# Patient Record
Sex: FEMALE | Race: BLACK OR AFRICAN AMERICAN | Employment: UNEMPLOYED | URBAN - METROPOLITAN AREA
[De-identification: names, ages, dates, MRNs, and addresses within clinical notes are randomized per-mention and may not be internally consistent; named-entity substitution may affect disease eponyms.]

---

## 2017-01-15 ENCOUNTER — APPOINTMENT (OUTPATIENT)
Dept: GENERAL RADIOLOGY | Age: 57
End: 2017-01-15
Attending: PHYSICIAN ASSISTANT
Payer: MEDICAID

## 2017-01-15 ENCOUNTER — HOSPITAL ENCOUNTER (OUTPATIENT)
Age: 57
Setting detail: OBSERVATION
Discharge: HOME | End: 2017-01-17
Attending: EMERGENCY MEDICINE | Admitting: FAMILY MEDICINE
Payer: MEDICAID

## 2017-01-15 DIAGNOSIS — J20.9 ACUTE BRONCHITIS, UNSPECIFIED ORGANISM: ICD-10-CM

## 2017-01-15 DIAGNOSIS — M62.82 NON-TRAUMATIC RHABDOMYOLYSIS: Primary | ICD-10-CM

## 2017-01-15 DIAGNOSIS — S92.109A: ICD-10-CM

## 2017-01-15 PROBLEM — E86.0 DEHYDRATION: Status: ACTIVE | Noted: 2017-01-15

## 2017-01-15 LAB
ALBUMIN SERPL BCP-MCNC: 3.2 G/DL (ref 3.5–5)
ALBUMIN/GLOB SERPL: 0.8 {RATIO} (ref 1.1–2.2)
ALP SERPL-CCNC: 154 U/L (ref 45–117)
ALT SERPL-CCNC: 26 U/L (ref 12–78)
ANION GAP BLD CALC-SCNC: 7 MMOL/L (ref 5–15)
APPEARANCE UR: ABNORMAL
AST SERPL W P-5'-P-CCNC: 30 U/L (ref 15–37)
BACTERIA URNS QL MICRO: ABNORMAL /HPF
BASOPHILS # BLD AUTO: 0 K/UL (ref 0–0.1)
BASOPHILS # BLD: 0 % (ref 0–1)
BILIRUB SERPL-MCNC: 0.3 MG/DL (ref 0.2–1)
BILIRUB UR QL: NEGATIVE
BUN SERPL-MCNC: 16 MG/DL (ref 6–20)
BUN/CREAT SERPL: 15 (ref 12–20)
CALCIUM SERPL-MCNC: 8.8 MG/DL (ref 8.5–10.1)
CHLORIDE SERPL-SCNC: 103 MMOL/L (ref 97–108)
CK MB CFR SERPL CALC: 0.5 % (ref 0–2.5)
CK MB SERPL-MCNC: 7.9 NG/ML (ref 5–25)
CK SERPL-CCNC: 1447 U/L (ref 26–192)
CO2 SERPL-SCNC: 28 MMOL/L (ref 21–32)
COLOR UR: ABNORMAL
CREAT SERPL-MCNC: 1.06 MG/DL (ref 0.55–1.02)
EOSINOPHIL # BLD: 0 K/UL (ref 0–0.4)
EOSINOPHIL NFR BLD: 0 % (ref 0–7)
EPITH CASTS URNS QL MICRO: ABNORMAL /LPF
ERYTHROCYTE [DISTWIDTH] IN BLOOD BY AUTOMATED COUNT: 13.4 % (ref 11.5–14.5)
GLOBULIN SER CALC-MCNC: 3.9 G/DL (ref 2–4)
GLUCOSE BLD STRIP.AUTO-MCNC: 290 MG/DL (ref 65–100)
GLUCOSE BLD STRIP.AUTO-MCNC: 297 MG/DL (ref 65–100)
GLUCOSE BLD STRIP.AUTO-MCNC: 304 MG/DL (ref 65–100)
GLUCOSE SERPL-MCNC: 278 MG/DL (ref 65–100)
GLUCOSE UR STRIP.AUTO-MCNC: >1000 MG/DL
HCT VFR BLD AUTO: 36.6 % (ref 35–47)
HGB BLD-MCNC: 11.8 G/DL (ref 11.5–16)
HGB UR QL STRIP: ABNORMAL
KETONES UR QL STRIP.AUTO: NEGATIVE MG/DL
LEUKOCYTE ESTERASE UR QL STRIP.AUTO: NEGATIVE
LYMPHOCYTES # BLD AUTO: 30 % (ref 12–49)
LYMPHOCYTES # BLD: 3.1 K/UL (ref 0.8–3.5)
MAGNESIUM SERPL-MCNC: 2.2 MG/DL (ref 1.6–2.4)
MCH RBC QN AUTO: 29 PG (ref 26–34)
MCHC RBC AUTO-ENTMCNC: 32.2 G/DL (ref 30–36.5)
MCV RBC AUTO: 89.9 FL (ref 80–99)
MONOCYTES # BLD: 0.4 K/UL (ref 0–1)
MONOCYTES NFR BLD AUTO: 4 % (ref 5–13)
NEUTS SEG # BLD: 6.6 K/UL (ref 1.8–8)
NEUTS SEG NFR BLD AUTO: 66 % (ref 32–75)
NITRITE UR QL STRIP.AUTO: NEGATIVE
PH UR STRIP: 5.5 [PH] (ref 5–8)
PLATELET # BLD AUTO: 246 K/UL (ref 150–400)
POTASSIUM SERPL-SCNC: 4.6 MMOL/L (ref 3.5–5.1)
PROT SERPL-MCNC: 7.1 G/DL (ref 6.4–8.2)
PROT UR STRIP-MCNC: 100 MG/DL
RBC # BLD AUTO: 4.07 M/UL (ref 3.8–5.2)
RBC #/AREA URNS HPF: ABNORMAL /HPF (ref 0–5)
SERVICE CMNT-IMP: ABNORMAL
SODIUM SERPL-SCNC: 138 MMOL/L (ref 136–145)
SP GR UR REFRACTOMETRY: 1.03 (ref 1–1.03)
TROPONIN I SERPL-MCNC: <0.04 NG/ML
UA: UC IF INDICATED,UAUC: ABNORMAL
UROBILINOGEN UR QL STRIP.AUTO: 0.2 EU/DL (ref 0.2–1)
WBC # BLD AUTO: 10.1 K/UL (ref 3.6–11)
WBC URNS QL MICRO: ABNORMAL /HPF (ref 0–4)

## 2017-01-15 PROCEDURE — 94640 AIRWAY INHALATION TREATMENT: CPT

## 2017-01-15 PROCEDURE — 71020 XR CHEST PA LAT: CPT

## 2017-01-15 PROCEDURE — 80053 COMPREHEN METABOLIC PANEL: CPT | Performed by: PHYSICIAN ASSISTANT

## 2017-01-15 PROCEDURE — 73610 X-RAY EXAM OF ANKLE: CPT

## 2017-01-15 PROCEDURE — 74011000250 HC RX REV CODE- 250: Performed by: FAMILY MEDICINE

## 2017-01-15 PROCEDURE — 75810000053 HC SPLINT APPLICATION

## 2017-01-15 PROCEDURE — 87077 CULTURE AEROBIC IDENTIFY: CPT | Performed by: EMERGENCY MEDICINE

## 2017-01-15 PROCEDURE — 82550 ASSAY OF CK (CPK): CPT | Performed by: PHYSICIAN ASSISTANT

## 2017-01-15 PROCEDURE — 74011250636 HC RX REV CODE- 250/636: Performed by: FAMILY MEDICINE

## 2017-01-15 PROCEDURE — 84484 ASSAY OF TROPONIN QUANT: CPT | Performed by: PHYSICIAN ASSISTANT

## 2017-01-15 PROCEDURE — 87186 SC STD MICRODIL/AGAR DIL: CPT | Performed by: EMERGENCY MEDICINE

## 2017-01-15 PROCEDURE — 96361 HYDRATE IV INFUSION ADD-ON: CPT

## 2017-01-15 PROCEDURE — 74011000250 HC RX REV CODE- 250: Performed by: PHYSICIAN ASSISTANT

## 2017-01-15 PROCEDURE — 96360 HYDRATION IV INFUSION INIT: CPT

## 2017-01-15 PROCEDURE — 81001 URINALYSIS AUTO W/SCOPE: CPT | Performed by: PHYSICIAN ASSISTANT

## 2017-01-15 PROCEDURE — 82962 GLUCOSE BLOOD TEST: CPT

## 2017-01-15 PROCEDURE — 74011636637 HC RX REV CODE- 636/637: Performed by: FAMILY MEDICINE

## 2017-01-15 PROCEDURE — 36415 COLL VENOUS BLD VENIPUNCTURE: CPT | Performed by: PHYSICIAN ASSISTANT

## 2017-01-15 PROCEDURE — 99285 EMERGENCY DEPT VISIT HI MDM: CPT

## 2017-01-15 PROCEDURE — 83735 ASSAY OF MAGNESIUM: CPT | Performed by: PHYSICIAN ASSISTANT

## 2017-01-15 PROCEDURE — 87086 URINE CULTURE/COLONY COUNT: CPT | Performed by: EMERGENCY MEDICINE

## 2017-01-15 PROCEDURE — 74011250637 HC RX REV CODE- 250/637: Performed by: FAMILY MEDICINE

## 2017-01-15 PROCEDURE — 74011250637 HC RX REV CODE- 250/637: Performed by: PHYSICIAN ASSISTANT

## 2017-01-15 PROCEDURE — 85025 COMPLETE CBC W/AUTO DIFF WBC: CPT | Performed by: PHYSICIAN ASSISTANT

## 2017-01-15 PROCEDURE — 77030028224 HC PDNG CST BSNM -A

## 2017-01-15 PROCEDURE — 74011250636 HC RX REV CODE- 250/636: Performed by: PHYSICIAN ASSISTANT

## 2017-01-15 PROCEDURE — 77030013140 HC MSK NEB VYRM -A

## 2017-01-15 PROCEDURE — 65270000029 HC RM PRIVATE

## 2017-01-15 PROCEDURE — 99218 HC RM OBSERVATION: CPT

## 2017-01-15 PROCEDURE — 96372 THER/PROPH/DIAG INJ SC/IM: CPT

## 2017-01-15 RX ORDER — DEXTROSE 50 % IN WATER (D50W) INTRAVENOUS SYRINGE
12.5-25 AS NEEDED
Status: DISCONTINUED | OUTPATIENT
Start: 2017-01-15 | End: 2017-01-17 | Stop reason: HOSPADM

## 2017-01-15 RX ORDER — SODIUM CHLORIDE 0.9 % (FLUSH) 0.9 %
5-10 SYRINGE (ML) INJECTION EVERY 8 HOURS
Status: DISCONTINUED | OUTPATIENT
Start: 2017-01-15 | End: 2017-01-17 | Stop reason: HOSPADM

## 2017-01-15 RX ORDER — INSULIN GLARGINE 100 [IU]/ML
25 INJECTION, SOLUTION SUBCUTANEOUS
Status: DISCONTINUED | OUTPATIENT
Start: 2017-01-15 | End: 2017-01-16

## 2017-01-15 RX ORDER — METFORMIN HYDROCHLORIDE 500 MG/1
500 TABLET, EXTENDED RELEASE ORAL 2 TIMES DAILY WITH MEALS
COMMUNITY
End: 2017-05-24 | Stop reason: SDUPTHER

## 2017-01-15 RX ORDER — SODIUM CHLORIDE 0.9 % (FLUSH) 0.9 %
5-10 SYRINGE (ML) INJECTION AS NEEDED
Status: DISCONTINUED | OUTPATIENT
Start: 2017-01-15 | End: 2017-01-17 | Stop reason: HOSPADM

## 2017-01-15 RX ORDER — IBUPROFEN 400 MG/1
400 TABLET ORAL
Status: DISCONTINUED | OUTPATIENT
Start: 2017-01-15 | End: 2017-01-15

## 2017-01-15 RX ORDER — HYDROCODONE BITARTRATE AND ACETAMINOPHEN 5; 325 MG/1; MG/1
1 TABLET ORAL
Status: COMPLETED | OUTPATIENT
Start: 2017-01-15 | End: 2017-01-15

## 2017-01-15 RX ORDER — SODIUM CHLORIDE 9 MG/ML
150 INJECTION, SOLUTION INTRAVENOUS CONTINUOUS
Status: DISCONTINUED | OUTPATIENT
Start: 2017-01-15 | End: 2017-01-17 | Stop reason: HOSPADM

## 2017-01-15 RX ORDER — INSULIN GLARGINE 100 [IU]/ML
16 INJECTION, SOLUTION SUBCUTANEOUS
Status: DISCONTINUED | OUTPATIENT
Start: 2017-01-15 | End: 2017-01-15

## 2017-01-15 RX ORDER — MAGNESIUM SULFATE 100 %
4 CRYSTALS MISCELLANEOUS AS NEEDED
Status: DISCONTINUED | OUTPATIENT
Start: 2017-01-15 | End: 2017-01-17 | Stop reason: HOSPADM

## 2017-01-15 RX ORDER — PREDNISONE 20 MG/1
20 TABLET ORAL
Status: DISCONTINUED | OUTPATIENT
Start: 2017-01-16 | End: 2017-01-17 | Stop reason: HOSPADM

## 2017-01-15 RX ORDER — IPRATROPIUM BROMIDE AND ALBUTEROL SULFATE 2.5; .5 MG/3ML; MG/3ML
3 SOLUTION RESPIRATORY (INHALATION)
Status: DISCONTINUED | OUTPATIENT
Start: 2017-01-15 | End: 2017-01-16

## 2017-01-15 RX ORDER — INSULIN LISPRO 100 [IU]/ML
INJECTION, SOLUTION INTRAVENOUS; SUBCUTANEOUS
Status: DISCONTINUED | OUTPATIENT
Start: 2017-01-15 | End: 2017-01-15

## 2017-01-15 RX ORDER — ONDANSETRON 2 MG/ML
4 INJECTION INTRAMUSCULAR; INTRAVENOUS
Status: DISCONTINUED | OUTPATIENT
Start: 2017-01-15 | End: 2017-01-17 | Stop reason: HOSPADM

## 2017-01-15 RX ORDER — TRAMADOL HYDROCHLORIDE 50 MG/1
50 TABLET ORAL
Status: DISCONTINUED | OUTPATIENT
Start: 2017-01-15 | End: 2017-01-17 | Stop reason: HOSPADM

## 2017-01-15 RX ORDER — IPRATROPIUM BROMIDE AND ALBUTEROL SULFATE 2.5; .5 MG/3ML; MG/3ML
3 SOLUTION RESPIRATORY (INHALATION)
Status: COMPLETED | OUTPATIENT
Start: 2017-01-15 | End: 2017-01-15

## 2017-01-15 RX ORDER — HEPARIN SODIUM 5000 [USP'U]/ML
5000 INJECTION, SOLUTION INTRAVENOUS; SUBCUTANEOUS EVERY 8 HOURS
Status: DISCONTINUED | OUTPATIENT
Start: 2017-01-15 | End: 2017-01-17 | Stop reason: HOSPADM

## 2017-01-15 RX ORDER — PANTOPRAZOLE SODIUM 40 MG/1
40 TABLET, DELAYED RELEASE ORAL
Status: DISCONTINUED | OUTPATIENT
Start: 2017-01-16 | End: 2017-01-17 | Stop reason: HOSPADM

## 2017-01-15 RX ORDER — ACETAMINOPHEN 325 MG/1
650 TABLET ORAL
Status: DISCONTINUED | OUTPATIENT
Start: 2017-01-15 | End: 2017-01-17 | Stop reason: HOSPADM

## 2017-01-15 RX ORDER — INSULIN LISPRO 100 [IU]/ML
INJECTION, SOLUTION INTRAVENOUS; SUBCUTANEOUS
Status: DISCONTINUED | OUTPATIENT
Start: 2017-01-15 | End: 2017-01-17 | Stop reason: HOSPADM

## 2017-01-15 RX ORDER — PREDNISONE 20 MG/1
20 TABLET ORAL
Status: DISCONTINUED | OUTPATIENT
Start: 2017-01-15 | End: 2017-01-15

## 2017-01-15 RX ORDER — ALBUTEROL SULFATE 0.83 MG/ML
2.5 SOLUTION RESPIRATORY (INHALATION)
Status: DISCONTINUED | OUTPATIENT
Start: 2017-01-15 | End: 2017-01-17 | Stop reason: HOSPADM

## 2017-01-15 RX ORDER — LOSARTAN POTASSIUM 50 MG/1
50 TABLET ORAL DAILY
Status: DISCONTINUED | OUTPATIENT
Start: 2017-01-16 | End: 2017-01-15

## 2017-01-15 RX ADMIN — PREDNISONE 20 MG: 20 TABLET ORAL at 18:27

## 2017-01-15 RX ADMIN — SODIUM CHLORIDE 500 ML: 900 INJECTION, SOLUTION INTRAVENOUS at 13:51

## 2017-01-15 RX ADMIN — TRAMADOL HYDROCHLORIDE 50 MG: 50 TABLET, FILM COATED ORAL at 18:27

## 2017-01-15 RX ADMIN — INSULIN LISPRO 4 UNITS: 100 INJECTION, SOLUTION INTRAVENOUS; SUBCUTANEOUS at 22:13

## 2017-01-15 RX ADMIN — INSULIN LISPRO 5 UNITS: 100 INJECTION, SOLUTION INTRAVENOUS; SUBCUTANEOUS at 18:28

## 2017-01-15 RX ADMIN — HEPARIN SODIUM 5000 UNITS: 5000 INJECTION, SOLUTION INTRAVENOUS; SUBCUTANEOUS at 17:03

## 2017-01-15 RX ADMIN — IPRATROPIUM BROMIDE AND ALBUTEROL SULFATE 3 ML: .5; 2.5 SOLUTION RESPIRATORY (INHALATION) at 19:04

## 2017-01-15 RX ADMIN — IPRATROPIUM BROMIDE AND ALBUTEROL SULFATE 3 ML: .5; 2.5 SOLUTION RESPIRATORY (INHALATION) at 14:17

## 2017-01-15 RX ADMIN — HYDROCODONE BITARTRATE AND ACETAMINOPHEN 1 TABLET: 5; 325 TABLET ORAL at 14:39

## 2017-01-15 RX ADMIN — SODIUM CHLORIDE 150 ML/HR: 900 INJECTION, SOLUTION INTRAVENOUS at 21:53

## 2017-01-15 RX ADMIN — INSULIN GLARGINE 25 UNITS: 100 INJECTION, SOLUTION SUBCUTANEOUS at 22:13

## 2017-01-15 RX ADMIN — GUAIFENESIN 600 MG: 600 TABLET, EXTENDED RELEASE ORAL at 18:27

## 2017-01-15 RX ADMIN — IPRATROPIUM BROMIDE AND ALBUTEROL SULFATE 3 ML: .5; 2.5 SOLUTION RESPIRATORY (INHALATION) at 22:32

## 2017-01-15 RX ADMIN — Medication 10 ML: at 22:14

## 2017-01-15 NOTE — ED TRIAGE NOTES
Pt. C/o of vomiting two days ago,\"I figured it was because I was coughing up flem, color is greenish\"  Pt. Also complains of cramping lower legs with the right legs being worse. Pt. Takes k+ with BP meds. Pt. States she fell yesterday and could not get up.  Laid on the floor from 10pm to 4am.

## 2017-01-15 NOTE — H&P
[address] [facility]   Admission H&P    Date of admission: 1/15/2017    Patient name: Alan Boggs  MRN: [id number]  YOB: 1960  Age: 64 y.o. Primary care provider:  Serenity Patel MD     Source of Information: patient, medical records    Chief complaint:  Rt foot pain, leg cramping, vomiting    History of Present Illness  Alan Boggs is a 64 y.o. female with Hx of DM, HTN, Diabetic Neuropathy, Asthma who presents to the ER complaining of Rt foot pain after falling on the night of 01/13/2017. Patient reports that she was experiencing some nausea with several episodes of NBNB vomiting before the onset of severe cramping in the legs during that night. Patient had to lay down in an attempt to relieve the pain in the legs, but tried to stand and feel on her feet. She could not move or stand from about 10:00 PM to 4:00 AM of the next day. Since then, the patient have been feeling an intense pain in the Rt foot and leg. The pain is worse during ambulation. Patient told ED personnel, that she also had cough with wheezing while laying on the floor. Denies fever, changes in vision, headaches, CP, SOB, diarrhea, dysuria, frequency, hematuria, or any other concern. In the ER, vital signs were unremarkable. Labs were remarkable for Glu 278, Cr 1.06 (baseline 0.66), Alk Phos 154, CK 1447, trop <0.04. Pt was treated with IVF and Splinting of Rt foot. Home Medications   Prior to Admission medications    Medication Sig Start Date End Date Taking? Authorizing Provider   metFORMIN ER (GLUCOPHAGE XR) 500 mg tablet Take 500 mg by mouth two (2) times daily (with meals). Yes Historical Provider   SUMAtriptan (IMITREX) 100 mg tablet Take 1 Tab by mouth once as needed for Migraine for up to 1 dose.  NEED OFFICE VISIT 02/62/28  Yes Serenity Patel MD   VENTOLIN HFA 90 mcg/actuation inhaler TAKE 2 PUFFS BY INHALATION EVERY FOUR (4) HOURS AS NEEDED FOR WHEEZING. 12/15/16 Yes Katt Rust MD   insulin lispro (HUMALOG) 100 unit/mL kwikpen by SubCUTAneous route Before breakfast, lunch, and dinner. Yes Historical Provider   gabapentin (NEURONTIN) 300 mg capsule TAKE 1 CAP BY MOUTH THREE (3) TIMES DAILY. 1/95/29  Yes Katt Rust MD   losartan (COZAAR) 50 mg tablet TAKE 1 TAB BY MOUTH DAILY. INDICATIONS: DIABETIC NEPHROPATHY, HYPERTENSION 0/40/56  Yes Katt Rust MD   LANTUS SOLOSTAR 100 unit/mL (3 mL) pen 30 UNITS AT NIGHT  Patient taking differently: 32 UNITS AT NIGHT 7/27/48  Yes Katt Rust MD   omeprazole (PRILOSEC) 20 mg capsule TAKE 1 CAPSULE BY MOUTH TWICE A DAY 8/51/02  Yes Katt Rust MD   L.ACIDOPH/B. LONG/L. PLANT/B.LAC (PROBIOTIC ACIDOPHILUS BEADS PO) Take 1 Cap by mouth daily. Yes Historical Provider   levocetirizine (XYZAL) 5 mg tablet Take 1 Tab by mouth daily. 0/7/30  Yes Katt Rust MD   torsemide (DEMADEX) 20 mg tablet Take 1 Tab by mouth daily. Indications: EDEMA 6/8/93  Yes Katt Rust MD   promethazine (PHENERGAN) 25 mg tablet Take 1 Tab by mouth every six (6) hours as needed for Nausea. 4/5/27  Yes Katt Rust MD   lipase-protease-amylase 36,000-114,000- 180,000 unit cpDR Take 4 Caps by mouth three (3) times daily (with meals). Take 4 capsules with each meal and 2 capsules with snacks  Patient taking differently: Take  by mouth.  Take 4 capsules with each meal and 2 capsules with snacks 7/1/48  Yes Katt Rust MD       Allergies   Allergies   Allergen Reactions    Ciprofloxacin Cough    Lisinopril Cough    Penicillins Other (comments)     Yeast infection        Past Medical History   Diagnosis Date    Arthritis     Asthma     Diabetes ([address]     GERD (gastroesophageal reflux disease)     Hypertension     Ill-defined condition      pancreatitis       Past Surgical History   Procedure Laterality Date    Aries mammo bi screening incl cad  5/3/12     normal    Pr pancreatic elastase, fecal, qual/semi-quant  1999     growths in prancreatitis    Hx cataract removal Left 10/15    Hx cataract removal Right 10/2016    Hx cholecystectomy  2005 ?  Hx gi  2001 ? gastic tumor removal.     Colonoscopy N/A 11/30/2016     COLONOSCOPY,EGD performed by Kaitlin Parker MD at [facility] ENDOSCOPY       Family History   Problem Relation Age of Onset    Diabetes Mother     Heart Disease Father     Diabetes Brother        Social History   Patient resides  x  Independently      With family care      Assisted living      SNF      Ambulates  x  Independently      With cane       Assisted walker           Alcohol history   x  None     Social     Chronic     Smoking history    None   x  Former smoker     Current smoker     History   Smoking Status    Former Smoker    Types: Cigarettes    Start date: 2/13/2016   [employer] Quit date: 2/29/2016   Smokeless Tobacco    Never Used           Drug history  x  None     Former drug user     Current drug user     Sexual history    Sexually active    x  Not sexually active     Code status  x  Full code     DNR/DNI     Partial    Code status discussed with the patient/caregivers. Review of Systems  See HPI    Physical Exam  Visit Vitals    /75 (BP 1 Location: Left arm, BP Patient Position: At rest)    Pulse 66    Temp 98.2 °F (36.8 °C)    Resp 18    Ht 5' 2\" (1.575 m)    Wt 190 lb (86.2 kg)    SpO2 99%    BMI 34.75 kg/m2        General: No acute distress. Alert. Cooperative. Head: Normocephalic. Atraumatic. Eyes:  Conjunctiva pink. Sclera white. PERRL. Ears:  Ear canals patent. TM non-erythematous. Nose:  Septum midline. Mucosa pink. No drainage. Throat: Mucosa pink. Moist mucous membranes. No tonsillar exudates or erythema. Palate movement equal bilaterally. Neck: Supple. Normal ROM. No stiffness. Respiratory: CTAB. No w/r/r/c.   Cardiovascular: RRR. Normal S1,S2. No m/r/g. Pulses 2+ throughout. GI: + bowel sounds. Nontender. No rebound tenderness or guarding. Nondistended. Extremities: She exhibits edema and tenderness. She exhibits no deformity. Marked right ankle TTP medially without discoloration. Increased pain with ROM. Distal n/v intact. Cap refill brisk. Normothermic. Unable to ambulate secondary to pain. No lesions. Diffuse leg tenderness bilaterally     Musculoskeletal: Full ROM in all extremities. Neuro: CN II-XII grossly intact. Strength 5/5 in all extremities. Sensation intact in all extremities. DTRs 2+ throughout. Skin: Clear. No rashes. No ulcers. : Deferred   Rectal: Deferred       Laboratory Data  Recent Results (from the past 24 hour(s))   GLUCOSE, POC    Collection Time: 01/15/17  1:47 PM   Result Value Ref Range    Glucose (POC) 297 (H) 65 - 100 mg/dL    Performed by Carson Tijerina    CBC WITH AUTOMATED DIFF    Collection Time: 01/15/17  1:49 PM   Result Value Ref Range    WBC 10.1 3.6 - 11.0 K/uL    RBC 4.07 3.80 - 5.20 M/uL    HGB 11.8 11.5 - 16.0 g/dL    HCT 36.6 35.0 - 47.0 %    MCV 89.9 80.0 - 99.0 FL    MCH 29.0 26.0 - 34.0 PG    MCHC 32.2 30.0 - 36.5 g/dL    RDW 13.4 11.5 - 14.5 %    PLATELET 526 326 - 556 K/uL    NEUTROPHILS 66 32 - 75 %    LYMPHOCYTES 30 12 - 49 %    MONOCYTES 4 (L) 5 - 13 %    EOSINOPHILS 0 0 - 7 %    BASOPHILS 0 0 - 1 %    ABS. NEUTROPHILS 6.6 1.8 - 8.0 K/UL    ABS. LYMPHOCYTES 3.1 0.8 - 3.5 K/UL    ABS. MONOCYTES 0.4 0.0 - 1.0 K/UL    ABS. EOSINOPHILS 0.0 0.0 - 0.4 K/UL    ABS.  BASOPHILS 0.0 0.0 - 0.1 K/UL   METABOLIC PANEL, COMPREHENSIVE    Collection Time: 01/15/17  1:49 PM   Result Value Ref Range    Sodium 138 136 - 145 mmol/L    Potassium 4.6 3.5 - 5.1 mmol/L    Chloride 103 97 - 108 mmol/L    CO2 28 21 - 32 mmol/L    Anion gap 7 5 - 15 mmol/L    Glucose 278 (H) 65 - 100 mg/dL    BUN 16 6 - 20 MG/DL    Creatinine 1.06 (H) 0.55 - 1.02 MG/DL    BUN/Creatinine ratio 15 12 - 20      GFR est AA >60 >60 ml/min/1.73m2    GFR est non-AA 54 (L) >60 ml/min/1.73m2    Calcium 8.8 8.5 - 10.1 MG/DL    Bilirubin, total 0.3 0.2 - 1.0 MG/DL    ALT 26 12 - 78 U/L    AST 30 15 - 37 U/L    Alk. phosphatase 154 (H) 45 - 117 U/L    Protein, total 7.1 6.4 - 8.2 g/dL    Albumin 3.2 (L) 3.5 - 5.0 g/dL    Globulin 3.9 2.0 - 4.0 g/dL    A-G Ratio 0.8 (L) 1.1 - 2.2     CK W/ CKMB & INDEX    Collection Time: 01/15/17  1:49 PM   Result Value Ref Range    CK 1447 (H) 26 - 192 U/L    CK - MB 7.9 (H) <3.6 NG/ML    CK-MB Index 0.5 0 - 2.5     TROPONIN I    Collection Time: 01/15/17  1:49 PM   Result Value Ref Range    Troponin-I, Qt. <0.04 <0.05 ng/mL   MAGNESIUM    Collection Time: 01/15/17  1:49 PM   Result Value Ref Range    Magnesium 2.2 1.6 - 2.4 mg/dL   URINALYSIS W/ REFLEX CULTURE    Collection Time: 01/15/17  2:17 PM   Result Value Ref Range    Color YELLOW/STRAW      Appearance CLOUDY (A) CLEAR      Specific gravity 1.027 1.003 - 1.030      pH (UA) 5.5 5.0 - 8.0      Protein 100 (A) NEG mg/dL    Glucose >1000 (A) NEG mg/dL    Ketone NEGATIVE  NEG mg/dL    Bilirubin NEGATIVE  NEG      Blood MODERATE (A) NEG      Urobilinogen 0.2 0.2 - 1.0 EU/dL    Nitrites NEGATIVE  NEG      Leukocyte Esterase NEGATIVE  NEG      WBC 10-20 0 - 4 /hpf    RBC 5-10 0 - 5 /hpf    Epithelial cells FEW FEW /lpf    Bacteria 4+ (A) NEG /hpf    UA:UC IF INDICATED URINE CULTURE ORDERED (A) CNI     GLUCOSE, POC    Collection Time: 01/15/17  5:47 PM   Result Value Ref Range    Glucose (POC) 290 (H) 65 - 100 mg/dL    Performed by Montse Escalante      Imaging    EXAM: XR CHEST PA LAT     INDICATION: cough, congestion     COMPARISON: 2011.     FINDINGS: PA and lateral radiographs of the chest demonstrate clear lungs. The  cardiac and mediastinal contours and pulmonary vascularity are normal. The  bones and soft tissues are within normal limits.      IMPRESSION  IMPRESSION: No acute abnormality        EXAM: XR ANKLE LT MIN 3 V     INDICATION: Trauma. Right leg pain.  Symptoms for 2 days     COMPARISON: None.     FINDINGS: Three views of the left ankle demonstrate a small age indeterminate  fracture dorsal margin of the talar head. There is no other acute osseous or  articular abnormality. The soft tissues are within normal limits.     IMPRESSION  IMPRESSION:   1. Age indeterminate fracture dorsal margin of the talar head. Assessment and Plan   Cathie Héctor is a 64 y.o. female who is admitted for Dehydration. Dehydration with Rhabdomyolysis - s/p GLF due to leg cramping now with elevated CK to 1447. Layed down on floor for ~ 6 hours. - IVF hydration  - Daily CK. Rt Ankle Pain - XR showed a small age indeterminate fracture dorsal margin of the talar head. Pain control with Tylenol prn, if not controlled will consider a stronger option.   - Tylenol PRN  - PT/OT    T2DM - On Lantus 32 units at home, will do 80% 25 units  - Lantus 25u at bedtime  - SSI Regular Sensitivity   - Accuchecks ACHS    Asthma - Possible exacerbation with mild wheezing and cough. - DuoNebs Q4H  - Albuterol PRN  - Prednisone 20 mg QDay x 5 days  - Mucinex 600 mg BID    GERD -  Stable  - Protonix 40 mg AM    HTN - Stable   - Hold Losartan 50 mg  until baseline renal function    LE Edema - Stable   - Hold Torsemide 20 mg daily until baseline renal function      FEN/GI - Regular Diabetic Diet. 0.9% NS at 150 mL/hr. Activity - Ambulate with Assistance, Fall Precautions   DVT prophylaxis - Heparin and SCDs  GI prophylaxis -  Zofran  Disposition - Plan to d/c to Home.     CODE STATUS:  FULL CODE       Patient to be discussed with Dr. Iva Kumar, [address] Problems  Date Reviewed: 7/6/2016          Codes Class Noted POA    * (Principal)Dehydration ICD-10-CM: E86.0  ICD-9-CM: 276.51  1/15/2017 Yes        UTI (urinary tract infection) ICD-10-CM: N39.0  ICD-9-CM: 599.0  7/14/2016 Yes

## 2017-01-15 NOTE — IP AVS SNAPSHOT
[address] 
[phone] Patient: Yousuf Gutierrez MRN: [id number] CQ You are allergic to the following Allergen Reactions Ciprofloxacin Cough Lisinopril Cough Penicillins Other (comments) Yeast infection Recent Documentation Height Weight Breastfeeding? BMI OB Status Smoking Status 1.575 m 86.2 kg No 34.75 kg/m2 Postmenopausal Former Smoker Unresulted Labs Order Current Status CULTURE, BLOOD, PERIPHERAL Preliminary result CULTURE, URINE Preliminary result Emergency Contacts Name Discharge Info Relation Home Work Mobile Francis Purvis DISCHARGE CAREGIVER [3] Other Relative [6] [phone] About your hospitalization You were admitted on:  January 15, 2017 You last received care in the:  [facility] 4M POST SURG ORT 2 You were discharged on:  2017 Unit phone number:  [phone] Why you were hospitalized Your primary diagnosis was:  Dehydration Your diagnoses also included:  Uti (Urinary Tract Infection), Diabetic Neuropathy (Hcc), Essential Hypertension, Traumatic Rhabdomyolysis (Hcc), Closed Fracture Of Right Talus, Synovial Cyst Of Right Knee, Mild Intermittent Asthma Without Complication, Diabetes (Hcc) Providers Seen During Your Hospitalizations Provider Role Specialty Primary office phone Joana Aragon MD Attending Provider Emergency Medicine [phone] Dotty Baker DO Attending Provider [facility] [phone] Rafa Bauer MD Attending Provider Family Practice [phone] Your Primary Care Physician (PCP) Primary Care Physician Office Phone Office Fax [facility] [phone] Follow-up Information Follow up With Details Comments Contact Info An Arredondo MD Schedule an appointment as soon as possible for a visit in 2 days Hopsital follow-up, Check BP (if low we added HCTZ 12.5mg), blood glucose check  [address] [facility] [address] [facility] 
[phone] [facility] [address] 
[phone] Current Discharge Medication List  
  
START taking these medications Dose & Instructions Dispensing Information Comments Morning Noon Evening Bedtime  
 acetaminophen 325 mg tablet Commonly known as:  TYLENOL Your next dose is: Today, Tomorrow Other:  _________ Dose:  650 mg Take 2 Tabs by mouth every six (6) hours as needed. Quantity:  40 Tab Refills:  0  
     
   
   
   
  
 cephALEXin 500 mg capsule Commonly known as:  Rick Mendez Your next dose is: Today, Tomorrow Other:  _________ Dose:  500 mg Take 1 Cap by mouth every twelve (12) hours for 4 days. Quantity:  8 Cap Refills:  0  
     
   
   
   
  
 guaiFENesin  mg SR tablet Commonly known as:  [employer] Your next dose is: Today, Tomorrow Other:  _________ Dose:  600 mg Take 1 Tab by mouth two (2) times a day. Quantity:  30 Tab Refills:  0  
     
   
   
   
  
 hydroCHLOROthiazide 12.5 mg tablet Commonly known as:  HYDRODIURIL Your next dose is: Today, Tomorrow Other:  _________ Dose:  12.5 mg Take 1 Tab by mouth daily. Quantity:  30 Tab Refills:  0 HYDROcodone-acetaminophen 5-325 mg per tablet Commonly known as:  Rogelio Handlekris Your next dose is: Today, Tomorrow Other:  _________ Dose:  1 Tab Take 1 Tab by mouth every eight (8) hours as needed. Max Daily Amount: 3 Tabs. Quantity:  5 Tab Refills:  0  
     
   
   
   
  
 predniSONE 20 mg tablet Commonly known as:  Shawn Gun Your next dose is: Today, Tomorrow Other:  _________ Dose:  20 mg Take 1 Tab by mouth daily (with breakfast) for 3 days. Quantity:  3 Tab Refills:  0 CONTINUE these medications which have CHANGED Dose & Instructions Dispensing Information Comments Morning Noon Evening Bedtime LANTUS SOLOSTAR 100 unit/mL (3 mL) pen Generic drug:  insulin glargine What changed:  See the new instructions. Your next dose is: Today, Tomorrow Other:  _________  
   
   
 30 UNITS AT NIGHT Quantity:  15 mL Refills:  3  
     
   
   
   
  
 lipase-protease-amylase 36,000-114,000- 180,000 unit Cpdr  
What changed:   
- how much to take - when to take this 
- additional instructions Your next dose is: Today, Tomorrow Other:  _________ Dose:  4 Cap Take 4 Caps by mouth three (3) times daily (with meals). Take 4 capsules with each meal and 2 capsules with snacks Quantity:  480 Cap Refills:  12 CONTINUE these medications which have NOT CHANGED Dose & Instructions Dispensing Information Comments Morning Noon Evening Bedtime  
 gabapentin 300 mg capsule Commonly known as:  NEURONTIN Your next dose is: Today, Tomorrow Other:  _________ TAKE 1 CAP BY MOUTH THREE (3) TIMES DAILY. Quantity:  90 Cap Refills:  5  
     
   
   
   
  
 insulin lispro 100 unit/mL kwikpen Commonly known as:  HUMALOG Your next dose is: Today, Tomorrow Other:  _________  
   
   
 by SubCUTAneous route Before breakfast, lunch, and dinner. Refills:  0  
     
   
   
   
  
 levocetirizine 5 mg tablet Commonly known as:  Rosa Isela Malu Your next dose is: Today, Tomorrow Other:  _________ Dose:  5 mg Take 1 Tab by mouth daily. Quantity:  30 Tab Refills:  12  
     
   
   
   
  
 losartan 50 mg tablet Commonly known as:  COZAAR Your next dose is: Today, Tomorrow Other:  _________ TAKE 1 TAB BY MOUTH DAILY. INDICATIONS: DIABETIC NEPHROPATHY, HYPERTENSION Quantity:  30 Tab Refills:  5  
     
   
   
   
  
 metFORMIN  mg tablet Commonly known as:  GLUCOPHAGE XR Your next dose is: Today, Tomorrow Other:  _________ Dose:  500 mg Take 500 mg by mouth two (2) times daily (with meals). Refills:  0  
     
   
   
   
  
 omeprazole 20 mg capsule Commonly known as:  PRILOSEC Your next dose is: Today, Tomorrow Other:  _________ TAKE 1 CAPSULE BY MOUTH TWICE A DAY Quantity:  60 Cap Refills:  3 PROBIOTIC ACIDOPHILUS BEADS PO Your next dose is: Today, Tomorrow Other:  _________ Dose:  1 Cap Take 1 Cap by mouth daily. Refills:  0  
     
   
   
   
  
 promethazine 25 mg tablet Commonly known as:  PHENERGAN Your next dose is: Today, Tomorrow Other:  _________ Dose:  25 mg Take 1 Tab by mouth every six (6) hours as needed for Nausea. Quantity:  20 Tab Refills:  2 SUMAtriptan 100 mg tablet Commonly known as:  IMITREX Your next dose is: Today, Tomorrow Other:  _________ Dose:  100 mg Take 1 Tab by mouth once as needed for Migraine for up to 1 dose. NEED OFFICE VISIT Quantity:  9 Tab Refills:  0  
     
   
   
   
  
 torsemide 20 mg tablet Commonly known as:  DEMADEX Your next dose is: Today, Tomorrow Other:  _________ Dose:  20 mg Take 1 Tab by mouth daily. Indications: EDEMA Quantity:  30 Tab Refills:  5 VENTOLIN HFA 90 mcg/actuation inhaler Generic drug:  albuterol Your next dose is: Today, Tomorrow Other:  _________ TAKE 2 PUFFS BY INHALATION EVERY FOUR (4) HOURS AS NEEDED FOR WHEEZING. Quantity:  18 Inhaler Refills:  0 NEED OFFICE VISIT Where to Get Your Medications Information on where to get these meds will be given to you by the nurse or doctor. ! Ask your nurse or doctor about these medications  
  acetaminophen 325 mg tablet  
 cephALEXin 500 mg capsule  
 guaiFENesin  mg SR tablet  
 hydroCHLOROthiazide 12.5 mg tablet HYDROcodone-acetaminophen 5-325 mg per tablet  
 predniSONE 20 mg tablet Discharge Instructions HOME DISCHARGE INSTRUCTIONS Kumar Jordan / [id number] : 1960 Admission date: 1/15/2017 Discharge date: 2017 Please bring this form with you to show your care provider at your follow-up appointment. Primary care provider:  Neptali Pleitez MD 
 
Discharging provider: Kristyn Love DO  - Family Medicine Resident Dr. Donald Nickerson MD - Attending, Family Medicine You have been admitted to the hospital with the following diagnoses: 
 
ACUTE DIAGNOSES: 
UTI (urinary tract infection) Sarah Richter . . . . . . . . . . . . . . . . . . . . . . . . . . . . . . . . . . . . . . . . . . . . . . . . . . . . . . . . . . . . . . . . . . . . . . Sarah Richter FOLLOW-UP CARE RECOMMENDATIONS: 
- You were found to have a fracture on imaging.  
- You can follow up with your PCP or with Ortho. Both numbers have been provided - You can take ibuprofen or tylenol for pain. You have been provided some pain medications. Follow up with PCP for further pain management 
 
-For your asthma, we placed you of 3 more days of Prednisone. Important to take and important to check your blood sugars. This medication can increase blood sugars, therefore add lantus accordingly. This is why you need to see Dr. Matt Manrique with in 2 days, so you can discuss your blood sugars with her. 
 
- Check your blood pressure at home, and also follow up with your PCP. If you feel dizzy or lightheaded, tell your PCP. Your blood pressures were slightly high here so we added HCTZ 12.5 mg to your current Losartan - Check Blood Glucose, you are on prednisone, would make your blood sugar higher. If high, then add 2 units for your lantus.  
 
- You had a possible infection found in your urine. You were started on an antibiotic for this and have 4 more days of Keflex, take twice per day. Follow-up tests needed: Blood glucose, CBC Pending test results: At the time of your discharge the following test results are still pending: none. Please make sure you review these results with your outpatient follow-up provider(s). Specific symptoms to watch for: chest pain, shortness of breath, fever (100.4 Farenheit or greater), chills, nausea, vomiting, diarrhea, change in mentation, falling, weakness, bleeding. DIET/what to eat:  Diabetic Diet ACTIVITY:  Activity as tolerated What to do if new or unexpected symptoms occur? If you experience any of the above symptoms (or should other concerns or questions arise after discharge) please call your primary care physician. Return to the emergency room if you cannot get hold of your doctor. · It is very important that you keep your follow-up appointment(s). · Please bring discharge papers, medication list (and/or medication bottles) to your follow-up appointments for review by your outpatient provider(s). · Please check the list of medications and be sure it includes every medication (even non-prescription medications) that your provider wants you to take. · It is important that you take the medication exactly as they are prescribed. · Keep your medication in the bottles provided by the pharmacist and keep a list of the medication names, dosages, and times to be taken in your wallet. · Do not take other medications without consulting your doctor. · If you have any questions about your medications or other instructions, please talk to your nurse or care provider before you leave the hospital.  
 
Information obtained by: I understand that if any problems occur once I am at home I am to contact my physician. These instructions were explained to me and I had the opportunity to ask questions. I understand and acknowledge receipt of the instructions indicated above. Physician's or R.N.'s Signature                                                                  Date/Time Patient or Representative Signature                                                          Date/Time Follow-up Information Follow up With Details Comments Contact Info Hien Shane MD Schedule an appointment as soon as possible for a visit in 2 days Hopsital follow-up, Check BP (if low we added HCTZ 12.5mg), blood glucose check  [address] [facility] [address] [facility] 
[phone] [facility] [address] 
[phone] Discharge Orders None [employer] Announcement We are excited to announce that we are making your provider's discharge notes available to you in [employer]. You will see these notes when they are completed and signed by the physician that discharged you from your recent hospital stay. If you have any questions or concerns about any information you see in [employer], please call the Health Information Department where you were seen or reach out to your Primary Care Provider for more information about your plan of care. Introducing [facility]! Leda Crockett introduces [employer] patient portal. Now you can access parts of your medical record, email your doctor's office, and request medication refills online.    
 
1. In your internet browser, go to [email or web address] 2. Click on the First Time User? Click Here link in the Sign In box. You will see the New Member Sign Up page. 3. Enter your [employer] Access Code exactly as it appears below. You will not need to use this code after youve completed the sign-up process. If you do not sign up before the expiration date, you must request a new code. · [employer] Access Code: [email or web address] Expires: 2/28/2017 10:51 AM 
 
4. Enter the last four digits of your Social Security Number (xxxx) and Date of Birth (mm/dd/yyyy) as indicated and click Submit. You will be taken to the next sign-up page. 5. Create a [employer] ID. This will be your [employer] login ID and cannot be changed, so think of one that is secure and easy to remember. 6. Create a [employer] password. You can change your password at any time. 7. Enter your Password Reset Question and Answer. This can be used at a later time if you forget your password. 8. Enter your e-mail address. You will receive e-mail notification when new information is available in [address] 9. Click Sign Up. You can now view and download portions of your medical record. 10. Click the Download Summary menu link to download a portable copy of your medical information. If you have questions, please visit the Frequently Asked Questions section of the [employer] website. Remember, [employer] is NOT to be used for urgent needs. For medical emergencies, dial 911. Now available from your iPhone and Android! General Information Please provide this summary of care documentation to your next provider. Patient Signature:  ____________________________________________________________ Date:  ____________________________________________________________  
  
Bootjack Karla Provider Signature:  ____________________________________________________________ Date:  ____________________________________________________________

## 2017-01-15 NOTE — ED NOTES
Attempted to call report, unable to receive at this time      5:00pm pt's leg was splinted by Hermelinda Console, neurovascular was intact before and after.

## 2017-01-15 NOTE — ED NOTES
TRANSFER - OUT REPORT:    Verbal report given to RONAN Paula(name) on Lazarus Don  being transferred to 4th floor(unit) for routine progression of care       Report consisted of patients Situation, Background, Assessment and   Recommendations(SBAR). Information from the following report(s) SBAR, Kardex, ED Summary, [facility] and Recent Results was reviewed with the receiving nurse. Lines:   Peripheral IV 01/15/17 Right Antecubital (Active)   Site Assessment Clean, dry, & intact 1/15/2017  2:16 PM   Phlebitis Assessment 0 1/15/2017  2:16 PM   Infiltration Assessment 0 1/15/2017  2:16 PM   Dressing Status Clean, dry, & intact 1/15/2017  2:16 PM        Opportunity for questions and clarification was provided. All of pt's belongings, valuables, and medications were sent with patient.     Patient transported with:   XTWIP

## 2017-01-15 NOTE — PROGRESS NOTES
[facility]: MED RECONCILIATION    Comments: Interviewed patient. RX Query reviewed. Allergies: Ciprofloxacin; Lisinopril; and Penicillins      Prior to Admission Medications   Prescriptions Last Dose Informant Patient Reported? Taking? L.ACIDOPH/B. LONG/L. PLANT/B.LAC (PROBIOTIC ACIDOPHILUS BEADS PO) 2017 Self Yes Yes   Sig: Take 1 Cap by mouth daily. LANTUS SOLOSTAR 100 unit/mL (3 mL) pen 2017 at HS Self No Yes   Si UNITS AT NIGHT   Patient taking differently: 32 UNITS AT NIGHT   SUMAtriptan (IMITREX) 100 mg tablet 2016 Self No Yes   Sig: Take 1 Tab by mouth once as needed for Migraine for up to 1 dose. NEED OFFICE VISIT   VENTOLIN HFA 90 mcg/actuation inhaler  Self No Yes   Sig: TAKE 2 PUFFS BY INHALATION EVERY FOUR (4) HOURS AS NEEDED FOR WHEEZING. gabapentin (NEURONTIN) 300 mg capsule 2017 Self No Yes   Sig: TAKE 1 CAP BY MOUTH THREE (3) TIMES DAILY. insulin lispro (HUMALOG) 100 unit/mL kwikpen 2017 Self Yes Yes   Sig: by SubCUTAneous route Before breakfast, lunch, and dinner. levocetirizine (XYZAL) 5 mg tablet 2017 Self No Yes   Sig: Take 1 Tab by mouth daily. lipase-protease-amylase 36,000-114,000- 180,000 unit cpDR 2017 Self No Yes   Sig: Take 4 Caps by mouth three (3) times daily (with meals). Take 4 capsules with each meal and 2 capsules with snacks   losartan (COZAAR) 50 mg tablet 1/15/2017 Self No Yes   Sig: TAKE 1 TAB BY MOUTH DAILY. INDICATIONS: DIABETIC NEPHROPATHY, HYPERTENSION   metFORMIN ER (GLUCOPHAGE XR) 500 mg tablet 2017  Yes Yes   Sig: Take 500 mg by mouth two (2) times daily (with meals). omeprazole (PRILOSEC) 20 mg capsule 2017 Self No Yes   Sig: TAKE 1 CAPSULE BY MOUTH TWICE A DAY   promethazine (PHENERGAN) 25 mg tablet  Self No Yes   Sig: Take 1 Tab by mouth every six (6) hours as needed for Nausea. torsemide (DEMADEX) 20 mg tablet 2017 Self No Yes   Sig: Take 1 Tab by mouth daily.  Indications: EDEMA           Dada M Ginger Vazquez   Contact: [id number]

## 2017-01-15 NOTE — ED PROVIDER NOTES
HPI Comments: Tova Jay is a 64 y.o. female who presents to the ED with a c/o leg pain. Pt states 2 days ago (1/13/17) multiple episodes of nausea and vomiting onset that gradually decreased throughout the day. Pt states that as the day progressed, she began experiencing worsening muscle cramps and spasms that she could visualize bilaterally through her legs and body. Pt experienced a GLF that night due to the spasms, and the pain was so great that she was unable to get up from [id number] the following morning. Pt complains of residual right ankle pain since then. Pt complains of significant leg pain with ambulation bilaterally that is greater over the right leg. Pt also states she developed a cough with wheezing while she was lying on the ground that causes chest pain. Pt denies taking medications for her symptoms. She specifically denies any fevers, chills, shortness of breath, headache, rash, diarrhea, sweating or weight loss. PCP: Eduardo Latham. John Cobian MD  PMHx significant for: DM, HTN, asthma, pancreatitis, arthritis and GERD  PSHx significant for: cataract removal, cholecystectomy, gastric tumor removal, colonoscopy  Social Hx: Tobacco: former tobacco user EtOH: uses socially Illicit drug use: denies    There are no further complaints or symptoms at this time. Note written by Margarette Blancas, as dictated by Charly Faith. RADHA Whitt 12:58 PM      The history is provided by the patient. Past Medical History:   Diagnosis Date    Arthritis     Asthma     Diabetes ([address]     GERD (gastroesophageal reflux disease)     Hypertension     Ill-defined condition      pancreatitis       Past Surgical History:   Procedure Laterality Date    Aries mammo bi screening incl cad  5/3/12     normal    Pr pancreatic elastase, fecal, qual/semi-quant  1999     growths in prancreatitis    Hx cataract removal Left 10/15    Hx cataract removal Right 10/2016    Hx cholecystectomy  2005 ?  Hx gi  2001 ? gastic tumor removal.     Colonoscopy N/A 11/30/2016     COLONOSCOPY,EGD performed by Oz Bales MD at [address]         Family History:   Problem Relation Age of Onset    Diabetes Mother     Heart Disease Father     Diabetes Brother        Social History     Social History    Marital status: LEGALLY      Spouse name: N/A    Number of children: N/A    Years of education: N/A     Occupational History    Not on file. Social History Main Topics    Smoking status: Former Smoker     Types: Cigarettes     Start date: 2/13/2016     Quit date: 2/29/2016    Smokeless tobacco: Never Used    Alcohol use No      Comment: social    Drug use: No    Sexual activity: Not Currently     Other Topics Concern    Not on file     Social History Narrative         ALLERGIES: Ciprofloxacin; Lisinopril; and Penicillins    Review of Systems   Constitutional: Positive for activity change. Negative for chills and fever. HENT: Negative for congestion, rhinorrhea, sneezing and sore throat. Eyes: Negative for redness and visual disturbance. Respiratory: Positive for cough and wheezing. Negative for shortness of breath. Cardiovascular: Positive for chest pain. Negative for leg swelling. Gastrointestinal: Positive for nausea and vomiting. Negative for abdominal pain and constipation. Genitourinary: Negative for difficulty urinating and frequency. Musculoskeletal: Positive for arthralgias, gait problem and myalgias. Negative for back pain and neck stiffness. Skin: Negative for rash. Neurological: Negative for dizziness, syncope, weakness and headaches. Hematological: Negative for adenopathy.        Patient Vitals for the past 12 hrs:   Temp Pulse Resp BP SpO2   01/15/17 1904 - - - - 98 %   01/15/17 1742 98.2 °F (36.8 °C) 66 18 178/75 99 %   01/15/17 1653 98 °F (36.7 °C) 73 16 167/54 99 %   01/15/17 1530 - - - 158/73 99 %   01/15/17 1520 - - - 165/63 98 %   01/15/17 1228 97.8 °F (36.6 °C) - 18 144/88 100 %              Physical Exam   Constitutional: She is oriented to person, place, and time. She appears well-developed and well-nourished. No distress. HENT:   Head: Normocephalic and atraumatic. Right Ear: External ear normal.   Left Ear: External ear normal.   Nose: Nose normal.   Mouth/Throat: Oropharynx is clear and moist. No oropharyngeal exudate. Neck: Neck supple. Cardiovascular: Normal rate, regular rhythm, normal heart sounds and intact distal pulses. Exam reveals no gallop and no friction rub. No murmur heard. Pulmonary/Chest: Effort normal and breath sounds normal. No stridor. No respiratory distress. She has no wheezes. She has no rales. She exhibits no tenderness. Bronchial cough    Abdominal: Soft. Bowel sounds are normal. She exhibits no distension and no mass. There is no tenderness. There is no rebound and no guarding. Musculoskeletal: She exhibits edema and tenderness. She exhibits no deformity. Marked right ankle TTP medially without discoloration. Increased pain with ROM. Distal n/v intact. Cap refill brisk. Normothermic. Unable to ambulate secondary to pain. No lesions. Diffuse leg tenderness bilaterally   Neurological: She is alert and oriented to person, place, and time. No cranial nerve deficit. Coordination normal.   Skin: No rash noted. No erythema. No pallor. Psychiatric: She has a normal mood and affect. Her behavior is normal.   Nursing note and vitals reviewed.        MDM  Number of Diagnoses or Management Options  Acute bronchitis, unspecified organism:   Closed fracture of talus, unspecified portion of talus, initial encounter:   Non-traumatic rhabdomyolysis:      Amount and/or Complexity of Data Reviewed  Clinical lab tests: ordered and reviewed  Tests in the radiology section of CPT®: reviewed and ordered  Tests in the medicine section of CPT®: ordered and reviewed  Obtain history from someone other than the patient: yes  Review and summarize past medical records: yes  Independent visualization of images, tracings, or specimens: yes    Patient Progress  Patient progress: stable    ED Course       Procedures  2:07 PM  Discussed pt, sx, hx and current findings with Dr. Ysabel Pandey. He is in agreement with plan  Sobia Kuo. RADHA Whitt    3:30 PM  Sobia Kuo. RADHA Whitt spoke with [facility] resident. Discussed available diagnostic tests and clinical findings. He is in agreement with care plans as outlined. He will admit  Lauryn Park PA-C    Procedure Note - Splint Placement:  4:22 PM  Performed by: ELEANOR tech, checked and readjusted by Sobia Kuo. RADHA Whitt  Neurovascularly intact prior to tx. An Orthoglass posterior splint and sugar tong was placed on pt's right ankle. Joint was placed in neutral position. Neurovascularly intact after tx. The procedure took 1-15 minutes, and pt tolerated well. Sobia Kuo. RADHA Whitt    LABORATORY TESTS:  Recent Results (from the past 12 hour(s))   GLUCOSE, POC    Collection Time: 01/15/17  1:47 PM   Result Value Ref Range    Glucose (POC) 297 (H) 65 - 100 mg/dL    Performed by Gilbert Renee    CBC WITH AUTOMATED DIFF    Collection Time: 01/15/17  1:49 PM   Result Value Ref Range    WBC 10.1 3.6 - 11.0 K/uL    RBC 4.07 3.80 - 5.20 M/uL    HGB 11.8 11.5 - 16.0 g/dL    HCT 36.6 35.0 - 47.0 %    MCV 89.9 80.0 - 99.0 FL    MCH 29.0 26.0 - 34.0 PG    MCHC 32.2 30.0 - 36.5 g/dL    RDW 13.4 11.5 - 14.5 %    PLATELET 126 760 - 734 K/uL    NEUTROPHILS 66 32 - 75 %    LYMPHOCYTES 30 12 - 49 %    MONOCYTES 4 (L) 5 - 13 %    EOSINOPHILS 0 0 - 7 %    BASOPHILS 0 0 - 1 %    ABS. NEUTROPHILS 6.6 1.8 - 8.0 K/UL    ABS. LYMPHOCYTES 3.1 0.8 - 3.5 K/UL    ABS. MONOCYTES 0.4 0.0 - 1.0 K/UL    ABS. EOSINOPHILS 0.0 0.0 - 0.4 K/UL    ABS.  BASOPHILS 0.0 0.0 - 0.1 K/UL   METABOLIC PANEL, COMPREHENSIVE    Collection Time: 01/15/17  1:49 PM   Result Value Ref Range    Sodium 138 136 - 145 mmol/L    Potassium 4.6 3.5 - 5.1 mmol/L    Chloride 103 97 - 108 mmol/L    CO2 28 21 - 32 mmol/L    Anion gap 7 5 - 15 mmol/L    Glucose 278 (H) 65 - 100 mg/dL    BUN 16 6 - 20 MG/DL    Creatinine 1.06 (H) 0.55 - 1.02 MG/DL    BUN/Creatinine ratio 15 12 - 20      GFR est AA >60 >60 ml/min/1.73m2    GFR est non-AA 54 (L) >60 ml/min/1.73m2    Calcium 8.8 8.5 - 10.1 MG/DL    Bilirubin, total 0.3 0.2 - 1.0 MG/DL    ALT 26 12 - 78 U/L    AST 30 15 - 37 U/L    Alk. phosphatase 154 (H) 45 - 117 U/L    Protein, total 7.1 6.4 - 8.2 g/dL    Albumin 3.2 (L) 3.5 - 5.0 g/dL    Globulin 3.9 2.0 - 4.0 g/dL    A-G Ratio 0.8 (L) 1.1 - 2.2     CK W/ CKMB & INDEX    Collection Time: 01/15/17  1:49 PM   Result Value Ref Range    CK 1447 (H) 26 - 192 U/L    CK - MB 7.9 (H) <3.6 NG/ML    CK-MB Index 0.5 0 - 2.5     TROPONIN I    Collection Time: 01/15/17  1:49 PM   Result Value Ref Range    Troponin-I, Qt. <0.04 <0.05 ng/mL   MAGNESIUM    Collection Time: 01/15/17  1:49 PM   Result Value Ref Range    Magnesium 2.2 1.6 - 2.4 mg/dL   URINALYSIS W/ REFLEX CULTURE    Collection Time: 01/15/17  2:17 PM   Result Value Ref Range    Color YELLOW/STRAW      Appearance CLOUDY (A) CLEAR      Specific gravity 1.027 1.003 - 1.030      pH (UA) 5.5 5.0 - 8.0      Protein 100 (A) NEG mg/dL    Glucose >1000 (A) NEG mg/dL    Ketone NEGATIVE  NEG mg/dL    Bilirubin NEGATIVE  NEG      Blood MODERATE (A) NEG      Urobilinogen 0.2 0.2 - 1.0 EU/dL    Nitrites NEGATIVE  NEG      Leukocyte Esterase NEGATIVE  NEG      WBC 10-20 0 - 4 /hpf    RBC 5-10 0 - 5 /hpf    Epithelial cells FEW FEW /lpf    Bacteria 4+ (A) NEG /hpf    UA:UC IF INDICATED URINE CULTURE ORDERED (A) CNI     GLUCOSE, POC    Collection Time: 01/15/17  5:47 PM   Result Value Ref Range    Glucose (POC) 290 (H) 65 - 100 mg/dL    Performed by Montse Escalante        IMAGING RESULTS:     Study Result      EXAM: XR CHEST PA LAT     INDICATION: cough, congestion     COMPARISON: 2011.     FINDINGS: PA and lateral radiographs of the chest demonstrate clear lungs. The  cardiac and mediastinal contours and pulmonary vascularity are normal. The  bones and soft tissues are within normal limits.      IMPRESSION  IMPRESSION: No acute abnormality              Study Result      EXAM: XR ANKLE LT MIN 3 V     INDICATION: Trauma. Right leg pain. Symptoms for 2 days     COMPARISON: None.     FINDINGS: Three views of the left ankle demonstrate a small age indeterminate  fracture dorsal margin of the talar head. There is no other acute osseous or  articular abnormality. The soft tissues are within normal limits.     IMPRESSION  IMPRESSION:   1.  Age indeterminate fracture dorsal margin of the talar head.                 MEDICATIONS GIVEN:  Medications   sodium chloride (NS) flush 5-10 mL (not administered)   sodium chloride (NS) flush 5-10 mL (not administered)   ondansetron (ZOFRAN) injection 4 mg (not administered)   heparin (porcine) injection 5,000 Units (5,000 Units SubCUTAneous Given 1/15/17 1703)   glucose chewable tablet 16 g (not administered)   dextrose (D50W) injection syrg 12.5-25 g (not administered)   glucagon (GLUCAGEN) injection 1 mg (not administered)   0.9% sodium chloride infusion (not administered)   insulin lispro (HUMALOG) injection (5 Units SubCUTAneous Given 1/15/17 1828)   insulin glargine (LANTUS) injection 25 Units (not administered)   albuterol-ipratropium (DUO-NEB) 2.5 MG-0.5 MG/3 ML (3 mL Nebulization Given 1/15/17 1904)   guaiFENesin SR (MUCINEX) tablet 600 mg (600 mg Oral Given 1/15/17 1827)   traMADol (ULTRAM) tablet 50 mg (50 mg Oral Given 1/15/17 1827)   acetaminophen (TYLENOL) tablet 650 mg (not administered)   predniSONE (DELTASONE) tablet 20 mg (not administered)   albuterol (PROVENTIL VENTOLIN) nebulizer solution 2.5 mg (not administered)   albuterol-ipratropium (DUO-NEB) 2.5 MG-0.5 MG/3 ML (3 mL Nebulization Given 1/15/17 1417)   sodium chloride 0.9 % bolus infusion 500 mL (500 mL IntraVENous New Bag 1/15/17 1351)   HYDROcodone-acetaminophen (Tiny Lopez) 5-325 mg per tablet 1 Tab (1 Tab Oral Given 1/15/17 8843)       IMPRESSION:  1. Non-traumatic rhabdomyolysis    2. Closed fracture of talus, unspecified portion of talus, initial encounter    3. Acute bronchitis, unspecified organism        PLAN:  1. Admit to hospital     4:11 PM   Pt is being admitted to the hospital by [facility]. All available results and reasons for admission have been discussed with pt and/or available family. Pt and/or family convey agreement and understanding of need for admission and of admission diagnosis. Charly Faith.  RADHA Whitt

## 2017-01-16 ENCOUNTER — APPOINTMENT (OUTPATIENT)
Dept: GENERAL RADIOLOGY | Age: 57
End: 2017-01-16
Attending: FAMILY MEDICINE
Payer: MEDICAID

## 2017-01-16 PROBLEM — M71.21 SYNOVIAL CYST OF RIGHT KNEE: Status: ACTIVE | Noted: 2017-01-16

## 2017-01-16 PROBLEM — S92.101A CLOSED FRACTURE OF RIGHT TALUS: Status: ACTIVE | Noted: 2017-01-16

## 2017-01-16 PROBLEM — T79.6XXA TRAUMATIC RHABDOMYOLYSIS (HCC): Status: ACTIVE | Noted: 2017-01-16

## 2017-01-16 LAB
ANION GAP BLD CALC-SCNC: 9 MMOL/L (ref 5–15)
BASOPHILS # BLD AUTO: 0 K/UL (ref 0–0.1)
BASOPHILS # BLD: 0 % (ref 0–1)
BUN SERPL-MCNC: 20 MG/DL (ref 6–20)
BUN/CREAT SERPL: 19 (ref 12–20)
CALCIUM SERPL-MCNC: 8.4 MG/DL (ref 8.5–10.1)
CHLORIDE SERPL-SCNC: 105 MMOL/L (ref 97–108)
CK SERPL-CCNC: 759 U/L (ref 26–192)
CO2 SERPL-SCNC: 22 MMOL/L (ref 21–32)
CREAT SERPL-MCNC: 1.03 MG/DL (ref 0.55–1.02)
EOSINOPHIL # BLD: 0 K/UL (ref 0–0.4)
EOSINOPHIL NFR BLD: 0 % (ref 0–7)
ERYTHROCYTE [DISTWIDTH] IN BLOOD BY AUTOMATED COUNT: 13.4 % (ref 11.5–14.5)
GLUCOSE BLD STRIP.AUTO-MCNC: 188 MG/DL (ref 65–100)
GLUCOSE BLD STRIP.AUTO-MCNC: 263 MG/DL (ref 65–100)
GLUCOSE BLD STRIP.AUTO-MCNC: 319 MG/DL (ref 65–100)
GLUCOSE BLD STRIP.AUTO-MCNC: 467 MG/DL (ref 65–100)
GLUCOSE SERPL-MCNC: 343 MG/DL (ref 65–100)
HCT VFR BLD AUTO: 33.7 % (ref 35–47)
HGB BLD-MCNC: 10.7 G/DL (ref 11.5–16)
LYMPHOCYTES # BLD AUTO: 18 % (ref 12–49)
LYMPHOCYTES # BLD: 1.3 K/UL (ref 0.8–3.5)
MCH RBC QN AUTO: 28.7 PG (ref 26–34)
MCHC RBC AUTO-ENTMCNC: 31.8 G/DL (ref 30–36.5)
MCV RBC AUTO: 90.3 FL (ref 80–99)
MONOCYTES # BLD: 0.1 K/UL (ref 0–1)
MONOCYTES NFR BLD AUTO: 1 % (ref 5–13)
NEUTS SEG # BLD: 6.1 K/UL (ref 1.8–8)
NEUTS SEG NFR BLD AUTO: 81 % (ref 32–75)
PLATELET # BLD AUTO: 218 K/UL (ref 150–400)
POTASSIUM SERPL-SCNC: 5.2 MMOL/L (ref 3.5–5.1)
RBC # BLD AUTO: 3.73 M/UL (ref 3.8–5.2)
SERVICE CMNT-IMP: ABNORMAL
SODIUM SERPL-SCNC: 136 MMOL/L (ref 136–145)
WBC # BLD AUTO: 7.6 K/UL (ref 3.6–11)

## 2017-01-16 PROCEDURE — 74011250636 HC RX REV CODE- 250/636: Performed by: FAMILY MEDICINE

## 2017-01-16 PROCEDURE — 73610 X-RAY EXAM OF ANKLE: CPT

## 2017-01-16 PROCEDURE — 74011000250 HC RX REV CODE- 250: Performed by: FAMILY MEDICINE

## 2017-01-16 PROCEDURE — 74011250637 HC RX REV CODE- 250/637: Performed by: FAMILY MEDICINE

## 2017-01-16 PROCEDURE — 82962 GLUCOSE BLOOD TEST: CPT

## 2017-01-16 PROCEDURE — 65270000029 HC RM PRIVATE

## 2017-01-16 PROCEDURE — 74011636637 HC RX REV CODE- 636/637: Performed by: FAMILY MEDICINE

## 2017-01-16 PROCEDURE — 85025 COMPLETE CBC W/AUTO DIFF WBC: CPT | Performed by: FAMILY MEDICINE

## 2017-01-16 PROCEDURE — 77030013140 HC MSK NEB VYRM -A

## 2017-01-16 PROCEDURE — 36415 COLL VENOUS BLD VENIPUNCTURE: CPT | Performed by: FAMILY MEDICINE

## 2017-01-16 PROCEDURE — 82550 ASSAY OF CK (CPK): CPT | Performed by: FAMILY MEDICINE

## 2017-01-16 PROCEDURE — 87040 BLOOD CULTURE FOR BACTERIA: CPT | Performed by: FAMILY MEDICINE

## 2017-01-16 PROCEDURE — 94640 AIRWAY INHALATION TREATMENT: CPT

## 2017-01-16 PROCEDURE — 73590 X-RAY EXAM OF LOWER LEG: CPT

## 2017-01-16 PROCEDURE — 99218 HC RM OBSERVATION: CPT

## 2017-01-16 PROCEDURE — 93970 EXTREMITY STUDY: CPT

## 2017-01-16 PROCEDURE — 80048 BASIC METABOLIC PNL TOTAL CA: CPT | Performed by: FAMILY MEDICINE

## 2017-01-16 RX ORDER — IPRATROPIUM BROMIDE AND ALBUTEROL SULFATE 2.5; .5 MG/3ML; MG/3ML
3 SOLUTION RESPIRATORY (INHALATION)
Status: DISCONTINUED | OUTPATIENT
Start: 2017-01-16 | End: 2017-01-17 | Stop reason: HOSPADM

## 2017-01-16 RX ORDER — OXYCODONE HYDROCHLORIDE 5 MG/1
5 TABLET ORAL ONCE
Status: COMPLETED | OUTPATIENT
Start: 2017-01-16 | End: 2017-01-16

## 2017-01-16 RX ORDER — OMEPRAZOLE 20 MG/1
20 CAPSULE, DELAYED RELEASE ORAL DAILY
Status: DISCONTINUED | OUTPATIENT
Start: 2017-01-16 | End: 2017-01-16 | Stop reason: SDUPTHER

## 2017-01-16 RX ORDER — INSULIN LISPRO 100 [IU]/ML
10 INJECTION, SOLUTION INTRAVENOUS; SUBCUTANEOUS ONCE
Status: COMPLETED | OUTPATIENT
Start: 2017-01-16 | End: 2017-01-16

## 2017-01-16 RX ORDER — CEPHALEXIN 250 MG/1
500 CAPSULE ORAL EVERY 12 HOURS
Status: DISCONTINUED | OUTPATIENT
Start: 2017-01-16 | End: 2017-01-17 | Stop reason: HOSPADM

## 2017-01-16 RX ORDER — GABAPENTIN 300 MG/1
300 CAPSULE ORAL 3 TIMES DAILY
Status: DISCONTINUED | OUTPATIENT
Start: 2017-01-16 | End: 2017-01-16

## 2017-01-16 RX ORDER — PROMETHAZINE HYDROCHLORIDE 25 MG/1
25 TABLET ORAL
Status: DISCONTINUED | OUTPATIENT
Start: 2017-01-16 | End: 2017-01-17 | Stop reason: HOSPADM

## 2017-01-16 RX ORDER — GABAPENTIN 300 MG/1
300 CAPSULE ORAL 3 TIMES DAILY
Status: DISCONTINUED | OUTPATIENT
Start: 2017-01-16 | End: 2017-01-17 | Stop reason: HOSPADM

## 2017-01-16 RX ORDER — HYDROCHLOROTHIAZIDE 25 MG/1
12.5 TABLET ORAL DAILY
Status: DISCONTINUED | OUTPATIENT
Start: 2017-01-16 | End: 2017-01-17 | Stop reason: HOSPADM

## 2017-01-16 RX ORDER — HYDROCODONE BITARTRATE AND ACETAMINOPHEN 5; 325 MG/1; MG/1
1 TABLET ORAL
Status: DISCONTINUED | OUTPATIENT
Start: 2017-01-16 | End: 2017-01-17 | Stop reason: HOSPADM

## 2017-01-16 RX ORDER — LOSARTAN POTASSIUM 50 MG/1
50 TABLET ORAL DAILY
Status: DISCONTINUED | OUTPATIENT
Start: 2017-01-16 | End: 2017-01-17 | Stop reason: HOSPADM

## 2017-01-16 RX ORDER — INSULIN GLARGINE 100 [IU]/ML
34 INJECTION, SOLUTION SUBCUTANEOUS
Status: DISCONTINUED | OUTPATIENT
Start: 2017-01-17 | End: 2017-01-17

## 2017-01-16 RX ORDER — INSULIN GLARGINE 100 [IU]/ML
10 INJECTION, SOLUTION SUBCUTANEOUS
Status: DISCONTINUED | OUTPATIENT
Start: 2017-01-16 | End: 2017-01-16

## 2017-01-16 RX ORDER — TORSEMIDE 20 MG/1
20 TABLET ORAL DAILY
Status: DISCONTINUED | OUTPATIENT
Start: 2017-01-16 | End: 2017-01-17 | Stop reason: HOSPADM

## 2017-01-16 RX ADMIN — PREDNISONE 20 MG: 20 TABLET ORAL at 08:09

## 2017-01-16 RX ADMIN — CEPHALEXIN 500 MG: 250 CAPSULE ORAL at 21:34

## 2017-01-16 RX ADMIN — INSULIN LISPRO 2 UNITS: 100 INJECTION, SOLUTION INTRAVENOUS; SUBCUTANEOUS at 16:30

## 2017-01-16 RX ADMIN — Medication 10 ML: at 06:35

## 2017-01-16 RX ADMIN — IPRATROPIUM BROMIDE AND ALBUTEROL SULFATE 3 ML: .5; 2.5 SOLUTION RESPIRATORY (INHALATION) at 19:38

## 2017-01-16 RX ADMIN — OXYCODONE HYDROCHLORIDE 5 MG: 5 TABLET ORAL at 10:56

## 2017-01-16 RX ADMIN — TRAMADOL HYDROCHLORIDE 50 MG: 50 TABLET, FILM COATED ORAL at 02:22

## 2017-01-16 RX ADMIN — HEPARIN SODIUM 5000 UNITS: 5000 INJECTION, SOLUTION INTRAVENOUS; SUBCUTANEOUS at 08:09

## 2017-01-16 RX ADMIN — Medication 5 ML: at 14:00

## 2017-01-16 RX ADMIN — INSULIN LISPRO 10 UNITS: 100 INJECTION, SOLUTION INTRAVENOUS; SUBCUTANEOUS at 23:03

## 2017-01-16 RX ADMIN — HYDROCHLOROTHIAZIDE 12.5 MG: 25 TABLET ORAL at 17:41

## 2017-01-16 RX ADMIN — INSULIN LISPRO 7 UNITS: 100 INJECTION, SOLUTION INTRAVENOUS; SUBCUTANEOUS at 08:09

## 2017-01-16 RX ADMIN — IPRATROPIUM BROMIDE AND ALBUTEROL SULFATE 3 ML: .5; 2.5 SOLUTION RESPIRATORY (INHALATION) at 14:01

## 2017-01-16 RX ADMIN — HEPARIN SODIUM 5000 UNITS: 5000 INJECTION, SOLUTION INTRAVENOUS; SUBCUTANEOUS at 16:22

## 2017-01-16 RX ADMIN — GABAPENTIN 300 MG: 300 CAPSULE ORAL at 08:08

## 2017-01-16 RX ADMIN — GUAIFENESIN 600 MG: 600 TABLET, EXTENDED RELEASE ORAL at 08:08

## 2017-01-16 RX ADMIN — INSULIN LISPRO 5 UNITS: 100 INJECTION, SOLUTION INTRAVENOUS; SUBCUTANEOUS at 12:07

## 2017-01-16 RX ADMIN — HYDROCODONE BITARTRATE AND ACETAMINOPHEN 1 TABLET: 5; 325 TABLET ORAL at 16:22

## 2017-01-16 RX ADMIN — PANTOPRAZOLE SODIUM 40 MG: 40 TABLET, DELAYED RELEASE ORAL at 06:35

## 2017-01-16 RX ADMIN — IPRATROPIUM BROMIDE AND ALBUTEROL SULFATE 3 ML: .5; 2.5 SOLUTION RESPIRATORY (INHALATION) at 03:46

## 2017-01-16 RX ADMIN — GABAPENTIN 300 MG: 300 CAPSULE ORAL at 16:22

## 2017-01-16 RX ADMIN — Medication 10 ML: at 21:35

## 2017-01-16 RX ADMIN — HYDROCODONE BITARTRATE AND ACETAMINOPHEN 1 TABLET: 5; 325 TABLET ORAL at 23:03

## 2017-01-16 RX ADMIN — GUAIFENESIN 600 MG: 600 TABLET, EXTENDED RELEASE ORAL at 17:41

## 2017-01-16 RX ADMIN — LOSARTAN POTASSIUM 50 MG: 50 TABLET, FILM COATED ORAL at 08:08

## 2017-01-16 RX ADMIN — HEPARIN SODIUM 5000 UNITS: 5000 INJECTION, SOLUTION INTRAVENOUS; SUBCUTANEOUS at 00:09

## 2017-01-16 RX ADMIN — TORSEMIDE 20 MG: 20 TABLET ORAL at 08:08

## 2017-01-16 RX ADMIN — GABAPENTIN 300 MG: 300 CAPSULE ORAL at 21:34

## 2017-01-16 NOTE — PROGRESS NOTES
01/16/17 0918   Chart and Patient  Assessment   Pulmonary History 3   Surgical History 0   Chest X-ray 0   Respiratory Pattern 0   Mental Status 0   Breath Sounds 2   Cough 0   Level of Activity/Mobility 1   Respiratory Assessment Total Score 6   Date Last Assessed 01/16/17     Neb frequency changed to Q6WA & PRN per RT Protocol.

## 2017-01-16 NOTE — CDMP QUERY
1. Dr. Ashvin Roe,    Good morning, this query is to ask for further specificity of the rhabdomyolysis which was POA. Could this be further specified as:     =>Traumatic Rhabdomyolysis in the setting of GLF treated with IVF hydration  =>Other Explanation of clinical findings  =>Unable to Determine (no explanation of clinical findings)    The medical record reflects the following clinical findings, treatment, and risk factors:    Risk Factors: indeterminate fracture dorsal margin of the talar head. Clinical Indicators: CK on adm 1447; Down on floor for 6 hrs after fall; dehydration POA    Treatment: Monitor labs, IVF hydration as ordered, monitor VS, I/O    Please clarify and document your clinical opinion in the progress notes and discharge summary including the definitive and/or presumptive diagnosis, (suspected or probable), related to the above clinical findings. Please include clinical findings supporting your diagnosis.     Thank you,  Stella Nance, MSN, [address] [facility], [facility]  [phone]

## 2017-01-16 NOTE — PROGRESS NOTES
Orders received and chart reviewed. Patient awaiting dopplers due to calf pain to rule out DVT. Will defer PT until results of dopplers known. Will follow. Nursing aware.

## 2017-01-16 NOTE — CDMP QUERY
2. Per the [facility] [employer], \"Individuals who are overweight, obese, or morbidly obese are at an increased risk for certain medical conditions when compared to persons of normal weight. Therefore, these conditions are always clinically significant and reportable. \"    Patient is noted to have a BMI of 34.75  kg/m2 ( 5'2\", 190 lbs ). Please clarify if this patient is:     =>Morbidly obese  =>Obese   =>Overweight  =>Other explanation of clinical findings  =>Unable to determine (no explanation for clinical findings)    Please clarify and document your clinical opinion in the progress notes and discharge summary, including the definitive and or presumptive diagnosis, (suspected or probable), related to the above clinical findings. Please include clinical findings supporting your diagnosis.      Thank you,  Vicente Iyer, MSN, [address] [facility], [facility]  [phone]

## 2017-01-16 NOTE — PROGRESS NOTES
CM note:    Met with pt. Confirmed demographics. Pt reports that she lives alone. Reports no DME at home and no hx of home health. CM to follow for dc needs. Thanks.   Alessandra Bowen, \[facility] [place] [facility]\""JAMAAL    Care Management Interventions  Current Support Network: Own Home, Lives Alone  Confirm Follow Up Transport: Family  Discharge Location  Discharge Placement: Home

## 2017-01-16 NOTE — PROGRESS NOTES
[facility] MEDICINE RESIDENCY PROGRAM  PROGRESS NOTE       PCP: Alyssa Guajardo MD     Assessment/Plan:   Lis Valencia is a 64 y.o. female who is admitted for Dehydration.     Dehydration with Rhabdomyolysis -  Improving. CK to 1447 to 759. Layed down on floor for ~ 6 hours. - IVF hydration  - Daily CK.     Rt Ankle Pain - XR showed a small age indeterminate fracture dorsal margin of the talar head. - Tylenol for mild pain, Tramadol for moderate pain, and Norco 5 for severe pain PRN  - PT/OT     T2DM - On Lantus 32 units at home, will do 80% 25 units  - Lantus 25u at bedtime  - SSI Regular Sensitivity   - Accuchecks ACHS     Asthma - Possible exacerbation with mild wheezing and cough. - DuoNebs Q4H  - Albuterol PRN  - Prednisone 20 mg QDay x 5 days  - Mucinex 600 mg BID     GERD - Stable  - Protonix 40 mg AM     HTN - Stable   - Losartan 50 mg     LE Edema - Stable   - Torsemide 20 mg daily         FEN/GI - Regular Diabetic Diet. 0.9% NS at 150 mL/hr. Activity - Ambulate with Assistance, Fall Precautions   DVT prophylaxis - Heparin and SCDs  GI prophylaxis - Zofran  Disposition - Plan to d/c to Home.     CODE STATUS:  FULL CODE        Subjective:     Pt was seen and examined at bedside. No acute events overnight. Patient is upset that right foot pain was not properly controlled. No SOB or wheezing. Allergies: Allergies   Allergen Reactions    Ciprofloxacin Cough    Lisinopril Cough    Penicillins Other (comments)     Yeast infection        Objective:   Physical examination  Visit Vitals    /83 (BP 1 Location: Left arm, BP Patient Position: At rest)    Pulse 66    Temp 98 °F (36.7 °C)    Resp 17    Ht 5' 2\" (1.575 m)    Wt 190 lb (86.2 kg)    SpO2 97%    BMI 34.75 kg/m2      Temp (24hrs), Av °F (36.7 °C), Min:97.8 °F (36.6 °C), Max:98.2 °F (36.8 °C)         O2 Device: Room air    General: No acute distress. Alert. Cooperative. Neck: Supple. Normal ROM.  No stiffness. Respiratory: CTAB. No w/r/r/c.   Cardiovascular: RRR. Normal S1,S2. No m/r/g. Pulses 2+ throughout. GI: + bowel sounds. Nontender. No rebound tenderness or guarding. Nondistended. Extremities: She exhibits edema and tenderness. She exhibits no deformity. Right ankle in splint. Distal n/v intact. Cap refill brisk. Normothermic. Unable to ambulate secondary to pain. No lesions. Diffuse leg tenderness on the right leg and foot. TTP left calf. Musculoskeletal: Full ROM in all extremities.        Data Review:     Recent Labs      01/16/17   0508  01/15/17   1349   WBC  7.6  10.1   HGB  10.7*  11.8   HCT  33.7*  36.6   PLT  218  246     Recent Labs      01/16/17   0508  01/15/17   1349   NA  136  138   K  5.2*  4.6   CL  105  103   CO2  22  28   GLU  343*  278*   BUN  20  16   CREA  1.03*  1.06*   CA  8.4*  8.8   MG   --   2.2   ALB   --   3.2*   SGOT   --   30   ALT   --   26     Medications reviewed  Current Facility-Administered Medications   Medication Dose Route Frequency    losartan (COZAAR) tablet 50 mg  50 mg Oral DAILY    promethazine (PHENERGAN) tablet 25 mg  25 mg Oral Q6H PRN    torsemide (DEMADEX) tablet 20 mg  20 mg Oral DAILY    gabapentin (NEURONTIN) capsule 300 mg  300 mg Oral TID    albuterol-ipratropium (DUO-NEB) 2.5 MG-0.5 MG/3 ML  3 mL Nebulization Q6HWA RT    sodium chloride (NS) flush 5-10 mL  5-10 mL IntraVENous Q8H    sodium chloride (NS) flush 5-10 mL  5-10 mL IntraVENous PRN    ondansetron (ZOFRAN) injection 4 mg  4 mg IntraVENous Q4H PRN    heparin (porcine) injection 5,000 Units  5,000 Units SubCUTAneous Q8H    glucose chewable tablet 16 g  4 Tab Oral PRN    dextrose (D50W) injection syrg 12.5-25 g  12.5-25 g IntraVENous PRN    glucagon (GLUCAGEN) injection 1 mg  1 mg IntraMUSCular PRN    0.9% sodium chloride infusion  150 mL/hr IntraVENous CONTINUOUS    insulin lispro (HUMALOG) injection   SubCUTAneous AC&HS    insulin glargine (LANTUS) injection 25 Units  25 Units SubCUTAneous QHS    guaiFENesin SR (MUCINEX) tablet 600 mg  600 mg Oral BID    traMADol (ULTRAM) tablet 50 mg  50 mg Oral Q8H PRN    acetaminophen (TYLENOL) tablet 650 mg  650 mg Oral Q6H PRN    predniSONE (DELTASONE) tablet 20 mg  20 mg Oral DAILY WITH BREAKFAST    albuterol (PROVENTIL VENTOLIN) nebulizer solution 2.5 mg  2.5 mg Nebulization Q2H PRN    pantoprazole (PROTONIX) tablet 40 mg  40 mg Oral ACB       Imaging:  Right leg xray  FINDINGS: Again noted is a small mildly displaced fracture of the dorsal margin  of the talar head, unchanged compared to plain radiographs dated January 15,  2017. No additional fracture is visualized. Bones are well-mineralized. Signed:    Ebony De León DO   Resident, Family Medicine

## 2017-01-16 NOTE — DIABETES MGMT
[facility]        Recommendations/ Comments: If appropriate, please consider adding a new A1C test to next lab draw to assess home management. A1c:   Lab Results   Component Value Date/Time    Hemoglobin A1c 12.5 07/06/2016 10:28 AM    Hemoglobin A1c 8.1 11/20/2014 09:30 AM       Will continue to follow as needed. Thank you. Alexa Guillaume.  LAURIE Esquivel, [address] [place] [address]   [phone] (office)  [phone] (pager)

## 2017-01-16 NOTE — PROGRESS NOTES
Bedside and Verbal shift change report given to [facility] (oncoming nurse) by Joslyn Stephens (offgoing nurse). Report included the following information ED Summary, Intake/Output, MAR and Recent Results.

## 2017-01-16 NOTE — PROGRESS NOTES
Occupational Therapy Orders    Orders received, chart reviewed. Patient awaiting dopplers to rule out DVT of LE. Will defer OT at this time. Charisma Real MS OTR/L

## 2017-01-16 NOTE — PROGRESS NOTES
Bedside and Verbal shift change report given to Iva Huffman RN (oncoming nurse) by Elizabeth Lomax RN (offgoing nurse). Report included the following information SBAR, Kardex, Procedure Summary, Intake/Output, MAR, Accordion and Recent Results.

## 2017-01-16 NOTE — PROCEDURES
[facility]  *** AMENDED REPORT ***    Name: Arlene Lopez  MRN: [id number]    Inpatient  : 1960  HIS Order #: [id number]  [id number] [facility] Visit #: [id number]  Date: 2017    TYPE OF TEST: Peripheral Venous Testing    REASON FOR TEST  Pain in limb    Right Leg:-  Deep venous thrombosis:           No  Superficial venous thrombosis:    No  Deep venous insufficiency:        No  Superficial venous insufficiency: No    Left Leg:-  Deep venous thrombosis:           No  Superficial venous thrombosis:    No  Deep venous insufficiency:        No  Superficial venous insufficiency: No      AMENDED INTERPRETATION/FINDINGS  PROCEDURE:  BILATERAL LE VENOUS DUPLEX. Evaluation of lower extremity veins with ultrasound (B-mode imaging,  pulsed Doppler, color Doppler). Includes the common femoral, deep  femoral, femoral, popliteal, posterior tibial, peroneal, and great  saphenous veins. FINDINGS:  Sapphire Lenore scale and color flow duplex images of the veins in  both lower extremities demonstrate normal compressibility, spontaneous   and augmented flow profiles, and absence of filling defects  throughout the deep and superficial veins in both lower extremities. CONCLUSION:  Bilateral lower extremity venous duplex negative for deep   venous thrombosis or thrombophlebitis. NOTE: Avascular structure  noted in the right popliteal fossa measuring 2.44 x 2.41 cm consistent   with a baker's cyst.    AMENDED COMMENTS    I have personally reviewed the data relevant to the interpretation of  this study. TECHNOLOGIST: Yari Tapia  Signed: 2017 04:09 PM    PHYSICIAN: Alex Monroy.  Pradeep Crook MD  Signed: 2017 09:30 AM

## 2017-01-16 NOTE — PROGRESS NOTES
Primary Nurse Ralph Vela, RN performed a dual skin assessment on this patient No impairment noted  Gee score is 21

## 2017-01-16 NOTE — PROGRESS NOTES
Paged Family Practice regarding patients pain level of 7/10 not relieved by PRN pain medication. No new orders given at this time. MD recommended Tylenol for breakthrough pain. Patient refuses tylenol stating \" That won't help. \"

## 2017-01-17 VITALS
TEMPERATURE: 97.7 F | DIASTOLIC BLOOD PRESSURE: 69 MMHG | SYSTOLIC BLOOD PRESSURE: 118 MMHG | OXYGEN SATURATION: 98 % | BODY MASS INDEX: 34.96 KG/M2 | RESPIRATION RATE: 18 BRPM | HEART RATE: 78 BPM | WEIGHT: 190 LBS | HEIGHT: 62 IN

## 2017-01-17 LAB
ANION GAP BLD CALC-SCNC: 10 MMOL/L (ref 5–15)
BASOPHILS # BLD AUTO: 0 K/UL (ref 0–0.1)
BASOPHILS # BLD: 0 % (ref 0–1)
BUN SERPL-MCNC: 29 MG/DL (ref 6–20)
BUN/CREAT SERPL: 25 (ref 12–20)
CALCIUM SERPL-MCNC: 8.8 MG/DL (ref 8.5–10.1)
CHLORIDE SERPL-SCNC: 103 MMOL/L (ref 97–108)
CK SERPL-CCNC: 407 U/L (ref 26–192)
CO2 SERPL-SCNC: 23 MMOL/L (ref 21–32)
CREAT SERPL-MCNC: 1.17 MG/DL (ref 0.55–1.02)
EOSINOPHIL # BLD: 0.1 K/UL (ref 0–0.4)
EOSINOPHIL NFR BLD: 0 % (ref 0–7)
ERYTHROCYTE [DISTWIDTH] IN BLOOD BY AUTOMATED COUNT: 13.1 % (ref 11.5–14.5)
GLUCOSE BLD STRIP.AUTO-MCNC: 105 MG/DL (ref 65–100)
GLUCOSE BLD STRIP.AUTO-MCNC: 266 MG/DL (ref 65–100)
GLUCOSE BLD STRIP.AUTO-MCNC: 449 MG/DL (ref 65–100)
GLUCOSE SERPL-MCNC: 154 MG/DL (ref 65–100)
HCT VFR BLD AUTO: 32.3 % (ref 35–47)
HGB BLD-MCNC: 10.6 G/DL (ref 11.5–16)
LYMPHOCYTES # BLD AUTO: 38 % (ref 12–49)
LYMPHOCYTES # BLD: 5.4 K/UL (ref 0.8–3.5)
MCH RBC QN AUTO: 28.8 PG (ref 26–34)
MCHC RBC AUTO-ENTMCNC: 32.8 G/DL (ref 30–36.5)
MCV RBC AUTO: 87.8 FL (ref 80–99)
MONOCYTES # BLD: 0.6 K/UL (ref 0–1)
MONOCYTES NFR BLD AUTO: 4 % (ref 5–13)
NEUTS SEG # BLD: 8.1 K/UL (ref 1.8–8)
NEUTS SEG NFR BLD AUTO: 58 % (ref 32–75)
PLATELET # BLD AUTO: 236 K/UL (ref 150–400)
POTASSIUM SERPL-SCNC: 4 MMOL/L (ref 3.5–5.1)
RBC # BLD AUTO: 3.68 M/UL (ref 3.8–5.2)
SERVICE CMNT-IMP: ABNORMAL
SODIUM SERPL-SCNC: 136 MMOL/L (ref 136–145)
WBC # BLD AUTO: 14.1 K/UL (ref 3.6–11)

## 2017-01-17 PROCEDURE — 74011250637 HC RX REV CODE- 250/637: Performed by: FAMILY MEDICINE

## 2017-01-17 PROCEDURE — 82962 GLUCOSE BLOOD TEST: CPT

## 2017-01-17 PROCEDURE — 74011250636 HC RX REV CODE- 250/636: Performed by: FAMILY MEDICINE

## 2017-01-17 PROCEDURE — 74011636637 HC RX REV CODE- 636/637: Performed by: FAMILY MEDICINE

## 2017-01-17 PROCEDURE — 97161 PT EVAL LOW COMPLEX 20 MIN: CPT

## 2017-01-17 PROCEDURE — 97116 GAIT TRAINING THERAPY: CPT

## 2017-01-17 PROCEDURE — 99218 HC RM OBSERVATION: CPT

## 2017-01-17 PROCEDURE — 85025 COMPLETE CBC W/AUTO DIFF WBC: CPT | Performed by: FAMILY MEDICINE

## 2017-01-17 PROCEDURE — 94640 AIRWAY INHALATION TREATMENT: CPT

## 2017-01-17 PROCEDURE — 82550 ASSAY OF CK (CPK): CPT | Performed by: FAMILY MEDICINE

## 2017-01-17 PROCEDURE — 80048 BASIC METABOLIC PNL TOTAL CA: CPT | Performed by: FAMILY MEDICINE

## 2017-01-17 PROCEDURE — 36415 COLL VENOUS BLD VENIPUNCTURE: CPT | Performed by: FAMILY MEDICINE

## 2017-01-17 PROCEDURE — 74011000250 HC RX REV CODE- 250: Performed by: FAMILY MEDICINE

## 2017-01-17 RX ORDER — PREDNISONE 20 MG/1
20 TABLET ORAL
Qty: 3 TAB | Refills: 0 | Status: SHIPPED | OUTPATIENT
Start: 2017-01-17 | End: 2017-01-20

## 2017-01-17 RX ORDER — CEPHALEXIN 500 MG/1
500 CAPSULE ORAL EVERY 12 HOURS
Qty: 8 CAP | Refills: 0 | Status: SHIPPED | OUTPATIENT
Start: 2017-01-17 | End: 2017-01-21

## 2017-01-17 RX ORDER — INSULIN LISPRO 100 [IU]/ML
8 INJECTION, SOLUTION INTRAVENOUS; SUBCUTANEOUS ONCE
Status: COMPLETED | OUTPATIENT
Start: 2017-01-17 | End: 2017-01-17

## 2017-01-17 RX ORDER — HYDROCODONE BITARTRATE AND ACETAMINOPHEN 5; 325 MG/1; MG/1
1 TABLET ORAL
Qty: 5 TAB | Refills: 0 | Status: SHIPPED | OUTPATIENT
Start: 2017-01-17 | End: 2017-01-19 | Stop reason: SDUPTHER

## 2017-01-17 RX ORDER — ACETAMINOPHEN 325 MG/1
650 TABLET ORAL
Qty: 40 TAB | Refills: 0 | Status: SHIPPED | OUTPATIENT
Start: 2017-01-17 | End: 2019-11-22 | Stop reason: SDUPTHER

## 2017-01-17 RX ORDER — HYDROCHLOROTHIAZIDE 12.5 MG/1
12.5 TABLET ORAL DAILY
Qty: 30 TAB | Refills: 0 | Status: SHIPPED | OUTPATIENT
Start: 2017-01-17 | End: 2017-01-19 | Stop reason: SDUPTHER

## 2017-01-17 RX ORDER — INSULIN GLARGINE 100 [IU]/ML
34 INJECTION, SOLUTION SUBCUTANEOUS
Status: DISCONTINUED | OUTPATIENT
Start: 2017-01-17 | End: 2017-01-17 | Stop reason: HOSPADM

## 2017-01-17 RX ADMIN — TRAMADOL HYDROCHLORIDE 50 MG: 50 TABLET, FILM COATED ORAL at 03:37

## 2017-01-17 RX ADMIN — HEPARIN SODIUM 5000 UNITS: 5000 INJECTION, SOLUTION INTRAVENOUS; SUBCUTANEOUS at 01:30

## 2017-01-17 RX ADMIN — PANCRELIPASE 6 CAPSULE: 24000; 76000; 120000 CAPSULE, DELAYED RELEASE PELLETS ORAL at 09:15

## 2017-01-17 RX ADMIN — PREDNISONE 20 MG: 20 TABLET ORAL at 09:15

## 2017-01-17 RX ADMIN — HYDROCHLOROTHIAZIDE 12.5 MG: 25 TABLET ORAL at 09:15

## 2017-01-17 RX ADMIN — Medication 10 ML: at 14:22

## 2017-01-17 RX ADMIN — CEPHALEXIN 500 MG: 250 CAPSULE ORAL at 09:15

## 2017-01-17 RX ADMIN — HEPARIN SODIUM 5000 UNITS: 5000 INJECTION, SOLUTION INTRAVENOUS; SUBCUTANEOUS at 09:18

## 2017-01-17 RX ADMIN — PANCRELIPASE 6 CAPSULE: 24000; 76000; 120000 CAPSULE, DELAYED RELEASE PELLETS ORAL at 12:14

## 2017-01-17 RX ADMIN — LOSARTAN POTASSIUM 50 MG: 50 TABLET, FILM COATED ORAL at 09:15

## 2017-01-17 RX ADMIN — IPRATROPIUM BROMIDE AND ALBUTEROL SULFATE 3 ML: .5; 2.5 SOLUTION RESPIRATORY (INHALATION) at 07:10

## 2017-01-17 RX ADMIN — PANTOPRAZOLE SODIUM 40 MG: 40 TABLET, DELAYED RELEASE ORAL at 06:22

## 2017-01-17 RX ADMIN — Medication 1 CAPSULE: at 09:15

## 2017-01-17 RX ADMIN — HYDROCODONE BITARTRATE AND ACETAMINOPHEN 1 TABLET: 5; 325 TABLET ORAL at 12:14

## 2017-01-17 RX ADMIN — HYDROCODONE BITARTRATE AND ACETAMINOPHEN 1 TABLET: 5; 325 TABLET ORAL at 06:22

## 2017-01-17 RX ADMIN — TORSEMIDE 20 MG: 20 TABLET ORAL at 09:15

## 2017-01-17 RX ADMIN — INSULIN GLARGINE 34 UNITS: 100 INJECTION, SOLUTION SUBCUTANEOUS at 01:30

## 2017-01-17 RX ADMIN — GABAPENTIN 300 MG: 300 CAPSULE ORAL at 09:15

## 2017-01-17 RX ADMIN — GUAIFENESIN 600 MG: 600 TABLET, EXTENDED RELEASE ORAL at 09:15

## 2017-01-17 RX ADMIN — IPRATROPIUM BROMIDE AND ALBUTEROL SULFATE 3 ML: .5; 2.5 SOLUTION RESPIRATORY (INHALATION) at 13:19

## 2017-01-17 RX ADMIN — INSULIN LISPRO 5 UNITS: 100 INJECTION, SOLUTION INTRAVENOUS; SUBCUTANEOUS at 12:13

## 2017-01-17 RX ADMIN — INSULIN LISPRO 8 UNITS: 100 INJECTION, SOLUTION INTRAVENOUS; SUBCUTANEOUS at 16:58

## 2017-01-17 NOTE — PROGRESS NOTES
Problem: Mobility Impaired (Adult and Pediatric)  Goal: *Acute Goals and Plan of Care (Insert Text)  Physical Therapy Goals  Initiated 1/17/2017  1. Patient will transfer from bed to chair and chair to bed with modified independence using the least restrictive device within 4 day(s). 2. Patient will perform sit to stand with modified independence within 4 day(s). 3. Patient will ambulate with modified independence for 150 feet with the least restrictive device within 4 day(s). 4. Patient will ascend/descend 4 stairs with 2 handrail(s) with modified independence within 4 day(s). PHYSICAL THERAPY EVALUATION  Patient: Eliceo Toro (56 y.o. female)  Date: 1/17/2017  Primary Diagnosis: UTI (urinary tract infection)        Precautions: R NWB         ASSESSMENT :  Based on the objective data described below, the patient presents with R talus fracture without history of fall. Patient reports a history of nausea and vomitting and inability to walk and was admitted with rhabdo, dehydration. Per ED note, she has a R nondisplaced talus fracture and she was placed in a splint and made NWB, though patient reports that in the ED she was told she would likely get a boot. It is not clear when this would be (at follow up or on admission) so contacted Family Practice who is reconsulting Ortho. Patient also has a R Covarrubias's cyst.  She was able to mobilize with the walker and then was trained with crutches, but she was clearly having difficulty maintaining a true NWB status. She states that she is not putting pressure on her R foot, but it is often touching the floor when standing. Patient will need to manage 4 steps to enter her home, and we will wait on ortho consult to clarify weight bearing status prior to stair training, in the event that they are able to upgrade her weight bearing. We will also wait on ordering DME pending ortho consult.      Patient will benefit from skilled intervention to address the above impairments. Patients rehabilitation potential is considered to be Good  Factors which may influence rehabilitation potential include:   [ ]         None noted  [ ]         Mental ability/status  [ ]         Medical condition  [ ]         Home/family situation and support systems  [ ]         Safety awareness  [ ]         Pain tolerance/management  [ ]         Other:        PLAN :  Recommendations and Planned Interventions:  [ ]           Bed Mobility Training             [ ]    Neuromuscular Re-Education  [ ]           Transfer Training                   [ ]    Orthotic/Prosthetic Training  [ ]           Gait Training                         [ ]    Modalities  [ ]           Therapeutic Exercises           [ ]    Edema Management/Control  [ ]           Therapeutic Activities            [ ]    Patient and Family Training/Education  [ ]           Other (comment):     Frequency/Duration: Patient will be followed by physical therapy  twice daily to address goals. Discharge Recommendations: To Be Determined  Further Equipment Recommendations for Discharge:  to be determined pending ortho rec's       SUBJECTIVE:   Patient stated [place]       OBJECTIVE DATA SUMMARY:   HISTORY:    Past Medical History   Diagnosis Date    Arthritis      Asthma      Diabetes ([facility] [address]      GERD (gastroesophageal reflux disease)      Hypertension      Ill-defined condition         pancreatitis     Past Surgical History   Procedure Laterality Date    Aries mammo bi screening incl cad   5/3/12       normal    Pr pancreatic elastase, fecal, qual/semi-quant   1999       growths in prancreatitis    Hx cataract removal Left 10/15    Hx cataract removal Right 10/2016    Hx cholecystectomy   2005 ?  Hx gi   2001 ?        gastic tumor removal.     Colonoscopy N/A 11/30/2016       COLONOSCOPY,EGD performed by Maryjane Holstein, MD at [address] [place] [address] 10     Prior Level of Function/Home Situation: patient was on disability but states she was working 2 days a week prior to admission. She lives alone in a one level home with steps to enter with railings  Personal factors and/or comorbidities impacting plan of care: Baker's cyst R knee, R talus fx, GI issues     Home Situation  Home Environment: Private residence  # Steps to Enter: 3  One/Two Story Residence: One story  Living Alone: Yes  Support Systems: Child(ayden)  Patient Expects to be Discharged to[de-identified] Private residence  Current DME Used/Available at Home: None     EXAMINATION/PRESENTATION/DECISION MAKING:   Critical Behavior:              Skin:  Splint R foot is intact with ace bandage  Strength:    Strength: Within functional limits (except for R foot in splint)                    Tone & Sensation:   Tone: Normal              Sensation: Intact               Range Of Motion:  AROM: Within functional limits (except for R foot in splint)                       Coordination:  Coordination: Within functional limits     Functional Mobility:  Bed Mobility:     Supine to Sit: Supervision  Sit to Supine: Supervision     Transfers:  Sit to Stand: Contact guard assistance;Minimum assistance; Adaptive equipment (min assist to CG with training and practice)  Stand to Sit: Contact guard assistance;Minimum assistance (with training and practice, improved to CG)  Stand Pivot Transfers: Minimum assistance                    Balance:   Sitting: Intact  Standing: Impaired; With support  Standing - Static: Good (with hands on a support, NWB R)  Standing - Dynamic : Fair  Ambulation/Gait Training:  Distance (ft): 50 Feet (ft)  Assistive Device: Gait belt;Crutches;Walker, rolling (started with RW and progressed to crutches)  Ambulation - Level of Assistance: Minimal assistance; Moderate assistance (mod at first, to min assist with training, practice)     Gait Description (WDL): Exceptions to WDL  Gait Abnormalities: Step to gait; Decreased step clearance  Right Side Weight Bearing: Non-weight bearing  Left Side Weight Bearing: Full  Base of Support: Center of gravity altered     Speed/Lexii: Slow  Step Length: Left shortened        Interventions: Visual/Demos; Tactile cues; Verbal cues; Safety awareness training                    Pt initially was trained in NWB with the walker and then advanced to axillary crutches. She tends to put her R foot down but states she is not putting any pressure. Physical Therapy Evaluation Charge Determination   History Examination Presentation Decision-Making   MEDIUM  Complexity : 1-2 comorbidities / personal factors will impact the outcome/ POC  MEDIUM Complexity : 3 Standardized tests and measures addressing body structure, function, activity limitation and / or participation in recreation  LOW Complexity : Stable, uncomplicated  LOW Complexity : FOTO score of       Based on the above components, the patient evaluation is determined to be of the following complexity level: LOW      Pain:  Pain Scale 1: Numeric (0 - 10)  Pain Intensity 1: 6  Pain Location 1: Foot  Pain Orientation 1: Right  Pain Description 1: Aching  Pain Intervention(s) 1: Medication (see MAR)  Activity Tolerance:   Good overall, but fatigues with ambulation NWB  Please refer to the flowsheet for vital signs taken during this treatment. After treatment:   [ ]         Patient left in no apparent distress sitting up in chair  [X]         Patient left in no apparent distress in bed  [X]         Call bell left within reach  [X]         Nursing notified  [ ]         Caregiver present  [ ]         Bed alarm activated      COMMUNICATION/EDUCATION:   The patients plan of care was discussed with: Registered Nurse and Physician.  [X]         Fall prevention education was provided and the patient/caregiver indicated understanding. [X]         Patient/family have participated as able in goal setting and plan of care.   [X]         Patient/family agree to work toward stated goals and plan of care. [ ]         Patient understands intent and goals of therapy, but is neutral about his/her participation. [ ]         Patient is unable to participate in goal setting and plan of care.      Thank you for this referral.  Rebecca Fajardo, PT   Time Calculation: 26 mins

## 2017-01-17 NOTE — PROGRESS NOTES
Bedside and Verbal shift change report given to [address] [place] [address] (oncoming nurse) by Naeem Bonds RN (offgoing nurse). Report included the following information SBAR, Kardex, Procedure Summary, Intake/Output, MAR, Accordion and Recent Results.

## 2017-01-17 NOTE — PROGRESS NOTES
Discharge instructions reviewed with patient. Declined to have discharge caregiver present. Reinforced that patient is to take 34units of lantus at bedtime per instructions from family practice (see prior notes) and follow-up with PCP on Thursday for further instructions. Pt verbalized understanding. All new prescriptions reviewed - administration instructions and possible side effects discussed. PIV removed prior to discharge. Opportunity for questions provided. Pt agreeable to discharge home at this time.

## 2017-01-17 NOTE — PROGRESS NOTES
Bedside and Verbal shift change report given to [facility] (oncoming nurse) by Marek Gooden (offgoing nurse). Report included the following information SBAR, Intake/Output, MAR and Recent Results.

## 2017-01-17 NOTE — PROGRESS NOTES
Problem: Mobility Impaired (Adult and Pediatric)  Goal: *Acute Goals and Plan of Care (Insert Text)  Physical Therapy Goals  Initiated 1/17/2017  1. Patient will transfer from bed to chair and chair to bed with modified independence using the least restrictive device within 4 day(s). 2. Patient will perform sit to stand with modified independence within 4 day(s). 3. Patient will ambulate with modified independence for 150 feet with the least restrictive device within 4 day(s). 4. Patient will ascend/descend 4 stairs with 2 handrail(s) with modified independence within 4 day(s). PHYSICAL THERAPY TREATMENT  Patient: Shilo Sanderson (89 y.o. female)  Date: 1/17/2017  Diagnosis: UTI (urinary tract infection) Dehydration       Precautions:        ASSESSMENT:  After talking with ortho, patient is now WBAT RLE with CAM boot on. She was able to ambulate with the boot on with the crutches for support without any difficulty. She is clear for D/C home with family support. CAM boot and crutches were fit and issued to her. Progression toward goals:  [X]    Improving appropriately and progressing toward goals  [ ]    Improving slowly and progressing toward goals  [ ]    Not making progress toward goals and plan of care will be adjusted       PLAN:  Patient continues to benefit from skilled intervention to address the above impairments. Continue treatment per established plan of care. Discharge Recommendations:  None  Further Equipment Recommendations for Discharge: All provided       SUBJECTIVE:   Patient stated I want to go home.       OBJECTIVE DATA SUMMARY:   Critical Behavior:              Functional Mobility Training:  Bed Mobility:     Supine to Sit: Supervision  Sit to Supine: Supervision           Transfers:  Sit to Stand: Contact guard assistance;Minimum assistance; Adaptive equipment (min assist to CG with training and practice)  Stand to Sit: Contact guard assistance;Minimum assistance (with training and practice, improved to CG)  Stand Pivot Transfers: Minimum assistance                          Balance:  Sitting: Intact  Standing: Impaired; With support  Standing - Static: Good (with hands on a support, NWB R)  Standing - Dynamic : Fair  Ambulation/Gait Training:  Distance (ft): 40 Feet (ft)  Assistive Device: Crutches  Ambulation - Level of Assistance: Supervision     Gait Description (WDL): Exceptions to WDL  Gait Abnormalities: Step to gait; Decreased step clearance  Right Side Weight Bearing: As tolerated (in cam boot)  Left Side Weight Bearing: Full  Base of Support: Center of gravity altered     Speed/Lexii: Slow  Step Length: Left shortened        Interventions: Visual/Demos; Tactile cues; Verbal cues; Safety awareness training        Pain:  Pain Scale 1: Numeric (0 - 10)  Pain Intensity 1: 6  Pain Location 1: Foot  Pain Orientation 1: Right  Pain Description 1: Aching  Pain Intervention(s) 1: Medication (see MAR)  Activity Tolerance:   Good  Please refer to the flowsheet for vital signs taken during this treatment.   After treatment:   [ ]    Patient left in no apparent distress sitting up in chair  [X]    Patient left in no apparent distress in bed  [X]    Call bell left within reach  [X]    Nursing notified  [X]    Caregiver present  [ ]    Bed alarm activated      COMMUNICATION/COLLABORATION:   The patients plan of care was discussed with: Registered Nurse and Physician     Nolan Lemus PT   Time Calculation: 56 mins

## 2017-01-17 NOTE — PROGRESS NOTES
1640.  Spoke to Dr. Edward Santos regarding patient's blood sugar of 449. One time order to administer dose of 8 units of humalog received. Dr. Edward Santos informed RN to reinforce with patient to take 34 units of insulin at home and follow her sliding scale- patient verbalized understanding with Marianne Murphy RN & myself.

## 2017-01-17 NOTE — DISCHARGE SUMMARY
[address]                                                                                DISCHARGE SUMMARY     Patient: Yousuf Gutierrez  MRN: [id number]  YOB: 1960  Age: 64 y.o. Date of admission:  1/15/2017  Date of discharge:  1/17/2017  Primary care provider:  Griffin Carl MD   Admitting provider:  Dotty Baker DO    Discharging provider(s): Precious Wheeler DO - Family Medicine Resident  Dr. Dayan Gaona MD -  Family Medicine Attending      Consultations:  Ortho    Procedures:  · None    Discharge destination: Home. The patient is stable for discharge. Admission diagnosis:  Dehydration  Rhabdomyolysis    HPI:   Yousuf Gutierrez is a 64 y.o. female with Hx of DM, HTN, Diabetic Neuropathy, Asthma who presents to the ER complaining of Rt foot pain after falling on the night of 01/13/2017. Patient reports that she was experiencing some nausea with several episodes of NBNB vomiting before the onset of severe cramping in the legs during that night. Patient had to lay down in an attempt to relieve the pain in the legs, but tried to stand and feel on her feet. She could not move or stand from about 10:00 PM to 4:00 AM of the next day. Since then, the patient have been feeling an intense pain in the Rt foot and leg. The pain is worse during ambulation. Patient told ED personnel, that she also had cough with wheezing while laying on the floor. Denies fever, changes in vision, headaches, CP, SOB, diarrhea, dysuria, frequency, hematuria, or any other concern.       In the ER, vital signs were unremarkable. Labs were remarkable for Glu 278, Cr 1.06 (baseline 0.66), Alk Phos 154, CK 1447, trop <0.04. Pt was treated with IVF and Splinting of Rt foot.     Final discharge diagnoses and brief hospital course:   Dehydration with Rhabdomyolysis -  Initially pt was fatigued. Pt improved with IVF hydration. CK 1447 to 759 on day of discharge. .  - F/u with PCP.       Rt Ankle Pain - XR showed a small age indeterminate fracture dorsal margin of the talar head. Tylenol for mild pain, Tramadol for moderate pain, and Norco 5 for severe pain PRN. Orthopedics reviewed the xray and found that no surgery need. It was recommended that pt be placed in a boot and use crutches. Weight barring allowed. PT consult helped pt learn how to properly used crutches. - F/u with PCP  - F/u with ortho  - Pain control with Tylenol and Ibuprofen. Also gave pt Norco to manage severe pain. UTI: UA showed pt had infection. Pt was asymptomatic. He was started on Keflex. - Continue Keflex for 4 more days BID.   - F/u with PCP.      T2DM - On Lantus 32 units at home. Initially placed pt on 80% of home regimen which was 25 units. Also started on SSI. However, pt was started on a 5 day prednisone regimen for wheezing, which complicated glucose control.   -Pt to check her BG at home and adjust home Lantus dosing.  - Pt stated she understood how to dose her Lantus according to her BG readings. - F/u with PCP      Asthma -ED noted pt to have mild wheezing and cough. Pt was started on Prednisone 20 mg QDay x 5 days. Wheeze improved throughout stay. - Continue Prednisone for 3 more days. - F/u with PCP for lung exam and blood glucose check    HTN - Pt had elevated BP while inpatient. Pain medications did not decrease BP.  Added HCTZ 12.5 to home Losartan which stabilized pt's BP.  - Pt to continue Losartan and will start to take HCTZ at home  - Educated pt about hypotensive symptoms  - F/u with PCP to evaluate BP    GERD - Stable  - Continue Protonix 40 mg AM      LE Edema - Stable   - Torsemide 20 mg daily       Obese   - Pt advised about healthy eating and exercising     Follow-up Cares:   · Pending LABS at the time of Discharge: None  · Labs Need to Repeat by PCP: Blood glucose and CBC    Follow-up Information     Follow up With Details Comments Contact Info    Glenys Mendes MD Schedule an appointment as soon as possible for a visit in 2 days [facility] follow-up, Check BP (if low we added HCTZ 12.5mg), blood glucose check  [address] [place] [address]  [address]  On license of [facility] [address]  [phone]      Ortho of [address] [facility] [address]   [phone]            Physical Examination at Discharge:     Visit Vitals    /69 (BP 1 Location: Left arm)    Pulse 78    Temp 97.7 °F (36.5 °C)    Resp 18    Ht 5' 2\" (1.575 m)    Wt 190 lb (86.2 kg)    SpO2 98%    Breastfeeding No    BMI 34.75 kg/m2       General: No acute distress. Alert. Cooperative. Neck: Supple. Normal ROM. No stiffness. Respiratory: CTAB. No w/r/r/c.   Cardiovascular: RRR. Normal S1,S2. No m/r/g. Pulses 2+ throughout. GI: + bowel sounds. Nontender. No rebound tenderness or guarding. Nondistended. Extremities: She exhibits edema and tenderness. She exhibits no deformity. Right ankle in splint. Distal n/v intact. Cap refill brisk. Normothermic. Unable to ambulate secondary to pain. No lesions. Diffuse leg tenderness on the right leg and foot. TTP left calf. Musculoskeletal: Full ROM in all extremities. Pertinent Imaging Studies:     Xr Chest Pa Lat    Result Date: 1/15/2017  EXAM:  XR CHEST PA LAT INDICATION:   cough, congestion COMPARISON: 2011. FINDINGS: PA and lateral radiographs of the chest demonstrate clear lungs. The cardiac and mediastinal contours and pulmonary vascularity are normal.  The bones and soft tissues are within normal limits. IMPRESSION: No acute abnormality     Xr Tib/fib Rt    Result Date: 1/16/2017  INDICATION:  right leg pain Exam: AP, lateral, oblique casted views of the right ankle and AP and lateral views of the right tibia and fibula. FINDINGS: Again noted is a small mildly displaced fracture of the dorsal margin of the talar head, unchanged compared to plain radiographs dated January 15, 2017. No additional fracture is visualized. Bones are well-mineralized. IMPRESSION: No interval change. Xr Ankle Rt Min 3 V    Result Date: 1/16/2017  INDICATION:  right leg pain Exam: AP, lateral, oblique casted views of the right ankle and AP and lateral views of the right tibia and fibula. FINDINGS: Again noted is a small mildly displaced fracture of the dorsal margin of the talar head, unchanged compared to plain radiographs dated January 15, 2017. No additional fracture is visualized. Bones are well-mineralized. IMPRESSION: No interval change. Xr Ankle Lt Min 3 V    Result Date: 1/15/2017  EXAM:  XR ANKLE LT MIN 3 V INDICATION:  Trauma. Right leg pain. Symptoms for 2 days COMPARISON: None. FINDINGS: Three views of the left ankle demonstrate a small age indeterminate fracture dorsal margin of the talar head. There is no other acute osseous or articular abnormality. The soft tissues are within normal limits. IMPRESSION: 1. Age indeterminate fracture dorsal margin of the talar head.     ---------------------------------    Discharge Medications:      Discharge Medication List as of 1/17/2017  3:38 PM      START taking these medications    Details   cephALEXin (KEFLEX) 500 mg capsule Take 1 Cap by mouth every twelve (12) hours for 4 days. , Print, Disp-8 Cap, R-0      guaiFENesin SR (MUCINEX) 600 mg SR tablet Take 1 Tab by mouth two (2) times a day., Print, Disp-30 Tab, R-0      hydroCHLOROthiazide (HYDRODIURIL) 12.5 mg tablet Take 1 Tab by mouth daily. , Print, Disp-30 Tab, R-0      HYDROcodone-acetaminophen (NORCO) 5-325 mg per tablet Take 1 Tab by mouth every eight (8) hours as needed. Max Daily Amount: 3 Tabs., Print, Disp-5 Tab, R-0      predniSONE (DELTASONE) 20 mg tablet Take 1 Tab by mouth daily (with breakfast) for 3 days. , Print, Disp-3 Tab, R-0      acetaminophen (TYLENOL) 325 mg tablet Take 2 Tabs by mouth every six (6) hours as needed. , Print, Disp-40 Tab, R-0         CONTINUE these medications which have NOT CHANGED    Details   metFORMIN ER (GLUCOPHAGE XR) 500 mg tablet Take 500 mg by mouth two (2) times daily (with meals). , Historical Med      SUMAtriptan (IMITREX) 100 mg tablet Take 1 Tab by mouth once as needed for Migraine for up to 1 dose. NEED OFFICE VISIT, Normal, Disp-9 Tab, R-0      VENTOLIN HFA 90 mcg/actuation inhaler TAKE 2 PUFFS BY INHALATION EVERY FOUR (4) HOURS AS NEEDED FOR WHEEZING., NormalNEED OFFICE VISITDisp-18 Inhaler, R-0      insulin lispro (HUMALOG) 100 unit/mL kwikpen by SubCUTAneous route Before breakfast, lunch, and dinner., Historical Med      gabapentin (NEURONTIN) 300 mg capsule TAKE 1 CAP BY MOUTH THREE (3) TIMES DAILY., Normal, Disp-90 Cap, R-5      losartan (COZAAR) 50 mg tablet TAKE 1 TAB BY MOUTH DAILY. INDICATIONS: DIABETIC NEPHROPATHY, HYPERTENSION, Normal, Disp-30 Tab, R-5      LANTUS SOLOSTAR 100 unit/mL (3 mL) pen 30 UNITS AT NIGHT, Normal, Disp-15 mL, R-3      omeprazole (PRILOSEC) 20 mg capsule TAKE 1 CAPSULE BY MOUTH TWICE A DAY, Normal, Disp-60 Cap, R-3      L. ACIDOPH/B. LONG/L. PLANT/B.LAC (PROBIOTIC ACIDOPHILUS BEADS PO) Take 1 Cap by mouth daily. , Historical Med      levocetirizine (XYZAL) 5 mg tablet Take 1 Tab by mouth daily. , Normal, Disp-30 Tab, R-12      torsemide (DEMADEX) 20 mg tablet Take 1 Tab by mouth daily. Indications: EDEMA, Normal, Disp-30 Tab, R-5      promethazine (PHENERGAN) 25 mg tablet Take 1 Tab by mouth every six (6) hours as needed for Nausea., Normal, Disp-20 Tab, R-2      lipase-protease-amylase 36,000-114,000- 180,000 unit cpDR Take 4 Caps by mouth three (3) times daily (with meals). Take 4 capsules with each meal and 2 capsules with snacks, Normal, Disp-480 Cap, R-12             Chronic Diagnoses:    Problem List as of 1/17/2017  Date Reviewed: 1/16/2017          Codes Class Noted - Resolved    Traumatic rhabdomyolysis ([facility] ICD-10-CM: T79. 6XXA  ICD-9-CM: 958.6  1/16/2017 - Present        Closed fracture of right talus ICD-10-CM: S92.101A  ICD-9-CM: 825.21  1/16/2017 - Present Synovial cyst of right knee ICD-10-CM: M71.21  ICD-9-CM: 727.51  1/16/2017 - Present        * (Principal)Dehydration ICD-10-CM: E86.0  ICD-9-CM: 276.51  1/15/2017 - Present        UTI (urinary tract infection) ICD-10-CM: N39.0  ICD-9-CM: 599.0  7/14/2016 - Present        Mild intermittent asthma without complication ZBE-30-AI: A97.09  ICD-9-CM: 493.90  7/6/2016 - Present        Diabetes ([facility] ICD-10-CM: E11.9  ICD-9-CM: 250.00  Unknown - Present        GERD (gastroesophageal reflux disease) ICD-10-CM: K21.9  ICD-9-CM: 530.81  Unknown - Present        Pancreatitis ICD-10-CM: K85.90  ICD-9-CM: 577.0  2/8/2016 - Present    Overview Signed 3/6/5423  9:69 PM by Jazz Meléndez MD     [facility]             Essential hypertension ICD-10-CM: I10  ICD-9-CM: 401.9  2/8/2016 - Present        Diabetic neuropathy ([facility] ICD-10-CM: E11.40  ICD-9-CM: 250.60, 357.2  2/8/2016 - Present        Vitamin D deficiency ICD-10-CM: E55.9  ICD-9-CM: 268.9  2/8/2016 - Present        RESOLVED: Antibiotic-induced yeast infection ICD-10-CM: B37.9, T36.95XA  ICD-9-CM: 112.9  7/15/2016 - 1/16/2017              Signed: Ana Laura Bravo DO   Family Medicine Resident      1/17/2017   1:01 PM     Cc:  Jazz Meléndez MD

## 2017-01-17 NOTE — DISCHARGE INSTRUCTIONS
HOME DISCHARGE INSTRUCTIONS    Darcy Carpio / [id number] : 1960    Admission date: 1/15/2017 Discharge date: 2017     Please bring this form with you to show your care provider at your follow-up appointment. Primary care provider:  Calvin Gonzalez MD    Discharging provider: Kathia Bauer DO  - Family Medicine Resident  Dr. Catarino Antoine MD - Attending, Family Medicine     You have been admitted to the hospital with the following diagnoses:    ACUTE DIAGNOSES:  UTI (urinary tract infection)  . . . . . . . . . . . . . . . . . . . . . . . . . . . . . . . . . . . . . . . . . . . . . . . . . . . . . . . . . . . . . . . . . . . . . . . Ilana Serrano FOLLOW-UP CARE RECOMMENDATIONS:  - You were found to have a fracture on imaging.   - You can follow up with your PCP or with Ortho. Both numbers have been provided  - You can take ibuprofen or tylenol for pain. You have been provided some pain medications. Follow up with PCP for further pain management    -For your asthma, we placed you of 3 more days of Prednisone. Important to take and important to check your blood sugars. This medication can increase blood sugars, therefore add lantus accordingly. This is why you need to see Dr. Sejal Graves with in 2 days, so you can discuss your blood sugars with her.    - Check your blood pressure at home, and also follow up with your PCP. If you feel dizzy or lightheaded, tell your PCP. Your blood pressures were slightly high here so we added HCTZ 12.5 mg to your current Losartan    - Check Blood Glucose, you are on prednisone, would make your blood sugar higher. If high, then add 2 units for your lantus.     - You had a possible infection found in your urine. You were started on an antibiotic for this and have 4 more days of Keflex, take twice per day. Follow-up tests needed: Blood glucose, CBC    Pending test results: At the time of your discharge the following test results are still pending: none.    Please make sure you review these results with your outpatient follow-up provider(s). Specific symptoms to watch for: chest pain, shortness of breath, fever (100.4 Farenheit or greater), chills, nausea, vomiting, diarrhea, change in mentation, falling, weakness, bleeding. DIET/what to eat:  Diabetic Diet    ACTIVITY:  Activity as tolerated    What to do if new or unexpected symptoms occur? If you experience any of the above symptoms (or should other concerns or questions arise after discharge) please call your primary care physician. Return to the emergency room if you cannot get hold of your doctor. · It is very important that you keep your follow-up appointment(s). · Please bring discharge papers, medication list (and/or medication bottles) to your follow-up appointments for review by your outpatient provider(s). · Please check the list of medications and be sure it includes every medication (even non-prescription medications) that your provider wants you to take. · It is important that you take the medication exactly as they are prescribed. · Keep your medication in the bottles provided by the pharmacist and keep a list of the medication names, dosages, and times to be taken in your wallet. · Do not take other medications without consulting your doctor. · If you have any questions about your medications or other instructions, please talk to your nurse or care provider before you leave the hospital.     Information obtained by:     I understand that if any problems occur once I am at home I am to contact my physician. These instructions were explained to me and I had the opportunity to ask questions. I understand and acknowledge receipt of the instructions indicated above.                                                                                                                                                Physician's or R.N.'s Signature Date/Time                                                                                                                                              Patient or Representative Signature                                                          Date/Time    Follow-up Information     Follow up With Details Comments Contact Info    Akilah Washington MD Schedule an appointment as soon as possible for a visit in 2 days [facility] follow-up, Check BP (if low we added HCTZ 12.5mg), blood glucose check  [address] [place] [phone]      [facility] of [address] [address] [place] [address]  [phone]

## 2017-01-18 ENCOUNTER — TELEPHONE (OUTPATIENT)
Dept: FAMILY MEDICINE CLINIC | Age: 57
End: 2017-01-18

## 2017-01-18 NOTE — TELEPHONE ENCOUNTER
Voicemail left Per call from Mame Cortes with [facility] at [facility]. Patient insurance denying Inpatient status and asking is it okay for observation?        Call [phone]

## 2017-01-19 ENCOUNTER — TELEPHONE (OUTPATIENT)
Dept: FAMILY MEDICINE CLINIC | Age: 57
End: 2017-01-19

## 2017-01-19 ENCOUNTER — OFFICE VISIT (OUTPATIENT)
Dept: FAMILY MEDICINE CLINIC | Age: 57
End: 2017-01-19

## 2017-01-19 VITALS
DIASTOLIC BLOOD PRESSURE: 66 MMHG | HEIGHT: 62 IN | BODY MASS INDEX: 34.96 KG/M2 | HEART RATE: 64 BPM | RESPIRATION RATE: 16 BRPM | WEIGHT: 190 LBS | SYSTOLIC BLOOD PRESSURE: 134 MMHG | OXYGEN SATURATION: 99 % | TEMPERATURE: 96.6 F

## 2017-01-19 DIAGNOSIS — Z09 HOSPITAL DISCHARGE FOLLOW-UP: Primary | ICD-10-CM

## 2017-01-19 DIAGNOSIS — E11.42 DIABETIC POLYNEUROPATHY ASSOCIATED WITH TYPE 2 DIABETES MELLITUS (HCC): ICD-10-CM

## 2017-01-19 DIAGNOSIS — S92.101A: ICD-10-CM

## 2017-01-19 DIAGNOSIS — E86.0 DEHYDRATION: ICD-10-CM

## 2017-01-19 DIAGNOSIS — I10 ESSENTIAL HYPERTENSION WITH GOAL BLOOD PRESSURE LESS THAN 140/90: ICD-10-CM

## 2017-01-19 DIAGNOSIS — G43.909 MIGRAINE WITHOUT STATUS MIGRAINOSUS, NOT INTRACTABLE, UNSPECIFIED MIGRAINE TYPE: ICD-10-CM

## 2017-01-19 DIAGNOSIS — Z79.4 TYPE 2 DIABETES MELLITUS WITH DIABETIC NEUROPATHY, WITH LONG-TERM CURRENT USE OF INSULIN (HCC): ICD-10-CM

## 2017-01-19 DIAGNOSIS — R30.0 DYSURIA: ICD-10-CM

## 2017-01-19 DIAGNOSIS — R60.9 EDEMA, UNSPECIFIED TYPE: ICD-10-CM

## 2017-01-19 DIAGNOSIS — E11.40 TYPE 2 DIABETES MELLITUS WITH DIABETIC NEUROPATHY, WITH LONG-TERM CURRENT USE OF INSULIN (HCC): ICD-10-CM

## 2017-01-19 LAB
BACTERIA SPEC CULT: NORMAL
BACTERIA SPEC CULT: NORMAL
BILIRUB UR QL STRIP: NEGATIVE
CC UR VC: NORMAL
GLUCOSE UR-MCNC: NORMAL MG/DL
KETONES P FAST UR STRIP-MCNC: NEGATIVE MG/DL
PH UR STRIP: 5.5 [PH] (ref 4.6–8)
PROT UR QL STRIP: NEGATIVE MG/DL
SERVICE CMNT-IMP: NORMAL
SP GR UR STRIP: 1.01 (ref 1–1.03)
UA UROBILINOGEN AMB POC: NORMAL (ref 0.2–1)
URINALYSIS CLARITY POC: CLEAR
URINALYSIS COLOR POC: YELLOW
URINE BLOOD POC: NORMAL
URINE LEUKOCYTES POC: NEGATIVE
URINE NITRITES POC: NEGATIVE

## 2017-01-19 RX ORDER — GABAPENTIN 300 MG/1
CAPSULE ORAL
Qty: 270 CAP | Refills: 1 | Status: SHIPPED | OUTPATIENT
Start: 2017-01-19 | End: 2017-07-16 | Stop reason: SDUPTHER

## 2017-01-19 RX ORDER — INSULIN GLARGINE 100 [IU]/ML
INJECTION, SOLUTION SUBCUTANEOUS
Qty: 15 ML | Refills: 3 | Status: SHIPPED | OUTPATIENT
Start: 2017-01-19 | End: 2017-05-24 | Stop reason: SDUPTHER

## 2017-01-19 RX ORDER — LOSARTAN POTASSIUM 50 MG/1
TABLET ORAL
Qty: 90 TAB | Refills: 1 | Status: SHIPPED | OUTPATIENT
Start: 2017-01-19 | End: 2017-05-24 | Stop reason: SDUPTHER

## 2017-01-19 RX ORDER — HYDROCHLOROTHIAZIDE 12.5 MG/1
12.5 TABLET ORAL DAILY
Qty: 90 TAB | Refills: 1 | Status: SHIPPED | OUTPATIENT
Start: 2017-01-19 | End: 2017-04-17 | Stop reason: SDUPTHER

## 2017-01-19 RX ORDER — HYDROCODONE BITARTRATE AND ACETAMINOPHEN 5; 325 MG/1; MG/1
1 TABLET ORAL
Qty: 10 TAB | Refills: 0 | Status: SHIPPED | OUTPATIENT
Start: 2017-01-19 | End: 2017-05-24 | Stop reason: ALTCHOICE

## 2017-01-19 RX ORDER — SUMATRIPTAN 100 MG/1
100 TABLET, FILM COATED ORAL
Qty: 9 TAB | Refills: 2 | Status: SHIPPED | OUTPATIENT
Start: 2017-01-19 | End: 2018-11-19 | Stop reason: SDUPTHER

## 2017-01-19 RX ORDER — TORSEMIDE 20 MG/1
20 TABLET ORAL DAILY
Qty: 90 TAB | Refills: 1 | Status: SHIPPED | OUTPATIENT
Start: 2017-01-19 | End: 2017-05-24 | Stop reason: SDUPTHER

## 2017-01-19 NOTE — PROGRESS NOTES
HISTORY OF PRESENT ILLNESS  Judie Wong is a 64 y.o. female. HPI  Pt presents with \"hospital discharge follow up\"    Pt was admitted to [facility] from 1/15-, due to dehydration and rhabdomyolysis. Pt had fallen at home, and was having some right ankle pain. Pt was found to have right ankle fracture, and was placed in boot as fracture is non operative. Pt is to wear the boot, and follow up with ortho, as needed for foot pain. Pt is continuing to have ankle pain, and is requesting more pain medication. In addition, patient needs information for orthopedics, as she has yet to make an appointment. UTI was found in hospital, and patient was placed on Keflex. Pt denies any UTI symptoms at this time, but we will re-check urine at this time. Diabetes was uncontrolled in hospital, and worsened by being placed on prednisone in the hospital for wheezing. Pt was informed to adjust insulin dosing based on her blood sugars. Pt has been adjusting insulin, as the hospital and her endocrinologist suggest, Humalog 3 units with meals, then add 1 unit per 50 blood glucose that is over 200. In addition, patient has been administering 34 units of Lantus at night. Pt brought in the following blood sugar lo/15: , Noon 263, Night 105  1: , Noon 188, Night 266  1: , Noon 178, Night 400  1/: , Noon 183, Night 389  :     Pt is followed by endocrinology, at [facility], and saw them last in December. Pt has appointment with them in the beginning of February. Pt's blood pressure was also elevated in the ER, and was placed on HCTZ. Pt was informed to continue HCTZ, until being seen by PCP for BP re-check. Blood pressure readings:  : /79, Noon 142/84, Night, 132/73  : /74    Pt states that she has been doing quite well since being home, besides her foot pain. Review of Systems   Constitutional: Negative for fever. HENT: Negative for congestion. Musculoskeletal: Positive for joint pain. Physical Exam   Constitutional: She is oriented to person, place, and time. She appears well-developed and well-nourished. HENT:   Head: Normocephalic and atraumatic. Neck: Normal range of motion. Neck supple. Cardiovascular: Normal rate, regular rhythm and normal heart sounds. Pulmonary/Chest: Effort normal and breath sounds normal.   Musculoskeletal:        Right foot: There is tenderness. There is normal range of motion. Lymphadenopathy:     She has no cervical adenopathy. Neurological: She is alert and oriented to person, place, and time. Skin: Skin is warm and dry. Psychiatric: She has a normal mood and affect. Her behavior is normal.       ASSESSMENT and PLAN    ICD-10-CM ICD-9-CM    1. Hospital discharge follow-up Z09 V67.59    2. Closed fracture of right talus, unspecified portion of talus, initial encounter S92.101A 825.21 HYDROcodone-acetaminophen (NORCO) 5-325 mg per tablet      REFERRAL TO ORTHOPEDICS   3. Dehydration E86.0 276.51 CBC WITH AUTOMATED DIFF      METABOLIC PANEL, COMPREHENSIVE   4. Dysuria R30.0 788.1 AMB POC URINALYSIS DIP STICK AUTO W/O MICRO   5. Migraine without status migrainosus, not intractable, unspecified migraine type G43.909 346.90 SUMAtriptan (IMITREX) 100 mg tablet   6. Diabetic polyneuropathy associated with type 2 diabetes mellitus (HCC) E11.42 250.60 gabapentin (NEURONTIN) 300 mg capsule     357.2    7. Type 2 diabetes mellitus with diabetic neuropathy, with long-term current use of insulin ([facility]) E11.40 250.60 losartan (COZAAR) 50 mg tablet    Z79.4 357.2 insulin glargine (LANTUS SOLOSTAR) 100 unit/mL (3 mL) pen     V58.67 CBC WITH AUTOMATED DIFF      METABOLIC PANEL, COMPREHENSIVE   8. Essential hypertension with goal blood pressure less than 140/90 I10 401.9 losartan (COZAAR) 50 mg tablet      hydroCHLOROthiazide (HYDRODIURIL) 12.5 mg tablet   9.  Edema, unspecified type R60.9 782.3 torsemide (DEMADEX) 20 mg tablet     Informed patient that we will notify her when her labs return, and inform her of any change in plan of care at that time. Educated about calling endocrinology, with blood sugar readings, as they may want to change her dosing. Educated that blood sugar elevation is expected with prednisone, but her blood sugars are incredibly high. Informed patient that we will continue the HCTZ therapy, as BP is still not at goal.  Educated about taking as prescribed, and monitoring BP. Educated about calling ortho, ASAP, for appointment. One time rx for pain pills given, but informed patient that this will not be refilled. Pt informed to return to office with worsening of symptoms, or PRN with any questions or concerns. Pt verbalizes understanding of plan of care and denies further questions or concerns at this time.

## 2017-01-19 NOTE — PROGRESS NOTES
Identified pt with two pt identifiers(name and ). Chief Complaint   Patient presents with   [facility] Follow Up     [address] 1-15-17      Pt would like a refill on Imitrex today. Health Maintenance Due   Topic    Hepatitis C Screening     EYE EXAM RETINAL OR DILATED Q1     BREAST CANCER SCRN MAMMOGRAM     FOBT Q 1 YEAR AGE 50-75     INFLUENZA AGE 9 TO ADULT     HEMOGLOBIN A1C Q6M     MICROALBUMIN Q1        Wt Readings from Last 3 Encounters:   17 190 lb (86.2 kg)   01/15/17 190 lb (86.2 kg)   16 189 lb (85.7 kg)     Temp Readings from Last 3 Encounters:   17 96.6 °F (35.9 °C) (Oral)   17 97.7 °F (36.5 °C)   16 97.7 °F (36.5 °C)     BP Readings from Last 3 Encounters:   17 134/66   17 118/69   16 130/88     Pulse Readings from Last 3 Encounters:   17 64   17 78   16 63         Learning Assessment:  :     Learning Assessment 2016   PRIMARY LEARNER Patient   HIGHEST LEVEL OF EDUCATION - PRIMARY LEARNER  GRADUATED HIGH SCHOOL OR GED   BARRIERS PRIMARY LEARNER NONE   CO-LEARNER CAREGIVER No   PRIMARY LANGUAGE ENGLISH   LEARNER PREFERENCE PRIMARY OTHER (COMMENT)   ANSWERED BY self   RELATIONSHIP SELF       Depression Screening:  :     PHQ 2 / 9, over the last two weeks 2017   Little interest or pleasure in doing things Not at all   Feeling down, depressed or hopeless Not at all   Total Score PHQ 2 0       Fall Risk Assessment:  :     Fall Risk Assessment, last 12 mths 2016   Able to walk? Yes   Fall in past 12 months? No       Abuse Screening:  :     Abuse Screening Questionnaire 2016   Do you ever feel afraid of your partner? N   Are you in a relationship with someone who physically or mentally threatens you? N   Is it safe for you to go home? Y       Coordination of Care Questionnaire:  :     1) Have you been to an emergency room, urgent care clinic since your last visit? yes [address] 1-15-17  Hospitalized since your last visit? yes      [facility] 1-15-17      2) Have you seen or consulted any other health care providers outside of [address] [place] [address] since your last visit? no  (Include any pap smears or colon screenings in this section.)    3) Do you have an Advance Directive on file? no  Are you interested in receiving information about Advance Directives? no    Patient is accompanied by self I have received verbal consent from Bull Montemayor to discuss any/all medical information while they are present in the room. Reviewed record in preparation for visit and have obtained necessary documentation. Medication reconciliation up to date and corrected with patient at this time.

## 2017-01-19 NOTE — MR AVS SNAPSHOT
Visit Information Date & Time Provider Department Dept. Phone Encounter #  
 1/19/2017 11:00 AM Glenis Tee NP [address] [place] [address] [phone] [id number] Follow-up Instructions Return if symptoms worsen or fail to improve. Upcoming Health Maintenance Date Due Hepatitis C Screening 1960 EYE EXAM RETINAL OR DILATED Q1 4/12/1970 BREAST CANCER SCRN MAMMOGRAM 4/12/2010 FOBT Q 1 YEAR AGE 50-75 4/12/2010 INFLUENZA AGE 9 TO ADULT 8/1/2016 HEMOGLOBIN A1C Q6M 1/6/2017 MICROALBUMIN Q1 2/8/2017 FOOT EXAM Q1 7/6/2017 LIPID PANEL Q1 7/6/2017 PAP AKA CERVICAL CYTOLOGY 7/6/2019 DTaP/Tdap/Td series (2 - Td) 7/21/2026 Allergies as of 1/19/2017  Review Complete On: 1/19/2017 By: Glenis Tee NP Severity Noted Reaction Type Reactions Ciprofloxacin  07/14/2016    Cough Lisinopril  07/21/2016    Cough Penicillins  04/09/2011   Side Effect Other (comments) Yeast infection Current Immunizations  Reviewed on 2/8/2016 Name Date Influenza Vaccine 10/5/2015 Tdap 7/21/2016  8:03 PM  
  
 Not reviewed this visit You Were Diagnosed With   
  
 Codes Comments Hospital discharge follow-up    -  Primary ICD-10-CM: [facility] ICD-9-CM: V67.59 Closed fracture of right talus, unspecified portion of talus, initial encounter     ICD-10-CM: S92.101A ICD-9-CM: 825.21 Dehydration     ICD-10-CM: E86.0 ICD-9-CM: 276.51 Dysuria     ICD-10-CM: R30.0 ICD-9-CM: 788.1 Migraine without status migrainosus, not intractable, unspecified migraine type     ICD-10-CM: G43.909 ICD-9-CM: 346.90 Diabetic polyneuropathy associated with type 2 diabetes mellitus ([facility] [address]     ICD-10-CM: E11.42 
ICD-9-CM: 250.60, 357.2 Type 2 diabetes mellitus with diabetic neuropathy, with long-term current use of insulin (HCC)     ICD-10-CM: E11.40, Z79.4 ICD-9-CM: 250.60, 357.2, V58.67   
 Essential hypertension with goal blood pressure less than 140/90     ICD-10-CM: I10 
ICD-9-CM: 401.9 Edema, unspecified type     ICD-10-CM: R60.9 ICD-9-CM: 942. 3 Vitals BP Pulse Temp Resp Height(growth percentile) Weight(growth percentile) 134/66 64 96.6 °F (35.9 °C) (Oral) 16 5' 2\" (1.575 m) 190 lb (86.2 kg) SpO2 BMI OB Status Smoking Status 99% 34.75 kg/m2 Postmenopausal Former Smoker BMI and BSA Data Body Mass Index Body Surface Area 34.75 kg/m 2 1.94 m 2 Preferred Pharmacy Pharmacy Name Phone [facility]/PHARMACY #[id number] - Donnette Gitelman - [address] [facility] [address] [phone] Your Updated Medication List  
  
   
This list is accurate as of: 1/19/17 11:31 AM.  Always use your most recent med list.  
  
  
  
  
 acetaminophen 325 mg tablet Commonly known as:  TYLENOL Take 2 Tabs by mouth every six (6) hours as needed. cephALEXin 500 mg capsule Commonly known as:  Rebecca Rummer Take 1 Cap by mouth every twelve (12) hours for 4 days. gabapentin 300 mg capsule Commonly known as:  NEURONTIN  
TAKE 1 CAP BY MOUTH THREE (3) TIMES DAILY. guaiFENesin  mg SR tablet Commonly known as:  [employer] Quinn Take 1 Tab by mouth two (2) times a day. hydroCHLOROthiazide 12.5 mg tablet Commonly known as:  HYDRODIURIL Take 1 Tab by mouth daily. HYDROcodone-acetaminophen 5-325 mg per tablet Commonly known as:  Diehl Kanwal Take 1 Tab by mouth every eight (8) hours as needed. Max Daily Amount: 3 Tabs. insulin glargine 100 unit/mL (3 mL) pen Commonly known as:  LANTUS SOLOSTAR  
32 UNITS AT NIGHT  
  
 insulin lispro 100 unit/mL kwikpen Commonly known as:  HUMALOG  
by SubCUTAneous route Before breakfast, lunch, and dinner. levocetirizine 5 mg tablet Commonly known as:  Buster Loss Take 1 Tab by mouth daily.   
  
 lipase-protease-amylase 36,000-114,000- 180,000 unit Cpdr  
 Take 4 Caps by mouth three (3) times daily (with meals). Take 4 capsules with each meal and 2 capsules with snacks  
  
 losartan 50 mg tablet Commonly known as:  COZAAR  
TAKE 1 TAB BY MOUTH DAILY. INDICATIONS: DIABETIC NEPHROPATHY, HYPERTENSION  
  
 metFORMIN  mg tablet Commonly known as:  GLUCOPHAGE XR Take 500 mg by mouth two (2) times daily (with meals). omeprazole 20 mg capsule Commonly known as:  PRILOSEC  
TAKE 1 CAPSULE BY MOUTH TWICE A DAY  
  
 predniSONE 20 mg tablet Commonly known as:  Sanaz Pound Take 1 Tab by mouth daily (with breakfast) for 3 days. PROBIOTIC ACIDOPHILUS BEADS PO Take 1 Cap by mouth daily. promethazine 25 mg tablet Commonly known as:  PHENERGAN Take 1 Tab by mouth every six (6) hours as needed for Nausea. SUMAtriptan 100 mg tablet Commonly known as:  IMITREX Take 1 Tab by mouth once as needed for Migraine for up to 1 dose. torsemide 20 mg tablet Commonly known as:  DEMADEX Take 1 Tab by mouth daily. Indications: Edema VENTOLIN HFA 90 mcg/actuation inhaler Generic drug:  albuterol TAKE 2 PUFFS BY INHALATION EVERY FOUR (4) HOURS AS NEEDED FOR WHEEZING. Prescriptions Printed Refills HYDROcodone-acetaminophen (NORCO) 5-325 mg per tablet 0 Sig: Take 1 Tab by mouth every eight (8) hours as needed. Max Daily Amount: 3 Tabs. Class: Print Route: Oral  
  
Prescriptions Sent to Pharmacy Refills SUMAtriptan (IMITREX) 100 mg tablet 2 Sig: Take 1 Tab by mouth once as needed for Migraine for up to 1 dose. Class: Normal  
 Pharmacy: [facility]/pharmacy #[id number] - [address] Ph #: [phone] Route: Oral  
 gabapentin (NEURONTIN) 300 mg capsule 1 Sig: TAKE 1 CAP BY MOUTH THREE (3) TIMES DAILY. Class: Normal  
 Pharmacy: [facility]/pharmacy #[id number] - [address]  Ph #: [phone]  
 losartan (COZAAR) 50 mg tablet 1  
 Sig: TAKE 1 TAB BY MOUTH DAILY. INDICATIONS: DIABETIC NEPHROPATHY, HYPERTENSION Class: Normal  
 Pharm[id number]  [address] Ph #: [phone]  
 torsemide (DEMADEX) 20 mg tablet 1 Sig: Take 1 Tab by mouth daily. Indications: Edema Class: Normal  
 Pharmacy: [facility]/pharmacy #[id number] - [place], [place] -  [address] Ph #: [phone] Route: Oral  
 insulin glargine (LANTUS SOLOSTAR) 100 unit/mL (3 mL) pen 3 Si UNITS AT NIGHT Class: Normal  
 [id number]  [address] Ph #: [phone] We Performed the Following AMB POC URINALYSIS DIP STICK AUTO W/O MICRO [72791 CPT(R)] CBC WITH AUTOMATED DIFF [99877 CPT(R)] METABOLIC PANEL, COMPREHENSIVE [87970 CPT(R)] REFERRAL TO ORTHOPEDICS [[id number] Custom] Comments:  
 Please evaluate patient for foot fracture Follow-up Instructions Return if symptoms worsen or fail to improve. Referral Information Referral ID Referred By Referred To  
  
 [id number] Champ MORENO [address] [place] [address] Phone: [phone] Fax: [phone] Visits Status Start Date End Date 1 New Request 17 If your referral has a status of pending review or denied, additional information will be sent to support the outcome of this decision. Patient Instructions Mediterranean Diet: Care Instructions Your Care Instructions The Mediterranean diet features foods eaten in [place], [place], [place] and [place], and other countries that border the [facility]. It emphasizes eating a diet rich in fruits, vegetables, nuts, and high-fiber grains, and limits meat, cheese, and sweets. The Mediterranean diet may: · Prevent heart disease and lower the risk of a heart attack or stroke. · Prevent type 2 diabetes. · Prevent Alzheimer's disease and other dementia. · Prevent depression. · Prevent Parkinson's disease. This diet contains more fat than other heart-healthy diets. But the fats are mainly from nuts, unsaturated oils, such as fish oils, olive oil, and certain nut or seed oils (such as canola, soybean, or flaxseed oil). These types of oils may help protect the heart and blood vessels. Follow-up care is a key part of your treatment and safety. Be sure to make and go to all appointments, and call your doctor if you are having problems. It's also a good idea to know your test results and keep a list of the medicines you take. How can you care for yourself at home? What to eat · Eat a variety of fruits and vegetables each day, such as grapes, blueberries, tomatoes, broccoli, peppers, figs, olives, spinach, eggplant, beans, lentils, and chickpeas. · Eat a variety of whole-grain foods each day, such as oats, brown rice, and whole wheat bread, pasta, and couscous. · Eat fish at least 2 times a week. Try tuna, salmon, mackerel, lake trout, herring, or sardines. · Eat moderate amounts of low-fat dairy products each day or weekly, such as milk, cheese, or yogurt. · Eat moderate amounts of poultry and eggs every 2 days or weekly. · Choose healthy (unsaturated) fats, such as nuts, olive oil and certain nut or seed oils like canola, soybean, and flaxseed. · Limit unhealthy (saturated) fats, such as butter, palm oil, and coconut oil. And limit fats found in animal products, such as meat and dairy products made with whole milk. Try to eat red meat only a few times a month in very small amounts. · Limit sweets and desserts to only a few times a week. This includes sugar-sweetened drinks like soda. The Mediterranean diet may also include red wine with your meal1 glass each day for women and up to 2 glasses a day for men. Tips for changing your diet · Dip bread in a mix of olive oil and fresh herbs instead of using butter. · Add avocado slices to your sandwich instead of aguilar. · Have fish for lunch or dinner instead of red meat. Brush the fish with olive oil, and broil or grill it. · Sprinkle your salad with seeds or nuts instead of cheese. · Cook with olive or canola oil instead of butter or oils that are high in saturated fat. · Switch from 2% milk or whole milk to 1% or fat-free milk. · Dip raw vegetables in a vinaigrette dressing or hummus instead of dips made from mayonnaise or sour cream. 
· Have a piece of fruit for dessert instead of a piece of cake. Try baked apples, or have some dried fruit. Part of the Mediterranean diet is being active. Get at least 30 minutes of exercise on most days of the week. Walking is a good choice. You also may want to do other activities, such as running, swimming, cycling, or playing tennis or team sports. Where can you learn more? Go to http://anil-shade.info/. Enter O407 in the search box to learn more about \"Mediterranean Diet: Care Instructions. \" Current as of: July 26, 2016 Content Version: 11.1 © 1592-4722 [employer]. Care instructions adapted under license by [employer] (which disclaims liability or warranty for this information). If you have questions about a medical condition or this instruction, always ask your healthcare professional. [place] [address] any warranty or liability for your use of this information. Introducing [facility]! Deepa Josue introduces [employer] patient portal. Now you can access parts of your medical record, email your doctor's office, and request medication refills online. 1. In your internet browser, go to [email or web address] 2. Click on the First Time User? Click Here link in the Sign In box. You will see the New Member Sign Up page. 3. Enter your Path Access Code exactly as it appears below. You will not need to use this code after youve completed the sign-up process. If you do not sign up before the expiration date, you must request a new code. · Path Access Code: [email or web address] Expires: 2/28/2017 10:51 AM 
 
4. Enter the last four digits of your Social Security Number (xxxx) and Date of Birth (mm/dd/yyyy) as indicated and click Submit. You will be taken to the next sign-up page. 5. Create a Path ID. This will be your Path login ID and cannot be changed, so think of one that is secure and easy to remember. 6. Create a Path password. You can change your password at any time. 7. Enter your Password Reset Question and Answer. This can be used at a later time if you forget your password. 8. Enter your e-mail address. You will receive e-mail notification when new information is available in [address] 9. Click Sign Up. You can now view and download portions of your medical record. 10. Click the Download Summary menu link to download a portable copy of your medical information. If you have questions, please visit the Frequently Asked Questions section of the Path website. Remember, Path is NOT to be used for urgent needs. For medical emergencies, dial 911. Now available from your iPhone and Android! Please provide this summary of care documentation to your next provider. Your primary care clinician is listed as Calvin Gonzalez. If you have any questions after today's visit, please call [phone].

## 2017-01-19 NOTE — PATIENT INSTRUCTIONS
Mediterranean Diet: Care Instructions  Your Care Instructions  The Mediterranean diet features foods eaten in [place], [place], [place] and [place], and other countries that border the [facility]. It emphasizes eating a diet rich in fruits, vegetables, nuts, and high-fiber grains, and limits meat, cheese, and sweets. The Mediterranean diet may:  · Prevent heart disease and lower the risk of a heart attack or stroke. · Prevent type 2 diabetes. · Prevent Alzheimer's disease and other dementia. · Prevent depression. · Prevent Parkinson's disease. This diet contains more fat than other heart-healthy diets. But the fats are mainly from nuts, unsaturated oils, such as fish oils, olive oil, and certain nut or seed oils (such as canola, soybean, or flaxseed oil). These types of oils may help protect the heart and blood vessels. Follow-up care is a key part of your treatment and safety. Be sure to make and go to all appointments, and call your doctor if you are having problems. It's also a good idea to know your test results and keep a list of the medicines you take. How can you care for yourself at home? What to eat  · Eat a variety of fruits and vegetables each day, such as grapes, blueberries, tomatoes, broccoli, peppers, figs, olives, spinach, eggplant, beans, lentils, and chickpeas. · Eat a variety of whole-grain foods each day, such as oats, brown rice, and whole wheat bread, pasta, and couscous. · Eat fish at least 2 times a week. Try tuna, salmon, mackerel, lake trout, herring, or sardines. · Eat moderate amounts of low-fat dairy products each day or weekly, such as milk, cheese, or yogurt. · Eat moderate amounts of poultry and eggs every 2 days or weekly. · Choose healthy (unsaturated) fats, such as nuts, olive oil and certain nut or seed oils like canola, soybean, and flaxseed. · Limit unhealthy (saturated) fats, such as butter, palm oil, and coconut oil.  And limit fats found in animal products, such as meat and dairy products made with whole milk. Try to eat red meat only a few times a month in very small amounts. · Limit sweets and desserts to only a few times a week. This includes sugar-sweetened drinks like soda. The Mediterranean diet may also include red wine with your meal--1 glass each day for women and up to 2 glasses a day for men. Tips for changing your diet  · Dip bread in a mix of olive oil and fresh herbs instead of using butter. · Add avocado slices to your sandwich instead of aguilar. · Have fish for lunch or dinner instead of red meat. Brush the fish with olive oil, and broil or grill it. · Sprinkle your salad with seeds or nuts instead of cheese. · Cook with olive or canola oil instead of butter or oils that are high in saturated fat. · Switch from 2% milk or whole milk to 1% or fat-free milk. · Dip raw vegetables in a vinaigrette dressing or hummus instead of dips made from mayonnaise or sour cream.  · Have a piece of fruit for dessert instead of a piece of cake. Try baked apples, or have some dried fruit. Part of the Mediterranean diet is being active. Get at least 30 minutes of exercise on most days of the week. Walking is a good choice. You also may want to do other activities, such as running, swimming, cycling, or playing tennis or team sports. Where can you learn more? Go to http://anil-shade.info/. Enter O407 in the search box to learn more about \"Mediterranean Diet: Care Instructions. \"  Current as of: July 26, 2016  Content Version: 11.1  © 6503-2567 [employer]. Care instructions adapted under license by [employer] (which disclaims liability or warranty for this information). If you have questions about a medical condition or this instruction, always ask your healthcare professional. [address] any warranty or liability for your use of this information.

## 2017-01-20 ENCOUNTER — TELEPHONE (OUTPATIENT)
Dept: FAMILY MEDICINE CLINIC | Age: 57
End: 2017-01-20

## 2017-01-20 LAB
ALBUMIN SERPL-MCNC: 3.8 G/DL (ref 3.5–5.5)
ALBUMIN/GLOB SERPL: 1.4 {RATIO} (ref 1.1–2.5)
ALP SERPL-CCNC: 138 IU/L (ref 39–117)
ALT SERPL-CCNC: 13 IU/L (ref 0–32)
AST SERPL-CCNC: 10 IU/L (ref 0–40)
BASOPHILS # BLD AUTO: 0 X10E3/UL (ref 0–0.2)
BASOPHILS NFR BLD AUTO: 0 %
BILIRUB SERPL-MCNC: <0.2 MG/DL (ref 0–1.2)
BUN SERPL-MCNC: 36 MG/DL (ref 6–24)
BUN/CREAT SERPL: 31 (ref 9–23)
CALCIUM SERPL-MCNC: 9.4 MG/DL (ref 8.7–10.2)
CHLORIDE SERPL-SCNC: 94 MMOL/L (ref 96–106)
CO2 SERPL-SCNC: 21 MMOL/L (ref 18–29)
CREAT SERPL-MCNC: 1.15 MG/DL (ref 0.57–1)
EOSINOPHIL # BLD AUTO: 0.1 X10E3/UL (ref 0–0.4)
EOSINOPHIL NFR BLD AUTO: 0 %
ERYTHROCYTE [DISTWIDTH] IN BLOOD BY AUTOMATED COUNT: 13.9 % (ref 12.3–15.4)
GLOBULIN SER CALC-MCNC: 2.7 G/DL (ref 1.5–4.5)
GLUCOSE SERPL-MCNC: 411 MG/DL (ref 65–99)
HCT VFR BLD AUTO: 36.4 % (ref 34–46.6)
HGB BLD-MCNC: 11.5 G/DL (ref 11.1–15.9)
IMM GRANULOCYTES # BLD: 0 X10E3/UL (ref 0–0.1)
IMM GRANULOCYTES NFR BLD: 0 %
INTERPRETATION: NORMAL
LYMPHOCYTES # BLD AUTO: 5.1 X10E3/UL (ref 0.7–3.1)
LYMPHOCYTES NFR BLD AUTO: 31 %
MCH RBC QN AUTO: 28.9 PG (ref 26.6–33)
MCHC RBC AUTO-ENTMCNC: 31.6 G/DL (ref 31.5–35.7)
MCV RBC AUTO: 92 FL (ref 79–97)
MONOCYTES # BLD AUTO: 0.8 X10E3/UL (ref 0.1–0.9)
MONOCYTES NFR BLD AUTO: 5 %
NEUTROPHILS # BLD AUTO: 10.4 X10E3/UL (ref 1.4–7)
NEUTROPHILS NFR BLD AUTO: 64 %
PLATELET # BLD AUTO: 316 X10E3/UL (ref 150–379)
POTASSIUM SERPL-SCNC: 4.4 MMOL/L (ref 3.5–5.2)
PROT SERPL-MCNC: 6.5 G/DL (ref 6–8.5)
RBC # BLD AUTO: 3.98 X10E6/UL (ref 3.77–5.28)
SODIUM SERPL-SCNC: 133 MMOL/L (ref 134–144)
WBC # BLD AUTO: 16.5 X10E3/UL (ref 3.4–10.8)

## 2017-01-20 NOTE — TELEPHONE ENCOUNTER
----- Message from Michelle Costa NP sent at 1/20/2017  1:19 PM EST -----  Please call patient and let her know that her labs returned. She must follow up with endocrinologist ASAP, her blood sugar is still very high. She should return to office in 2-3 months, very well hydrated, for BP re-check, and re-check of kidney function. Thanks!

## 2017-01-20 NOTE — TELEPHONE ENCOUNTER
----- Message from Jax Steele NP sent at 1/20/2017  1:19 PM EST -----  Please call patient and let her know that her labs returned. She must follow up with endocrinologist ASAP, her blood sugar is still very high. She should return to office in 2-3 months, very well hydrated, for BP re-check, and re-check of kidney function. Thanks!

## 2017-01-20 NOTE — PROGRESS NOTES
Please call patient and let her know that her labs returned. She must follow up with endocrinologist ASAP, her blood sugar is still very high. She should return to office in 2-3 months, very well hydrated, for BP re-check, and re-check of kidney function. Thanks!

## 2017-01-20 NOTE — TELEPHONE ENCOUNTER
See note below. Advised pt lab results & recommendations per CHEKO Obrien. Pt states she is seeing her DM doctor in February.

## 2017-01-22 LAB
BACTERIA SPEC CULT: NORMAL
SERVICE CMNT-IMP: NORMAL

## 2017-01-24 NOTE — TELEPHONE ENCOUNTER
Cody Tineo with [facility]/Care Management states this is her 4th time calling and is asking that Dr. Milan Steen address message. Asking if she will except observation status or if she wants to do peer to peer? Okay to leave voicemail.       I did inform her Dr. Milan Steen not in clinic on today, but in office tomorrow evening    Call [phone]

## 2017-01-24 NOTE — TELEPHONE ENCOUNTER
This is not a patient of our practice, she was seen by the residents in the hospital. I have sent the message to [address] and he stated the patient was Discharged. I have called Dora back and informed of 's message and that I can not give orders as she is not even an established patient here. She stated she would forward this to her manager, as they need to know the process of what to do when they need an order changed. I will also copy our  on this message as well.

## 2017-01-30 ENCOUNTER — HOSPITAL ENCOUNTER (OUTPATIENT)
Dept: MAMMOGRAPHY | Age: 57
Discharge: HOME | End: 2017-01-30
Attending: NURSE PRACTITIONER
Payer: MEDICAID

## 2017-01-30 ENCOUNTER — TELEPHONE (OUTPATIENT)
Dept: FAMILY MEDICINE CLINIC | Age: 57
End: 2017-01-30

## 2017-01-30 DIAGNOSIS — S92.101A: ICD-10-CM

## 2017-01-30 PROCEDURE — 77080 DXA BONE DENSITY AXIAL: CPT

## 2017-01-30 NOTE — TELEPHONE ENCOUNTER
----- Message from Baldomero Vega NP sent at 1/30/2017 11:55 AM EST -----  Please call patient and let her know that her DEXA scan returned normal.  Thanks!

## 2017-02-21 DIAGNOSIS — Z79.4 TYPE 2 DIABETES MELLITUS WITH DIABETIC NEUROPATHY, WITH LONG-TERM CURRENT USE OF INSULIN (HCC): ICD-10-CM

## 2017-02-21 DIAGNOSIS — E11.40 TYPE 2 DIABETES MELLITUS WITH DIABETIC NEUROPATHY, WITH LONG-TERM CURRENT USE OF INSULIN (HCC): ICD-10-CM

## 2017-02-21 DIAGNOSIS — I10 ESSENTIAL HYPERTENSION WITH GOAL BLOOD PRESSURE LESS THAN 140/90: ICD-10-CM

## 2017-02-21 RX ORDER — LOSARTAN POTASSIUM 50 MG/1
TABLET ORAL
Qty: 90 TAB | Refills: 1 | Status: CANCELLED | OUTPATIENT
Start: 2017-02-21

## 2017-02-21 RX ORDER — HYDROCHLOROTHIAZIDE 12.5 MG/1
12.5 TABLET ORAL DAILY
Qty: 90 TAB | Refills: 1 | Status: CANCELLED | OUTPATIENT
Start: 2017-02-21

## 2017-03-13 RX ORDER — ERGOCALCIFEROL 1.25 MG/1
CAPSULE ORAL
Qty: 5 CAP | Refills: 11 | Status: SHIPPED | OUTPATIENT
Start: 2017-03-13 | End: 2018-05-21 | Stop reason: SDUPTHER

## 2017-03-13 RX ORDER — OMEPRAZOLE 20 MG/1
CAPSULE, DELAYED RELEASE ORAL
Qty: 60 CAP | Refills: 3 | Status: SHIPPED | OUTPATIENT
Start: 2017-03-13 | End: 2017-07-16 | Stop reason: SDUPTHER

## 2017-03-13 NOTE — TELEPHONE ENCOUNTER
I have not seen pt since Sept 2016. Last seen by Christopher Buckner NP. Will forward refill request to her.

## 2017-03-15 RX ORDER — ERGOCALCIFEROL 1.25 MG/1
CAPSULE ORAL
Qty: 5 CAP | Refills: 11 | OUTPATIENT
Start: 2017-03-15

## 2017-03-21 ENCOUNTER — TELEPHONE (OUTPATIENT)
Dept: FAMILY MEDICINE CLINIC | Age: 57
End: 2017-03-21

## 2017-03-21 NOTE — TELEPHONE ENCOUNTER
Patient called and stated that she would like for Cindy Mcneill to call her. She would not elaborate any further was to what the call is about.   Please call at [phone]

## 2017-03-23 NOTE — TELEPHONE ENCOUNTER
She was asking about Vit D. The pharmacy said she was too early to  Vit D. I told her to call and see when she can pick it up. She can not pick it up too early.

## 2017-04-08 DIAGNOSIS — R60.9 EDEMA, UNSPECIFIED TYPE: ICD-10-CM

## 2017-04-08 DIAGNOSIS — E11.42 DIABETIC POLYNEUROPATHY ASSOCIATED WITH TYPE 2 DIABETES MELLITUS (HCC): ICD-10-CM

## 2017-04-10 RX ORDER — TORSEMIDE 20 MG/1
TABLET ORAL
Qty: 90 TAB | Refills: 0 | OUTPATIENT
Start: 2017-04-10

## 2017-04-10 RX ORDER — GABAPENTIN 300 MG/1
CAPSULE ORAL
Qty: 270 CAP | Refills: 0 | OUTPATIENT
Start: 2017-04-10

## 2017-04-17 DIAGNOSIS — I10 ESSENTIAL HYPERTENSION WITH GOAL BLOOD PRESSURE LESS THAN 140/90: ICD-10-CM

## 2017-04-17 RX ORDER — HYDROCHLOROTHIAZIDE 12.5 MG/1
TABLET ORAL
Qty: 90 TAB | Refills: 0 | Status: SHIPPED | OUTPATIENT
Start: 2017-04-17 | End: 2017-05-24 | Stop reason: SDUPTHER

## 2017-05-17 DIAGNOSIS — E11.40 TYPE 2 DIABETES MELLITUS WITH DIABETIC NEUROPATHY, WITH LONG-TERM CURRENT USE OF INSULIN (HCC): ICD-10-CM

## 2017-05-17 DIAGNOSIS — Z79.4 TYPE 2 DIABETES MELLITUS WITH DIABETIC NEUROPATHY, WITH LONG-TERM CURRENT USE OF INSULIN (HCC): ICD-10-CM

## 2017-05-17 DIAGNOSIS — I10 ESSENTIAL HYPERTENSION WITH GOAL BLOOD PRESSURE LESS THAN 140/90: ICD-10-CM

## 2017-05-17 RX ORDER — LOSARTAN POTASSIUM 50 MG/1
TABLET ORAL
Qty: 90 TAB | Refills: 0 | OUTPATIENT
Start: 2017-05-17

## 2017-05-24 ENCOUNTER — OFFICE VISIT (OUTPATIENT)
Dept: FAMILY MEDICINE CLINIC | Age: 57
End: 2017-05-24

## 2017-05-24 VITALS
RESPIRATION RATE: 16 BRPM | HEART RATE: 78 BPM | WEIGHT: 185 LBS | HEIGHT: 62 IN | DIASTOLIC BLOOD PRESSURE: 68 MMHG | SYSTOLIC BLOOD PRESSURE: 118 MMHG | OXYGEN SATURATION: 97 % | TEMPERATURE: 98 F | BODY MASS INDEX: 34.04 KG/M2

## 2017-05-24 DIAGNOSIS — Z13.220 SCREENING FOR CHOLESTEROL LEVEL: ICD-10-CM

## 2017-05-24 DIAGNOSIS — R60.9 EDEMA, UNSPECIFIED TYPE: ICD-10-CM

## 2017-05-24 DIAGNOSIS — I10 ESSENTIAL HYPERTENSION WITH GOAL BLOOD PRESSURE LESS THAN 140/90: ICD-10-CM

## 2017-05-24 DIAGNOSIS — R20.2 TINGLING: ICD-10-CM

## 2017-05-24 DIAGNOSIS — Z12.11 SCREENING FOR COLON CANCER: ICD-10-CM

## 2017-05-24 DIAGNOSIS — Z13.29 SCREENING FOR THYROID DISORDER: ICD-10-CM

## 2017-05-24 DIAGNOSIS — E11.9 TYPE 2 DIABETES MELLITUS WITHOUT COMPLICATION, WITH LONG-TERM CURRENT USE OF INSULIN (HCC): Primary | ICD-10-CM

## 2017-05-24 DIAGNOSIS — Z72.0 TOBACCO ABUSE: ICD-10-CM

## 2017-05-24 DIAGNOSIS — Z12.39 SCREENING FOR BREAST CANCER: ICD-10-CM

## 2017-05-24 DIAGNOSIS — E11.40 TYPE 2 DIABETES MELLITUS WITH DIABETIC NEUROPATHY, WITH LONG-TERM CURRENT USE OF INSULIN (HCC): ICD-10-CM

## 2017-05-24 DIAGNOSIS — Z79.4 TYPE 2 DIABETES MELLITUS WITHOUT COMPLICATION, WITH LONG-TERM CURRENT USE OF INSULIN (HCC): Primary | ICD-10-CM

## 2017-05-24 DIAGNOSIS — E55.9 VITAMIN D DEFICIENCY: ICD-10-CM

## 2017-05-24 DIAGNOSIS — I10 ESSENTIAL HYPERTENSION: ICD-10-CM

## 2017-05-24 DIAGNOSIS — Z11.59 NEED FOR HEPATITIS C SCREENING TEST: ICD-10-CM

## 2017-05-24 DIAGNOSIS — Z79.4 TYPE 2 DIABETES MELLITUS WITH DIABETIC NEUROPATHY, WITH LONG-TERM CURRENT USE OF INSULIN (HCC): ICD-10-CM

## 2017-05-24 LAB
ALBUMIN UR QL STRIP: NORMAL MG/L
CREATININE, URINE POC: NORMAL MG/DL
MICROALBUMIN/CREAT RATIO POC: NORMAL MG/G

## 2017-05-24 RX ORDER — NICOTINE 7MG/24HR
1 PATCH, TRANSDERMAL 24 HOURS TRANSDERMAL EVERY 24 HOURS
Qty: 14 PATCH | Refills: 0 | Status: SHIPPED | OUTPATIENT
Start: 2017-05-24 | End: 2017-06-07

## 2017-05-24 RX ORDER — IBUPROFEN 200 MG
1 TABLET ORAL EVERY 24 HOURS
Qty: 42 PATCH | Refills: 0 | Status: SHIPPED | OUTPATIENT
Start: 2017-05-24 | End: 2017-06-23

## 2017-05-24 RX ORDER — TORSEMIDE 20 MG/1
20 TABLET ORAL DAILY
Qty: 90 TAB | Refills: 1 | Status: SHIPPED | OUTPATIENT
Start: 2017-05-24 | End: 2018-10-30

## 2017-05-24 RX ORDER — LOSARTAN POTASSIUM 50 MG/1
TABLET ORAL
Qty: 90 TAB | Refills: 1 | Status: SHIPPED | OUTPATIENT
Start: 2017-05-24 | End: 2017-12-04 | Stop reason: SDUPTHER

## 2017-05-24 RX ORDER — METFORMIN HYDROCHLORIDE 500 MG/1
500 TABLET, EXTENDED RELEASE ORAL 2 TIMES DAILY WITH MEALS
Qty: 180 TAB | Refills: 1 | Status: SHIPPED | OUTPATIENT
Start: 2017-05-24 | End: 2017-06-21 | Stop reason: DRUGHIGH

## 2017-05-24 RX ORDER — INSULIN GLARGINE 100 [IU]/ML
32 INJECTION, SOLUTION SUBCUTANEOUS
Qty: 15 ML | Refills: 3 | Status: SHIPPED | OUTPATIENT
Start: 2017-05-24 | End: 2017-11-30 | Stop reason: SDUPTHER

## 2017-05-24 RX ORDER — INSULIN LISPRO 100 [IU]/ML
INJECTION, SOLUTION INTRAVENOUS; SUBCUTANEOUS
Qty: 1 PACKAGE | Refills: 2 | Status: SHIPPED | OUTPATIENT
Start: 2017-05-24 | End: 2018-10-03

## 2017-05-24 RX ORDER — HYDROCHLOROTHIAZIDE 12.5 MG/1
TABLET ORAL
Qty: 90 TAB | Refills: 1 | Status: SHIPPED | OUTPATIENT
Start: 2017-05-24 | End: 2018-02-10 | Stop reason: SDUPTHER

## 2017-05-24 NOTE — PROGRESS NOTES
Subjective:     Hui Aldana is a 62 y.o. female seen for follow up of diabetes. She also has hypertension. Diabetic Review of Systems - medication compliance: noncompliant some of the time, home glucose monitoring: is performed sporadically. Other symptoms and concerns: Pt states that she is here for diabetes follow up. Pt is fasting, and we will collect fasting labs. No blood sugar log is present, and patient has no blood sugar recall. Pt has no set sliding scale instructions, with her Humalog. When asked how patient has been taking her Humalog, there is no set answer. It appears that the Humalog has not been administered in any regular fashion. Pt has had some tingling in her toes, and her lips at times. No pain and no swelling. Pt states that she is unsure if this is in relation to her diabetes, or something else. In addition, she has started smoking again. Pt states that most days, she smokes about 4 cigarettes. When she gets stressed out, she finds herself smoking more so. Pt is interested in quitting and is requesting an order for the nicotine patches. Pt has quite smoking previously.   Patient Active Problem List    Diagnosis Date Noted    Tingling 05/24/2017    Tobacco abuse 05/24/2017    Screening for breast cancer 05/24/2017    Screening for colon cancer 05/24/2017    Need for hepatitis C screening test 05/24/2017    Screening for thyroid disorder 05/24/2017    Screening for cholesterol level 05/24/2017   [facility] discharge follow-up 01/19/2017    Traumatic rhabdomyolysis ([facility] [address] 01/16/2017    Closed fracture of right talus 01/16/2017    Synovial cyst of right knee 01/16/2017    Dehydration 01/15/2017    UTI (urinary tract infection) 07/14/2016    Mild intermittent asthma without complication 27/10/0925    Pancreatitis 02/08/2016    Essential hypertension 02/08/2016    Diabetic neuropathy ([facility] [address] 02/08/2016    Vitamin D deficiency 02/08/2016    Diabetes ([facility] [address]  GERD (gastroesophageal reflux disease)      Current Outpatient Prescriptions   Medication Sig Dispense Refill    losartan (COZAAR) 50 mg tablet TAKE 1 TAB BY MOUTH DAILY. INDICATIONS: DIABETIC NEPHROPATHY, HYPERTENSION 90 Tab 1    hydroCHLOROthiazide (HYDRODIURIL) 12.5 mg tablet TAKE 1 TAB BY MOUTH DAILY. 90 Tab 1    torsemide (DEMADEX) 20 mg tablet Take 1 Tab by mouth daily. Indications: Edema 90 Tab 1    insulin glargine (LANTUS SOLOSTAR) 100 unit/mL (3 mL) inpn 32 Units by SubCUTAneous route nightly. 32 UNITS AT NIGHT 15 mL 3    metFORMIN ER (GLUCOPHAGE XR) 500 mg tablet Take 1 Tab by mouth two (2) times daily (with meals). 180 Tab 1    insulin lispro (HUMALOG) 100 unit/mL kwikpen Blood sugar 0-150: 0 units  151-200: 4 units  201-250: 6 units  251-300: 8 units  301-350: 10 units  351-400: 12 units  Over 400: call office 1 Package 2    nicotine (NICODERM CQ) 14 mg/24 hr patch 1 Patch by TransDERmal route every twenty-four (24) hours for 30 days. X 6 weeks. 42 Patch 0    nicotine (NICODERM CQ) 7 mg/24 hr 1 Patch by TransDERmal route every twenty-four (24) hours for 14 days. 14 Patch 0    omeprazole (PRILOSEC) 20 mg capsule TAKE 1 CAPSULE BY MOUTH TWICE A DAY 60 Cap 3    ergocalciferol (ERGOCALCIFEROL) 50,000 unit capsule TAKE 1 CAP BY MOUTH EVERY SEVEN (7) DAYS. 5 Cap 11    glucose blood VI test strips (ASCENSIA AUTODISC VI, ONE TOUCH ULTRA TEST VI) strip Check BS twice a day E11.40 200 Strip 5    SUMAtriptan (IMITREX) 100 mg tablet Take 1 Tab by mouth once as needed for Migraine for up to 1 dose. 9 Tab 2    gabapentin (NEURONTIN) 300 mg capsule TAKE 1 CAP BY MOUTH THREE (3) TIMES DAILY. 270 Cap 1    guaiFENesin SR (MUCINEX) 600 mg SR tablet Take 1 Tab by mouth two (2) times a day. 30 Tab 0    acetaminophen (TYLENOL) 325 mg tablet Take 2 Tabs by mouth every six (6) hours as needed.  40 Tab 0    VENTOLIN HFA 90 mcg/actuation inhaler TAKE 2 PUFFS BY INHALATION EVERY FOUR (4) HOURS AS NEEDED FOR WHEEZING. 18 Inhaler 0    L. ACIDOPH/B. LONG/L. PLANT/B.LAC (PROBIOTIC ACIDOPHILUS BEADS PO) Take 1 Cap by mouth daily.  levocetirizine (XYZAL) 5 mg tablet Take 1 Tab by mouth daily. 30 Tab 12    lipase-protease-amylase 36,000-114,000- 180,000 unit cpDR Take 4 Caps by mouth three (3) times daily (with meals). Take 4 capsules with each meal and 2 capsules with snacks (Patient taking differently: Take  by mouth. Take 4 capsules with each meal and 2 capsules with snacks) 480 Cap 12     Allergies   Allergen Reactions    Ciprofloxacin Cough    Lisinopril Cough    Penicillins Other (comments)     Yeast infection      Past Medical History:   Diagnosis Date    Arthritis     Asthma     Diabetes ([address]     GERD (gastroesophageal reflux disease)     Hypertension     Ill-defined condition     pancreatitis     Past Surgical History:   Procedure Laterality Date    COLONOSCOPY N/A 11/30/2016    COLONOSCOPY,EGD performed by Anju Cook MD at [facility] 175 Left 10/15    HX CATARACT REMOVAL Right 10/2016    HX CHOLECYSTECTOMY  2005 ?  HX GI  2001 ?     gastic tumor removal.     ASHA MAMMO BI SCREENING INCL CAD  5/3/12    normal    OK PANCREATIC ELASTASE, FECAL, QUAL/SEMI-QUANT  1999    growths in prancreatitis     Family History   Problem Relation Age of Onset    Diabetes Mother     Heart Disease Father     Diabetes Brother      Social History   Substance Use Topics    Smoking status: Former Smoker     Types: Cigarettes     Start date: 2/13/2016     Quit date: 2/29/2016    Smokeless tobacco: Never Used    Alcohol use No      Comment: social        Lab Results  Component Value Date/Time   WBC 16.5 01/19/2017 11:43 AM   HGB 11.5 01/19/2017 11:43 AM   HCT 36.4 01/19/2017 11:43 AM   PLATELET 082 90/68/5355 11:43 AM   MCV 92 01/19/2017 11:43 AM       Lab Results  Component Value Date/Time   Cholesterol, total 167 07/06/2016 10:28 AM   HDL Cholesterol 70 07/06/2016 10:28 AM   LDL, calculated 80 07/06/2016 10:28 AM   Triglyceride 85 07/06/2016 10:28 AM   CHOL/HDL Ratio 2.0 07/24/2014 09:10 AM       Lab Results  Component Value Date/Time   GFR est AA 61 01/19/2017 11:43 AM   GFR est non-AA 53 01/19/2017 11:43 AM   Creatinine 1.15 01/19/2017 11:43 AM   BUN 36 01/19/2017 11:43 AM   Sodium 133 01/19/2017 11:43 AM   Potassium 4.4 01/19/2017 11:43 AM   Chloride 94 01/19/2017 11:43 AM   CO2 21 01/19/2017 11:43 AM         Review of Systems  Pertinent items are noted in HPI. Objective:     Visit Vitals    /68    Pulse 78    Temp 98 °F (36.7 °C) (Oral)    Resp 16    Ht 5' 2\" (1.575 m)    Wt 185 lb (83.9 kg)    SpO2 97%    BMI 33.84 kg/m2     Appearance: alert, well appearing, and in no distress. Exam: heart sounds normal rate, regular rhythm, normal S1, S2, no murmurs, rubs, clicks or gallops, no carotid bruits  Lab review: orders written for new lab studies as appropriate; see orders. Assessment/Plan:     diabetes control uncertain. Diabetic issues reviewed with her: diabetic diet discussed in detail, written exchange diet given, low cholesterol diet, weight control and daily exercise discussed, home glucose monitoring emphasized, all medications, side effects and compliance discussed carefully, foot care discussed and Podiatry visits discussed, annual eye examinations at Ophthalmology discussed, glycohemoglobin and other lab monitoring discussed and long term diabetic complications discussed. ICD-10-CM ICD-9-CM    1. Type 2 diabetes mellitus without complication, with long-term current use of insulin (HCC) E11.9 250.00 AMB POC URINE, MICROALBUMIN, SEMIQUANT (3 RESULTS)    Z79.4 V58.67 CBC WITH AUTOMATED DIFF      METABOLIC PANEL, COMPREHENSIVE      HEMOGLOBIN A1C WITH EAG      metFORMIN ER (GLUCOPHAGE XR) 500 mg tablet      insulin lispro (HUMALOG) 100 unit/mL kwikpen   2. Essential hypertension I10 401.9    3.  Type 2 diabetes mellitus with diabetic neuropathy, with long-term current use of insulin (HCC) E11.40 250.60 losartan (COZAAR) 50 mg tablet    Z79.4 357.2 insulin glargine (LANTUS SOLOSTAR) 100 unit/mL (3 mL) inpn     V58.67    4. Essential hypertension with goal blood pressure less than 140/90 I10 401.9 losartan (COZAAR) 50 mg tablet      hydroCHLOROthiazide (HYDRODIURIL) 12.5 mg tablet   5. Tingling R20.2 782.0 VITAMIN B12 & FOLATE   6. Tobacco abuse Z72.0 305.1 nicotine (NICODERM CQ) 14 mg/24 hr patch      nicotine (NICODERM CQ) 7 mg/24 hr   7. Edema, unspecified type R60.9 782.3 torsemide (DEMADEX) 20 mg tablet   8. Screening for breast cancer Z12.39 V76.10 ASHA MAMMO BI SCREENING INCL CAD   9. Screening for colon cancer Z12.11 V76.51 OCCULT BLOOD, STOOL   10. Need for hepatitis C screening test Z11.59 V73.89 HEPATITIS C AB   11. Screening for thyroid disorder Z13.29 V77.0 THYROID CASCADE PROFILE   12. Screening for cholesterol level Z13.220 V77.91 LIPID PANEL   13. Vitamin D deficiency E55.9 268.9 VITAMIN D, 25 HYDROXY     Medications sent to pharmacy. Educated that we will notify her when labs return, and inform her of any change in plan of care at that time. Educated about importance of following sliding scale, for her Humalog, as opposed to just making up dosage of insulin. Educated about using Nicotine patches, as prescribed, to aid in smoking cessation. Educated about starting these when she is ready to quit smoking. Pt informed to return to office with worsening of symptoms, or PRN with any questions or concerns. Pt verbalizes understanding of plan of care and denies further questions or concerns at this time.

## 2017-05-24 NOTE — PATIENT INSTRUCTIONS
Nicotine (Nicoderm CQ, Nicoderm CQ Clear, Leader Nicotine Transdermal System, Nicotrol) - (Absorbed through the skin)   Why this medicine is used:   Helps you quit smoking. Contact a nurse or doctor right away if you have:  · Fast, slow, pounding, or uneven heartbeat     Common side effects:  · Vivid dreams, trouble sleeping  · Mild skin redness, itching, burning, tingling where you wear the patch  · Headache  © 2017 [address] is for End User's use only and may not be sold, redistributed or otherwise used for commercial purposes.

## 2017-05-24 NOTE — MR AVS SNAPSHOT
Visit Information Date & Time Provider Department Dept. Phone Encounter #  
 5/24/2017  8:45 AM Avery Katz  [address] [phone] [id number] Follow-up Instructions Return in about 6 months (around 11/24/2017), or if symptoms worsen or fail to improve. Upcoming Health Maintenance Date Due  
 BREAST CANCER SCRN MAMMOGRAM 6/23/2017* EYE EXAM RETINAL OR DILATED Q1 6/23/2017* FOBT Q 1 YEAR AGE 50-75 6/23/2017* MICROALBUMIN Q1 6/30/2017* FOOT EXAM Q1 7/6/2017 LIPID PANEL Q1 7/6/2017 INFLUENZA AGE 9 TO ADULT 8/1/2017 HEMOGLOBIN A1C Q6M 11/24/2017 PAP AKA CERVICAL CYTOLOGY 7/6/2019 DTaP/Tdap/Td series (2 - Td) 7/21/2026 *Topic was postponed. The date shown is not the original due date. Allergies as of 5/24/2017  Review Complete On: 5/24/2017 By: Avery Katz NP Severity Noted Reaction Type Reactions Ciprofloxacin  07/14/2016    Cough Lisinopril  07/21/2016    Cough Penicillins  04/09/2011   Side Effect Other (comments) Yeast infection Current Immunizations  Reviewed on 2/8/2016 Name Date Influenza Vaccine 10/5/2015 Tdap 7/21/2016  8:03 PM  
  
 Not reviewed this visit You Were Diagnosed With   
  
 Codes Comments Type 2 diabetes mellitus without complication, with long-term current use of insulin (HCC)    -  Primary ICD-10-CM: E11.9, Z79.4 ICD-9-CM: 250.00, V58.67 Essential hypertension     ICD-10-CM: I10 
ICD-9-CM: 401.9 Type 2 diabetes mellitus with diabetic neuropathy, with long-term current use of insulin (HCC)     ICD-10-CM: E11.40, Z79.4 ICD-9-CM: 250.60, 357.2, V58.67 Essential hypertension with goal blood pressure less than 140/90     ICD-10-CM: I10 
ICD-9-CM: 401.9 Tingling     ICD-10-CM: R20.2 ICD-9-CM: 782.0 Tobacco abuse     ICD-10-CM: Z72.0 ICD-9-CM: 305.1 Edema, unspecified type     ICD-10-CM: R60.9 ICD-9-CM: 782.3 Screening for breast cancer     ICD-10-CM: Z12.39 
ICD-9-CM: V76.10 Screening for colon cancer     ICD-10-CM: Z12.11 ICD-9-CM: V76.51 Need for hepatitis C screening test     ICD-10-CM: Z11.59 
ICD-9-CM: V73.89 Screening for thyroid disorder     ICD-10-CM: Z13.29 ICD-9-CM: V77.0 Screening for cholesterol level     ICD-10-CM: Z13.220 ICD-9-CM: V77.91 Vitamin D deficiency     ICD-10-CM: E55.9 ICD-9-CM: 268.9 Vitals BP Pulse Temp Resp Height(growth percentile) Weight(growth percentile)  
 118/68 78 98 °F (36.7 °C) (Oral) 16 5' 2\" (1.575 m) 185 lb (83.9 kg) SpO2 BMI OB Status Smoking Status 97% 33.84 kg/m2 Postmenopausal Former Smoker BMI and BSA Data Body Mass Index Body Surface Area  
 33.84 kg/m 2 1.92 m 2 Preferred Pharmacy Pharmacy Name Phone [facility]/PHARMACY #[id number] - [place] - [address] [phone] Your Updated Medication List  
  
   
This list is accurate as of: 5/24/17  9:04 AM.  Always use your most recent med list.  
  
  
  
  
 acetaminophen 325 mg tablet Commonly known as:  TYLENOL Take 2 Tabs by mouth every six (6) hours as needed. ergocalciferol 50,000 unit capsule Commonly known as:  ERGOCALCIFEROL  
TAKE 1 CAP BY MOUTH EVERY SEVEN (7) DAYS. gabapentin 300 mg capsule Commonly known as:  NEURONTIN  
TAKE 1 CAP BY MOUTH THREE (3) TIMES DAILY. glucose blood VI test strips strip Commonly known as:  ASCENSIA AUTODISC VI, ONE TOUCH ULTRA TEST VI Check BS twice a day E11.40  
  
 guaiFENesin  mg SR tablet Commonly known as:  [employer] Take 1 Tab by mouth two (2) times a day. hydroCHLOROthiazide 12.5 mg tablet Commonly known as:  HYDRODIURIL  
TAKE 1 TAB BY MOUTH DAILY. insulin glargine 100 unit/mL (3 mL) Inpn Commonly known as:  LANTUS SOLOSTAR  
32 Units by SubCUTAneous route nightly. 32 UNITS AT NIGHT  
  
 insulin lispro 100 unit/mL kwikpen Commonly known as:  HUMALOG Blood sugar 0-150: 0 units 151-200: 4 units 201-250: 6 units 251-300: 8 units 301-350: 10 units 351-400: 12 units Over 400: call office  
  
 levocetirizine 5 mg tablet Commonly known as:  Gaynel Ink Take 1 Tab by mouth daily. lipase-protease-amylase 36,000-114,000- 180,000 unit Cpdr  
Take 4 Caps by mouth three (3) times daily (with meals). Take 4 capsules with each meal and 2 capsules with snacks  
  
 losartan 50 mg tablet Commonly known as:  COZAAR  
TAKE 1 TAB BY MOUTH DAILY. INDICATIONS: DIABETIC NEPHROPATHY, HYPERTENSION  
  
 metFORMIN  mg tablet Commonly known as:  GLUCOPHAGE XR Take 1 Tab by mouth two (2) times daily (with meals). * nicotine 14 mg/24 hr patch Commonly known as:  NICODERM CQ  
1 Patch by TransDERmal route every twenty-four (24) hours for 30 days. X 6 weeks. * nicotine 7 mg/24 hr  
Commonly known as:  NICODERM CQ  
1 Patch by TransDERmal route every twenty-four (24) hours for 14 days. omeprazole 20 mg capsule Commonly known as:  PRILOSEC  
TAKE 1 CAPSULE BY MOUTH TWICE A DAY PROBIOTIC ACIDOPHILUS BEADS PO Take 1 Cap by mouth daily. SUMAtriptan 100 mg tablet Commonly known as:  IMITREX Take 1 Tab by mouth once as needed for Migraine for up to 1 dose. torsemide 20 mg tablet Commonly known as:  DEMADEX Take 1 Tab by mouth daily. Indications: Edema VENTOLIN HFA 90 mcg/actuation inhaler Generic drug:  albuterol TAKE 2 PUFFS BY INHALATION EVERY FOUR (4) HOURS AS NEEDED FOR WHEEZING. * Notice: This list has 2 medication(s) that are the same as other medications prescribed for you. Read the directions carefully, and ask your doctor or other care provider to review them with you. Prescriptions Printed Refills  
 insulin lispro (HUMALOG) 100 unit/mL kwikpen 2 Sig: Blood sugar 0-150: 0 units 151-200: 4 units 201-250: 6 units 251-300: 8 units 301-350: 10 units 351-400: 12 units Over 400: call office Class: Print Prescriptions Sent to Pharmacy Refills  
 losartan (COZAAR) 50 mg tablet 1 Sig: TAKE 1 TAB BY MOUTH DAILY. INDICATIONS: DIABETIC NEPHROPATHY, HYPERTENSION Class: Normal  
 Pharmacy: [facility]/pharmacy #[id number] [address] Ph #: [phone]  
 hydroCHLOROthiazide (HYDRODIURIL) 12.5 mg tablet 1 Sig: TAKE 1 TAB BY MOUTH DAILY. Class: Normal  
 Pharmacy: [facility]/pharmacy #[id number] [address] Ph #: [phone]  
 torsemide (DEMADEX) 20 mg tablet 1 Sig: Take 1 Tab by mouth daily. Indications: Edema Class: Normal  
 Pharmacy: [facility]/pharmacy #[id number] [address] Ph #: [phone] Route: Oral  
 insulin glargine (LANTUS SOLOSTAR) 100 unit/mL (3 mL) inpn 3 Si Units by SubCUTAneous route nightly. 32 UNITS AT NIGHT Class: Normal  
 Pharmacy: [facility]/pharmacy #[id number] [address] Ph #: [phone] Route: SubCUTAneous  
 metFORMIN ER (GLUCOPHAGE XR) 500 mg tablet 1 Sig: Take 1 Tab by mouth two (2) times daily (with meals). Class: Normal  
 Pharmacy: [facility]/pharmacy #[id number] [address] Ph #: [phone] Route: Oral  
 nicotine (NICODERM CQ) 14 mg/24 hr patch 0 Si Patch by TransDERmal route every twenty-four (24) hours for 30 days. X 6 weeks. Class: Normal  
 Pharmacy: [facility]/pharmacy #[id number] [address] Ph #: [phone] Route: TransDERmal  
 nicotine (NICODERM CQ) 7 mg/24 hr 0 Si Patch by TransDERmal route every twenty-four (24) hours for 14 days. Class: Normal  
 Pharmacy: [facility]/pharmacy #[id number] [address] Ph #: [phone] Route: TransDERmal  
  
We Performed the Following AMB POC URINE, MICROALBUMIN, SEMIQUANT (3 RESULTS) [17416 CPT(R)] CBC WITH AUTOMATED DIFF [94324 CPT(R)] HEMOGLOBIN A1C WITH EAG [43132 CPT(R)] HEPATITIS C AB [45368 CPT(R)] LIPID PANEL [47506 CPT(R)] METABOLIC PANEL, COMPREHENSIVE [64100 CPT(R)] OCCULT BLOOD, STOOL [id number] CPT(R)] THYROID CASCADE PROFILE [[id number] Custom] VITAMIN B12 & FOLATE [95714 CPT(R)] VITAMIN D, 25 HYDROXY [id number] CPT(R)] Follow-up Instructions Return in about 6 months (around 11/24/2017), or if symptoms worsen or fail to improve. To-Do List   
 05/25/2017 Imaging:  ASHA MAMMO BI SCREENING INCL CAD Patient Instructions Nicotine (Nicoderm CQ, Nicoderm CQ Clear, Leader Nicotine Transdermal System, Nicotrol) - (Absorbed through the skin) Why this medicine is used:  
Helps you quit smoking. Contact a nurse or doctor right away if you have: 
· Fast, slow, pounding, or uneven heartbeat Common side effects: · Vivid dreams, trouble sleeping · Mild skin redness, itching, burning, tingling where you wear the patch 
· Headache © 2017 [place] Information is for End User's use only and may not be sold, redistributed or otherwise used for commercial purposes. Introducing [facility]! [employer] introduces [employer] patient portal. Now you can access parts of your medical record, email your doctor's office, and request medication refills online. 1. In your internet browser, go to [email or web address] 2. Click on the First Time User? Click Here link in the Sign In box. You will see the New Member Sign Up page. 3. Enter your [employer] Access Code exactly as it appears below. You will not need to use this code after youve completed the sign-up process. If you do not sign up before the expiration date, you must request a new code. · [employer] Access Code: [facility] Expires: 8/22/2017  8:42 AM 
 
4. Enter the last four digits of your Social Security Number (xxxx) and Date of Birth (mm/dd/yyyy) as indicated and click Submit. You will be taken to the next sign-up page. 5. Create a SPHARES ID. This will be your [employer] login ID and cannot be changed, so think of one that is secure and easy to remember. 6. Create a [employer] password. You can change your password at any time. 7. Enter your Password Reset Question and Answer. This can be used at a later time if you forget your password. 8. Enter your e-mail address. You will receive e-mail notification when new information is available in [address] 9. Click Sign Up. You can now view and download portions of your medical record. 10. Click the Download Summary menu link to download a portable copy of your medical information. If you have questions, please visit the Frequently Asked Questions section of the [employer] website. Remember, [employer] is NOT to be used for urgent needs. For medical emergencies, dial 911. Now available from your iPhone and Android! Please provide this summary of care documentation to your next provider. Your primary care clinician is listed as Evonne Goltz. If you have any questions after today's visit, please call [phone].

## 2017-05-24 NOTE — PROGRESS NOTES
Identified pt with two pt identifiers(name and ). Chief Complaint   Patient presents with    Diabetes     fasting follow up        Health Maintenance Due   Topic    Hepatitis C Screening     EYE EXAM RETINAL OR DILATED Q1     BREAST CANCER SCRN MAMMOGRAM     FOBT Q 1 YEAR AGE 50-75     HEMOGLOBIN A1C Q6M     MICROALBUMIN Q1        Wt Readings from Last 3 Encounters:   17 185 lb (83.9 kg)   17 190 lb (86.2 kg)   01/15/17 190 lb (86.2 kg)     Temp Readings from Last 3 Encounters:   17 98 °F (36.7 °C) (Oral)   17 96.6 °F (35.9 °C) (Oral)   17 97.7 °F (36.5 °C)     BP Readings from Last 3 Encounters:   17 118/68   17 134/66   17 118/69     Pulse Readings from Last 3 Encounters:   17 78   17 64   17 78         Learning Assessment:  :     Learning Assessment 2016   PRIMARY LEARNER Patient   HIGHEST LEVEL OF EDUCATION - PRIMARY LEARNER  GRADUATED HIGH SCHOOL OR GED   BARRIERS PRIMARY LEARNER NONE   CO-LEARNER CAREGIVER No   PRIMARY LANGUAGE ENGLISH   LEARNER PREFERENCE PRIMARY OTHER (COMMENT)   ANSWERED BY self   RELATIONSHIP SELF       Depression Screening:  :     PHQ over the last two weeks 2017   Little interest or pleasure in doing things Not at all   Feeling down, depressed or hopeless Not at all   Total Score PHQ 2 0       Fall Risk Assessment:  :     Fall Risk Assessment, last 12 mths 2016   Able to walk? Yes   Fall in past 12 months? No       Abuse Screening:  :     Abuse Screening Questionnaire 2016   Do you ever feel afraid of your partner? N   Are you in a relationship with someone who physically or mentally threatens you? N   Is it safe for you to go home?  Y       Coordination of Care Questionnaire:  :     1) Have you been to an emergency room, urgent care clinic since your last visit? no   Hospitalized since your last visit? no             2) Have you seen or consulted any other health care providers outside of [facility] [address] [place] [address] since your last visit? no  (Include any pap smears or colon screenings in this section.)    3) Do you have an Advance Directive on file? no  Are you interested in receiving information about Advance Directives? no    Patient is accompanied by self I have received verbal consent from John Barroso to discuss any/all medical information while they are present in the room. Reviewed record in preparation for visit and have obtained necessary documentation. Medication reconciliation up to date and corrected with patient at this time.

## 2017-06-01 ENCOUNTER — TELEPHONE (OUTPATIENT)
Dept: FAMILY MEDICINE CLINIC | Age: 57
End: 2017-06-01

## 2017-06-01 LAB
25(OH)D3+25(OH)D2 SERPL-MCNC: 44.5 NG/ML (ref 30–100)
ALBUMIN SERPL-MCNC: 4 G/DL (ref 3.5–5.5)
ALBUMIN/GLOB SERPL: 1.4 {RATIO} (ref 1.2–2.2)
ALP SERPL-CCNC: 149 IU/L (ref 39–117)
ALT SERPL-CCNC: 8 IU/L (ref 0–32)
AST SERPL-CCNC: 10 IU/L (ref 0–40)
BASOPHILS # BLD AUTO: 0 X10E3/UL (ref 0–0.2)
BASOPHILS NFR BLD AUTO: 0 %
BILIRUB SERPL-MCNC: 0.3 MG/DL (ref 0–1.2)
BUN SERPL-MCNC: 32 MG/DL (ref 6–24)
BUN/CREAT SERPL: 28 (ref 9–23)
CALCIUM SERPL-MCNC: 9.4 MG/DL (ref 8.7–10.2)
CHLORIDE SERPL-SCNC: 98 MMOL/L (ref 96–106)
CHOLEST SERPL-MCNC: 139 MG/DL (ref 100–199)
CO2 SERPL-SCNC: 20 MMOL/L (ref 18–29)
CREAT SERPL-MCNC: 1.15 MG/DL (ref 0.57–1)
EOSINOPHIL # BLD AUTO: 0 X10E3/UL (ref 0–0.4)
EOSINOPHIL NFR BLD AUTO: 0 %
ERYTHROCYTE [DISTWIDTH] IN BLOOD BY AUTOMATED COUNT: 13.9 % (ref 12.3–15.4)
EST. AVERAGE GLUCOSE BLD GHB EST-MCNC: 341 MG/DL
FOLATE SERPL-MCNC: 13.2 NG/ML
GLOBULIN SER CALC-MCNC: 2.8 G/DL (ref 1.5–4.5)
GLUCOSE SERPL-MCNC: 194 MG/DL (ref 65–99)
HBA1C MFR BLD: 13.5 % (ref 4.8–5.6)
HCT VFR BLD AUTO: 36 % (ref 34–46.6)
HCV AB S/CO SERPL IA: <0.1 S/CO RATIO (ref 0–0.9)
HDLC SERPL-MCNC: 48 MG/DL
HEMOCCULT STL QL IA: NEGATIVE
HGB BLD-MCNC: 11.6 G/DL (ref 11.1–15.9)
IMM GRANULOCYTES # BLD: 0 X10E3/UL (ref 0–0.1)
IMM GRANULOCYTES NFR BLD: 0 %
INTERPRETATION, 910389: NORMAL
INTERPRETATION: NORMAL
LDLC SERPL CALC-MCNC: 72 MG/DL (ref 0–99)
LYMPHOCYTES # BLD AUTO: 3.5 X10E3/UL (ref 0.7–3.1)
LYMPHOCYTES NFR BLD AUTO: 37 %
Lab: NORMAL
MCH RBC QN AUTO: 29.2 PG (ref 26.6–33)
MCHC RBC AUTO-ENTMCNC: 32.2 G/DL (ref 31.5–35.7)
MCV RBC AUTO: 91 FL (ref 79–97)
MONOCYTES # BLD AUTO: 0.4 X10E3/UL (ref 0.1–0.9)
MONOCYTES NFR BLD AUTO: 5 %
NEUTROPHILS # BLD AUTO: 5.4 X10E3/UL (ref 1.4–7)
NEUTROPHILS NFR BLD AUTO: 58 %
PDF IMAGE, 910387: NORMAL
PLATELET # BLD AUTO: 277 X10E3/UL (ref 150–379)
POTASSIUM SERPL-SCNC: 5.2 MMOL/L (ref 3.5–5.2)
PROT SERPL-MCNC: 6.8 G/DL (ref 6–8.5)
RBC # BLD AUTO: 3.97 X10E6/UL (ref 3.77–5.28)
SODIUM SERPL-SCNC: 135 MMOL/L (ref 134–144)
TRIGL SERPL-MCNC: 97 MG/DL (ref 0–149)
TSH SERPL DL<=0.005 MIU/L-ACNC: 0.92 UIU/ML (ref 0.45–4.5)
VIT B12 SERPL-MCNC: 441 PG/ML (ref 211–946)
VLDLC SERPL CALC-MCNC: 19 MG/DL (ref 5–40)
WBC # BLD AUTO: 9.4 X10E3/UL (ref 3.4–10.8)

## 2017-06-01 NOTE — TELEPHONE ENCOUNTER
----- Message from Alysia Garland NP sent at 6/1/2017  7:31 AM EDT -----  Please call patient and let her know that her labs returned. Diabetes is horribly uncontrolled, and she needs to make an appointment to discuss this. She should bring ALL medications, and blood sugar log. Thanks!

## 2017-06-01 NOTE — PROGRESS NOTES
Please call patient and let her know that her labs returned. Diabetes is horribly uncontrolled, and she needs to make an appointment to discuss this. She should bring ALL medications, and blood sugar log. Thanks!

## 2017-06-21 ENCOUNTER — OFFICE VISIT (OUTPATIENT)
Dept: FAMILY MEDICINE CLINIC | Age: 57
End: 2017-06-21

## 2017-06-21 VITALS
HEART RATE: 78 BPM | TEMPERATURE: 98.4 F | OXYGEN SATURATION: 96 % | HEIGHT: 62 IN | WEIGHT: 186 LBS | RESPIRATION RATE: 16 BRPM | BODY MASS INDEX: 34.23 KG/M2 | SYSTOLIC BLOOD PRESSURE: 118 MMHG | DIASTOLIC BLOOD PRESSURE: 72 MMHG

## 2017-06-21 DIAGNOSIS — N89.8 VAGINAL ITCHING: ICD-10-CM

## 2017-06-21 DIAGNOSIS — E11.9 TYPE 2 DIABETES MELLITUS WITHOUT COMPLICATION, WITH LONG-TERM CURRENT USE OF INSULIN (HCC): Primary | ICD-10-CM

## 2017-06-21 DIAGNOSIS — Z79.4 TYPE 2 DIABETES MELLITUS WITHOUT COMPLICATION, WITH LONG-TERM CURRENT USE OF INSULIN (HCC): Primary | ICD-10-CM

## 2017-06-21 RX ORDER — FLUCONAZOLE 150 MG/1
150 TABLET ORAL DAILY
Qty: 1 TAB | Refills: 0 | Status: SHIPPED | OUTPATIENT
Start: 2017-06-21 | End: 2017-06-22

## 2017-06-21 RX ORDER — METFORMIN HYDROCHLORIDE 750 MG/1
750 TABLET, EXTENDED RELEASE ORAL 2 TIMES DAILY
Qty: 180 TAB | Refills: 0 | Status: SHIPPED | OUTPATIENT
Start: 2017-06-21 | End: 2017-10-20 | Stop reason: SDUPTHER

## 2017-06-21 RX ORDER — GLIMEPIRIDE 2 MG/1
2 TABLET ORAL
Qty: 90 TAB | Refills: 0 | Status: SHIPPED | OUTPATIENT
Start: 2017-06-21 | End: 2017-09-18 | Stop reason: SDUPTHER

## 2017-06-21 NOTE — MR AVS SNAPSHOT
Visit Information Date & Time Provider Department Dept. Phone Encounter #  
 6/21/2017  9:00 AM Theo Lennonqusinlu[facility] [address] [phone] [id number] Follow-up Instructions Return in about 3 months (around 9/21/2017), or if symptoms worsen or fail to improve. Upcoming Health Maintenance Date Due  
 FOOT EXAM Q1 7/6/2017 BREAST CANCER SCRN MAMMOGRAM 6/23/2017* EYE EXAM RETINAL OR DILATED Q1 6/23/2017* INFLUENZA AGE 9 TO ADULT 8/1/2017 HEMOGLOBIN A1C Q6M 11/24/2017 MICROALBUMIN Q1 5/24/2018 LIPID PANEL Q1 5/24/2018 FOBT Q 1 YEAR AGE 50-75 5/25/2018 PAP AKA CERVICAL CYTOLOGY 7/6/2019 DTaP/Tdap/Td series (2 - Td) 7/21/2026 *Topic was postponed. The date shown is not the original due date. Allergies as of 6/21/2017  Review Complete On: 6/21/2017 By: Sona Jovel NP Severity Noted Reaction Type Reactions Ciprofloxacin  07/14/2016    Cough Lisinopril  07/21/2016    Cough Penicillins  04/09/2011   Side Effect Other (comments) Yeast infection Current Immunizations  Reviewed on 2/8/2016 Name Date Influenza Vaccine 10/5/2015 Tdap 7/21/2016  8:03 PM  
  
 Not reviewed this visit You Were Diagnosed With   
  
 Codes Comments Type 2 diabetes mellitus without complication, with long-term current use of insulin (HCC)    -  Primary ICD-10-CM: E11.9, Z79.4 ICD-9-CM: 250.00, V58.67 Vitals BP Pulse Temp Resp Height(growth percentile) Weight(growth percentile) 118/72 78 98.4 °F (36.9 °C) (Oral) 16 5' 2\" (1.575 m) 186 lb (84.4 kg) SpO2 BMI OB Status Smoking Status 96% 34.02 kg/m2 Postmenopausal Former Smoker BMI and BSA Data Body Mass Index Body Surface Area 34.02 kg/m 2 1.92 m 2 Preferred Pharmacy Pharmacy Name Phone [facility]/PHARMACY #[id number] - Katharine Ashbydanielkris - [address] [address] [phone] Your Updated Medication List  
  
   
 This list is accurate as of: 6/21/17  9:16 AM.  Always use your most recent med list.  
  
  
  
  
 acetaminophen 325 mg tablet Commonly known as:  TYLENOL Take 2 Tabs by mouth every six (6) hours as needed. ergocalciferol 50,000 unit capsule Commonly known as:  ERGOCALCIFEROL  
TAKE 1 CAP BY MOUTH EVERY SEVEN (7) DAYS. gabapentin 300 mg capsule Commonly known as:  NEURONTIN  
TAKE 1 CAP BY MOUTH THREE (3) TIMES DAILY. glimepiride 2 mg tablet Commonly known as:  AMARYL Take 1 Tab by mouth every morning. glucose blood VI test strips strip Commonly known as:  ASCENSIA AUTODISC VI, ONE TOUCH ULTRA TEST VI Check BS twice a day E11.40  
  
 guaiFENesin  mg SR tablet Commonly known as:  Quinn Jamba! Quinn Take 1 Tab by mouth two (2) times a day. hydroCHLOROthiazide 12.5 mg tablet Commonly known as:  HYDRODIURIL  
TAKE 1 TAB BY MOUTH DAILY. insulin glargine 100 unit/mL (3 mL) Inpn Commonly known as:  LANTUS SOLOSTAR  
32 Units by SubCUTAneous route nightly. 32 UNITS AT NIGHT  
  
 insulin lispro 100 unit/mL kwikpen Commonly known as:  HUMALOG Blood sugar 0-150: 0 units 151-200: 4 units 201-250: 6 units 251-300: 8 units 301-350: 10 units 351-400: 12 units Over 400: call office  
  
 levocetirizine 5 mg tablet Commonly known as:  Azalea Gregory Take 1 Tab by mouth daily. lipase-protease-amylase 36,000-114,000- 180,000 unit Cpdr  
Take 4 Caps by mouth three (3) times daily (with meals). Take 4 capsules with each meal and 2 capsules with snacks  
  
 losartan 50 mg tablet Commonly known as:  COZAAR  
TAKE 1 TAB BY MOUTH DAILY. INDICATIONS: DIABETIC NEPHROPATHY, HYPERTENSION  
  
 metFORMIN  mg tablet Commonly known as:  GLUCOPHAGE XR Take 1 Tab by mouth two (2) times a day. nicotine 14 mg/24 hr patch Commonly known as:  NICODERM CQ  
1 Patch by TransDERmal route every twenty-four (24) hours for 30 days. X 6 weeks. omeprazole 20 mg capsule Commonly known as:  PRILOSEC  
TAKE 1 CAPSULE BY MOUTH TWICE A DAY PROBIOTIC ACIDOPHILUS BEADS PO Take 1 Cap by mouth daily. SUMAtriptan 100 mg tablet Commonly known as:  IMITREX Take 1 Tab by mouth once as needed for Migraine for up to 1 dose. torsemide 20 mg tablet Commonly known as:  DEMADEX Take 1 Tab by mouth daily. Indications: Edema VENTOLIN HFA 90 mcg/actuation inhaler Generic drug:  albuterol TAKE 2 PUFFS BY INHALATION EVERY FOUR (4) HOURS AS NEEDED FOR WHEEZING. Prescriptions Sent to Pharmacy Refills  
 metFORMIN ER (GLUCOPHAGE XR) 750 mg tablet 0 Sig: Take 1 Tab by mouth two (2) times a day. Class: Normal  
 Pharmacy: [facility] #[id number] - [place], [place] - [address] Ph #: [phone] Route: Oral  
 glimepiride (AMARYL) 2 mg tablet 0 Sig: Take 1 Tab by mouth every morning. Class: Normal  
 Pharmacy: [facility] #[id number] - [place], [place] - [address] Ph #: [phone] Route: Oral  
  
Follow-up Instructions Return in about 3 months (around 9/21/2017), or if symptoms worsen or fail to improve. To-Do List   
 06/23/2017 9:00 AM  
(Arrive by 8:45 AM) Appointment with [facility] at [facility] ([phone]) Shower or bathe using soap and water. Do not use deodorant, powder, perfumes, or lotion the day of your exam.  If your prior mammograms were not performed at [place] [address] please bring films with you or forward prior images 2 days before your procedure. Check in at registration 15min before your appointment time unless you were instructed to do otherwise. A script is not necessary, but if you have one, please bring it on the day of the mammogram or have it faxed to the department. [facility] [phone] [facility]  [phone] [facility]  [phone] [facility] [phone] [place] [id number] [facility] [phone] Please arrive 15 minutes prior to appointment to register Patient Instructions Learning About Diabetes Food Guidelines Your Care Instructions Meal planning is important to manage diabetes. It helps keep your blood sugar at a target level (which you set with your doctor). You don't have to eat special foods. You can eat what your family eats, including sweets once in a while. But you do have to pay attention to how often you eat and how much you eat of certain foods. You may want to work with a dietitian or a certified diabetes educator (CDE) to help you plan meals and snacks. A dietitian or CDE can also help you lose weight if that is one of your goals. What should you know about eating carbs? Managing the amount of carbohydrate (carbs) you eat is an important part of healthy meals when you have diabetes. Carbohydrate is found in many foods. · Learn which foods have carbs. And learn the amounts of carbs in different foods. ¨ Bread, cereal, pasta, and rice have about 15 grams of carbs in a serving. A serving is 1 slice of bread (1 ounce), ½ cup of cooked cereal, or 1/3 cup of cooked pasta or rice. ¨ Fruits have 15 grams of carbs in a serving. A serving is 1 small fresh fruit, such as an apple or orange; ½ of a banana; ½ cup of cooked or canned fruit; ½ cup of fruit juice; 1 cup of melon or raspberries; or 2 tablespoons of dried fruit. ¨ Milk and no-sugar-added yogurt have 15 grams of carbs in a serving. A serving is 1 cup of milk or 2/3 cup of no-sugar-added yogurt. ¨ Starchy vegetables have 15 grams of carbs in a serving. A serving is ½ cup of mashed potatoes or sweet potato; 1 cup winter squash; ½ of a small baked potato; ½ cup of cooked beans; or ½ cup cooked corn or green peas. · Learn how much carbs to eat each day and at each meal. A dietitian or CDE can teach you how to keep track of the amount of carbs you eat. This is called carbohydrate counting.  
· If you are not sure how to count carbohydrate grams, use the Plate Method to plan meals. It is a good, quick way to make sure that you have a balanced meal. It also helps you spread carbs throughout the day. ¨ Divide your plate by types of foods. Put non-starchy vegetables on half the plate, meat or other protein food on one-quarter of the plate, and a grain or starchy vegetable in the final quarter of the plate. To this you can add a small piece of fruit and 1 cup of milk or yogurt, depending on how many carbs you are supposed to eat at a meal. 
· Try to eat about the same amount of carbs at each meal. Do not \"save up\" your daily allowance of carbs to eat at one meal. 
· Proteins have very little or no carbs per serving. Examples of proteins are beef, chicken, turkey, fish, eggs, tofu, cheese, cottage cheese, and peanut butter. A serving size of meat is 3 ounces, which is about the size of a deck of cards. Examples of meat substitute serving sizes (equal to 1 ounce of meat) are 1/4 cup of cottage cheese, 1 egg, 1 tablespoon of peanut butter, and ½ cup of tofu. How can you eat out and still eat healthy? · Learn to estimate the serving sizes of foods that have carbohydrate. If you measure food at home, it will be easier to estimate the amount in a serving of restaurant food. · If the meal you order has too much carbohydrate (such as potatoes, corn, or baked beans), ask to have a low-carbohydrate food instead. Ask for a salad or green vegetables. · If you use insulin, check your blood sugar before and after eating out to help you plan how much to eat in the future. · If you eat more carbohydrate at a meal than you had planned, take a walk or do other exercise. This will help lower your blood sugar. What else should you know? · Limit saturated fat, such as the fat from meat and dairy products. This is a healthy choice because people who have diabetes are at higher risk of heart disease.  So choose lean cuts of meat and nonfat or low-fat dairy products. Use olive or canola oil instead of butter or shortening when cooking. · Don't skip meals. Your blood sugar may drop too low if you skip meals and take insulin or certain medicines for diabetes. · Check with your doctor before you drink alcohol. Alcohol can cause your blood sugar to drop too low. Alcohol can also cause a bad reaction if you take certain diabetes medicines. Follow-up care is a key part of your treatment and safety. Be sure to make and go to all appointments, and call your doctor if you are having problems. It's also a good idea to know your test results and keep a list of the medicines you take. Where can you learn more? Go to http://anil-shade.info/. Enter F940 in the search box to learn more about \"Learning About Diabetes Food Guidelines. \" Current as of: March 13, 2017 Content Version: 11.3 © 4851-5496 [employer]. Care instructions adapted under license by [employer] (which disclaims liability or warranty for this information). If you have questions about a medical condition or this instruction, always ask your healthcare professional. [address] any warranty or liability for your use of this information. Introducing [facility]! Willie Kiran introduces [employer] patient portal. Now you can access parts of your medical record, email your doctor's office, and request medication refills online. 1. In your internet browser, go to [email or web address] 2. Click on the First Time User? Click Here link in the Sign In box. You will see the New Member Sign Up page. 3. Enter your [employer] Access Code exactly as it appears below. You will not need to use this code after youve completed the sign-up process. If you do not sign up before the expiration date, you must request a new code. · [employer] Access Code: [facility] Expires: 8/22/2017  8:42 AM 
 
 4. Enter the last four digits of your Social Security Number (xxxx) and Date of Birth (mm/dd/yyyy) as indicated and click Submit. You will be taken to the next sign-up page. 5. Create a [employer] ID. This will be your [employer] login ID and cannot be changed, so think of one that is secure and easy to remember. 6. Create a [employer] password. You can change your password at any time. 7. Enter your Password Reset Question and Answer. This can be used at a later time if you forget your password. 8. Enter your e-mail address. You will receive e-mail notification when new information is available in [address] 9. Click Sign Up. You can now view and download portions of your medical record. 10. Click the Download Summary menu link to download a portable copy of your medical information. If you have questions, please visit the Frequently Asked Questions section of the [employer] website. Remember, [employer] is NOT to be used for urgent needs. For medical emergencies, dial 911. Now available from your iPhone and Android! Please provide this summary of care documentation to your next provider. Your primary care clinician is listed as Ash France. If you have any questions after today's visit, please call [phone].

## 2017-06-21 NOTE — PATIENT INSTRUCTIONS

## 2017-06-21 NOTE — PROGRESS NOTES
Subjective:     Isabelle Shah is a 62 y.o. female seen for follow up of diabetes. She also has hyperlipidemia. Diabetic Review of Systems - medication compliance: noncompliant some of the time, diabetic diet compliance: noncompliant some of the time. Other symptoms and concerns: Pt is here to discuss recent labs. Her HgbA1C returned at 13.5. It was apparent through discussion that patient has not been using her sliding scale insulin as prescribed, and this was reinforced. In addition, patient states that she does not always follow a diabetic diet. Patient Active Problem List    Diagnosis Date Noted    Vaginal itching 06/21/2017    Tingling 05/24/2017    Tobacco abuse 05/24/2017    Screening for breast cancer 05/24/2017    Screening for colon cancer 05/24/2017    Need for hepatitis C screening test 05/24/2017    Screening for thyroid disorder 05/24/2017    Screening for cholesterol level 05/24/2017   [facility] discharge follow-up 01/19/2017    Traumatic rhabdomyolysis ([facility] [address] 01/16/2017    Closed fracture of right talus 01/16/2017    Synovial cyst of right knee 01/16/2017    Dehydration 01/15/2017    UTI (urinary tract infection) 07/14/2016    Mild intermittent asthma without complication 96/82/3753    Pancreatitis 02/08/2016    Essential hypertension 02/08/2016    Diabetic neuropathy ([facility] [address] 02/08/2016    Vitamin D deficiency 02/08/2016    Diabetes (HCC)     GERD (gastroesophageal reflux disease)      Current Outpatient Prescriptions   Medication Sig Dispense Refill    metFORMIN ER (GLUCOPHAGE XR) 750 mg tablet Take 1 Tab by mouth two (2) times a day. 180 Tab 0    glimepiride (AMARYL) 2 mg tablet Take 1 Tab by mouth every morning. 90 Tab 0    fluconazole (DIFLUCAN) 150 mg tablet Take 1 Tab by mouth daily for 1 day. FDA advises cautious prescribing of oral fluconazole in pregnancy. 1 Tab 0    losartan (COZAAR) 50 mg tablet TAKE 1 TAB BY MOUTH DAILY.  INDICATIONS: DIABETIC NEPHROPATHY, HYPERTENSION 90 Tab 1    hydroCHLOROthiazide (HYDRODIURIL) 12.5 mg tablet TAKE 1 TAB BY MOUTH DAILY. 90 Tab 1    torsemide (DEMADEX) 20 mg tablet Take 1 Tab by mouth daily. Indications: Edema 90 Tab 1    insulin glargine (LANTUS SOLOSTAR) 100 unit/mL (3 mL) inpn 32 Units by SubCUTAneous route nightly. 32 UNITS AT NIGHT 15 mL 3    insulin lispro (HUMALOG) 100 unit/mL kwikpen Blood sugar 0-150: 0 units  151-200: 4 units  201-250: 6 units  251-300: 8 units  301-350: 10 units  351-400: 12 units  Over 400: call office 1 Package 2    omeprazole (PRILOSEC) 20 mg capsule TAKE 1 CAPSULE BY MOUTH TWICE A DAY 60 Cap 3    ergocalciferol (ERGOCALCIFEROL) 50,000 unit capsule TAKE 1 CAP BY MOUTH EVERY SEVEN (7) DAYS. 5 Cap 11    glucose blood VI test strips (ASCENSIA AUTODISC VI, ONE TOUCH ULTRA TEST VI) strip Check BS twice a day E11.40 200 Strip 5    SUMAtriptan (IMITREX) 100 mg tablet Take 1 Tab by mouth once as needed for Migraine for up to 1 dose. 9 Tab 2    gabapentin (NEURONTIN) 300 mg capsule TAKE 1 CAP BY MOUTH THREE (3) TIMES DAILY. 270 Cap 1    guaiFENesin SR (MUCINEX) 600 mg SR tablet Take 1 Tab by mouth two (2) times a day. 30 Tab 0    acetaminophen (TYLENOL) 325 mg tablet Take 2 Tabs by mouth every six (6) hours as needed. 40 Tab 0    VENTOLIN HFA 90 mcg/actuation inhaler TAKE 2 PUFFS BY INHALATION EVERY FOUR (4) HOURS AS NEEDED FOR WHEEZING. 18 Inhaler 0    L. ACIDOPH/B. LONG/L. PLANT/B.LAC (PROBIOTIC ACIDOPHILUS BEADS PO) Take 1 Cap by mouth daily.  levocetirizine (XYZAL) 5 mg tablet Take 1 Tab by mouth daily. 30 Tab 12    lipase-protease-amylase 36,000-114,000- 180,000 unit cpDR Take 4 Caps by mouth three (3) times daily (with meals). Take 4 capsules with each meal and 2 capsules with snacks (Patient taking differently: Take  by mouth.  Take 4 capsules with each meal and 2 capsules with snacks) 480 Cap 12    nicotine (NICODERM CQ) 14 mg/24 hr patch 1 Patch by TransDERmal route every twenty-four (24) hours for 30 days. X 6 weeks. 42 Patch 0     Allergies   Allergen Reactions    Ciprofloxacin Cough    Lisinopril Cough    Penicillins Other (comments)     Yeast infection      Past Medical History:   Diagnosis Date    Arthritis     Asthma     Diabetes ([address]     GERD (gastroesophageal reflux disease)     Hypertension     Ill-defined condition     pancreatitis     Past Surgical History:   Procedure Laterality Date    COLONOSCOPY N/A 11/30/2016    COLONOSCOPY,EGD performed by Eugene Wren MD at [address] HX CATARACT REMOVAL Left 10/15    HX CATARACT REMOVAL Right 10/2016    HX CHOLECYSTECTOMY  2005 ?  HX GI  2001 ?     gastic tumor removal.     ASHA MAMMO BI SCREENING INCL CAD  5/3/12    normal    AR PANCREATIC ELASTASE, FECAL, QUAL/SEMI-QUANT  1999    growths in prancreatitis     Family History   Problem Relation Age of Onset    Diabetes Mother     Heart Disease Father     Diabetes Brother      Social History   Substance Use Topics    Smoking status: Former Smoker     Types: Cigarettes     Start date: 2/13/2016     Quit date: 2/29/2016    Smokeless tobacco: Never Used    Alcohol use No      Comment: social        Lab Results  Component Value Date/Time   WBC 9.4 05/24/2017 09:25 AM   HGB 11.6 05/24/2017 09:25 AM   HCT 36.0 05/24/2017 09:25 AM   PLATELET 250 40/54/3782 09:25 AM   MCV 91 05/24/2017 09:25 AM     Lab Results  Component Value Date/Time   Hemoglobin A1c 13.5 05/24/2017 09:25 AM   Hemoglobin A1c 12.5 07/06/2016 10:28 AM   Hemoglobin A1c 8.1 11/20/2014 09:30 AM   Glucose 194 05/24/2017 09:25 AM   Glucose (POC) 449 01/17/2017 04:43 PM   Microalbumin/Creat ratio (mg/g creat) 227 03/20/2014 11:35 AM   Microalbumin,urine random 28.00 03/20/2014 11:35 AM   LDL, calculated 72 05/24/2017 09:25 AM   Creatinine 1.15 05/24/2017 09:25 AM      Lab Results  Component Value Date/Time   Cholesterol, total 139 05/24/2017 09:25 AM   HDL Cholesterol 48 05/24/2017 09:25 AM LDL, calculated 72 05/24/2017 09:25 AM   Triglyceride 97 05/24/2017 09:25 AM   CHOL/HDL Ratio 2.0 07/24/2014 09:10 AM              Review of Systems  A comprehensive review of systems was negative except for that written in the HPI. Objective:   Visit Vitals    /72    Pulse 78    Temp 98.4 °F (36.9 °C) (Oral)    Resp 16    Ht 5' 2\" (1.575 m)    Wt 186 lb (84.4 kg)    SpO2 96%    BMI 34.02 kg/m2     Appearance: alert, well appearing, and in no distress. Exam: heart sounds normal rate, regular rhythm, normal S1, S2, no murmurs, rubs, clicks or gallops  Lab review: reviewed labs in person. Assessment/Plan:     diabetes poorly controlled. Diabetic issues reviewed with her: diabetic diet discussed in detail, written exchange diet given, home glucose monitoring emphasized, all medications, side effects and compliance discussed carefully, use and side effects of insulin is taught, annual eye examinations at Ophthalmology discussed, glycohemoglobin and other lab monitoring discussed and long term diabetic complications discussed. ICD-10-CM ICD-9-CM    1. Type 2 diabetes mellitus without complication, with long-term current use of insulin ([facility]) E11.9 250.00 metFORMIN ER (GLUCOPHAGE XR) 750 mg tablet    Z79.4 V58.67 glimepiride (AMARYL) 2 mg tablet   2. Vaginal itching L29.8 698.1 fluconazole (DIFLUCAN) 150 mg tablet     Spent a lot of time discussing the importance of getting her diabetes under control, and the long term side effects of uncontrolled diabetes. Educated about the importance of taking medications as prescribed, in addition to focusing hard on diabetic diet. Educated about increasing dose of Metformin, and adding in Amaryl. Educated about taking as prescribed. Pt to return to office in 3 months for office visit and labs. Pt informed to return to office with worsening of symptoms, or PRN with any questions or concerns.   Pt verbalizes understanding of plan of care and denies further questions or concerns at this time.

## 2017-06-21 NOTE — PROGRESS NOTES
Identified pt with two pt identifiers(name and ). Chief Complaint   Patient presents with    Diabetes     follow up        Health Maintenance Due   Topic    FOOT EXAM Q1        Wt Readings from Last 3 Encounters:   17 186 lb (84.4 kg)   17 185 lb (83.9 kg)   17 190 lb (86.2 kg)     Temp Readings from Last 3 Encounters:   17 98.4 °F (36.9 °C) (Oral)   17 98 °F (36.7 °C) (Oral)   17 96.6 °F (35.9 °C) (Oral)     BP Readings from Last 3 Encounters:   17 118/72   17 118/68   17 134/66     Pulse Readings from Last 3 Encounters:   17 78   17 78   17 64         Learning Assessment:  :     Learning Assessment 2016   PRIMARY LEARNER Patient   HIGHEST LEVEL OF EDUCATION - PRIMARY LEARNER  GRADUATED HIGH SCHOOL OR GED   BARRIERS PRIMARY LEARNER NONE   CO-LEARNER CAREGIVER No   PRIMARY LANGUAGE ENGLISH   LEARNER PREFERENCE PRIMARY OTHER (COMMENT)   ANSWERED BY self   RELATIONSHIP SELF       Depression Screening:  :     PHQ over the last two weeks 2017   Little interest or pleasure in doing things Not at all   Feeling down, depressed or hopeless Not at all   Total Score PHQ 2 0       Fall Risk Assessment:  :     Fall Risk Assessment, last 12 mths 2016   Able to walk? Yes   Fall in past 12 months? No       Abuse Screening:  :     Abuse Screening Questionnaire 2016   Do you ever feel afraid of your partner? N   Are you in a relationship with someone who physically or mentally threatens you? N   Is it safe for you to go home?  Y       Coordination of Care Questionnaire:  :     1) Have you been to an emergency room, urgent care clinic since your last visit? no   Hospitalized since your last visit? no             2) Have you seen or consulted any other health care providers outside of [facility] since your last visit? no  (Include any pap smears or colon screenings in this section.)    3) Do you have an Advance Directive on file? no  Are you interested in receiving information about Advance Directives? no    Patient is accompanied by self I have received verbal consent from Jose Lopez to discuss any/all medical information while they are present in the room. Reviewed record in preparation for visit and have obtained necessary documentation. Medication reconciliation up to date and corrected with patient at this time.

## 2017-06-22 DIAGNOSIS — Z72.0 TOBACCO ABUSE: ICD-10-CM

## 2017-06-22 RX ORDER — IBUPROFEN 200 MG
TABLET ORAL
Qty: 42 PATCH | Refills: 0 | OUTPATIENT
Start: 2017-06-22

## 2017-06-22 NOTE — TELEPHONE ENCOUNTER
I denied request because I have already sent in the patches she should wear next, the 7 mg. You only do the 14 mg for 6 weeks. Please call the patient and let her know. Thanks!

## 2017-06-23 ENCOUNTER — HOSPITAL ENCOUNTER (OUTPATIENT)
Dept: MAMMOGRAPHY | Age: 57
Discharge: HOME | End: 2017-06-23
Attending: NURSE PRACTITIONER
Payer: MEDICAID

## 2017-06-23 DIAGNOSIS — Z12.39 SCREENING FOR BREAST CANCER: ICD-10-CM

## 2017-06-23 PROCEDURE — 77067 SCR MAMMO BI INCL CAD: CPT

## 2017-07-16 DIAGNOSIS — E11.42 DIABETIC POLYNEUROPATHY ASSOCIATED WITH TYPE 2 DIABETES MELLITUS (HCC): ICD-10-CM

## 2017-07-17 RX ORDER — GABAPENTIN 300 MG/1
CAPSULE ORAL
Qty: 270 CAP | Refills: 0 | Status: SHIPPED | OUTPATIENT
Start: 2017-07-17 | End: 2017-10-28 | Stop reason: SDUPTHER

## 2017-07-17 RX ORDER — OMEPRAZOLE 20 MG/1
CAPSULE, DELAYED RELEASE ORAL
Qty: 60 CAP | Refills: 3 | Status: SHIPPED | OUTPATIENT
Start: 2017-07-17 | End: 2017-12-04 | Stop reason: SDUPTHER

## 2017-08-03 RX ORDER — BLOOD SUGAR DIAGNOSTIC
STRIP MISCELLANEOUS
Qty: 100 STRIP | Refills: 0 | Status: SHIPPED | OUTPATIENT
Start: 2017-08-03 | End: 2021-09-27 | Stop reason: SDUPTHER

## 2017-09-18 DIAGNOSIS — E11.9 TYPE 2 DIABETES MELLITUS WITHOUT COMPLICATION, WITH LONG-TERM CURRENT USE OF INSULIN (HCC): ICD-10-CM

## 2017-09-18 DIAGNOSIS — Z79.4 TYPE 2 DIABETES MELLITUS WITHOUT COMPLICATION, WITH LONG-TERM CURRENT USE OF INSULIN (HCC): ICD-10-CM

## 2017-09-18 RX ORDER — GLIMEPIRIDE 2 MG/1
TABLET ORAL
Qty: 90 TAB | Refills: 0 | Status: SHIPPED | OUTPATIENT
Start: 2017-09-18 | End: 2017-12-21 | Stop reason: SDUPTHER

## 2017-09-22 RX ORDER — PANCRELIPASE 36000; 180000; 114000 [USP'U]/1; [USP'U]/1; [USP'U]/1
CAPSULE, DELAYED RELEASE PELLETS ORAL
Qty: 480 CAP | Refills: 6 | Status: SHIPPED | OUTPATIENT
Start: 2017-09-22 | End: 2019-06-24 | Stop reason: SDUPTHER

## 2017-10-02 DIAGNOSIS — Z91.09 ENVIRONMENTAL ALLERGIES: ICD-10-CM

## 2017-10-03 RX ORDER — LEVOCETIRIZINE DIHYDROCHLORIDE 5 MG/1
TABLET, FILM COATED ORAL
Qty: 30 TAB | Refills: 9 | Status: SHIPPED | OUTPATIENT
Start: 2017-10-03 | End: 2018-11-21 | Stop reason: SDUPTHER

## 2017-10-20 DIAGNOSIS — E11.9 TYPE 2 DIABETES MELLITUS WITHOUT COMPLICATION, WITH LONG-TERM CURRENT USE OF INSULIN (HCC): ICD-10-CM

## 2017-10-20 DIAGNOSIS — Z79.4 TYPE 2 DIABETES MELLITUS WITHOUT COMPLICATION, WITH LONG-TERM CURRENT USE OF INSULIN (HCC): ICD-10-CM

## 2017-10-23 RX ORDER — METFORMIN HYDROCHLORIDE 750 MG/1
TABLET, EXTENDED RELEASE ORAL
Qty: 180 TAB | Refills: 0 | Status: SHIPPED | OUTPATIENT
Start: 2017-10-23 | End: 2018-11-19 | Stop reason: SDUPTHER

## 2017-10-28 DIAGNOSIS — E11.42 DIABETIC POLYNEUROPATHY ASSOCIATED WITH TYPE 2 DIABETES MELLITUS (HCC): ICD-10-CM

## 2017-10-30 RX ORDER — GABAPENTIN 300 MG/1
CAPSULE ORAL
Qty: 270 CAP | Refills: 0 | Status: SHIPPED | OUTPATIENT
Start: 2017-10-30 | End: 2018-04-30 | Stop reason: SDUPTHER

## 2017-11-01 LAB
HBA1C MFR BLD HPLC: 9.6 %
LDL-C, EXTERNAL: 86

## 2017-11-30 DIAGNOSIS — Z79.4 TYPE 2 DIABETES MELLITUS WITH DIABETIC NEUROPATHY, WITH LONG-TERM CURRENT USE OF INSULIN (HCC): ICD-10-CM

## 2017-11-30 DIAGNOSIS — J45.20 MILD INTERMITTENT ASTHMA WITHOUT COMPLICATION: ICD-10-CM

## 2017-11-30 DIAGNOSIS — E11.40 TYPE 2 DIABETES MELLITUS WITH DIABETIC NEUROPATHY, WITH LONG-TERM CURRENT USE OF INSULIN (HCC): ICD-10-CM

## 2017-11-30 RX ORDER — INSULIN GLARGINE 100 [IU]/ML
INJECTION, SOLUTION SUBCUTANEOUS
Qty: 15 ADJUSTABLE DOSE PRE-FILLED PEN SYRINGE | Refills: 3 | Status: SHIPPED | OUTPATIENT
Start: 2017-11-30 | End: 2018-04-06 | Stop reason: SDUPTHER

## 2017-12-01 RX ORDER — ALBUTEROL SULFATE 90 UG/1
AEROSOL, METERED RESPIRATORY (INHALATION)
Qty: 18 INHALER | Refills: 3 | Status: SHIPPED | OUTPATIENT
Start: 2017-12-01 | End: 2018-11-19 | Stop reason: SDUPTHER

## 2017-12-04 DIAGNOSIS — E11.40 TYPE 2 DIABETES MELLITUS WITH DIABETIC NEUROPATHY, WITH LONG-TERM CURRENT USE OF INSULIN (HCC): ICD-10-CM

## 2017-12-04 DIAGNOSIS — Z79.4 TYPE 2 DIABETES MELLITUS WITH DIABETIC NEUROPATHY, WITH LONG-TERM CURRENT USE OF INSULIN (HCC): ICD-10-CM

## 2017-12-04 DIAGNOSIS — I10 ESSENTIAL HYPERTENSION WITH GOAL BLOOD PRESSURE LESS THAN 140/90: ICD-10-CM

## 2017-12-04 RX ORDER — LOSARTAN POTASSIUM 50 MG/1
TABLET ORAL
Qty: 90 TAB | Refills: 0 | Status: SHIPPED | OUTPATIENT
Start: 2017-12-04 | End: 2018-02-10 | Stop reason: SDUPTHER

## 2017-12-04 RX ORDER — OMEPRAZOLE 20 MG/1
CAPSULE, DELAYED RELEASE ORAL
Qty: 60 CAP | Refills: 3 | Status: SHIPPED | OUTPATIENT
Start: 2017-12-04 | End: 2018-04-06 | Stop reason: SDUPTHER

## 2017-12-21 DIAGNOSIS — Z79.4 TYPE 2 DIABETES MELLITUS WITHOUT COMPLICATION, WITH LONG-TERM CURRENT USE OF INSULIN (HCC): ICD-10-CM

## 2017-12-21 DIAGNOSIS — E11.9 TYPE 2 DIABETES MELLITUS WITHOUT COMPLICATION, WITH LONG-TERM CURRENT USE OF INSULIN (HCC): ICD-10-CM

## 2017-12-21 RX ORDER — GLIMEPIRIDE 2 MG/1
TABLET ORAL
Qty: 90 TAB | Refills: 0 | Status: SHIPPED | OUTPATIENT
Start: 2017-12-21 | End: 2018-04-06 | Stop reason: SDUPTHER

## 2017-12-28 ENCOUNTER — OFFICE VISIT (OUTPATIENT)
Dept: FAMILY MEDICINE CLINIC | Age: 57
End: 2017-12-28

## 2017-12-28 VITALS
RESPIRATION RATE: 18 BRPM | BODY MASS INDEX: 33.49 KG/M2 | HEIGHT: 62 IN | TEMPERATURE: 97.7 F | DIASTOLIC BLOOD PRESSURE: 82 MMHG | WEIGHT: 182 LBS | HEART RATE: 74 BPM | SYSTOLIC BLOOD PRESSURE: 140 MMHG | OXYGEN SATURATION: 99 %

## 2017-12-28 DIAGNOSIS — B00.1 COLD SORE: ICD-10-CM

## 2017-12-28 DIAGNOSIS — J06.9 URI WITH COUGH AND CONGESTION: Primary | ICD-10-CM

## 2017-12-28 DIAGNOSIS — N76.0 ACUTE VAGINITIS: ICD-10-CM

## 2017-12-28 RX ORDER — ACYCLOVIR 50 MG/G
OINTMENT TOPICAL
Qty: 5 G | Refills: 0 | Status: SHIPPED | OUTPATIENT
Start: 2017-12-28 | End: 2018-11-19 | Stop reason: ALTCHOICE

## 2017-12-28 RX ORDER — FLUCONAZOLE 150 MG/1
TABLET ORAL
Qty: 2 TAB | Refills: 0 | Status: SHIPPED | OUTPATIENT
Start: 2017-12-28 | End: 2018-08-08 | Stop reason: ALTCHOICE

## 2017-12-28 RX ORDER — PEN NEEDLE, DIABETIC 32GX 5/32"
NEEDLE, DISPOSABLE MISCELLANEOUS
Refills: 11 | COMMUNITY
Start: 2017-10-22 | End: 2018-03-08 | Stop reason: SDUPTHER

## 2017-12-28 RX ORDER — VALACYCLOVIR HYDROCHLORIDE 1 G/1
2000 TABLET, FILM COATED ORAL 2 TIMES DAILY
Qty: 60 TAB | Refills: 0 | Status: SHIPPED | OUTPATIENT
Start: 2017-12-28 | End: 2019-02-13 | Stop reason: ALTCHOICE

## 2017-12-28 RX ORDER — CODEINE PHOSPHATE AND GUAIFENESIN 10; 100 MG/5ML; MG/5ML
5 SOLUTION ORAL
Qty: 236 ML | Refills: 0 | Status: SHIPPED | OUTPATIENT
Start: 2017-12-28 | End: 2018-08-08 | Stop reason: ALTCHOICE

## 2017-12-28 RX ORDER — HYDROMORPHONE HYDROCHLORIDE 4 MG/1
4 TABLET ORAL
COMMUNITY
Start: 2017-12-22 | End: 2018-08-08 | Stop reason: ALTCHOICE

## 2017-12-28 NOTE — PROGRESS NOTES
Identified pt with two pt identifiers(name and ). Chief Complaint   Patient presents with    Medication Evaluation     would like to be prepared for cold season because of ride for insurance. Health Maintenance Due   Topic    EYE EXAM RETINAL OR DILATED Q1     FOOT EXAM Q1     HEMOGLOBIN A1C Q6M        Wt Readings from Last 3 Encounters:   17 182 lb (82.6 kg)   17 186 lb (84.4 kg)   17 185 lb (83.9 kg)     Temp Readings from Last 3 Encounters:   17 97.7 °F (36.5 °C) (Oral)   17 98.4 °F (36.9 °C) (Oral)   17 98 °F (36.7 °C) (Oral)     BP Readings from Last 3 Encounters:   17 140/82   17 118/72   17 118/68     Pulse Readings from Last 3 Encounters:   17 74   17 78   17 78         Learning Assessment:  :     Learning Assessment 2016   PRIMARY LEARNER Patient   HIGHEST LEVEL OF EDUCATION - PRIMARY LEARNER  GRADUATED HIGH SCHOOL OR GED   BARRIERS PRIMARY LEARNER NONE   CO-LEARNER CAREGIVER No   PRIMARY LANGUAGE ENGLISH   LEARNER PREFERENCE PRIMARY OTHER (COMMENT)   ANSWERED BY self   RELATIONSHIP SELF       Depression Screening:  :     PHQ over the last two weeks 2017   Little interest or pleasure in doing things Not at all   Feeling down, depressed or hopeless Not at all   Total Score PHQ 2 0       Fall Risk Assessment:  :     Fall Risk Assessment, last 12 mths 2016   Able to walk? Yes   Fall in past 12 months? No       Abuse Screening:  :     Abuse Screening Questionnaire 2016   Do you ever feel afraid of your partner? N   Are you in a relationship with someone who physically or mentally threatens you? N   Is it safe for you to go home?  Y       Coordination of Care Questionnaire:  :     1) Have you been to an emergency room, urgent care clinic since your last visit? no   Hospitalized since your last visit? no             2) Have you seen or consulted any other health care providers outside of [facility] since your last visit? yes  Diabetic Specialist at [facility] (Include any pap smears or colon screenings in this section.)    3) Do you have an Advance Directive on file? no  Are you interested in receiving information about Advance Directives? no    Reviewed record in preparation for visit and have obtained necessary documentation. Medication reconciliation up to date and corrected with patient at this time.

## 2017-12-28 NOTE — PROGRESS NOTES
Subjective:      Jillian Erickson is a 62 y.o. female here today for \"check up\". Current concerns:   - Reports that she intermittently has yeast infection for which she has to use Diflucan therapy. Currently asymptomatic. Would like to have prescription on hand. - Cold sores: on the lips. Has previously been treated with topical acyclovir and Valtrex per her report. - URI symptoms: has started with nasal congestion, throat irritation, mild cough x 2-3 days. Denies fever, shortness of breath. Has been taking OTC cold medications at needed. Diabetes: managed by Diabetes Clinic at [facility] - goes once every 3 months. She brings in copy of recent labs for review (performed on 11/1/17): HgA1c 9.6, (+) microalbuminuria, creatinine 1.04, LDL 86. States no changes have been made to her medication.   - Eye Care is performed at [facility]       Current Outpatient Prescriptions   Medication Sig Dispense Refill    HYDROmorphone (DILAUDID) 4 mg tablet Take 4 mg by mouth every six (6) hours as needed.  LEE PEN NEEDLE 32 gauge x 5/32\" ndle FOR INJECTING INSULIN 250.00/E11.9 TEST _4__ TIME(S) PER DAY  11    glimepiride (AMARYL) 2 mg tablet TAKE 1 TAB BY MOUTH EVERY MORNING. 90 Tab 0    losartan (COZAAR) 50 mg tablet TAKE 1 TABLET BY MOUTH DAILY 90 Tab 0    omeprazole (PRILOSEC) 20 mg capsule TAKE 1 CAPSULE BY MOUTH TWICE A DAY 60 Cap 3    VENTOLIN HFA 90 mcg/actuation inhaler TAKE 2 PUFFS BY INHALATION EVERY FOUR (4) HOURS AS NEEDED FOR WHEEZING. 18 Inhaler 3    LANTUS SOLOSTAR 100 unit/mL (3 mL) inpn INJECT 32 UNITS SUBCUTANEOUSLY NIGHTLY 15 Adjustable Dose Pre-filled Pen Syringe 3    gabapentin (NEURONTIN) 300 mg capsule TAKE 1 CAP BY MOUTH THREE (3) TIMES DAILY. 270 Cap 0    metFORMIN ER (GLUCOPHAGE XR) 750 mg tablet TAKE 1 TAB BY MOUTH TWO (2) TIMES A DAY.  180 Tab 0    levocetirizine (XYZAL) 5 mg tablet TAKE 1 TABLET BY MOUTH DAILY 30 Tab 9    CREON 36,000-114,000- 180,000 unit cpDR TAKE 4 CAPSULES BY MOUTH THREE TIMES A DAY WITH EACH MEAL AND TAKE 2 CAPS WITH SNACKS 480 Cap 6    ONETOUCH ULTRA TEST strip TEST BLOOD GLUCOSE THREE TIMES DAILY 100 Strip 0    hydroCHLOROthiazide (HYDRODIURIL) 12.5 mg tablet TAKE 1 TAB BY MOUTH DAILY. 90 Tab 1    torsemide (DEMADEX) 20 mg tablet Take 1 Tab by mouth daily. Indications: Edema 90 Tab 1    insulin lispro (HUMALOG) 100 unit/mL kwikpen Blood sugar 0-150: 0 units  151-200: 4 units  201-250: 6 units  251-300: 8 units  301-350: 10 units  351-400: 12 units  Over 400: call office 1 Package 2    ergocalciferol (ERGOCALCIFEROL) 50,000 unit capsule TAKE 1 CAP BY MOUTH EVERY SEVEN (7) DAYS. 5 Cap 11    SUMAtriptan (IMITREX) 100 mg tablet Take 1 Tab by mouth once as needed for Migraine for up to 1 dose. 9 Tab 2    guaiFENesin SR (MUCINEX) 600 mg SR tablet Take 1 Tab by mouth two (2) times a day. 30 Tab 0    acetaminophen (TYLENOL) 325 mg tablet Take 2 Tabs by mouth every six (6) hours as needed. 40 Tab 0    L. ACIDOPH/B. LONG/L. PLANT/B.LAC (PROBIOTIC ACIDOPHILUS BEADS PO) Take 1 Cap by mouth daily. Allergies   Allergen Reactions    Ciprofloxacin Cough    Lisinopril Cough    Penicillins Other (comments)     Yeast infection        Past Medical History:   Diagnosis Date    Arthritis     Asthma     Diabetes ([facility] [address]     GERD (gastroesophageal reflux disease)     Hypertension     Ill-defined condition     pancreatitis       Social History   Substance Use Topics    Smoking status: Former Smoker     Types: Cigarettes     Start date: 2/13/2016     Quit date: 2/29/2016    Smokeless tobacco: Never Used    Alcohol use No      Comment: social        Review of Systems  Pertinent items are noted in HPI.      Objective:     Visit Vitals    /82 (BP 1 Location: Right arm, BP Patient Position: Sitting)  Comment: manual    Pulse 74    Temp 97.7 °F (36.5 °C) (Oral)    Resp 18    Ht 5' 2\" (1.575 m)    Wt 182 lb (82.6 kg)    SpO2 99%    BMI 33.29 kg/m2      General appearance - alert, well appearing, and in no distress  Eyes - pupils equal and reactive, extraocular eye movements intact, sclera anicteric  Ears - bilateral TM's and external ear canals normal  Nasal exam - mucosal congestion, mucosal erythema and sinuses normal and nontender  Oropharyngx - mucous membranes moist, pharynx normal without lesions  Neck - supple, no significant adenopathy  Chest - clear to auscultation, no wheezes, rales or rhonchi, symmetric air entry, no tachypnea, retractions or cyanosis  Heart - normal rate, regular rhythm, normal S1, S2, no murmurs, rubs, clicks or gallops  Neurological - alert, oriented, normal speech, no focal findings or movement disorder noted    Assessment/Plan:   Olesya Burden is a 62 y.o. female seen for:     1. URI with cough and congestion: viral in etiology. Symptomatic therapies recommended.   - guaiFENesin-codeine (ROBITUSSIN AC) 100-10 mg/5 mL solution; Take 5 mL by mouth three (3) times daily as needed for Cough. Max Daily Amount: 15 mL. Dispense: 236 mL; Refill: 0    2. Cold sore: per history, currently asymptomatic. Treat as below (when symptomatic). - acyclovir (ZOVIRAX) 5 % ointment; Apply  to affected area six (6) times daily. For 7 days. Apply enough to sufficiently cover lesion. Dispense: 5 g; Refill: 0  - valACYclovir (VALTREX) 1 gram tablet; Take 2 Tabs by mouth two (2) times a day. Dispense: 60 Tab; Refill: 0    3. Acute vaginitis  - fluconazole (DIFLUCAN) 150 mg tablet; Take 1 tablet by mouth. May repeat dose in 48-72 hours if still having symptoms. Dispense: 2 Tab; Refill: 0    I have discussed the diagnosis with the patient and the intended plan as seen in the above orders. The patient has received an after-visit summary and questions were answered concerning future plans. I have discussed medication side effects and warnings with the patient as well.  Patient verbalizes understanding of plan of care and denies further questions or concerns at this time. Informed patient to return to the office if symptoms worsen or if new symptoms arise. Follow-up Disposition:  Return if symptoms worsen or fail to improve.

## 2017-12-28 NOTE — MR AVS SNAPSHOT
Visit Information Date & Time Provider Department Dept. Phone Encounter #  
 12/28/2017  9:00 AM Joe Nina Karl [phone] [id number] Follow-up Instructions Return if symptoms worsen or fail to improve. Upcoming Health Maintenance Date Due  
 EYE EXAM RETINAL OR DILATED Q1 4/12/1970 FOOT EXAM Q1 7/6/2017 HEMOGLOBIN A1C Q6M 11/24/2017 MICROALBUMIN Q1 5/24/2018 LIPID PANEL Q1 5/24/2018 FOBT Q 1 YEAR AGE 50-75 5/25/2018 PAP AKA CERVICAL CYTOLOGY 7/6/2019 DTaP/Tdap/Td series (2 - Td) 7/21/2026 Allergies as of 12/28/2017  Review Complete On: 12/28/2017 By: Sandra Redmond MD  
  
 Severity Noted Reaction Type Reactions Ciprofloxacin  07/14/2016    Cough Lisinopril  07/21/2016    Cough Penicillins  04/09/2011   Side Effect Other (comments) Yeast infection Current Immunizations  Reviewed on 2/8/2016 Name Date Influenza Vaccine 10/5/2015 Tdap 7/21/2016  8:03 PM  
  
 Not reviewed this visit You Were Diagnosed With   
  
 Codes Comments URI with cough and congestion    -  Primary ICD-10-CM: J06.9 ICD-9-CM: 465.9 Cold sore     ICD-10-CM: B00.1 ICD-9-CM: 054.9 Acute vaginitis     ICD-10-CM: N76.0 ICD-9-CM: 616.10 Vitals BP Pulse Temp Resp Height(growth percentile) Weight(growth percentile) 140/82 (BP 1 Location: Right arm, BP Patient Position: Sitting) 74 97.7 °F (36.5 °C) (Oral) 18 5' 2\" (1.575 m) 182 lb (82.6 kg) SpO2 BMI OB Status Smoking Status 99% 33.29 kg/m2 Postmenopausal Former Smoker Vitals History BMI and BSA Data Body Mass Index Body Surface Area  
 33.29 kg/m 2 1.9 m 2 Preferred Pharmacy Pharmacy Name Phone [id number] [place] - [address] [phone] Your Updated Medication List  
  
   
This list is accurate as of: 12/28/17 10:19 AM.  Always use your most recent med list.  
  
  
  
  
 acetaminophen 325 mg tablet Commonly known as:  TYLENOL Take 2 Tabs by mouth every six (6) hours as needed. acyclovir 5 % ointment Commonly known as:  ZOVIRAX Apply  to affected area six (6) times daily. For 7 days. Apply enough to sufficiently cover lesion. CREON 36,000-114,000- 180,000 unit Cpdr  
Generic drug:  lipase-protease-amylase TAKE 4 CAPSULES BY MOUTH THREE TIMES A DAY WITH EACH MEAL AND TAKE 2 CAPS WITH SNACKS  
  
 ergocalciferol 50,000 unit capsule Commonly known as:  ERGOCALCIFEROL  
TAKE 1 CAP BY MOUTH EVERY SEVEN (7) DAYS. fluconazole 150 mg tablet Commonly known as:  DIFLUCAN Take 1 tablet by mouth. May repeat dose in 48-72 hours if still having symptoms. gabapentin 300 mg capsule Commonly known as:  NEURONTIN  
TAKE 1 CAP BY MOUTH THREE (3) TIMES DAILY. glimepiride 2 mg tablet Commonly known as:  AMARYL  
TAKE 1 TAB BY MOUTH EVERY MORNING. guaiFENesin  mg SR tablet Commonly known as:  [employer] Take 1 Tab by mouth two (2) times a day. guaiFENesin-codeine 100-10 mg/5 mL solution Commonly known as:  ROBITUSSIN AC Take 5 mL by mouth three (3) times daily as needed for Cough. Max Daily Amount: 15 mL.  
  
 hydroCHLOROthiazide 12.5 mg tablet Commonly known as:  HYDRODIURIL  
TAKE 1 TAB BY MOUTH DAILY. HYDROmorphone 4 mg tablet Commonly known as:  DILAUDID Take 4 mg by mouth every six (6) hours as needed. insulin lispro 100 unit/mL kwikpen Commonly known as:  HUMALOG Blood sugar 0-150: 0 units 151-200: 4 units 201-250: 6 units 251-300: 8 units 301-350: 10 units 351-400: 12 units Over 400: call office LANTUS SOLOSTAR 100 unit/mL (3 mL) Inpn Generic drug:  insulin glargine INJECT 32 UNITS SUBCUTANEOUSLY NIGHTLY  
  
 levocetirizine 5 mg tablet Commonly known as:  Hong Maidens TAKE 1 TABLET BY MOUTH DAILY losartan 50 mg tablet Commonly known as:  COZAAR  
TAKE 1 TABLET BY MOUTH DAILY metFORMIN  mg tablet Commonly known as:  GLUCOPHAGE XR  
TAKE 1 TAB BY MOUTH TWO (2) TIMES A DAY. Rosalba Pen Needle 32 gauge x 5/32\" Ndle Generic drug:  Insulin Needles (Disposable) FOR INJECTING INSULIN 250.00/E11.9 TEST _4__ TIME(S) PER DAY  
  
 omeprazole 20 mg capsule Commonly known as:  PRILOSEC  
TAKE 1 CAPSULE BY MOUTH TWICE A DAY  
  
 ONETOUCH ULTRA TEST strip Generic drug:  glucose blood VI test strips TEST BLOOD GLUCOSE THREE TIMES DAILY PROBIOTIC ACIDOPHILUS BEADS PO Take 1 Cap by mouth daily. SUMAtriptan 100 mg tablet Commonly known as:  IMITREX Take 1 Tab by mouth once as needed for Migraine for up to 1 dose. torsemide 20 mg tablet Commonly known as:  DEMADEX Take 1 Tab by mouth daily. Indications: Edema  
  
 valACYclovir 1 gram tablet Commonly known as:  VALTREX Take 2 Tabs by mouth two (2) times a day. VENTOLIN HFA 90 mcg/actuation inhaler Generic drug:  albuterol TAKE 2 PUFFS BY INHALATION EVERY FOUR (4) HOURS AS NEEDED FOR WHEEZING. Prescriptions Printed Refills  
 guaiFENesin-codeine (ROBITUSSIN AC) 100-10 mg/5 mL solution 0 Sig: Take 5 mL by mouth three (3) times daily as needed for Cough. Max Daily Amount: 15 mL. Class: Print Route: Oral  
  
Prescriptions Sent to Pharmacy Refills  
 fluconazole (DIFLUCAN) 150 mg tablet 0 Sig: Take 1 tablet by mouth. May repeat dose in 48-72 hours if still having symptoms. Class: Normal  
 Pharmacy: [facility] #[id number] - [place] [address] [facility] Ph #: [phone]  
 acyclovir (ZOVIRAX) 5 % ointment 0 Sig: Apply  to affected area six (6) times daily. For 7 days. Apply enough to sufficiently cover lesion. Class: Normal  
 Pharmacy: [facility]/pharmacy #[id number] - [place] [address] Ph #: [phone] Route: Topical  
 valACYclovir (VALTREX) 1 gram tablet 0 Sig: Take 2 Tabs by mouth two (2) times a day.   
 Class: Normal  
 Pharmacy: [facility]/pharmacy [address], [place] - [address]  #: [phone] Route: Oral  
  
Follow-up Instructions Return if symptoms worsen or fail to improve. Patient Instructions Upper Respiratory Infection (Cold): Care Instructions Your Care Instructions An upper respiratory infection, or URI, is an infection of the nose, sinuses, or throat. URIs are spread by coughs, sneezes, and direct contact. The common cold is the most frequent kind of URI. The flu and sinus infections are other kinds of URIs. Almost all URIs are caused by viruses. Antibiotics won't cure them. But you can treat most infections with home care. This may include drinking lots of fluids and taking over-the-counter pain medicine. You will probably feel better in 4 to 10 days. The doctor has checked you carefully, but problems can develop later. If you notice any problems or new symptoms, get medical treatment right away. Follow-up care is a key part of your treatment and safety. Be sure to make and go to all appointments, and call your doctor if you are having problems. It's also a good idea to know your test results and keep a list of the medicines you take. How can you care for yourself at home? · To prevent dehydration, drink plenty of fluids, enough so that your urine is light yellow or clear like water. Choose water and other caffeine-free clear liquids until you feel better. If you have kidney, heart, or liver disease and have to limit fluids, talk with your doctor before you increase the amount of fluids you drink. · Take an over-the-counter pain medicine, such as acetaminophen (Tylenol), ibuprofen (Advil, Motrin), or naproxen (Aleve). Read and follow all instructions on the label. · Before you use cough and cold medicines, check the label. These medicines may not be safe for young children or for people with certain health problems. · Be careful when taking over-the-counter cold or flu medicines and Tylenol at the same time. Many of these medicines have acetaminophen, which is Tylenol. Read the labels to make sure that you are not taking more than the recommended dose. Too much acetaminophen (Tylenol) can be harmful. · Get plenty of rest. 
· Do not smoke or allow others to smoke around you. If you need help quitting, talk to your doctor about stop-smoking programs and medicines. These can increase your chances of quitting for good. When should you call for help? Call 911 anytime you think you may need emergency care. For example, call if: 
? · You have severe trouble breathing. ?Call your doctor now or seek immediate medical care if: 
? · You seem to be getting much sicker. ? · You have new or worse trouble breathing. ? · You have a new or higher fever. ? · You have a new rash. ? Watch closely for changes in your health, and be sure to contact your doctor if: 
? · You have a new symptom, such as a sore throat, an earache, or sinus pain. ? · You cough more deeply or more often, especially if you notice more mucus or a change in the color of your mucus. ? · You do not get better as expected. Where can you learn more? Go to http://anil-shade.info/. Enter Y158 in the search box to learn more about \"Upper Respiratory Infection (Cold): Care Instructions. \" Current as of: May 12, 2017 Content Version: 11.4 © 2597-3106 [employer]. Care instructions adapted under license by [employer] (which disclaims liability or warranty for this information). If you have questions about a medical condition or this instruction, always ask your healthcare professional. [address] any warranty or liability for your use of this information. Introducing [facility]!    
 Kathya Wray introduces [employer] patient portal. Now you can access parts of your medical record, email your doctor's office, and request medication refills online. 1. In your internet browser, go to [email or web address] 2. Click on the First Time User? Click Here link in the Sign In box. You will see the New Member Sign Up page. 3. Enter your enosiX Access Code exactly as it appears below. You will not need to use this code after youve completed the sign-up process. If you do not sign up before the expiration date, you must request a new code. · enosiX Access Code: [email or web address] Expires: 3/28/2018 10:19 AM 
 
4. Enter the last four digits of your Social Security Number (xxxx) and Date of Birth (mm/dd/yyyy) as indicated and click Submit. You will be taken to the next sign-up page. 5. Create a enosiX ID. This will be your enosiX login ID and cannot be changed, so think of one that is secure and easy to remember. 6. Create a enosiX password. You can change your password at any time. 7. Enter your Password Reset Question and Answer. This can be used at a later time if you forget your password. 8. Enter your e-mail address. You will receive e-mail notification when new information is available in [address] 9. Click Sign Up. You can now view and download portions of your medical record. 10. Click the Download Summary menu link to download a portable copy of your medical information. If you have questions, please visit the Frequently Asked Questions section of the enosiX website. Remember, enosiX is NOT to be used for urgent needs. For medical emergencies, dial 911. Now available from your iPhone and Android! Please provide this summary of care documentation to your next provider. Your primary care clinician is listed as Mell Pereira. If you have any questions after today's visit, please call [phone].

## 2018-01-29 ENCOUNTER — OFFICE VISIT (OUTPATIENT)
Dept: FAMILY MEDICINE CLINIC | Age: 58
End: 2018-01-29

## 2018-01-29 VITALS
OXYGEN SATURATION: 98 % | RESPIRATION RATE: 16 BRPM | DIASTOLIC BLOOD PRESSURE: 63 MMHG | WEIGHT: 181.6 LBS | HEART RATE: 66 BPM | BODY MASS INDEX: 33.42 KG/M2 | TEMPERATURE: 98.5 F | SYSTOLIC BLOOD PRESSURE: 110 MMHG | HEIGHT: 62 IN

## 2018-01-29 DIAGNOSIS — I10 ESSENTIAL HYPERTENSION: Primary | ICD-10-CM

## 2018-01-29 NOTE — PROGRESS NOTES
Chief Complaint   Patient presents with    Physical     fasting visit     \"REVIEWED RECORD IN PREPARATION FOR VISIT AND HAVE OBTAINED THE NECESSARY DOCUMENTATION\"  1. Have you been to the ER, urgent care clinic since your last visit? Hospitalized since your last visit? No    2. Have you seen or consulted any other health care providers outside of the [address] [place] [address] since your last visit? Include any pap smears or colon screening. No  Patient does not have advanced directives.

## 2018-01-29 NOTE — PATIENT INSTRUCTIONS
High Blood Pressure: Care Instructions  Your Care Instructions    If your blood pressure is usually above 140/90, you have high blood pressure, or hypertension. That means the top number is 140 or higher or the bottom number is 90 or higher, or both. Despite what a lot of people think, high blood pressure usually doesn't cause headaches or make you feel dizzy or lightheaded. It usually has no symptoms. But it does increase your risk for heart attack, stroke, and kidney or eye damage. The higher your blood pressure, the more your risk increases. Your doctor will give you a goal for your blood pressure. Your goal will be based on your health and your age. An example of a goal is to keep your blood pressure below 140/90. Lifestyle changes, such as eating healthy and being active, are always important to help lower blood pressure. You might also take medicine to reach your blood pressure goal.  Follow-up care is a key part of your treatment and safety. Be sure to make and go to all appointments, and call your doctor if you are having problems. It's also a good idea to know your test results and keep a list of the medicines you take. How can you care for yourself at home? Medical treatment  · If you stop taking your medicine, your blood pressure will go back up. You may take one or more types of medicine to lower your blood pressure. Be safe with medicines. Take your medicine exactly as prescribed. Call your doctor if you think you are having a problem with your medicine. · Talk to your doctor before you start taking aspirin every day. Aspirin can help certain people lower their risk of a heart attack or stroke. But taking aspirin isn't right for everyone, because it can cause serious bleeding. · See your doctor regularly. You may need to see the doctor more often at first or until your blood pressure comes down.   · If you are taking blood pressure medicine, talk to your doctor before you take decongestants or anti-inflammatory medicine, such as ibuprofen. Some of these medicines can raise blood pressure. · Learn how to check your blood pressure at home. Lifestyle changes  · Stay at a healthy weight. This is especially important if you put on weight around the waist. Losing even 10 pounds can help you lower your blood pressure. · If your doctor recommends it, get more exercise. Walking is a good choice. Bit by bit, increase the amount you walk every day. Try for at least 30 minutes on most days of the week. You also may want to swim, bike, or do other activities. · Avoid or limit alcohol. Talk to your doctor about whether you can drink any alcohol. · Try to limit how much sodium you eat to less than 2,300 milligrams (mg) a day. Your doctor may ask you to try to eat less than 1,500 mg a day. · Eat plenty of fruits (such as bananas and oranges), vegetables, legumes, whole grains, and low-fat dairy products. · Lower the amount of saturated fat in your diet. Saturated fat is found in animal products such as milk, cheese, and meat. Limiting these foods may help you lose weight and also lower your risk for heart disease. · Do not smoke. Smoking increases your risk for heart attack and stroke. If you need help quitting, talk to your doctor about stop-smoking programs and medicines. These can increase your chances of quitting for good. When should you call for help? Call 911 anytime you think you may need emergency care. This may mean having symptoms that suggest that your blood pressure is causing a serious heart or blood vessel problem. Your blood pressure may be over 180/110. ? For example, call 911 if:  ? · You have symptoms of a heart attack. These may include:  ¨ Chest pain or pressure, or a strange feeling in the chest.  ¨ Sweating. ¨ Shortness of breath. ¨ Nausea or vomiting.   ¨ Pain, pressure, or a strange feeling in the back, neck, jaw, or upper belly or in one or both shoulders or arms.  ¨ Lightheadedness or sudden weakness. ¨ A fast or irregular heartbeat. ? · You have symptoms of a stroke. These may include:  ¨ Sudden numbness, tingling, weakness, or loss of movement in your face, arm, or leg, especially on only one side of your body. ¨ Sudden vision changes. ¨ Sudden trouble speaking. ¨ Sudden confusion or trouble understanding simple statements. ¨ Sudden problems with walking or balance. ¨ A sudden, severe headache that is different from past headaches. ? · You have severe back or belly pain. ?Do not wait until your blood pressure comes down on its own. Get help right away. ?Call your doctor now or seek immediate care if:  ? · Your blood pressure is much higher than normal (such as 180/110 or higher), but you don't have symptoms. ? · You think high blood pressure is causing symptoms, such as:  ¨ Severe headache. ¨ Blurry vision. ? Watch closely for changes in your health, and be sure to contact your doctor if:  ? · Your blood pressure measures 140/90 or higher at least 2 times. That means the top number is 140 or higher or the bottom number is 90 or higher, or both. ? · You think you may be having side effects from your blood pressure medicine. ? · Your blood pressure is usually normal, but it goes above normal at least 2 times. Where can you learn more? Go to http://anil-shade.info/. Enter X593 in the search box to learn more about \"High Blood Pressure: Care Instructions. \"  Current as of: September 21, 2016  Content Version: 11.4  © 9067-2006 [employer]. Care instructions adapted under license by [employer] (which disclaims liability or warranty for this information). If you have questions about a medical condition or this instruction, always ask your healthcare professional. [place] [address] any warranty or liability for your use of this information.

## 2018-01-29 NOTE — PROGRESS NOTES
Subjective:       Subjective:     Mckenzie Daily is a 62 y.o. female who presents for follow up of hypertension. Diet and Lifestyle: generally follows a low fat low cholesterol diet  Home BP Monitoring: is not measured at home    Cardiovascular ROS: taking medications as instructed, no medication side effects noted, no TIA's, no chest pain on exertion, no dyspnea on exertion, no swelling of ankles. New concerns: None. Pt is fasting today, for refills. Pt is seeing endocrinology at [facility], and will continue to follow up with them for her diabetes management. Patient Active Problem List    Diagnosis Date Noted    Vaginal itching 06/21/2017    Tingling 05/24/2017    Tobacco abuse 05/24/2017    Screening for breast cancer 05/24/2017    Screening for colon cancer 05/24/2017    Need for hepatitis C screening test 05/24/2017    Screening for thyroid disorder 05/24/2017    Screening for cholesterol level 05/24/2017   [facility] discharge follow-up 01/19/2017    Traumatic rhabdomyolysis ([facility] [address] 01/16/2017    Closed fracture of right talus 01/16/2017    Synovial cyst of right knee 01/16/2017    Dehydration 01/15/2017    UTI (urinary tract infection) 07/14/2016    Mild intermittent asthma without complication 47/14/9250    Pancreatitis 02/08/2016    Essential hypertension 02/08/2016    Diabetic neuropathy ([facility] [address] 02/08/2016    Vitamin D deficiency 02/08/2016    Diabetes (HCC)     GERD (gastroesophageal reflux disease)      Current Outpatient Prescriptions   Medication Sig Dispense Refill    HYDROmorphone (DILAUDID) 4 mg tablet Take 4 mg by mouth every six (6) hours as needed.  LEE PEN NEEDLE 32 gauge x 5/32\" ndle FOR INJECTING INSULIN 250.00/E11.9 TEST _4__ TIME(S) PER DAY  11    fluconazole (DIFLUCAN) 150 mg tablet Take 1 tablet by mouth. May repeat dose in 48-72 hours if still having symptoms. 2 Tab 0    valACYclovir (VALTREX) 1 gram tablet Take 2 Tabs by mouth two (2) times a day. 60 Tab 0    guaiFENesin-codeine (ROBITUSSIN AC) 100-10 mg/5 mL solution Take 5 mL by mouth three (3) times daily as needed for Cough. Max Daily Amount: 15 mL. 236 mL 0    glimepiride (AMARYL) 2 mg tablet TAKE 1 TAB BY MOUTH EVERY MORNING. 90 Tab 0    losartan (COZAAR) 50 mg tablet TAKE 1 TABLET BY MOUTH DAILY 90 Tab 0    omeprazole (PRILOSEC) 20 mg capsule TAKE 1 CAPSULE BY MOUTH TWICE A DAY 60 Cap 3    VENTOLIN HFA 90 mcg/actuation inhaler TAKE 2 PUFFS BY INHALATION EVERY FOUR (4) HOURS AS NEEDED FOR WHEEZING. 18 Inhaler 3    LANTUS SOLOSTAR 100 unit/mL (3 mL) inpn INJECT 32 UNITS SUBCUTANEOUSLY NIGHTLY 15 Adjustable Dose Pre-filled Pen Syringe 3    gabapentin (NEURONTIN) 300 mg capsule TAKE 1 CAP BY MOUTH THREE (3) TIMES DAILY. 270 Cap 0    metFORMIN ER (GLUCOPHAGE XR) 750 mg tablet TAKE 1 TAB BY MOUTH TWO (2) TIMES A DAY. 180 Tab 0    levocetirizine (XYZAL) 5 mg tablet TAKE 1 TABLET BY MOUTH DAILY 30 Tab 9    CREON 36,000-114,000- 180,000 unit cpDR TAKE 4 CAPSULES BY MOUTH THREE TIMES A DAY WITH EACH MEAL AND TAKE 2 CAPS WITH SNACKS 480 Cap 6    ONETOUCH ULTRA TEST strip TEST BLOOD GLUCOSE THREE TIMES DAILY 100 Strip 0    hydroCHLOROthiazide (HYDRODIURIL) 12.5 mg tablet TAKE 1 TAB BY MOUTH DAILY. 90 Tab 1    torsemide (DEMADEX) 20 mg tablet Take 1 Tab by mouth daily. Indications: Edema 90 Tab 1    insulin lispro (HUMALOG) 100 unit/mL kwikpen Blood sugar 0-150: 0 units  151-200: 4 units  201-250: 6 units  251-300: 8 units  301-350: 10 units  351-400: 12 units  Over 400: call office 1 Package 2    ergocalciferol (ERGOCALCIFEROL) 50,000 unit capsule TAKE 1 CAP BY MOUTH EVERY SEVEN (7) DAYS. 5 Cap 11    SUMAtriptan (IMITREX) 100 mg tablet Take 1 Tab by mouth once as needed for Migraine for up to 1 dose. 9 Tab 2    acetaminophen (TYLENOL) 325 mg tablet Take 2 Tabs by mouth every six (6) hours as needed. 40 Tab 0    L. ACIDOPH/B. LONG/L. PLANT/B.LAC (PROBIOTIC ACIDOPHILUS BEADS PO) Take 1 Cap by mouth daily.      acyclovir (ZOVIRAX) 5 % ointment Apply  to affected area six (6) times daily. For 7 days. Apply enough to sufficiently cover lesion. 5 g 0    guaiFENesin SR (MUCINEX) 600 mg SR tablet Take 1 Tab by mouth two (2) times a day. 30 Tab 0     Allergies   Allergen Reactions    Ciprofloxacin Cough    Lisinopril Cough    Penicillins Other (comments)     Yeast infection      Past Medical History:   Diagnosis Date    Arthritis     Asthma     Diabetes ([address]     GERD (gastroesophageal reflux disease)     Hypertension     Ill-defined condition     pancreatitis     Past Surgical History:   Procedure Laterality Date    COLONOSCOPY N/A 11/30/2016    COLONOSCOPY,EGD performed by Steffany Cortes MD at [facility] HX CATARACT REMOVAL Left 10/15    HX CATARACT REMOVAL Right 10/2016    HX CHOLECYSTECTOMY  2005 ?  HX GI  2001 ? gastic tumor removal.     ASHA MAMMO BI SCREENING INCL CAD  5/3/12    normal    PA PANCREATIC ELASTASE, FECAL, QUAL/SEMI-QUANT  1999    growths in prancreatitis        Lab Results   Component Value Date/Time    WBC 9.4 05/24/2017 09:25 AM    HGB 11.6 05/24/2017 09:25 AM    HCT 36.0 05/24/2017 09:25 AM    PLATELET 153 61/68/2304 09:25 AM    MCV 91 05/24/2017 09:25 AM     Lab Results   Component Value Date/Time    Cholesterol, total 139 05/24/2017 09:25 AM    HDL Cholesterol 48 05/24/2017 09:25 AM    LDL, calculated 72 05/24/2017 09:25 AM    Triglyceride 97 05/24/2017 09:25 AM    CHOL/HDL Ratio 2.0 07/24/2014 09:10 AM     Lab Results   Component Value Date/Time    GFR est non-AA 53 05/24/2017 09:25 AM    GFR est AA 61 05/24/2017 09:25 AM    Creatinine 1.15 05/24/2017 09:25 AM    BUN 32 05/24/2017 09:25 AM    Sodium 135 05/24/2017 09:25 AM    Potassium 5.2 05/24/2017 09:25 AM    Chloride 98 05/24/2017 09:25 AM    CO2 20 05/24/2017 09:25 AM    Magnesium 2.2 01/15/2017 01:49 PM        Review of Systems, additional:  Pertinent items are noted in HPI.     Objective:     Visit Vitals    /63    Pulse 66    Temp 98.5 °F (36.9 °C) (Oral)    Resp 16    Ht 5' 2\" (1.575 m)    Wt 181 lb 9.6 oz (82.4 kg)    SpO2 98%    BMI 33.22 kg/m2     Appearance: alert, well appearing, and in no distress. General exam: CVS exam BP noted to be well controlled today in office, S1, S2 normal, no gallop, no murmur, chest clear, no JVD, no HSM, no edema. Lab review: orders written for new lab studies as appropriate; see orders. Assessment/Plan:     hypertension well controlled. current treatment plan is effective, no change in therapy. ICD-10-CM ICD-9-CM    1. Essential hypertension I10 401.9 CBC W/O DIFF      METABOLIC PANEL, COMPREHENSIVE     Informed patient that we will notify her when her labs return and will notify her of any change in plan of care at that time. Pt informed to return to office with worsening of symptoms, or PRN with any questions or concerns. Pt verbalizes understanding of plan of care and denies further questions or concerns at this time.

## 2018-01-29 NOTE — MR AVS SNAPSHOT
[address] [facility] 
[phone] Patient: Gabi Walter MRN: [id number] POH:[id number][id number] Visit Information Date & Time Provider Department Dept. Phone Encounter #  
 1/29/2018  8:30 AM Vicky Neal  [address] [phone] [id number] Follow-up Instructions Return if symptoms worsen or fail to improve. Upcoming Health Maintenance Date Due  
 EYE EXAM RETINAL OR DILATED Q1 4/12/1970 FOOT EXAM Q1 7/6/2017 HEMOGLOBIN A1C Q6M 11/24/2017 MICROALBUMIN Q1 5/24/2018 LIPID PANEL Q1 5/24/2018 FOBT Q 1 YEAR AGE 50-75 5/25/2018 BREAST CANCER SCRN MAMMOGRAM 6/23/2019 PAP AKA CERVICAL CYTOLOGY 7/6/2019 DTaP/Tdap/Td series (2 - Td) 7/21/2026 Allergies as of 1/29/2018  Review Complete On: 1/29/2018 By: Vicky Neal NP Severity Noted Reaction Type Reactions Ciprofloxacin  07/14/2016    Cough Lisinopril  07/21/2016    Cough Penicillins  04/09/2011   Side Effect Other (comments) Yeast infection Current Immunizations  Reviewed on 2/8/2016 Name Date Influenza Vaccine 10/5/2015 Tdap 7/21/2016  8:03 PM  
  
 Not reviewed this visit You Were Diagnosed With   
  
 Codes Comments Essential hypertension    -  Primary ICD-10-CM: I10 
ICD-9-CM: 401.9 Vitals BP Pulse Temp Resp Height(growth percentile) Weight(growth percentile) 110/63 66 98.5 °F (36.9 °C) (Oral) 16 5' 2\" (1.575 m) 181 lb 9.6 oz (82.4 kg) SpO2 BMI OB Status Smoking Status 98% 33.22 kg/m2 Postmenopausal Former Smoker BMI and BSA Data Body Mass Index Body Surface Area  
 33.22 kg/m 2 1.9 m 2 Preferred Pharmacy Pharmacy Name Phone [id number] - [place] - [address], [place] [address] [phone] Your Updated Medication List  
  
   
This list is accurate as of: 1/29/18  8:42 AM.  Always use your most recent med list.  
  
 acetaminophen 325 mg tablet Commonly known as:  TYLENOL Take 2 Tabs by mouth every six (6) hours as needed. acyclovir 5 % ointment Commonly known as:  ZOVIRAX Apply  to affected area six (6) times daily. For 7 days. Apply enough to sufficiently cover lesion. CREON 36,000-114,000- 180,000 unit Cpdr  
Generic drug:  lipase-protease-amylase TAKE 4 CAPSULES BY MOUTH THREE TIMES A DAY WITH EACH MEAL AND TAKE 2 CAPS WITH SNACKS  
  
 ergocalciferol 50,000 unit capsule Commonly known as:  ERGOCALCIFEROL  
TAKE 1 CAP BY MOUTH EVERY SEVEN (7) DAYS. fluconazole 150 mg tablet Commonly known as:  DIFLUCAN Take 1 tablet by mouth. May repeat dose in 48-72 hours if still having symptoms. gabapentin 300 mg capsule Commonly known as:  NEURONTIN  
TAKE 1 CAP BY MOUTH THREE (3) TIMES DAILY. glimepiride 2 mg tablet Commonly known as:  AMARYL  
TAKE 1 TAB BY MOUTH EVERY MORNING. guaiFENesin  mg SR tablet Commonly known as:  Quinn [employer] Quinn Take 1 Tab by mouth two (2) times a day. guaiFENesin-codeine 100-10 mg/5 mL solution Commonly known as:  ROBITUSSIN AC Take 5 mL by mouth three (3) times daily as needed for Cough. Max Daily Amount: 15 mL.  
  
 hydroCHLOROthiazide 12.5 mg tablet Commonly known as:  HYDRODIURIL  
TAKE 1 TAB BY MOUTH DAILY. HYDROmorphone 4 mg tablet Commonly known as:  DILAUDID Take 4 mg by mouth every six (6) hours as needed. insulin lispro 100 unit/mL kwikpen Commonly known as:  HUMALOG Blood sugar 0-150: 0 units 151-200: 4 units 201-250: 6 units 251-300: 8 units 301-350: 10 units 351-400: 12 units Over 400: call office LANTUS SOLOSTAR 100 unit/mL (3 mL) Inpn Generic drug:  insulin glargine INJECT 32 UNITS SUBCUTANEOUSLY NIGHTLY  
  
 levocetirizine 5 mg tablet Commonly known as:  Jolaine Lied TAKE 1 TABLET BY MOUTH DAILY losartan 50 mg tablet Commonly known as:  COZAAR  
TAKE 1 TABLET BY MOUTH DAILY metFORMIN  mg tablet Commonly known as:  GLUCOPHAGE XR  
TAKE 1 TAB BY MOUTH TWO (2) TIMES A DAY. Rosalba Pen Needle 32 gauge x 5/32\" Ndle Generic drug:  Insulin Needles (Disposable) FOR INJECTING INSULIN 250.00/E11.9 TEST _4__ TIME(S) PER DAY  
  
 omeprazole 20 mg capsule Commonly known as:  PRILOSEC  
TAKE 1 CAPSULE BY MOUTH TWICE A DAY  
  
 ONETOUCH ULTRA TEST strip Generic drug:  glucose blood VI test strips TEST BLOOD GLUCOSE THREE TIMES DAILY PROBIOTIC ACIDOPHILUS BEADS PO Take 1 Cap by mouth daily. SUMAtriptan 100 mg tablet Commonly known as:  IMITREX Take 1 Tab by mouth once as needed for Migraine for up to 1 dose. torsemide 20 mg tablet Commonly known as:  DEMADEX Take 1 Tab by mouth daily. Indications: Edema  
  
 valACYclovir 1 gram tablet Commonly known as:  VALTREX Take 2 Tabs by mouth two (2) times a day. VENTOLIN HFA 90 mcg/actuation inhaler Generic drug:  albuterol TAKE 2 PUFFS BY INHALATION EVERY FOUR (4) HOURS AS NEEDED FOR WHEEZING. We Performed the Following CBC W/O DIFF [90927 CPT(R)] METABOLIC PANEL, COMPREHENSIVE [12819 CPT(R)] Follow-up Instructions Return if symptoms worsen or fail to improve. Patient Instructions High Blood Pressure: Care Instructions Your Care Instructions If your blood pressure is usually above 140/90, you have high blood pressure, or hypertension. That means the top number is 140 or higher or the bottom number is 90 or higher, or both. Despite what a lot of people think, high blood pressure usually doesn't cause headaches or make you feel dizzy or lightheaded. It usually has no symptoms. But it does increase your risk for heart attack, stroke, and kidney or eye damage. The higher your blood pressure, the more your risk increases. Your doctor will give you a goal for your blood pressure.  Your goal will be based on your health and your age. An example of a goal is to keep your blood pressure below 140/90. Lifestyle changes, such as eating healthy and being active, are always important to help lower blood pressure. You might also take medicine to reach your blood pressure goal. 
Follow-up care is a key part of your treatment and safety. Be sure to make and go to all appointments, and call your doctor if you are having problems. It's also a good idea to know your test results and keep a list of the medicines you take. How can you care for yourself at home? Medical treatment · If you stop taking your medicine, your blood pressure will go back up. You may take one or more types of medicine to lower your blood pressure. Be safe with medicines. Take your medicine exactly as prescribed. Call your doctor if you think you are having a problem with your medicine. · Talk to your doctor before you start taking aspirin every day. Aspirin can help certain people lower their risk of a heart attack or stroke. But taking aspirin isn't right for everyone, because it can cause serious bleeding. · See your doctor regularly. You may need to see the doctor more often at first or until your blood pressure comes down. · If you are taking blood pressure medicine, talk to your doctor before you take decongestants or anti-inflammatory medicine, such as ibuprofen. Some of these medicines can raise blood pressure. · Learn how to check your blood pressure at home. Lifestyle changes · Stay at a healthy weight. This is especially important if you put on weight around the waist. Losing even 10 pounds can help you lower your blood pressure. · If your doctor recommends it, get more exercise. Walking is a good choice. Bit by bit, increase the amount you walk every day. Try for at least 30 minutes on most days of the week. You also may want to swim, bike, or do other activities. · Avoid or limit alcohol. Talk to your doctor about whether you can drink any alcohol. · Try to limit how much sodium you eat to less than 2,300 milligrams (mg) a day. Your doctor may ask you to try to eat less than 1,500 mg a day. · Eat plenty of fruits (such as bananas and oranges), vegetables, legumes, whole grains, and low-fat dairy products. · Lower the amount of saturated fat in your diet. Saturated fat is found in animal products such as milk, cheese, and meat. Limiting these foods may help you lose weight and also lower your risk for heart disease. · Do not smoke. Smoking increases your risk for heart attack and stroke. If you need help quitting, talk to your doctor about stop-smoking programs and medicines. These can increase your chances of quitting for good. When should you call for help? Call 911 anytime you think you may need emergency care. This may mean having symptoms that suggest that your blood pressure is causing a serious heart or blood vessel problem. Your blood pressure may be over 180/110. ? For example, call 911 if: 
? · You have symptoms of a heart attack. These may include: ¨ Chest pain or pressure, or a strange feeling in the chest. 
¨ Sweating. ¨ Shortness of breath. ¨ Nausea or vomiting. ¨ Pain, pressure, or a strange feeling in the back, neck, jaw, or upper belly or in one or both shoulders or arms. ¨ Lightheadedness or sudden weakness. ¨ A fast or irregular heartbeat. ? · You have symptoms of a stroke. These may include: 
¨ Sudden numbness, tingling, weakness, or loss of movement in your face, arm, or leg, especially on only one side of your body. ¨ Sudden vision changes. ¨ Sudden trouble speaking. ¨ Sudden confusion or trouble understanding simple statements. ¨ Sudden problems with walking or balance. ¨ A sudden, severe headache that is different from past headaches. ? · You have severe back or belly pain. ?Do not wait until your blood pressure comes down on its own. Get help right away. ?Call your doctor now or seek immediate care if: 
? · Your blood pressure is much higher than normal (such as 180/110 or higher), but you don't have symptoms. ? · You think high blood pressure is causing symptoms, such as: ¨ Severe headache. ¨ Blurry vision. ? Watch closely for changes in your health, and be sure to contact your doctor if: 
? · Your blood pressure measures 140/90 or higher at least 2 times. That means the top number is 140 or higher or the bottom number is 90 or higher, or both. ? · You think you may be having side effects from your blood pressure medicine. ? · Your blood pressure is usually normal, but it goes above normal at least 2 times. Where can you learn more? Go to http://anil-shade.info/. Enter B298 in the search box to learn more about \"High Blood Pressure: Care Instructions. \" Current as of: September 21, 2016 Content Version: 11.4 © 2664-1617 [employer]. Care instructions adapted under license by [employer] (which disclaims liability or warranty for this information). If you have questions about a medical condition or this instruction, always ask your healthcare professional. [address] any warranty or liability for your use of this information. Introducing [facility] HE[facility]! [facility] introduces [employer] patient portal. Now you can access parts of your medical record, email your doctor's office, and request medication refills online. 1. In your internet browser, go to [email or web address] 2. Click on the First Time User? Click Here link in the Sign In box. You will see the New Member Sign Up page. 3. Enter your [employer] Access Code exactly as it appears below. You will not need to use this code after youve completed the sign-up process.  If you do not sign up before the expiration date, you must request a new code. · [employer] Access Code: [email or web address] Expires: 3/28/2018 10:19 AM 
 
4. Enter the last four digits of your Social Security Number (xxxx) and Date of Birth (mm/dd/yyyy) as indicated and click Submit. You will be taken to the next sign-up page. 5. Create a [employer] ID. This will be your [employer] login ID and cannot be changed, so think of one that is secure and easy to remember. 6. Create a [employer] password. You can change your password at any time. 7. Enter your Password Reset Question and Answer. This can be used at a later time if you forget your password. 8. Enter your e-mail address. You will receive e-mail notification when new information is available in [address] 9. Click Sign Up. You can now view and download portions of your medical record. 10. Click the Download Summary menu link to download a portable copy of your medical information. If you have questions, please visit the Frequently Asked Questions section of the [employer] website. Remember, [employer] is NOT to be used for urgent needs. For medical emergencies, dial 911. Now available from your iPhone and Android! Please provide this summary of care documentation to your next provider. Your primary care clinician is listed as Mell Pereira. If you have any questions after today's visit, please call [phone].

## 2018-01-30 ENCOUNTER — TELEPHONE (OUTPATIENT)
Dept: FAMILY MEDICINE CLINIC | Age: 58
End: 2018-01-30

## 2018-01-30 LAB
ALBUMIN SERPL-MCNC: 3.9 G/DL (ref 3.5–5.5)
ALBUMIN/GLOB SERPL: 1.6 {RATIO} (ref 1.2–2.2)
ALP SERPL-CCNC: 140 IU/L (ref 39–117)
ALT SERPL-CCNC: 8 IU/L (ref 0–32)
AST SERPL-CCNC: 11 IU/L (ref 0–40)
BILIRUB SERPL-MCNC: 0.3 MG/DL (ref 0–1.2)
BUN SERPL-MCNC: 29 MG/DL (ref 6–24)
BUN/CREAT SERPL: 29 (ref 9–23)
CALCIUM SERPL-MCNC: 9.5 MG/DL (ref 8.7–10.2)
CHLORIDE SERPL-SCNC: 106 MMOL/L (ref 96–106)
CO2 SERPL-SCNC: 25 MMOL/L (ref 18–29)
CREAT SERPL-MCNC: 1 MG/DL (ref 0.57–1)
ERYTHROCYTE [DISTWIDTH] IN BLOOD BY AUTOMATED COUNT: 14 % (ref 12.3–15.4)
GFR SERPLBLD CREATININE-BSD FMLA CKD-EPI: 63 ML/MIN/1.73
GFR SERPLBLD CREATININE-BSD FMLA CKD-EPI: 72 ML/MIN/1.73
GLOBULIN SER CALC-MCNC: 2.4 G/DL (ref 1.5–4.5)
GLUCOSE SERPL-MCNC: 113 MG/DL (ref 65–99)
HCT VFR BLD AUTO: 35.4 % (ref 34–46.6)
HGB BLD-MCNC: 11.2 G/DL (ref 11.1–15.9)
MCH RBC QN AUTO: 29.1 PG (ref 26.6–33)
MCHC RBC AUTO-ENTMCNC: 31.6 G/DL (ref 31.5–35.7)
MCV RBC AUTO: 92 FL (ref 79–97)
PLATELET # BLD AUTO: 257 X10E3/UL (ref 150–379)
POTASSIUM SERPL-SCNC: 5 MMOL/L (ref 3.5–5.2)
PROT SERPL-MCNC: 6.3 G/DL (ref 6–8.5)
RBC # BLD AUTO: 3.85 X10E6/UL (ref 3.77–5.28)
SODIUM SERPL-SCNC: 143 MMOL/L (ref 134–144)
WBC # BLD AUTO: 9.8 X10E3/UL (ref 3.4–10.8)

## 2018-01-30 NOTE — TELEPHONE ENCOUNTER
----- Message from [employer], NP sent at 1/30/2018  7:57 AM EST -----  Please call patient and let her know that her labs returned and are stable. Thanks!

## 2018-02-10 DIAGNOSIS — E11.40 TYPE 2 DIABETES MELLITUS WITH DIABETIC NEUROPATHY, WITH LONG-TERM CURRENT USE OF INSULIN (HCC): ICD-10-CM

## 2018-02-10 DIAGNOSIS — Z79.4 TYPE 2 DIABETES MELLITUS WITH DIABETIC NEUROPATHY, WITH LONG-TERM CURRENT USE OF INSULIN (HCC): ICD-10-CM

## 2018-02-10 DIAGNOSIS — I10 ESSENTIAL HYPERTENSION WITH GOAL BLOOD PRESSURE LESS THAN 140/90: ICD-10-CM

## 2018-02-12 RX ORDER — LOSARTAN POTASSIUM 50 MG/1
TABLET ORAL
Qty: 90 TAB | Refills: 0 | Status: SHIPPED | OUTPATIENT
Start: 2018-02-12 | End: 2018-06-13 | Stop reason: SDUPTHER

## 2018-02-12 RX ORDER — HYDROCHLOROTHIAZIDE 12.5 MG/1
TABLET ORAL
Qty: 90 TAB | Refills: 1 | Status: SHIPPED | OUTPATIENT
Start: 2018-02-12 | End: 2018-06-03 | Stop reason: SDUPTHER

## 2018-03-09 RX ORDER — PEN NEEDLE, DIABETIC 32GX 5/32"
NEEDLE, DISPOSABLE MISCELLANEOUS
Qty: 100 PEN NEEDLE | Refills: 3 | Status: SHIPPED | OUTPATIENT
Start: 2018-03-09 | End: 2018-08-15 | Stop reason: SDUPTHER

## 2018-03-22 ENCOUNTER — TELEPHONE (OUTPATIENT)
Dept: FAMILY MEDICINE CLINIC | Age: 58
End: 2018-03-22

## 2018-03-22 DIAGNOSIS — Z91.09 ENVIRONMENTAL ALLERGIES: Primary | ICD-10-CM

## 2018-03-22 RX ORDER — CETIRIZINE HCL 10 MG
10 TABLET ORAL
Qty: 90 TAB | Refills: 1 | Status: SHIPPED | OUTPATIENT
Start: 2018-03-22 | End: 2018-11-19 | Stop reason: SDUPTHER

## 2018-03-22 NOTE — TELEPHONE ENCOUNTER
Pharmacy requesting an alternative med to Levocetirizine be sent to pharmacy. Med is on back order at this time. [facility] [employer]. [phone]. Thanks.

## 2018-04-30 DIAGNOSIS — E11.42 DIABETIC POLYNEUROPATHY ASSOCIATED WITH TYPE 2 DIABETES MELLITUS (HCC): ICD-10-CM

## 2018-05-01 RX ORDER — GABAPENTIN 300 MG/1
CAPSULE ORAL
Qty: 270 CAP | Refills: 0 | Status: SHIPPED | OUTPATIENT
Start: 2018-05-01 | End: 2018-08-01 | Stop reason: SDUPTHER

## 2018-06-03 DIAGNOSIS — I10 ESSENTIAL HYPERTENSION WITH GOAL BLOOD PRESSURE LESS THAN 140/90: ICD-10-CM

## 2018-06-04 RX ORDER — HYDROCHLOROTHIAZIDE 12.5 MG/1
TABLET ORAL
Qty: 90 TAB | Refills: 0 | Status: SHIPPED | OUTPATIENT
Start: 2018-06-04 | End: 2018-09-13 | Stop reason: SDUPTHER

## 2018-06-13 DIAGNOSIS — Z79.4 TYPE 2 DIABETES MELLITUS WITH DIABETIC NEUROPATHY, WITH LONG-TERM CURRENT USE OF INSULIN (HCC): ICD-10-CM

## 2018-06-13 DIAGNOSIS — E11.40 TYPE 2 DIABETES MELLITUS WITH DIABETIC NEUROPATHY, WITH LONG-TERM CURRENT USE OF INSULIN (HCC): ICD-10-CM

## 2018-06-13 DIAGNOSIS — I10 ESSENTIAL HYPERTENSION WITH GOAL BLOOD PRESSURE LESS THAN 140/90: ICD-10-CM

## 2018-06-13 RX ORDER — LOSARTAN POTASSIUM 50 MG/1
TABLET ORAL
Qty: 90 TAB | Refills: 0 | Status: SHIPPED | OUTPATIENT
Start: 2018-06-13 | End: 2018-08-08 | Stop reason: DRUGHIGH

## 2018-07-25 DIAGNOSIS — Z79.4 TYPE 2 DIABETES MELLITUS WITHOUT COMPLICATION, WITH LONG-TERM CURRENT USE OF INSULIN (HCC): ICD-10-CM

## 2018-07-25 DIAGNOSIS — E11.9 TYPE 2 DIABETES MELLITUS WITHOUT COMPLICATION, WITH LONG-TERM CURRENT USE OF INSULIN (HCC): ICD-10-CM

## 2018-07-25 NOTE — TELEPHONE ENCOUNTER
----- Message from Briseida Hunt sent at 7/25/2018  2:22 PM EDT -----  Regarding: Np Kelli/ Refill  Pt requested a refill on Rx \"Glimertran 2mg\" to be sent to [facility] that is on file. Best contact [phone].

## 2018-07-25 NOTE — TELEPHONE ENCOUNTER
Pt was last seen on 01/29/2018. She is overdue for appt with fasting labs. Luciana Naidu, please advise pt and schedule her for physical and fasting labs. Thanks.

## 2018-07-26 RX ORDER — GLIMEPIRIDE 2 MG/1
TABLET ORAL
Qty: 90 TAB | Refills: 0 | OUTPATIENT
Start: 2018-07-26

## 2018-07-31 DIAGNOSIS — E11.9 TYPE 2 DIABETES MELLITUS WITHOUT COMPLICATION, WITH LONG-TERM CURRENT USE OF INSULIN (HCC): ICD-10-CM

## 2018-07-31 DIAGNOSIS — Z79.4 TYPE 2 DIABETES MELLITUS WITHOUT COMPLICATION, WITH LONG-TERM CURRENT USE OF INSULIN (HCC): ICD-10-CM

## 2018-07-31 DIAGNOSIS — E11.42 DIABETIC POLYNEUROPATHY ASSOCIATED WITH TYPE 2 DIABETES MELLITUS (HCC): ICD-10-CM

## 2018-07-31 RX ORDER — PANCRELIPASE 36000; 180000; 114000 [USP'U]/1; [USP'U]/1; [USP'U]/1
CAPSULE, DELAYED RELEASE PELLETS ORAL
Qty: 480 CAP | Refills: 6 | OUTPATIENT
Start: 2018-07-31

## 2018-07-31 RX ORDER — GLIMEPIRIDE 2 MG/1
TABLET ORAL
Qty: 90 TAB | Refills: 0 | OUTPATIENT
Start: 2018-07-31

## 2018-07-31 RX ORDER — GABAPENTIN 300 MG/1
CAPSULE ORAL
Qty: 270 CAP | Refills: 0 | OUTPATIENT
Start: 2018-07-31

## 2018-08-01 DIAGNOSIS — E11.9 TYPE 2 DIABETES MELLITUS WITHOUT COMPLICATION, WITH LONG-TERM CURRENT USE OF INSULIN (HCC): ICD-10-CM

## 2018-08-01 DIAGNOSIS — E11.42 DIABETIC POLYNEUROPATHY ASSOCIATED WITH TYPE 2 DIABETES MELLITUS (HCC): ICD-10-CM

## 2018-08-01 DIAGNOSIS — Z79.4 TYPE 2 DIABETES MELLITUS WITHOUT COMPLICATION, WITH LONG-TERM CURRENT USE OF INSULIN (HCC): ICD-10-CM

## 2018-08-01 RX ORDER — GLIMEPIRIDE 2 MG/1
TABLET ORAL
Qty: 7 TAB | Refills: 0 | Status: SHIPPED | OUTPATIENT
Start: 2018-08-01 | End: 2018-08-10 | Stop reason: SDUPTHER

## 2018-08-01 RX ORDER — GABAPENTIN 300 MG/1
CAPSULE ORAL
Qty: 21 CAP | Refills: 0 | Status: SHIPPED | OUTPATIENT
Start: 2018-08-01 | End: 2019-04-29 | Stop reason: SDUPTHER

## 2018-08-01 NOTE — TELEPHONE ENCOUNTER
Contacted patient to advise of appointment and lab requirements. Patient understood and scheduled. However, she cannot schedule until next week because her transportation service requires 5-7 business days notice. I advised that I would relay that information to see about a bridge of medication but could not guarantee as she is due for labs.       Best contact for patient: [phone]

## 2018-08-01 NOTE — TELEPHONE ENCOUNTER
I have sent in a weeks worth of meds, to cover her until her appointment.    As discussed in prior appointments, the Creon needs to be filled by her pancreatic specialist.  Thanks

## 2018-08-02 ENCOUNTER — TELEPHONE (OUTPATIENT)
Dept: FAMILY MEDICINE CLINIC | Age: 58
End: 2018-08-02

## 2018-08-02 NOTE — TELEPHONE ENCOUNTER
----- Message from LOYAL3 sent at 8/1/2018  3:40 PM EDT -----  Regarding: MEG Pereira/ Telephone  Pt is returning a call from the office. Pt would a message to be left if there is no answer.  Best contact number: [phone]

## 2018-08-02 NOTE — TELEPHONE ENCOUNTER
I LM for pt yesterday advising:    Darry Goodpasture, NP      11:54 AM   Note      I have sent in a weeks worth of meds, to cover her until her appointment. As discussed in prior appointments, the Creon needs to be filled by her pancreatic specialist.  Thanks            I called her again today after receiving this message and again left the same message to be certain that she rec'd it.

## 2018-08-08 ENCOUNTER — OFFICE VISIT (OUTPATIENT)
Dept: FAMILY MEDICINE CLINIC | Age: 58
End: 2018-08-08

## 2018-08-08 VITALS
TEMPERATURE: 98 F | OXYGEN SATURATION: 97 % | HEIGHT: 62 IN | RESPIRATION RATE: 18 BRPM | SYSTOLIC BLOOD PRESSURE: 150 MMHG | BODY MASS INDEX: 35.15 KG/M2 | WEIGHT: 191 LBS | HEART RATE: 70 BPM | DIASTOLIC BLOOD PRESSURE: 82 MMHG

## 2018-08-08 DIAGNOSIS — Z91.09 ENVIRONMENTAL ALLERGIES: ICD-10-CM

## 2018-08-08 DIAGNOSIS — E11.42 DIABETIC POLYNEUROPATHY ASSOCIATED WITH TYPE 2 DIABETES MELLITUS (HCC): ICD-10-CM

## 2018-08-08 DIAGNOSIS — E11.9 TYPE 2 DIABETES MELLITUS WITHOUT COMPLICATION, WITH LONG-TERM CURRENT USE OF INSULIN (HCC): Primary | ICD-10-CM

## 2018-08-08 DIAGNOSIS — I10 ESSENTIAL HYPERTENSION: ICD-10-CM

## 2018-08-08 DIAGNOSIS — Z79.4 TYPE 2 DIABETES MELLITUS WITHOUT COMPLICATION, WITH LONG-TERM CURRENT USE OF INSULIN (HCC): Primary | ICD-10-CM

## 2018-08-08 DIAGNOSIS — Z12.11 SCREENING FOR COLON CANCER: ICD-10-CM

## 2018-08-08 DIAGNOSIS — E55.9 VITAMIN D DEFICIENCY: ICD-10-CM

## 2018-08-08 DIAGNOSIS — I10 ESSENTIAL HYPERTENSION WITH GOAL BLOOD PRESSURE LESS THAN 140/90: ICD-10-CM

## 2018-08-08 DIAGNOSIS — N89.8 VAGINAL ITCHING: ICD-10-CM

## 2018-08-08 PROBLEM — E11.21 TYPE 2 DIABETES WITH NEPHROPATHY (HCC): Status: ACTIVE | Noted: 2018-08-08

## 2018-08-08 LAB
ALBUMIN UR QL STRIP: 150 MG/L
BILIRUB UR QL STRIP: NEGATIVE
CREATININE, URINE POC: 200 MG/DL
GLUCOSE UR-MCNC: NEGATIVE MG/DL
KETONES P FAST UR STRIP-MCNC: NEGATIVE MG/DL
MICROALBUMIN/CREAT RATIO POC: >300 MG/G
PH UR STRIP: 5.5 [PH] (ref 4.6–8)
PROT UR QL STRIP: ABNORMAL
SP GR UR STRIP: 1.01 (ref 1–1.03)
UA UROBILINOGEN AMB POC: ABNORMAL (ref 0.2–1)
URINALYSIS CLARITY POC: CLEAR
URINALYSIS COLOR POC: YELLOW
URINE BLOOD POC: ABNORMAL
URINE LEUKOCYTES POC: ABNORMAL
URINE NITRITES POC: POSITIVE

## 2018-08-08 RX ORDER — OMEPRAZOLE 20 MG/1
CAPSULE, DELAYED RELEASE ORAL
Qty: 180 CAP | Refills: 1 | Status: CANCELLED | OUTPATIENT
Start: 2018-08-08

## 2018-08-08 RX ORDER — FLUCONAZOLE 150 MG/1
150 TABLET ORAL DAILY
Qty: 1 TAB | Refills: 0 | Status: SHIPPED | OUTPATIENT
Start: 2018-08-08 | End: 2018-08-09

## 2018-08-08 RX ORDER — INSULIN LISPRO 100 [IU]/ML
INJECTION, SOLUTION INTRAVENOUS; SUBCUTANEOUS
Qty: 1 PACKAGE | Refills: 2 | Status: CANCELLED | OUTPATIENT
Start: 2018-08-08

## 2018-08-08 RX ORDER — LOSARTAN POTASSIUM 100 MG/1
100 TABLET ORAL DAILY
Qty: 90 TAB | Refills: 1 | Status: SHIPPED | OUTPATIENT
Start: 2018-08-08 | End: 2018-08-16 | Stop reason: ALTCHOICE

## 2018-08-08 RX ORDER — CETIRIZINE HCL 10 MG
10 TABLET ORAL
Qty: 90 TAB | Refills: 1 | Status: CANCELLED | OUTPATIENT
Start: 2018-08-08

## 2018-08-08 RX ORDER — GABAPENTIN 300 MG/1
CAPSULE ORAL
Qty: 270 CAP | Refills: 1 | Status: CANCELLED | OUTPATIENT
Start: 2018-08-08

## 2018-08-08 RX ORDER — HYDROCHLOROTHIAZIDE 12.5 MG/1
TABLET ORAL
Qty: 90 TAB | Refills: 1 | Status: CANCELLED | OUTPATIENT
Start: 2018-08-08

## 2018-08-08 RX ORDER — METFORMIN HYDROCHLORIDE 750 MG/1
TABLET, EXTENDED RELEASE ORAL
Qty: 180 TAB | Refills: 1 | Status: CANCELLED | OUTPATIENT
Start: 2018-08-08

## 2018-08-08 RX ORDER — GLIMEPIRIDE 2 MG/1
TABLET ORAL
Qty: 90 TAB | Refills: 1 | Status: CANCELLED | OUTPATIENT
Start: 2018-08-08

## 2018-08-08 NOTE — PATIENT INSTRUCTIONS

## 2018-08-08 NOTE — PROGRESS NOTES
Identified pt with two pt identifiers(name and ).     Chief Complaint   Patient presents with    Diabetes     also due for eye exam, foot exam and FOBT testing and pt was advised she is due for physical     Hypertension    Labs     has had cup of coffee with cream and sugar in it this morning    Yeast Infection     thinks she has vaginal yeast infection    Pain (Chronic)     would like to get pain prescription as her pancreatic clinic has shut down at [facility] - would like bridge mediction until she sees the GI specialist she has been referred to - GI appt should be at end of September - pt was advised chronic pain medications now need to be referred to pain specialist        Health Maintenance Due   Topic    EYE EXAM RETINAL OR DILATED Q1     FOOT EXAM Q1     HEMOGLOBIN A1C Q6M     MICROALBUMIN Q1     FOBT Q 1 YEAR AGE 50-75     Influenza Age 5 to Adult        Wt Readings from Last 3 Encounters:   18 191 lb (86.6 kg)   18 181 lb 9.6 oz (82.4 kg)   17 182 lb (82.6 kg)     Temp Readings from Last 3 Encounters:   18 98 °F (36.7 °C) (Oral)   18 98.5 °F (36.9 °C) (Oral)   17 97.7 °F (36.5 °C) (Oral)     BP Readings from Last 3 Encounters:   18 150/82   18 110/63   17 140/82     Pulse Readings from Last 3 Encounters:   18 70   18 66   17 74         Learning Assessment:  :     Learning Assessment 2016   PRIMARY LEARNER Patient   HIGHEST LEVEL OF EDUCATION - PRIMARY LEARNER  GRADUATED HIGH SCHOOL OR GED   BARRIERS PRIMARY LEARNER NONE   CO-LEARNER CAREGIVER No   PRIMARY LANGUAGE ENGLISH   LEARNER PREFERENCE PRIMARY OTHER (COMMENT)   ANSWERED BY self   RELATIONSHIP SELF       Depression Screening:  :     PHQ over the last two weeks 2018   Little interest or pleasure in doing things Not at all   Feeling down, depressed, irritable, or hopeless Not at all   Total Score PHQ 2 0       Fall Risk Assessment:  :     Fall Risk Assessment, last 15 mths 8/8/2018   Able to walk? Yes   Fall in past 12 months? No       Abuse Screening:  :     Abuse Screening Questionnaire 8/8/2018 2/8/2016   Do you ever feel afraid of your partner? N N   Are you in a relationship with someone who physically or mentally threatens you? N N   Is it safe for you to go home? Y Y           Coordination of Care Questionnaire:  :     1) Have you been to an emergency room, urgent care clinic since your last visit? no   Hospitalized since your last visit? no             2) Have you seen or consulted any other health care providers outside of [address] [place] [address] since your last visit? no  (Include any pap smears or colon screenings in this section.)    3) Do you have an Advance Directive on file? no  Are you interested in receiving information about Advance Directives? no    Patient is accompanied by self. Reviewed record in preparation for visit and have obtained necessary documentation. Medication reconciliation up to date and corrected with patient at this time.

## 2018-08-08 NOTE — MR AVS SNAPSHOT
[facility] 
 
 
 [facility] [address] [facility] 
[phone] Patient: Renee Mukherjee MRN: [id number] Visit Information Date & Time Provider Department Dept. Phone Encounter #  
 8/8/2018  8:30 AM Orquidea Salguero  [address] [phone] [id number] Follow-up Instructions Return if symptoms worsen or fail to improve. Upcoming Health Maintenance Date Due  
 EYE EXAM RETINAL OR DILATED Q1 4/12/1970 FOOT EXAM Q1 7/6/2017 HEMOGLOBIN A1C Q6M 5/1/2018 MICROALBUMIN Q1 5/24/2018 FOBT Q 1 YEAR AGE 50-75 5/25/2018 Influenza Age 5 to Adult 8/1/2018 LIPID PANEL Q1 11/1/2018 BREAST CANCER SCRN MAMMOGRAM 6/23/2019 PAP AKA CERVICAL CYTOLOGY 7/6/2019 DTaP/Tdap/Td series (2 - Td) 7/21/2026 Allergies as of 8/8/2018  Review Complete On: 8/8/2018 By: Ledora Curling, LPN Severity Noted Reaction Type Reactions Ciprofloxacin  07/14/2016    Cough Lisinopril  07/21/2016    Cough Penicillins  04/09/2011   Side Effect Other (comments) Yeast infection Current Immunizations  Reviewed on 2/8/2016 Name Date Influenza Vaccine 10/5/2015 Tdap 7/21/2016  8:03 PM  
  
 Not reviewed this visit You Were Diagnosed With   
  
 Codes Comments Type 2 diabetes mellitus without complication, with long-term current use of insulin (HCC)    -  Primary ICD-10-CM: E11.9, Z79.4 ICD-9-CM: 250.00, V58.67 Essential hypertension     ICD-10-CM: I10 
ICD-9-CM: 401.9 Vitamin D deficiency     ICD-10-CM: E55.9 ICD-9-CM: 268.9 Screening for colon cancer     ICD-10-CM: Z12.11 ICD-9-CM: V76.51 Vaginal itching     ICD-10-CM: L29.8 ICD-9-CM: 698.1 Vitals BP Pulse Temp Resp Height(growth percentile) Weight(growth percentile) 150/82 79 98 °F (36.7 °C) (Oral) 18 5' 2\" (1.575 m) 191 lb (86.6 kg) SpO2 BMI OB Status Smoking Status 97% 34.93 kg/m2 Postmenopausal Former Smoker Vitals History BMI and BSA Data Body Mass Index Body Surface Area 34.93 kg/m 2 1.95 m 2 Preferred Pharmacy Pharmacy Name Phone [id number] [address] [address] [phone] Your Updated Medication List  
  
   
This list is accurate as of 8/8/18  8:55 AM.  Always use your most recent med list.  
  
  
  
  
 acetaminophen 325 mg tablet Commonly known as:  TYLENOL Take 2 Tabs by mouth every six (6) hours as needed. acyclovir 5 % ointment Commonly known as:  ZOVIRAX Apply  to affected area six (6) times daily. For 7 days. Apply enough to sufficiently cover lesion. cetirizine 10 mg tablet Commonly known as:  ZYRTEC Take 1 Tab by mouth nightly. CREON 36,000-114,000- 180,000 unit Cpdr  
Generic drug:  lipase-protease-amylase TAKE 4 CAPSULES BY MOUTH THREE TIMES A DAY WITH EACH MEAL AND TAKE 2 CAPS WITH SNACKS  
  
 ergocalciferol 50,000 unit capsule Commonly known as:  ERGOCALCIFEROL  
TAKE 1 CAP BY MOUTH EVERY SEVEN (7) DAYS. gabapentin 300 mg capsule Commonly known as:  NEURONTIN  
TAKE 1 CAP BY MOUTH THREE (3) TIMES DAILY. glimepiride 2 mg tablet Commonly known as:  AMARYL  
TAKE 1 TABLET BY MOUTH EVERY DAY IN THE MORNING  
  
 hydroCHLOROthiazide 12.5 mg tablet Commonly known as:  HYDRODIURIL  
TAKE 1 TABLET BY MOUTH DAILY  
  
 insulin lispro 100 unit/mL kwikpen Commonly known as:  HUMALOG Blood sugar 0-150: 0 units 151-200: 4 units 201-250: 6 units 251-300: 8 units 301-350: 10 units 351-400: 12 units Over 400: call office LANTUS SOLOSTAR U-100 INSULIN 100 unit/mL (3 mL) Inpn Generic drug:  insulin glargine INJECT 32 UNITS SUBCUTANEOUSLY NIGHTLY  
  
 levocetirizine 5 mg tablet Commonly known as:  Delories Sunita TAKE 1 TABLET BY MOUTH DAILY losartan 100 mg tablet Commonly known as:  COZAAR  
 Take 1 Tab by mouth daily. Please note change in dose. metFORMIN  mg tablet Commonly known as:  GLUCOPHAGE XR  
TAKE 1 TAB BY MOUTH TWO (2) TIMES A DAY. Rosalba Pen Needle 32 gauge x 5/32\" Ndle Generic drug:  Insulin Needles (Disposable) FOR INJECTING INSULIN 250.00/E11.9 TEST _4__ TIME(S) PER DAY  
  
 omeprazole 20 mg capsule Commonly known as:  PRILOSEC  
TAKE 1 CAPSULE BY MOUTH TWICE A DAY  
  
 ONETOUCH ULTRA TEST strip Generic drug:  glucose blood VI test strips TEST BLOOD GLUCOSE THREE TIMES DAILY  
  
 SUMAtriptan 100 mg tablet Commonly known as:  IMITREX Take 1 Tab by mouth once as needed for Migraine for up to 1 dose. torsemide 20 mg tablet Commonly known as:  DEMADEX Take 1 Tab by mouth daily. Indications: Edema  
  
 valACYclovir 1 gram tablet Commonly known as:  VALTREX Take 2 Tabs by mouth two (2) times a day. VENTOLIN HFA 90 mcg/actuation inhaler Generic drug:  albuterol TAKE 2 PUFFS BY INHALATION EVERY FOUR (4) HOURS AS NEEDED FOR WHEEZING. Prescriptions Sent to Pharmacy Refills  
 losartan (COZAAR) 100 mg tablet 1 Sig: Take 1 Tab by mouth daily. Please note change in dose. Class: Normal  
 Pharmacy: [facility]/pharmacy [address], [address] [place] [address] Ph #: [phone] Route: Oral  
  
We Performed the Following AMB POC URINE, MICROALBUMIN, SEMIQUANT (3 RESULTS) [73373 CPT(R)] CBC W/O DIFF [74281 CPT(R)] HEMOGLOBIN A1C WITH EAG [11492 CPT(R)] METABOLIC PANEL, COMPREHENSIVE [41257 CPT(R)] [address] [id number] Custom] OCCULT BLOOD IMMUNOASSAY,DIAGNOSTIC [33564 CPT(R)] VITAMIN D, 25 HYDROXY [id number] CPT(R)] Follow-up Instructions Return if symptoms worsen or fail to improve. Patient Instructions Learning About Diabetes Food Guidelines Your Care Instructions Meal planning is important to manage diabetes.  It helps keep your blood sugar at a target level (which you set with your doctor). You don't have to eat special foods. You can eat what your family eats, including sweets once in a while. But you do have to pay attention to how often you eat and how much you eat of certain foods. You may want to work with a dietitian or a certified diabetes educator (CDE) to help you plan meals and snacks. A dietitian or CDE can also help you lose weight if that is one of your goals. What should you know about eating carbs? Managing the amount of carbohydrate (carbs) you eat is an important part of healthy meals when you have diabetes. Carbohydrate is found in many foods. · Learn which foods have carbs. And learn the amounts of carbs in different foods. ¨ Bread, cereal, pasta, and rice have about 15 grams of carbs in a serving. A serving is 1 slice of bread (1 ounce), ½ cup of cooked cereal, or 1/3 cup of cooked pasta or rice. ¨ Fruits have 15 grams of carbs in a serving. A serving is 1 small fresh fruit, such as an apple or orange; ½ of a banana; ½ cup of cooked or canned fruit; ½ cup of fruit juice; 1 cup of melon or raspberries; or 2 tablespoons of dried fruit. ¨ Milk and no-sugar-added yogurt have 15 grams of carbs in a serving. A serving is 1 cup of milk or 2/3 cup of no-sugar-added yogurt. ¨ Starchy vegetables have 15 grams of carbs in a serving. A serving is ½ cup of mashed potatoes or sweet potato; 1 cup winter squash; ½ of a small baked potato; ½ cup of cooked beans; or ½ cup cooked corn or green peas. · Learn how much carbs to eat each day and at each meal. A dietitian or CDE can teach you how to keep track of the amount of carbs you eat. This is called carbohydrate counting. · If you are not sure how to count carbohydrate grams, use the Plate Method to plan meals. It is a good, quick way to make sure that you have a balanced meal. It also helps you spread carbs throughout the day. ¨ Divide your plate by types of foods. Put non-starchy vegetables on half the plate, meat or other protein food on one-quarter of the plate, and a grain or starchy vegetable in the final quarter of the plate. To this you can add a small piece of fruit and 1 cup of milk or yogurt, depending on how many carbs you are supposed to eat at a meal. 
· Try to eat about the same amount of carbs at each meal. Do not \"save up\" your daily allowance of carbs to eat at one meal. 
· Proteins have very little or no carbs per serving. Examples of proteins are beef, chicken, turkey, fish, eggs, tofu, cheese, cottage cheese, and peanut butter. A serving size of meat is 3 ounces, which is about the size of a deck of cards. Examples of meat substitute serving sizes (equal to 1 ounce of meat) are 1/4 cup of cottage cheese, 1 egg, 1 tablespoon of peanut butter, and ½ cup of tofu. How can you eat out and still eat healthy? · Learn to estimate the serving sizes of foods that have carbohydrate. If you measure food at home, it will be easier to estimate the amount in a serving of restaurant food. · If the meal you order has too much carbohydrate (such as potatoes, corn, or baked beans), ask to have a low-carbohydrate food instead. Ask for a salad or green vegetables. · If you use insulin, check your blood sugar before and after eating out to help you plan how much to eat in the future. · If you eat more carbohydrate at a meal than you had planned, take a walk or do other exercise. This will help lower your blood sugar. What else should you know? · Limit saturated fat, such as the fat from meat and dairy products. This is a healthy choice because people who have diabetes are at higher risk of heart disease. So choose lean cuts of meat and nonfat or low-fat dairy products. Use olive or canola oil instead of butter or shortening when cooking. · Don't skip meals.  Your blood sugar may drop too low if you skip meals and take insulin or certain medicines for diabetes. · Check with your doctor before you drink alcohol. Alcohol can cause your blood sugar to drop too low. Alcohol can also cause a bad reaction if you take certain diabetes medicines. Follow-up care is a key part of your treatment and safety. Be sure to make and go to all appointments, and call your doctor if you are having problems. It's also a good idea to know your test results and keep a list of the medicines you take. Where can you learn more? Go to http://anil-shade.info/. Enter P488 in the search box to learn more about \"Learning About Diabetes Food Guidelines. \" Current as of: December 7, 2017 Content Version: 11.7 © 9909-7925 [employer]. Care instructions adapted under license by [employer] (which disclaims liability or warranty for this information). If you have questions about a medical condition or this instruction, always ask your healthcare professional. [place] [address] any warranty or liability for your use of this information. Introducing [facility]! [employer] introduces Saisei patient portal. Now you can access parts of your medical record, email your doctor's office, and request medication refills online. 1. In your internet browser, go to https://[email or web address]; to (do). Yatra/for; to (do) 2. Click on the First Time User? Click Here link in the Sign In box. You will see the New Member Sign Up page. 3. Enter your Saisei Access Code exactly as it appears below. You will not need to use this code after youve completed the sign-up process. If you do not sign up before the expiration date, you must request a new code. · Saisei Access Code: [email or web address] Expires: 11/6/2018  8:29 AM 
 
4. Enter the last four digits of your Social Security Number (xxxx) and Date of Birth (mm/dd/yyyy) as indicated and click Submit. You will be taken to the next sign-up page. 5. Create a LeTV ID. This will be your LeTV login ID and cannot be changed, so think of one that is secure and easy to remember. 6. Create a LeTV password. You can change your password at any time. 7. Enter your Password Reset Question and Answer. This can be used at a later time if you forget your password. 8. Enter your e-mail address. You will receive e-mail notification when new information is available in [address] 9. Click Sign Up. You can now view and download portions of your medical record. 10. Click the Download Summary menu link to download a portable copy of your medical information. If you have questions, please visit the Frequently Asked Questions section of the LeTV website. Remember, LeTV is NOT to be used for urgent needs. For medical emergencies, dial 911. Now available from your iPhone and Android! Please provide this summary of care documentation to your next provider. Your primary care clinician is listed as Mell Pereira. If you have any questions after today's visit, please call [phone].

## 2018-08-08 NOTE — PROGRESS NOTES
Subjective:     Hector Miranda is a 62 y.o. female seen for follow up of diabetes. She also has hypertension. Diabetic Review of Systems - medication compliance: noncompliant some of the time, diabetic diet compliance: noncompliant some of the time. Pt states that she believes that she may have a vaginal yeast infection. She has had some vaginal itching and irritation for a day. No change in vaginal discharge. No vaginal bleeding. No urinary symptoms. In addition, patient states that the pancreatitis clinic that she was seeing at [facility] is closing down. Due to this, they are referring her to GI specialist, but she does not have an appointment for about a month. Pt is requesting her pain medication, Dilaudid, to be refilled at this time. Patient Active Problem List    Diagnosis Date Noted    Vaginal itching 06/21/2017    Tingling 05/24/2017    Tobacco abuse 05/24/2017    Screening for breast cancer 05/24/2017    Screening for colon cancer 05/24/2017    Need for hepatitis C screening test 05/24/2017    Screening for thyroid disorder 05/24/2017    Screening for cholesterol level 05/24/2017   [facility] discharge follow-up 01/19/2017    Traumatic rhabdomyolysis ([facility] 01/16/2017    Closed fracture of right talus 01/16/2017    Synovial cyst of right knee 01/16/2017    Dehydration 01/15/2017    UTI (urinary tract infection) 07/14/2016    Mild intermittent asthma without complication 87/63/0549    Pancreatitis 02/08/2016    Essential hypertension 02/08/2016    Diabetic neuropathy ([facility] [address] 02/08/2016    Vitamin D deficiency 02/08/2016    Diabetes (HCC)     GERD (gastroesophageal reflux disease)      Current Outpatient Prescriptions   Medication Sig Dispense Refill    losartan (COZAAR) 100 mg tablet Take 1 Tab by mouth daily. Please note change in dose. 90 Tab 1    gabapentin (NEURONTIN) 300 mg capsule TAKE 1 CAP BY MOUTH THREE (3) TIMES DAILY.  21 Cap 0    glimepiride (AMARYL) 2 mg tablet TAKE 1 TABLET BY MOUTH EVERY DAY IN THE MORNING 7 Tab 0    hydroCHLOROthiazide (HYDRODIURIL) 12.5 mg tablet TAKE 1 TABLET BY MOUTH DAILY 90 Tab 0    ergocalciferol (ERGOCALCIFEROL) 50,000 unit capsule TAKE 1 CAP BY MOUTH EVERY SEVEN (7) DAYS. 12 Cap 1    omeprazole (PRILOSEC) 20 mg capsule TAKE 1 CAPSULE BY MOUTH TWICE A  Cap 0    LANTUS SOLOSTAR U-100 INSULIN 100 unit/mL (3 mL) inpn INJECT 32 UNITS SUBCUTANEOUSLY NIGHTLY 15 Pen 3    cetirizine (ZYRTEC) 10 mg tablet Take 1 Tab by mouth nightly. 90 Tab 1    LEE PEN NEEDLE 32 gauge x 5/32\" ndle FOR INJECTING INSULIN 250.00/E11.9 TEST _4__ TIME(S) PER  Pen Needle 3    acyclovir (ZOVIRAX) 5 % ointment Apply  to affected area six (6) times daily. For 7 days. Apply enough to sufficiently cover lesion. 5 g 0    valACYclovir (VALTREX) 1 gram tablet Take 2 Tabs by mouth two (2) times a day. (Patient taking differently: Take 2,000 mg by mouth two (2) times daily as needed.) 60 Tab 0    VENTOLIN HFA 90 mcg/actuation inhaler TAKE 2 PUFFS BY INHALATION EVERY FOUR (4) HOURS AS NEEDED FOR WHEEZING. 18 Inhaler 3    metFORMIN ER (GLUCOPHAGE XR) 750 mg tablet TAKE 1 TAB BY MOUTH TWO (2) TIMES A DAY. 180 Tab 0    levocetirizine (XYZAL) 5 mg tablet TAKE 1 TABLET BY MOUTH DAILY 30 Tab 9    CREON 36,000-114,000- 180,000 unit cpDR TAKE 4 CAPSULES BY MOUTH THREE TIMES A DAY WITH EACH MEAL AND TAKE 2 CAPS WITH SNACKS 480 Cap 6    ONETOUCH ULTRA TEST strip TEST BLOOD GLUCOSE THREE TIMES DAILY 100 Strip 0    torsemide (DEMADEX) 20 mg tablet Take 1 Tab by mouth daily. Indications: Edema 90 Tab 1    insulin lispro (HUMALOG) 100 unit/mL kwikpen Blood sugar 0-150: 0 units  151-200: 4 units  201-250: 6 units  251-300: 8 units  301-350: 10 units  351-400: 12 units  Over 400: call office 1 Package 2    SUMAtriptan (IMITREX) 100 mg tablet Take 1 Tab by mouth once as needed for Migraine for up to 1 dose.  9 Tab 2    acetaminophen (TYLENOL) 325 mg tablet Take 2 Tabs by mouth every six (6) hours as needed. 40 Tab 0     Allergies   Allergen Reactions    Ciprofloxacin Cough    Lisinopril Cough    Penicillins Other (comments)     Yeast infection      Past Medical History:   Diagnosis Date    Arthritis     Asthma     Diabetes ([address]     [facility]    GERD (gastroesophageal reflux disease)     Hypertension     Ill-defined condition     pancreatitis, followed by GI     Past Surgical History:   Procedure Laterality Date    COLONOSCOPY N/A 11/30/2016    COLONOSCOPY,EGD performed by Yonas Suh MD at [address] [facility] HX CATARACT REMOVAL Left 10/15    HX CATARACT REMOVAL Right 10/2016    HX CHOLECYSTECTOMY  2005 ?  HX GI  2001 ?     gastic tumor removal.     ASHA MAMMO BI SCREENING INCL CAD  5/3/12    normal    NM PANCREATIC ELASTASE, FECAL, QUAL/SEMI-QUANT  1999    growths in prancreatitis     Family History   Problem Relation Age of Onset    Diabetes Mother     Heart Disease Father     Diabetes Brother     Breast Cancer Sister      Social History   Substance Use Topics    Smoking status: Former Smoker     Types: Cigarettes     Start date: 2/13/2016     Quit date: 2/29/2016    Smokeless tobacco: Never Used    Alcohol use No      Comment: social        Lab Results  Component Value Date/Time   WBC 9.8 01/29/2018 08:47 AM   HGB 11.2 01/29/2018 08:47 AM   HCT 35.4 01/29/2018 08:47 AM   PLATELET 128 20/35/9697 08:47 AM   MCV 92 01/29/2018 08:47 AM     Lab Results  Component Value Date/Time   Hemoglobin A1c 13.5 (H) 05/24/2017 09:25 AM   Hemoglobin A1c 12.5 (H) 07/06/2016 10:28 AM   Hemoglobin A1c 8.1 (H) 11/20/2014 09:30 AM   Hemoglobin A1c, External 9.6 11/01/2017   Glucose 113 (H) 01/29/2018 08:47 AM   Glucose (POC) 449 (H) 01/17/2017 04:43 PM   Microalbumin/Creat ratio (mg/g creat) 227 (H) 03/20/2014 11:35 AM   Microalbumin,urine random 28.00 03/20/2014 11:35 AM   LDL, calculated 72 05/24/2017 09:25 AM   Creatinine 1.00 01/29/2018 08:47 AM      Lab Results  Component Value Date/Time   Cholesterol, total 139 05/24/2017 09:25 AM   HDL Cholesterol 48 05/24/2017 09:25 AM   LDL, calculated 72 05/24/2017 09:25 AM   LDL-C, External 86 11/01/2017   Triglyceride 97 05/24/2017 09:25 AM   CHOL/HDL Ratio 2.0 07/24/2014 09:10 AM     Lab Results  Component Value Date/Time   GFR est non-AA 63 01/29/2018 08:47 AM   GFR est AA 72 01/29/2018 08:47 AM   Creatinine 1.00 01/29/2018 08:47 AM   BUN 29 (H) 01/29/2018 08:47 AM   Sodium 143 01/29/2018 08:47 AM   Potassium 5.0 01/29/2018 08:47 AM   Chloride 106 01/29/2018 08:47 AM   CO2 25 01/29/2018 08:47 AM   Magnesium 2.2 01/15/2017 01:49 PM        Review of Systems  Pertinent items are noted in HPI. Objective:     Visit Vitals    /69 (BP 1 Location: Right arm, BP Patient Position: Sitting)    Pulse 79    Temp 98 °F (36.7 °C) (Oral)    Resp 18    Ht 5' 2\" (1.575 m)    Wt 191 lb (86.6 kg)    SpO2 97%    BMI 34.93 kg/m2     Appearance: alert, well appearing, and in no distress. Exam: heart sounds normal rate, regular rhythm, normal S1, S2, no murmurs, rubs, clicks or gallops, chest clear  Lab review: orders written for new lab studies as appropriate; see orders. Assessment/Plan:     diabetes borderline controlled, hypertension borderline controlled. Diabetic issues reviewed with her: diabetic diet discussed in detail, written exchange diet given, low cholesterol diet, weight control and daily exercise discussed, foot care discussed and Podiatry visits discussed and annual eye examinations at Ophthalmology discussed. ICD-10-CM ICD-9-CM    1. Type 2 diabetes mellitus without complication, with long-term current use of insulin (HCC) E11.9 250.00 AMB POC URINE, MICROALBUMIN, SEMIQUANT (3 RESULTS)    Z79.4 V58.67 CBC W/O DIFF      METABOLIC PANEL, COMPREHENSIVE      HEMOGLOBIN A1C WITH EAG   2. Essential hypertension I10 401.9 losartan (COZAAR) 100 mg tablet   3.  Vitamin D deficiency E55.9 268.9 VITAMIN D, 25 HYDROXY   4. Screening for colon cancer Z12.11 V76.51 OCCULT BLOOD IMMUNOASSAY,DIAGNOSTIC   5. Vaginal itching L29.8 698.1 NUSWAB VAGINITIS PLUS     Informed patient that we will notify her when labs return, and inform her of any change in plan of care at that time. Educated about increasing BP medication, due to elevation in BP, and taking as prescribed. Should follow up with GI specialist for pain medications, as I am not following her pancreatitis. Pt informed to return to office with worsening of symptoms, or PRN with any questions or concerns. Pt verbalizes understanding of plan of care and denies further questions or concerns at this time.

## 2018-08-10 ENCOUNTER — TELEPHONE (OUTPATIENT)
Dept: FAMILY MEDICINE CLINIC | Age: 58
End: 2018-08-10

## 2018-08-10 DIAGNOSIS — B96.89 BACTERIAL VAGINOSIS: Primary | ICD-10-CM

## 2018-08-10 DIAGNOSIS — N76.0 BACTERIAL VAGINOSIS: Primary | ICD-10-CM

## 2018-08-10 LAB
A VAGINAE DNA VAG QL NAA+PROBE: ABNORMAL SCORE
BVAB2 DNA VAG QL NAA+PROBE: ABNORMAL SCORE
C ALBICANS DNA VAG QL NAA+PROBE: NEGATIVE
C GLABRATA DNA VAG QL NAA+PROBE: NEGATIVE
C TRACH RRNA SPEC QL NAA+PROBE: NEGATIVE
MEGA1 DNA VAG QL NAA+PROBE: ABNORMAL SCORE
N GONORRHOEA RRNA SPEC QL NAA+PROBE: NEGATIVE
T VAGINALIS RRNA SPEC QL NAA+PROBE: NEGATIVE

## 2018-08-10 RX ORDER — METRONIDAZOLE 500 MG/1
500 TABLET ORAL 2 TIMES DAILY
Qty: 14 TAB | Refills: 0 | Status: SHIPPED | OUTPATIENT
Start: 2018-08-10 | End: 2018-08-17

## 2018-08-10 NOTE — PROGRESS NOTES
Nuswab positive for bacterial vaginosis otherwise negative. Recommendation made for treatment with Flagyl 500 mg BID for 7 days. Prescription sent to pharmacy.

## 2018-08-10 NOTE — TELEPHONE ENCOUNTER
Please inform patient that her Nuswab is positive for bacterial vaginosis. All other testing is negative. Recommend treatment with Flagyl 500 mg twice daily for the 7 days. She should abstain from alcohol during treatment and for a full 24 hours after medication completed. Medication has been sent to her pharmacy. Requested Prescriptions     Signed Prescriptions Disp Refills    metroNIDAZOLE (FLAGYL) 500 mg tablet 14 Tab 0     Sig: Take 1 Tab by mouth two (2) times a day for 7 days.      Authorizing Provider: Alex Thompson

## 2018-08-11 LAB
25(OH)D3+25(OH)D2 SERPL-MCNC: 22.1 NG/ML (ref 30–100)
ALBUMIN SERPL-MCNC: 3.8 G/DL (ref 3.5–5.5)
ALBUMIN/GLOB SERPL: 1.5 {RATIO} (ref 1.2–2.2)
ALP SERPL-CCNC: 129 IU/L (ref 39–117)
ALT SERPL-CCNC: 7 IU/L (ref 0–32)
AST SERPL-CCNC: 9 IU/L (ref 0–40)
BACTERIA UR CULT: ABNORMAL
BACTERIA UR CULT: ABNORMAL
BILIRUB SERPL-MCNC: <0.2 MG/DL (ref 0–1.2)
BUN SERPL-MCNC: 31 MG/DL (ref 6–24)
BUN/CREAT SERPL: 26 (ref 9–23)
CALCIUM SERPL-MCNC: 9.2 MG/DL (ref 8.7–10.2)
CHLORIDE SERPL-SCNC: 113 MMOL/L (ref 96–106)
CO2 SERPL-SCNC: 17 MMOL/L (ref 20–29)
CREAT SERPL-MCNC: 1.17 MG/DL (ref 0.57–1)
ERYTHROCYTE [DISTWIDTH] IN BLOOD BY AUTOMATED COUNT: 14.2 % (ref 12.3–15.4)
EST. AVERAGE GLUCOSE BLD GHB EST-MCNC: 275 MG/DL
GLOBULIN SER CALC-MCNC: 2.5 G/DL (ref 1.5–4.5)
GLUCOSE SERPL-MCNC: 132 MG/DL (ref 65–99)
HBA1C MFR BLD: 11.2 % (ref 4.8–5.6)
HCT VFR BLD AUTO: 31 % (ref 34–46.6)
HGB BLD-MCNC: 9.9 G/DL (ref 11.1–15.9)
INTERPRETATION: NORMAL
Lab: NORMAL
MCH RBC QN AUTO: 29.3 PG (ref 26.6–33)
MCHC RBC AUTO-ENTMCNC: 31.9 G/DL (ref 31.5–35.7)
MCV RBC AUTO: 92 FL (ref 79–97)
OTHER ANTIBIOTIC SUSC ISLT: ABNORMAL
PLATELET # BLD AUTO: 257 X10E3/UL (ref 150–379)
POTASSIUM SERPL-SCNC: 5.6 MMOL/L (ref 3.5–5.2)
PROT SERPL-MCNC: 6.3 G/DL (ref 6–8.5)
RBC # BLD AUTO: 3.38 X10E6/UL (ref 3.77–5.28)
SODIUM SERPL-SCNC: 142 MMOL/L (ref 134–144)
WBC # BLD AUTO: 8.5 X10E3/UL (ref 3.4–10.8)

## 2018-08-13 DIAGNOSIS — E87.5 SERUM POTASSIUM ELEVATED: ICD-10-CM

## 2018-08-13 DIAGNOSIS — N30.00 ACUTE CYSTITIS WITHOUT HEMATURIA: ICD-10-CM

## 2018-08-13 RX ORDER — NITROFURANTOIN 25; 75 MG/1; MG/1
100 CAPSULE ORAL 2 TIMES DAILY
Qty: 10 CAP | Refills: 0 | Status: SHIPPED | OUTPATIENT
Start: 2018-08-13 | End: 2018-08-18

## 2018-08-13 NOTE — PROGRESS NOTES
Please call patient and let her know that her labs returned:  1.  Hgb is VERY low. The lowest it has ever been. When was her last colonoscopy? 2.  Kidney function continues to be altered. Is she currently seeing a nephrologist?  3. Potassium is high. Needs to return today or tomorrow, very well hydrated, for re-check. 4.  Diabetes is still uncontrolled. Due to this, and her altered kidney function, I do think it would be best for her to be evaluated by endocrinology. I have placed an order for her to be seen by [facility], and she should call for appointment today, [phone]  5. She has a UTI. I have sent Macrobid to the pharmacy, and she should take as prescribed.   Thanks

## 2018-08-14 ENCOUNTER — TELEPHONE (OUTPATIENT)
Dept: FAMILY MEDICINE CLINIC | Age: 58
End: 2018-08-14

## 2018-08-14 DIAGNOSIS — D64.9 LOW HEMOGLOBIN: Primary | ICD-10-CM

## 2018-08-14 NOTE — TELEPHONE ENCOUNTER
Message forwarded from the call center    Pt requested a call back in regards to recent lab results from appt on 8/8/18. Best contact [phone].

## 2018-08-14 NOTE — PROGRESS NOTES
Pt was advised of need to f/u with Dr. Maddie CLIFTON. I have relayed Dr. Andi Delgado office phone number to her. She verbalized understanding.

## 2018-08-14 NOTE — TELEPHONE ENCOUNTER
----- Message from Delores Ramsey sent at 8/14/2018  8:58 AM EDT -----  Regarding: Kelli NP/Telephone  The patient missed a call from Sherri Rothman and is requesting a call back.  (m)[phone]

## 2018-08-14 NOTE — PROGRESS NOTES
Noted.  Due to recent normal colonoscopy will send to hematology due to low Hgb. I have placed order with Dr Shireen Centeno and she should call and make an appointment ASAP [phone].   thanks

## 2018-08-14 NOTE — TELEPHONE ENCOUNTER
----- Message from Jackie Galan sent at 8/14/2018 11:33 AM EDT -----  Regarding: Omid Pereira/ telephone  Pt would like a callback from the nurse to get test results.      Best contact number [phone]

## 2018-08-14 NOTE — PROGRESS NOTES
Pt was advised and verbalized understanding. She reports her last colonoscopy was approx 1-1.5 years ago and she denies any rectal bleeding with stools. She does see a nephrologist and has f/u appt in September. She was encouraged to keep this appt. She will return tomorrow, well hydrated for repeat labs.

## 2018-08-15 LAB — HEMOCCULT STL QL IA: NEGATIVE

## 2018-08-15 RX ORDER — PEN NEEDLE, DIABETIC 32GX 5/32"
NEEDLE, DISPOSABLE MISCELLANEOUS
Qty: 100 PEN NEEDLE | Refills: 3 | Status: SHIPPED | OUTPATIENT
Start: 2018-08-15 | End: 2018-11-19 | Stop reason: SDUPTHER

## 2018-08-16 ENCOUNTER — TELEPHONE (OUTPATIENT)
Dept: FAMILY MEDICINE CLINIC | Age: 58
End: 2018-08-16

## 2018-08-16 DIAGNOSIS — I10 ESSENTIAL HYPERTENSION: Primary | ICD-10-CM

## 2018-08-16 LAB
BUN SERPL-MCNC: 33 MG/DL (ref 6–24)
BUN/CREAT SERPL: 26 (ref 9–23)
CALCIUM SERPL-MCNC: 9.3 MG/DL (ref 8.7–10.2)
CHLORIDE SERPL-SCNC: 113 MMOL/L (ref 96–106)
CO2 SERPL-SCNC: 19 MMOL/L (ref 20–29)
CREAT SERPL-MCNC: 1.26 MG/DL (ref 0.57–1)
GLUCOSE SERPL-MCNC: 184 MG/DL (ref 65–99)
INTERPRETATION: NORMAL
POTASSIUM SERPL-SCNC: 5.6 MMOL/L (ref 3.5–5.2)
SODIUM SERPL-SCNC: 141 MMOL/L (ref 134–144)

## 2018-08-16 RX ORDER — AMLODIPINE BESYLATE 10 MG/1
10 TABLET ORAL DAILY
Qty: 30 TAB | Refills: 0 | Status: SHIPPED | OUTPATIENT
Start: 2018-08-16 | End: 2018-09-13 | Stop reason: SDUPTHER

## 2018-08-16 NOTE — TELEPHONE ENCOUNTER
Forwarded from call center. ..      Pt is requesting a return call from [facility].  Best contact [phone]

## 2018-08-16 NOTE — TELEPHONE ENCOUNTER
Attempted to return call, however, could only again leave a message requesting return call to discuss labs. Please call me when pt calls back. Thanks.

## 2018-08-16 NOTE — PROGRESS NOTES
Please call patient and let her know that her labs returned:  1. Kidney function is worse. Needs to follow up with her nephrologist ASAP. 2.  Potassium is still high. This can be side effect of the losartan that she is taking for BP. She should STOP the losartan, and start Norvasc that I just sent to pharmacy. She should return to office in 1 month, for BP re-check and lab re-check. Should always return to office or go to ER with any chest pain, shortness of breath, etc.  Thanks!

## 2018-08-16 NOTE — PROGRESS NOTES
Pt returned call and LM to call her back. I attempted to call her back and again was only able to leave a voicemail requesting return call.

## 2018-08-20 ENCOUNTER — TELEPHONE (OUTPATIENT)
Dept: FAMILY MEDICINE CLINIC | Age: 58
End: 2018-08-20

## 2018-08-20 NOTE — TELEPHONE ENCOUNTER
The pt is calling asking for the nurse to give her a call back as soon as possible. She didn't give any details.

## 2018-09-19 ENCOUNTER — OFFICE VISIT (OUTPATIENT)
Dept: ONCOLOGY | Age: 58
End: 2018-09-19

## 2018-09-19 VITALS
RESPIRATION RATE: 18 BRPM | WEIGHT: 193.8 LBS | HEIGHT: 62 IN | DIASTOLIC BLOOD PRESSURE: 82 MMHG | BODY MASS INDEX: 35.66 KG/M2 | TEMPERATURE: 98 F | HEART RATE: 72 BPM | SYSTOLIC BLOOD PRESSURE: 172 MMHG

## 2018-09-19 DIAGNOSIS — D64.9 ANEMIA, NORMOCYTIC NORMOCHROMIC: Primary | ICD-10-CM

## 2018-09-19 DIAGNOSIS — E11.29 TYPE 2 DIABETES MELLITUS WITH OTHER DIABETIC KIDNEY COMPLICATION, WITH LONG-TERM CURRENT USE OF INSULIN (HCC): ICD-10-CM

## 2018-09-19 DIAGNOSIS — Z79.4 TYPE 2 DIABETES MELLITUS WITH OTHER DIABETIC KIDNEY COMPLICATION, WITH LONG-TERM CURRENT USE OF INSULIN (HCC): ICD-10-CM

## 2018-09-19 NOTE — PROGRESS NOTES
[facility] [place] [id number]  [address] [facility], [address] [place] [address] Engman: [phone]  F: [phone]      Reason for Visit:   Sandra Roman is a 62 y.o. female who is seen in consultation at the request of Carlos Henson NP, for evaluation of anemia        History of Present Illness:   5/24/17 hgb 11.6  1/29/18 hgb 11.2  8/8/18 hgb 9.9. plt 257; wbc 8.5    Fecal occult blood negative    Colonoscopy and EGD 11/30/16 Impressions:  EGD:  Schatzki's ring, dilated  Colonoscopy: Colon polyp, removed. She complains of fatigue for 1 month. Mammogram next month scheduled. Only very occasional etoh use. Past Medical History:   Diagnosis Date    Arthritis     Asthma     Diabetes ([address]     seen [facility]ology at [facility]    GERD (gastroesophageal reflux disease)     Hypertension     Ill-defined condition     pancreatitis, followed by GI      Past Surgical History:   Procedure Laterality Date    COLONOSCOPY N/A 11/30/2016    COLONOSCOPY,EGD performed by Diana Pugh MD at [address] HX CATARACT REMOVAL Left 10/15    HX CATARACT REMOVAL Right 10/2016    HX CHOLECYSTECTOMY  2005 ?  HX GI  2001 ? gastic tumor removal.     ASHA MAMMO BI SCREENING INCL CAD  5/3/12    normal    [place] PANCREATIC ELASTASE, FECAL, QUAL/SEMI-QUANT  1999    growths in prancreatitis      Social History   Substance Use Topics    Smoking status: Former Smoker     Packs/day: 0.50     Types: Cigarettes     Start date: 2/13/2016     Quit date: 2/29/2016    Smokeless tobacco: Never Used    Alcohol use No      Comment: social      Family History   Problem Relation Age of Onset    Diabetes Mother     Heart Disease Father     Diabetes Brother     Breast Cancer Sister      Current Outpatient Prescriptions   Medication Sig    amLODIPine (NORVASC) 10 mg tablet Take 1 Tab by mouth daily. Pt needs appointment for further refills.     hydroCHLOROthiazide (HYDRODIURIL) 12.5 mg tablet TAKE 1 TABLET BY MOUTH DAILY. Pt needs appointment for further refills.  LEE PEN NEEDLE 32 gauge x 5/32\" ndle FOR INJECTING INSULIN 250.00/E11.9 TEST _4__ TIME(S) PER DAY    omeprazole (PRILOSEC) 20 mg capsule TAKE 1 CAPSULE BY MOUTH TWICE A DAY    glimepiride (AMARYL) 2 mg tablet TAKE 1 TABLET BY MOUTH EVERY DAY IN THE MORNING    gabapentin (NEURONTIN) 300 mg capsule TAKE 1 CAP BY MOUTH THREE (3) TIMES DAILY.  ergocalciferol (ERGOCALCIFEROL) 50,000 unit capsule TAKE 1 CAP BY MOUTH EVERY SEVEN (7) DAYS.  LANTUS SOLOSTAR U-100 INSULIN 100 unit/mL (3 mL) inpn INJECT 32 UNITS SUBCUTANEOUSLY NIGHTLY    cetirizine (ZYRTEC) 10 mg tablet Take 1 Tab by mouth nightly.  acyclovir (ZOVIRAX) 5 % ointment Apply  to affected area six (6) times daily. For 7 days. Apply enough to sufficiently cover lesion.  valACYclovir (VALTREX) 1 gram tablet Take 2 Tabs by mouth two (2) times a day. (Patient taking differently: Take 2,000 mg by mouth two (2) times daily as needed.)    VENTOLIN HFA 90 mcg/actuation inhaler TAKE 2 PUFFS BY INHALATION EVERY FOUR (4) HOURS AS NEEDED FOR WHEEZING.  metFORMIN ER (GLUCOPHAGE XR) 750 mg tablet TAKE 1 TAB BY MOUTH TWO (2) TIMES A DAY.  levocetirizine (XYZAL) 5 mg tablet TAKE 1 TABLET BY MOUTH DAILY    CREON 36,000-114,000- 180,000 unit cpDR TAKE 4 CAPSULES BY MOUTH THREE TIMES A DAY WITH EACH MEAL AND TAKE 2 CAPS WITH SNACKS    ONETOUCH ULTRA TEST strip TEST BLOOD GLUCOSE THREE TIMES DAILY    torsemide (DEMADEX) 20 mg tablet Take 1 Tab by mouth daily. Indications: Edema    insulin lispro (HUMALOG) 100 unit/mL kwikpen Blood sugar 0-150: 0 units  151-200: 4 units  201-250: 6 units  251-300: 8 units  301-350: 10 units  351-400: 12 units  Over 400: call office    SUMAtriptan (IMITREX) 100 mg tablet Take 1 Tab by mouth once as needed for Migraine for up to 1 dose.  acetaminophen (TYLENOL) 325 mg tablet Take 2 Tabs by mouth every six (6) hours as needed.      No current facility-administered medications for this visit. Allergies   Allergen Reactions    Ciprofloxacin Cough    Lisinopril Cough    Penicillins Other (comments)     Yeast infection         Review of Systems: A complete review of systems was obtained, negative except as described above. Physical Exam:     Visit Vitals    /82    Pulse 72    Temp 98 °F (36.7 °C) (Temporal)    Resp 18    Ht 5' 2\" (1.575 m)    Wt 193 lb 12.8 oz (87.9 kg)    BMI 35.45 kg/m2     ECOG PS: 0  General: No distress  Eyes: PERRLA, anicteric sclerae  HENT: Atraumatic, OP clear  Neck: Supple  Lymphatic: No cervical, supraclavicular,  adenopathy  Respiratory: CTAB, normal respiratory effort  CV: Normal rate, regular rhythm, no murmurs, no peripheral edema  GI: Soft, nontender, nondistended, no masses, no hepatomegaly, no splenomegaly  MS: Normal gait and station. Digits without clubbing or cyanosis. Skin: No rashes, ecchymoses, or petechiae. Normal temperature, turgor, and texture. Psych: Alert, oriented, appropriate affect, normal judgment/insight    Results:     Lab Results   Component Value Date/Time    WBC 8.5 08/08/2018 09:04 AM    HGB 9.9 (L) 08/08/2018 09:04 AM    HCT 31.0 (L) 08/08/2018 09:04 AM    PLATELET 281 05/98/7095 09:04 AM    MCV 92 08/08/2018 09:04 AM    ABS.  NEUTROPHILS 5.4 05/24/2017 09:25 AM     Lab Results   Component Value Date/Time    Sodium 141 08/15/2018 08:42 AM    Potassium 5.6 (H) 08/15/2018 08:42 AM    Chloride 113 (H) 08/15/2018 08:42 AM    CO2 19 (L) 08/15/2018 08:42 AM    Glucose 184 (H) 08/15/2018 08:42 AM    BUN 33 (H) 08/15/2018 08:42 AM    Creatinine 1.26 (H) 08/15/2018 08:42 AM    GFR est AA 54 (L) 08/15/2018 08:42 AM    GFR est non-AA 47 (L) 08/15/2018 08:42 AM    Calcium 9.3 08/15/2018 08:42 AM    Glucose (POC) 449 (H) 01/17/2017 04:43 PM     Lab Results   Component Value Date/Time    Bilirubin, total <0.2 08/08/2018 09:04 AM    ALT (SGPT) 7 08/08/2018 09:04 AM    AST (SGOT) 9 08/08/2018 09:04 AM    Alk. phosphatase 129 (H) 08/08/2018 09:04 AM    Protein, total 6.3 08/08/2018 09:04 AM    Albumin 3.8 08/08/2018 09:04 AM    Globulin 3.9 01/15/2017 01:49 PM         Records reviewed and summarized above. Assessment/plan:   1. Anemia, normocytic:  The differential diagnosis for a normocytic anemia is broad, and includes blood loss, anemia of chronic disease, chronic renal insufficiency, iron deficiency, hypothyroidism, hemolysis, and bone marrow suppression. MDS, B12 deficiency, folate deficiency, and etoh abuse can also lead to anemia, though it is generally macrocytic. I will obtain some additional labwork today to further investigate, including CBC with differential, smear review, retic count, iron profile, ferritin, B12, folate, TSH, haptoglobin, LDH, SPEP. Will have her return in 1 week to discuss results    2. DM2:  On insulin, with kidney complications, has been referred to neprhology    3. HTN:  Uncontrolled today, ran out of norvasc and hctz, she will call in her refill    I appreciate the opportunity to participate in [address] [facility].     Signed By: Mayela Hunter MD

## 2018-09-22 LAB
ALBUMIN SERPL ELPH-MCNC: 3.5 G/DL (ref 2.9–4.4)
ALBUMIN/GLOB SERPL: 1.3 {RATIO} (ref 0.7–1.7)
ALPHA1 GLOB SERPL ELPH-MCNC: 0.2 G/DL (ref 0–0.4)
ALPHA2 GLOB SERPL ELPH-MCNC: 0.7 G/DL (ref 0.4–1)
B-GLOBULIN SERPL ELPH-MCNC: 0.9 G/DL (ref 0.7–1.3)
BASOPHILS # BLD AUTO: 0 X10E3/UL (ref 0–0.2)
BASOPHILS NFR BLD AUTO: 0 %
EOSINOPHIL # BLD AUTO: 0.1 X10E3/UL (ref 0–0.4)
EOSINOPHIL NFR BLD AUTO: 1 %
ERYTHROCYTE [DISTWIDTH] IN BLOOD BY AUTOMATED COUNT: 14.4 % (ref 12.3–15.4)
FERRITIN SERPL-MCNC: 74 NG/ML (ref 15–150)
FOLATE SERPL-MCNC: 9.1 NG/ML
GAMMA GLOB SERPL ELPH-MCNC: 0.9 G/DL (ref 0.4–1.8)
GLOBULIN SER CALC-MCNC: 2.7 G/DL (ref 2.2–3.9)
HAPTOGLOB SERPL-MCNC: 94 MG/DL (ref 34–200)
HCT VFR BLD AUTO: 32.4 % (ref 34–46.6)
HGB BLD-MCNC: 10.3 G/DL (ref 11.1–15.9)
IMM GRANULOCYTES # BLD: 0 X10E3/UL (ref 0–0.1)
IMM GRANULOCYTES NFR BLD: 0 %
IRON SATN MFR SERPL: 30 % (ref 15–55)
IRON SERPL-MCNC: 79 UG/DL (ref 27–159)
LDH SERPL-CCNC: 230 IU/L (ref 119–226)
LYMPHOCYTES # BLD AUTO: 3.1 X10E3/UL (ref 0.7–3.1)
LYMPHOCYTES NFR BLD AUTO: 33 %
M PROTEIN SERPL ELPH-MCNC: NORMAL G/DL
MCH RBC QN AUTO: 29.8 PG (ref 26.6–33)
MCHC RBC AUTO-ENTMCNC: 31.8 G/DL (ref 31.5–35.7)
MCV RBC AUTO: 94 FL (ref 79–97)
MONOCYTES # BLD AUTO: 0.4 X10E3/UL (ref 0.1–0.9)
MONOCYTES NFR BLD AUTO: 4 %
NEUTROPHILS # BLD AUTO: 5.9 X10E3/UL (ref 1.4–7)
NEUTROPHILS NFR BLD AUTO: 62 %
PLATELET # BLD AUTO: 244 X10E3/UL (ref 150–379)
PLEASE NOTE, 011150: NORMAL
PROT SERPL-MCNC: 6.2 G/DL (ref 6–8.5)
RBC # BLD AUTO: 3.46 X10E6/UL (ref 3.77–5.28)
RETICS/RBC NFR AUTO: 1.9 % (ref 0.6–2.6)
TIBC SERPL-MCNC: 260 UG/DL (ref 250–450)
TSH SERPL DL<=0.005 MIU/L-ACNC: 1.51 UIU/ML (ref 0.45–4.5)
UIBC SERPL-MCNC: 181 UG/DL (ref 131–425)
VIT B12 SERPL-MCNC: 430 PG/ML (ref 232–1245)
WBC # BLD AUTO: 9.6 X10E3/UL (ref 3.4–10.8)

## 2018-10-03 ENCOUNTER — OFFICE VISIT (OUTPATIENT)
Dept: ONCOLOGY | Age: 58
End: 2018-10-03

## 2018-10-03 VITALS
WEIGHT: 192.2 LBS | RESPIRATION RATE: 18 BRPM | SYSTOLIC BLOOD PRESSURE: 161 MMHG | TEMPERATURE: 97 F | HEIGHT: 62 IN | BODY MASS INDEX: 35.37 KG/M2 | OXYGEN SATURATION: 98 % | HEART RATE: 76 BPM | DIASTOLIC BLOOD PRESSURE: 89 MMHG

## 2018-10-03 DIAGNOSIS — I10 ESSENTIAL HYPERTENSION: ICD-10-CM

## 2018-10-03 DIAGNOSIS — I15.9 SECONDARY HYPERTENSION: ICD-10-CM

## 2018-10-03 DIAGNOSIS — D64.9 ANEMIA, NORMOCYTIC NORMOCHROMIC: Primary | ICD-10-CM

## 2018-10-03 DIAGNOSIS — I10 ESSENTIAL HYPERTENSION WITH GOAL BLOOD PRESSURE LESS THAN 140/90: ICD-10-CM

## 2018-10-03 DIAGNOSIS — E11.29 TYPE 2 DIABETES MELLITUS WITH OTHER DIABETIC KIDNEY COMPLICATION, WITH LONG-TERM CURRENT USE OF INSULIN (HCC): ICD-10-CM

## 2018-10-03 DIAGNOSIS — Z79.4 TYPE 2 DIABETES MELLITUS WITH OTHER DIABETIC KIDNEY COMPLICATION, WITH LONG-TERM CURRENT USE OF INSULIN (HCC): ICD-10-CM

## 2018-10-03 PROBLEM — E66.01 SEVERE OBESITY (HCC): Status: ACTIVE | Noted: 2018-10-03

## 2018-10-03 RX ORDER — HYDROCHLOROTHIAZIDE 12.5 MG/1
TABLET ORAL
Qty: 30 TAB | Refills: 0 | Status: SHIPPED | OUTPATIENT
Start: 2018-10-03 | End: 2018-10-31 | Stop reason: SDUPTHER

## 2018-10-03 RX ORDER — AMLODIPINE BESYLATE 10 MG/1
10 TABLET ORAL DAILY
Qty: 30 TAB | Refills: 0 | Status: SHIPPED | OUTPATIENT
Start: 2018-10-03 | End: 2018-11-19 | Stop reason: SDUPTHER

## 2018-10-03 NOTE — PROGRESS NOTES
[facility] at [address], [address]  Amol Lillytron: [phone]  F: [phone]      Reason for Visit:   Carolynn Loera is a 62 y.o. female who is seen in consultation at the request of Scotty Weinberg NP, for evaluation of anemia    History of Present Illness:   5/24/17 hgb 11.6  1/29/18 hgb 11.2  8/8/18 hgb 9.9. plt 257; wbc 8.5    Fecal occult blood negative    Colonoscopy and EGD 11/30/16 Impressions:  EGD:  Schatzki's ring, dilated  Colonoscopy: Colon polyp, removed. She complains of gr 1 fatigue, gr 2 hot flashes, 7/10 pain in legs, feet and stomach, gr 1 sob, gr 2 neuropathy, gr 1 headache, gr 1 vaginal dryness. Mammogram next month scheduled for 10/15/18. Only very occasional etoh use. Past Medical History:   Diagnosis Date    Arthritis     Asthma     Diabetes ([address]     seen by [facility]ocr[facility]ology at [facility]    GERD (gastroesophageal reflux disease)     Hypertension     Ill-defined condition     pancreatitis, followed by GI      Past Surgical History:   Procedure Laterality Date    COLONOSCOPY N/A 11/30/2016    COLONOSCOPY,EGD performed by America Dee MD at [address] [facility] HX CATARACT REMOVAL Left 10/15    HX CATARACT REMOVAL Right 10/2016    HX CHOLECYSTECTOMY  2005 ?  HX GI  2001 ?     gastic tumor removal.     ASHA MAMMO BI SCREENING INCL CAD  5/3/12    normal    [place] PANCREATIC ELASTASE, FECAL, QUAL/SEMI-QUANT  1999    growths in prancreatitis      Social History   Substance Use Topics    Smoking status: Former Smoker     Packs/day: 0.50     Types: Cigarettes     Start date: 2/13/2016     Quit date: 2/29/2016    Smokeless tobacco: Never Used    Alcohol use No      Comment: social      Family History   Problem Relation Age of Onset    Diabetes Mother     Heart Disease Father     Diabetes Brother     Breast Cancer Sister      Current Outpatient Prescriptions   Medication Sig    insulin NPH hum/reg insulin hm (HUMULIN 70/30 U-100 KWIKPEN SC) 30 Units by SubCUTAneous route two (2) times a day.  amLODIPine (NORVASC) 10 mg tablet Take 1 Tab by mouth daily.  hydroCHLOROthiazide (HYDRODIURIL) 12.5 mg tablet TAKE 1 TABLET BY MOUTH DAILY.  LEE PEN NEEDLE 32 gauge x 5/32\" ndle FOR INJECTING INSULIN 250.00/E11.9 TEST _4__ TIME(S) PER DAY    omeprazole (PRILOSEC) 20 mg capsule TAKE 1 CAPSULE BY MOUTH TWICE A DAY    gabapentin (NEURONTIN) 300 mg capsule TAKE 1 CAP BY MOUTH THREE (3) TIMES DAILY.  ergocalciferol (ERGOCALCIFEROL) 50,000 unit capsule TAKE 1 CAP BY MOUTH EVERY SEVEN (7) DAYS.  cetirizine (ZYRTEC) 10 mg tablet Take 1 Tab by mouth nightly.  acyclovir (ZOVIRAX) 5 % ointment Apply  to affected area six (6) times daily. For 7 days. Apply enough to sufficiently cover lesion.  metFORMIN ER (GLUCOPHAGE XR) 750 mg tablet TAKE 1 TAB BY MOUTH TWO (2) TIMES A DAY.  levocetirizine (XYZAL) 5 mg tablet TAKE 1 TABLET BY MOUTH DAILY    CREON 36,000-114,000- 180,000 unit cpDR TAKE 4 CAPSULES BY MOUTH THREE TIMES A DAY WITH EACH MEAL AND TAKE 2 CAPS WITH SNACKS    ONETOUCH ULTRA TEST strip TEST BLOOD GLUCOSE THREE TIMES DAILY    torsemide (DEMADEX) 20 mg tablet Take 1 Tab by mouth daily. Indications: Edema    valACYclovir (VALTREX) 1 gram tablet Take 2 Tabs by mouth two (2) times a day. (Patient taking differently: Take 2,000 mg by mouth two (2) times daily as needed.)    VENTOLIN HFA 90 mcg/actuation inhaler TAKE 2 PUFFS BY INHALATION EVERY FOUR (4) HOURS AS NEEDED FOR WHEEZING.  SUMAtriptan (IMITREX) 100 mg tablet Take 1 Tab by mouth once as needed for Migraine for up to 1 dose.  acetaminophen (TYLENOL) 325 mg tablet Take 2 Tabs by mouth every six (6) hours as needed. No current facility-administered medications for this visit.        Allergies   Allergen Reactions    Ciprofloxacin Cough    Lisinopril Cough    Penicillins Other (comments)     Yeast infection         Review of Systems: A complete review of systems was obtained, negative except as described above. Physical Exam:     Visit Vitals    /89    Pulse 76    Temp 97 °F (36.1 °C) (Temporal)    Resp 18    Ht 5' 2\" (1.575 m)    Wt 192 lb 3.2 oz (87.2 kg)    SpO2 98%    BMI 35.15 kg/m2     ECOG PS: 0  General: No distress  Eyes: PERRLA, anicteric sclerae  HENT: Atraumatic, OP clear  Neck: Supple  Lymphatic: No cervical, supraclavicular,  adenopathy  Respiratory: CTAB, normal respiratory effort  CV: Normal rate, regular rhythm, no murmurs, no peripheral edema  GI: Soft, nontender, nondistended, no masses, no hepatomegaly, no splenomegaly  MS: Normal gait and station. Digits without clubbing or cyanosis. Skin: No rashes, ecchymoses, or petechiae. Normal temperature, turgor, and texture. Psych: Alert, oriented, appropriate affect, normal judgment/insight    Results:     Lab Results   Component Value Date/Time    WBC 9.6 09/19/2018 09:04 AM    HGB 10.3 (L) 09/19/2018 09:04 AM    HCT 32.4 (L) 09/19/2018 09:04 AM    PLATELET 342 97/98/6874 09:04 AM    MCV 94 09/19/2018 09:04 AM    ABS. NEUTROPHILS 5.9 09/19/2018 09:04 AM     Lab Results   Component Value Date/Time    Sodium 141 08/15/2018 08:42 AM    Potassium 5.6 (H) 08/15/2018 08:42 AM    Chloride 113 (H) 08/15/2018 08:42 AM    CO2 19 (L) 08/15/2018 08:42 AM    Glucose 184 (H) 08/15/2018 08:42 AM    BUN 33 (H) 08/15/2018 08:42 AM    Creatinine 1.26 (H) 08/15/2018 08:42 AM    GFR est AA 54 (L) 08/15/2018 08:42 AM    GFR est non-AA 47 (L) 08/15/2018 08:42 AM    Calcium 9.3 08/15/2018 08:42 AM    Glucose (POC) 449 (H) 01/17/2017 04:43 PM     Lab Results   Component Value Date/Time    Bilirubin, total <0.2 08/08/2018 09:04 AM    ALT (SGPT) 7 08/08/2018 09:04 AM    AST (SGOT) 9 08/08/2018 09:04 AM    Alk.  phosphatase 129 (H) 08/08/2018 09:04 AM    Protein, total 6.2 09/19/2018 09:04 AM    Albumin 3.8 08/08/2018 09:04 AM    Globulin 3.9 01/15/2017 01:49 PM     Records reviewed and summarized above. Assessment/plan:   1. Anemia, normocytic:  The differential diagnosis for a normocytic anemia is broad, and includes blood loss, anemia of chronic disease, chronic renal insufficiency, iron deficiency, hypothyroidism, hemolysis, and bone marrow suppression. MDS, B12 deficiency, folate deficiency, and etoh abuse can also lead to anemia, though it is generally macrocytic. Smear review, retic count, iron profile, ferritin, B12, folate, TSH, haptoglobin, LDH, SPEP on 9/19/18 unremarkable. Hgb improved to 10.1 on 9/19/18. Will have her return in 2 months with a CBC before. If no improvement, may consider bone marrow biopsy. 2. DM2:  On insulin, with kidney complications, has been referred to nephrology    3. HTN: high today, continue norvasc and hctz, given 1 month refill until she can get back to PCP. > 15 min were spent with this patient with > 50% of that time spent in face to face counseling    I appreciate the opportunity to participate in [address] [facility].     Signed By: Kaylen Patel MD

## 2018-10-15 ENCOUNTER — HOSPITAL ENCOUNTER (OUTPATIENT)
Dept: MAMMOGRAPHY | Age: 58
Discharge: HOME | End: 2018-10-15
Attending: NURSE PRACTITIONER
Payer: MEDICAID

## 2018-10-15 DIAGNOSIS — Z12.31 VISIT FOR SCREENING MAMMOGRAM: ICD-10-CM

## 2018-10-15 PROCEDURE — 77067 SCR MAMMO BI INCL CAD: CPT

## 2018-10-23 NOTE — TELEPHONE ENCOUNTER
----- Message from Satish Shah sent at 10/23/2018  2:03 PM EDT -----  Regarding: Omid Pereira/refjayde  Contact: [phone]  Pt requesting a refill of her bp medication(pt doesn't remember the name) to be sent to [facility] ([place]) on file.

## 2018-10-24 RX ORDER — OMEPRAZOLE 20 MG/1
CAPSULE, DELAYED RELEASE ORAL
Qty: 180 CAP | Refills: 0 | Status: SHIPPED | OUTPATIENT
Start: 2018-10-24 | End: 2019-01-22 | Stop reason: SDUPTHER

## 2018-10-29 ENCOUNTER — HOSPITAL ENCOUNTER (OUTPATIENT)
Dept: MAMMOGRAPHY | Age: 58
Discharge: HOME | End: 2018-10-29
Attending: NURSE PRACTITIONER
Payer: MEDICAID

## 2018-10-29 DIAGNOSIS — R92.8 ABNORMAL MAMMOGRAM OF LEFT BREAST: ICD-10-CM

## 2018-10-29 PROCEDURE — 77065 DX MAMMO INCL CAD UNI: CPT

## 2018-10-30 ENCOUNTER — HOSPITAL ENCOUNTER (EMERGENCY)
Age: 58
Discharge: HOME | End: 2018-10-31
Attending: EMERGENCY MEDICINE | Admitting: EMERGENCY MEDICINE
Payer: MEDICAID

## 2018-10-30 DIAGNOSIS — N28.9 RENAL INSUFFICIENCY: ICD-10-CM

## 2018-10-30 DIAGNOSIS — R25.2 LEG CRAMPS: Primary | ICD-10-CM

## 2018-10-30 LAB
ALBUMIN SERPL-MCNC: 3.9 G/DL (ref 3.5–5)
ALBUMIN/GLOB SERPL: 1 {RATIO} (ref 1.1–2.2)
ALP SERPL-CCNC: 175 U/L (ref 45–117)
ALT SERPL-CCNC: 18 U/L (ref 12–78)
ANION GAP SERPL CALC-SCNC: 12 MMOL/L (ref 5–15)
AST SERPL-CCNC: 13 U/L (ref 15–37)
BILIRUB SERPL-MCNC: 0.3 MG/DL (ref 0.2–1)
BUN SERPL-MCNC: 31 MG/DL (ref 6–20)
BUN/CREAT SERPL: 18 (ref 12–20)
CALCIUM SERPL-MCNC: 9.6 MG/DL (ref 8.5–10.1)
CHLORIDE SERPL-SCNC: 99 MMOL/L (ref 97–108)
CK SERPL-CCNC: 265 U/L (ref 26–192)
CO2 SERPL-SCNC: 25 MMOL/L (ref 21–32)
COMMENT, HOLDF: NORMAL
CREAT SERPL-MCNC: 1.7 MG/DL (ref 0.55–1.02)
GLOBULIN SER CALC-MCNC: 3.9 G/DL (ref 2–4)
GLUCOSE SERPL-MCNC: 254 MG/DL (ref 65–100)
MAGNESIUM SERPL-MCNC: 2.4 MG/DL (ref 1.6–2.4)
POTASSIUM SERPL-SCNC: 4.3 MMOL/L (ref 3.5–5.1)
PROT SERPL-MCNC: 7.8 G/DL (ref 6.4–8.2)
SAMPLES BEING HELD,HOLD: NORMAL
SODIUM SERPL-SCNC: 136 MMOL/L (ref 136–145)

## 2018-10-30 PROCEDURE — 80053 COMPREHEN METABOLIC PANEL: CPT | Performed by: EMERGENCY MEDICINE

## 2018-10-30 PROCEDURE — 83735 ASSAY OF MAGNESIUM: CPT | Performed by: EMERGENCY MEDICINE

## 2018-10-30 PROCEDURE — 74011250636 HC RX REV CODE- 250/636: Performed by: EMERGENCY MEDICINE

## 2018-10-30 PROCEDURE — 74011000250 HC RX REV CODE- 250: Performed by: EMERGENCY MEDICINE

## 2018-10-30 PROCEDURE — 96360 HYDRATION IV INFUSION INIT: CPT

## 2018-10-30 PROCEDURE — 96361 HYDRATE IV INFUSION ADD-ON: CPT

## 2018-10-30 PROCEDURE — 82550 ASSAY OF CK (CPK): CPT | Performed by: EMERGENCY MEDICINE

## 2018-10-30 PROCEDURE — 94762 N-INVAS EAR/PLS OXIMTRY CONT: CPT

## 2018-10-30 PROCEDURE — 36415 COLL VENOUS BLD VENIPUNCTURE: CPT | Performed by: EMERGENCY MEDICINE

## 2018-10-30 PROCEDURE — 99284 EMERGENCY DEPT VISIT MOD MDM: CPT

## 2018-10-30 PROCEDURE — 74011250637 HC RX REV CODE- 250/637: Performed by: EMERGENCY MEDICINE

## 2018-10-30 RX ORDER — DIAZEPAM 5 MG/1
5 TABLET ORAL
Status: COMPLETED | OUTPATIENT
Start: 2018-10-30 | End: 2018-10-30

## 2018-10-30 RX ORDER — LIDOCAINE 4 G/100G
1 PATCH TOPICAL EVERY 24 HOURS
Status: DISCONTINUED | OUTPATIENT
Start: 2018-10-30 | End: 2018-10-31 | Stop reason: HOSPADM

## 2018-10-30 RX ORDER — LIDOCAINE 50 MG/G
PATCH TOPICAL
Qty: 15 EACH | Refills: 0 | Status: SHIPPED | OUTPATIENT
Start: 2018-10-30 | End: 2018-11-19 | Stop reason: SDUPTHER

## 2018-10-30 RX ORDER — CYCLOBENZAPRINE HCL 10 MG
10 TABLET ORAL
Qty: 12 TAB | Refills: 0 | Status: SHIPPED | OUTPATIENT
Start: 2018-10-30 | End: 2019-04-29 | Stop reason: SDUPTHER

## 2018-10-30 RX ADMIN — SODIUM CHLORIDE 500 ML: 900 INJECTION, SOLUTION INTRAVENOUS at 22:07

## 2018-10-30 RX ADMIN — DIAZEPAM 5 MG: 5 TABLET ORAL at 21:59

## 2018-10-31 VITALS
HEIGHT: 62 IN | DIASTOLIC BLOOD PRESSURE: 72 MMHG | OXYGEN SATURATION: 95 % | WEIGHT: 189 LBS | TEMPERATURE: 98 F | HEART RATE: 87 BPM | BODY MASS INDEX: 34.78 KG/M2 | SYSTOLIC BLOOD PRESSURE: 154 MMHG | RESPIRATION RATE: 20 BRPM

## 2018-10-31 DIAGNOSIS — I10 ESSENTIAL HYPERTENSION WITH GOAL BLOOD PRESSURE LESS THAN 140/90: ICD-10-CM

## 2018-10-31 RX ORDER — HYDROCHLOROTHIAZIDE 12.5 MG/1
TABLET ORAL
Qty: 30 TAB | Refills: 0 | Status: SHIPPED | OUTPATIENT
Start: 2018-10-31 | End: 2018-11-19 | Stop reason: SDUPTHER

## 2018-10-31 NOTE — DISCHARGE INSTRUCTIONS
We hope that we have addressed all of your medical concerns. The examination and treatment you received in the Emergency Department were for an emergent problem and were not intended as complete care. It is important that you follow up with your healthcare provider(s) for ongoing care. If your symptoms worsen or do not improve as expected, and you are unable to reach your usual health care provider(s), you should return to the Emergency Department. Today's healthcare is undergoing tremendous change, and patient satisfaction surveys are one of the many tools to assess the quality of medical care. You may receive a survey from the [employer] regarding your experience in the Emergency Department. I hope that your experience has been completely positive, particularly the medical care that I provided. As such, please participate in the survey; anything less than excellent does not meet my expectations or intentions. [facility] and [address] [place] [address] participate in nationally recognized quality of care measures. If your blood pressure is greater than 120/80, as reported below, we urge that you seek medical care to address the potential of high blood pressure, commonly known as hypertension. Hypertension can be hereditary or can be caused by certain medical conditions, pain, stress, or \"white coat syndrome. \"       Please make an appointment with your health care provider(s) for follow up of your Emergency Department visit. VITALS:   Patient Vitals for the past 8 hrs:   Temp Pulse Resp BP SpO2   10/30/18 2315 -- -- -- 171/68 99 %   10/30/18 2149 98 °F (36.7 °C) 87 20 159/62 98 %          Thank you for allowing us to provide you with medical care today. We realize that you have many choices for your emergency care needs. Please choose us in the future for any continued health care needs.       Lucina Price MD    [facility]: [phone]            Recent Results (from the past 24 hour(s))   SAMPLES BEING HELD    Collection Time: 10/30/18 10:06 PM   Result Value Ref Range    SAMPLES BEING HELD LV     COMMENT        Add-on orders for these samples will be processed based on acceptable specimen integrity and analyte stability, which may vary by analyte. METABOLIC PANEL, COMPREHENSIVE    Collection Time: 10/30/18 10:07 PM   Result Value Ref Range    Sodium 136 136 - 145 mmol/L    Potassium 4.3 3.5 - 5.1 mmol/L    Chloride 99 97 - 108 mmol/L    CO2 25 21 - 32 mmol/L    Anion gap 12 5 - 15 mmol/L    Glucose 254 (H) 65 - 100 mg/dL    BUN 31 (H) 6 - 20 MG/DL    Creatinine 1.70 (H) 0.55 - 1.02 MG/DL    BUN/Creatinine ratio 18 12 - 20      GFR est AA 37 (L) >60 ml/min/1.73m2    GFR est non-AA 31 (L) >60 ml/min/1.73m2    Calcium 9.6 8.5 - 10.1 MG/DL    Bilirubin, total 0.3 0.2 - 1.0 MG/DL    ALT (SGPT) 18 12 - 78 U/L    AST (SGOT) 13 (L) 15 - 37 U/L    Alk. phosphatase 175 (H) 45 - 117 U/L    Protein, total 7.8 6.4 - 8.2 g/dL    Albumin 3.9 3.5 - 5.0 g/dL    Globulin 3.9 2.0 - 4.0 g/dL    A-G Ratio 1.0 (L) 1.1 - 2.2     CK    Collection Time: 10/30/18 10:07 PM   Result Value Ref Range     (H) 26 - 192 U/L   MAGNESIUM    Collection Time: 10/30/18 10:07 PM   Result Value Ref Range    Magnesium 2.4 1.6 - 2.4 mg/dL       No results found. Acute Kidney Injury: Care Instructions  Your Care Instructions    Acute kidney injury (BHUPINDER) is a sudden decrease in kidney function. This can happen over a period of hours, days or, in some cases, weeks. BHUPINDER used to be called acute renal failure, but kidney failure doesn't always happen with BHUPINDER. Common causes of BHUPINDER are dehydration, blood loss, and medicines. When BHUPINDER happens, the kidneys have trouble removing waste and excess fluids from the body. The waste and fluids build up and become harmful.   Kidney function may return to normal if the cause of BHUPINDER is treated quickly. Your chance of a full recovery depends on what caused the problem, how quickly the cause was treated, and what other medical problems you have. A machine may be used to help your kidneys remove waste and fluids for a short period of time. This is called dialysis. Follow-up care is a key part of your treatment and safety. Be sure to make and go to all appointments, and call your doctor if you are having problems. It's also a good idea to know your test results and keep a list of the medicines you take. How can you care for yourself at home? · Talk to your doctor about how much fluid you should drink. · Eat a balanced diet. Talk to your doctor or a dietitian about what type of diet may be best for you. You may need to limit sodium, potassium, and phosphorus. · If you need dialysis, follow the instructions and schedule for dialysis that your doctor gives you. · Do not smoke. Smoking can make your condition worse. If you need help quitting, talk to your doctor about stop-smoking programs and medicines. These can increase your chances of quitting for good. · Do not drink alcohol. · Review all of your medicines with your doctor. Do not take any medicines, including nonsteroidal anti-inflammatory drugs (NSAIDs) such as ibuprofen (Advil, Motrin) or naproxen (Aleve), unless your doctor says it is safe for you to do so. · Make sure that anyone treating you for any health problem knows that you have had BHUPINDER. When should you call for help? Call 911 anytime you think you may need emergency care.  For example, call if:    · You passed out (lost consciousness).    Call your doctor now or seek immediate medical care if:    · You have new or worse nausea and vomiting.     · You have much less urine than normal, or you have no urine.     · You are feeling confused or cannot think clearly.     · You have new or more blood in your urine.     · You have new swelling.     · You are dizzy or lightheaded, or feel like you may faint.    Watch closely for changes in your health, and be sure to contact your doctor if:    · You do not get better as expected. Where can you learn more? Go to http://anil-shade.info/. Enter K055 in the search box to learn more about \"Acute Kidney Injury: Care Instructions. \"  Current as of: March 15, 2018  Content Version: 11.8  © 6898-1689 [employer]. Care instructions adapted under license by [employer] (which disclaims liability or warranty for this information). If you have questions about a medical condition or this instruction, always ask your healthcare professional. [place] [address] any warranty or liability for your use of this information. Muscle Cramps: Care Instructions  Your Care Instructions    A muscle cramp occurs when a muscle tightens up suddenly. A cramp often happens in the legs. A muscle cramp is also called a muscle spasm or a charley horse. Muscle cramps usually last less than a minute. However, the pain may last for several minutes. Leg cramps that occur at night may wake you up. Heavy exercise, dehydration, and being overweight can increase your risk of getting cramps. An imbalance of certain chemicals in your blood, called electrolytes, can also lead to muscle cramps. Pregnant women sometimes get muscle cramps during sleep. Muscle cramps can be treated by stretching and massaging the muscle. If cramps keep coming back, your doctor may prescribe medicine that relaxes your muscles. Follow-up care is a key part of your treatment and safety. Be sure to make and go to all appointments, and call your doctor if you are having problems. It's also a good idea to know your test results and keep a list of the medicines you take. How can you care for yourself at home? · Drink plenty of fluids to prevent dehydration. Choose water and other caffeine-free clear liquids until you feel better.  If you have kidney, heart, or liver disease and have to limit fluids, talk with your doctor before you increase the amount of fluids you drink. · Stretch your muscles every day, especially before and after exercise and at bedtime. Regular stretching can relax your muscles and may prevent cramps. · Do not suddenly increase the amount of exercise you get. Increase your exercise a little each week. · When you get a cramp, stretch and massage the muscle. You can also take a warm shower or bath to relax the muscle. A heating pad placed on the muscle can also help. · Take a daily multivitamin supplement. · Ask your doctor if you can take an over-the-counter pain medicine, such as acetaminophen (Tylenol), ibuprofen (Advil, Motrin), or naproxen (Aleve). Be safe with medicines. Read and follow all instructions on the label. When should you call for help? Watch closely for changes in your health, and be sure to contact your doctor if:    · You get muscle cramps often that do not go away after home treatment.     · Your muscle cramps often wake you up at night.     · You do not get better as expected. Where can you learn more? Go to http://anil-shade.info/. Enter G087 in the search box to learn more about \"Muscle Cramps: Care Instructions. \"  Current as of: November 29, 2017  Content Version: 11.8  © 3932-3788 [employer]. Care instructions adapted under license by [employer] (which disclaims liability or warranty for this information). If you have questions about a medical condition or this instruction, always ask your healthcare professional. [place] [address] any warranty or liability for your use of this information.

## 2018-10-31 NOTE — ED PROVIDER NOTES
William Riojas is a 62 y.o. female who presents to the ED via EMS with a c/o bilateral leg pain, hand pain, arm pain onset today. Pt currently rates her pain at 10/10 in severity and she describes the pain as \"cramping\". Pt denies any trauma/injury to her legs or arms, she also specifically denies any low back pain. EMS did not give any medications PTA. Pt specifically denies chest pain, shortness of breath, n/v,d , fever, chills, numbness, tingling, abdominal pain, cough, leg swelling, dizziness or any other acute sx. Pt denies any recent travel, known sick contacts, or recent illness. PCP: Sarah Colindres NP 
PMHx significant for: HTN, Arthritis, DM, GERD, Pancreatitis, Asthma PSHx significant for: Cholecystectomy, Colonoscopy, Gastric Tumor Removal, Cataract Removal 
Social Hx: Tobacco: Former smoker EtOH: none Illicit drug use: none There are no further complaints or symptoms at this time. Signed by: ayaz Townsend for Mango Bhandari MD on October 30th, 2018 at 22:02pm. 
 
 
 
 
  
 
Past Medical History:  
Diagnosis Date  Arthritis  Asthma  Diabetes ([address] seen by endocrinology at [facility]  GERD (gastroesophageal reflux disease)  Hypertension  Ill-defined condition   
 pancreatitis, followed by GI Past Surgical History:  
Procedure Laterality Date  HX CATARACT REMOVAL Left 10/15  HX CATARACT REMOVAL Right 10/2016  HX CHOLECYSTECTOMY  2005 ?  HX GI  2001 ? gastic tumor removal.   
 ASHA MAMMO BI SCREENING INCL CAD  5/3/12  
 normal  
 GA PANCREATIC ELASTASE, FECAL, QUAL/SEMI-QUANT  1999  
 growths in prancreatitis Family History:  
Problem Relation Age of Onset  Diabetes Mother  Heart Disease Father  Diabetes Brother  Breast Cancer Sister 64 Social History Socioeconomic History  Marital status: LEGALLY  Spouse name: Not on file  Number of children: Not on file  Years of education: Not on file  Highest education level: Not on file Social Needs  Financial resource strain: Not on file  Food insecurity - worry: Not on file  Food insecurity - inability: Not on file  Transportation needs - medical: Not on file  Transportation needs - non-medical: Not on file Occupational History  Not on file Tobacco Use  Smoking status: Former Smoker Packs/day: 0.50 Types: Cigarettes Start date: 2016 Last attempt to quit: 2016 Years since quittin.6  Smokeless tobacco: Never Used Substance and Sexual Activity  Alcohol use: No  
  Alcohol/week: 0.0 oz  
  Comment: social  
 Drug use: No  
 Sexual activity: Not Currently Other Topics Concern  Not on file Social History Narrative  Not on file ALLERGIES: Ciprofloxacin; Lisinopril; and Penicillins Review of Systems Constitutional: Negative for chills and fever. Respiratory: Negative for cough and shortness of breath. Cardiovascular: Negative for chest pain. Gastrointestinal: Negative for nausea and vomiting. Musculoskeletal: Negative for back pain. Positive for Bilateral UE, LE pain All other systems reviewed and are negative. Vitals:  
 10/30/18 2149 BP: 159/62 Pulse: 87 Resp: 20 Temp: 98 °F (36.7 °C) SpO2: 98% Weight: 85.7 kg (189 lb) Height: 5' 2\" (1.575 m) Physical Exam  
Constitutional: She is oriented to person, place, and time. She appears well-developed and well-nourished. HENT:  
Head: Normocephalic and atraumatic. Eyes: Conjunctivae are normal.  
Neck: Normal range of motion. Cardiovascular: Normal rate, regular rhythm and normal heart sounds. Pulmonary/Chest: Effort normal and breath sounds normal.  
Abdominal: Soft. Bowel sounds are normal. She exhibits no distension. There is no tenderness. Musculoskeletal: Normal range of motion. Pt with warm extremities and palpable pulses in arms and feet Neurological: She is alert and oriented to person, place, and time. Skin: Skin is warm and dry. Psychiatric:  
Crying in bed Nursing note and vitals reviewed. MDM Number of Diagnoses or Management Options Leg cramps:  
Renal insufficiency:  
Diagnosis management comments: Check ck and electrolytes, no back pain or chest pain does seem like leg cramps give valium and reassess Amount and/or Complexity of Data Reviewed Clinical lab tests: ordered and reviewed Patient Progress Patient progress: stable Procedures 11:33 PM 
Spoke with pt she is feeling much better. Discussed creatinine. She does not want to urinate now. Denies urinary sx. She will have it rechecked later this week as I discussed if it remains elevated, it can affect her insulin dosing. Patient's results have been reviewed with them. Patient and/or family have verbally conveyed their understanding and agreement of the patient's signs, symptoms, diagnosis, treatment and prognosis and additionally agree to follow up as recommended or return to the Emergency Room should their condition change prior to follow-up. Discharge instructions have also been provided to the patient with some educational information regarding their diagnosis as well a list of reasons why they would want to return to the ER prior to their follow-up appointment should their condition change.

## 2018-11-01 ENCOUNTER — TELEPHONE (OUTPATIENT)
Dept: ONCOLOGY | Age: 58
End: 2018-11-01

## 2018-11-01 NOTE — TELEPHONE ENCOUNTER
11/1/18 2:38 PM: Called patient regarding HCTZ refill. No answer; left voicemail requesting call back.

## 2018-11-01 NOTE — TELEPHONE ENCOUNTER
11/1/18 3:39 PM: Received call back from patient, verified ID x 2. Advised patient that per [address]e [address] NP, we sent a refill for her HCTZ to the [facility] pharmacy in [place]. Also advised that per [facility], future refills will need to be done by her PCP. Patient verbalized understanding and denied questions or concerns.

## 2018-11-07 RX ORDER — ERGOCALCIFEROL 1.25 MG/1
CAPSULE ORAL
Qty: 12 CAP | Refills: 1 | Status: SHIPPED | OUTPATIENT
Start: 2018-11-07 | End: 2018-11-19 | Stop reason: SDUPTHER

## 2018-11-19 ENCOUNTER — OFFICE VISIT (OUTPATIENT)
Dept: FAMILY MEDICINE CLINIC | Age: 58
End: 2018-11-19

## 2018-11-19 VITALS
WEIGHT: 187 LBS | DIASTOLIC BLOOD PRESSURE: 74 MMHG | HEIGHT: 62 IN | SYSTOLIC BLOOD PRESSURE: 139 MMHG | OXYGEN SATURATION: 97 % | BODY MASS INDEX: 34.41 KG/M2 | HEART RATE: 72 BPM | RESPIRATION RATE: 18 BRPM | TEMPERATURE: 98.7 F

## 2018-11-19 DIAGNOSIS — I10 ESSENTIAL HYPERTENSION WITH GOAL BLOOD PRESSURE LESS THAN 140/90: ICD-10-CM

## 2018-11-19 DIAGNOSIS — E11.9 TYPE 2 DIABETES MELLITUS WITHOUT COMPLICATION, WITH LONG-TERM CURRENT USE OF INSULIN (HCC): ICD-10-CM

## 2018-11-19 DIAGNOSIS — Z79.4 TYPE 2 DIABETES MELLITUS WITHOUT COMPLICATION, WITH LONG-TERM CURRENT USE OF INSULIN (HCC): ICD-10-CM

## 2018-11-19 DIAGNOSIS — M79.605 PAIN IN BOTH LOWER EXTREMITIES: ICD-10-CM

## 2018-11-19 DIAGNOSIS — J45.20 MILD INTERMITTENT ASTHMA WITHOUT COMPLICATION: ICD-10-CM

## 2018-11-19 DIAGNOSIS — Z91.09 ENVIRONMENTAL ALLERGIES: ICD-10-CM

## 2018-11-19 DIAGNOSIS — M79.604 PAIN IN BOTH LOWER EXTREMITIES: ICD-10-CM

## 2018-11-19 DIAGNOSIS — I10 ESSENTIAL HYPERTENSION: ICD-10-CM

## 2018-11-19 DIAGNOSIS — E55.9 VITAMIN D DEFICIENCY: Primary | ICD-10-CM

## 2018-11-19 DIAGNOSIS — G43.909 MIGRAINE WITHOUT STATUS MIGRAINOSUS, NOT INTRACTABLE, UNSPECIFIED MIGRAINE TYPE: ICD-10-CM

## 2018-11-19 RX ORDER — SUMATRIPTAN 100 MG/1
100 TABLET, FILM COATED ORAL
Qty: 9 TAB | Refills: 2 | Status: SHIPPED | OUTPATIENT
Start: 2018-11-19 | End: 2018-11-19

## 2018-11-19 RX ORDER — CETIRIZINE HCL 10 MG
10 TABLET ORAL
Qty: 90 TAB | Refills: 1 | Status: SHIPPED | OUTPATIENT
Start: 2018-11-19 | End: 2018-11-21 | Stop reason: ALTCHOICE

## 2018-11-19 RX ORDER — ERGOCALCIFEROL 1.25 MG/1
CAPSULE ORAL
Qty: 12 CAP | Refills: 1 | Status: SHIPPED | OUTPATIENT
Start: 2018-11-19 | End: 2019-04-29 | Stop reason: SDUPTHER

## 2018-11-19 RX ORDER — ALBUTEROL SULFATE 90 UG/1
AEROSOL, METERED RESPIRATORY (INHALATION)
Qty: 1 INHALER | Refills: 3 | Status: SHIPPED | OUTPATIENT
Start: 2018-11-19 | End: 2018-11-21 | Stop reason: ALTCHOICE

## 2018-11-19 RX ORDER — INSULIN GLARGINE 100 [IU]/ML
INJECTION, SOLUTION SUBCUTANEOUS
Refills: 3 | COMMUNITY
Start: 2018-11-05 | End: 2018-11-19 | Stop reason: SDUPTHER

## 2018-11-19 RX ORDER — INSULIN GLARGINE 100 [IU]/ML
INJECTION, SOLUTION SUBCUTANEOUS
Qty: 5 PEN | Refills: 2 | Status: SHIPPED | OUTPATIENT
Start: 2018-11-19 | End: 2019-04-29 | Stop reason: SDUPTHER

## 2018-11-19 RX ORDER — LIDOCAINE 50 MG/G
PATCH TOPICAL
Qty: 15 EACH | Refills: 2 | Status: SHIPPED | OUTPATIENT
Start: 2018-11-19 | End: 2019-02-13

## 2018-11-19 RX ORDER — PEN NEEDLE, DIABETIC 31 GX3/16"
NEEDLE, DISPOSABLE MISCELLANEOUS
Qty: 100 PEN NEEDLE | Refills: 3 | Status: SHIPPED | OUTPATIENT
Start: 2018-11-19 | End: 2019-04-29 | Stop reason: SDUPTHER

## 2018-11-19 RX ORDER — AMLODIPINE BESYLATE 10 MG/1
10 TABLET ORAL DAILY
Qty: 90 TAB | Refills: 1 | Status: SHIPPED | OUTPATIENT
Start: 2018-11-19 | End: 2019-04-29 | Stop reason: SDUPTHER

## 2018-11-19 RX ORDER — HYDROCHLOROTHIAZIDE 12.5 MG/1
TABLET ORAL
Qty: 90 TAB | Refills: 1 | Status: SHIPPED | OUTPATIENT
Start: 2018-11-19 | End: 2019-01-22 | Stop reason: SDUPTHER

## 2018-11-19 RX ORDER — METFORMIN HYDROCHLORIDE 750 MG/1
TABLET, EXTENDED RELEASE ORAL
Qty: 180 TAB | Refills: 1 | Status: SHIPPED | OUTPATIENT
Start: 2018-11-19 | End: 2019-04-29 | Stop reason: SDUPTHER

## 2018-11-19 NOTE — PATIENT INSTRUCTIONS
High Blood Pressure: Care Instructions  Your Care Instructions    If your blood pressure is usually above 130/80, you have high blood pressure, or hypertension. That means the top number is 130 or higher or the bottom number is 80 or higher, or both. Despite what a lot of people think, high blood pressure usually doesn't cause headaches or make you feel dizzy or lightheaded. It usually has no symptoms. But it does increase your risk for heart attack, stroke, and kidney or eye damage. The higher your blood pressure, the more your risk increases. Your doctor will give you a goal for your blood pressure. Your goal will be based on your health and your age. Lifestyle changes, such as eating healthy and being active, are always important to help lower blood pressure. You might also take medicine to reach your blood pressure goal.  Follow-up care is a key part of your treatment and safety. Be sure to make and go to all appointments, and call your doctor if you are having problems. It's also a good idea to know your test results and keep a list of the medicines you take. How can you care for yourself at home? Medical treatment  · If you stop taking your medicine, your blood pressure will go back up. You may take one or more types of medicine to lower your blood pressure. Be safe with medicines. Take your medicine exactly as prescribed. Call your doctor if you think you are having a problem with your medicine. · Talk to your doctor before you start taking aspirin every day. Aspirin can help certain people lower their risk of a heart attack or stroke. But taking aspirin isn't right for everyone, because it can cause serious bleeding. · See your doctor regularly. You may need to see the doctor more often at first or until your blood pressure comes down. · If you are taking blood pressure medicine, talk to your doctor before you take decongestants or anti-inflammatory medicine, such as ibuprofen.  Some of these medicines can raise blood pressure. · Learn how to check your blood pressure at home. Lifestyle changes  · Stay at a healthy weight. This is especially important if you put on weight around the waist. Losing even 10 pounds can help you lower your blood pressure. · If your doctor recommends it, get more exercise. Walking is a good choice. Bit by bit, increase the amount you walk every day. Try for at least 30 minutes on most days of the week. You also may want to swim, bike, or do other activities. · Avoid or limit alcohol. Talk to your doctor about whether you can drink any alcohol. · Try to limit how much sodium you eat to less than 2,300 milligrams (mg) a day. Your doctor may ask you to try to eat less than 1,500 mg a day. · Eat plenty of fruits (such as bananas and oranges), vegetables, legumes, whole grains, and low-fat dairy products. · Lower the amount of saturated fat in your diet. Saturated fat is found in animal products such as milk, cheese, and meat. Limiting these foods may help you lose weight and also lower your risk for heart disease. · Do not smoke. Smoking increases your risk for heart attack and stroke. If you need help quitting, talk to your doctor about stop-smoking programs and medicines. These can increase your chances of quitting for good. When should you call for help? Call 911 anytime you think you may need emergency care. This may mean having symptoms that suggest that your blood pressure is causing a serious heart or blood vessel problem. Your blood pressure may be over 180/120.   For example, call 911 if:    · You have symptoms of a heart attack. These may include:  ? Chest pain or pressure, or a strange feeling in the chest.  ? Sweating. ? Shortness of breath. ? Nausea or vomiting. ? Pain, pressure, or a strange feeling in the back, neck, jaw, or upper belly or in one or both shoulders or arms. ? Lightheadedness or sudden weakness.   ? A fast or irregular heartbeat.     · You have symptoms of a stroke. These may include:  ? Sudden numbness, tingling, weakness, or loss of movement in your face, arm, or leg, especially on only one side of your body. ? Sudden vision changes. ? Sudden trouble speaking. ? Sudden confusion or trouble understanding simple statements. ? Sudden problems with walking or balance. ? A sudden, severe headache that is different from past headaches.     · You have severe back or belly pain.    Do not wait until your blood pressure comes down on its own. Get help right away.   Call your doctor now or seek immediate care if:    · Your blood pressure is much higher than normal (such as 180/120 or higher), but you don't have symptoms.     · You think high blood pressure is causing symptoms, such as:  ? Severe headache.  ? Blurry vision.    Watch closely for changes in your health, and be sure to contact your doctor if:    · Your blood pressure measures higher than your doctor recommends at least 2 times. That means the top number is higher or the bottom number is higher, or both.     · You think you may be having side effects from your blood pressure medicine. Where can you learn more? Go to http://anil-shade.info/. Enter E633 in the search box to learn more about \"High Blood Pressure: Care Instructions. \"  Current as of: December 6, 2017  Content Version: 11.8  © 0671-1717 [employer]. Care instructions adapted under license by [employer] (which disclaims liability or warranty for this information). If you have questions about a medical condition or this instruction, always ask your healthcare professional. [place] [address] any warranty or liability for your use of this information.

## 2018-11-19 NOTE — PROGRESS NOTES
Subjective:     Sindy Ibarra is a 62 y.o. female who presents for follow up of diabetes and hypertension. Diet and Lifestyle: generally follows a low fat low cholesterol diet  Home BP Monitoring: is not measured at home    Cardiovascular ROS: taking medications as instructed, no medication side effects noted, no TIA's, no chest pain on exertion, no dyspnea on exertion, no swelling of ankles. New concerns:   Pt presents for routine follow up and labs. Pt is not fasting, but will get non fasting labs today. Pt would like refills of lidocaine patches. She was given these in the ER for body pain, and patient states that they have been very helpful. Pt states that she mostly has pain in both of her knees, her hips, and her hands.     Patient Active Problem List    Diagnosis Date Noted    Severe obesity ([address] 10/03/2018    Type 2 diabetes with nephropathy ([facility] 08/08/2018    Vaginal itching 06/21/2017    Tingling 05/24/2017    Tobacco abuse 05/24/2017    Screening for breast cancer 05/24/2017    Screening for colon cancer 05/24/2017    Need for hepatitis C screening test 05/24/2017    Screening for thyroid disorder 05/24/2017    Screening for cholesterol level 05/24/2017   [facility] discharge follow-up 01/19/2017    Traumatic rhabdomyolysis ([address] 01/16/2017    Closed fracture of right talus 01/16/2017    Synovial cyst of right knee 01/16/2017    Dehydration 01/15/2017    UTI (urinary tract infection) 07/14/2016    Mild intermittent asthma without complication 82/12/4057    Pancreatitis 02/08/2016    Essential hypertension 02/08/2016    Diabetic neuropathy ([address] 02/08/2016    Vitamin D deficiency 02/08/2016    Diabetes (HCC)     GERD (gastroesophageal reflux disease)      Current Outpatient Medications   Medication Sig Dispense Refill    LANTUS SOLOSTAR U-100 INSULIN 100 unit/mL (3 mL) inpn Taking 20 units subq at breakfast and lunch and 30 units qhs 5 Pen 2    ergocalciferol (ERGOCALCIFEROL) 50,000 unit capsule TAKE 1 CAP BY MOUTH EVERY SEVEN (7) DAYS. 12 Cap 1    hydroCHLOROthiazide (HYDRODIURIL) 12.5 mg tablet TAKE 1 TABLET BY MOUTH EVERY DAY 90 Tab 1    amLODIPine (NORVASC) 10 mg tablet Take 1 Tab by mouth daily. 90 Tab 1    Insulin Needles, Disposable, (LEE PEN NEEDLE) 32 gauge x 5/32\" ndle FOR INJECTING INSULIN 250.00/E11.9 TEST _4__ TIME(S) PER  Pen Needle 3    cetirizine (ZYRTEC) 10 mg tablet Take 1 Tab by mouth nightly. 90 Tab 1    albuterol (VENTOLIN HFA) 90 mcg/actuation inhaler TAKE 2 PUFFS BY INHALATION EVERY FOUR (4) HOURS AS NEEDED FOR WHEEZING. 1 Inhaler 3    metFORMIN ER (GLUCOPHAGE XR) 750 mg tablet TAKE 1 TAB BY MOUTH TWO (2) TIMES A DAY. 180 Tab 1    SUMAtriptan (IMITREX) 100 mg tablet Take 1 Tab by mouth once as needed for Migraine for up to 1 dose. 9 Tab 2    lidocaine (LIDODERM) 5 % Apply patch to the affected area for 12 hours a day and remove for 12 hours a day. 15 Each 2    cyclobenzaprine (FLEXERIL) 10 mg tablet Take 1 Tab by mouth three (3) times daily as needed for Muscle Spasm(s). 12 Tab 0    omeprazole (PRILOSEC) 20 mg capsule TAKE 1 CAPSULE BY MOUTH TWICE A  Cap 0    gabapentin (NEURONTIN) 300 mg capsule TAKE 1 CAP BY MOUTH THREE (3) TIMES DAILY. 21 Cap 0    valACYclovir (VALTREX) 1 gram tablet Take 2 Tabs by mouth two (2) times a day. (Patient taking differently: Take 2,000 mg by mouth two (2) times daily as needed.) 60 Tab 0    levocetirizine (XYZAL) 5 mg tablet TAKE 1 TABLET BY MOUTH DAILY 30 Tab 9    CREON 36,000-114,000- 180,000 unit cpDR TAKE 4 CAPSULES BY MOUTH THREE TIMES A DAY WITH EACH MEAL AND TAKE 2 CAPS WITH SNACKS 480 Cap 6    ONETOUCH ULTRA TEST strip TEST BLOOD GLUCOSE THREE TIMES DAILY 100 Strip 0    acetaminophen (TYLENOL) 325 mg tablet Take 2 Tabs by mouth every six (6) hours as needed.  40 Tab 0     Allergies   Allergen Reactions    Ciprofloxacin Cough    Lisinopril Cough    Penicillins Other (comments) Yeast infection      Past Medical History:   Diagnosis Date    Arthritis     Asthma     Diabetes ([address]     [facility] at [facility]    GERD (gastroesophageal reflux disease)     Hypertension     Ill-defined condition     pancreatitis, followed by GI     Past Surgical History:   Procedure Laterality Date    HX CATARACT REMOVAL Left 10/15    HX CATARACT REMOVAL Right 10/2016    HX CHOLECYSTECTOMY  2005 ?  HX GI  2001 ?     gastic tumor removal.     ASHA MAMMO BI SCREENING INCL CAD  5/3/12    normal    TX PANCREATIC ELASTASE, FECAL, QUAL/SEMI-QUANT      growths in prancreatitis     Family History   Problem Relation Age of Onset    Diabetes Mother     Heart Disease Father     Diabetes Brother     Breast Cancer Sister 64     Social History     Tobacco Use    Smoking status: Former Smoker     Packs/day: 0.50     Types: Cigarettes     Start date: 2016     Last attempt to quit: 2016     Years since quittin.7    Smokeless tobacco: Never Used   Substance Use Topics    Alcohol use: No     Alcohol/week: 0.0 oz     Comment: social        Lab Results   Component Value Date/Time    WBC 9.6 2018 09:04 AM    HGB 10.3 (L) 2018 09:04 AM    HCT 32.4 (L) 2018 09:04 AM    PLATELET 726  09:04 AM    MCV 94 2018 09:04 AM     Lab Results   Component Value Date/Time    Hemoglobin A1c 11.2 (H) 2018 09:04 AM    Hemoglobin A1c 13.5 (H) 2017 09:25 AM    Hemoglobin A1c 12.5 (H) 2016 10:28 AM    Hemoglobin A1c, External 9.6 2017    Glucose 254 (H) 10/30/2018 10:07 PM    Glucose (POC) 449 (H) 2017 04:43 PM    Microalbumin/Creat ratio (mg/g creat) 227 (H) 2014 11:35 AM    Microalbumin,urine random 28.00 2014 11:35 AM    LDL, calculated 72 2017 09:25 AM    Creatinine 1.70 (H) 10/30/2018 10:07 PM      Lab Results   Component Value Date/Time    Cholesterol, total 139 2017 09:25 AM    HDL Cholesterol 48 2017 09:25 AM LDL, calculated 72 05/24/2017 09:25 AM    LDL-C, External 86 11/01/2017    Triglyceride 97 05/24/2017 09:25 AM    CHOL/HDL Ratio 2.0 07/24/2014 09:10 AM     Lab Results   Component Value Date/Time    GFR est non-AA 31 (L) 10/30/2018 10:07 PM    GFR est AA 37 (L) 10/30/2018 10:07 PM    Creatinine 1.70 (H) 10/30/2018 10:07 PM    BUN 31 (H) 10/30/2018 10:07 PM    Sodium 136 10/30/2018 10:07 PM    Potassium 4.3 10/30/2018 10:07 PM    Chloride 99 10/30/2018 10:07 PM    CO2 25 10/30/2018 10:07 PM    Magnesium 2.4 10/30/2018 10:07 PM        Review of Systems, additional:  Pertinent items are noted in HPI. Objective:     Visit Vitals  /74 (BP 1 Location: Right arm, BP Patient Position: Sitting)   Pulse 72   Temp 98.7 °F (37.1 °C) (Oral)   Resp 18   Ht 5' 2\" (1.575 m)   Wt 187 lb (84.8 kg)   SpO2 97%   BMI 34.20 kg/m²     Appearance: alert, well appearing, and in no distress. General exam: CVS exam BP noted to be well controlled today in office, S1, S2 normal, no gallop, no murmur, chest clear, no JVD, no HSM, no edema, peripheral vascular exam both carotids normal upstroke without bruits. Lab review: orders written for new lab studies as appropriate; see orders. Assessment/Plan:     diabetes control uncertain, hypertension stable. current treatment plan is effective, no change in therapy  orders and follow up as documented in patient record. ICD-10-CM ICD-9-CM    1. Vitamin D deficiency E55.9 268.9 ergocalciferol (ERGOCALCIFEROL) 50,000 unit capsule   2. Essential hypertension with goal blood pressure less than 140/90 I10 401.9 hydroCHLOROthiazide (HYDRODIURIL) 12.5 mg tablet      CBC W/O DIFF      METABOLIC PANEL, COMPREHENSIVE   3. Essential hypertension I10 401.9 amLODIPine (NORVASC) 10 mg tablet   4. Environmental allergies Z91.09 V15.09 cetirizine (ZYRTEC) 10 mg tablet   5. Mild intermittent asthma without complication Z09.17 771.66 albuterol (VENTOLIN HFA) 90 mcg/actuation inhaler   6.  Type 2 diabetes mellitus without complication, with long-term current use of insulin ([facility]) E11.9 250.00 LANTUS SOLOSTAR U-100 INSULIN 100 unit/mL (3 mL) inpn    Z79.4 V58.67 Insulin Needles, Disposable, (LEE PEN NEEDLE) 32 gauge x 5/32\" ndle      metFORMIN ER (GLUCOPHAGE XR) 750 mg tablet      HEMOGLOBIN A1C WITH EAG   7. Migraine without status migrainosus, not intractable, unspecified migraine type G43.909 346.90 SUMAtriptan (IMITREX) 100 mg tablet   8. Pain in both lower extremities M79.604 729.5 lidocaine (LIDODERM) 5 %    M79.605       HISTORY OF PRESENT ILLNESS  Carolynn Loera is a 62 y.o. female. HPI    ROS    Physical Exam    ASSESSMENT and PLAN    ICD-10-CM ICD-9-CM    1. Vitamin D deficiency E55.9 268.9 ergocalciferol (ERGOCALCIFEROL) 50,000 unit capsule   2. Essential hypertension with goal blood pressure less than 140/90 I10 401.9 hydroCHLOROthiazide (HYDRODIURIL) 12.5 mg tablet      CBC W/O DIFF      METABOLIC PANEL, COMPREHENSIVE   3. Essential hypertension I10 401.9 amLODIPine (NORVASC) 10 mg tablet   4. Environmental allergies Z91.09 V15.09 cetirizine (ZYRTEC) 10 mg tablet   5. Mild intermittent asthma without complication H36.93 746.20 albuterol (VENTOLIN HFA) 90 mcg/actuation inhaler   6. Type 2 diabetes mellitus without complication, with long-term current use of insulin ([facility]) E11.9 250.00 LANTUS SOLOSTAR U-100 INSULIN 100 unit/mL (3 mL) inpn    Z79.4 V58.67 Insulin Needles, Disposable, (LEE PEN NEEDLE) 32 gauge x 5/32\" ndle      metFORMIN ER (GLUCOPHAGE XR) 750 mg tablet      HEMOGLOBIN A1C WITH EAG   7. Migraine without status migrainosus, not intractable, unspecified migraine type G43.909 346.90 SUMAtriptan (IMITREX) 100 mg tablet   8. Pain in both lower extremities M79.604 729.5 lidocaine (LIDODERM) 5 %    M79.605       Informed patient that we will notify her when labs return. Educated about continuing medications as prescribed. Should focus on mediterranean diet.     Pt informed to return to office with worsening of symptoms, or PRN with any questions or concerns. Pt verbalizes understanding of plan of care and denies further questions or concerns at this time.

## 2018-11-19 NOTE — PROGRESS NOTES
Identified pt with two pt identifiers(name and ). Chief Complaint   Patient presents with    Hypertension    Diabetes    Labs     has eaten since midnight        Health Maintenance Due   Topic    EYE EXAM RETINAL OR DILATED Q1     Shingrix Vaccine Age 50> (1 of 2)    FOOT EXAM Q1     Influenza Age 5 to Adult     LIPID PANEL Q1        Wt Readings from Last 3 Encounters:   18 187 lb (84.8 kg)   10/30/18 189 lb (85.7 kg)   10/03/18 192 lb 3.2 oz (87.2 kg)     Temp Readings from Last 3 Encounters:   18 98.7 °F (37.1 °C) (Oral)   10/30/18 98 °F (36.7 °C)   10/03/18 97 °F (36.1 °C) (Temporal)     BP Readings from Last 3 Encounters:   18 139/74   10/31/18 154/72   10/03/18 161/89     Pulse Readings from Last 3 Encounters:   18 72   10/30/18 87   10/03/18 76         Learning Assessment:  :     Learning Assessment 2016   PRIMARY LEARNER Patient   HIGHEST LEVEL OF EDUCATION - PRIMARY LEARNER  GRADUATED HIGH SCHOOL OR GED   BARRIERS PRIMARY LEARNER NONE   CO-LEARNER CAREGIVER No   PRIMARY LANGUAGE ENGLISH   LEARNER PREFERENCE PRIMARY OTHER (COMMENT)   ANSWERED BY self   RELATIONSHIP SELF       Depression Screening:  :     PHQ over the last two weeks 2018   Little interest or pleasure in doing things Not at all   Feeling down, depressed, irritable, or hopeless Not at all   Total Score PHQ 2 0       Fall Risk Assessment:  :     Fall Risk Assessment, last 12 mths 2018   Able to walk? Yes   Fall in past 12 months? No       Abuse Screening:  :     Abuse Screening Questionnaire 2018   Do you ever feel afraid of your partner? N N   Are you in a relationship with someone who physically or mentally threatens you? N N   Is it safe for you to go home?  Y Y       Coordination of Care Questionnaire:  :     1) Have you been to an emergency room, urgent care clinic since your last visit? no   Hospitalized since your last visit? no             2) Have you seen or consulted any other health care providers outside of [address] [place] [address] since your last visit? no  (Include any pap smears or colon screenings in this section.)    3) Do you have an Advance Directive on file? no  Are you interested in receiving information about Advance Directives? no    Patient is accompanied by self. Reviewed record in preparation for visit and have obtained necessary documentation. Medication reconciliation up to date and corrected with patient at this time.

## 2018-11-20 LAB
ALBUMIN SERPL-MCNC: 4 G/DL (ref 3.5–5.5)
ALBUMIN/GLOB SERPL: 1.6 {RATIO} (ref 1.2–2.2)
ALP SERPL-CCNC: 141 IU/L (ref 39–117)
ALT SERPL-CCNC: 11 IU/L (ref 0–32)
AST SERPL-CCNC: 13 IU/L (ref 0–40)
BILIRUB SERPL-MCNC: <0.2 MG/DL (ref 0–1.2)
BUN SERPL-MCNC: 28 MG/DL (ref 6–24)
BUN/CREAT SERPL: 27 (ref 9–23)
CALCIUM SERPL-MCNC: 9.5 MG/DL (ref 8.7–10.2)
CHLORIDE SERPL-SCNC: 102 MMOL/L (ref 96–106)
CO2 SERPL-SCNC: 22 MMOL/L (ref 20–29)
CREAT SERPL-MCNC: 1.03 MG/DL (ref 0.57–1)
ERYTHROCYTE [DISTWIDTH] IN BLOOD BY AUTOMATED COUNT: 13.6 % (ref 12.3–15.4)
EST. AVERAGE GLUCOSE BLD GHB EST-MCNC: 324 MG/DL
GLOBULIN SER CALC-MCNC: 2.5 G/DL (ref 1.5–4.5)
GLUCOSE SERPL-MCNC: 320 MG/DL (ref 65–99)
HBA1C MFR BLD: 12.9 % (ref 4.8–5.6)
HCT VFR BLD AUTO: 35.1 % (ref 34–46.6)
HGB BLD-MCNC: 11 G/DL (ref 11.1–15.9)
MCH RBC QN AUTO: 29.6 PG (ref 26.6–33)
MCHC RBC AUTO-ENTMCNC: 31.3 G/DL (ref 31.5–35.7)
MCV RBC AUTO: 95 FL (ref 79–97)
PLATELET # BLD AUTO: 273 X10E3/UL (ref 150–379)
POTASSIUM SERPL-SCNC: 5.2 MMOL/L (ref 3.5–5.2)
PROT SERPL-MCNC: 6.5 G/DL (ref 6–8.5)
RBC # BLD AUTO: 3.71 X10E6/UL (ref 3.77–5.28)
SODIUM SERPL-SCNC: 137 MMOL/L (ref 134–144)
WBC # BLD AUTO: 10.1 X10E3/UL (ref 3.4–10.8)

## 2018-11-20 NOTE — PROGRESS NOTES
Please call patient and let her know that her labs returned:  Diabetes is horribly controlled. I had referred her to endocrinology in August.  Did she ever follow through with this? If not, she needs to ASAP.   Thanks

## 2018-11-21 ENCOUNTER — TELEPHONE (OUTPATIENT)
Dept: FAMILY MEDICINE CLINIC | Age: 58
End: 2018-11-21

## 2018-11-21 DIAGNOSIS — Z91.09 ENVIRONMENTAL ALLERGIES: ICD-10-CM

## 2018-11-21 RX ORDER — ALBUTEROL SULFATE 90 UG/1
2 AEROSOL, METERED RESPIRATORY (INHALATION)
Qty: 1 INHALER | Refills: 5 | Status: SHIPPED | OUTPATIENT
Start: 2018-11-21 | End: 2019-04-29 | Stop reason: SDUPTHER

## 2018-11-21 RX ORDER — LEVOCETIRIZINE DIHYDROCHLORIDE 5 MG/1
TABLET, FILM COATED ORAL
Qty: 90 TAB | Refills: 1 | Status: SHIPPED | OUTPATIENT
Start: 2018-11-21 | End: 2019-02-13 | Stop reason: ALTCHOICE

## 2018-11-21 RX ORDER — LEVOCETIRIZINE DIHYDROCHLORIDE 5 MG/1
TABLET, FILM COATED ORAL
Qty: 90 TAB | Refills: 1 | Status: CANCELLED | OUTPATIENT
Start: 2018-11-21

## 2018-11-21 NOTE — TELEPHONE ENCOUNTER
----- Message from Jennifer Amado sent at 11/20/2018  5:31 PM EST -----  Regarding: MEG Pereira/telephone  Contact: [phone]  Pt returning call from practice.

## 2018-11-21 NOTE — TELEPHONE ENCOUNTER
Pt returned call and was advised. She also reports that she rec'd a refill of zyrtec and she is currently using xyzal, not zyrtec. She would like refill of xyzal sent to pharmacy.

## 2018-11-21 NOTE — PROGRESS NOTES
Pt was advised and reports she is seeing Dr. Woodrow Feldman who has recently changed her medications. She believes this could be part of the reason her HgbA1c is elevated right now and thinks it will improve with the new med changes.

## 2018-11-26 DIAGNOSIS — E11.9 TYPE 2 DIABETES MELLITUS WITHOUT COMPLICATION, WITH LONG-TERM CURRENT USE OF INSULIN (HCC): ICD-10-CM

## 2018-11-26 DIAGNOSIS — Z79.4 TYPE 2 DIABETES MELLITUS WITHOUT COMPLICATION, WITH LONG-TERM CURRENT USE OF INSULIN (HCC): ICD-10-CM

## 2018-11-28 RX ORDER — GLIMEPIRIDE 2 MG/1
TABLET ORAL
Qty: 90 TAB | Refills: 0 | Status: SHIPPED | OUTPATIENT
Start: 2018-11-28 | End: 2019-04-29 | Stop reason: SDUPTHER

## 2018-12-20 ENCOUNTER — TELEPHONE (OUTPATIENT)
Dept: ONCOLOGY | Age: 58
End: 2018-12-20

## 2018-12-20 NOTE — TELEPHONE ENCOUNTER
Voicemail left requesting a call back. Called patient to reschedule no show appointment with Dr. Adam Santoyo on 12/19/18.

## 2019-01-22 DIAGNOSIS — I10 ESSENTIAL HYPERTENSION WITH GOAL BLOOD PRESSURE LESS THAN 140/90: ICD-10-CM

## 2019-01-23 RX ORDER — OMEPRAZOLE 20 MG/1
CAPSULE, DELAYED RELEASE ORAL
Qty: 180 CAP | Refills: 0 | Status: SHIPPED | OUTPATIENT
Start: 2019-01-23 | End: 2019-04-28 | Stop reason: SDUPTHER

## 2019-01-23 RX ORDER — HYDROCHLOROTHIAZIDE 12.5 MG/1
TABLET ORAL
Qty: 90 TAB | Refills: 0 | Status: SHIPPED | OUTPATIENT
Start: 2019-01-23 | End: 2019-04-29 | Stop reason: SDUPTHER

## 2019-02-13 ENCOUNTER — HOSPITAL ENCOUNTER (EMERGENCY)
Age: 59
Discharge: HOME | End: 2019-02-13
Attending: EMERGENCY MEDICINE
Payer: MEDICAID

## 2019-02-13 ENCOUNTER — APPOINTMENT (OUTPATIENT)
Dept: GENERAL RADIOLOGY | Age: 59
End: 2019-02-13
Attending: PHYSICIAN ASSISTANT
Payer: MEDICAID

## 2019-02-13 VITALS
SYSTOLIC BLOOD PRESSURE: 150 MMHG | HEIGHT: 62 IN | WEIGHT: 198.19 LBS | DIASTOLIC BLOOD PRESSURE: 67 MMHG | TEMPERATURE: 98.5 F | RESPIRATION RATE: 16 BRPM | HEART RATE: 66 BPM | OXYGEN SATURATION: 97 % | BODY MASS INDEX: 36.47 KG/M2

## 2019-02-13 DIAGNOSIS — M79.605 PAIN IN BOTH LOWER EXTREMITIES: ICD-10-CM

## 2019-02-13 DIAGNOSIS — M79.604 PAIN IN BOTH LOWER EXTREMITIES: ICD-10-CM

## 2019-02-13 DIAGNOSIS — M77.12 LATERAL EPICONDYLITIS OF LEFT ELBOW: Primary | ICD-10-CM

## 2019-02-13 PROCEDURE — 73080 X-RAY EXAM OF ELBOW: CPT

## 2019-02-13 PROCEDURE — 99282 EMERGENCY DEPT VISIT SF MDM: CPT

## 2019-02-13 RX ORDER — NAPROXEN 500 MG/1
500 TABLET ORAL 2 TIMES DAILY WITH MEALS
Qty: 20 TAB | Refills: 0 | Status: SHIPPED | OUTPATIENT
Start: 2019-02-13 | End: 2019-02-23

## 2019-02-13 RX ORDER — LIDOCAINE 50 MG/G
PATCH TOPICAL
Qty: 15 EACH | Refills: 2 | Status: SHIPPED | OUTPATIENT
Start: 2019-02-13 | End: 2019-04-29 | Stop reason: SDUPTHER

## 2019-02-13 NOTE — ED NOTES
Patient given blanket for comfort at requested. Updated regarding plan of care and associated time constraints. Patient verbalizes understanding and agreement. Call bell in reach. Will continue to monitor.

## 2019-02-13 NOTE — ED TRIAGE NOTES
\"my left arm has been hurting for several days, started in my elbow and now it's from my elbow to my fingers. Fingers are tingling and now they are getting numb\". Pt denies injury.

## 2019-02-13 NOTE — ED NOTES
The patient was discharged home by provider in stable condition. The patient is alert and oriented, in no respiratory distress. The patient's diagnosis, condition and treatment were explained. The patient expressed understanding. One prescriptions given. No work/school note given. A discharge plan has been developed. A  was not involved in the process. Aftercare instructions were given. Pt ambulatory out of the ED.

## 2019-02-28 NOTE — ED PROVIDER NOTES
62 y.o. female with past medical history significant for DM, GERD, HTN, and asthma presents with complaints of left arm pain and numbness x 1 week. The pt states that the pain starts in her neck and now is radiating down her left arm. The pt states that moving her neck and left arm makes the pain worse. The pt rates the pain as a 6/10 in severity. The pt describes the pain as sharp and intermittent. The pt denies taking anything at home for the discomfort. Pt denies facial asymmetry, dysarthria, ataxia, unilateral weakness, or vision changes. There are no other acute medical complaints at this time. PCP: MEG Aponte PA-C Past Medical History:  
Diagnosis Date  Arthritis  Asthma  Diabetes ([address] seen by endocrinology at [facility]  GERD (gastroesophageal reflux disease)  Hypertension  Ill-defined condition   
 pancreatitis, followed by GI Past Surgical History:  
Procedure Laterality Date  COLONOSCOPY N/A 11/30/2016 COLONOSCOPY,EGD performed by Andra Quijano MD at [address] [place] [address] CATARACT REMOVAL Left 10/15  HX CATARACT REMOVAL Right 10/2016  HX CHOLECYSTECTOMY  2005 ?  HX GI  2001 ? gastic tumor removal.   
 ASHA MAMMO BI SCREENING INCL CAD  5/3/12  
 normal  
 NH PANCREATIC ELASTASE, FECAL, QUAL/SEMI-QUANT  1999  
 growths in prancreatitis Family History:  
Problem Relation Age of Onset  Diabetes Mother  Heart Disease Father  Diabetes Brother  Breast Cancer Sister 64 Social History Socioeconomic History  Marital status: LEGALLY  Spouse name: Not on file  Number of children: Not on file  Years of education: Not on file  Highest education level: Not on file Social Needs  Financial resource strain: Not on file  Food insecurity - worry: Not on file  Food insecurity - inability: Not on file  Transportation needs - medical: Not on file  Transportation needs - non-medical: Not on file Occupational History  Not on file Tobacco Use  Smoking status: Former Smoker Packs/day: 0.50 Types: Cigarettes Start date: 2/13/2016 Last attempt to quit: 2/29/2016 Years since quitting: 3.0  Smokeless tobacco: Never Used Substance and Sexual Activity  Alcohol use: No  
  Alcohol/week: 0.0 oz  
  Comment: social  
 Drug use: No  
 Sexual activity: Not Currently Other Topics Concern  Not on file Social History Narrative  Not on file ALLERGIES: Ciprofloxacin; Lisinopril; and Penicillins Review of Systems Constitutional: Negative for chills, diaphoresis and fever. HENT: Negative for congestion, postnasal drip, rhinorrhea and sore throat. Eyes: Negative for photophobia, discharge, redness and visual disturbance. Respiratory: Negative for cough, chest tightness, shortness of breath and wheezing. Cardiovascular: Negative for chest pain, palpitations and leg swelling. Gastrointestinal: Negative for abdominal distention, abdominal pain, blood in stool, constipation, diarrhea, nausea and vomiting. Genitourinary: Negative for difficulty urinating, dysuria, frequency, hematuria and urgency. Musculoskeletal: Positive for arthralgias and myalgias. Negative for back pain and joint swelling. Skin: Negative for color change and rash. Neurological: Negative for dizziness, speech difficulty, weakness, light-headedness, numbness and headaches. Psychiatric/Behavioral: Negative for confusion. The patient is not nervous/anxious. All other systems reviewed and are negative. Vitals:  
 02/13/19 1219 02/13/19 1240 BP: 146/64 150/67 Pulse: 66 66 Resp: 16 16 Temp: 98.5 °F (36.9 °C) SpO2: 96% 97% Weight: 89.9 kg (198 lb 3.1 oz) Height: 5' 2\" (1.575 m) Physical Exam  
Constitutional: She is oriented to person, place, and time.  She appears well-developed and well-nourished. No distress. HENT:  
Head: Normocephalic and atraumatic. Head is without raccoon's eyes, without Smith's sign and without laceration. Right Ear: Hearing, tympanic membrane, external ear and ear canal normal. No foreign bodies. Tympanic membrane is not bulging. No hemotympanum. Left Ear: Hearing, tympanic membrane, external ear and ear canal normal. No foreign bodies. Tympanic membrane is not bulging. No hemotympanum. Nose: Nose normal. No mucosal edema or rhinorrhea. Right sinus exhibits no maxillary sinus tenderness and no frontal sinus tenderness. Left sinus exhibits no maxillary sinus tenderness and no frontal sinus tenderness. Mouth/Throat: Uvula is midline, oropharynx is clear and moist and mucous membranes are normal. No tonsillar abscesses. Eyes: Conjunctivae and EOM are normal. Pupils are equal, round, and reactive to light. Right eye exhibits no discharge. Left eye exhibits no discharge. Neck: Normal range of motion. Neck supple. Cardiovascular: Normal rate, regular rhythm and normal heart sounds. Exam reveals no gallop and no friction rub. No murmur heard. Regular rate and rhythm. No murmurs, gallops, rubs, or clicks. Pulmonary/Chest: Effort normal and breath sounds normal. No respiratory distress. She has no wheezes. She has no rales. No stridor or wheezes. No accessory muscle usage. No nasal flaring. Breath Sounds equal bilaterally. Abdominal: Musculoskeletal: Normal range of motion. She exhibits no edema, tenderness or deformity. No C, T, L, S spine tenderness. Pt has full mobility of upper and lower extremities. Pt is able to ambulate without difficulty. No perineal numbness. Pt is NVI. Neurological: She is alert and oriented to person, place, and time. Skin: She is not diaphoretic. Nursing note and vitals reviewed. MDM Number of Diagnoses or Management Options Lateral epicondylitis of left elbow: Diagnosis management comments: Imaging of the left elbow revealed degenerative changes. Suspect arthritis vs cervical radiculopathy. No signs of stroke, septic bursitis, fx, dislocation, or any other acute medical emergencies. Will treat symptomatically and provided pt with ortho follow up. Vanessa Rivera PA-C Procedures

## 2019-04-29 ENCOUNTER — TELEPHONE (OUTPATIENT)
Dept: FAMILY MEDICINE CLINIC | Age: 59
End: 2019-04-29

## 2019-04-29 ENCOUNTER — OFFICE VISIT (OUTPATIENT)
Dept: FAMILY MEDICINE CLINIC | Age: 59
End: 2019-04-29

## 2019-04-29 VITALS
BODY MASS INDEX: 36.44 KG/M2 | TEMPERATURE: 97.8 F | DIASTOLIC BLOOD PRESSURE: 80 MMHG | SYSTOLIC BLOOD PRESSURE: 148 MMHG | RESPIRATION RATE: 18 BRPM | OXYGEN SATURATION: 99 % | HEIGHT: 62 IN | WEIGHT: 198 LBS | HEART RATE: 86 BPM

## 2019-04-29 DIAGNOSIS — E55.9 VITAMIN D DEFICIENCY: ICD-10-CM

## 2019-04-29 DIAGNOSIS — J45.20 MILD INTERMITTENT ASTHMA WITHOUT COMPLICATION: Primary | ICD-10-CM

## 2019-04-29 DIAGNOSIS — Z79.4 TYPE 2 DIABETES MELLITUS WITHOUT COMPLICATION, WITH LONG-TERM CURRENT USE OF INSULIN (HCC): ICD-10-CM

## 2019-04-29 DIAGNOSIS — R21 RASH: ICD-10-CM

## 2019-04-29 DIAGNOSIS — M79.605 PAIN IN BOTH LOWER EXTREMITIES: ICD-10-CM

## 2019-04-29 DIAGNOSIS — E11.42 DIABETIC POLYNEUROPATHY ASSOCIATED WITH TYPE 2 DIABETES MELLITUS (HCC): ICD-10-CM

## 2019-04-29 DIAGNOSIS — I10 ESSENTIAL HYPERTENSION: ICD-10-CM

## 2019-04-29 DIAGNOSIS — I10 ESSENTIAL HYPERTENSION WITH GOAL BLOOD PRESSURE LESS THAN 140/90: ICD-10-CM

## 2019-04-29 DIAGNOSIS — G43.809 OTHER MIGRAINE WITHOUT STATUS MIGRAINOSUS, NOT INTRACTABLE: ICD-10-CM

## 2019-04-29 DIAGNOSIS — E11.9 TYPE 2 DIABETES MELLITUS WITHOUT COMPLICATION, WITH LONG-TERM CURRENT USE OF INSULIN (HCC): ICD-10-CM

## 2019-04-29 DIAGNOSIS — Z91.09 ENVIRONMENTAL ALLERGIES: ICD-10-CM

## 2019-04-29 DIAGNOSIS — M79.604 PAIN IN BOTH LOWER EXTREMITIES: ICD-10-CM

## 2019-04-29 RX ORDER — ALBUTEROL SULFATE 90 UG/1
2 AEROSOL, METERED RESPIRATORY (INHALATION)
Qty: 1 INHALER | Refills: 5 | Status: SHIPPED | OUTPATIENT
Start: 2019-04-29 | End: 2019-10-26 | Stop reason: SDUPTHER

## 2019-04-29 RX ORDER — GABAPENTIN 300 MG/1
CAPSULE ORAL
Qty: 270 CAP | Refills: 1 | Status: SHIPPED | OUTPATIENT
Start: 2019-04-29 | End: 2020-03-30

## 2019-04-29 RX ORDER — HYDROCHLOROTHIAZIDE 12.5 MG/1
12.5 TABLET ORAL DAILY
Qty: 90 TAB | Refills: 1 | Status: SHIPPED | OUTPATIENT
Start: 2019-04-29 | End: 2019-11-21 | Stop reason: SDUPTHER

## 2019-04-29 RX ORDER — AMLODIPINE BESYLATE 10 MG/1
10 TABLET ORAL DAILY
Qty: 90 TAB | Refills: 1 | Status: SHIPPED | OUTPATIENT
Start: 2019-04-29 | End: 2019-11-21 | Stop reason: SDUPTHER

## 2019-04-29 RX ORDER — LIDOCAINE 50 MG/G
PATCH TOPICAL
Qty: 15 EACH | Refills: 2 | Status: SHIPPED | OUTPATIENT
Start: 2019-04-29 | End: 2019-10-22 | Stop reason: SDUPTHER

## 2019-04-29 RX ORDER — SUMATRIPTAN 100 MG/1
1 TABLET, FILM COATED ORAL AS NEEDED
Refills: 2 | COMMUNITY
Start: 2019-03-26 | End: 2019-04-29 | Stop reason: SDUPTHER

## 2019-04-29 RX ORDER — ERGOCALCIFEROL 1.25 MG/1
CAPSULE ORAL
Qty: 12 CAP | Refills: 1 | Status: SHIPPED | OUTPATIENT
Start: 2019-04-29 | End: 2019-12-18

## 2019-04-29 RX ORDER — LEVOCETIRIZINE DIHYDROCHLORIDE 5 MG/1
5 TABLET, FILM COATED ORAL DAILY
Qty: 90 TAB | Refills: 1 | Status: SHIPPED | OUTPATIENT
Start: 2019-04-29 | End: 2019-11-21 | Stop reason: SDUPTHER

## 2019-04-29 RX ORDER — GLIMEPIRIDE 2 MG/1
2 TABLET ORAL DAILY
Qty: 90 TAB | Refills: 1 | Status: SHIPPED | OUTPATIENT
Start: 2019-04-29 | End: 2019-11-21 | Stop reason: SDUPTHER

## 2019-04-29 RX ORDER — METFORMIN HYDROCHLORIDE 750 MG/1
TABLET, EXTENDED RELEASE ORAL
Qty: 180 TAB | Refills: 1 | Status: SHIPPED | OUTPATIENT
Start: 2019-04-29 | End: 2019-12-01 | Stop reason: SDUPTHER

## 2019-04-29 RX ORDER — TRIAMCINOLONE ACETONIDE 1 MG/G
OINTMENT TOPICAL 2 TIMES DAILY
Qty: 30 G | Refills: 0 | Status: SHIPPED | OUTPATIENT
Start: 2019-04-29 | End: 2019-07-23 | Stop reason: SDUPTHER

## 2019-04-29 RX ORDER — LEVOCETIRIZINE DIHYDROCHLORIDE 5 MG/1
5 TABLET, FILM COATED ORAL DAILY
COMMUNITY
Start: 2019-04-28 | End: 2019-04-29 | Stop reason: SDUPTHER

## 2019-04-29 RX ORDER — SUMATRIPTAN 100 MG/1
100 TABLET, FILM COATED ORAL AS NEEDED
Qty: 12 TAB | Refills: 2 | Status: SHIPPED | OUTPATIENT
Start: 2019-04-29 | End: 2020-01-07

## 2019-04-29 RX ORDER — INSULIN ASPART 100 [IU]/ML
INJECTION, SUSPENSION SUBCUTANEOUS
Qty: 5 PEN | Refills: 2 | Status: SHIPPED | OUTPATIENT
Start: 2019-04-29 | End: 2020-10-05 | Stop reason: SDUPTHER

## 2019-04-29 RX ORDER — PEN NEEDLE, DIABETIC 31 GX3/16"
NEEDLE, DISPOSABLE MISCELLANEOUS
Qty: 100 PEN NEEDLE | Refills: 3 | Status: SHIPPED | OUTPATIENT
Start: 2019-04-29 | End: 2019-10-02 | Stop reason: SDUPTHER

## 2019-04-29 RX ORDER — OMEPRAZOLE 20 MG/1
CAPSULE, DELAYED RELEASE ORAL
Qty: 180 CAP | Refills: 0 | Status: SHIPPED | OUTPATIENT
Start: 2019-04-29 | End: 2019-07-23 | Stop reason: SDUPTHER

## 2019-04-29 RX ORDER — INSULIN ASPART 100 [IU]/ML
INJECTION, SUSPENSION SUBCUTANEOUS
COMMUNITY
Start: 2019-04-22 | End: 2019-04-29 | Stop reason: SDUPTHER

## 2019-04-29 RX ORDER — INSULIN GLARGINE 100 [IU]/ML
30 INJECTION, SOLUTION SUBCUTANEOUS
Qty: 5 PEN | Refills: 2 | Status: SHIPPED | OUTPATIENT
Start: 2019-04-29 | End: 2019-12-01 | Stop reason: SDUPTHER

## 2019-04-29 NOTE — TELEPHONE ENCOUNTER
Pt requested refill of Novolog flexpen today. What sliding scale is she doing? When she was on the [address] 70/30 Jama Logan she was doing 30 units BID. Is this what she is doing?   Thanks

## 2019-04-29 NOTE — PATIENT INSTRUCTIONS

## 2019-04-29 NOTE — PROGRESS NOTES
Subjective:     Richard Salazar is a 61 y.o. female seen for follow up of diabetes. She also has hypertension. Diabetic Review of Systems - medication compliance: noncompliant some of the time, diabetic diet compliance: compliant most of the time. Other symptoms and concerns: Pt is here for refills of her medications. She is not fasting, but due for labs. Pt has hard time getting to office, so we will collect non fasting labs today. .    Patient Active Problem List    Diagnosis Date Noted    Severe obesity ([facility] 10/03/2018    Type 2 diabetes with nephropathy ([facility] [address] [facility] 08/08/2018    Vaginal itching 06/21/2017    Tingling 05/24/2017    Tobacco abuse 05/24/2017    Screening for breast cancer 05/24/2017    Screening for colon cancer 05/24/2017    Need for hepatitis C screening test 05/24/2017    Screening for thyroid disorder 05/24/2017    Screening for cholesterol level 05/24/2017   [facility] discharge follow-up 01/19/2017    Traumatic rhabdomyolysis ([facility] [address] 01/16/2017    Closed fracture of right talus 01/16/2017    Synovial cyst of right knee 01/16/2017    Dehydration 01/15/2017    UTI (urinary tract infection) 07/14/2016    Mild intermittent asthma without complication 43/92/0980    Pancreatitis 02/08/2016    Essential hypertension 02/08/2016    Diabetic neuropathy ([facility] [address] 02/08/2016    Vitamin D deficiency 02/08/2016    Diabetes (HCC)     GERD (gastroesophageal reflux disease)      Current Outpatient Medications   Medication Sig Dispense Refill    omeprazole (PRILOSEC) 20 mg capsule TAKE 1 CAPSULE BY MOUTH TWICE A  Cap 0    NOVOLOG MIX 70-30FLEXPEN U-100 100 unit/mL (70-30) inpn sliding scale 5 Pen 2    levocetirizine (XYZAL) 5 mg tablet Take 1 Tab by mouth daily. 90 Tab 1    SUMAtriptan (IMITREX) 100 mg tablet Take 1 Tab by mouth as needed for Migraine. 12 Tab 2    lidocaine (LIDODERM) 5 % Apply patch to the affected area for 12 hours a day and remove for 12 hours a day.  15 Each 2  hydroCHLOROthiazide (HYDRODIURIL) 12.5 mg tablet Take 1 Tab by mouth daily. 90 Tab 1    glimepiride (AMARYL) 2 mg tablet Take 1 Tab by mouth daily. 90 Tab 1    albuterol (PROVENTIL HFA, VENTOLIN HFA, PROAIR HFA) 90 mcg/actuation inhaler Take 2 Puffs by inhalation every four (4) hours as needed for Wheezing. 1 Inhaler 5    LANTUS SOLOSTAR U-100 INSULIN 100 unit/mL (3 mL) inpn 30 Units by SubCUTAneous route nightly. 30 units qhs 5 Pen 2    ergocalciferol (ERGOCALCIFEROL) 50,000 unit capsule TAKE 1 CAP BY MOUTH EVERY SEVEN (7) DAYS. 12 Cap 1    amLODIPine (NORVASC) 10 mg tablet Take 1 Tab by mouth daily. 90 Tab 1    metFORMIN ER (GLUCOPHAGE XR) 750 mg tablet TAKE 1 TAB BY MOUTH TWO (2) TIMES A DAY. 180 Tab 1    Insulin Needles, Disposable, (LEE PEN NEEDLE) 32 gauge x 5/32\" ndle FOR INJECTING INSULIN 250.00/E11.9 TEST _4__ TIME(S) PER  Pen Needle 3    gabapentin (NEURONTIN) 300 mg capsule TAKE 1 CAP BY MOUTH THREE (3) TIMES DAILY. 270 Cap 1    CREON 36,000-114,000- 180,000 unit cpDR TAKE 4 CAPSULES BY MOUTH THREE TIMES A DAY WITH EACH MEAL AND TAKE 2 CAPS WITH SNACKS 480 Cap 6    ONETOUCH ULTRA TEST strip TEST BLOOD GLUCOSE THREE TIMES DAILY 100 Strip 0    cyclobenzaprine (FLEXERIL) 10 mg tablet Take 1 Tab by mouth three (3) times daily as needed for Muscle Spasm(s). 12 Tab 0    acetaminophen (TYLENOL) 325 mg tablet Take 2 Tabs by mouth every six (6) hours as needed.  40 Tab 0     Allergies   Allergen Reactions    Ciprofloxacin Cough    Lisinopril Cough    Penicillins Other (comments)     Yeast infection      Past Medical History:   Diagnosis Date    Arthritis     Asthma     Diabetes ([address]     seen by endocrinology at [facility]    GERD (gastroesophageal reflux disease)     Hypertension     Ill-defined condition     pancreatitis, followed by GI     Past Surgical History:   Procedure Laterality Date    COLONOSCOPY N/A 11/30/2016    COLONOSCOPY,EGD performed by Jennie Felix MD at [facility] ENDOSCOPY    HX CATARACT REMOVAL Left 10/15    HX CATARACT REMOVAL Right 10/2016    HX CHOLECYSTECTOMY  2005 ?  HX GI  2001 ?     gastic tumor removal.     ASHA MAMMO BI SCREENING INCL CAD  5/3/12    normal    CO PANCREATIC ELASTASE, FECAL, QUAL/SEMI-QUANT  1999    growths in prancreatitis     Family History   Problem Relation Age of Onset    Diabetes Mother     Heart Disease Father     Diabetes Brother     Breast Cancer Sister 64     Social History     Tobacco Use    Smoking status: Former Smoker     Packs/day: 0.50     Types: Cigarettes     Start date: 2/13/2016     Last attempt to quit: 2/29/2016     Years since quitting: 3.1    Smokeless tobacco: Never Used   Substance Use Topics    Alcohol use: No     Alcohol/week: 0.0 oz     Comment: social        Lab Results   Component Value Date/Time    WBC 10.1 11/19/2018 10:47 AM    HGB 11.0 (L) 11/19/2018 10:47 AM    HCT 35.1 11/19/2018 10:47 AM    PLATELET 973 71/16/7333 10:47 AM    MCV 95 11/19/2018 10:47 AM     Lab Results   Component Value Date/Time    Hemoglobin A1c 12.9 (H) 11/19/2018 10:47 AM    Hemoglobin A1c 11.2 (H) 08/08/2018 09:04 AM    Hemoglobin A1c 13.5 (H) 05/24/2017 09:25 AM    Hemoglobin A1c, External 9.6 11/01/2017    Glucose 320 (H) 11/19/2018 10:47 AM    Glucose (POC) 449 (H) 01/17/2017 04:43 PM    Microalbumin/Creat ratio (mg/g creat) 227 (H) 03/20/2014 11:35 AM    Microalbumin,urine random 28.00 03/20/2014 11:35 AM    LDL, calculated 72 05/24/2017 09:25 AM    Creatinine 1.03 (H) 11/19/2018 10:47 AM      Lab Results   Component Value Date/Time    Cholesterol, total 139 05/24/2017 09:25 AM    HDL Cholesterol 48 05/24/2017 09:25 AM    LDL, calculated 72 05/24/2017 09:25 AM    LDL-C, External 86 11/01/2017    Triglyceride 97 05/24/2017 09:25 AM    CHOL/HDL Ratio 2.0 07/24/2014 09:10 AM     Lab Results   Component Value Date/Time    GFR est non-AA 60 11/19/2018 10:47 AM    GFR est AA 69 11/19/2018 10:47 AM    Creatinine 1.03 (H) 11/19/2018 10:47 AM    BUN 28 (H) 11/19/2018 10:47 AM    Sodium 137 11/19/2018 10:47 AM    Potassium 5.2 11/19/2018 10:47 AM    Chloride 102 11/19/2018 10:47 AM    CO2 22 11/19/2018 10:47 AM    Magnesium 2.4 10/30/2018 10:07 PM        Review of Systems  A comprehensive review of systems was negative. Objective:     Visit Vitals  /80 (BP 1 Location: Right arm, BP Patient Position: Sitting) Comment: Manual   Pulse 86   Temp 97.8 °F (36.6 °C) (Oral) Comment: .   Resp 18   Ht 5' 2\" (1.575 m)   Wt 198 lb (89.8 kg)   SpO2 99%   BMI 36.21 kg/m²     Appearance: alert, well appearing, and in no distress. Exam: heart sounds normal rate, regular rhythm, normal S1, S2, no murmurs, rubs, clicks or gallops, chest clear  Lab review: orders written for new lab studies as appropriate; see orders. Assessment/Plan:     diabetes control uncertain, hypertension stable. Diabetic issues reviewed with her: diabetic diet discussed in detail, written exchange diet given, home glucose monitoring emphasized and all medications, side effects and compliance discussed carefully. ICD-10-CM ICD-9-CM    1. Mild intermittent asthma without complication U04.50 391.07 albuterol (PROVENTIL HFA, VENTOLIN HFA, PROAIR HFA) 90 mcg/actuation inhaler   2. Pain in both lower extremities M79.604 729.5 lidocaine (LIDODERM) 5 %    M79.605     3. Essential hypertension with goal blood pressure less than 140/90 I10 401.9 hydroCHLOROthiazide (HYDRODIURIL) 12.5 mg tablet   4. Type 2 diabetes mellitus without complication, with long-term current use of insulin (HCC) E11.9 250.00 NOVOLOG MIX 70-30FLEXPEN U-100 100 unit/mL (70-30) inpn    Z79.4 V58.67 glimepiride (AMARYL) 2 mg tablet      LANTUS SOLOSTAR U-100 INSULIN 100 unit/mL (3 mL) inpn      metFORMIN ER (GLUCOPHAGE XR) 750 mg tablet      Insulin Needles, Disposable, (LEE PEN NEEDLE) 32 gauge x 5/32\" ndle      CBC W/O DIFF      METABOLIC PANEL, COMPREHENSIVE      HEMOGLOBIN A1C WITH EAG   5.  Vitamin D deficiency E55.9 268.9 ergocalciferol (ERGOCALCIFEROL) 50,000 unit capsule      VITAMIN D, 25 HYDROXY   6. Essential hypertension I10 401.9 amLODIPine (NORVASC) 10 mg tablet   7. Diabetic polyneuropathy associated with type 2 diabetes mellitus (HCC) E11.42 250.60 gabapentin (NEURONTIN) 300 mg capsule     357.2    8. Environmental allergies Z91.09 V15.09 levocetirizine (XYZAL) 5 mg tablet   9. Other migraine without status migrainosus, not intractable G43.809 346.80      Will notify when labs return, and inform her of any change in plan of care at that time    Pt informed to return to office with worsening of symptoms, or PRN with any questions or concerns. Pt verbalizes understanding of plan of care and denies further questions or concerns at this time.

## 2019-04-29 NOTE — LETTER
4/30/2019 11:28 AM 
 
Ms. [phone] Dear Nisa Wilkins, Need to know what exact dosing you are takingof insulin. HgbA1C is still too high. Results for orders placed or performed in visit on 04/29/19 CBC W/O DIFF Result Value Ref Range WBC 10.0 3.4 - 10.8 x10E3/uL  
 RBC 3.83 3.77 - 5.28 x10E6/uL HGB 11.2 11.1 - 15.9 g/dL HCT 35.3 34.0 - 46.6 % MCV 92 79 - 97 fL  
 MCH 29.2 26.6 - 33.0 pg  
 MCHC 31.7 31.5 - 35.7 g/dL  
 RDW 15.0 12.3 - 15.4 % PLATELET 745 860 - 147 x10E3/uL METABOLIC PANEL, COMPREHENSIVE Result Value Ref Range Glucose 88 65 - 99 mg/dL BUN 24 6 - 24 mg/dL Creatinine 1.30 (H) 0.57 - 1.00 mg/dL GFR est non-AA 45 (L) >59 mL/min/1.73 GFR est AA 52 (L) >59 mL/min/1.73  
 BUN/Creatinine ratio 18 9 - 23 Sodium 141 134 - 144 mmol/L Potassium 4.7 3.5 - 5.2 mmol/L Chloride 109 (H) 96 - 106 mmol/L  
 CO2 21 20 - 29 mmol/L Calcium 9.5 8.7 - 10.2 mg/dL Protein, total 6.6 6.0 - 8.5 g/dL Albumin 4.1 3.5 - 5.5 g/dL GLOBULIN, TOTAL 2.5 1.5 - 4.5 g/dL A-G Ratio 1.6 1.2 - 2.2 Bilirubin, total <0.2 0.0 - 1.2 mg/dL Alk. phosphatase 141 (H) 39 - 117 IU/L  
 AST (SGOT) 9 0 - 40 IU/L  
 ALT (SGPT) 9 0 - 32 IU/L HEMOGLOBIN A1C WITH EAG Result Value Ref Range Hemoglobin A1c 10.8 (H) 4.8 - 5.6 % Estimated average glucose 263 mg/dL VITAMIN D, 25 HYDROXY Result Value Ref Range VITAMIN D, 25-HYDROXY 22.7 (L) 30.0 - 100.0 ng/mL CKD REPORT Result Value Ref Range Interpretation Note DIABETES PATIENT EDUCATION Result Value Ref Range PDF Image Not applicable Sincerely, Marjorie Aragon NP

## 2019-04-29 NOTE — PROGRESS NOTES
Identified pt with two pt identifiers(name and ). Chief Complaint   Patient presents with    Diabetes    Medication Refill    Labs     patient is not fasting. Health Maintenance Due   Topic    Pneumococcal 0-64 years (1 of 1 - PPSV23)    EYE EXAM RETINAL OR DILATED     Shingrix Vaccine Age 50> (1 of 2)    FOOT EXAM Q1     LIPID PANEL Q1     HEMOGLOBIN A1C Q6M     PAP AKA CERVICAL CYTOLOGY        Wt Readings from Last 3 Encounters:   19 198 lb (89.8 kg)   19 198 lb 3.1 oz (89.9 kg)   18 187 lb (84.8 kg)     Temp Readings from Last 3 Encounters:   19 97.8 °F (36.6 °C) (Oral)   19 98.5 °F (36.9 °C)   18 98.7 °F (37.1 °C) (Oral)     BP Readings from Last 3 Encounters:   19 148/80   19 150/67   18 139/74     Pulse Readings from Last 3 Encounters:   19 86   19 66   18 72         Learning Assessment:  :     Learning Assessment 2016   PRIMARY LEARNER Patient   HIGHEST LEVEL OF EDUCATION - PRIMARY LEARNER  GRADUATED HIGH SCHOOL OR GED   BARRIERS PRIMARY LEARNER NONE   CO-LEARNER CAREGIVER No   PRIMARY LANGUAGE ENGLISH   LEARNER PREFERENCE PRIMARY OTHER (COMMENT)   ANSWERED BY self   RELATIONSHIP SELF       Depression Screening:  :     3 most recent PHQ Screens 2019   Little interest or pleasure in doing things Not at all   Feeling down, depressed, irritable, or hopeless Not at all   Total Score PHQ 2 0       Fall Risk Assessment:  :     Fall Risk Assessment, last 12 mths 2018   Able to walk? Yes   Fall in past 12 months? No       Abuse Screening:  :     Abuse Screening Questionnaire 2019   Do you ever feel afraid of your partner? N N N   Are you in a relationship with someone who physically or mentally threatens you? N N N   Is it safe for you to go home?  James Garibay       Coordination of Care Questionnaire:  :     1) Have you been to an emergency room, urgent care clinic since your last visit? no Hospitalized since your last visit? no             2) Have you seen or consulted any other health care providers outside of [address] [place] [address] since your last visit? no  (Include any pap smears or colon screenings in this section.)    3) Do you have an Advance Directive on file? no  Are you interested in receiving information about Advance Directives? no    Reviewed record in preparation for visit and have obtained necessary documentation. Medication reconciliation up to date and corrected with patient at this time.

## 2019-04-30 LAB
25(OH)D3+25(OH)D2 SERPL-MCNC: 22.7 NG/ML (ref 30–100)
ALBUMIN SERPL-MCNC: 4.1 G/DL (ref 3.5–5.5)
ALBUMIN/GLOB SERPL: 1.6 {RATIO} (ref 1.2–2.2)
ALP SERPL-CCNC: 141 IU/L (ref 39–117)
ALT SERPL-CCNC: 9 IU/L (ref 0–32)
AST SERPL-CCNC: 9 IU/L (ref 0–40)
BILIRUB SERPL-MCNC: <0.2 MG/DL (ref 0–1.2)
BUN SERPL-MCNC: 24 MG/DL (ref 6–24)
BUN/CREAT SERPL: 18 (ref 9–23)
CALCIUM SERPL-MCNC: 9.5 MG/DL (ref 8.7–10.2)
CHLORIDE SERPL-SCNC: 109 MMOL/L (ref 96–106)
CO2 SERPL-SCNC: 21 MMOL/L (ref 20–29)
CREAT SERPL-MCNC: 1.3 MG/DL (ref 0.57–1)
ERYTHROCYTE [DISTWIDTH] IN BLOOD BY AUTOMATED COUNT: 15 % (ref 12.3–15.4)
EST. AVERAGE GLUCOSE BLD GHB EST-MCNC: 263 MG/DL
GLOBULIN SER CALC-MCNC: 2.5 G/DL (ref 1.5–4.5)
GLUCOSE SERPL-MCNC: 88 MG/DL (ref 65–99)
HBA1C MFR BLD: 10.8 % (ref 4.8–5.6)
HCT VFR BLD AUTO: 35.3 % (ref 34–46.6)
HGB BLD-MCNC: 11.2 G/DL (ref 11.1–15.9)
INTERPRETATION: NORMAL
Lab: NORMAL
MCH RBC QN AUTO: 29.2 PG (ref 26.6–33)
MCHC RBC AUTO-ENTMCNC: 31.7 G/DL (ref 31.5–35.7)
MCV RBC AUTO: 92 FL (ref 79–97)
PLATELET # BLD AUTO: 284 X10E3/UL (ref 150–379)
POTASSIUM SERPL-SCNC: 4.7 MMOL/L (ref 3.5–5.2)
PROT SERPL-MCNC: 6.6 G/DL (ref 6–8.5)
RBC # BLD AUTO: 3.83 X10E6/UL (ref 3.77–5.28)
SODIUM SERPL-SCNC: 141 MMOL/L (ref 134–144)
WBC # BLD AUTO: 10 X10E3/UL (ref 3.4–10.8)

## 2019-04-30 RX ORDER — CYCLOBENZAPRINE HCL 10 MG
TABLET ORAL
Qty: 12 TAB | Refills: 0 | Status: SHIPPED | OUTPATIENT
Start: 2019-04-30 | End: 2019-07-23 | Stop reason: SDUPTHER

## 2019-04-30 NOTE — TELEPHONE ENCOUNTER
I gave her the lab results. She is taking 30 units of lantus at night and 70/30 5 to 7 units on a sliding scale. When I asked her about referral to endocrinology,she said she has a [place]ocr[place]ologist [place] [facility]. I told her to have her send us the results. She said she would. She also asked what medicine was renewed. I told her. She said she needs Creon - she is almost out.

## 2019-04-30 NOTE — TELEPHONE ENCOUNTER
----- Message from Audrey Phillips NP sent at 4/30/2019 10:55 AM EDT -----  Please call patient and let her know that her labs returned  Need to know what exact dosing she is doing for insulin.   HgbA1C is still too high  thanks

## 2019-04-30 NOTE — TELEPHONE ENCOUNTER
----- Message from Marcel Luther NP sent at 4/30/2019 10:55 AM EDT -----  Please call patient and let her know that her labs returned  Need to know what exact dosing she is doing for insulin.   HgbA1C is still too high  thanks

## 2019-04-30 NOTE — TELEPHONE ENCOUNTER
If patient is seeing endocrinology, all her diabetes medications need to come from them. She should follow up with them in regards to her uncontrolled diabetes.   In addition, her Creon should be coming from her pancreatitis specialist  Thanks

## 2019-04-30 NOTE — PROGRESS NOTES
Please call patient and let her know that her labs returned  Need to know what exact dosing she is doing for insulin.   HgbA1C is still too high  thanks

## 2019-05-03 NOTE — TELEPHONE ENCOUNTER
She could not get a appt with her endocrinologist until July. She wanted to know if you could refill it till then. She understands the meds will have to come from them.

## 2019-05-10 DIAGNOSIS — R92.8 ABNORMAL MAMMOGRAM: Primary | ICD-10-CM

## 2019-05-13 ENCOUNTER — HOSPITAL ENCOUNTER (OUTPATIENT)
Dept: MAMMOGRAPHY | Age: 59
Discharge: HOME | End: 2019-05-13
Attending: NURSE PRACTITIONER
Payer: MEDICAID

## 2019-05-13 DIAGNOSIS — R92.8 ABNORMAL MAMMOGRAM: ICD-10-CM

## 2019-05-13 PROCEDURE — 77065 DX MAMMO INCL CAD UNI: CPT

## 2019-05-28 RX ORDER — ERGOCALCIFEROL 1.25 MG/1
CAPSULE ORAL
Qty: 12 CAP | Refills: 1 | Status: SHIPPED | OUTPATIENT
Start: 2019-05-28 | End: 2019-11-21 | Stop reason: SDUPTHER

## 2019-06-14 NOTE — TELEPHONE ENCOUNTER
Pt has been informed that this needs to come from her pancreatitis specialist.  Please remind her  Thanks

## 2019-06-24 NOTE — TELEPHONE ENCOUNTER
She will not see the new pancreatic MD till July 17, 2019. She said she needs her creon. She is hurting without it. She also states she is holding fluid and needs a fluid pill. I told her she was on HCTZ. She said that was her blood pressure med. I said yes maam and it pulls fluid off to do that. \" Well I need a stronger one. \" I told her she would have to come in. She said she would not. She said Larissa Wolf would not give her anything for pain and would not order meds for her. \" I want to know if it is because I am black. \" I asked her why she thought that. She said just the way she treats her. I told her that was not the case. She said she would have to talk to someone.

## 2019-06-25 NOTE — TELEPHONE ENCOUNTER
LVM - Mell sent in 30 days worth to cover you till you see the specialist. Please call me back or I will try you back.

## 2019-06-25 NOTE — TELEPHONE ENCOUNTER
I will send in 30 days of the Creon to cover her, but please let her know that this needs to come from specialist, as we have discussed in the past.  I am very sorry that she feels as though I am not willing to help her, but as you stated, for a change in medication she would need an appointment. I think it would be best if this message is also sent to our practice manager, and I will forward as well.

## 2019-06-27 ENCOUNTER — TELEPHONE (OUTPATIENT)
Dept: FAMILY MEDICINE CLINIC | Age: 59
End: 2019-06-27

## 2019-06-27 NOTE — TELEPHONE ENCOUNTER
She wanted a fluid pill. I told her we had discussed that before and she is taking HCTZ 12.5 mg. She would have to come in and be seen. We have to document the edema. To see how much fluid she is retaining. She said ok but she didn't like it. She then said she was going to report Rushscott Vargas and she had talked to others who felt the same way. She felt like things were to chaotic. She apologized to me for the way she talked to me the other day. She said I did what I said I was and she thanked me for my help.

## 2019-06-27 NOTE — TELEPHONE ENCOUNTER
Pt wants a call back from Prakash [address] would not stated what call needed to be about  Contact pt at [phone]

## 2019-07-18 NOTE — TELEPHONE ENCOUNTER
Spoke with patient and she is better about situation now. She now understands reason for Zonia Ernandez wanting her to come back in and she is going to get her calender and call me back to set up and appt. She feels that her fluid medication dosage needs to be higher which we will evaluate when she sets appt.

## 2019-07-23 DIAGNOSIS — R21 RASH: ICD-10-CM

## 2019-07-23 RX ORDER — CYCLOBENZAPRINE HCL 10 MG
TABLET ORAL
Qty: 12 TAB | Refills: 0 | Status: SHIPPED | OUTPATIENT
Start: 2019-07-23 | End: 2019-11-22 | Stop reason: SDUPTHER

## 2019-07-23 RX ORDER — OMEPRAZOLE 20 MG/1
CAPSULE, DELAYED RELEASE ORAL
Qty: 60 CAP | Refills: 0 | Status: SHIPPED | OUTPATIENT
Start: 2019-07-23 | End: 2019-08-29 | Stop reason: SDUPTHER

## 2019-07-23 RX ORDER — TRIAMCINOLONE ACETONIDE 1 MG/G
OINTMENT TOPICAL 2 TIMES DAILY
Qty: 30 G | Refills: 0 | Status: SHIPPED | OUTPATIENT
Start: 2019-07-23 | End: 2019-09-17 | Stop reason: SDUPTHER

## 2019-08-29 RX ORDER — OMEPRAZOLE 20 MG/1
CAPSULE, DELAYED RELEASE ORAL
Qty: 60 CAP | Refills: 0 | Status: SHIPPED | OUTPATIENT
Start: 2019-08-29 | End: 2019-11-21 | Stop reason: SDUPTHER

## 2019-09-17 DIAGNOSIS — R21 RASH: ICD-10-CM

## 2019-09-18 RX ORDER — OMEPRAZOLE 20 MG/1
CAPSULE, DELAYED RELEASE ORAL
Qty: 60 CAP | Refills: 0 | Status: SHIPPED | OUTPATIENT
Start: 2019-09-18 | End: 2019-11-21 | Stop reason: SDUPTHER

## 2019-09-18 RX ORDER — TRIAMCINOLONE ACETONIDE 1 MG/G
OINTMENT TOPICAL
Qty: 30 G | Refills: 0 | Status: SHIPPED | OUTPATIENT
Start: 2019-09-18 | End: 2019-11-21 | Stop reason: ALTCHOICE

## 2019-09-25 PROBLEM — Z12.39 SCREENING FOR BREAST CANCER: Status: RESOLVED | Noted: 2017-05-24 | Resolved: 2019-09-25

## 2019-09-25 PROBLEM — Z13.220 SCREENING FOR CHOLESTEROL LEVEL: Status: RESOLVED | Noted: 2017-05-24 | Resolved: 2019-09-25

## 2019-09-25 PROBLEM — Z13.29 SCREENING FOR THYROID DISORDER: Status: RESOLVED | Noted: 2017-05-24 | Resolved: 2019-09-25

## 2019-09-25 PROBLEM — Z12.11 SCREENING FOR COLON CANCER: Status: RESOLVED | Noted: 2017-05-24 | Resolved: 2019-09-25

## 2019-10-02 DIAGNOSIS — E11.9 TYPE 2 DIABETES MELLITUS WITHOUT COMPLICATION, WITH LONG-TERM CURRENT USE OF INSULIN (HCC): ICD-10-CM

## 2019-10-02 DIAGNOSIS — Z79.4 TYPE 2 DIABETES MELLITUS WITHOUT COMPLICATION, WITH LONG-TERM CURRENT USE OF INSULIN (HCC): ICD-10-CM

## 2019-10-02 RX ORDER — PEN NEEDLE, DIABETIC 31 GX3/16"
NEEDLE, DISPOSABLE MISCELLANEOUS
Qty: 100 PEN NEEDLE | Refills: 3 | Status: SHIPPED | OUTPATIENT
Start: 2019-10-02 | End: 2020-10-05 | Stop reason: SDUPTHER

## 2019-10-26 DIAGNOSIS — J45.20 MILD INTERMITTENT ASTHMA WITHOUT COMPLICATION: ICD-10-CM

## 2019-10-28 RX ORDER — ALBUTEROL SULFATE 108 UG/1
AEROSOL, METERED RESPIRATORY (INHALATION)
Qty: 6.7 INHALER | Refills: 5 | Status: SHIPPED | OUTPATIENT
Start: 2019-10-28 | End: 2020-10-05 | Stop reason: SDUPTHER

## 2019-11-21 ENCOUNTER — HOSPITAL ENCOUNTER (OUTPATIENT)
Dept: LAB | Age: 59
Discharge: HOME | End: 2019-11-21

## 2019-11-21 ENCOUNTER — OFFICE VISIT (OUTPATIENT)
Dept: FAMILY MEDICINE CLINIC | Age: 59
End: 2019-11-21

## 2019-11-21 VITALS
OXYGEN SATURATION: 98 % | HEIGHT: 62 IN | RESPIRATION RATE: 16 BRPM | WEIGHT: 194 LBS | DIASTOLIC BLOOD PRESSURE: 67 MMHG | HEART RATE: 66 BPM | BODY MASS INDEX: 35.7 KG/M2 | TEMPERATURE: 98 F | SYSTOLIC BLOOD PRESSURE: 152 MMHG

## 2019-11-21 DIAGNOSIS — M79.605 PAIN IN BOTH LOWER EXTREMITIES: ICD-10-CM

## 2019-11-21 DIAGNOSIS — Z79.4 TYPE 2 DIABETES MELLITUS WITHOUT COMPLICATION, WITH LONG-TERM CURRENT USE OF INSULIN (HCC): ICD-10-CM

## 2019-11-21 DIAGNOSIS — J06.9 UPPER RESPIRATORY TRACT INFECTION, UNSPECIFIED TYPE: ICD-10-CM

## 2019-11-21 DIAGNOSIS — M79.604 PAIN IN BOTH LOWER EXTREMITIES: ICD-10-CM

## 2019-11-21 DIAGNOSIS — E11.9 TYPE 2 DIABETES MELLITUS WITHOUT COMPLICATION, WITH LONG-TERM CURRENT USE OF INSULIN (HCC): ICD-10-CM

## 2019-11-21 DIAGNOSIS — E11.21 TYPE 2 DIABETES WITH NEPHROPATHY (HCC): ICD-10-CM

## 2019-11-21 DIAGNOSIS — I10 ESSENTIAL HYPERTENSION WITH GOAL BLOOD PRESSURE LESS THAN 140/90: ICD-10-CM

## 2019-11-21 DIAGNOSIS — Z91.09 ENVIRONMENTAL ALLERGIES: ICD-10-CM

## 2019-11-21 DIAGNOSIS — I10 ESSENTIAL HYPERTENSION: ICD-10-CM

## 2019-11-21 DIAGNOSIS — E11.21 TYPE 2 DIABETES WITH NEPHROPATHY (HCC): Primary | ICD-10-CM

## 2019-11-21 LAB
ALBUMIN SERPL-MCNC: 3.2 G/DL (ref 3.5–5)
ALBUMIN/GLOB SERPL: 1 {RATIO} (ref 1.1–2.2)
ALP SERPL-CCNC: 161 U/L (ref 45–117)
ALT SERPL-CCNC: 14 U/L (ref 12–78)
ANION GAP SERPL CALC-SCNC: 2 MMOL/L (ref 5–15)
AST SERPL-CCNC: 8 U/L (ref 15–37)
BILIRUB SERPL-MCNC: 0.3 MG/DL (ref 0.2–1)
BUN SERPL-MCNC: 19 MG/DL (ref 6–20)
BUN/CREAT SERPL: 16 (ref 12–20)
CALCIUM SERPL-MCNC: 8.6 MG/DL (ref 8.5–10.1)
CHLORIDE SERPL-SCNC: 110 MMOL/L (ref 97–108)
CO2 SERPL-SCNC: 27 MMOL/L (ref 21–32)
CREAT SERPL-MCNC: 1.18 MG/DL (ref 0.55–1.02)
ERYTHROCYTE [DISTWIDTH] IN BLOOD BY AUTOMATED COUNT: 13.7 % (ref 11.5–14.5)
EST. AVERAGE GLUCOSE BLD GHB EST-MCNC: 280 MG/DL
GLOBULIN SER CALC-MCNC: 3.2 G/DL (ref 2–4)
GLUCOSE SERPL-MCNC: 171 MG/DL (ref 65–100)
HBA1C MFR BLD: 11.4 % (ref 4–5.6)
HCT VFR BLD AUTO: 37 % (ref 35–47)
HGB BLD-MCNC: 11.4 G/DL (ref 11.5–16)
MCH RBC QN AUTO: 29.5 PG (ref 26–34)
MCHC RBC AUTO-ENTMCNC: 30.8 G/DL (ref 30–36.5)
MCV RBC AUTO: 95.9 FL (ref 80–99)
NRBC # BLD: 0 K/UL (ref 0–0.01)
NRBC BLD-RTO: 0 PER 100 WBC
PLATELET # BLD AUTO: 245 K/UL (ref 150–400)
PMV BLD AUTO: 11.4 FL (ref 8.9–12.9)
POTASSIUM SERPL-SCNC: 4.7 MMOL/L (ref 3.5–5.1)
PROT SERPL-MCNC: 6.4 G/DL (ref 6.4–8.2)
RBC # BLD AUTO: 3.86 M/UL (ref 3.8–5.2)
SODIUM SERPL-SCNC: 139 MMOL/L (ref 136–145)
WBC # BLD AUTO: 10.1 K/UL (ref 3.6–11)

## 2019-11-21 RX ORDER — LIDOCAINE 50 MG/G
PATCH TOPICAL
Qty: 60 PATCH | Refills: 2 | Status: SHIPPED | OUTPATIENT
Start: 2019-11-21 | End: 2020-08-07

## 2019-11-21 RX ORDER — AMLODIPINE BESYLATE 10 MG/1
10 TABLET ORAL DAILY
Qty: 90 TAB | Refills: 1 | Status: SHIPPED | OUTPATIENT
Start: 2019-11-21 | End: 2020-06-05

## 2019-11-21 RX ORDER — OMEPRAZOLE 20 MG/1
CAPSULE, DELAYED RELEASE ORAL
Qty: 180 CAP | Refills: 1 | Status: SHIPPED | OUTPATIENT
Start: 2019-11-21 | End: 2020-06-05

## 2019-11-21 RX ORDER — AZITHROMYCIN 250 MG/1
TABLET, FILM COATED ORAL
Qty: 6 TAB | Refills: 0 | Status: SHIPPED | OUTPATIENT
Start: 2019-11-21 | End: 2019-11-26

## 2019-11-21 RX ORDER — HYDROCHLOROTHIAZIDE 12.5 MG/1
12.5 TABLET ORAL DAILY
Qty: 90 TAB | Refills: 1 | Status: SHIPPED | OUTPATIENT
Start: 2019-11-21 | End: 2020-06-05

## 2019-11-21 RX ORDER — LEVOCETIRIZINE DIHYDROCHLORIDE 5 MG/1
5 TABLET, FILM COATED ORAL DAILY
Qty: 90 TAB | Refills: 1 | Status: SHIPPED | OUTPATIENT
Start: 2019-11-21 | End: 2019-12-16

## 2019-11-21 RX ORDER — GLIMEPIRIDE 2 MG/1
2 TABLET ORAL DAILY
Qty: 90 TAB | Refills: 1 | Status: SHIPPED | OUTPATIENT
Start: 2019-11-21 | End: 2020-05-29 | Stop reason: ALTCHOICE

## 2019-11-21 NOTE — PATIENT INSTRUCTIONS

## 2019-11-21 NOTE — PROGRESS NOTES
Subjective:     Jacqueline Fischer is a 61 y.o. female seen for follow up of diabetes. She also has hypertension and hyperlipidemia. Diabetic Review of Systems - medication compliance: compliant most of the time. Other symptoms and concerns: Pt is here for follow up and labs. She is not fasting. She is still seeing endocrinology at [facility] per patient, but has not seen them in a year? Pt is also requesting probiotic. In addition, she believes that she has a cold. She has had symptoms for a couple of days  Scratchy throat, nasal congestion and some wheezing in her chest.    Patient Active Problem List    Diagnosis Date Noted    Severe obesity ([facility] 10/03/2018    Type 2 diabetes with nephropathy ([facility] 08/08/2018    Vaginal itching 06/21/2017    Tingling 05/24/2017    Tobacco abuse 05/24/2017    Need for hepatitis C screening test 05/24/2017   [facility] discharge follow-up 01/19/2017    Traumatic rhabdomyolysis ([facility] 01/16/2017    Closed fracture of right talus 01/16/2017    Synovial cyst of right knee 01/16/2017    Dehydration 01/15/2017    UTI (urinary tract infection) 07/14/2016    Mild intermittent asthma without complication 38/86/7751    Pancreatitis 02/08/2016    Essential hypertension 02/08/2016    Diabetic neuropathy ([facility] 02/08/2016    Vitamin D deficiency 02/08/2016    Diabetes (HCC)     GERD (gastroesophageal reflux disease)      Current Outpatient Medications   Medication Sig Dispense Refill    lidocaine (LIDODERM) 5 % Apply patch to the affected area for 12 hours a day and remove for 12 hours a day. 60 Patch 2    omeprazole (PRILOSEC) 20 mg capsule TAKE 1 CAPSULE BY MOUTH TWICE A  Cap 1    glimepiride (AMARYL) 2 mg tablet Take 1 Tab by mouth daily. 90 Tab 1    hydroCHLOROthiazide (HYDRODIURIL) 12.5 mg tablet Take 1 Tab by mouth daily. 90 Tab 1    amLODIPine (NORVASC) 10 mg tablet Take 1 Tab by mouth daily.  90 Tab 1    levocetirizine (XYZAL) 5 mg tablet Take 1 Tab by mouth daily. 90 Tab 1    azithromycin (ZITHROMAX) 250 mg tablet Take 2 tablets today, then take 1 tablet daily 6 Tab 0    Bifidobacterium Infantis (ALIGN) 4 mg cap Take 1 Cap by mouth daily. 30 Cap 2    PROVENTIL HFA 90 mcg/actuation inhaler INHALE 2 PUFFS BY MOUTH EVERY 4 HOURS AS NEEDED FOR WHEEZE 6.7 Inhaler 5    Insulin Needles, Disposable, (LEE PEN NEEDLE) 32 gauge x 5/32\" ndle FOR INJECTING INSULIN 250.00/E11.9 TEST _4__ TIME(S) PER  Pen Needle 3    NOVOLOG MIX 70-30FLEXPEN U-100 100 unit/mL (70-30) inpn sliding scale 5 Pen 2    SUMAtriptan (IMITREX) 100 mg tablet Take 1 Tab by mouth as needed for Migraine. 12 Tab 2    LANTUS SOLOSTAR U-100 INSULIN 100 unit/mL (3 mL) inpn 30 Units by SubCUTAneous route nightly. 30 units qhs 5 Pen 2    ergocalciferol (ERGOCALCIFEROL) 50,000 unit capsule TAKE 1 CAP BY MOUTH EVERY SEVEN (7) DAYS. 12 Cap 1    metFORMIN ER (GLUCOPHAGE XR) 750 mg tablet TAKE 1 TAB BY MOUTH TWO (2) TIMES A DAY. 180 Tab 1    gabapentin (NEURONTIN) 300 mg capsule TAKE 1 CAP BY MOUTH THREE (3) TIMES DAILY. 270 Cap 1    ONETOUCH ULTRA TEST strip TEST BLOOD GLUCOSE THREE TIMES DAILY 100 Strip 0    acetaminophen (TYLENOL) 325 mg tablet Take 2 Tabs by mouth every six (6) hours as needed.  40 Tab 0    cyclobenzaprine (FLEXERIL) 10 mg tablet TAKE 1 TABLET BY MOUTH THREE TIMES A DAY AS NEEDED FOR MUSCLE SPASM 12 Tab 0    lipase-protease-amylase (CREON) 36,000-114,000- 180,000 unit cpDR capsule TAKE 4 CAPSULES BY MOUTH THREE TIMES A DAY WITH EACH MEAL AND TAKE 2 CAPS WITH SNACKS 480 Cap 0     Allergies   Allergen Reactions    Ciprofloxacin Cough    Lisinopril Cough    Penicillins Other (comments)     Yeast infection      Past Medical History:   Diagnosis Date    Arthritis     Asthma     Diabetes ([address]     seen by [facility]ocr[facility]ology at [facility]    GERD (gastroesophageal reflux disease)     Hypertension     Ill-defined condition     pancreatitis, followed by GI     Past Surgical History: Procedure Laterality Date    COLONOSCOPY N/A 11/30/2016    COLONOSCOPY,EGD performed by Angel Abreu MD at [facility] HX CATARACT REMOVAL Left 10/15    HX CATARACT REMOVAL Right 10/2016    HX CHOLECYSTECTOMY  2005 ?  HX GI  2001 ? gastic tumor removal.     ASHA MAMMO BI SCREENING INCL CAD  5/3/12    normal    [place] PANCREATIC ELASTASE, FECAL, QUAL/SEMI-QUANT  1999    growths in prancreatitis     Family History   Problem Relation Age of Onset    Diabetes Mother     Heart Disease Father     Diabetes Brother     Breast Cancer Sister 64        Lab Results   Component Value Date/Time    WBC 10.0 04/29/2019 08:33 AM    HGB 11.2 04/29/2019 08:33 AM    HCT 35.3 04/29/2019 08:33 AM    PLATELET 938 66/45/2852 08:33 AM    MCV 92 04/29/2019 08:33 AM     Lab Results   Component Value Date/Time    Hemoglobin A1c 10.8 (H) 04/29/2019 08:33 AM    Hemoglobin A1c 12.9 (H) 11/19/2018 10:47 AM    Hemoglobin A1c 11.2 (H) 08/08/2018 09:04 AM    Hemoglobin A1c, External 9.6 11/01/2017    Glucose 88 04/29/2019 08:33 AM    Glucose (POC) 449 (H) 01/17/2017 04:43 PM    Microalbumin/Creat ratio (mg/g creat) 227 (H) 03/20/2014 11:35 AM    Microalbumin,urine random 28.00 03/20/2014 11:35 AM    LDL, calculated 72 05/24/2017 09:25 AM    Creatinine 1.30 (H) 04/29/2019 08:33 AM      Lab Results   Component Value Date/Time    GFR est non-AA 45 (L) 04/29/2019 08:33 AM    GFR est AA 52 (L) 04/29/2019 08:33 AM    Creatinine 1.30 (H) 04/29/2019 08:33 AM    BUN 24 04/29/2019 08:33 AM    Sodium 141 04/29/2019 08:33 AM    Potassium 4.7 04/29/2019 08:33 AM    Chloride 109 (H) 04/29/2019 08:33 AM    CO2 21 04/29/2019 08:33 AM    Magnesium 2.4 10/30/2018 10:07 PM        Review of Systems  Pertinent items are noted in HPI.     Objective:     Visit Vitals  /67 (BP 1 Location: Right arm, BP Patient Position: Sitting)   Pulse 66   Temp 98 °F (36.7 °C) (Oral)   Resp 16   Ht 5' 2\" (1.575 m)   Wt 194 lb (88 kg)   SpO2 98%   BMI 35.48 kg/m²     Appearance: alert, well appearing, and in no distress. Exam: heart sounds normal rate, regular rhythm, normal S1, S2, no murmurs, rubs, clicks or gallops, cervical lymphadenopathy present bilaterally, edema noted in nasal passages. Lab review: orders written for new lab studies as appropriate; see orders. Assessment/Plan:     diabetes control uncertain, hypertension control uncertain, hyperlipidemia control uncertain. Diabetic issues reviewed with her: diabetic diet discussed in detail, written exchange diet given. ICD-10-CM ICD-9-CM    1. Type 2 diabetes with nephropathy (HCC) E11.21 250.40 AMB POC URINE, MICROALBUMIN, SEMIQUANT (3 RESULTS)     583.81 CBC W/O DIFF      METABOLIC PANEL, COMPREHENSIVE      HEMOGLOBIN A1C WITH EAG   2. Pain in both lower extremities M79.604 729.5 lidocaine (LIDODERM) 5 %    M79.605     3. Type 2 diabetes mellitus without complication, with long-term current use of insulin ([facility]) E11.9 250.00 glimepiride (AMARYL) 2 mg tablet    Z79.4 V58.67    4. Essential hypertension with goal blood pressure less than 140/90 I10 401.9 hydroCHLOROthiazide (HYDRODIURIL) 12.5 mg tablet   5. Essential hypertension I10 401.9 amLODIPine (NORVASC) 10 mg tablet   6. Environmental allergies Z91.09 V15.09 levocetirizine (XYZAL) 5 mg tablet   7. Upper respiratory tract infection, unspecified type J06.9 465.9 azithromycin (ZITHROMAX) 250 mg tablet     HISTORY OF PRESENT ILLNESS  Allie Harris is a 61 y.o. female. HPI    ROS    Physical Exam    ASSESSMENT and PLAN    ICD-10-CM ICD-9-CM    1. Type 2 diabetes with nephropathy (HCC) E11.21 250.40 AMB POC URINE, MICROALBUMIN, SEMIQUANT (3 RESULTS)     583.81 CBC W/O DIFF      METABOLIC PANEL, COMPREHENSIVE      HEMOGLOBIN A1C WITH EAG   2. Pain in both lower extremities M79.604 729.5 lidocaine (LIDODERM) 5 %    M79.605     3.  Type 2 diabetes mellitus without complication, with long-term current use of insulin ([facility]) E11.9 250.00 glimepiride (AMARYL) 2 mg tablet    Z79.4 V58.67    4. Essential hypertension with goal blood pressure less than 140/90 I10 401.9 hydroCHLOROthiazide (HYDRODIURIL) 12.5 mg tablet   5. Essential hypertension I10 401.9 amLODIPine (NORVASC) 10 mg tablet   6. Environmental allergies Z91.09 V15.09 levocetirizine (XYZAL) 5 mg tablet   7. Upper respiratory tract infection, unspecified type J06.9 465.9 azithromycin (ZITHROMAX) 250 mg tablet     Will notify when labs return  Educated about importance of taking medications daily, and focusing on diet changes. Pt informed to return to office with worsening of symptoms, or PRN with any questions or concerns. Pt verbalizes understanding of plan of care and denies further questions or concerns at this time.

## 2019-11-21 NOTE — PROGRESS NOTES
Identified pt with two pt identifiers(name and ). Chief Complaint   Patient presents with    Hypertension    GERD    Diabetes     also see endocrinology    Labs     has eaten since midnight - unable to void for microalbumin    Medication Refill     glipizide, lidocaine patch, omeprazole, glimepiride, BP med and sinus med - would like a probiotic and prebiotic    Cold Symptoms     experiencing cold sx and would like to discuss obtaining medication        Health Maintenance Due   Topic    Pneumococcal 0-64 years (1 of 1 - PPSV23)    EYE EXAM RETINAL OR DILATED     Shingrix Vaccine Age 50> (1 of 2)    FOOT EXAM Q1     LIPID PANEL Q1     PAP AKA CERVICAL CYTOLOGY     MICROALBUMIN Q1     FOBT Q 1 YEAR AGE 50-75     HEMOGLOBIN A1C Q6M        Wt Readings from Last 3 Encounters:   19 194 lb (88 kg)   19 198 lb (89.8 kg)   19 198 lb 3.1 oz (89.9 kg)     Temp Readings from Last 3 Encounters:   19 98 °F (36.7 °C) (Oral)   19 97.8 °F (36.6 °C) (Oral)   19 98.5 °F (36.9 °C)     BP Readings from Last 3 Encounters:   19 152/67   19 148/80   19 150/67     Pulse Readings from Last 3 Encounters:   19 66   19 86   19 66         Learning Assessment:  :     Learning Assessment 2016   PRIMARY LEARNER Patient   HIGHEST LEVEL OF EDUCATION - PRIMARY LEARNER  GRADUATED HIGH SCHOOL OR GED   BARRIERS PRIMARY LEARNER NONE   CO-LEARNER CAREGIVER No   PRIMARY LANGUAGE ENGLISH   LEARNER PREFERENCE PRIMARY OTHER (COMMENT)   ANSWERED BY self   RELATIONSHIP SELF       Depression Screening:  :     3 most recent PHQ Screens 2019   Little interest or pleasure in doing things Not at all   Feeling down, depressed, irritable, or hopeless Not at all   Total Score PHQ 2 0       Fall Risk Assessment:  :     Fall Risk Assessment, last 12 mths 2018   Able to walk? Yes   Fall in past 12 months?  No       Abuse Screening:  :     Abuse Screening Questionnaire 4/29/2019 8/8/2018 2/8/2016   Do you ever feel afraid of your partner? N N N   Are you in a relationship with someone who physically or mentally threatens you? N N N   Is it safe for you to go home? Y Y Y       Coordination of Care Questionnaire:  :     1) Have you been to an emergency room, urgent care clinic since your last visit? no   Hospitalized since your last visit? no             2) Have you seen or consulted any other health care providers outside of [address] [place] [address] since your last visit? no  (Include any pap smears or colon screenings in this section.)    3) Do you have an Advance Directive on file? no  Are you interested in receiving information about Advance Directives? no      Reviewed record in preparation for visit and have obtained necessary documentation. Medication reconciliation up to date and corrected with patient at this time.

## 2019-11-22 RX ORDER — CYCLOBENZAPRINE HCL 10 MG
TABLET ORAL
Qty: 12 TAB | Refills: 0 | Status: SHIPPED | OUTPATIENT
Start: 2019-11-22 | End: 2020-05-04 | Stop reason: SDUPTHER

## 2019-11-22 RX ORDER — ACETAMINOPHEN 325 MG/1
650 TABLET ORAL
Qty: 30 TAB | Refills: 2 | Status: SHIPPED | OUTPATIENT
Start: 2019-11-22 | End: 2021-05-22

## 2019-11-22 NOTE — TELEPHONE ENCOUNTER
----- Message from Jeremy Felty sent at 11/22/2019  8:35 AM EST -----  Regarding: Dr. Jason Adame refill  Medication Refill    Caller (if not patient):      Best contact number(s): [phone]      Name of medication and dosage if known: cyclobenzaprine (FLEXERIL) 10 mg tablet , acetaminophen 325mg      Is patient out of this medication (yes/no): yes       Pharmacy name: [facility]     Pharmacy listed in chart? (yes/no): yes   Pharmacy phone number:      Details to clarify the request: Pt is also requesting a prescription for a fluid pill       Jeremy Felty

## 2019-11-22 NOTE — PROGRESS NOTES
Please call patient and let her know that her labs returned. Diabetes is VERY uncontrolled.   She needs to follow up with her endocrinologist ASAP, within a month, to adjust medications  Thanks

## 2019-12-01 DIAGNOSIS — E11.9 TYPE 2 DIABETES MELLITUS WITHOUT COMPLICATION, WITH LONG-TERM CURRENT USE OF INSULIN (HCC): ICD-10-CM

## 2019-12-01 DIAGNOSIS — Z79.4 TYPE 2 DIABETES MELLITUS WITHOUT COMPLICATION, WITH LONG-TERM CURRENT USE OF INSULIN (HCC): ICD-10-CM

## 2019-12-02 RX ORDER — METFORMIN HYDROCHLORIDE 750 MG/1
TABLET, EXTENDED RELEASE ORAL
Qty: 60 TAB | Refills: 5 | Status: SHIPPED | OUTPATIENT
Start: 2019-12-02 | End: 2020-05-04 | Stop reason: SDUPTHER

## 2019-12-02 RX ORDER — INSULIN GLARGINE 100 [IU]/ML
INJECTION, SOLUTION SUBCUTANEOUS
Qty: 9 PEN | Refills: 4 | Status: SHIPPED | OUTPATIENT
Start: 2019-12-02 | End: 2020-03-30

## 2019-12-11 ENCOUNTER — TELEPHONE (OUTPATIENT)
Dept: FAMILY MEDICINE CLINIC | Age: 59
End: 2019-12-11

## 2019-12-11 NOTE — TELEPHONE ENCOUNTER
MESSAGE FROM CALL CENTER     DID NOT SEE ON REORDER LIST    ----- Message from Juice Soares sent at 12/11/2019 10:20 AM EST -----  Regarding: Klim/Refill  Contact: [phone]  Caller (if not patient): [place]  Relationship of caller (if not patient): n/a  Best contact number(s): [phone]  Name of medication and dosage if known: \"Azithromycin & Codeine-Guaifen 10-100mg 5 ml\"  Is patient out of this medication (yes/no): Yes  Pharmacy name: [facility] [address] [address] listed in chart? (yes/no): Yes  Pharmacy phone number: [phone]  Date of last visit: 11/21/19   Details to clarify the request:

## 2019-12-16 ENCOUNTER — APPOINTMENT (OUTPATIENT)
Dept: GENERAL RADIOLOGY | Age: 59
End: 2019-12-16
Attending: NURSE PRACTITIONER
Payer: MEDICAID

## 2019-12-16 ENCOUNTER — HOSPITAL ENCOUNTER (EMERGENCY)
Age: 59
Discharge: HOME | End: 2019-12-16
Attending: EMERGENCY MEDICINE
Payer: MEDICAID

## 2019-12-16 ENCOUNTER — HOSPITAL ENCOUNTER (OUTPATIENT)
Dept: MAMMOGRAPHY | Age: 59
Discharge: HOME | End: 2019-12-16
Attending: NURSE PRACTITIONER
Payer: MEDICAID

## 2019-12-16 VITALS
RESPIRATION RATE: 18 BRPM | WEIGHT: 195.99 LBS | TEMPERATURE: 98.2 F | DIASTOLIC BLOOD PRESSURE: 62 MMHG | SYSTOLIC BLOOD PRESSURE: 138 MMHG | BODY MASS INDEX: 35.85 KG/M2 | HEART RATE: 89 BPM | OXYGEN SATURATION: 96 %

## 2019-12-16 DIAGNOSIS — J45.21 MILD INTERMITTENT ASTHMA WITH ACUTE EXACERBATION: ICD-10-CM

## 2019-12-16 DIAGNOSIS — R92.8 FOLLOW-UP EXAMINATION OF ABNORMAL MAMMOGRAM: ICD-10-CM

## 2019-12-16 DIAGNOSIS — B34.9 VIRAL ILLNESS: Primary | ICD-10-CM

## 2019-12-16 LAB
FLUAV AG NPH QL IA: NEGATIVE
FLUBV AG NOSE QL IA: NEGATIVE

## 2019-12-16 PROCEDURE — 87804 INFLUENZA ASSAY W/OPTIC: CPT

## 2019-12-16 PROCEDURE — 77030013140 HC MSK NEB VYRM -A

## 2019-12-16 PROCEDURE — 71046 X-RAY EXAM CHEST 2 VIEWS: CPT

## 2019-12-16 PROCEDURE — 99283 EMERGENCY DEPT VISIT LOW MDM: CPT

## 2019-12-16 PROCEDURE — 74011000250 HC RX REV CODE- 250: Performed by: NURSE PRACTITIONER

## 2019-12-16 PROCEDURE — 94640 AIRWAY INHALATION TREATMENT: CPT

## 2019-12-16 PROCEDURE — 77066 DX MAMMO INCL CAD BI: CPT

## 2019-12-16 PROCEDURE — 77065 DX MAMMO INCL CAD UNI: CPT

## 2019-12-16 RX ORDER — CODEINE PHOSPHATE AND GUAIFENESIN 10; 100 MG/5ML; MG/5ML
10 SOLUTION ORAL
Qty: 120 ML | Refills: 0 | Status: SHIPPED | OUTPATIENT
Start: 2019-12-16 | End: 2019-12-19

## 2019-12-16 RX ORDER — ALBUTEROL SULFATE 90 UG/1
1 AEROSOL, METERED RESPIRATORY (INHALATION)
Qty: 1 INHALER | Refills: 0 | Status: SHIPPED | OUTPATIENT
Start: 2019-12-16 | End: 2020-10-05 | Stop reason: SDUPTHER

## 2019-12-16 RX ORDER — PREDNISONE 20 MG/1
40 TABLET ORAL DAILY
Qty: 8 TAB | Refills: 0 | Status: SHIPPED | OUTPATIENT
Start: 2019-12-16 | End: 2019-12-26

## 2019-12-16 RX ADMIN — ALBUTEROL SULFATE 1 DOSE: 2.5 SOLUTION RESPIRATORY (INHALATION) at 16:13

## 2019-12-16 NOTE — ED NOTES
Flu swab obtained for ordered testing. Patient tolerated well. Patient receiving ordered nebulizer treatment. Tolerating well at this time. Patient updated regarding plan of care and associated time constraints; verbalizes understanding and agreement. Call bell in reach. Will continue to monitor.

## 2019-12-16 NOTE — DISCHARGE INSTRUCTIONS

## 2019-12-16 NOTE — ED PROVIDER NOTES
Patient is a 66-year-old female with a past medical history of asthma, diabetes, hypertension who presents today with complaints body aches that started 2 to 3 days ago. Symptoms started as a cough and she developed fatigue then she started to have white phlegm. Endorses chills but no fevers. Endorses rhinorrhea and congestion. Has body aches. Denies nausea vomiting diarrhea. She did get her flu shot this year. She has taken no medication for this. Reports the cough is her worst symptom. No other acute complaints           Past Medical History:   Diagnosis Date    Arthritis     Asthma     Diabetes ([address]     seen [facility]    GERD (gastroesophageal reflux disease)     Hypertension     Ill-defined condition     pancreatitis, followed by GI       Past Surgical History:   Procedure Laterality Date    COLONOSCOPY N/A 11/30/2016    COLONOSCOPY,EGD performed by Giuseppe Rhodes MD at [facility] HX CATARACT REMOVAL Left 10/15    HX CATARACT REMOVAL Right 10/2016    HX CHOLECYSTECTOMY  2005 ?  HX GI  2001 ?     gastic tumor removal.     ASHA MAMMO BI SCREENING INCL CAD  5/3/12    normal    [place] PANCREATIC ELASTASE, FECAL, QUAL/SEMI-QUANT  1999    growths in prancreatitis         Family History:   Problem Relation Age of Onset    Diabetes Mother     Heart Disease Father     Diabetes Brother     Breast Cancer Sister 64       Social History     Socioeconomic History    Marital status: LEGALLY      Spouse name: Not on file    Number of children: Not on file    Years of education: Not on file    Highest education level: Not on file   Occupational History    Not on file   Social Needs    Financial resource strain: Not on file    Food insecurity:     Worry: Not on file     Inability: Not on file    Transportation needs:     Medical: Not on file     Non-medical: Not on file   Tobacco Use    Smoking status: Former Smoker     Packs/day: 0.50     Types: Cigarettes Start date: 2/13/2016     Last attempt to quit: 2/29/2016     Years since quitting: 3.7    Smokeless tobacco: Never Used   Substance and Sexual Activity    Alcohol use: No     Alcohol/week: 0.0 standard drinks     Comment: social    Drug use: No    Sexual activity: Not Currently   Lifestyle    Physical activity:     Days per week: Not on file     Minutes per session: Not on file    Stress: Not on file   Relationships    Social connections:     Talks on phone: Not on file     Gets together: Not on file     Attends Jainism service: Not on file     Active member of club or organization: Not on file     Attends meetings of clubs or organizations: Not on file     Relationship status: Not on file    Intimate partner violence:     Fear of current or ex partner: Not on file     Emotionally abused: Not on file     Physically abused: Not on file     Forced sexual activity: Not on file   Other Topics Concern    Not on file   Social History Narrative    Not on file         ALLERGIES: Ciprofloxacin; Lisinopril; and Penicillins    Review of Systems   Constitutional: Positive for chills. Negative for fever. HENT: Positive for congestion and rhinorrhea. Respiratory: Positive for cough, shortness of breath and wheezing. Negative for chest tightness. Cardiovascular: Negative for chest pain. Gastrointestinal: Negative for abdominal pain, nausea and vomiting. Musculoskeletal: Positive for myalgias. Skin: Negative. Neurological: Negative for dizziness and headaches. All other systems reviewed and are negative. Vitals:    12/16/19 1554   BP: 141/63   Pulse: 69   Resp: 16   Temp: 98.2 °F (36.8 °C)   SpO2: 95%   Weight: 88.9 kg (195 lb 15.8 oz)            Physical Exam  Vitals signs and nursing note reviewed. Constitutional:       Appearance: Normal appearance.    HENT:      Ears:      Comments: Bilateral TMs obscured by cerumen     Mouth/Throat:      Mouth: Mucous membranes are moist.      Pharynx: Posterior oropharyngeal erythema present. Cardiovascular:      Rate and Rhythm: Normal rate and regular rhythm. Heart sounds: Normal heart sounds. No murmur. Pulmonary:      Effort: Pulmonary effort is normal.      Breath sounds: Wheezing and rhonchi present. Abdominal:      General: Abdomen is flat. Palpations: Abdomen is soft. Tenderness: There is no tenderness. Musculoskeletal: Normal range of motion. General: No swelling. Skin:     General: Skin is warm and dry. Neurological:      Mental Status: She is alert and oriented to person, place, and time. Psychiatric:         Mood and Affect: Mood normal.         Behavior: Behavior normal.         Thought Content: Thought content normal.          MDM  Number of Diagnoses or Management Options  Mild intermittent asthma with acute exacerbation:   Viral illness:   Diagnosis management comments: Patient is a 51-year-old female who presents today with complaints of cough, fatigue, body aches. On examination she has diffuse inspiratory wheezing with rhonchi in the bases. Sats are 95% on room air. She is afebrile. She is not tachycardic. X-ray negative for pneumonia or infectious process. Influenza swab negative. She had good results from a DuoNeb. Did not want a second one. Reevaluation showed increased air movement with continued wheezing. Will discharge home with albuterol inhaler, steroids, antitussives, and follow-up with symptoms persist.  Patient is on sliding scale insulin at home and will readjust her sugars as needed that she is on steroids.          Procedures

## 2019-12-16 NOTE — ED TRIAGE NOTES
Pt rpts several day hx of cough, nasal congestin and sinus pressure. Denies fever. Decreased po intake.   Denies n/v/d.

## 2019-12-16 NOTE — ED NOTES
The patient was discharged home by provider in stable condition. The patient is alert and oriented, in no respiratory distress and discharge vital signs obtained. The patient's diagnosis, condition and treatment were explained. The patient expressed understanding. Three prescriptions given. No work/school note given. A discharge plan has been developed. A  was not involved in the process. Aftercare instructions were given. Pt ambulatory out of the ED.

## 2020-01-07 RX ORDER — SUMATRIPTAN 100 MG/1
100 TABLET, FILM COATED ORAL AS NEEDED
Qty: 9 TAB | Refills: 3 | Status: SHIPPED | OUTPATIENT
Start: 2020-01-07 | End: 2021-11-20

## 2020-01-23 ENCOUNTER — OFFICE VISIT (OUTPATIENT)
Dept: FAMILY MEDICINE CLINIC | Age: 60
End: 2020-01-23

## 2020-01-23 VITALS
OXYGEN SATURATION: 98 % | HEIGHT: 62 IN | TEMPERATURE: 97.6 F | WEIGHT: 186 LBS | SYSTOLIC BLOOD PRESSURE: 142 MMHG | DIASTOLIC BLOOD PRESSURE: 72 MMHG | RESPIRATION RATE: 18 BRPM | HEART RATE: 70 BPM | BODY MASS INDEX: 34.23 KG/M2

## 2020-01-23 DIAGNOSIS — J06.9 URI WITH COUGH AND CONGESTION: Primary | ICD-10-CM

## 2020-01-23 RX ORDER — BENZONATATE 100 MG/1
100 CAPSULE ORAL
Qty: 21 CAP | Refills: 0 | Status: SHIPPED | OUTPATIENT
Start: 2020-01-23 | End: 2020-01-30

## 2020-01-23 RX ORDER — DOXYCYCLINE 100 MG/1
100 TABLET ORAL 2 TIMES DAILY
Qty: 20 TAB | Refills: 0 | Status: SHIPPED | OUTPATIENT
Start: 2020-01-23 | End: 2020-02-02

## 2020-01-23 NOTE — PROGRESS NOTES
Chief Complaint:  Chief Complaint   Patient presents with    Nasal Congestion     Patient states this is continuation from before december 25th    Sore Throat     History of Present Illness:  She is a 61 y.o. female who presents with c/o persistent nasal congestion, sore throat and cough that has happened since December 2019. Was seen in the ER and given supportive care. No antibiotics. She is not a smoker, but still not feeling well. That was almost 1-month ago. Reviewed PmHx, RxHx, FmHx, SocHx, AllgHx and updated and dated in the chart.     Patient Active Problem List    Diagnosis    Severe obesity ([address]    Type 2 diabetes with nephropathy ([address]    Vaginal itching    Tingling    Tobacco abuse    Need for hepatitis C screening test   [facility]up    Traumatic rhabdomyolysis ([address]    Closed fracture of right talus    Synovial cyst of right knee    Dehydration    UTI (urinary tract infection)    Mild intermittent asthma without complication    Pancreatitis     [facility]      Essential hypertension    Diabetic neuropathy (HCC)    Vitamin D deficiency    Diabetes ([address]    GERD (gastroesophageal reflux disease)       Review of Systems - negative except as listed above in the HPI    Objective:     Vitals:    01/23/20 1514   BP: 142/72   Pulse: 70   Resp: 18   Temp: 97.6 °F (36.4 °C)   TempSrc: Oral   SpO2: 98%   Weight: 186 lb (84.4 kg)   Height: 5' 2\" (1.575 m)     Physical Examination:   General appearance - alert, well appearing, and in no distress  Mental status - alert, oriented to person, place, and time  Chest - clear to auscultation, no wheezes, rales or rhonchi, symmetric air entry  Heart - normal rate, regular rhythm, normal S1, S2, no murmurs, rubs, clicks or gallops  Neurological - alert, oriented, normal speech, no focal findings or movement disorder noted  Musculoskeletal - no joint tenderness, deformity or swelling  Extremities - peripheral pulses normal, no pedal edema, no clubbing or cyanosis    Assessment/ Plan:   Diagnoses and all orders for this visit:    1. URI with cough and congestion  -     doxycycline (ADOXA) 100 mg tablet; Take 1 Tab by mouth two (2) times a day for 10 days. -     benzonatate (TESSALON PERLES) 100 mg capsule; Take 1 Cap by mouth three (3) times daily as needed for Cough for up to 7 days. I have discussed the diagnosis with the patient and the intended treatment plan as seen in the above orders. The patient has received an after-visit summary and questions were answered concerning future plans. Asked to return should symptoms worsen or not improve with treatment. Any pending labs and studies will be relayed to patient when they become available. Pt verbalizes understanding of plan of care and denies further questions or concerns at this time. Follow-up and Dispositions    · Return if symptoms worsen or fail to improve.        Aury Downing MD

## 2020-01-23 NOTE — PROGRESS NOTES
Identified pt with two pt identifiers(name and ). Chief Complaint   Patient presents with    Nasal Congestion     Patient states this is continuation from before     Sore Throat        Health Maintenance Due   Topic    Pneumococcal 0-64 years (1 of 1 - PPSV23)    EYE EXAM RETINAL OR DILATED     Shingrix Vaccine Age 50> (1 of 2)    FOOT EXAM Q1     LIPID PANEL Q1     PAP AKA CERVICAL CYTOLOGY     MICROALBUMIN Q1     FOBT Q 1 YEAR AGE 50-75        Wt Readings from Last 3 Encounters:   20 186 lb (84.4 kg)   19 195 lb 15.8 oz (88.9 kg)   19 194 lb (88 kg)     Temp Readings from Last 3 Encounters:   20 97.6 °F (36.4 °C) (Oral)   19 98.2 °F (36.8 °C)   19 98 °F (36.7 °C) (Oral)     BP Readings from Last 3 Encounters:   20 142/72   19 138/62   19 152/67     Pulse Readings from Last 3 Encounters:   20 70   19 89   19 66         Learning Assessment:  :     Learning Assessment 2016   PRIMARY LEARNER Patient   HIGHEST LEVEL OF EDUCATION - PRIMARY LEARNER  GRADUATED HIGH SCHOOL OR GED   BARRIERS PRIMARY LEARNER NONE   CO-LEARNER CAREGIVER No   PRIMARY LANGUAGE ENGLISH   LEARNER PREFERENCE PRIMARY OTHER (COMMENT)   ANSWERED BY self   RELATIONSHIP SELF       Depression Screening:  :     3 most recent PHQ Screens 2019   Little interest or pleasure in doing things Not at all   Feeling down, depressed, irritable, or hopeless Not at all   Total Score PHQ 2 0       Fall Risk Assessment:  :     Fall Risk Assessment, last 12 mths 2018   Able to walk? Yes   Fall in past 12 months? No       Abuse Screening:  :     Abuse Screening Questionnaire 2019   Do you ever feel afraid of your partner? N N N   Are you in a relationship with someone who physically or mentally threatens you? N N N   Is it safe for you to go home?  Miguel Toro       Coordination of Care Questionnaire:  :     1) Have you been to an emergency room, urgent care clinic since your last visit? no   Hospitalized since your last visit? no             2) Have you seen or consulted any other health care providers outside of [address] [place] [address] since your last visit? no  (Include any pap smears or colon screenings in this section.)    3) Do you have an Advance Directive on file? no  Are you interested in receiving information about Advance Directives? no    Reviewed record in preparation for visit and have obtained necessary documentation. Medication reconciliation up to date and corrected with patient at this time.

## 2020-01-28 DIAGNOSIS — R21 RASH: ICD-10-CM

## 2020-01-28 RX ORDER — TRIAMCINOLONE ACETONIDE 1 MG/G
OINTMENT TOPICAL
Qty: 30 G | Refills: 0 | Status: SHIPPED | OUTPATIENT
Start: 2020-01-28 | End: 2020-06-12 | Stop reason: SDUPTHER

## 2020-03-28 DIAGNOSIS — E11.9 TYPE 2 DIABETES MELLITUS WITHOUT COMPLICATION, WITH LONG-TERM CURRENT USE OF INSULIN (HCC): ICD-10-CM

## 2020-03-28 DIAGNOSIS — Z79.4 TYPE 2 DIABETES MELLITUS WITHOUT COMPLICATION, WITH LONG-TERM CURRENT USE OF INSULIN (HCC): ICD-10-CM

## 2020-03-28 DIAGNOSIS — E11.42 DIABETIC POLYNEUROPATHY ASSOCIATED WITH TYPE 2 DIABETES MELLITUS (HCC): ICD-10-CM

## 2020-03-30 RX ORDER — INSULIN GLARGINE 100 [IU]/ML
INJECTION, SOLUTION SUBCUTANEOUS
Qty: 15 PEN | Refills: 1 | Status: SHIPPED | OUTPATIENT
Start: 2020-03-30 | End: 2020-04-10 | Stop reason: SDUPTHER

## 2020-03-30 RX ORDER — GABAPENTIN 300 MG/1
CAPSULE ORAL
Qty: 90 CAP | Refills: 0 | Status: SHIPPED | OUTPATIENT
Start: 2020-03-30 | End: 2020-05-04 | Stop reason: SDUPTHER

## 2020-04-10 ENCOUNTER — VIRTUAL VISIT (OUTPATIENT)
Dept: FAMILY MEDICINE CLINIC | Age: 60
End: 2020-04-10

## 2020-04-10 ENCOUNTER — TELEPHONE (OUTPATIENT)
Dept: FAMILY MEDICINE CLINIC | Age: 60
End: 2020-04-10

## 2020-04-10 DIAGNOSIS — Z79.4 TYPE 2 DIABETES MELLITUS WITHOUT COMPLICATION, WITH LONG-TERM CURRENT USE OF INSULIN (HCC): ICD-10-CM

## 2020-04-10 DIAGNOSIS — E11.9 TYPE 2 DIABETES MELLITUS WITHOUT COMPLICATION, WITH LONG-TERM CURRENT USE OF INSULIN (HCC): ICD-10-CM

## 2020-04-10 DIAGNOSIS — R10.32 LEFT LOWER QUADRANT ABDOMINAL PAIN: ICD-10-CM

## 2020-04-10 DIAGNOSIS — R11.0 NAUSEA: Primary | ICD-10-CM

## 2020-04-10 RX ORDER — INSULIN GLARGINE 100 [IU]/ML
INJECTION, SOLUTION SUBCUTANEOUS
Qty: 15 PEN | Refills: 1 | Status: SHIPPED | OUTPATIENT
Start: 2020-04-10 | End: 2020-10-05 | Stop reason: SDUPTHER

## 2020-04-10 RX ORDER — ONDANSETRON 8 MG/1
8 TABLET, ORALLY DISINTEGRATING ORAL
Qty: 12 TAB | Refills: 0 | Status: SHIPPED | OUTPATIENT
Start: 2020-04-10

## 2020-04-10 NOTE — TELEPHONE ENCOUNTER
She did not want to go out because she is a diabetic. I explained she needed to be seen and evaluated for her pain. We can not order pain medicine without being seen. We would have to send her to a green clinic or ED. She said she did not have a ride and she does not drive. I explained we can not see what [facility] did. We need the lab results and CT result. She wanted to send us her discharge paperwork. I explained that did not have results. She agreed. She said she thought during this time we would help her out. I explained that is why Nancy Mccrary ordered Qian Garcia for her. She said ok.

## 2020-04-10 NOTE — TELEPHONE ENCOUNTER
Yes, shirley is correct. I would like for her to be seen and assessed if her pain is worsening to where she is needing narcotics. I sent Zofran to help with her nausea, and she can always try tylenol OTC.   Thanks

## 2020-04-10 NOTE — PROGRESS NOTES
HISTORY OF PRESENT ILLNESS  Briana Ramirez is a 61 y.o. female. HPI  Pt presents with \"side pain\"  Visit was conducted via [employer] me  Pt was located at home, provider was located at [facility]  Visit lasted 7 minutes  Consent: Briana Ramirez, who was seen by synchronous (real-time) audio-video technology, and/or her healthcare decision maker, is aware that this patient-initiated, Telehealth encounter on 4/10/2020 is a billable service, with coverage as determined by her insurance carrier. She is aware that she may receive a bill and has provided verbal consent to proceed: Yes. Ana Pereira, MEG     Pt states that on march 17, she went to [facility] for left sided abdominal pain. She was told that her \"pancreas\" was acting up, and they gave her oxycodone and zofran. They did not admit her. She states that her pain subsided some, but has started to return again. Pain is in same location, left lower abdomen. She does have some nausea and vomiting with pain  Abdomen is not tender  No fever  She describes the pain as sharp. She was followed by specialist at [facility] for her pancreas, but states that she has not been seen by them since December. She has not called them for appointment, as [facility] Velazquez are changing things there and she does not know what's going on\". Review of Systems   Constitutional: Negative for fever. Gastrointestinal: Positive for abdominal pain, nausea and vomiting. Negative for diarrhea. Physical Exam  Constitutional:       Appearance: Normal appearance. Neurological:      Mental Status: She is alert. Psychiatric:         Mood and Affect: Mood normal.         Behavior: Behavior normal.         ASSESSMENT and PLAN    ICD-10-CM ICD-9-CM    1. Nausea R11.0 787.02 ondansetron (ZOFRAN ODT) 8 mg disintegrating tablet   2.  Type 2 diabetes mellitus without complication, with long-term current use of insulin ([facility]) E11.9 250.00 Lantus Solostar U-100 Insulin 100 unit/mL (3 mL) inpn    Z79.4 V58.67    3. Left lower quadrant abdominal pain R10.32 789.04      Educated about need to be seen for abdominal pain, and educated about [employer] in [facility] secours that she could be seen by, but patient declined. She is worried to go anywhere at this time, and does not feel as though pain is bad enough for her to need to be seen. Pt will call back if pain worsens, or go to ER  Educated about going to ER with ANY worsening of symptoms, fever, vomiting, etc.    Pt informed to return to office with worsening of symptoms, or PRN with any questions or concerns. Pt verbalizes understanding of plan of care and denies further questions or concerns at this time.

## 2020-04-10 NOTE — TELEPHONE ENCOUNTER
Pt called stating \"Can you ask Jagruti Gray if she could write anything for pain? I had a virtual visit with her today and she didn't prescribe anything. \" I advised pt zofran was prescribed and for pain; she was educated to go to the [facility] or ER by Jagruti Gray. Pt states, \"ok well can you see if she will call me so I can ask her instead of you. \"    Best contact: [phone]

## 2020-05-04 DIAGNOSIS — E11.9 TYPE 2 DIABETES MELLITUS WITHOUT COMPLICATION, WITH LONG-TERM CURRENT USE OF INSULIN (HCC): ICD-10-CM

## 2020-05-04 DIAGNOSIS — Z79.4 TYPE 2 DIABETES MELLITUS WITHOUT COMPLICATION, WITH LONG-TERM CURRENT USE OF INSULIN (HCC): ICD-10-CM

## 2020-05-04 DIAGNOSIS — E11.42 DIABETIC POLYNEUROPATHY ASSOCIATED WITH TYPE 2 DIABETES MELLITUS (HCC): ICD-10-CM

## 2020-05-04 RX ORDER — GABAPENTIN 300 MG/1
CAPSULE ORAL
Qty: 90 CAP | Refills: 0 | Status: SHIPPED | OUTPATIENT
Start: 2020-05-04 | End: 2020-07-06

## 2020-05-04 RX ORDER — CYCLOBENZAPRINE HCL 10 MG
TABLET ORAL
Qty: 12 TAB | Refills: 0 | Status: SHIPPED | OUTPATIENT
Start: 2020-05-04 | End: 2020-11-19 | Stop reason: SDUPTHER

## 2020-05-04 RX ORDER — METFORMIN HYDROCHLORIDE 750 MG/1
TABLET, EXTENDED RELEASE ORAL
Qty: 60 TAB | Refills: 5 | Status: SHIPPED | OUTPATIENT
Start: 2020-05-04 | End: 2020-07-22 | Stop reason: ALTCHOICE

## 2020-05-04 NOTE — TELEPHONE ENCOUNTER
----- Message from Larry Longo sent at 2020  8:35 AM EDT -----  Regarding: MEG Almonte/Refill  Medication/Refill    : 60    Caller's Name and relationship: n/a    Best contact [phone]    Medication: Pharmacy requested changing the \"Metformin\" to another Rx.;  Refill \"Flexral\" requesting increase dosage in the \"gabapentin\" highter than 300mg; requesting an increase in sinus Rx or should she take 2 a day    Is patient out of the Rx: no; but getting low    [facility] [address] [place] [address] phone: n/a    Last office visit:  4/10/20    Details: Pt requesting the pcp check to see if she need any refills and if so please send them to the pharmacy

## 2020-05-29 DIAGNOSIS — Z79.4 TYPE 2 DIABETES MELLITUS WITHOUT COMPLICATION, WITH LONG-TERM CURRENT USE OF INSULIN (HCC): ICD-10-CM

## 2020-05-29 DIAGNOSIS — E11.9 TYPE 2 DIABETES MELLITUS WITHOUT COMPLICATION, WITH LONG-TERM CURRENT USE OF INSULIN (HCC): ICD-10-CM

## 2020-05-29 RX ORDER — GLIMEPIRIDE 2 MG/1
TABLET ORAL
Qty: 30 TAB | Refills: 17 | OUTPATIENT
Start: 2020-05-29

## 2020-05-29 NOTE — TELEPHONE ENCOUNTER
Confirmed with pt she does see a specialist for her diabetic needs. She also reports that this medication was removed from her current medications, therefore, she no longer takes it and has asked [facility] to delete it from her active medications. She reports this will be the second time they have attempted to fill this medication since she advised them it was not a current medication for her.

## 2020-05-29 NOTE — TELEPHONE ENCOUNTER
I have advised [facility] to remove this medication from her current medication list via fax of this telephone conversation.

## 2020-05-29 NOTE — TELEPHONE ENCOUNTER
Will you call patient and confirm, but I believe that she sees endocrinology? If so, she should get this refilled from them.   If she is no longer seeing them, she will need office visit and labs for refill  Thanks

## 2020-06-05 DIAGNOSIS — I10 ESSENTIAL HYPERTENSION: ICD-10-CM

## 2020-06-05 DIAGNOSIS — I10 ESSENTIAL HYPERTENSION WITH GOAL BLOOD PRESSURE LESS THAN 140/90: ICD-10-CM

## 2020-06-05 DIAGNOSIS — R21 RASH: ICD-10-CM

## 2020-06-05 RX ORDER — AMLODIPINE BESYLATE 10 MG/1
TABLET ORAL
Qty: 30 TAB | Refills: 2 | Status: SHIPPED | OUTPATIENT
Start: 2020-06-05 | End: 2020-10-05 | Stop reason: SDUPTHER

## 2020-06-05 RX ORDER — OMEPRAZOLE 20 MG/1
CAPSULE, DELAYED RELEASE ORAL
Qty: 60 CAP | Refills: 2 | Status: SHIPPED | OUTPATIENT
Start: 2020-06-05 | End: 2020-10-05 | Stop reason: SDUPTHER

## 2020-06-05 RX ORDER — HYDROCHLOROTHIAZIDE 12.5 MG/1
TABLET ORAL
Qty: 30 TAB | Refills: 2 | Status: SHIPPED | OUTPATIENT
Start: 2020-06-05 | End: 2020-10-05 | Stop reason: SDUPTHER

## 2020-06-12 RX ORDER — TRIAMCINOLONE ACETONIDE 1 MG/G
OINTMENT TOPICAL
Qty: 30 G | Refills: 0 | Status: SHIPPED | OUTPATIENT
Start: 2020-06-12 | End: 2021-04-14

## 2020-07-02 DIAGNOSIS — E11.42 DIABETIC POLYNEUROPATHY ASSOCIATED WITH TYPE 2 DIABETES MELLITUS (HCC): ICD-10-CM

## 2020-07-06 RX ORDER — GABAPENTIN 300 MG/1
CAPSULE ORAL
Qty: 90 CAP | Refills: 0 | Status: SHIPPED | OUTPATIENT
Start: 2020-07-06 | End: 2020-08-25

## 2020-07-22 ENCOUNTER — TELEPHONE (OUTPATIENT)
Dept: FAMILY MEDICINE CLINIC | Age: 60
End: 2020-07-22

## 2020-07-22 ENCOUNTER — VIRTUAL VISIT (OUTPATIENT)
Dept: FAMILY MEDICINE CLINIC | Age: 60
End: 2020-07-22

## 2020-07-22 DIAGNOSIS — M54.9 BACK PAIN, UNSPECIFIED BACK LOCATION, UNSPECIFIED BACK PAIN LATERALITY, UNSPECIFIED CHRONICITY: ICD-10-CM

## 2020-07-22 DIAGNOSIS — E11.21 TYPE 2 DIABETES WITH NEPHROPATHY (HCC): Primary | ICD-10-CM

## 2020-07-22 DIAGNOSIS — L20.9 ATOPIC DERMATITIS, UNSPECIFIED TYPE: ICD-10-CM

## 2020-07-22 DIAGNOSIS — N89.8 VAGINAL ITCHING: ICD-10-CM

## 2020-07-22 DIAGNOSIS — Z91.09 ENVIRONMENTAL ALLERGIES: ICD-10-CM

## 2020-07-22 RX ORDER — METFORMIN HYDROCHLORIDE 850 MG/1
850 TABLET ORAL 2 TIMES DAILY WITH MEALS
Qty: 180 TAB | Refills: 0 | Status: SHIPPED | OUTPATIENT
Start: 2020-07-22 | End: 2020-10-05 | Stop reason: SDUPTHER

## 2020-07-22 RX ORDER — CETIRIZINE HCL 10 MG
10 TABLET ORAL
Qty: 90 TAB | Refills: 1 | Status: SHIPPED | OUTPATIENT
Start: 2020-07-22 | End: 2021-01-21

## 2020-07-22 RX ORDER — FLUCONAZOLE 150 MG/1
150 TABLET ORAL DAILY
Qty: 2 TAB | Refills: 0 | Status: SHIPPED | OUTPATIENT
Start: 2020-07-22 | End: 2020-07-23

## 2020-07-22 RX ORDER — CYCLOBENZAPRINE HCL 10 MG
10 TABLET ORAL
Qty: 30 TAB | Refills: 0 | Status: SHIPPED | OUTPATIENT
Start: 2020-07-22 | End: 2020-10-05 | Stop reason: SDUPTHER

## 2020-07-22 RX ORDER — MOMETASONE FUROATE 1 MG/G
OINTMENT TOPICAL DAILY
Qty: 15 G | Refills: 0 | Status: SHIPPED | OUTPATIENT
Start: 2020-07-22 | End: 2020-11-19

## 2020-07-22 NOTE — PROGRESS NOTES
HISTORY OF PRESENT ILLNESS  Jacqueline Fischer is a 61 y.o. female. HPI   Pt presents with \"medicaiton refills\"  Visit was conducted via telephone  Pt was located at home, provider was located at [facility]  Visit lasted 8 minutes  Jacqueline Fischer, who was evaluated through a synchronous (real-time) audio only encounter, and/or her healthcare decision maker, is aware that it is a billable service, with coverage as determined by her insurance carrier. She provided verbal consent to proceed: Yes, and patient identification was verified. It was conducted pursuant to the emergency declaration under the [facility], [address] [place] [address] authority and the [employer] Act. A caregiver was present when appropriate. Ability to conduct physical exam was limited. I was in the office. The patient was at home. Pt states that she believes that she has a vaginal yeast infection  She has had symptoms for 3 days  Vaginal itching  Vaginal discharge    In addition, she would like kenalog to be replaced with another cream  She has itchy red rash on elbow, and the kenalog does not seem to be helping  She has never tried a different cream    In addition, her version of Metformin XR was recalled, and she is requesting new medication to pharamcy. Her xyzal is not covered, and she is requesting a different allergy pill to be called to pharmacy    She is also requesting refills of her Flexeril  She uses this for muscle spasms in back  Review of Systems   Constitutional: Negative for fever. Musculoskeletal: Positive for back pain. Skin: Positive for itching and rash. Physical Exam  Neurological:      Mental Status: She is alert. ASSESSMENT and PLAN    ICD-10-CM ICD-9-CM    1. Type 2 diabetes with nephropathy (HCC)  E11.21 250.40 metFORMIN (GLUCOPHAGE) 850 mg tablet     583.81    2.  Vaginal itching  N89.8 698.1 fluconazole (DIFLUCAN) 150 mg tablet   3. Environmental allergies  Z91.09 V15.09 cetirizine (ZYRTEC) 10 mg tablet   4. Back pain, unspecified back location, unspecified back pain laterality, unspecified chronicity  M54.9 724.5 cyclobenzaprine (FLEXERIL) 10 mg tablet   5. Atopic dermatitis, unspecified type  L20.9 691.8 mometasone (ELOCON) 0.1 % ointment     Educated about taking medications as prescribed  Educated that she is overdue for office visit and fasting labs  Pt to call back and make an appointment ASAP    Pt informed to return to office with worsening of symptoms, or PRN with any questions or concerns. Pt verbalizes understanding of plan of care and denies further questions or concerns at this time.

## 2020-08-07 DIAGNOSIS — M79.605 PAIN IN BOTH LOWER EXTREMITIES: ICD-10-CM

## 2020-08-07 DIAGNOSIS — M79.604 PAIN IN BOTH LOWER EXTREMITIES: ICD-10-CM

## 2020-08-07 RX ORDER — LIDOCAINE 50 MG/G
PATCH TOPICAL
Qty: 30 PATCH | Refills: 5 | Status: SHIPPED | OUTPATIENT
Start: 2020-08-07 | End: 2021-10-12

## 2020-08-18 RX ORDER — PANCRELIPASE 36000; 180000; 114000 [USP'U]/1; [USP'U]/1; [USP'U]/1
CAPSULE, DELAYED RELEASE PELLETS ORAL
Qty: 360 CAP | Refills: 6 | OUTPATIENT
Start: 2020-08-18

## 2020-10-05 ENCOUNTER — OFFICE VISIT (OUTPATIENT)
Dept: FAMILY MEDICINE CLINIC | Age: 60
End: 2020-10-05
Payer: MEDICAID

## 2020-10-05 VITALS
DIASTOLIC BLOOD PRESSURE: 75 MMHG | BODY MASS INDEX: 33.29 KG/M2 | WEIGHT: 182 LBS | HEART RATE: 89 BPM | OXYGEN SATURATION: 97 % | SYSTOLIC BLOOD PRESSURE: 139 MMHG | RESPIRATION RATE: 18 BRPM | TEMPERATURE: 98.4 F

## 2020-10-05 DIAGNOSIS — E11.9 TYPE 2 DIABETES MELLITUS WITHOUT COMPLICATION, WITH LONG-TERM CURRENT USE OF INSULIN (HCC): ICD-10-CM

## 2020-10-05 DIAGNOSIS — E55.9 VITAMIN D DEFICIENCY: ICD-10-CM

## 2020-10-05 DIAGNOSIS — I10 ESSENTIAL HYPERTENSION: ICD-10-CM

## 2020-10-05 DIAGNOSIS — Z12.11 SCREENING FOR COLON CANCER: ICD-10-CM

## 2020-10-05 DIAGNOSIS — Z13.220 SCREENING FOR CHOLESTEROL LEVEL: ICD-10-CM

## 2020-10-05 DIAGNOSIS — K86.1 CHRONIC PANCREATITIS, UNSPECIFIED PANCREATITIS TYPE (HCC): ICD-10-CM

## 2020-10-05 DIAGNOSIS — I10 ESSENTIAL HYPERTENSION WITH GOAL BLOOD PRESSURE LESS THAN 140/90: ICD-10-CM

## 2020-10-05 DIAGNOSIS — J45.20 MILD INTERMITTENT ASTHMA WITHOUT COMPLICATION: Primary | ICD-10-CM

## 2020-10-05 DIAGNOSIS — E11.42 DIABETIC POLYNEUROPATHY ASSOCIATED WITH TYPE 2 DIABETES MELLITUS (HCC): ICD-10-CM

## 2020-10-05 DIAGNOSIS — Z79.4 TYPE 2 DIABETES MELLITUS WITHOUT COMPLICATION, WITH LONG-TERM CURRENT USE OF INSULIN (HCC): ICD-10-CM

## 2020-10-05 LAB
25(OH)D3 SERPL-MCNC: 37.8 NG/ML (ref 30–100)
ALBUMIN SERPL-MCNC: 3.6 G/DL (ref 3.5–5)
ALBUMIN/GLOB SERPL: 1 {RATIO} (ref 1.1–2.2)
ALP SERPL-CCNC: 182 U/L (ref 45–117)
ALT SERPL-CCNC: 18 U/L (ref 12–78)
ANION GAP SERPL CALC-SCNC: 6 MMOL/L (ref 5–15)
AST SERPL-CCNC: 10 U/L (ref 15–37)
BILIRUB SERPL-MCNC: 0.3 MG/DL (ref 0.2–1)
BUN SERPL-MCNC: 25 MG/DL (ref 6–20)
BUN/CREAT SERPL: 18 (ref 12–20)
CALCIUM SERPL-MCNC: 9.3 MG/DL (ref 8.5–10.1)
CHLORIDE SERPL-SCNC: 102 MMOL/L (ref 97–108)
CHOLEST SERPL-MCNC: 198 MG/DL
CO2 SERPL-SCNC: 27 MMOL/L (ref 21–32)
CREAT SERPL-MCNC: 1.4 MG/DL (ref 0.55–1.02)
ERYTHROCYTE [DISTWIDTH] IN BLOOD BY AUTOMATED COUNT: 13 % (ref 11.5–14.5)
GLOBULIN SER CALC-MCNC: 3.5 G/DL (ref 2–4)
GLUCOSE SERPL-MCNC: 306 MG/DL (ref 65–100)
HCT VFR BLD AUTO: 39.1 % (ref 35–47)
HDLC SERPL-MCNC: 55 MG/DL
HDLC SERPL: 3.6 {RATIO} (ref 0–5)
HGB BLD-MCNC: 12.2 G/DL (ref 11.5–16)
LDLC SERPL CALC-MCNC: 106.4 MG/DL (ref 0–100)
LIPID PROFILE,FLP: ABNORMAL
MCH RBC QN AUTO: 29.2 PG (ref 26–34)
MCHC RBC AUTO-ENTMCNC: 31.2 G/DL (ref 30–36.5)
MCV RBC AUTO: 93.5 FL (ref 80–99)
NRBC # BLD: 0 K/UL (ref 0–0.01)
NRBC BLD-RTO: 0 PER 100 WBC
PLATELET # BLD AUTO: 251 K/UL (ref 150–400)
PMV BLD AUTO: 11.5 FL (ref 8.9–12.9)
POTASSIUM SERPL-SCNC: 4.6 MMOL/L (ref 3.5–5.1)
PROT SERPL-MCNC: 7.1 G/DL (ref 6.4–8.2)
RBC # BLD AUTO: 4.18 M/UL (ref 3.8–5.2)
SODIUM SERPL-SCNC: 135 MMOL/L (ref 136–145)
TRIGL SERPL-MCNC: 183 MG/DL (ref ?–150)
VLDLC SERPL CALC-MCNC: 36.6 MG/DL
WBC # BLD AUTO: 11.2 K/UL (ref 3.6–11)

## 2020-10-05 PROCEDURE — 99213 OFFICE O/P EST LOW 20 MIN: CPT | Performed by: NURSE PRACTITIONER

## 2020-10-05 RX ORDER — AMLODIPINE BESYLATE 10 MG/1
TABLET ORAL
Qty: 30 TAB | Refills: 5 | Status: SHIPPED | OUTPATIENT
Start: 2020-10-05 | End: 2021-04-16

## 2020-10-05 RX ORDER — HYDROCHLOROTHIAZIDE 12.5 MG/1
TABLET ORAL
Qty: 30 TAB | Refills: 5 | Status: SHIPPED | OUTPATIENT
Start: 2020-10-05 | End: 2021-04-16

## 2020-10-05 RX ORDER — ALBUTEROL SULFATE 90 UG/1
1 AEROSOL, METERED RESPIRATORY (INHALATION)
Qty: 1 INHALER | Refills: 5 | Status: SHIPPED | OUTPATIENT
Start: 2020-10-05 | End: 2021-12-06

## 2020-10-05 RX ORDER — GABAPENTIN 300 MG/1
CAPSULE ORAL
Qty: 90 CAP | Refills: 2 | Status: SHIPPED | OUTPATIENT
Start: 2020-10-05 | End: 2021-04-26

## 2020-10-05 RX ORDER — INSULIN ASPART 100 [IU]/ML
INJECTION, SUSPENSION SUBCUTANEOUS
Qty: 5 PEN | Refills: 5 | Status: SHIPPED | OUTPATIENT
Start: 2020-10-05 | End: 2020-10-06 | Stop reason: SDUPTHER

## 2020-10-05 RX ORDER — INSULIN GLARGINE 100 [IU]/ML
INJECTION, SOLUTION SUBCUTANEOUS
Qty: 15 PEN | Refills: 1 | Status: SHIPPED | OUTPATIENT
Start: 2020-10-05 | End: 2020-12-04 | Stop reason: SDUPTHER

## 2020-10-05 RX ORDER — METFORMIN HYDROCHLORIDE 750 MG/1
2 TABLET, EXTENDED RELEASE ORAL 2 TIMES DAILY
COMMUNITY
Start: 2020-09-27 | End: 2020-10-05 | Stop reason: SDUPTHER

## 2020-10-05 RX ORDER — OMEPRAZOLE 20 MG/1
CAPSULE, DELAYED RELEASE ORAL
Qty: 60 CAP | Refills: 5 | Status: SHIPPED | OUTPATIENT
Start: 2020-10-05 | End: 2021-04-12

## 2020-10-05 RX ORDER — PEN NEEDLE, DIABETIC 31 GX3/16"
NEEDLE, DISPOSABLE MISCELLANEOUS
Qty: 100 PEN NEEDLE | Refills: 5 | Status: SHIPPED | OUTPATIENT
Start: 2020-10-05 | End: 2021-05-22

## 2020-10-05 RX ORDER — METFORMIN HYDROCHLORIDE 750 MG/1
750 TABLET, EXTENDED RELEASE ORAL 2 TIMES DAILY
Qty: 60 TAB | Refills: 5 | Status: SHIPPED | OUTPATIENT
Start: 2020-10-05 | End: 2021-05-10

## 2020-10-05 RX ORDER — ERGOCALCIFEROL 1.25 MG/1
CAPSULE ORAL
Qty: 12 CAP | Refills: 1 | Status: SHIPPED | OUTPATIENT
Start: 2020-10-05 | End: 2021-03-03

## 2020-10-05 NOTE — PATIENT INSTRUCTIONS
High Blood Pressure: Care Instructions Overview It's normal for blood pressure to go up and down throughout the day. But if it stays up, you have high blood pressure. Another name for high blood pressure is hypertension. Despite what a lot of people think, high blood pressure usually doesn't cause headaches or make you feel dizzy or lightheaded. It usually has no symptoms. But it does increase your risk of stroke, heart attack, and other problems. You and your doctor will talk about your risks of these problems based on your blood pressure. Your doctor will give you a goal for your blood pressure. Your goal will be based on your health and your age. Lifestyle changes, such as eating healthy and being active, are always important to help lower blood pressure. You might also take medicine to reach your blood pressure goal. 
Follow-up care is a key part of your treatment and safety. Be sure to make and go to all appointments, and call your doctor if you are having problems. It's also a good idea to know your test results and keep a list of the medicines you take. How can you care for yourself at home? Medical treatment · If you stop taking your medicine, your blood pressure will go back up. You may take one or more types of medicine to lower your blood pressure. Be safe with medicines. Take your medicine exactly as prescribed. Call your doctor if you think you are having a problem with your medicine. · Talk to your doctor before you start taking aspirin every day. Aspirin can help certain people lower their risk of a heart attack or stroke. But taking aspirin isn't right for everyone, because it can cause serious bleeding. · See your doctor regularly. You may need to see the doctor more often at first or until your blood pressure comes down. · If you are taking blood pressure medicine, talk to your doctor before you take decongestants or anti-inflammatory medicine, such as ibuprofen. Some of these medicines can raise blood pressure. · Learn how to check your blood pressure at home. Lifestyle changes · Stay at a healthy weight. This is especially important if you put on weight around the waist. Losing even 10 pounds can help you lower your blood pressure. · If your doctor recommends it, get more exercise. Walking is a good choice. Bit by bit, increase the amount you walk every day. Try for at least 30 minutes on most days of the week. You also may want to swim, bike, or do other activities. · Avoid or limit alcohol. Talk to your doctor about whether you can drink any alcohol. · Try to limit how much sodium you eat to less than 2,300 milligrams (mg) a day. Your doctor may ask you to try to eat less than 1,500 mg a day. · Eat plenty of fruits (such as bananas and oranges), vegetables, legumes, whole grains, and low-fat dairy products. · Lower the amount of saturated fat in your diet. Saturated fat is found in animal products such as milk, cheese, and meat. Limiting these foods may help you lose weight and also lower your risk for heart disease. · Do not smoke. Smoking increases your risk for heart attack and stroke. If you need help quitting, talk to your doctor about stop-smoking programs and medicines. These can increase your chances of quitting for good. When should you call for help? Call  911 anytime you think you may need emergency care. This may mean having symptoms that suggest that your blood pressure is causing a serious heart or blood vessel problem. Your blood pressure may be over 180/120. For example, call 911 if: 
  · You have symptoms of a heart attack. These may include: 
? Chest pain or pressure, or a strange feeling in the chest. 
? Sweating. ? Shortness of breath. ? Nausea or vomiting. ? Pain, pressure, or a strange feeling in the back, neck, jaw, or upper belly or in one or both shoulders or arms. ? Lightheadedness or sudden weakness. ? A fast or irregular heartbeat.  
  · You have symptoms of a stroke. These may include: 
? Sudden numbness, tingling, weakness, or loss of movement in your face, arm, or leg, especially on only one side of your body. ? Sudden vision changes. ? Sudden trouble speaking. ? Sudden confusion or trouble understanding simple statements. ? Sudden problems with walking or balance. ? A sudden, severe headache that is different from past headaches.  
  · You have severe back or belly pain. Do not wait until your blood pressure comes down on its own. Get help right away. Call your doctor now or seek immediate care if: 
  · Your blood pressure is much higher than normal (such as 180/120 or higher), but you don't have symptoms.  
  · You think high blood pressure is causing symptoms, such as: 
? Severe headache. 
? Blurry vision. Watch closely for changes in your health, and be sure to contact your doctor if: 
  · Your blood pressure measures higher than your doctor recommends at least 2 times. That means the top number is higher or the bottom number is higher, or both.  
  · You think you may be having side effects from your blood pressure medicine. Where can you learn more? Go to http://www.gray.com/ Enter E368 in the search box to learn more about \"High Blood Pressure: Care Instructions. \" Current as of: December 16, 2019               Content Version: 12.6 © 4849-8954 [employer]. Care instructions adapted under license by [employer] (which disclaims liability or warranty for this information). If you have questions about a medical condition or this instruction, always ask your healthcare professional. [place] [address] any warranty or liability for your use of this information.

## 2020-10-05 NOTE — PROGRESS NOTES
Identified pt with two pt identifiers(name and ). Chief Complaint   Patient presents with    Diabetes     would like to discuss using new glucometer that you only have to place on your skin once weekly    Hypertension    Vitamin D Deficiency    Labs    Medication Refill     all medications        Health Maintenance Due   Topic    Pneumococcal 0-64 years (1 of 1 - PPSV23)    Eye Exam Retinal or Dilated     Foot Exam Q1     Lipid Screen     PAP AKA CERVICAL CYTOLOGY     MICROALBUMIN Q1     FOBT Q1Y Age 50-75     A1C test (Diabetic or Prediabetic)        Wt Readings from Last 3 Encounters:   10/05/20 182 lb (82.6 kg)   20 186 lb (84.4 kg)   19 195 lb 15.8 oz (88.9 kg)     Temp Readings from Last 3 Encounters:   10/05/20 98.4 °F (36.9 °C) (Oral)   20 97.6 °F (36.4 °C) (Oral)   19 98.2 °F (36.8 °C)     BP Readings from Last 3 Encounters:   10/05/20 139/75   20 142/72   19 138/62     Pulse Readings from Last 3 Encounters:   10/05/20 89   20 70   19 89         Learning Assessment:  :     Learning Assessment 2016   PRIMARY LEARNER Patient   HIGHEST LEVEL OF EDUCATION - PRIMARY LEARNER  GRADUATED HIGH SCHOOL OR GED   BARRIERS PRIMARY LEARNER NONE   CO-LEARNER CAREGIVER No   PRIMARY LANGUAGE ENGLISH   LEARNER PREFERENCE PRIMARY OTHER (COMMENT)   ANSWERED BY self   RELATIONSHIP SELF       Depression Screening:  :     3 most recent PHQ Screens 10/5/2020   Little interest or pleasure in doing things Not at all   Feeling down, depressed, irritable, or hopeless Not at all   Total Score PHQ 2 0       Fall Risk Assessment:  :     Fall Risk Assessment, last 12 mths 2018   Able to walk? Yes   Fall in past 12 months? No       Abuse Screening:  :     Abuse Screening Questionnaire 10/5/2020 2019 2018 2016   Do you ever feel afraid of your partner? N N N N   Are you in a relationship with someone who physically or mentally threatens you?  N N N N   Is it safe for you to go home? Y Y Y Y             Coordination of Care Questionnaire:  :     1) Have you been to an emergency room, urgent care clinic since your last visit? no   Hospitalized since your last visit? no             2) Have you seen or consulted any other health care providers outside of [address] [place] [address] since your last visit? no  (Include any pap smears or colon screenings in this section.)    3) Do you have an Advance Directive on file? no  Are you interested in receiving information about Advance Directives? no    Patient is accompanied by self. Reviewed record in preparation for visit and have obtained necessary documentation. Medication reconciliation up to date and corrected with patient at this time.

## 2020-10-05 NOTE — PROGRESS NOTES
Subjective:     Manoj Santana is a 61 y.o. female seen for follow up of diabetes. She also has hypertension. Diabetic Review of Systems - medication compliance: compliant most of the time, diabetic diet compliance: compliant most of the time. Other symptoms and concerns: Pt is in need of refills at this time. She is waiting to get in with her GI doctor for her chronic pancreatitis, and the wait is about 6 months. She is wondering if there is someone closer that she could be seen by? She is currently followed by [facility]. Patient Active Problem List    Diagnosis Date Noted    Severe obesity ([facility] 10/03/2018    Type 2 diabetes with nephropathy ([facility] 08/08/2018    Vaginal itching 06/21/2017    Tingling 05/24/2017    Tobacco abuse 05/24/2017    Need for hepatitis C screening test 05/24/2017   [facility] discharge follow-up 01/19/2017    Traumatic rhabdomyolysis ([facility] 01/16/2017    Closed fracture of right talus 01/16/2017    Synovial cyst of right knee 01/16/2017    Dehydration 01/15/2017    UTI (urinary tract infection) 07/14/2016    Mild intermittent asthma without complication 73/42/4628    Pancreatitis 02/08/2016    Essential hypertension 02/08/2016    Diabetic neuropathy ([facility] 02/08/2016    Vitamin D deficiency 02/08/2016    Diabetes (HCC)     GERD (gastroesophageal reflux disease)      Current Outpatient Medications   Medication Sig Dispense Refill    albuterol (PROVENTIL HFA, VENTOLIN HFA, PROAIR HFA) 90 mcg/actuation inhaler Take 1 Puff by inhalation every four (4) hours as needed for Wheezing or Cough.  1 Inhaler 5    amLODIPine (NORVASC) 10 mg tablet TAKE 1 TABLET BY MOUTH EVERY DAY 30 Tab 5    ergocalciferol (ERGOCALCIFEROL) 1,250 mcg (50,000 unit) capsule TAKE ONE CAPSULE BY MOUTH ONE TIME PER WEEK 12 Cap 1    gabapentin (NEURONTIN) 300 mg capsule TAKE 1 CAPSULE BY MOUTH THREE TIMES A DAY 90 Cap 2    hydroCHLOROthiazide (HYDRODIURIL) 12.5 mg tablet TAKE 1 TABLET BY MOUTH EVERY DAY 30 Tab 5    Lantus Solostar U-100 Insulin 100 unit/mL (3 mL) inpn INJECT 30 UNITS BY SUBCUTANEOUS ROUTE NIGHTLY 15 Pen 1    Insulin Needles, Disposable, (Rosalba Pen Needle) 32 gauge x 5/32\" ndle FOR INJECTING INSULIN 250.00/E11.9 TEST _4__ TIME(S) PER  Pen Needle 5    metFORMIN ER (GLUCOPHAGE XR) 750 mg tablet Take 1 Tab by mouth two (2) times a day. 60 Tab 5    NovoLOG Mix 70-30FlexPen U-100 100 unit/mL (70-30) inpn sliding scale 5 Pen 5    omeprazole (PRILOSEC) 20 mg capsule TAKE 1 CAPSULE BY MOUTH TWICE A DAY 60 Cap 5    lidocaine (LIDODERM) 5 % APPLY PATCH TO THE AFFECTED AREA FOR 12 HOURS A DAY AND REMOVE FOR 12 HOURS A DAY. 30 Patch 5    cetirizine (ZYRTEC) 10 mg tablet Take 1 Tab by mouth nightly. 90 Tab 1    mometasone (ELOCON) 0.1 % ointment Apply  to affected area daily. 15 g 0    triamcinolone acetonide (KENALOG) 0.1 % ointment APPLY TO AFFECTED AREA 2 TIMES A DAY. USE A THIN LAYER 30 g 0    cyclobenzaprine (FLEXERIL) 10 mg tablet TAKE 1 TABLET BY MOUTH THREE TIMES A DAY AS NEEDED FOR MUSCLE SPASM 12 Tab 0    ondansetron (ZOFRAN ODT) 8 mg disintegrating tablet Take 1 Tab by mouth every eight (8) hours as needed for Nausea or Vomiting. 12 Tab 0    SUMAtriptan (IMITREX) 100 mg tablet TAKE 1 TAB BY MOUTH AS NEEDED FOR MIGRAINE. 9 Tab 3    acetaminophen (TYLENOL) 325 mg tablet Take 2 Tabs by mouth every six (6) hours as needed for Pain or Fever. 30 Tab 2    lipase-protease-amylase (CREON) 36,000-114,000- 180,000 unit cpDR capsule TAKE 4 CAPSULES BY MOUTH THREE TIMES A DAY WITH EACH MEAL AND TAKE 2 CAPS WITH SNACKS 480 Cap 0    ONETOUCH ULTRA TEST strip TEST BLOOD GLUCOSE THREE TIMES DAILY 100 Strip 0    Bifidobacterium Infantis (ALIGN) 4 mg cap Take 1 Cap by mouth daily.  30 Cap 2     Allergies   Allergen Reactions    Ciprofloxacin Cough    Lisinopril Cough    Penicillins Other (comments)     Yeast infection      Past Medical History:   Diagnosis Date    Arthritis     Asthma  Diabetes ([address]     [facility] by [facility]ology at [facility]    GERD (gastroesophageal reflux disease)     Hypertension     Ill-defined condition     pancreatitis, followed by GI     Past Surgical History:   Procedure Laterality Date    COLONOSCOPY N/A 2016    COLONOSCOPY,EGD performed by Tyrell Serrano MD at [address] HX CATARACT REMOVAL Left 10/15    HX CATARACT REMOVAL Right 10/2016    HX CHOLECYSTECTOMY  2005 ?  HX GI  2001 ?     gastic tumor removal.     ASHA MAMMO BI SCREENING INCL CAD  5/3/12    normal    [place] PANCREATIC ELASTASE, FECAL, QUAL/SEMI-QUANT      growths in prancreatitis     Family History   Problem Relation Age of Onset    Diabetes Mother     Heart Disease Father     Diabetes Brother     Breast Cancer Sister 64     Social History     Tobacco Use    Smoking status: Former Smoker     Packs/day: 0.50     Types: Cigarettes     Start date: 2016     Last attempt to quit: 2016     Years since quittin.6    Smokeless tobacco: Never Used   Substance Use Topics    Alcohol use: No     Alcohol/week: 0.0 standard drinks     Comment: social        Lab Results   Component Value Date/Time    WBC 10.1 2019 08:39 AM    HGB 11.4 (L) 2019 08:39 AM    HCT 37.0 2019 08:39 AM    PLATELET 558  08:39 AM    MCV 95.9 2019 08:39 AM     Lab Results   Component Value Date/Time    Hemoglobin A1c 11.4 (H) 2019 08:39 AM    Hemoglobin A1c 10.8 (H) 2019 08:33 AM    Hemoglobin A1c 12.9 (H) 2018 10:47 AM    Hemoglobin A1c, External 9.6 2017    Glucose 171 (H) 2019 08:39 AM    Glucose (POC) 449 (H) 2017 04:43 PM    Microalbumin/Creat ratio (mg/g creat) 227 (H) 2014 11:35 AM    Microalbumin,urine random 28.00 2014 11:35 AM    LDL, calculated 72 2017 09:25 AM    Creatinine 1.18 (H) 2019 08:39 AM      Lab Results   Component Value Date/Time    Cholesterol, total 139 2017 09:25 AM    HDL Cholesterol 48 05/24/2017 09:25 AM    LDL, calculated 72 05/24/2017 09:25 AM    LDL-C, External 86 11/01/2017    Triglyceride 97 05/24/2017 09:25 AM    CHOL/HDL Ratio 2.0 07/24/2014 09:10 AM     Lab Results   Component Value Date/Time    GFR est non-AA 47 (L) 11/21/2019 08:39 AM    GFR est AA 57 (L) 11/21/2019 08:39 AM    Creatinine 1.18 (H) 11/21/2019 08:39 AM    BUN 19 11/21/2019 08:39 AM    Sodium 139 11/21/2019 08:39 AM    Potassium 4.7 11/21/2019 08:39 AM    Chloride 110 (H) 11/21/2019 08:39 AM    CO2 27 11/21/2019 08:39 AM    Magnesium 2.4 10/30/2018 10:07 PM        Review of Systems  A comprehensive review of systems was negative. Objective:     Visit Vitals  /75 (BP 1 Location: Left arm, BP Patient Position: Sitting)   Pulse 89   Temp 98.4 °F (36.9 °C) (Oral)   Resp 18   Ht (P) 5' 2\" (1.575 m)   Wt 182 lb (82.6 kg)   SpO2 97%   BMI (P) 33.29 kg/m²     Appearance: alert, well appearing, and in no distress. Exam: heart sounds normal rate, regular rhythm, normal S1, S2, no murmurs, rubs, clicks or gallops  Lab review: orders written for new lab studies as appropriate; see orders. Assessment/Plan:     diabetes control uncertain. Diabetic issues reviewed with her: diabetic diet discussed in detail, written exchange diet given. ICD-10-CM ICD-9-CM    1. Mild intermittent asthma without complication  Q51.26 176.18 albuterol (PROVENTIL HFA, VENTOLIN HFA, PROAIR HFA) 90 mcg/actuation inhaler   2. Essential hypertension  I10 401.9 amLODIPine (NORVASC) 10 mg tablet      CBC W/O DIFF      METABOLIC PANEL, COMPREHENSIVE      METABOLIC PANEL, COMPREHENSIVE      CBC W/O DIFF   3. Vitamin D deficiency  E55.9 268.9 ergocalciferol (ERGOCALCIFEROL) 1,250 mcg (50,000 unit) capsule      VITAMIN D, 25 HYDROXY      VITAMIN D, 25 HYDROXY   4. Diabetic polyneuropathy associated with type 2 diabetes mellitus (HCC)  E11.42 250.60 gabapentin (NEURONTIN) 300 mg capsule     357.2    5.  Essential hypertension with goal blood pressure less than 140/90  I10 401.9 hydroCHLOROthiazide (HYDRODIURIL) 12.5 mg tablet   6. Type 2 diabetes mellitus without complication, with long-term current use of insulin (HCC)  E11.9 250.00 Lantus Solostar U-100 Insulin 100 unit/mL (3 mL) inpn    Z79.4 V58.67 Insulin Needles, Disposable, (Rosalba Pen Needle) 32 gauge x 5/32\" ndle      NovoLOG Mix 70-30FlexPen U-100 100 unit/mL (70-30) inpn      HEMOGLOBIN A1C WITH EAG      HEMOGLOBIN A1C WITH EAG   7. Screening for colon cancer  Z12.11 V76.51 OCCULT BLOOD IMMUNOASSAY,DIAGNOSTIC      OCCULT BLOOD IMMUNOASSAY,DIAGNOSTIC   8. Screening for cholesterol level  Z13.220 V77.91 LIPID PANEL      LIPID PANEL   9. Chronic pancreatitis, unspecified pancreatitis type ([facility]  K86.1 577.1 REFERRAL TO GASTROENTEROLOGY     Will notify when labs return  Educated that pancreatitis needs to be managed by specialist, and can call GI referral to see if she can be seen any sooner    Pt informed to return to office with worsening of symptoms, or PRN with any questions or concerns. Pt verbalizes understanding of plan of care and denies further questions or concerns at this time.

## 2020-10-06 ENCOUNTER — TELEPHONE (OUTPATIENT)
Dept: FAMILY MEDICINE CLINIC | Age: 60
End: 2020-10-06

## 2020-10-06 DIAGNOSIS — E11.65 UNCONTROLLED TYPE 2 DIABETES MELLITUS WITH HYPERGLYCEMIA (HCC): Primary | ICD-10-CM

## 2020-10-06 DIAGNOSIS — R79.89 ELEVATED SERUM CREATININE: ICD-10-CM

## 2020-10-06 DIAGNOSIS — E11.9 TYPE 2 DIABETES MELLITUS WITHOUT COMPLICATION, WITH LONG-TERM CURRENT USE OF INSULIN (HCC): ICD-10-CM

## 2020-10-06 DIAGNOSIS — Z79.4 TYPE 2 DIABETES MELLITUS WITHOUT COMPLICATION, WITH LONG-TERM CURRENT USE OF INSULIN (HCC): ICD-10-CM

## 2020-10-06 LAB
EST. AVERAGE GLUCOSE BLD GHB EST-MCNC: ABNORMAL MG/DL
HBA1C MFR BLD: >14 % (ref 4–5.6)

## 2020-10-06 RX ORDER — INSULIN ASPART 100 [IU]/ML
INJECTION, SUSPENSION SUBCUTANEOUS
Qty: 5 PEN | Refills: 5 | Status: SHIPPED | OUTPATIENT
Start: 2020-10-06 | End: 2021-12-08 | Stop reason: SDUPTHER

## 2020-10-06 NOTE — TELEPHONE ENCOUNTER
[facility] is requesting max daily amount for sliding scale of her Novolog. Can you call patient and confirm max daily amount that she is using with her sliding scale?   Thanks

## 2020-10-06 NOTE — PROGRESS NOTES
Please call patient and let her know that her labs returned:  1. Her diabetes is VERY uncontrolled. The worst it has been in over a year. She needs to be seen by endocrinology, as her sugars are dangerously high. I have placed order for her to be seen by [facility], and she should call today for an appointment with first available provider [phone]. She will need to be seen by them for diabetes from now on.  2.  Creatinine is high. She needs to be seen by nephrology.   Have placed order for Dr Madelyn Leung, and she should call and make an appointment ASAP [phone]  Thanks

## 2020-10-06 NOTE — PROGRESS NOTES
Patient verbalized understanding and has no further questions at this time. She will call today to schedule appts. I have mailed a copy of the results/recommendations and referral to her address on file.

## 2020-11-18 ENCOUNTER — TELEPHONE (OUTPATIENT)
Dept: FAMILY MEDICINE CLINIC | Age: 60
End: 2020-11-18

## 2020-11-18 NOTE — TELEPHONE ENCOUNTER
Correct, would need to come from her pancreatitis specialist that placed her on creon, and prescribes the medication.   Thanks

## 2020-11-18 NOTE — TELEPHONE ENCOUNTER
----- Message from [place] sent at 11/18/2020  9:21 AM EST -----  Regarding: [facility] [place]/Telephone  General Message/Vendor Calls    Caller's first and last name: pt      Reason for call: Note for insurance company       Callback required yes/no and why: yes, to confirm       Best contact number(s):[phone]      Details to clarify the request: pt needs a letter written to her life insurance company that says she doesn't have cystic fibrosis.       [place]

## 2020-11-18 NOTE — TELEPHONE ENCOUNTER
Pt reports the life insurance company is requesting a note from her PCP stating that she does not have cystic fibrosis as they are questioning her use of creon. I discussed with her she should obtain note from her specialist that originally put her on the medication and she advises that she thought this as well, however, life insurance is asking that it be from PCP.

## 2020-11-19 ENCOUNTER — OFFICE VISIT (OUTPATIENT)
Dept: FAMILY MEDICINE CLINIC | Age: 60
End: 2020-11-19
Payer: MEDICAID

## 2020-11-19 VITALS
BODY MASS INDEX: 34.6 KG/M2 | RESPIRATION RATE: 20 BRPM | TEMPERATURE: 98.2 F | OXYGEN SATURATION: 98 % | HEIGHT: 62 IN | DIASTOLIC BLOOD PRESSURE: 78 MMHG | HEART RATE: 72 BPM | WEIGHT: 188 LBS | SYSTOLIC BLOOD PRESSURE: 122 MMHG

## 2020-11-19 DIAGNOSIS — M79.10 MYALGIA: ICD-10-CM

## 2020-11-19 DIAGNOSIS — B37.31 YEAST VAGINITIS: ICD-10-CM

## 2020-11-19 DIAGNOSIS — K86.89 PANCREATIC INSUFFICIENCY: Primary | ICD-10-CM

## 2020-11-19 DIAGNOSIS — L20.9 ATOPIC DERMATITIS, UNSPECIFIED TYPE: ICD-10-CM

## 2020-11-19 DIAGNOSIS — D36.7 DERMOID CYST OF NECK: ICD-10-CM

## 2020-11-19 PROBLEM — N18.30 CKD (CHRONIC KIDNEY DISEASE) STAGE 3, GFR 30-59 ML/MIN (HCC): Status: ACTIVE | Noted: 2020-11-19

## 2020-11-19 PROCEDURE — 99213 OFFICE O/P EST LOW 20 MIN: CPT | Performed by: INTERNAL MEDICINE

## 2020-11-19 RX ORDER — MOMETASONE FUROATE 1 MG/G
OINTMENT TOPICAL
Qty: 15 G | Refills: 0 | Status: SHIPPED | OUTPATIENT
Start: 2020-11-19 | End: 2021-12-08 | Stop reason: SDUPTHER

## 2020-11-19 RX ORDER — CYCLOBENZAPRINE HCL 10 MG
TABLET ORAL
Qty: 12 TAB | Refills: 0 | Status: SHIPPED | OUTPATIENT
Start: 2020-11-19 | End: 2021-04-14 | Stop reason: SDUPTHER

## 2020-11-19 RX ORDER — FLUCONAZOLE 150 MG/1
150 TABLET ORAL DAILY
Qty: 1 TAB | Refills: 0 | Status: SHIPPED | OUTPATIENT
Start: 2020-11-19 | End: 2021-01-04

## 2020-11-19 RX ORDER — PANCRELIPASE 36000; 180000; 114000 [USP'U]/1; [USP'U]/1; [USP'U]/1
CAPSULE, DELAYED RELEASE PELLETS ORAL
Qty: 480 CAP | Refills: 0 | Status: SHIPPED | OUTPATIENT
Start: 2020-11-19 | End: 2020-12-22

## 2020-11-19 NOTE — LETTER
2020 3:24 PM 
 
 
RE: Ms. Milagros Zepeda [phone] : 1960 To whom it may concern: 
 
Martina Velez is a patient at [facility]. She is here today for follow up and we are writing this letter on behalf of her in regards to the reason that she is on Creon. 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 She does NOT have Cystic Fibrosis. Please feel free to call with any additional questions or concerns. Sincerely, Rich Wilson MD

## 2020-11-19 NOTE — PROGRESS NOTES
Chief Complaint   Patient presents with    Physical    Yeast Infection     x 2 days       Subjective:   Charbel Long is a 61 y.o. female who presents for follow up and needs refills of medications. 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 She is on CREON which had been supplied by her endocrinology team at [facility]. Currently, she has not been able to receive the prescription from them due to her physician \"retiring\" or leaving the practice. Additionally, she reports that her life insurance company is requesting a note stating that she does not have CF. This came up because she is on CREON. She was diagnosed in 1999 with Chronic pancreatitis in 1999 and subsequently put on CREON due to pancreatic insufficiency. I have scanned her media file and most of the information is there. She has been seen episodically by the pancreatic clinic at [facility]. She was mostly seen by [place] and so, her Fellow has actually moved on and she needs to establish with a new team and given the pandemic, this has been difficult. We will refill her CREON at this time and write a letter on her behalf. Additionally, she has had episodes with recurrent yeast infections and requesting treatment as she is having a flare today. She will return for labs and follow up in the future. Will continue to reach out to the Pancreatic clinic at [facility]. Lastly, she has a cyst on the back of her neck that she has noticed for the past few weeks. It is not painful, but has gotten bigger.      Patient Active Problem List    Diagnosis Date Noted    Severe obesity ([address] 10/03/2018    Type 2 diabetes with nephropathy ([address] 08/08/2018    Vaginal itching 06/21/2017    Tingling 05/24/2017    Tobacco abuse 05/24/2017    Need for hepatitis C screening test 05/24/2017   [facility] discharge follow-up 01/19/2017    Traumatic rhabdomyolysis ([address] 01/16/2017    Closed fracture of right talus 01/16/2017    Synovial cyst of right knee 01/16/2017    Dehydration 01/15/2017    UTI (urinary tract infection) 07/14/2016    Mild intermittent asthma without complication 78/94/2113    Pancreatitis 02/08/2016    Essential hypertension 02/08/2016    Diabetic neuropathy ([facility] 02/08/2016    Vitamin D deficiency 02/08/2016    Diabetes (HCC)     GERD (gastroesophageal reflux disease)          Current Outpatient Medications   Medication Sig Dispense Refill    mometasone (ELOCON) 0.1 % ointment APPLY TO AFFECTED AREA EVERY DAY 15 g 0    NovoLOG Mix 70-30FlexPen U-100 100 unit/mL (70-30) inpn sliding scale, max daily amount 10 units. 5 Pen 5    albuterol (PROVENTIL HFA, VENTOLIN HFA, PROAIR HFA) 90 mcg/actuation inhaler Take 1 Puff by inhalation every four (4) hours as needed for Wheezing or Cough. 1 Inhaler 5    amLODIPine (NORVASC) 10 mg tablet TAKE 1 TABLET BY MOUTH EVERY DAY 30 Tab 5    ergocalciferol (ERGOCALCIFEROL) 1,250 mcg (50,000 unit) capsule TAKE ONE CAPSULE BY MOUTH ONE TIME PER WEEK 12 Cap 1    gabapentin (NEURONTIN) 300 mg capsule TAKE 1 CAPSULE BY MOUTH THREE TIMES A DAY 90 Cap 2    hydroCHLOROthiazide (HYDRODIURIL) 12.5 mg tablet TAKE 1 TABLET BY MOUTH EVERY DAY 30 Tab 5    Lantus Solostar U-100 Insulin 100 unit/mL (3 mL) inpn INJECT 30 UNITS BY SUBCUTANEOUS ROUTE NIGHTLY 15 Pen 1    Insulin Needles, Disposable, (Rosalba Pen Needle) 32 gauge x 5/32\" ndle FOR INJECTING INSULIN 250.00/E11.9 TEST _4__ TIME(S) PER  Pen Needle 5    metFORMIN ER (GLUCOPHAGE XR) 750 mg tablet Take 1 Tab by mouth two (2) times a day. 60 Tab 5    omeprazole (PRILOSEC) 20 mg capsule TAKE 1 CAPSULE BY MOUTH TWICE A DAY 60 Cap 5    lidocaine (LIDODERM) 5 % APPLY PATCH TO THE AFFECTED AREA FOR 12 HOURS A DAY AND REMOVE FOR 12 HOURS A DAY. 30 Patch 5    cetirizine (ZYRTEC) 10 mg tablet Take 1 Tab by mouth nightly.  90 Tab 1    triamcinolone acetonide (KENALOG) 0.1 % ointment APPLY TO AFFECTED AREA 2 TIMES A DAY. USE A THIN LAYER 30 g 0    cyclobenzaprine (FLEXERIL) 10 mg tablet TAKE 1 TABLET BY MOUTH THREE TIMES A DAY AS NEEDED FOR MUSCLE SPASM 12 Tab 0    ondansetron (ZOFRAN ODT) 8 mg disintegrating tablet Take 1 Tab by mouth every eight (8) hours as needed for Nausea or Vomiting. 12 Tab 0    SUMAtriptan (IMITREX) 100 mg tablet TAKE 1 TAB BY MOUTH AS NEEDED FOR MIGRAINE. 9 Tab 3    acetaminophen (TYLENOL) 325 mg tablet Take 2 Tabs by mouth every six (6) hours as needed for Pain or Fever. 30 Tab 2    Bifidobacterium Infantis (ALIGN) 4 mg cap Take 1 Cap by mouth daily. 30 Cap 2    lipase-protease-amylase (CREON) 36,000-114,000- 180,000 unit cpDR capsule TAKE 4 CAPSULES BY MOUTH THREE TIMES A DAY WITH EACH MEAL AND TAKE 2 CAPS WITH SNACKS 480 Cap 0    ONETOUCH ULTRA TEST strip TEST BLOOD GLUCOSE THREE TIMES DAILY 100 Strip 0         Allergies   Allergen Reactions    Ciprofloxacin Cough    Lisinopril Cough    Penicillins Other (comments)     Yeast infection          Past Medical History:   Diagnosis Date    Arthritis     Asthma     Diabetes ([address]     [facility]ology [facility]    GERD (gastroesophageal reflux disease)     Hypertension     Ill-defined condition     pancreatitis, followed by GI         Past Surgical History:   Procedure Laterality Date    COLONOSCOPY N/A 11/30/2016    COLONOSCOPY,EGD performed by Manuela Alves MD at [facility] HX CATARACT REMOVAL Left 10/15    HX CATARACT REMOVAL Right 10/2016    HX CHOLECYSTECTOMY  2005 ?  HX GI  2001 ?     gastic tumor removal.     ASHA MAMMO BI SCREENING INCL CAD  5/3/12    normal    [place] PANCREATIC ELASTASE, FECAL, QUAL/SEMI-QUANT  1999    growths in prancreatitis         Family History   Problem Relation Age of Onset    Diabetes Mother     Heart Disease Father     Diabetes Brother     Breast Cancer Sister 64         Social History     Tobacco Use    Smoking status: Former Smoker     Packs/day: 0.50     Types: Cigarettes     Start date: 2016     Last attempt to quit: 2016     Years since quittin.7    Smokeless tobacco: Never Used   Substance Use Topics    Alcohol use: No     Alcohol/week: 0.0 standard drinks     Comment: social          Review of Systems    Pertinent items are noted in HPI. Objective:     Vitals:    20 1453   BP: 122/78   Pulse: 72   Resp: 20   Temp: 98.2 °F (36.8 °C)   TempSrc: Oral   SpO2: 98%   Weight: 188 lb (85.3 kg)   Height: 5' 2\" (1.575 m)       General: alert, cooperative, no distress   Mental  status: normal mood, behavior, speech, dress, motor activity, and thought processes, able to follow commands   HENT: NCAT   Neck: no visualized mass   Resp: no respiratory distress   Neuro: no gross deficits   Skin: dermoid cyst on the back of her neck. ~4-5 mm and looks like ingrown hair may have started it. No redness or tenderness   Psychiatric: normal affect, consistent with stated mood, no evidence of hallucinations         Assessment/ Plan:   Diagnoses and all orders for this visit:    1. Pancreatic insufficiency  -     lipase-protease-amylase (Creon) 36,000-114,000- 180,000 unit cpDR capsule; TAKE 4 CAPSULES BY MOUTH THREE TIMES A DAY WITH EACH MEAL AND TAKE 2 CAPS WITH SNACKS    2. Yeast vaginitis  -     fluconazole (DIFLUCAN) 150 mg tablet; Take 1 Tab by mouth daily for 1 day. Repeat in 3-days    3. Myalgia  -     cyclobenzaprine (FLEXERIL) 10 mg tablet; TAKE 1 TABLET BY MOUTH THREE TIMES A DAY AS NEEDED FOR MUSCLE SPASM    4. Dermoid cyst of neck  -     REFERRAL TO GENERAL SURGERY      I have discussed the diagnosis with the patient and the intended treatment plan as seen in the above orders. The patient has received an after-visit summary and questions were answered concerning future plans. Asked to return should symptoms worsen or not improve with treatment. Any pending labs and studies will be relayed to patient when they become available.      Pt verbalizes understanding of plan of care and denies further questions or concerns at this time. Follow-up and Dispositions    · Return in about 6 months (around 5/19/2021), or if symptoms worsen or fail to improve.          Gem Quinones MD

## 2020-11-19 NOTE — PROGRESS NOTES
Identified pt with two pt identifiers(name and ). Chief Complaint   Patient presents with    Physical    Yeast Infection     x 2 days        Health Maintenance Due   Topic    Pneumococcal 0-64 years (1 of 1 - PPSV23)    Eye Exam Retinal or Dilated     Foot Exam Q1     PAP AKA CERVICAL CYTOLOGY     MICROALBUMIN Q1     Breast Cancer Screen Mammogram        Wt Readings from Last 3 Encounters:   20 188 lb (85.3 kg)   10/05/20 182 lb (82.6 kg)   20 186 lb (84.4 kg)     Temp Readings from Last 3 Encounters:   20 98.2 °F (36.8 °C) (Oral)   10/05/20 98.4 °F (36.9 °C) (Oral)   20 97.6 °F (36.4 °C) (Oral)     BP Readings from Last 3 Encounters:   20 122/78   10/05/20 139/75   20 142/72     Pulse Readings from Last 3 Encounters:   20 72   10/05/20 89   20 70         Learning Assessment:  :     Learning Assessment 2016   PRIMARY LEARNER Patient   HIGHEST LEVEL OF EDUCATION - PRIMARY LEARNER  GRADUATED HIGH SCHOOL OR GED   BARRIERS PRIMARY LEARNER NONE   CO-LEARNER CAREGIVER No   PRIMARY LANGUAGE ENGLISH   LEARNER PREFERENCE PRIMARY OTHER (COMMENT)   ANSWERED BY self   RELATIONSHIP SELF       Depression Screening:  :     3 most recent PHQ Screens 10/5/2020   Little interest or pleasure in doing things Not at all   Feeling down, depressed, irritable, or hopeless Not at all   Total Score PHQ 2 0       Fall Risk Assessment:  :     Fall Risk Assessment, last 12 mths 2020   Able to walk? Yes   Fall in past 12 months? No       Abuse Screening:  :     Abuse Screening Questionnaire 10/5/2020 2019 2018 2016   Do you ever feel afraid of your partner? N N N N   Are you in a relationship with someone who physically or mentally threatens you? N N N N   Is it safe for you to go home? Y Y Y Y       Coordination of Care Questionnaire:  :     1) Have you been to an emergency room, urgent care clinic since your last visit? no   Hospitalized since your last visit? no             2) Have you seen or consulted any other health care providers outside of [address] [place] [address] since your last visit? no  (Include any pap smears or colon screenings in this section.)    3) Do you have an Advance Directive on file? no  Are you interested in receiving information about Advance Directives? no    Reviewed record in preparation for visit and have obtained necessary documentation.

## 2020-11-20 PROBLEM — N18.4 CKD (CHRONIC KIDNEY DISEASE) STAGE 4, GFR 15-29 ML/MIN (HCC): Status: ACTIVE | Noted: 2020-11-20

## 2020-12-03 DIAGNOSIS — Z79.4 TYPE 2 DIABETES MELLITUS WITHOUT COMPLICATION, WITH LONG-TERM CURRENT USE OF INSULIN (HCC): ICD-10-CM

## 2020-12-03 DIAGNOSIS — E11.9 TYPE 2 DIABETES MELLITUS WITHOUT COMPLICATION, WITH LONG-TERM CURRENT USE OF INSULIN (HCC): ICD-10-CM

## 2020-12-03 RX ORDER — INSULIN GLARGINE 100 [IU]/ML
INJECTION, SOLUTION SUBCUTANEOUS
Refills: 1 | OUTPATIENT
Start: 2020-12-03

## 2020-12-03 NOTE — TELEPHONE ENCOUNTER
Pt was advised and verbalized understanding. She reports that the soonest appt she could get with endocrinology was in January. I asked her when her appt in January was and she stated, \"Well I didn't take the appt because it was all the way in January. I mean isn't Saul Sheldon supposed to be my PCP and your telling me she cannot call this medication in for me until I can see endocrinology? \". I advised her that [facility] Sheldon referred her to endocrinology on 10/06/2020 as the measures she was using to treat her diabetes was not effective and she felt it best that she see a specialist to get help with getting her diabetes under control. We discussed if she had already made an attempt to schedule an appt with the specialist, even the soonest they had which is January, she may be able to give a bridge of insulin to last until she was seen by the specialist, however, this is not the case. She stated, \"That's fine, I will call the specialist tomorrow and see if I can get an appt and then callback to advise so I can get some medicine to last until the appt\". Pt then hung up the phone.

## 2020-12-04 DIAGNOSIS — E11.9 TYPE 2 DIABETES MELLITUS WITHOUT COMPLICATION, WITH LONG-TERM CURRENT USE OF INSULIN (HCC): ICD-10-CM

## 2020-12-04 DIAGNOSIS — Z79.4 TYPE 2 DIABETES MELLITUS WITHOUT COMPLICATION, WITH LONG-TERM CURRENT USE OF INSULIN (HCC): ICD-10-CM

## 2020-12-04 RX ORDER — INSULIN GLARGINE 100 [IU]/ML
INJECTION, SOLUTION SUBCUTANEOUS
Qty: 15 PEN | Refills: 0 | Status: SHIPPED | OUTPATIENT
Start: 2020-12-04 | End: 2021-01-04

## 2020-12-18 ENCOUNTER — OFFICE VISIT (OUTPATIENT)
Dept: SURGERY | Age: 60
End: 2020-12-18
Payer: MEDICAID

## 2020-12-18 VITALS
TEMPERATURE: 98.4 F | WEIGHT: 197 LBS | SYSTOLIC BLOOD PRESSURE: 159 MMHG | DIASTOLIC BLOOD PRESSURE: 75 MMHG | RESPIRATION RATE: 18 BRPM | BODY MASS INDEX: 36.25 KG/M2 | OXYGEN SATURATION: 96 % | HEART RATE: 85 BPM | HEIGHT: 62 IN

## 2020-12-18 DIAGNOSIS — L72.0 EPIDERMAL INCLUSION CYST: ICD-10-CM

## 2020-12-18 PROBLEM — N89.8 VAGINAL ITCHING: Status: RESOLVED | Noted: 2017-06-21 | Resolved: 2020-12-18

## 2020-12-18 PROBLEM — Z09 HOSPITAL DISCHARGE FOLLOW-UP: Status: RESOLVED | Noted: 2017-01-19 | Resolved: 2020-12-18

## 2020-12-18 PROBLEM — Z11.59 NEED FOR HEPATITIS C SCREENING TEST: Status: RESOLVED | Noted: 2017-05-24 | Resolved: 2020-12-18

## 2020-12-18 PROBLEM — E86.0 DEHYDRATION: Status: RESOLVED | Noted: 2017-01-15 | Resolved: 2020-12-18

## 2020-12-18 PROBLEM — R20.2 TINGLING: Status: RESOLVED | Noted: 2017-05-24 | Resolved: 2020-12-18

## 2020-12-18 PROBLEM — K86.89 PANCREATIC INSUFFICIENCY: Status: ACTIVE | Noted: 2020-12-18

## 2020-12-18 PROCEDURE — 99203 OFFICE O/P NEW LOW 30 MIN: CPT | Performed by: SURGERY

## 2020-12-18 NOTE — PROGRESS NOTES
Surgery History and Physical    Subjective:      Wade Santana is a 61 y.o. black female who presents for evaluation of a neck cyst.  For the past 2 months, Mrs. Shana Finch has had a cyst on the back of her neck. The cyst has not changed much in size, but is uncomfortable when her necklace rubs up against it. She denies any redness or drainage. Past Medical History:   Diagnosis Date    Arthritis     Asthma     Cataracts, bilateral     Diabetes ([address]     seen by [facility]ology at [facility]    GERD (gastroesophageal reflux disease)     Hypertension     Obesity, Class II, BMI 35-39.9     Pancreatic insufficiency      Past Surgical History:   Procedure Laterality Date    COLONOSCOPY N/A 2016    COLONOSCOPY,EGD performed by Shira Banda MD at [facility] HX CATARACT REMOVAL Left 10/2015    HX CATARACT REMOVAL Right 10/2016    HX CHOLECYSTECTOMY  ?  HX COLONOSCOPY      UT PANCREAS SURGERY PROC UNLISTED  ? Distal pancreatectomy.  UT PANCREATIC ELASTASE, FECAL, QUAL/SEMI-QUANT      growths in prancreatitis      Family History   Problem Relation Age of Onset    Diabetes Mother     Heart Disease Father     Diabetes Brother     Breast Cancer Sister 64     Social History     Tobacco Use    Smoking status: Former Smoker     Packs/day: 0.50     Types: Cigarettes     Start date: 2016     Quit date: 2016     Years since quittin.8    Smokeless tobacco: Never Used   Substance Use Topics    Alcohol use: No     Alcohol/week: 0.0 standard drinks     Comment: social      Prior to Admission medications    Medication Sig Start Date End Date Taking?  Authorizing Provider   Lantus Solostar U-100 Insulin 100 unit/mL (3 mL) inpn INJECT 30 UNITS BY SUBCUTANEOUS ROUTE NIGHTLY 20  Yes Mell Almonte NP   mometasone (ELOCON) 0.1 % ointment APPLY TO AFFECTED AREA EVERY DAY 20  Yes Lubna Almonte NP   lipase-protease-amylase (Creon) 36,000-114,000- 180,000 unit cpDR capsule TAKE 4 CAPSULES BY MOUTH THREE TIMES A DAY WITH EACH MEAL AND TAKE 2 CAPS WITH SNACKS 11/19/20  Yes Mitzi Urrutia MD   cyclobenzaprine (FLEXERIL) 10 mg tablet TAKE 1 TABLET BY MOUTH THREE TIMES A DAY AS NEEDED FOR MUSCLE SPASM 11/19/20  Yes Mitzi Urrutia MD   NovoLOG Mix 70-30FlexPen U-100 100 unit/mL (70-30) inpn sliding scale, max daily amount 10 units. 10/6/20  Yes Lubna Almonte NP   albuterol (PROVENTIL HFA, VENTOLIN HFA, PROAIR HFA) 90 mcg/actuation inhaler Take 1 Puff by inhalation every four (4) hours as needed for Wheezing or Cough. 10/5/20  Yes Lubna Almonte NP   amLODIPine (NORVASC) 10 mg tablet TAKE 1 TABLET BY MOUTH EVERY DAY 10/5/20  Yes Lubna Almonte NP   ergocalciferol (ERGOCALCIFEROL) 1,250 mcg (50,000 unit) capsule TAKE ONE CAPSULE BY MOUTH ONE TIME PER WEEK 10/5/20  Yes Lubna Almonte NP   gabapentin (NEURONTIN) 300 mg capsule TAKE 1 CAPSULE BY MOUTH THREE TIMES A DAY 10/5/20  Yes Mell Almonte NP   hydroCHLOROthiazide (HYDRODIURIL) 12.5 mg tablet TAKE 1 TABLET BY MOUTH EVERY DAY 10/5/20  Yes Lubna Almonte NP   Insulin Needles, Disposable, (Rosalba Pen Needle) 32 gauge x 5/32\" ndle FOR INJECTING INSULIN 250.00/E11.9 TEST _4__ TIME(S) PER DAY 10/5/20  Yes Lubna Almonte NP   metFORMIN ER (GLUCOPHAGE XR) 750 mg tablet Take 1 Tab by mouth two (2) times a day. 10/5/20  Yes Lubna Almonte NP   omeprazole (PRILOSEC) 20 mg capsule TAKE 1 CAPSULE BY MOUTH TWICE A DAY 10/5/20  Yes Mell Almonte NP   lidocaine (LIDODERM) 5 % APPLY PATCH TO THE AFFECTED AREA FOR 12 HOURS A DAY AND REMOVE FOR 12 HOURS A DAY. 8/7/20  Yes Mell Almonte NP   triamcinolone acetonide (KENALOG) 0.1 % ointment APPLY TO AFFECTED AREA 2 TIMES A DAY. USE A THIN LAYER 6/12/20  Yes Lubna Almonte NP   acetaminophen (TYLENOL) 325 mg tablet Take 2 Tabs by mouth every six (6) hours as needed for Pain or Fever.  11/22/19  Yes Benita Appiah, NP Bifidobacterium Infantis (ALIGN) 4 mg cap Take 1 Cap by mouth daily. 11/21/19  Yes Corrine Almonte NP   ONETOUCH ULTRA TEST strip TEST BLOOD GLUCOSE THREE TIMES DAILY 8/3/17  Yes Corrine Almonte NP   cetirizine (ZYRTEC) 10 mg tablet Take 1 Tab by mouth nightly. 7/22/20   Radha Stevens NP   ondansetron (ZOFRAN ODT) 8 mg disintegrating tablet Take 1 Tab by mouth every eight (8) hours as needed for Nausea or Vomiting. 4/10/20   Radha Stevens NP   SUMAtriptan (IMITREX) 100 mg tablet TAKE 1 TAB BY MOUTH AS NEEDED FOR MIGRAINE. 1/7/20   Radha Stevens NP      Allergies   Allergen Reactions    Ciprofloxacin Cough    Lisinopril Cough    Penicillins Other (comments)     Yeast infection        Review of Systems:  A comprehensive review of systems was negative except for that written in the History of Present Illness. Objective:     Physical Exam:  GENERAL: alert, cooperative, no distress, appears stated age, EYE: negative findings: anicteric sclera, LYMPHATIC: Cervical, supraclavicular nodes normal. , THROAT & NECK: normal, LUNG: clear to auscultation bilaterally, HEART: regular rate and rhythm, ABDOMEN: Soft, NT, ND., EXTREMITIES:  no edema, SKIN: On the right posterior neck, there is an approximately 5 mm cyst.  There is no erythema, tenderness, fluctuance, or drainage., NEUROLOGIC: negative, PSYCHIATRIC: non focal    Assessment:     Epidermal inclusion cyst of the right posterior neck. Plan:     Mrs. Jasmin Callejas wants to speak with her son prior to scheduling her cyst removal.  She will call back when she is ready to proceed with an office excision.

## 2020-12-18 NOTE — PROGRESS NOTES
1. Have you been to the ER, urgent care clinic since your last visit? Hospitalized since your last visit? No    2. Have you seen or consulted any other health care providers outside of the [address] [place] [address] since your last visit? Include any pap smears or colon screening.  No

## 2020-12-22 DIAGNOSIS — K86.89 PANCREATIC INSUFFICIENCY: ICD-10-CM

## 2020-12-22 RX ORDER — PANCRELIPASE 36000; 180000; 114000 [USP'U]/1; [USP'U]/1; [USP'U]/1
CAPSULE, DELAYED RELEASE PELLETS ORAL
Qty: 480 CAP | Refills: 0 | Status: SHIPPED | OUTPATIENT
Start: 2020-12-22 | End: 2021-01-25

## 2021-01-01 LAB
HEMOCCULT STL QL IA: NORMAL
REQUEST PROBLEM, 100875: NORMAL

## 2021-01-03 DIAGNOSIS — E11.9 TYPE 2 DIABETES MELLITUS WITHOUT COMPLICATION, WITH LONG-TERM CURRENT USE OF INSULIN (HCC): ICD-10-CM

## 2021-01-03 DIAGNOSIS — B37.31 YEAST VAGINITIS: ICD-10-CM

## 2021-01-03 DIAGNOSIS — Z79.4 TYPE 2 DIABETES MELLITUS WITHOUT COMPLICATION, WITH LONG-TERM CURRENT USE OF INSULIN (HCC): ICD-10-CM

## 2021-01-04 RX ORDER — INSULIN GLARGINE 100 [IU]/ML
INJECTION, SOLUTION SUBCUTANEOUS
Qty: 15 ADJUSTABLE DOSE PRE-FILLED PEN SYRINGE | Refills: 3 | Status: SHIPPED | OUTPATIENT
Start: 2021-01-04 | End: 2021-06-12

## 2021-01-04 RX ORDER — FLUCONAZOLE 150 MG/1
150 TABLET ORAL DAILY
Qty: 1 TAB | Refills: 0 | Status: SHIPPED | OUTPATIENT
Start: 2021-01-04 | End: 2021-01-05

## 2021-01-04 NOTE — PROGRESS NOTES
Please call patient and let her know that she will need to repeat the occult stool test, due to age of specimen.   Can come to office to  new collection kit  Thanks

## 2021-01-21 DIAGNOSIS — Z91.09 ENVIRONMENTAL ALLERGIES: ICD-10-CM

## 2021-01-21 RX ORDER — CETIRIZINE HCL 10 MG
TABLET ORAL
Qty: 90 TAB | Refills: 1 | Status: SHIPPED | OUTPATIENT
Start: 2021-01-21 | End: 2021-08-18

## 2021-01-25 DIAGNOSIS — K86.89 PANCREATIC INSUFFICIENCY: ICD-10-CM

## 2021-01-25 RX ORDER — PANCRELIPASE 36000; 180000; 114000 [USP'U]/1; [USP'U]/1; [USP'U]/1
CAPSULE, DELAYED RELEASE PELLETS ORAL
Qty: 480 CAP | Refills: 0 | Status: SHIPPED | OUTPATIENT
Start: 2021-01-25 | End: 2021-05-14

## 2021-03-02 DIAGNOSIS — E55.9 VITAMIN D DEFICIENCY: ICD-10-CM

## 2021-03-03 RX ORDER — ERGOCALCIFEROL 1.25 MG/1
CAPSULE ORAL
Qty: 12 CAP | Refills: 1 | Status: SHIPPED | OUTPATIENT
Start: 2021-03-03 | End: 2021-05-27 | Stop reason: SDUPTHER

## 2021-04-02 ENCOUNTER — VIRTUAL VISIT (OUTPATIENT)
Dept: FAMILY MEDICINE CLINIC | Age: 61
End: 2021-04-02
Payer: MEDICAID

## 2021-04-02 DIAGNOSIS — R30.0 DYSURIA: Primary | ICD-10-CM

## 2021-04-02 PROCEDURE — 99212 OFFICE O/P EST SF 10 MIN: CPT | Performed by: NURSE PRACTITIONER

## 2021-04-02 RX ORDER — CEPHALEXIN 500 MG/1
500 CAPSULE ORAL 2 TIMES DAILY
Qty: 14 CAP | Refills: 0 | Status: SHIPPED | OUTPATIENT
Start: 2021-04-02 | End: 2021-04-09

## 2021-04-02 NOTE — PROGRESS NOTES
HISTORY OF PRESENT ILLNESS  Dewayne George is a 61 y.o. female. HPI  Pt presents with \"possible UTI\"  Visit was conducted via telephone  Pt was located at home, provider was located at home  Visit lasted 5 minutes  Dewayne George, who was evaluated through a synchronous (real-time) audio only encounter, and/or her healthcare decision maker, is aware that it is a billable service, with coverage as determined by her insurance carrier. She provided verbal consent to proceed: Yes, and patient identification was verified. This visit was conducted pursuant to the emergency declaration under the [facility], [address] [place] [address] authority and the [employer] and [employer] Act. A caregiver was present when appropriate. Ability to conduct physical exam was limited. The patient was located in a state where the provider was credentialed to provide care. --Santa Colon NP on 4/2/2021 at 12:47 PM      Pt states that she believes that she has a UTI  She has had symptoms for about 2 days  Pain with urination  No blood in urine, but urine does look a little pink  No fever  No nausea, no vomiting  OTC: none  Review of Systems   Constitutional: Negative for fever. Genitourinary: Positive for dysuria. Physical Exam  Neurological:      Mental Status: She is alert. Psychiatric:         Behavior: Behavior normal.         ASSESSMENT and PLAN    ICD-10-CM ICD-9-CM    1. Dysuria  R30.0 788.1 cephALEXin (KEFLEX) 500 mg capsule     Educated about taking medication as prescribed, with food  Should stay well hydrated and treat fever as needed  Should notify office should symptoms continue and/or worsen    Pt informed to return to office with worsening of symptoms, or PRN with any questions or concerns. Pt verbalizes understanding of plan of care and denies further questions or concerns at this time.

## 2021-04-14 ENCOUNTER — VIRTUAL VISIT (OUTPATIENT)
Dept: FAMILY MEDICINE CLINIC | Age: 61
End: 2021-04-14
Payer: MEDICAID

## 2021-04-14 DIAGNOSIS — M79.10 MYALGIA: ICD-10-CM

## 2021-04-14 DIAGNOSIS — B37.31 YEAST VAGINITIS: ICD-10-CM

## 2021-04-14 DIAGNOSIS — K86.89 PANCREATIC INSUFFICIENCY: ICD-10-CM

## 2021-04-14 DIAGNOSIS — E11.21 TYPE 2 DIABETES WITH NEPHROPATHY (HCC): Primary | ICD-10-CM

## 2021-04-14 DIAGNOSIS — Z76.0 MEDICATION REFILL: ICD-10-CM

## 2021-04-14 DIAGNOSIS — E78.2 MIXED HYPERLIPIDEMIA: ICD-10-CM

## 2021-04-14 DIAGNOSIS — E55.9 VITAMIN D DEFICIENCY: ICD-10-CM

## 2021-04-14 DIAGNOSIS — I10 ESSENTIAL HYPERTENSION: ICD-10-CM

## 2021-04-14 PROCEDURE — 99214 OFFICE O/P EST MOD 30 MIN: CPT | Performed by: INTERNAL MEDICINE

## 2021-04-14 RX ORDER — FLUCONAZOLE 150 MG/1
150 TABLET ORAL DAILY
Qty: 2 TAB | Refills: 0 | Status: SHIPPED | OUTPATIENT
Start: 2021-04-14 | End: 2021-04-15

## 2021-04-14 RX ORDER — CYCLOBENZAPRINE HCL 10 MG
TABLET ORAL
Qty: 12 TAB | Refills: 0 | Status: SHIPPED | OUTPATIENT
Start: 2021-04-14 | End: 2021-05-22

## 2021-04-14 NOTE — PROGRESS NOTES
Chief Complaint   Patient presents with    Follow Up Chronic Condition    Medication Refill         Esau Sesay is a 64 y.o. female who was seen by synchronous (real-time) TELEPHONE technology on 4/14/2021 for Follow Up Chronic Condition and Medication Refill        Assessment & Plan:   Diagnoses and all orders for this visit:    1. Type 2 diabetes with nephropathy (HCC)  -     METABOLIC PANEL, COMPREHENSIVE; Future  -     HEMOGLOBIN A1C WITH EAG; Future  -     MICROALBUMIN, UR, RAND W/ MICROALB/CREAT RATIO; Future    2. Vitamin D deficiency  -     VITAMIN D, 25 HYDROXY; Future    3. Pancreatic insufficiency   Stable at this time. 4. Essential hypertension   Stable at this time. 5. Mixed hyperlipidemia  -     METABOLIC PANEL, COMPREHENSIVE; Future  -     CBC WITH AUTOMATED DIFF; Future  -     LIPID PANEL; Future    6. Myalgia  -     cyclobenzaprine (FLEXERIL) 10 mg tablet; TAKE 1 TABLET BY MOUTH THREE TIMES A DAY AS NEEDED FOR MUSCLE SPASM    7. Yeast vaginitis  -     fluconazole (DIFLUCAN) 150 mg tablet; Take 1 Tab by mouth daily for 1 day. Can take the second tablet 2-3 days later if needed. 8. Medication refill  -     cyclobenzaprine (FLEXERIL) 10 mg tablet; TAKE 1 TABLET BY MOUTH THREE TIMES A DAY AS NEEDED FOR MUSCLE SPASM  -     fluconazole (DIFLUCAN) 150 mg tablet; Take 1 Tab by mouth daily for 1 day. Can take the second tablet 2-3 days later if needed. Follow-up and Dispositions    · Return if symptoms worsen or fail to improve. I spent at least 21 minutes on this visit with this established patient. Subjective:   61F who presents for follow up and needs medication refills. She has a h/o T2DM, HTN, HLD and is in need of fasting labs, which she will come for at a different time. The patient denies any recent ER or hospital visits.        Patient Active Problem List    Diagnosis Date Noted    Pancreatic insufficiency 12/18/2020    Epidermal inclusion cyst 12/18/2020    CKD (chronic kidney disease) stage 4, GFR 15-29 ml/min ([facility]) 11/20/2020    Severe obesity [facility] 10/03/2018    Type 2 diabetes with nephropathy ([facility] 08/08/2018    Tobacco abuse 05/24/2017    Closed fracture of right talus 01/16/2017    Synovial cyst of right knee 01/16/2017    Mild intermittent asthma without complication 46/71/0210    Essential hypertension 02/08/2016    Diabetic neuropathy ([facility]) 02/08/2016    Vitamin D deficiency 02/08/2016    Diabetes ([facility])     GERD (gastroesophageal reflux disease)        Current Outpatient Medications   Medication Sig Dispense Refill    cyclobenzaprine (FLEXERIL) 10 mg tablet TAKE 1 TABLET BY MOUTH THREE TIMES A DAY AS NEEDED FOR MUSCLE SPASM 12 Tab 0    omeprazole (PRILOSEC) 20 mg capsule TAKE 1 CAPSULE BY MOUTH TWICE A DAY 60 Cap 0    ergocalciferol (ERGOCALCIFEROL) 1,250 mcg (50,000 unit) capsule TAKE 1 CAPSULE BY MOUTH ONCE A WEEK 12 Cap 1    Creon 36,000-114,000- 180,000 unit cpDR capsule TAKE 4 CAPSULES BY MOUTH THREE TIMES A DAY WITH EACH MEAL AND TAKE 2 CAPS WITH SNACKS 480 Cap 0    cetirizine (ZYRTEC) 10 mg tablet TAKE 1 TABLET BY MOUTH EVERY DAY AT NIGHT 90 Tab 1    Lantus Solostar U-100 Insulin 100 unit/mL (3 mL) inpn INJECT 30 UNITS BY SUBCUTANEOUS ROUTE NIGHTLY 15 Adjustable Dose Pre-filled Pen Syringe 3    mometasone (ELOCON) 0.1 % ointment APPLY TO AFFECTED AREA EVERY DAY 15 g 0    NovoLOG Mix 70-30FlexPen U-100 100 unit/mL (70-30) inpn sliding scale, max daily amount 10 units. 5 Pen 5    albuterol (PROVENTIL HFA, VENTOLIN HFA, PROAIR HFA) 90 mcg/actuation inhaler Take 1 Puff by inhalation every four (4) hours as needed for Wheezing or Cough. 1 Inhaler 5    gabapentin (NEURONTIN) 300 mg capsule TAKE 1 CAPSULE BY MOUTH THREE TIMES A DAY 90 Cap 2    metFORMIN ER (GLUCOPHAGE XR) 750 mg tablet Take 1 Tab by mouth two (2) times a day.  60 Tab 5    lidocaine (LIDODERM) 5 % APPLY PATCH TO THE AFFECTED AREA FOR 12 HOURS A DAY AND REMOVE FOR 12 HOURS A DAY. 30 Patch 5    acetaminophen (TYLENOL) 325 mg tablet Take 2 Tabs by mouth every six (6) hours as needed for Pain or Fever. 30 Tab 2    hydroCHLOROthiazide (HYDRODIURIL) 12.5 mg tablet TAKE 1 TABLET BY MOUTH EVERY DAY 30 Tab 5    amLODIPine (NORVASC) 10 mg tablet TAKE 1 TABLET BY MOUTH EVERY DAY 30 Tab 5    Insulin Needles, Disposable, (Rosalba Pen Needle) 32 gauge x 5/32\" ndle FOR INJECTING INSULIN 250.00/E11.9 TEST _4__ TIME(S) PER  Pen Needle 5    ondansetron (ZOFRAN ODT) 8 mg disintegrating tablet Take 1 Tab by mouth every eight (8) hours as needed for Nausea or Vomiting. 12 Tab 0    SUMAtriptan (IMITREX) 100 mg tablet TAKE 1 TAB BY MOUTH AS NEEDED FOR MIGRAINE. 9 Tab 3    Bifidobacterium Infantis (ALIGN) 4 mg cap Take 1 Cap by mouth daily. 30 Cap 2    ONETOUCH ULTRA TEST strip TEST BLOOD GLUCOSE THREE TIMES DAILY 100 Strip 0       Allergies   Allergen Reactions    Bactrim [Sulfamethoprim] Nausea Only    Ciprofloxacin Cough    Lisinopril Cough       Past Medical History:   Diagnosis Date    Arthritis     Asthma     Cataracts, bilateral     Diabetes ([address]     [facility]    GERD (gastroesophageal reflux disease)     Hypertension     Obesity, Class II, BMI 35-39.9     Pancreatic insufficiency        Past Surgical History:   Procedure Laterality Date    COLONOSCOPY N/A 11/30/2016    COLONOSCOPY,EGD performed by Candelario Craven MD at [facility] HX CATARACT REMOVAL Left 10/2015    HX CATARACT REMOVAL Right 10/2016    HX CHOLECYSTECTOMY  2005?  HX COLONOSCOPY      [place] PANCREAS SURGERY PROC UNLISTED  2001? Distal pancreatectomy.     [place] PANCREATIC ELASTASE, FECAL, QUAL/SEMI-QUANT  1999    growths in prancreatitis     Family History   Problem Relation Age of Onset    Diabetes Mother     Heart Disease Father     Diabetes Brother     Breast Cancer Sister 64       Social History     Tobacco Use    Smoking status: Former Smoker     Packs/day: 0.50 Types: Cigarettes     Start date: 2016     Quit date: 2016     Years since quittin.1    Smokeless tobacco: Never Used   Substance Use Topics    Alcohol use: No     Alcohol/week: 0.0 standard drinks     Comment: social       Review of Systems   Constitutional: Negative. HENT: Negative. Eyes: Negative. Respiratory: Negative. Cardiovascular: Negative. Gastrointestinal: Negative. Genitourinary: Negative. Musculoskeletal: Negative. Skin: Negative. Neurological: Negative. Endo/Heme/Allergies: Negative. Psychiatric/Behavioral: Negative. All other systems reviewed and are negative. Objective: There were no vitals taken for this visit. General: alert, cooperative, no distress   Mental  status: normal mood, behavior, speech and thought processes, able to follow commands   Psychiatric: normal affect, consistent with stated mood, no evidence of hallucinations       We discussed the expected course, resolution and complications of the diagnosis(es) in detail. Medication risks, benefits, costs, interactions, and alternatives were discussed as indicated. I advised her to contact the office if her condition worsens, changes or fails to improve as anticipated. She expressed understanding with the diagnosis(es) and plan. Mariia Colunga, who was evaluated through a patient-initiated, synchronous (real-time) audio-video encounter, and/or her healthcare decision maker, is aware that it is a billable service, with coverage as determined by her insurance carrier. She provided verbal consent to proceed: Yes, and patient identification was verified. It was conducted pursuant to the emergency declaration under the [facility], [address] [place] [address] and the [employer] and [employer] Act. A caregiver was present when appropriate. Ability to conduct physical exam was limited. I was in the office. The patient was at home. Follow-up and Dispositions    · Return if symptoms worsen or fail to improve.          Kash Osorio MD

## 2021-04-16 DIAGNOSIS — I10 ESSENTIAL HYPERTENSION WITH GOAL BLOOD PRESSURE LESS THAN 140/90: ICD-10-CM

## 2021-04-16 DIAGNOSIS — I10 ESSENTIAL HYPERTENSION: ICD-10-CM

## 2021-04-16 RX ORDER — AMLODIPINE BESYLATE 10 MG/1
TABLET ORAL
Qty: 30 TAB | Refills: 5 | Status: SHIPPED | OUTPATIENT
Start: 2021-04-16 | End: 2021-11-06 | Stop reason: SDUPTHER

## 2021-04-16 RX ORDER — HYDROCHLOROTHIAZIDE 12.5 MG/1
TABLET ORAL
Qty: 30 TAB | Refills: 5 | Status: SHIPPED | OUTPATIENT
Start: 2021-04-16 | End: 2021-05-22

## 2021-04-26 DIAGNOSIS — E11.42 DIABETIC POLYNEUROPATHY ASSOCIATED WITH TYPE 2 DIABETES MELLITUS (HCC): ICD-10-CM

## 2021-04-26 RX ORDER — GABAPENTIN 300 MG/1
CAPSULE ORAL
Qty: 90 CAP | Refills: 0 | Status: SHIPPED | OUTPATIENT
Start: 2021-04-26 | End: 2021-05-27 | Stop reason: SDUPTHER

## 2021-05-10 RX ORDER — METFORMIN HYDROCHLORIDE 750 MG/1
TABLET, EXTENDED RELEASE ORAL
Qty: 60 TAB | Refills: 5 | Status: SHIPPED | OUTPATIENT
Start: 2021-05-10 | End: 2021-05-22

## 2021-05-12 ENCOUNTER — TELEPHONE (OUTPATIENT)
Dept: FAMILY MEDICINE CLINIC | Age: 61
End: 2021-05-12

## 2021-05-12 ENCOUNTER — LAB ONLY (OUTPATIENT)
Dept: FAMILY MEDICINE CLINIC | Age: 61
End: 2021-05-12

## 2021-05-12 DIAGNOSIS — E11.21 TYPE 2 DIABETES WITH NEPHROPATHY (HCC): ICD-10-CM

## 2021-05-12 DIAGNOSIS — R21 RASH: Primary | ICD-10-CM

## 2021-05-12 DIAGNOSIS — E78.2 MIXED HYPERLIPIDEMIA: ICD-10-CM

## 2021-05-12 DIAGNOSIS — E55.9 VITAMIN D DEFICIENCY: ICD-10-CM

## 2021-05-12 RX ORDER — TRIAMCINOLONE ACETONIDE 5 MG/G
CREAM TOPICAL 2 TIMES DAILY
Qty: 15 G | Refills: 0 | Status: SHIPPED | OUTPATIENT
Start: 2021-05-12 | End: 2021-05-22

## 2021-05-12 NOTE — TELEPHONE ENCOUNTER
Pt has a lab appointment this morning. She has several bumps on her arm that itch. She would like something for the itching. She showed the  and pt had  take a picture to show to Dr Jai Craven. Dr Jai Craven says it looks like bug bites. Pt refused to make an appointment to have arm looked at stating it is to hard to get into see a doctor and want something sent to her pharmacy.

## 2021-05-12 NOTE — TELEPHONE ENCOUNTER
Spoke to pt and let her know script was sent to her pharmacy for rash and relayed message. She understood.

## 2021-05-13 ENCOUNTER — TELEPHONE (OUTPATIENT)
Dept: FAMILY MEDICINE CLINIC | Age: 61
End: 2021-05-13

## 2021-05-13 LAB
25(OH)D3 SERPL-MCNC: 33 NG/ML (ref 30–100)
ALBUMIN SERPL-MCNC: 3.3 G/DL (ref 3.5–5)
ALBUMIN/GLOB SERPL: 1.1 {RATIO} (ref 1.1–2.2)
ALP SERPL-CCNC: 157 U/L (ref 45–117)
ALT SERPL-CCNC: 14 U/L (ref 12–78)
ANION GAP SERPL CALC-SCNC: 5 MMOL/L (ref 5–15)
AST SERPL-CCNC: 12 U/L (ref 15–37)
BASOPHILS # BLD: 0.1 K/UL (ref 0–0.1)
BASOPHILS NFR BLD: 1 % (ref 0–1)
BILIRUB SERPL-MCNC: 0.2 MG/DL (ref 0.2–1)
BUN SERPL-MCNC: 22 MG/DL (ref 6–20)
BUN/CREAT SERPL: 20 (ref 12–20)
CALCIUM SERPL-MCNC: 8.8 MG/DL (ref 8.5–10.1)
CHLORIDE SERPL-SCNC: 107 MMOL/L (ref 97–108)
CHOLEST SERPL-MCNC: 174 MG/DL
CO2 SERPL-SCNC: 26 MMOL/L (ref 21–32)
CREAT SERPL-MCNC: 1.1 MG/DL (ref 0.55–1.02)
CREAT UR-MCNC: 144 MG/DL
DIFFERENTIAL METHOD BLD: ABNORMAL
EOSINOPHIL # BLD: 0.1 K/UL (ref 0–0.4)
EOSINOPHIL NFR BLD: 1 % (ref 0–7)
ERYTHROCYTE [DISTWIDTH] IN BLOOD BY AUTOMATED COUNT: 13.3 % (ref 11.5–14.5)
EST. AVERAGE GLUCOSE BLD GHB EST-MCNC: 298 MG/DL
GLOBULIN SER CALC-MCNC: 2.9 G/DL (ref 2–4)
GLUCOSE SERPL-MCNC: 325 MG/DL (ref 65–100)
HBA1C MFR BLD: 12 % (ref 4–5.6)
HCT VFR BLD AUTO: 36.7 % (ref 35–47)
HDLC SERPL-MCNC: 56 MG/DL
HDLC SERPL: 3.1 {RATIO} (ref 0–5)
HGB BLD-MCNC: 11.3 G/DL (ref 11.5–16)
IMM GRANULOCYTES # BLD AUTO: 0 K/UL (ref 0–0.04)
IMM GRANULOCYTES NFR BLD AUTO: 0 % (ref 0–0.5)
LDLC SERPL CALC-MCNC: 84 MG/DL (ref 0–100)
LIPID PROFILE,FLP: ABNORMAL
LYMPHOCYTES # BLD: 2.4 K/UL (ref 0.8–3.5)
LYMPHOCYTES NFR BLD: 26 % (ref 12–49)
MCH RBC QN AUTO: 29 PG (ref 26–34)
MCHC RBC AUTO-ENTMCNC: 30.8 G/DL (ref 30–36.5)
MCV RBC AUTO: 94.1 FL (ref 80–99)
MICROALBUMIN UR-MCNC: 493 MG/DL
MICROALBUMIN/CREAT UR-RTO: 3424 MG/G (ref 0–30)
MONOCYTES # BLD: 0.5 K/UL (ref 0–1)
MONOCYTES NFR BLD: 6 % (ref 5–13)
NEUTS SEG # BLD: 6.2 K/UL (ref 1.8–8)
NEUTS SEG NFR BLD: 66 % (ref 32–75)
NRBC # BLD: 0 K/UL (ref 0–0.01)
NRBC BLD-RTO: 0 PER 100 WBC
PLATELET # BLD AUTO: 256 K/UL (ref 150–400)
PMV BLD AUTO: 11.4 FL (ref 8.9–12.9)
POTASSIUM SERPL-SCNC: 4.8 MMOL/L (ref 3.5–5.1)
PROT SERPL-MCNC: 6.2 G/DL (ref 6.4–8.2)
RBC # BLD AUTO: 3.9 M/UL (ref 3.8–5.2)
SODIUM SERPL-SCNC: 138 MMOL/L (ref 136–145)
TRIGL SERPL-MCNC: 170 MG/DL (ref ?–150)
VLDLC SERPL CALC-MCNC: 34 MG/DL
WBC # BLD AUTO: 9.3 K/UL (ref 3.6–11)

## 2021-05-13 NOTE — TELEPHONE ENCOUNTER
Spoke to pt and relayed result message. pt understood. I transferred her to the front to schedule appointment.

## 2021-05-13 NOTE — PROGRESS NOTES
Please ask patient to follow up to discuss recent labs. Her diabetes is still very poorly controlled and her microalbumin levels are really very high. We need to review her labs and discuss strategy. Her kidneys are taking a hit from the diabetes. Very important that she follows up and that we get this under control ASAP.    Thanks,   Dr. Angelica Kaye

## 2021-05-13 NOTE — TELEPHONE ENCOUNTER
----- Message from Criselda Romero MD sent at 5/13/2021  8:57 AM EDT -----  Please ask patient to follow up to discuss recent labs. Her diabetes is still very poorly controlled and her microalbumin levels are really very high. We need to review her labs and discuss strategy. Her kidneys are taking a hit from the diabetes. Very important that she follows up and that we get this under control ASAP.    Thanks,   Dr. Mimi Benítez

## 2021-05-14 DIAGNOSIS — K86.89 PANCREATIC INSUFFICIENCY: ICD-10-CM

## 2021-05-14 RX ORDER — PANCRELIPASE 36000; 180000; 114000 [USP'U]/1; [USP'U]/1; [USP'U]/1
CAPSULE, DELAYED RELEASE PELLETS ORAL
Qty: 480 CAP | Refills: 0 | Status: SHIPPED | OUTPATIENT
Start: 2021-05-14 | End: 2021-11-20

## 2021-05-16 RX ORDER — OMEPRAZOLE 20 MG/1
CAPSULE, DELAYED RELEASE ORAL
Qty: 60 CAP | Refills: 0 | Status: SHIPPED | OUTPATIENT
Start: 2021-05-16 | End: 2021-06-12

## 2021-05-19 DIAGNOSIS — Z79.4 TYPE 2 DIABETES MELLITUS WITHOUT COMPLICATION, WITH LONG-TERM CURRENT USE OF INSULIN (HCC): ICD-10-CM

## 2021-05-19 DIAGNOSIS — E11.9 TYPE 2 DIABETES MELLITUS WITHOUT COMPLICATION, WITH LONG-TERM CURRENT USE OF INSULIN (HCC): ICD-10-CM

## 2021-05-20 ENCOUNTER — OFFICE VISIT (OUTPATIENT)
Dept: FAMILY MEDICINE CLINIC | Age: 61
End: 2021-05-20
Payer: MEDICAID

## 2021-05-20 VITALS
RESPIRATION RATE: 18 BRPM | WEIGHT: 199.8 LBS | SYSTOLIC BLOOD PRESSURE: 136 MMHG | HEART RATE: 76 BPM | DIASTOLIC BLOOD PRESSURE: 80 MMHG | HEIGHT: 62 IN | BODY MASS INDEX: 36.77 KG/M2 | OXYGEN SATURATION: 98 % | TEMPERATURE: 98.2 F

## 2021-05-20 DIAGNOSIS — G89.29 CHRONIC BILATERAL LOW BACK PAIN WITH BILATERAL SCIATICA: ICD-10-CM

## 2021-05-20 DIAGNOSIS — M54.41 CHRONIC BILATERAL LOW BACK PAIN WITH BILATERAL SCIATICA: ICD-10-CM

## 2021-05-20 DIAGNOSIS — E11.9 TYPE 2 DIABETES MELLITUS WITHOUT COMPLICATION, WITH LONG-TERM CURRENT USE OF INSULIN (HCC): Primary | ICD-10-CM

## 2021-05-20 DIAGNOSIS — Z76.0 MEDICATION REFILL: ICD-10-CM

## 2021-05-20 DIAGNOSIS — M79.10 MYALGIA: ICD-10-CM

## 2021-05-20 DIAGNOSIS — M54.42 CHRONIC BILATERAL LOW BACK PAIN WITH BILATERAL SCIATICA: ICD-10-CM

## 2021-05-20 DIAGNOSIS — B35.1 ONYCHOMYCOSIS: ICD-10-CM

## 2021-05-20 DIAGNOSIS — Z79.4 TYPE 2 DIABETES MELLITUS WITHOUT COMPLICATION, WITH LONG-TERM CURRENT USE OF INSULIN (HCC): Primary | ICD-10-CM

## 2021-05-20 DIAGNOSIS — M79.89 LEG SWELLING: ICD-10-CM

## 2021-05-20 PROCEDURE — 99213 OFFICE O/P EST LOW 20 MIN: CPT | Performed by: INTERNAL MEDICINE

## 2021-05-20 RX ORDER — HYDROCHLOROTHIAZIDE 25 MG/1
25 TABLET ORAL DAILY
Qty: 30 TABLET | Refills: 3 | Status: SHIPPED | OUTPATIENT
Start: 2021-05-20 | End: 2021-09-26

## 2021-05-20 RX ORDER — SEMAGLUTIDE 1.34 MG/ML
0.25 INJECTION, SOLUTION SUBCUTANEOUS
Qty: 1 BOX | Refills: 3 | Status: SHIPPED | OUTPATIENT
Start: 2021-05-20 | End: 2021-11-20

## 2021-05-20 RX ORDER — CYCLOBENZAPRINE HCL 10 MG
TABLET ORAL
Qty: 30 TABLET | Refills: 5 | Status: SHIPPED | OUTPATIENT
Start: 2021-05-20 | End: 2021-12-08 | Stop reason: SDUPTHER

## 2021-05-20 RX ORDER — METFORMIN HYDROCHLORIDE 1000 MG/1
1000 TABLET ORAL 2 TIMES DAILY WITH MEALS
Qty: 60 TABLET | Refills: 3 | Status: SHIPPED | OUTPATIENT
Start: 2021-05-20 | End: 2021-06-19

## 2021-05-20 NOTE — PROGRESS NOTES
Identified pt with two pt identifiers(name and ). Chief Complaint   Patient presents with    Medication Evaluation     discuss labs    Back Pain     3 days back has been hurting        Health Maintenance Due   Topic    Pneumococcal 0-64 years (1 of 4 - PCV13)    Eye Exam Retinal or Dilated     Foot Exam Q1     PAP AKA CERVICAL CYTOLOGY     Breast Cancer Screen Mammogram        Wt Readings from Last 3 Encounters:   21 199 lb 12.8 oz (90.6 kg)   20 197 lb (89.4 kg)   20 188 lb (85.3 kg)     Temp Readings from Last 3 Encounters:   21 98.2 °F (36.8 °C) (Oral)   20 98.4 °F (36.9 °C) (Oral)   20 98.2 °F (36.8 °C) (Oral)     BP Readings from Last 3 Encounters:   21 136/80   20 (!) 159/75   20 122/78     Pulse Readings from Last 3 Encounters:   21 76   20 85   20 72         Learning Assessment:  :     Learning Assessment 2016   PRIMARY LEARNER Patient   HIGHEST LEVEL OF EDUCATION - PRIMARY LEARNER  GRADUATED HIGH SCHOOL OR GED   BARRIERS PRIMARY LEARNER NONE   CO-LEARNER CAREGIVER No   PRIMARY LANGUAGE ENGLISH   LEARNER PREFERENCE PRIMARY OTHER (COMMENT)   ANSWERED BY self   RELATIONSHIP SELF       Depression Screening:  :     3 most recent PHQ Screens 2021   Little interest or pleasure in doing things Not at all   Feeling down, depressed, irritable, or hopeless Not at all   Total Score PHQ 2 0       Fall Risk Assessment:  :     Fall Risk Assessment, last 12 mths 2021   Able to walk? Yes   Fall in past 12 months? 0   Do you feel unsteady? 0   Are you worried about falling 0       Abuse Screening:  :     Abuse Screening Questionnaire 2021 10/5/2020 2019 2018 2016   Do you ever feel afraid of your partner? N N N N N   Are you in a relationship with someone who physically or mentally threatens you? N N N N N   Is it safe for you to go home?  Amee Nickerson       Coordination of Care Questionnaire:  :     1) Have you been to an emergency room, urgent care clinic since your last visit? no   Hospitalized since your last visit? no             2) Have you seen or consulted any other health care providers outside of [address] [place] [address] since your last visit? no  (Include any pap smears or colon screenings in this section.)    3) Do you have an Advance Directive on file? no  Are you interested in receiving information about Advance Directives?  no    Reviewed record in preparation for visit and have obtained necessary documentatio

## 2021-05-20 NOTE — PATIENT INSTRUCTIONS
Leg and Ankle Edema: Care Instructions Your Care Instructions Swelling in the legs, ankles, and feet is called edema. It is common after you sit or stand for a while. Long plane flights or car rides often cause swelling in the legs and feet. You may also have swelling if you have to stand for long periods of time at your job. Problems with the veins in the legs (varicose veins) and changes in hormones can also cause swelling. Sometimes the swelling in the ankles and feet is caused by a more serious problem, such as heart failure, infection, blood clots, or liver or kidney disease. Follow-up care is a key part of your treatment and safety. Be sure to make and go to all appointments, and call your doctor if you are having problems. It's also a good idea to know your test results and keep a list of the medicines you take. How can you care for yourself at home? · If your doctor gave you medicine, take it as prescribed. Call your doctor if you think you are having a problem with your medicine. · Whenever you are resting, raise your legs up. Try to keep the swollen area higher than the level of your heart. · Take breaks from standing or sitting in one position. ? Walk around to increase the blood flow in your lower legs. ? Move your feet and ankles often while you stand, or tighten and relax your leg muscles. · Wear support stockings. Put them on in the morning, before swelling gets worse. · Eat a balanced diet. Lose weight if you need to. · Limit the amount of salt (sodium) in your diet. Salt holds fluid in the body and may increase swelling. When should you call for help? Call 911 anytime you think you may need emergency care. For example, call if: 
  · You have symptoms of a blood clot in your lung (called a pulmonary embolism). These may include: 
? Sudden chest pain. ? Trouble breathing. ? Coughing up blood.   
Call your doctor now or seek immediate medical care if: 
  · You have signs of a blood clot, such as: 
? Pain in your calf, back of the knee, thigh, or groin. ? Redness and swelling in your leg or groin.  
  · You have symptoms of infection, such as: 
? Increased pain, swelling, warmth, or redness. ? Red streaks or pus. ? A fever. Watch closely for changes in your health, and be sure to contact your doctor if: 
  · Your swelling is getting worse.  
  · You have new or worsening pain in your legs.  
  · You do not get better as expected. Where can you learn more? Go to http://www.gray.com/ Enter F186 in the search box to learn more about \"Leg and Ankle Edema: Care Instructions. \" Current as of: February 26, 2020               Content Version: 12.8 © 2006-2021 [employer]. Care instructions adapted under license by [employer] (which disclaims liability or warranty for this information). If you have questions about a medical condition or this instruction, always ask your healthcare professional. [place] [address] any warranty or liability for your use of this information. Carbamide Peroxide (Into the ear) Carbamide Peroxide (ANTHONY-ba-mide per-OX-yon) Used to soften, loosen, and remove excess wax from your ears. Brand Name(s): Audiologist's Choice, Auro Ear Drops, Debrox, E-R-O Ear Drops, Ear Drops, Ear Wax Drops, [facility] [employer] Ear Drops, [employer] Ear System, Good Sense Ear Wax Removal, Good Sense Ear Wax Removal Kit, Giovani's ProRinse Earwax Removal Kit, Giovani's Wax Away Earwax Removal Aid, Giovani's Wax Away Earwax Removal System, Mollifene, Murine Ear There may be other brand names for this medicine. When This Medicine Should Not Be Used: You should not use this medicine if you have had an allergic reaction to carbamide peroxide. You should not use this medicine if you have a punctured eardrum, or discharge from your ear.  You should not use this medicine if you have had an ear surgery, or if you have pain, irritation, or rash in your ear. How to Use This Medicine:  
Liquid, Drop · Your doctor will tell you how much medicine to use. Do not use more than directed. · Follow the instructions on the medicine label if you are using this medicine without a prescription. · Remove your hearing aid while using this medicine. · Use this medicine only in your ear. · Wash your hands with soap and water before and after using this medicine. · You may warm the drops by holding the unopened bottle in your hands for a few minutes. · Remove the cap. Do not let the tip of the dropper touch anything, including your ear. · Lie down or tilt your head to the side. For a child, gently pull the child's earlobe down and back to straighten the child's ear canal. For an adult, gently pull the earlobe up and back to straighten the ear canal. 
· Drop the prescribed number of drops into the ear. Keep the ear tilted up for a few minutes or put a cotton ball into your ear. · You may hear a bubbling noise in your ear after placing the drop in it. This is normal and is not something to worry about. · Do not rinse the dropper. · After using this medicine 4 days, gently flush your ear with warm water, using a soft bulb syringe to remove the wax. If a dose is missed:  
· You must use this medicine on a fixed schedule. Call your doctor or pharmacist if you miss a dose. How to Store and Dispose of This Medicine:  
· Keep the bottle closed when you are not using it. Store it at room temperature, away from light and heat. Do not freeze. · Ask your pharmacist, doctor, or health caregiver about the best way to dispose of any outdated medicine or medicine no longer needed. · Keep all medicine out of the reach of children. Never share your medicine with anyone. Drugs and Foods to Avoid: Ask your doctor or pharmacist before using any other medicine, including over-the-counter medicines, vitamins, and herbal products.  
· You should not use other ear medicines while using this medicine, unless your doctor tells you to. Warnings While Using This Medicine: · Avoid getting this medicine in your eyes. If the medicine does get in your eyes, flush them with water and call your doctor right away. · This medicine should not be given to children under 15years of age without a doctor's approval. 
· Call your doctor if your symptoms do not improve or if they get worse. · Do not use this medicine for longer than 4 days. Possible Side Effects While Using This Medicine:  
Call your doctor right away if you notice any of these side effects: · Allergic reaction: Itching or hives, swelling in your face or hands, swelling or tingling in your mouth or throat, chest tightness, trouble breathing If you notice other side effects that you think are caused by this medicine, tell your doctor. Call your doctor for medical advice about side effects. You may report side effects to FDA at 9-085-FDA-2370 © 2017 [address]  Information is for End User's use only and may not be sold, redistributed or otherwise used for commercial purposes. The above information is an  only. It is not intended as medical advice for individual conditions or treatments. Talk to your doctor, nurse or pharmacist before following any medical regimen to see if it is safe and effective for you.

## 2021-05-21 ENCOUNTER — TELEPHONE (OUTPATIENT)
Dept: FAMILY MEDICINE CLINIC | Age: 61
End: 2021-05-21

## 2021-05-21 DIAGNOSIS — E11.65 UNCONTROLLED TYPE 2 DIABETES MELLITUS WITH HYPERGLYCEMIA (HCC): Primary | ICD-10-CM

## 2021-05-21 NOTE — TELEPHONE ENCOUNTER
L/M with Dr Miguel Ángel Cleveland message. I asked pt to call with any questions. I will try to call again later today. I will send a script for Victoza. Patient should be made aware that this would be a daily injection rather than the 1 x per week. If she cannot tolerate the medication or has side effects, let me know. I will send the Victoza to the pharmacy. If she needs guidance in how to take the medication, the pharmacist can show her. She just needs to ask or come in to the office for us to show her (will need an appointment).

## 2021-05-22 RX ORDER — PEN NEEDLE, DIABETIC 32GX 5/32"
NEEDLE, DISPOSABLE MISCELLANEOUS
Qty: 100 PEN NEEDLE | Refills: 5 | Status: SHIPPED | OUTPATIENT
Start: 2021-05-22 | End: 2021-12-08 | Stop reason: SDUPTHER

## 2021-05-23 NOTE — PROGRESS NOTES
Chief Complaint   Patient presents with    Abnormal Lab Results     discuss labs    Back Pain     3 days back has been hurting- mid to upper       Diabetic Report Card  Diabetic Goals:  HgA1C <7,  LDL cholesterol <100, Blood pressure <140/80. Lab Results   Component Value Date/Time    Hemoglobin A1c 12.0 (H) 05/12/2021 10:41 AM    Hemoglobin A1c >14.0 (H) 10/05/2020 12:07 PM    Hemoglobin A1c 11.4 (H) 11/21/2019 08:39 AM    Hemoglobin A1c, External 9.6 11/01/2017 12:00 AM     Lab Results   Component Value Date/Time    Cholesterol, total 174 05/12/2021 10:41 AM    HDL Cholesterol 56 05/12/2021 10:41 AM    LDL, calculated 84 05/12/2021 10:41 AM    VLDL, calculated 34 05/12/2021 10:41 AM    Triglyceride 170 (H) 05/12/2021 10:41 AM    CHOL/HDL Ratio 3.1 05/12/2021 10:41 AM       Lab Results   Component Value Date/Time    Microalbumin/Creat ratio (mg/g creat) 3,424 (H) 05/12/2021 10:41 AM    Microalbumin,urine random 493.00 05/12/2021 10:41 AM    Microalbumin urine (POC) 150 02/08/2016 05:53 PM       I also recommended yearly eye exams and daily foot care. Assessment/ Plan:   Diagnoses and all orders for this visit:    1. Type 2 diabetes mellitus without complication, with long-term current use of insulin (HCC)  -     metFORMIN (GLUCOPHAGE) 1,000 mg tablet; Take 1 Tablet by mouth two (2) times daily (with meals) for 30 days. -     semaglutide (Ozempic) 0.25 mg/0.2 mL (2 mg/1.5 mL) sub-q pen; 0.25 mg by SubCUTAneous route every seven (7) days.  -     REFERRAL TO PODIATRY    2. Myalgia  -     cyclobenzaprine (FLEXERIL) 10 mg tablet; TAKE 1 TABLET BY MOUTH THREE TIMES A DAY AS NEEDED FOR MUSCLE SPASM    3. Medication refill  -     cyclobenzaprine (FLEXERIL) 10 mg tablet; TAKE 1 TABLET BY MOUTH THREE TIMES A DAY AS NEEDED FOR MUSCLE SPASM    4. Chronic bilateral low back pain with bilateral sciatica  -     XR SPINE LUMB 2 OR 3 V; Future    5.  Onychomycosis  -     REFERRAL TO PODIATRY    6. Leg swelling  - hydroCHLOROthiazide (HYDRODIURIL) 25 mg tablet; Take 1 Tablet by mouth daily. I have discussed the diagnosis with the patient and the intended treatment plan as seen in the above orders. The patient has received an after-visit summary and questions were answered concerning future plans. Asked to return should symptoms worsen or not improve with treatment. Any pending labs and studies will be relayed to patient when they become available. Pt verbalizes understanding of plan of care and denies further questions or concerns at this time. Follow-up and Dispositions    · Return in about 3 months (around 8/20/2021), or if symptoms worsen or fail to improve. Subjective:   Ailyn Byrd is a 64 y.o. female who presents for follow up and discussion about poorly controlled T2DM, HTN, HLD and chronic lower back pain. I reviewed the patient results with her. She needs re-evaluation of medications and we discussed starting Ozempic to help with weight management, but also for T2DM management. We reviewed diet and lifestyle modifications. Lower back pain seems to be chronic and more than likely this is due to OA. We discussed sending her to PT. But will pursue an xray first and then discuss with her.      The patient is willing to do anything to improve her diabetic status and fully understands that current findings are not optimal. She may also need endocrinology, but will give Ozempic a trial.     Patient Active Problem List    Diagnosis Date Noted    Pancreatic insufficiency 12/18/2020    Epidermal inclusion cyst 12/18/2020    CKD (chronic kidney disease) stage 4, GFR 15-29 ml/min ([address] 11/20/2020    Severe obesity ([address] 10/03/2018    Type 2 diabetes with nephropathy ([address] 08/08/2018    Tobacco abuse 05/24/2017    Closed fracture of right talus 01/16/2017    Synovial cyst of right knee 01/16/2017    Mild intermittent asthma without complication 70/77/3640    Essential hypertension 02/08/2016    Diabetic neuropathy ([facility] 02/08/2016    Vitamin D deficiency 02/08/2016    Diabetes ([facility]     GERD (gastroesophageal reflux disease)          Current Outpatient Medications   Medication Sig Dispense Refill    metFORMIN (GLUCOPHAGE) 1,000 mg tablet Take 1 Tablet by mouth two (2) times daily (with meals) for 30 days. 60 Tablet 3    semaglutide (Ozempic) 0.25 mg/0.2 mL (2 mg/1.5 mL) sub-q pen 0.25 mg by SubCUTAneous route every seven (7) days. 1 Box 3    cyclobenzaprine (FLEXERIL) 10 mg tablet TAKE 1 TABLET BY MOUTH THREE TIMES A DAY AS NEEDED FOR MUSCLE SPASM 30 Tablet 5    hydroCHLOROthiazide (HYDRODIURIL) 25 mg tablet Take 1 Tablet by mouth daily. 30 Tablet 3    omeprazole (PRILOSEC) 20 mg capsule TAKE 1 CAPSULE BY MOUTH TWICE A DAY 60 Cap 0    Creon 36,000-114,000- 180,000 unit cpDR capsule TAKE 4 CAPSULES BY MOUTH THREE TIMES A DAY WITH EACH MEAL AND TAKE 2 CAPS WITH SNACKS 480 Cap 0    gabapentin (NEURONTIN) 300 mg capsule TAKE 1 CAPSULE BY MOUTH THREE TIMES A DAY 90 Cap 0    amLODIPine (NORVASC) 10 mg tablet TAKE 1 TABLET BY MOUTH EVERY DAY 30 Tab 5    ergocalciferol (ERGOCALCIFEROL) 1,250 mcg (50,000 unit) capsule TAKE 1 CAPSULE BY MOUTH ONCE A WEEK 12 Cap 1    cetirizine (ZYRTEC) 10 mg tablet TAKE 1 TABLET BY MOUTH EVERY DAY AT NIGHT 90 Tab 1    Lantus Solostar U-100 Insulin 100 unit/mL (3 mL) inpn INJECT 30 UNITS BY SUBCUTANEOUS ROUTE NIGHTLY 15 Adjustable Dose Pre-filled Pen Syringe 3    mometasone (ELOCON) 0.1 % ointment APPLY TO AFFECTED AREA EVERY DAY 15 g 0    NovoLOG Mix 70-30FlexPen U-100 100 unit/mL (70-30) inpn sliding scale, max daily amount 10 units. 5 Pen 5    albuterol (PROVENTIL HFA, VENTOLIN HFA, PROAIR HFA) 90 mcg/actuation inhaler Take 1 Puff by inhalation every four (4) hours as needed for Wheezing or Cough. 1 Inhaler 5    lidocaine (LIDODERM) 5 % APPLY PATCH TO THE AFFECTED AREA FOR 12 HOURS A DAY AND REMOVE FOR 12 HOURS A DAY.  30 Patch 5    ondansetron (ZOFRAN ODT) 8 mg disintegrating tablet Take 1 Tab by mouth every eight (8) hours as needed for Nausea or Vomiting. 12 Tab 0    SUMAtriptan (IMITREX) 100 mg tablet TAKE 1 TAB BY MOUTH AS NEEDED FOR MIGRAINE. 9 Tab 3    ONETOUCH ULTRA TEST strip TEST BLOOD GLUCOSE THREE TIMES DAILY 100 Strip 0    BD Rosalba 2nd Gen Pen Needle 32 gauge x 5/32\" ndle FOR INJECTING INSULIN 250.00/E11.9 TEST _4__ TIME(S) PER  Pen Needle 5    liraglutide (VICTOZA) 0.6 mg/0.1 mL (18 mg/3 mL) pnij Take 0.6 mgs daily x 1 week, then 1.2 mgs daily. 18 mg 3    triamcinolone (ARISTOCORT) 0.5 % topical cream Apply  to affected area two (2) times a day. use thin layer (Patient not taking: Reported on 5/20/2021) 15 g 0    acetaminophen (TYLENOL) 325 mg tablet Take 2 Tabs by mouth every six (6) hours as needed for Pain or Fever. (Patient not taking: Reported on 5/20/2021) 30 Tab 2    Bifidobacterium Infantis (ALIGN) 4 mg cap Take 1 Cap by mouth daily. (Patient not taking: Reported on 5/20/2021) 30 Cap 2         Allergies   Allergen Reactions    Bactrim [Sulfamethoprim] Nausea Only    Ciprofloxacin Cough    Lisinopril Cough         Past Medical History:   Diagnosis Date    Arthritis     Asthma     Cataracts, bilateral     Diabetes ([address]     [facility]    GERD (gastroesophageal reflux disease)     Hypertension     Obesity, Class II, BMI 35-39.9     Pancreatic insufficiency          Past Surgical History:   Procedure Laterality Date    COLONOSCOPY N/A 11/30/2016    COLONOSCOPY,EGD performed by [facility]i Guerra [facility] at [facility] HX CATARACT REMOVAL Left 10/2015    HX CATARACT REMOVAL Right 10/2016    HX CHOLECYSTECTOMY  2005?  HX COLONOSCOPY      TX PANCREAS SURGERY PROC UNLISTED  2001? Distal pancreatectomy.     TX PANCREATIC ELASTASE, FECAL, QUAL/SEMI-QUANT  1999    growths in prancreatitis         Family History   Problem Relation Age of Onset    Diabetes Mother     Heart Disease Father  Diabetes Brother     Breast Cancer Sister 64         Social History     Tobacco Use    Smoking status: Former Smoker     Packs/day: 0.50     Types: Cigarettes     Start date: 2016     Quit date: 2016     Years since quittin.2    Smokeless tobacco: Never Used   Substance Use Topics    Alcohol use: No     Alcohol/week: 0.0 standard drinks     Comment: social          Review of Systems    Pertinent items are noted in HPI. Objective:     Vitals:    21 1018   BP: 136/80   Pulse: 76   Resp: 18   Temp: 98.2 °F (36.8 °C)   TempSrc: Oral   SpO2: 98%   Weight: 199 lb 12.8 oz (90.6 kg)   Height: 5' 2\" (1.575 m)       General: alert, cooperative, no distress   Mental  status: normal mood, behavior, speech, dress, motor activity, and thought processes, able to follow commands   HENT: NCAT   Neck: no visualized mass   Resp: no respiratory distress   Neuro: no gross deficits   Skin: no discoloration or lesions of concern on visible areas   Psychiatric: normal affect, consistent with stated mood, no evidence of hallucinations     Eden Rizzo MD  Patient Instructions          Leg and Ankle Edema: Care Instructions  Your Care Instructions  Swelling in the legs, ankles, and feet is called edema. It is common after you sit or stand for a while. Long plane flights or car rides often cause swelling in the legs and feet. You may also have swelling if you have to stand for long periods of time at your job. Problems with the veins in the legs (varicose veins) and changes in hormones can also cause swelling. Sometimes the swelling in the ankles and feet is caused by a more serious problem, such as heart failure, infection, blood clots, or liver or kidney disease. Follow-up care is a key part of your treatment and safety. Be sure to make and go to all appointments, and call your doctor if you are having problems.  It's also a good idea to know your test results and keep a list of the medicines you take.  How can you care for yourself at home? · If your doctor gave you medicine, take it as prescribed. Call your doctor if you think you are having a problem with your medicine. · Whenever you are resting, raise your legs up. Try to keep the swollen area higher than the level of your heart. · Take breaks from standing or sitting in one position. ? Walk around to increase the blood flow in your lower legs. ? Move your feet and ankles often while you stand, or tighten and relax your leg muscles. · Wear support stockings. Put them on in the morning, before swelling gets worse. · Eat a balanced diet. Lose weight if you need to. · Limit the amount of salt (sodium) in your diet. Salt holds fluid in the body and may increase swelling. When should you call for help? Call 911 anytime you think you may need emergency care. For example, call if:    · You have symptoms of a blood clot in your lung (called a pulmonary embolism). These may include:  ? Sudden chest pain. ? Trouble breathing. ? Coughing up blood. Call your doctor now or seek immediate medical care if:    · You have signs of a blood clot, such as:  ? Pain in your calf, back of the knee, thigh, or groin. ? Redness and swelling in your leg or groin.     · You have symptoms of infection, such as:  ? Increased pain, swelling, warmth, or redness. ? Red streaks or pus. ? A fever. Watch closely for changes in your health, and be sure to contact your doctor if:    · Your swelling is getting worse.     · You have new or worsening pain in your legs.     · You do not get better as expected. Where can you learn more? Go to http://www.gray.com/  Enter F790 in the search box to learn more about \"Leg and Ankle Edema: Care Instructions. \"  Current as of: February 26, 2020               Content Version: 12.8  © 4103-1412 [employer].    Care instructions adapted under license by [employer] (which disclaims liability or warranty for this information). If you have questions about a medical condition or this instruction, always ask your healthcare professional. [place] [address] any warranty or liability for your use of this information. Carbamide Peroxide (Into the ear)   Carbamide Peroxide (ANTHONY-ba-mide per-OX-yon)  Used to soften, loosen, and remove excess wax from your ears. Brand Name(s): Audiologist's Choice, Auro Ear Drops, Debrox, E-R-O Ear Drops, Ear Drops, Ear Wax Drops, [facility] Ear Drops, [employer] Ear System, Good Sense Ear Wax Removal, Good Sense Ear Wax Removal Kit, Giovani's ProRinse Earwax Removal Kit, Giovani's Wax Away Earwax Removal Aid, Giovani's Wax Away Earwax Removal System, Mollifene, Murine Ear   There may be other brand names for this medicine. When This Medicine Should Not Be Used: You should not use this medicine if you have had an allergic reaction to carbamide peroxide. You should not use this medicine if you have a punctured eardrum, or discharge from your ear. You should not use this medicine if you have had an ear surgery, or if you have pain, irritation, or rash in your ear. How to Use This Medicine:   Liquid, Drop  · Your doctor will tell you how much medicine to use. Do not use more than directed. · Follow the instructions on the medicine label if you are using this medicine without a prescription. · Remove your hearing aid while using this medicine. · Use this medicine only in your ear. · Wash your hands with soap and water before and after using this medicine. · You may warm the drops by holding the unopened bottle in your hands for a few minutes. · Remove the cap. Do not let the tip of the dropper touch anything, including your ear. · Lie down or tilt your head to the side.  For a child, gently pull the child's earlobe down and back to straighten the child's ear canal. For an adult, gently pull the earlobe up and back to straighten the ear canal.  · Drop the prescribed number of drops into the ear. Keep the ear tilted up for a few minutes or put a cotton ball into your ear. · You may hear a bubbling noise in your ear after placing the drop in it. This is normal and is not something to worry about. · Do not rinse the dropper. · After using this medicine 4 days, gently flush your ear with warm water, using a soft bulb syringe to remove the wax. If a dose is missed:   · You must use this medicine on a fixed schedule. Call your doctor or pharmacist if you miss a dose. How to Store and Dispose of This Medicine:   · Keep the bottle closed when you are not using it. Store it at room temperature, away from light and heat. Do not freeze. · Ask your pharmacist, doctor, or health caregiver about the best way to dispose of any outdated medicine or medicine no longer needed. · Keep all medicine out of the reach of children. Never share your medicine with anyone. Drugs and Foods to Avoid:   Ask your doctor or pharmacist before using any other medicine, including over-the-counter medicines, vitamins, and herbal products. · You should not use other ear medicines while using this medicine, unless your doctor tells you to. Warnings While Using This Medicine:   · Avoid getting this medicine in your eyes. If the medicine does get in your eyes, flush them with water and call your doctor right away. · This medicine should not be given to children under 15years of age without a doctor's approval.  · Call your doctor if your symptoms do not improve or if they get worse. · Do not use this medicine for longer than 4 days.   Possible Side Effects While Using This Medicine:   Call your doctor right away if you notice any of these side effects:  · Allergic reaction: Itching or hives, swelling in your face or hands, swelling or tingling in your mouth or throat, chest tightness, trouble breathing  If you notice other side effects that you think are caused by this medicine, tell your doctor. Call your doctor for medical advice about side effects. You may report side effects to FDA at 1-094-DTM-0922  © 2017 [address] [employer] Information is for End User's use only and may not be sold, redistributed or otherwise used for commercial purposes. The above information is an  only. It is not intended as medical advice for individual conditions or treatments. Talk to your doctor, nurse or pharmacist before following any medical regimen to see if it is safe and effective for you.

## 2021-05-27 DIAGNOSIS — K86.89 PANCREATIC INSUFFICIENCY: ICD-10-CM

## 2021-05-27 DIAGNOSIS — E11.42 DIABETIC POLYNEUROPATHY ASSOCIATED WITH TYPE 2 DIABETES MELLITUS (HCC): ICD-10-CM

## 2021-05-27 DIAGNOSIS — E11.9 TYPE 2 DIABETES MELLITUS WITHOUT COMPLICATION, WITH LONG-TERM CURRENT USE OF INSULIN (HCC): ICD-10-CM

## 2021-05-27 DIAGNOSIS — Z79.4 TYPE 2 DIABETES MELLITUS WITHOUT COMPLICATION, WITH LONG-TERM CURRENT USE OF INSULIN (HCC): ICD-10-CM

## 2021-05-27 DIAGNOSIS — E55.9 VITAMIN D DEFICIENCY: ICD-10-CM

## 2021-05-27 RX ORDER — ERGOCALCIFEROL 1.25 MG/1
CAPSULE ORAL
Qty: 12 CAPSULE | Refills: 1 | Status: SHIPPED | OUTPATIENT
Start: 2021-05-27 | End: 2021-09-26

## 2021-05-27 RX ORDER — GABAPENTIN 300 MG/1
CAPSULE ORAL
Qty: 90 CAPSULE | Refills: 0 | Status: SHIPPED | OUTPATIENT
Start: 2021-05-27 | End: 2021-08-03

## 2021-05-27 NOTE — TELEPHONE ENCOUNTER
Kehinde BAUTISTA was approved on 2/17/21. Ozempic was switched to Victoza on 5/21/21 and pt was notified at that time. I let pt know and she understood.

## 2021-06-09 ENCOUNTER — TELEPHONE (OUTPATIENT)
Dept: FAMILY MEDICINE CLINIC | Age: 61
End: 2021-06-09

## 2021-06-09 NOTE — TELEPHONE ENCOUNTER
----- Message from Laura Heredia sent at 6/9/2021 12:16 PM EDT -----  Regarding: Dr. Restrepo Augusta: [phone]  General Message/Vendor Calls    Caller's first and last name: Pt      Reason for call: Medications recently prescribed are needing prior authorizations, per pharmacy, w/ names not recalled but sound like \"Olempic\" and \"Creon\". She also has questions regarding x-rays performed and would like to speak to someone. Callback required yes/no and why: Yes, requested for status and assistance.       Best contact number(s):[phone]      Details to clarify the request:  N/a    Laura Heredia

## 2021-06-11 ENCOUNTER — HOSPITAL ENCOUNTER (OUTPATIENT)
Dept: GENERAL RADIOLOGY | Age: 61
Discharge: HOME | End: 2021-06-11
Payer: MEDICAID

## 2021-06-11 DIAGNOSIS — M54.42 CHRONIC BILATERAL LOW BACK PAIN WITH BILATERAL SCIATICA: ICD-10-CM

## 2021-06-11 DIAGNOSIS — G89.29 CHRONIC BILATERAL LOW BACK PAIN WITH BILATERAL SCIATICA: ICD-10-CM

## 2021-06-11 DIAGNOSIS — M54.41 CHRONIC BILATERAL LOW BACK PAIN WITH BILATERAL SCIATICA: ICD-10-CM

## 2021-06-11 PROCEDURE — 72100 X-RAY EXAM L-S SPINE 2/3 VWS: CPT

## 2021-06-12 DIAGNOSIS — E11.9 TYPE 2 DIABETES MELLITUS WITHOUT COMPLICATION, WITH LONG-TERM CURRENT USE OF INSULIN (HCC): ICD-10-CM

## 2021-06-12 DIAGNOSIS — Z79.4 TYPE 2 DIABETES MELLITUS WITHOUT COMPLICATION, WITH LONG-TERM CURRENT USE OF INSULIN (HCC): ICD-10-CM

## 2021-06-12 RX ORDER — INSULIN GLARGINE 100 [IU]/ML
INJECTION, SOLUTION SUBCUTANEOUS
Qty: 15 ADJUSTABLE DOSE PRE-FILLED PEN SYRINGE | Refills: 5 | Status: SHIPPED | OUTPATIENT
Start: 2021-06-12 | End: 2021-12-08 | Stop reason: SDUPTHER

## 2021-06-12 RX ORDER — OMEPRAZOLE 20 MG/1
CAPSULE, DELAYED RELEASE ORAL
Qty: 60 CAPSULE | Refills: 0 | Status: SHIPPED | OUTPATIENT
Start: 2021-06-12 | End: 2021-11-20

## 2021-06-15 DIAGNOSIS — G89.29 CHRONIC BILATERAL LOW BACK PAIN WITH LEFT-SIDED SCIATICA: Primary | ICD-10-CM

## 2021-06-15 DIAGNOSIS — M54.42 CHRONIC BILATERAL LOW BACK PAIN WITH LEFT-SIDED SCIATICA: Primary | ICD-10-CM

## 2021-06-16 ENCOUNTER — TELEPHONE (OUTPATIENT)
Dept: FAMILY MEDICINE CLINIC | Age: 61
End: 2021-06-16

## 2021-06-16 NOTE — TELEPHONE ENCOUNTER
----- Message from Jio Pereira MD sent at 6/15/2021  9:53 PM EDT -----  Please let patient know that she has moderately severe degenerative disc disease of her lumbar spine. I will give her a referral to see Dr. Elsa Cai for further management. Referral is in the system.    Thanks,   Dr. Yao Pagan

## 2021-06-16 NOTE — TELEPHONE ENCOUNTER
Spoke to pt and relayed result message and referral information. She understood and will call to schedule.

## 2021-06-16 NOTE — PROGRESS NOTES
Please let patient know that she has moderately severe degenerative disc disease of her lumbar spine. I will give her a referral to see Dr. Avinash Sampson for further management. Referral is in the system.    Thanks,   Dr. Rina Nick

## 2021-06-25 ENCOUNTER — TRANSCRIBE ORDER (OUTPATIENT)
Dept: SCHEDULING | Age: 61
End: 2021-06-25

## 2021-06-25 DIAGNOSIS — Z12.31 SCREENING MAMMOGRAM, ENCOUNTER FOR: Primary | ICD-10-CM

## 2021-06-29 NOTE — DISCHARGE INSTRUCTIONS
Patient Education        Tennis Elbow: Care Instructions  Your Care Instructions    Tennis elbow is soreness or pain on the outer part of the elbow. The pain occurs when the tendon is stretched and becomes irritated by repeated twisting of the hand, wrist, and forearm. A tendon is a tough tissue that connects muscle to bone. This injury is common in tennis players. But you also can get it from many activities that work the same muscles. Examples include gardening, painting, and using tools. Tennis elbow usually heals with rest and treatment at home. Follow-up care is a key part of your treatment and safety. Be sure to make and go to all appointments, and call your doctor if you are having problems. It's also a good idea to know your test results and keep a list of the medicines you take. How can you care for yourself at home?    · Rest your fingers, wrist, and forearm. Try to stop or reduce any activity that causes elbow pain. You may have to rest your arm for weeks to months. Follow your doctor's directions for how long to rest.     · Put ice or a cold pack on your elbow for 10 to 20 minutes at a time. Try to do this every 1 to 2 hours for the next 3 days (when you are awake) or until the swelling goes down. Put a thin cloth between the ice and your skin.     · If your doctor gave you a brace or splint, use it as directed. A \"counterforce\" brace is a strap around your forearm, just below your elbow. It may ease the pressure on the tendon and spread force throughout your arm.     · Prop up your elbow on pillows to help reduce swelling.     · Follow your doctor's or physical therapist's directions for exercise.     · Return to your usual activities slowly.     · Try to prevent the problem. Learn the best techniques for your sport. For example, make sure the  on your tennis racquet is not too big for your hand.  Try not to hit a tennis ball late in your swing.     · Think about asking your employer about new ways of doing your job if your elbow pain is caused by something you do at work. Medicines    · Be safe with medicines. Read and follow all instructions on the label. ? If the doctor gave you a prescription medicine for pain, take it as prescribed. ? If you are not taking a prescription pain medicine, ask your doctor if you can take an over-the-counter medicine. When should you call for help? Call your doctor now or seek immediate medical care if:    · Your pain is worse.     · You cannot bend your elbow normally.     · Your arm or hand is cool or pale or changes color.     · You have tingling, weakness, or numbness in your hand and fingers.    Watch closely for changes in your health, and be sure to contact your doctor if:    · You have work problems caused by your elbow pain.     · Your pain is not better after 2 weeks. Where can you learn more? Go to http://[employer].info/. Enter [phone] [id number] [phone] in the search box to learn more about \"Tennis Elbow: Care Instructions. \"  Current as of: September 20, 2018  Content Version: 11.9  © 3759-2589 [employer]. Care instructions adapted under license by [employer] (which disclaims liability or warranty for this information). If you have questions about a medical condition or this instruction, always ask your healthcare professional. [address] any warranty or liability for your use of this information. We hope that we have addressed all of your medical concerns. The examination and treatment you received in the Emergency Department were for an emergent problem and were not intended as complete care. It is important that you follow up with your healthcare provider(s) for ongoing care. If your symptoms worsen or do not improve as expected, and you are unable to reach your usual health care provider(s), you should return to the Emergency Department.       Today's healthcare is undergoing tremendous change, and patient satisfaction surveys are one of the many tools to assess the quality of medical care. You may receive a survey from the [employer] regarding your experience in the Emergency Department. I hope that your experience has been completely positive, particularly the medical care that I provided. As such, please participate in the survey; anything less than excellent does not meet my expectations or intentions. [facility] and [facility] participate in nationally recognized quality of care measures. If your blood pressure is greater than 120/80, as reported below, we urge that you seek medical care to address the potential of high blood pressure, commonly known as hypertension. Hypertension can be hereditary or can be caused by certain medical conditions, pain, stress, or \"white coat syndrome. \"       Please make an appointment with your health care provider(s) for follow up of your Emergency Department visit. VITALS:   Patient Vitals for the past 8 hrs:   Temp Pulse Resp BP SpO2   02/13/19 1219 98.5 °F (36.9 °C) 66 16 146/64 96 %          Thank you for allowing us to provide you with medical care today. We realize that you have many choices for your emergency care needs. Please choose us in the future for any continued health care needs. [facility], [facility]: [phone]            No results found for this or any previous visit (from the past 24 hour(s)). Xr Elbow Lt Min 3 V    Result Date: 2/13/2019  EXAM: XR ELBOW LT MIN 3 V INDICATION: left arm pain; numbness. COMPARISON: None. FINDINGS: Three views of the left elbow demonstrate no fracture, dislocation, effusion or other acute abnormality. Minimal DJD soft tissue swelling over the OLECRANON.  Small bony density lateral to the lateral AP condyle appears to be degenerative in nature or related to a remote avulsion. IMPRESSION: No acute bone abnormality. influenza, injectable, quadrivalent, preservative free; 08-Oct-2020 10:52; Loreto Nino (JOSSIE); [employer]; [id number] (Exp. Date: 30-Jun-2021); IntraMuscular; Deltoid Left.; 0.5 milliLiter(s); VIS (VIS Published: 15-Aug-2019, VIS Presented: 08-Oct-2020);

## 2021-07-11 DIAGNOSIS — K86.89 PANCREATIC INSUFFICIENCY: ICD-10-CM

## 2021-07-11 RX ORDER — PANCRELIPASE 36000; 180000; 114000 [USP'U]/1; [USP'U]/1; [USP'U]/1
CAPSULE, DELAYED RELEASE PELLETS ORAL
Qty: 480 CAPSULE | Refills: 0 | Status: SHIPPED | OUTPATIENT
Start: 2021-07-11 | End: 2021-12-08 | Stop reason: SDUPTHER

## 2021-07-11 RX ORDER — OMEPRAZOLE 20 MG/1
CAPSULE, DELAYED RELEASE ORAL
Qty: 60 CAPSULE | Refills: 0 | Status: SHIPPED | OUTPATIENT
Start: 2021-07-11 | End: 2021-08-18

## 2021-08-03 DIAGNOSIS — E11.42 DIABETIC POLYNEUROPATHY ASSOCIATED WITH TYPE 2 DIABETES MELLITUS (HCC): ICD-10-CM

## 2021-08-03 RX ORDER — GABAPENTIN 300 MG/1
CAPSULE ORAL
Qty: 90 CAPSULE | Refills: 0 | Status: SHIPPED | OUTPATIENT
Start: 2021-08-03 | End: 2021-09-26

## 2021-08-18 ENCOUNTER — TELEPHONE (OUTPATIENT)
Dept: FAMILY MEDICINE CLINIC | Age: 61
End: 2021-08-18

## 2021-08-18 DIAGNOSIS — Z91.09 ENVIRONMENTAL ALLERGIES: ICD-10-CM

## 2021-08-18 RX ORDER — CETIRIZINE HYDROCHLORIDE 10 MG/1
TABLET, FILM COATED ORAL
Qty: 90 TABLET | Refills: 1 | Status: SHIPPED | OUTPATIENT
Start: 2021-08-18 | End: 2022-04-02

## 2021-08-18 RX ORDER — OMEPRAZOLE 20 MG/1
CAPSULE, DELAYED RELEASE ORAL
Qty: 60 CAPSULE | Refills: 0 | Status: SHIPPED | OUTPATIENT
Start: 2021-08-18 | End: 2021-09-26

## 2021-08-18 NOTE — TELEPHONE ENCOUNTER
Dr Yoel Martin diabetes doctor needs records for pt's diabetes faxed to [phone]  Specialist phone # [phone]

## 2021-08-19 ENCOUNTER — HOSPITAL ENCOUNTER (OUTPATIENT)
Dept: MAMMOGRAPHY | Age: 61
Discharge: HOME | End: 2021-08-19
Attending: INTERNAL MEDICINE
Payer: MEDICAID

## 2021-08-19 DIAGNOSIS — Z12.31 SCREENING MAMMOGRAM, ENCOUNTER FOR: ICD-10-CM

## 2021-08-19 PROCEDURE — 77067 SCR MAMMO BI INCL CAD: CPT

## 2021-09-25 DIAGNOSIS — M79.89 LEG SWELLING: ICD-10-CM

## 2021-09-25 DIAGNOSIS — E55.9 VITAMIN D DEFICIENCY: ICD-10-CM

## 2021-09-25 DIAGNOSIS — E11.42 DIABETIC POLYNEUROPATHY ASSOCIATED WITH TYPE 2 DIABETES MELLITUS (HCC): ICD-10-CM

## 2021-09-26 RX ORDER — GABAPENTIN 300 MG/1
CAPSULE ORAL
Qty: 90 CAPSULE | Refills: 0 | Status: SHIPPED | OUTPATIENT
Start: 2021-09-26 | End: 2021-12-08 | Stop reason: SDUPTHER

## 2021-09-26 RX ORDER — HYDROCHLOROTHIAZIDE 25 MG/1
TABLET ORAL
Qty: 30 TABLET | Refills: 0 | Status: SHIPPED | OUTPATIENT
Start: 2021-09-26 | End: 2021-11-16

## 2021-09-26 RX ORDER — OMEPRAZOLE 20 MG/1
CAPSULE, DELAYED RELEASE ORAL
Qty: 60 CAPSULE | Refills: 0 | Status: SHIPPED | OUTPATIENT
Start: 2021-09-26 | End: 2021-12-08 | Stop reason: SDUPTHER

## 2021-09-26 RX ORDER — ERGOCALCIFEROL 1.25 MG/1
CAPSULE ORAL
Qty: 12 CAPSULE | Refills: 0 | Status: SHIPPED | OUTPATIENT
Start: 2021-09-26 | End: 2021-12-08 | Stop reason: SDUPTHER

## 2021-09-26 NOTE — TELEPHONE ENCOUNTER
Patient needs follow up. Communicated in May that her kidney function and diabetes were poorly controlled. I have sent 1 month refills. No further refills until she follows up.

## 2021-09-27 RX ORDER — BLOOD SUGAR DIAGNOSTIC
STRIP MISCELLANEOUS
Qty: 100 STRIP | Refills: 0 | Status: SHIPPED | OUTPATIENT
Start: 2021-09-27 | End: 2022-02-24

## 2021-10-10 DIAGNOSIS — M79.604 PAIN IN BOTH LOWER EXTREMITIES: ICD-10-CM

## 2021-10-10 DIAGNOSIS — M79.605 PAIN IN BOTH LOWER EXTREMITIES: ICD-10-CM

## 2021-10-12 RX ORDER — LIDOCAINE 50 MG/G
PATCH TOPICAL
Qty: 30 PATCH | Refills: 5 | Status: SHIPPED | OUTPATIENT
Start: 2021-10-12 | End: 2021-12-08 | Stop reason: SDUPTHER

## 2021-11-05 ENCOUNTER — TELEPHONE (OUTPATIENT)
Dept: FAMILY MEDICINE CLINIC | Age: 61
End: 2021-11-05

## 2021-11-05 DIAGNOSIS — I10 ESSENTIAL HYPERTENSION: ICD-10-CM

## 2021-11-05 NOTE — TELEPHONE ENCOUNTER
PT called and requested refills. She needs appt with Dr. Ghassan Hidalgo before refills can be picked up.

## 2021-11-06 RX ORDER — AMLODIPINE BESYLATE 10 MG/1
TABLET ORAL
Qty: 30 TABLET | Refills: 5 | Status: SHIPPED | OUTPATIENT
Start: 2021-11-06

## 2021-11-15 ENCOUNTER — OFFICE VISIT (OUTPATIENT)
Dept: FAMILY MEDICINE CLINIC | Age: 61
End: 2021-11-15
Payer: MEDICAID

## 2021-11-15 VITALS
RESPIRATION RATE: 16 BRPM | BODY MASS INDEX: 35.7 KG/M2 | SYSTOLIC BLOOD PRESSURE: 135 MMHG | OXYGEN SATURATION: 99 % | DIASTOLIC BLOOD PRESSURE: 76 MMHG | TEMPERATURE: 97.1 F | WEIGHT: 194 LBS | HEART RATE: 70 BPM | HEIGHT: 62 IN

## 2021-11-15 DIAGNOSIS — I10 ESSENTIAL HYPERTENSION: ICD-10-CM

## 2021-11-15 DIAGNOSIS — E55.9 VITAMIN D DEFICIENCY: ICD-10-CM

## 2021-11-15 DIAGNOSIS — E78.2 MIXED HYPERLIPIDEMIA: ICD-10-CM

## 2021-11-15 DIAGNOSIS — Z79.4 TYPE 2 DIABETES MELLITUS WITHOUT COMPLICATION, WITH LONG-TERM CURRENT USE OF INSULIN (HCC): ICD-10-CM

## 2021-11-15 DIAGNOSIS — E11.9 TYPE 2 DIABETES MELLITUS WITHOUT COMPLICATION, WITH LONG-TERM CURRENT USE OF INSULIN (HCC): ICD-10-CM

## 2021-11-15 DIAGNOSIS — R06.02 SOB (SHORTNESS OF BREATH): Primary | ICD-10-CM

## 2021-11-15 PROCEDURE — 99214 OFFICE O/P EST MOD 30 MIN: CPT | Performed by: INTERNAL MEDICINE

## 2021-11-15 RX ORDER — METFORMIN HYDROCHLORIDE 1000 MG/1
TABLET ORAL 2 TIMES DAILY
COMMUNITY
Start: 2021-09-25 | End: 2021-12-08 | Stop reason: SDUPTHER

## 2021-11-15 NOTE — PROGRESS NOTES
Identified pt with two pt identifiers(name and ). Reviewed record in preparation for visit and have obtained necessary documentation. Chief Complaint   Patient presents with    Medication Refill     Pt states she wants to discuss increasing dose of HCTZ    Medication Evaluation     Pt states Victoza makes her feel like she has no energy, pt states she stopped taking it 2 weeks ago. Pt states she also believe it caused her to have thrush        Health Maintenance Due   Topic    Pneumococcal 0-64 years (1 of 4 - PCV13)    Eye Exam Retinal or Dilated     Cervical cancer screen     Foot Exam Q1     A1C test (Diabetic or Prediabetic)     Flu Vaccine (1)        Visit Vitals  /76 (BP 1 Location: Right arm, BP Patient Position: Sitting, BP Cuff Size: Large adult)   Pulse 70   Temp 97.1 °F (36.2 °C) (Temporal)   Resp 16   Ht 5' 2\" (1.575 m)   Wt 194 lb (88 kg)   SpO2 99%   BMI 35.48 kg/m²     Pain Scale: 7/10    Coordination of Care Questionnaire:  :   1. Have you been to the ER, urgent care clinic since your last visit? Hospitalized since your last visit? No    2. Have you seen or consulted any other health care providers outside of the [address] [place] [address] since your last visit? Include any pap smears or colon screening.  Yes, [facility]o at [facility], Optometrist Dr. Serenity Loredo, Hepatology

## 2021-11-20 NOTE — PROGRESS NOTES
Chief Complaint   Patient presents with    Medication Refill     Pt states she wants to discuss increasing dose of HCTZ    Medication Evaluation     Pt states Victoza makes her feel like she has no energy, pt states she stopped taking it 2 weeks ago. Pt states she also believe it caused her to have thrush         Assessment/ Plan:   Diagnoses and all orders for this visit:    1. SOB (shortness of breath)  -     NT-PRO BNP; Future  - Discussed that we need to get labs before increasing diuretics, which may not be a good idea. - She has no worsening symptoms such as SOB with CP, Dizziness or anginal symptoms.   - No evidence of increased bilateral ankle swelling. 2. Vitamin D deficiency  -     VITAMIN D, 25 HYDROXY; Future    3. Essential hypertension   BP is well controlled. 4. Type 2 diabetes mellitus without complication, with long-term current use of insulin (HCC)  -     METABOLIC PANEL, COMPREHENSIVE; Future  -     HEMOGLOBIN A1C WITH EAG; Future  - Patient stopped Victoza due to symptoms. Ozempic was too expensive. Benefit would not cover Trulicity. 5. Mixed hyperlipidemia  -     METABOLIC PANEL, COMPREHENSIVE; Future  -     CBC WITH AUTOMATED DIFF; Future  -     LIPID PANEL; Future    The patient needs these fasting labs to help with medication decisions. She clearly does not like the injectables. We may need to start insulin in the future if HBA1C is not well controlled. I have discussed the diagnosis with the patient and the intended treatment plan as seen in the above orders. The patient has received an after-visit summary and questions were answered concerning future plans. Asked to return should symptoms worsen or not improve with treatment. Any pending labs and studies will be relayed to patient when they become available. Pt verbalizes understanding of plan of care and denies further questions or concerns at this time.       Follow-up and Dispositions    · Return if symptoms worsen or fail to improve. Subjective:   Zan Martinez is a 64 y.o. female who presents     Patient Active Problem List    Diagnosis Date Noted    Pancreatic insufficiency 12/18/2020    Epidermal inclusion cyst 12/18/2020    CKD (chronic kidney disease) stage 4, GFR 15-29 ml/min ([facility]) 11/20/2020    Severe obesity ([facility] [address] 10/03/2018    Type 2 diabetes with nephropathy ([facility] 08/08/2018    Tobacco abuse 05/24/2017    Closed fracture of right talus 01/16/2017    Synovial cyst of right knee 01/16/2017    Mild intermittent asthma without complication 92/28/3574    Essential hypertension 02/08/2016    Diabetic neuropathy ([facility] 02/08/2016    Vitamin D deficiency 02/08/2016    Diabetes ([facility])     GERD (gastroesophageal reflux disease)          Current Outpatient Medications   Medication Sig Dispense Refill    metFORMIN (GLUCOPHAGE) 1,000 mg tablet two (2) times a day.  amLODIPine (NORVASC) 10 mg tablet TAKE 1 TABLET BY MOUTH EVERY DAY 30 Tablet 5    lidocaine (LIDODERM) 5 % APPLY PATCH TO THE AFFECTED AREA FOR 12 HOURS A DAY AND REMOVE FOR 12 HOURS A DAY.  30 Patch 5    glucose blood VI test strips (OneTouch Ultra Test) strip TEST BLOOD GLUCOSE THREE TIMES DAILY 100 Strip 0    gabapentin (NEURONTIN) 300 mg capsule TAKE 1 CAPSULE BY MOUTH THREE TIMES A DAY 90 Capsule 0    omeprazole (PRILOSEC) 20 mg capsule TAKE 1 CAPSULE BY MOUTH TWICE A DAY 60 Capsule 0    ergocalciferol (ERGOCALCIFEROL) 1,250 mcg (50,000 unit) capsule TAKE 1 CAPSULE BY MOUTH ONE TIME PER WEEK 12 Capsule 0    Allergy Relief, cetirizine, 10 mg tablet TAKE 1 TABLET BY MOUTH EVERY DAY AT NIGHT 90 Tablet 1    Creon 36,000-114,000- 180,000 unit cpDR capsule TAKE 4 CAPSULES BY MOUTH THREE TIMES A DAY WITH EACH MEAL AND TAKE 2 CAPS WITH SNACKS 480 Capsule 0    Lantus Solostar U-100 Insulin 100 unit/mL (3 mL) inpn INJECT 30 UNITS BY SUBCUTANEOUS ROUTE NIGHTLY 15 Adjustable Dose Pre-filled Pen Syringe 5    BD Rosalba 2nd Gen Pen Needle 32 gauge x 5/32\" ndle FOR INJECTING INSULIN 250.00/E11.9 TEST _4__ TIME(S) PER  Pen Needle 5    cyclobenzaprine (FLEXERIL) 10 mg tablet TAKE 1 TABLET BY MOUTH THREE TIMES A DAY AS NEEDED FOR MUSCLE SPASM 30 Tablet 5    mometasone (ELOCON) 0.1 % ointment APPLY TO AFFECTED AREA EVERY DAY 15 g 0    NovoLOG Mix 70-30FlexPen U-100 100 unit/mL (70-30) inpn sliding scale, max daily amount 10 units. 5 Pen 5    albuterol (PROVENTIL HFA, VENTOLIN HFA, PROAIR HFA) 90 mcg/actuation inhaler Take 1 Puff by inhalation every four (4) hours as needed for Wheezing or Cough. 1 Inhaler 5    ondansetron (ZOFRAN ODT) 8 mg disintegrating tablet Take 1 Tab by mouth every eight (8) hours as needed for Nausea or Vomiting. 12 Tab 0    lancets (One Touch Delica) 33 gauge misc To check BS 2-3 x per day. E11.9 100 Each 5    hydroCHLOROthiazide (HYDRODIURIL) 25 mg tablet TAKE 1 TABLET BY MOUTH EVERY DAY 90 Tablet 0    liraglutide (VICTOZA) 0.6 mg/0.1 mL (18 mg/3 mL) pnij Take 0.6 mgs daily x 1 week, then 1.2 mgs daily. (Patient not taking: Reported on 11/15/2021) 18 mg 3    semaglutide (Ozempic) 0.25 mg/0.2 mL (2 mg/1.5 mL) sub-q pen 0.25 mg by SubCUTAneous route every seven (7) days.  (Patient not taking: Reported on 11/15/2021) 1 Box 3    SUMAtriptan (IMITREX) 100 mg tablet TAKE 1 TAB BY MOUTH AS NEEDED FOR MIGRAINE. (Patient not taking: Reported on 11/15/2021) 9 Tab 3         Allergies   Allergen Reactions    Bactrim [Sulfamethoprim] Nausea Only    Ciprofloxacin Cough    Lisinopril Cough         Past Medical History:   Diagnosis Date    Arthritis     Asthma     Cataracts, bilateral     Diabetes ([address]     seen [facility] endocrinology at [facility]    GERD (gastroesophageal reflux disease)     Hypertension     Obesity, Class II, BMI 35-39.9     Pancreatic insufficiency          Past Surgical History:   Procedure Laterality Date    COLONOSCOPY N/A 11/30/2016    COLONOSCOPY,EGD [facility]    HX CATARACT REMOVAL Left 10/2015    HX CATARACT REMOVAL Right 10/2016    HX CHOLECYSTECTOMY  ?  HX COLONOSCOPY      OR PANCREAS SURGERY PROC UNLISTED  ? Distal pancreatectomy.  OR PANCREATIC ELASTASE, FECAL, QUAL/SEMI-QUANT      growths in prancreatitis         Family History   Problem Relation Age of Onset    Diabetes Mother     Heart Disease Father     Diabetes Brother     Breast Cancer Sister 64         Social History     Tobacco Use    Smoking status: Former Smoker     Packs/day: 0.50     Types: Cigarettes     Start date: 2016     Quit date: 2016     Years since quittin.7    Smokeless tobacco: Never Used   Substance Use Topics    Alcohol use: No     Alcohol/week: 0.0 standard drinks     Comment: social          Review of Systems    Pertinent items are noted in HPI.        Objective:     Vitals:    11/15/21 1422   BP: 135/76   Pulse: 70   Resp: 16   Temp: 97.1 °F (36.2 °C)   TempSrc: Temporal   SpO2: 99%   Weight: 194 lb (88 kg)   Height: 5' 2\" (1.575 m)       General: alert, cooperative, no distress   Mental  status: normal mood, behavior, speech, dress, motor activity, and thought processes, able to follow commands   HENT: NCAT   Neck: no visualized mass   Resp: no respiratory distress   Neuro: no gross deficits   Skin: no discoloration or lesions of concern on visible areas   Psychiatric: normal affect, consistent with stated mood, no evidence of hallucinations     Ana Ward MD

## 2021-11-26 ENCOUNTER — TELEPHONE (OUTPATIENT)
Dept: FAMILY MEDICINE CLINIC | Age: 61
End: 2021-11-26

## 2021-11-26 NOTE — TELEPHONE ENCOUNTER
pharmacysent fax stating pt thought you were going to send a medication for thrush at her last visit on 11/15/21.

## 2021-11-29 RX ORDER — NYSTATIN 100000 [USP'U]/ML
1 SUSPENSION ORAL 4 TIMES DAILY
Qty: 200 ML | Refills: 0 | Status: SHIPPED | OUTPATIENT
Start: 2021-11-29 | End: 2021-12-08 | Stop reason: SDUPTHER

## 2021-11-29 NOTE — TELEPHONE ENCOUNTER
Spoke to pt and let her know nystatin has been sent to pharmacy. I went over directions. Pt understood.

## 2021-12-05 DIAGNOSIS — J45.20 MILD INTERMITTENT ASTHMA WITHOUT COMPLICATION: ICD-10-CM

## 2021-12-06 RX ORDER — ALBUTEROL SULFATE 90 UG/1
AEROSOL, METERED RESPIRATORY (INHALATION)
Qty: 8.5 EACH | Refills: 5 | Status: SHIPPED | OUTPATIENT
Start: 2021-12-06 | End: 2022-08-25

## 2021-12-08 DIAGNOSIS — Z79.4 TYPE 2 DIABETES MELLITUS WITHOUT COMPLICATION, WITH LONG-TERM CURRENT USE OF INSULIN (HCC): ICD-10-CM

## 2021-12-08 DIAGNOSIS — M79.10 MYALGIA: ICD-10-CM

## 2021-12-08 DIAGNOSIS — E55.9 VITAMIN D DEFICIENCY: ICD-10-CM

## 2021-12-08 DIAGNOSIS — M79.604 PAIN IN BOTH LOWER EXTREMITIES: ICD-10-CM

## 2021-12-08 DIAGNOSIS — E11.9 TYPE 2 DIABETES MELLITUS WITHOUT COMPLICATION, WITH LONG-TERM CURRENT USE OF INSULIN (HCC): ICD-10-CM

## 2021-12-08 DIAGNOSIS — K86.89 PANCREATIC INSUFFICIENCY: ICD-10-CM

## 2021-12-08 DIAGNOSIS — Z76.0 MEDICATION REFILL: ICD-10-CM

## 2021-12-08 DIAGNOSIS — L20.9 ATOPIC DERMATITIS, UNSPECIFIED TYPE: ICD-10-CM

## 2021-12-08 DIAGNOSIS — E11.42 DIABETIC POLYNEUROPATHY ASSOCIATED WITH TYPE 2 DIABETES MELLITUS (HCC): ICD-10-CM

## 2021-12-08 DIAGNOSIS — M79.605 PAIN IN BOTH LOWER EXTREMITIES: ICD-10-CM

## 2021-12-08 RX ORDER — INSULIN ASPART 100 [IU]/ML
INJECTION, SUSPENSION SUBCUTANEOUS
Qty: 5 PEN | Refills: 5 | Status: SHIPPED | OUTPATIENT
Start: 2021-12-08 | End: 2022-11-02 | Stop reason: ALTCHOICE

## 2021-12-08 RX ORDER — PANCRELIPASE 36000; 180000; 114000 [USP'U]/1; [USP'U]/1; [USP'U]/1
CAPSULE, DELAYED RELEASE PELLETS ORAL
Qty: 480 CAPSULE | Refills: 0 | Status: SHIPPED | OUTPATIENT
Start: 2021-12-08 | End: 2022-01-24

## 2021-12-08 RX ORDER — LIDOCAINE 50 MG/G
1 PATCH TOPICAL DAILY
Qty: 30 PATCH | Refills: 5 | Status: SHIPPED | OUTPATIENT
Start: 2021-12-08

## 2021-12-08 RX ORDER — PEN NEEDLE, DIABETIC 31 GX3/16"
NEEDLE, DISPOSABLE MISCELLANEOUS
Qty: 100 PEN NEEDLE | Refills: 5 | Status: SHIPPED | OUTPATIENT
Start: 2021-12-08 | End: 2022-06-14

## 2021-12-08 RX ORDER — OMEPRAZOLE 20 MG/1
20 CAPSULE, DELAYED RELEASE ORAL 2 TIMES DAILY
Qty: 180 CAPSULE | Refills: 0 | Status: SHIPPED | OUTPATIENT
Start: 2021-12-08 | End: 2022-02-24

## 2021-12-08 RX ORDER — INSULIN GLARGINE 100 [IU]/ML
INJECTION, SOLUTION SUBCUTANEOUS
Qty: 15 ADJUSTABLE DOSE PRE-FILLED PEN SYRINGE | Refills: 5 | Status: SHIPPED | OUTPATIENT
Start: 2021-12-08 | End: 2022-09-23

## 2021-12-08 RX ORDER — GABAPENTIN 300 MG/1
300 CAPSULE ORAL 3 TIMES DAILY
Qty: 90 CAPSULE | Refills: 0 | Status: SHIPPED | OUTPATIENT
Start: 2021-12-08 | End: 2022-01-24

## 2021-12-08 RX ORDER — MOMETASONE FUROATE 1 MG/G
OINTMENT TOPICAL
Qty: 15 G | Refills: 0 | Status: SHIPPED | OUTPATIENT
Start: 2021-12-08 | End: 2022-01-24

## 2021-12-08 RX ORDER — ERGOCALCIFEROL 1.25 MG/1
CAPSULE ORAL
Qty: 12 CAPSULE | Refills: 0 | Status: SHIPPED | OUTPATIENT
Start: 2021-12-08 | End: 2022-07-21

## 2021-12-08 RX ORDER — CYCLOBENZAPRINE HCL 10 MG
TABLET ORAL
Qty: 30 TABLET | Refills: 5 | Status: SHIPPED | OUTPATIENT
Start: 2021-12-08

## 2021-12-08 RX ORDER — NYSTATIN 100000 [USP'U]/ML
1 SUSPENSION ORAL 4 TIMES DAILY
Qty: 200 ML | Refills: 0 | Status: SHIPPED | OUTPATIENT
Start: 2021-12-08 | End: 2021-12-18

## 2021-12-08 RX ORDER — FLUCONAZOLE 150 MG/1
150 TABLET ORAL DAILY
Qty: 1 TABLET | Refills: 0 | Status: SHIPPED | OUTPATIENT
Start: 2021-12-08 | End: 2022-03-01

## 2021-12-08 RX ORDER — METFORMIN HYDROCHLORIDE 1000 MG/1
1000 TABLET ORAL 2 TIMES DAILY
Qty: 90 TABLET | Refills: 0 | Status: SHIPPED | OUTPATIENT
Start: 2021-12-08 | End: 2022-02-24

## 2021-12-08 NOTE — TELEPHONE ENCOUNTER
----- Message from Mikal Hernandez sent at 12/8/2021  9:25 AM EST -----  Subject: Refill Request    QUESTIONS  Name of Medication? fluconazole (DIFLUCAN) 150 mg tablet  Patient-reported dosage and instructions? na  How many days do you have left? 0  Preferred Pharmacy? [facility]/PHARMACY #[id number]  Pharmacy phone number (if available)? [phone]  ---------------------------------------------------------------------------  --------------,  Name of Medication? BD Rosalba 2nd Gen Pen Needle 32 gauge x 5/32\" ndle  Patient-reported dosage and instructions? na  How many days do you have left? 0  Preferred Pharmacy? [facility]/PHARMACY #[id number]  Pharmacy phone number (if available)? [phone]  ---------------------------------------------------------------------------  --------------,  Name of Medication? metFORMIN (GLUCOPHAGE) 1,000 mg tablet  Patient-reported dosage and instructions? na  How many days do you have left? 3  Preferred Pharmacy? [facility]/PHARMACY #[id number]  Pharmacy phone number (if available)? [phone]  ---------------------------------------------------------------------------  --------------,  Name of Medication? cyclobenzaprine (FLEXERIL) 10 mg tablet  Patient-reported dosage and instructions? na  How many days do you have left? 0  Preferred Pharmacy? [facility]/PHARMACY #[id number]  Pharmacy phone number (if available)? [phone]  ---------------------------------------------------------------------------  --------------,  Name of Medication? Creon 36,000-114,000- 180,000 unit cpDR capsule  Patient-reported dosage and instructions? na  How many days do you have left? 0  Preferred Pharmacy? [facility]/PHARMACY #[id number]  Pharmacy phone number (if available)? [phone]  ---------------------------------------------------------------------------  --------------,  Name of Medication? Allergy Relief, cetirizine, 10 mg tablet  Patient-reported dosage and instructions? na  How many days do you have left? 0  Preferred Pharmacy?  [facility] #[id number]  Pharmacy phone number (if available)? [phone]  ---------------------------------------------------------------------------  --------------,  Name of Medication? gabapentin (NEURONTIN) 300 mg capsule  Patient-reported dosage and instructions? na  How many days do you have left? 0  Preferred Pharmacy? [id number]  Pharmacy phone number (if available)? [phone]  ---------------------------------------------------------------------------  --------------,  Name of Medication? omeprazole (PRILOSEC) 20 mg capsule  Patient-reported dosage and instructions? na  How many days do you have left? 0  Preferred Pharmacy? [facility]/PHARMACY #[id number]  Pharmacy phone number (if available)? [phone]  ---------------------------------------------------------------------------  --------------,  Name of Medication? ergocalciferol (ERGOCALCIFEROL) 1,250 mcg (50,000   unit) capsule  Patient-reported dosage and instructions? na  How many days do you have left? 0  Preferred Pharmacy? [id number]  Pharmacy phone number (if available)? [phone]  ---------------------------------------------------------------------------  --------------,  Name of Medication? lidocaine (LIDODERM) 5 %  Patient-reported dosage and instructions? na  How many days do you have left? 0  Preferred Pharmacy? [id number]  Pharmacy phone number (if available)? [phone]  ---------------------------------------------------------------------------  --------------,  Name of Medication? nystatin (MYCOSTATIN) 100,000 unit/mL suspension  Patient-reported dosage and instructions? na  How many days do you have left? 0  Preferred Pharmacy? [facility]/PHARMACY #[id number]  Pharmacy phone number (if available)? [phone]  ---------------------------------------------------------------------------  --------------,  Name of Medication? cephALEXin (KEFLEX) 500 mg capsule  Patient-reported dosage and instructions? na  How many days do you have left? 0  Preferred Pharmacy? [facility]/PHARMACY #[id number]  Pharmacy phone number (if available)? [phone]  ---------------------------------------------------------------------------  --------------,  Name of Medication? gabapentin (NEURONTIN) 300 mg capsule  Patient-reported dosage and instructions? na  How many days do you have left? 0  Preferred Pharmacy? [facility]/PHARMACY #[id number]  Pharmacy phone number (if available)? [phone]  ---------------------------------------------------------------------------  --------------,  Name of Medication? NovoLOG Mix 70-30FlexPen U-100 100 unit/mL (70-30)   inpn  Patient-reported dosage and instructions? na  How many days do you have left? 0  Preferred Pharmacy? [facility]/PHARMACY #[id number]  Pharmacy phone number (if available)? [phone]  ---------------------------------------------------------------------------  --------------,  Name of Medication? mometasone (ELOCON) 0.1 % ointment  Patient-reported dosage and instructions? n/a  How many days do you have left? 0  Preferred Pharmacy? [facility]/PHARMACY #[id number]  Pharmacy phone number (if available)? [phone]  ---------------------------------------------------------------------------  --------------  CALL BACK INFO  What is the best way for the office to contact you? OK to leave message on   voicemail  Preferred Call Back Phone Number?  [phone]

## 2022-01-24 DIAGNOSIS — K86.89 PANCREATIC INSUFFICIENCY: ICD-10-CM

## 2022-01-24 DIAGNOSIS — L20.9 ATOPIC DERMATITIS, UNSPECIFIED TYPE: ICD-10-CM

## 2022-01-24 DIAGNOSIS — E11.42 DIABETIC POLYNEUROPATHY ASSOCIATED WITH TYPE 2 DIABETES MELLITUS (HCC): ICD-10-CM

## 2022-01-24 RX ORDER — MOMETASONE FUROATE 1 MG/G
OINTMENT TOPICAL
Qty: 15 G | Refills: 0 | Status: SHIPPED | OUTPATIENT
Start: 2022-01-24

## 2022-01-24 RX ORDER — GABAPENTIN 300 MG/1
300 CAPSULE ORAL 3 TIMES DAILY
Qty: 90 CAPSULE | Refills: 0 | Status: SHIPPED | OUTPATIENT
Start: 2022-01-24 | End: 2022-03-01

## 2022-01-24 RX ORDER — PANCRELIPASE 36000; 180000; 114000 [USP'U]/1; [USP'U]/1; [USP'U]/1
CAPSULE, DELAYED RELEASE PELLETS ORAL
Qty: 480 CAPSULE | Refills: 0 | Status: SHIPPED | OUTPATIENT
Start: 2022-01-24 | End: 2022-02-24

## 2022-01-25 ENCOUNTER — VIRTUAL VISIT (OUTPATIENT)
Dept: FAMILY MEDICINE CLINIC | Age: 62
End: 2022-01-25
Payer: MEDICAID

## 2022-01-25 DIAGNOSIS — R05.9 COUGH: Primary | ICD-10-CM

## 2022-01-25 PROCEDURE — 99441 PR PHYS/QHP TELEPHONE EVALUATION 5-10 MIN: CPT | Performed by: NURSE PRACTITIONER

## 2022-01-25 RX ORDER — BENZONATATE 100 MG/1
100 CAPSULE ORAL
Qty: 21 CAPSULE | Refills: 0 | Status: SHIPPED | OUTPATIENT
Start: 2022-01-25 | End: 2022-02-09

## 2022-01-25 NOTE — PROGRESS NOTES
HISTORY OF PRESENT ILLNESS  Sandra Herman is a 64 y.o. female. HPI  Pt presents with \"cough and congestion\"  Visit was conducted via telephone  Sandra Herman, who was evaluated through a synchronous (real-time) audio only encounter, and/or her healthcare decision maker, is aware that it is a billable service, which includes applicable co-pays, with coverage as determined by her insurance carrier. She provided verbal consent to proceed and patient identification was verified. This visit was conducted pursuant to the emergency declaration under the [facility], [address] [place] [address] authority and the [employer] Act. A caregiver was present when appropriate. Ability to conduct physical exam was limited. The patient was located at home in a state where the provider was licensed to provide care. --Halle Blanchard NP on 1/25/2022 at 8:30 AM    Visit lasted 5 minutes  Pt states that she has a head and chest cold  Symptoms started 4 days ago  Chills  Throat is scratchy  Low grade fever  Body aches  Cough  No shortness of breath  Able to cough up mucous and dry cough at times  OTC: Tylenol  Sick contacts: none  Review of Systems   Constitutional: Positive for chills. Negative for fever. HENT: Positive for congestion. Respiratory: Positive for cough. Physical Exam  Neurological:      Mental Status: She is alert. ASSESSMENT and PLAN    ICD-10-CM ICD-9-CM    1. Cough  R05.9 786.2 benzonatate (TESSALON) 100 mg capsule     Due to symptoms, educated about getting COVID testing  Educated about where she should go to get this performed  Pt requesting cough medication, will send    Pt informed to return to office with worsening of symptoms, or PRN with any questions or concerns. Pt verbalizes understanding of plan of care and denies further questions or concerns at this time.

## 2022-01-26 ENCOUNTER — TELEPHONE (OUTPATIENT)
Dept: FAMILY MEDICINE CLINIC | Age: 62
End: 2022-01-26

## 2022-01-26 NOTE — TELEPHONE ENCOUNTER
----- Message from Lieutenant Hinojosa sent at 1/26/2022  1:27 PM EST -----  Subject: Message to Provider    QUESTIONS  Information for Provider? Pt calling for a virtual appt with practioner on   Tuesday, She was asking for a cough syrup. pt is asking that the   prescription be pt in for cough med todeineguaifen. Pt was checking to see   why meds was not prescribe to her at the time of her appt   ---------------------------------------------------------------------------  --------------  CALL BACK INFO  What is the best way for the office to contact you? OK to leave message on   voicemail  Preferred Call Back Phone Number?  [phone]  ---------------------------------------------------------------------------  --------------  SCRIPT ANSWERS  undefined

## 2022-01-26 NOTE — TELEPHONE ENCOUNTER
I did send cough medicine at time of visit, I sent Tessalon perles. If she is not improving, needs to be seen by urgent care. I had also advised COVID testing, based on patients symptoms.   Thanks

## 2022-01-27 ENCOUNTER — TELEPHONE (OUTPATIENT)
Dept: FAMILY MEDICINE CLINIC | Age: 62
End: 2022-01-27

## 2022-01-27 NOTE — TELEPHONE ENCOUNTER
----- Message from Lázaro Balamelia sent at 1/27/2022  4:04 PM EST -----  Subject: Message to Provider    QUESTIONS  Information for Provider? Pt was told that she would receive a rx for   cough syrup. Pt said she is usually sent a rx for codeine gauifen but   there is no cough syrup at the pharmacy for pt. Pt prefer the cough syrup   to take. Can the rx please be sent for pt and can pt be contacted when   this is completed to give her an update on this issue.  ---------------------------------------------------------------------------  --------------  CALL BACK INFO  What is the best way for the office to contact you? OK to leave message on   voicemail  Preferred Call Back Phone Number? [phone]  ---------------------------------------------------------------------------  --------------  SCRIPT ANSWERS  Relationship to Patient?  Self

## 2022-02-09 DIAGNOSIS — R05.9 COUGH: ICD-10-CM

## 2022-02-09 RX ORDER — BENZONATATE 100 MG/1
100 CAPSULE ORAL
Qty: 21 CAPSULE | Refills: 0 | Status: SHIPPED | OUTPATIENT
Start: 2022-02-09 | End: 2022-02-16

## 2022-02-24 ENCOUNTER — TELEPHONE (OUTPATIENT)
Dept: FAMILY MEDICINE CLINIC | Age: 62
End: 2022-02-24

## 2022-02-24 DIAGNOSIS — M79.89 LEG SWELLING: ICD-10-CM

## 2022-02-24 DIAGNOSIS — K86.89 PANCREATIC INSUFFICIENCY: ICD-10-CM

## 2022-02-24 RX ORDER — OMEPRAZOLE 20 MG/1
CAPSULE, DELAYED RELEASE ORAL
Qty: 180 CAPSULE | Refills: 0 | Status: SHIPPED | OUTPATIENT
Start: 2022-02-24 | End: 2022-03-14

## 2022-02-24 RX ORDER — HYDROCHLOROTHIAZIDE 25 MG/1
TABLET ORAL
Qty: 90 TABLET | Refills: 0 | Status: SHIPPED | OUTPATIENT
Start: 2022-02-24 | End: 2022-03-14

## 2022-02-24 RX ORDER — PANCRELIPASE 36000; 180000; 114000 [USP'U]/1; [USP'U]/1; [USP'U]/1
CAPSULE, DELAYED RELEASE PELLETS ORAL
Qty: 480 CAPSULE | Refills: 0 | Status: SHIPPED | OUTPATIENT
Start: 2022-02-24 | End: 2022-03-14

## 2022-02-24 RX ORDER — METFORMIN HYDROCHLORIDE 1000 MG/1
TABLET ORAL
Qty: 90 TABLET | Refills: 0 | Status: SHIPPED | OUTPATIENT
Start: 2022-02-24 | End: 2022-03-14

## 2022-02-24 RX ORDER — BLOOD SUGAR DIAGNOSTIC
STRIP MISCELLANEOUS
Qty: 100 STRIP | Refills: 0 | Status: SHIPPED | OUTPATIENT
Start: 2022-02-24 | End: 2022-03-14

## 2022-02-24 NOTE — TELEPHONE ENCOUNTER
Pt had a virtual on 1/25/21 and stated she was told a cough medication would be sent in but only got clear pills for cough and stated called multiple times and said it would be sent to [facility] in [place]    Pt stated expecting a call to explain why it was never sent    Call pt at [phone]

## 2022-02-24 NOTE — TELEPHONE ENCOUNTER
Called pt and advised her that [facility] sent in cough medication for her in Jan. She is requesting cough syrup I advised her that she needed an appt for this since it has been over a month and she is not feeling better. Advised her she needed to schedule a VV with Dr. Bairon Gonzalez her PCP to be evaluated.

## 2022-03-01 DIAGNOSIS — E11.42 DIABETIC POLYNEUROPATHY ASSOCIATED WITH TYPE 2 DIABETES MELLITUS (HCC): ICD-10-CM

## 2022-03-01 RX ORDER — FLUCONAZOLE 150 MG/1
TABLET ORAL
Qty: 1 TABLET | Refills: 0 | Status: SHIPPED | OUTPATIENT
Start: 2022-03-01

## 2022-03-01 RX ORDER — GABAPENTIN 300 MG/1
300 CAPSULE ORAL 3 TIMES DAILY
Qty: 90 CAPSULE | Refills: 0 | Status: SHIPPED | OUTPATIENT
Start: 2022-03-01 | End: 2022-04-22

## 2022-03-14 ENCOUNTER — TELEPHONE (OUTPATIENT)
Dept: FAMILY MEDICINE CLINIC | Age: 62
End: 2022-03-14

## 2022-03-14 DIAGNOSIS — M79.89 LEG SWELLING: ICD-10-CM

## 2022-03-14 DIAGNOSIS — K86.89 PANCREATIC INSUFFICIENCY: ICD-10-CM

## 2022-03-14 RX ORDER — METFORMIN HYDROCHLORIDE 1000 MG/1
TABLET ORAL
Qty: 60 TABLET | Refills: 1 | Status: SHIPPED | OUTPATIENT
Start: 2022-03-14 | End: 2022-10-12

## 2022-03-14 RX ORDER — HYDROCHLOROTHIAZIDE 25 MG/1
TABLET ORAL
Qty: 90 TABLET | Refills: 0 | Status: SHIPPED | OUTPATIENT
Start: 2022-03-14 | End: 2022-08-25

## 2022-03-14 RX ORDER — PANCRELIPASE 36000; 180000; 114000 [USP'U]/1; [USP'U]/1; [USP'U]/1
CAPSULE, DELAYED RELEASE PELLETS ORAL
Qty: 480 CAPSULE | Refills: 0 | Status: SHIPPED | OUTPATIENT
Start: 2022-03-14 | End: 2022-04-02

## 2022-03-14 RX ORDER — OMEPRAZOLE 20 MG/1
CAPSULE, DELAYED RELEASE ORAL
Qty: 180 CAPSULE | Refills: 0 | Status: SHIPPED | OUTPATIENT
Start: 2022-03-14 | End: 2022-08-25

## 2022-03-14 RX ORDER — BLOOD SUGAR DIAGNOSTIC
STRIP MISCELLANEOUS
Qty: 100 STRIP | Refills: 0 | Status: SHIPPED | OUTPATIENT
Start: 2022-03-14 | End: 2022-04-02

## 2022-03-14 NOTE — TELEPHONE ENCOUNTER
----- Message from Matthew Jody sent at 3/14/2022  9:56 AM EDT -----  Subject: Message to Provider    QUESTIONS  Information for Provider? Would like to speak to Vasquez Pelon, only   Surinder Dobbins no one else.   ---------------------------------------------------------------------------  --------------  Ramone PARADA  What is the best way for the office to contact you? OK to leave message on   voicemail  Preferred Call Back Phone Number? [phone]  ---------------------------------------------------------------------------  --------------  SCRIPT ANSWERS  Relationship to Patient?  Self

## 2022-03-18 PROBLEM — E66.01 SEVERE OBESITY (HCC): Status: ACTIVE | Noted: 2018-10-03

## 2022-03-19 PROBLEM — L72.0 EPIDERMAL INCLUSION CYST: Status: ACTIVE | Noted: 2020-12-18

## 2022-03-19 PROBLEM — E11.21 TYPE 2 DIABETES WITH NEPHROPATHY (HCC): Status: ACTIVE | Noted: 2018-08-08

## 2022-03-19 PROBLEM — Z72.0 TOBACCO ABUSE: Status: ACTIVE | Noted: 2017-05-24

## 2022-03-19 PROBLEM — M71.21 SYNOVIAL CYST OF RIGHT KNEE: Status: ACTIVE | Noted: 2017-01-16

## 2022-03-19 PROBLEM — N18.4 CKD (CHRONIC KIDNEY DISEASE) STAGE 4, GFR 15-29 ML/MIN (HCC): Status: ACTIVE | Noted: 2020-11-20

## 2022-03-19 PROBLEM — K86.89 PANCREATIC INSUFFICIENCY: Status: ACTIVE | Noted: 2020-12-18

## 2022-03-20 PROBLEM — S92.101A CLOSED FRACTURE OF RIGHT TALUS: Status: ACTIVE | Noted: 2017-01-16

## 2022-04-01 DIAGNOSIS — Z91.09 ENVIRONMENTAL ALLERGIES: ICD-10-CM

## 2022-04-01 DIAGNOSIS — K86.89 PANCREATIC INSUFFICIENCY: ICD-10-CM

## 2022-04-02 RX ORDER — CETIRIZINE HCL 10 MG
TABLET ORAL
Qty: 90 TABLET | Refills: 1 | Status: SHIPPED | OUTPATIENT
Start: 2022-04-02

## 2022-04-02 RX ORDER — PANCRELIPASE 36000; 180000; 114000 [USP'U]/1; [USP'U]/1; [USP'U]/1
CAPSULE, DELAYED RELEASE PELLETS ORAL
Qty: 480 CAPSULE | Refills: 0 | Status: SHIPPED | OUTPATIENT
Start: 2022-04-02 | End: 2022-10-12

## 2022-04-02 RX ORDER — BLOOD SUGAR DIAGNOSTIC
STRIP MISCELLANEOUS
Qty: 100 STRIP | Refills: 0 | Status: SHIPPED | OUTPATIENT
Start: 2022-04-02 | End: 2022-06-15

## 2022-04-22 DIAGNOSIS — E11.42 DIABETIC POLYNEUROPATHY ASSOCIATED WITH TYPE 2 DIABETES MELLITUS (HCC): ICD-10-CM

## 2022-04-22 RX ORDER — GABAPENTIN 300 MG/1
300 CAPSULE ORAL 3 TIMES DAILY
Qty: 90 CAPSULE | Refills: 0 | Status: SHIPPED | OUTPATIENT
Start: 2022-04-22 | End: 2022-06-15

## 2022-06-14 DIAGNOSIS — E11.42 DIABETIC POLYNEUROPATHY ASSOCIATED WITH TYPE 2 DIABETES MELLITUS (HCC): ICD-10-CM

## 2022-06-14 DIAGNOSIS — E11.9 TYPE 2 DIABETES MELLITUS WITHOUT COMPLICATION, WITH LONG-TERM CURRENT USE OF INSULIN (HCC): ICD-10-CM

## 2022-06-14 DIAGNOSIS — Z79.4 TYPE 2 DIABETES MELLITUS WITHOUT COMPLICATION, WITH LONG-TERM CURRENT USE OF INSULIN (HCC): ICD-10-CM

## 2022-06-15 RX ORDER — BLOOD SUGAR DIAGNOSTIC
STRIP MISCELLANEOUS
Qty: 100 STRIP | Refills: 0 | Status: SHIPPED | OUTPATIENT
Start: 2022-06-15 | End: 2022-08-25

## 2022-06-15 RX ORDER — GABAPENTIN 300 MG/1
300 CAPSULE ORAL 3 TIMES DAILY
Qty: 90 CAPSULE | Refills: 0 | Status: SHIPPED | OUTPATIENT
Start: 2022-06-15 | End: 2022-08-25

## 2022-06-15 RX ORDER — PEN NEEDLE, DIABETIC 32GX 5/32"
NEEDLE, DISPOSABLE MISCELLANEOUS
Qty: 100 PEN NEEDLE | Refills: 5 | Status: SHIPPED | OUTPATIENT
Start: 2022-06-15

## 2022-07-21 DIAGNOSIS — E55.9 VITAMIN D DEFICIENCY: ICD-10-CM

## 2022-07-21 RX ORDER — ERGOCALCIFEROL 1.25 MG/1
CAPSULE ORAL
Qty: 12 CAPSULE | Refills: 0 | Status: SHIPPED | OUTPATIENT
Start: 2022-07-21 | End: 2022-10-31

## 2022-08-25 DIAGNOSIS — J45.20 MILD INTERMITTENT ASTHMA WITHOUT COMPLICATION: ICD-10-CM

## 2022-08-25 DIAGNOSIS — M79.89 LEG SWELLING: ICD-10-CM

## 2022-08-25 DIAGNOSIS — E11.42 DIABETIC POLYNEUROPATHY ASSOCIATED WITH TYPE 2 DIABETES MELLITUS (HCC): ICD-10-CM

## 2022-08-25 RX ORDER — GABAPENTIN 300 MG/1
300 CAPSULE ORAL 3 TIMES DAILY
Qty: 90 CAPSULE | Refills: 0 | Status: SHIPPED | OUTPATIENT
Start: 2022-08-25 | End: 2022-10-12 | Stop reason: SDUPTHER

## 2022-08-25 RX ORDER — OMEPRAZOLE 20 MG/1
CAPSULE, DELAYED RELEASE ORAL
Qty: 180 CAPSULE | Refills: 0 | Status: SHIPPED | OUTPATIENT
Start: 2022-08-25 | End: 2022-10-12

## 2022-08-25 RX ORDER — HYDROCHLOROTHIAZIDE 25 MG/1
TABLET ORAL
Qty: 90 TABLET | Refills: 0 | Status: SHIPPED | OUTPATIENT
Start: 2022-08-25 | End: 2022-10-12

## 2022-08-25 RX ORDER — ALBUTEROL SULFATE 90 UG/1
AEROSOL, METERED RESPIRATORY (INHALATION)
Qty: 8.5 EACH | Refills: 5 | Status: SHIPPED | OUTPATIENT
Start: 2022-08-25

## 2022-08-25 RX ORDER — BLOOD SUGAR DIAGNOSTIC
STRIP MISCELLANEOUS
Qty: 100 STRIP | Refills: 0 | Status: SHIPPED | OUTPATIENT
Start: 2022-08-25 | End: 2022-09-23

## 2022-09-23 DIAGNOSIS — E11.9 TYPE 2 DIABETES MELLITUS WITHOUT COMPLICATION, WITH LONG-TERM CURRENT USE OF INSULIN (HCC): ICD-10-CM

## 2022-09-23 DIAGNOSIS — Z79.4 TYPE 2 DIABETES MELLITUS WITHOUT COMPLICATION, WITH LONG-TERM CURRENT USE OF INSULIN (HCC): ICD-10-CM

## 2022-09-23 RX ORDER — BLOOD SUGAR DIAGNOSTIC
STRIP MISCELLANEOUS
Qty: 100 STRIP | Refills: 0 | Status: SHIPPED | OUTPATIENT
Start: 2022-09-23

## 2022-09-23 RX ORDER — INSULIN GLARGINE 100 [IU]/ML
INJECTION, SOLUTION SUBCUTANEOUS
Qty: 15 ML | Refills: 0 | Status: SHIPPED | OUTPATIENT
Start: 2022-09-23 | End: 2022-10-17

## 2022-10-04 ENCOUNTER — TRANSCRIBE ORDER (OUTPATIENT)
Dept: SCHEDULING | Age: 62
End: 2022-10-04

## 2022-10-04 DIAGNOSIS — Z12.31 SCREENING MAMMOGRAM FOR HIGH-RISK PATIENT: Primary | ICD-10-CM

## 2022-10-04 DIAGNOSIS — Z12.31 VISIT FOR SCREENING MAMMOGRAM: ICD-10-CM

## 2022-10-12 DIAGNOSIS — E11.42 DIABETIC POLYNEUROPATHY ASSOCIATED WITH TYPE 2 DIABETES MELLITUS (HCC): ICD-10-CM

## 2022-10-12 RX ORDER — GABAPENTIN 300 MG/1
300 CAPSULE ORAL 3 TIMES DAILY
Qty: 90 CAPSULE | Refills: 0 | Status: SHIPPED | OUTPATIENT
Start: 2022-10-12

## 2022-10-17 DIAGNOSIS — Z79.4 TYPE 2 DIABETES MELLITUS WITHOUT COMPLICATION, WITH LONG-TERM CURRENT USE OF INSULIN (HCC): ICD-10-CM

## 2022-10-17 DIAGNOSIS — E11.9 TYPE 2 DIABETES MELLITUS WITHOUT COMPLICATION, WITH LONG-TERM CURRENT USE OF INSULIN (HCC): ICD-10-CM

## 2022-10-17 RX ORDER — INSULIN GLARGINE 100 [IU]/ML
INJECTION, SOLUTION SUBCUTANEOUS
Qty: 15 ML | Refills: 0 | Status: SHIPPED | OUTPATIENT
Start: 2022-10-17 | End: 2022-11-02

## 2022-10-18 ENCOUNTER — LAB ONLY (OUTPATIENT)
Dept: FAMILY MEDICINE CLINIC | Age: 62
End: 2022-10-18

## 2022-10-18 ENCOUNTER — OFFICE VISIT (OUTPATIENT)
Dept: FAMILY MEDICINE CLINIC | Age: 62
End: 2022-10-18
Payer: MEDICAID

## 2022-10-18 VITALS
HEART RATE: 72 BPM | BODY MASS INDEX: 32.39 KG/M2 | HEIGHT: 62 IN | OXYGEN SATURATION: 98 % | WEIGHT: 176 LBS | RESPIRATION RATE: 18 BRPM | SYSTOLIC BLOOD PRESSURE: 118 MMHG | TEMPERATURE: 98.3 F | DIASTOLIC BLOOD PRESSURE: 64 MMHG

## 2022-10-18 DIAGNOSIS — N18.30 STAGE 3 CHRONIC KIDNEY DISEASE, UNSPECIFIED WHETHER STAGE 3A OR 3B CKD (HCC): ICD-10-CM

## 2022-10-18 DIAGNOSIS — Z79.4 TYPE 2 DIABETES MELLITUS WITHOUT COMPLICATION, WITH LONG-TERM CURRENT USE OF INSULIN (HCC): ICD-10-CM

## 2022-10-18 DIAGNOSIS — Z79.4 TYPE 2 DIABETES MELLITUS WITHOUT COMPLICATION, WITH LONG-TERM CURRENT USE OF INSULIN (HCC): Primary | ICD-10-CM

## 2022-10-18 DIAGNOSIS — E11.9 TYPE 2 DIABETES MELLITUS WITHOUT COMPLICATION, WITH LONG-TERM CURRENT USE OF INSULIN (HCC): Primary | ICD-10-CM

## 2022-10-18 DIAGNOSIS — B37.31 YEAST VAGINITIS: ICD-10-CM

## 2022-10-18 DIAGNOSIS — E78.2 MIXED HYPERLIPIDEMIA: ICD-10-CM

## 2022-10-18 DIAGNOSIS — E55.9 VITAMIN D DEFICIENCY: ICD-10-CM

## 2022-10-18 DIAGNOSIS — I10 PRIMARY HYPERTENSION: ICD-10-CM

## 2022-10-18 DIAGNOSIS — E11.9 TYPE 2 DIABETES MELLITUS WITHOUT COMPLICATION, WITH LONG-TERM CURRENT USE OF INSULIN (HCC): ICD-10-CM

## 2022-10-18 PROBLEM — R80.9 PROTEINURIA: Status: ACTIVE | Noted: 2021-12-17

## 2022-10-18 PROBLEM — J45.909 UNCOMPLICATED ASTHMA: Status: ACTIVE | Noted: 2021-12-17

## 2022-10-18 PROCEDURE — 99214 OFFICE O/P EST MOD 30 MIN: CPT | Performed by: INTERNAL MEDICINE

## 2022-10-18 RX ORDER — DAPAGLIFLOZIN 10 MG/1
TABLET, FILM COATED ORAL
COMMUNITY
Start: 2022-10-17 | End: 2022-10-18 | Stop reason: SDUPTHER

## 2022-10-18 RX ORDER — DAPAGLIFLOZIN 10 MG/1
10 TABLET, FILM COATED ORAL DAILY
Qty: 90 TABLET | Refills: 1 | Status: SHIPPED | OUTPATIENT
Start: 2022-10-18 | End: 2023-01-16

## 2022-10-18 RX ORDER — LOSARTAN POTASSIUM 100 MG/1
100 TABLET ORAL DAILY
COMMUNITY
Start: 2022-09-23

## 2022-10-18 RX ORDER — FLUCONAZOLE 150 MG/1
150 TABLET ORAL DAILY
Qty: 2 TABLET | Refills: 5 | Status: SHIPPED | OUTPATIENT
Start: 2022-10-18 | End: 2022-10-19

## 2022-10-18 RX ORDER — SUMATRIPTAN 100 MG/1
100 TABLET, FILM COATED ORAL
COMMUNITY

## 2022-10-18 NOTE — PROGRESS NOTES
Chief Complaint   Patient presents with    Physical    Other     Concerned about Hot Flashes that have gotten more frequent    Medication Refill       ASSESSMENT:  Diabetes Mellitus: poorly controlled    Diagnoses and all orders for this visit:    1. Type 2 diabetes mellitus without complication, with long-term current use of insulin ([facility])  -     HEMOGLOBIN A1C WITH EAG; Future  -     MICROALBUMIN, UR, RAND W/ MICROALB/CREAT RATIO; Future  -     Farxiga 10 mg tab tablet; Take 1 Tablet by mouth daily for 90 days. 2. Vitamin D deficiency  -     VITAMIN D, 25 HYDROXY; Future    3. Mixed hyperlipidemia  -     METABOLIC PANEL, COMPREHENSIVE; Future  -     CBC WITH AUTOMATED DIFF; Future  -     LIPID PANEL; Future    4. Primary hypertension   Well controlled. No change in regimen. 5. Yeast vaginitis (caused by [place])  -     fluconazole (DIFLUCAN) 150 mg tablet; Take 1 Tablet by mouth daily for 1 day. May repeat in 3 days if needed. 6. Stage 3 chronic kidney disease, unspecified whether stage 3a or 3b CKD ([facility])  -     Farxiga 10 mg tab tablet; Take 1 Tablet by mouth daily for 90 days. Diabetic Goals:  HgA1C <7,  LDL cholesterol <100, Blood pressure <140/80. The patient is asked to make an attempt to improve diet and exercise patterns to aid in medical management of this problem. Potential long term end organ damage is discussed as well. I also recommend yearly eye exams and daily foot care. PLAN:  See orders for this visit as documented in the electronic medical record. Issues reviewed with her: home glucose monitoring emphasized, all medications, side effects and compliance discussed carefully, foot care discussed and Podiatry visits discussed, annual eye examinations at Ophthalmology discussed, glycohemoglobin and other lab monitoring discussed, long term diabetic complications discussed, and labs immediately prior to next visit. Consider diabetic education once we receive current labs.  She is followed by nephrology who recently started her on [address]. Emphasized that patient needs to be seen every 3-months. I have discussed the diagnosis with the patient and the intended treatment plan as seen in the above orders. The patient has received an after-visit summary and questions were answered concerning future plans. Asked to return should symptoms worsen or not improve with treatment. Any pending labs and studies will be relayed to patient when they become available. Pt verbalizes understanding of plan of care and denies further questions or concerns at this time. Follow-up and Dispositions    Return in about 3 months (around 1/18/2023), or if symptoms worsen or fail to improve, for Follow up Type II DM. SUBJECTIVE:  58 y.o. female for follow up of diabetes. Diabetic Review of Systems - medication compliance: noncompliant some of the time, diabetic diet compliance: noncompliant some of the time, home glucose monitoring: is performed sporadically. Other symptoms and concerns: She is concerned that her hot flushes have gotten worse in the past year. The patient also is non-compliant with follow ups. She has not had her HBA1C rechecked in over a year and it had been very elevated. She is not sure where her BS have been running. The patient reports that she has had a lot of social stressors. Her  did pass away recently. There have been some probable medication, dietary, and lifestyle compliance issues here. I have discussed with her the great importance of following the treatment plan exactly as directed in order to achieve a good medical outcome. Diabetic Report Card  Diabetic Goals:  HgA1C <7,  LDL cholesterol <100, Blood pressure <140/80.       Lab Results   Component Value Date/Time    Hemoglobin A1c 12.0 (H) 05/12/2021 10:41 AM    Hemoglobin A1c >14.0 (H) 10/05/2020 12:07 PM    Hemoglobin A1c 11.4 (H) 11/21/2019 08:39 AM    Hemoglobin A1c, External 9.6 11/01/2017 12:00 AM     Lab Results   Component Value Date/Time    Cholesterol, total 174 05/12/2021 10:41 AM    HDL Cholesterol 56 05/12/2021 10:41 AM    LDL, calculated 84 05/12/2021 10:41 AM    VLDL, calculated 34 05/12/2021 10:41 AM    Triglyceride 170 (H) 05/12/2021 10:41 AM    CHOL/HDL Ratio 3.1 05/12/2021 10:41 AM       I also recommended yearly eye exams and daily foot care. Current Outpatient Medications   Medication Sig Dispense Refill    Farxiga 10 mg tab tablet       losartan (COZAAR) 100 mg tablet Take 100 mg by mouth daily. SUMAtriptan (IMITREX) 100 mg tablet Take 100 mg by mouth. Lantus Solostar U-100 Insulin 100 unit/mL (3 mL) inpn INJECT 30 UNITS BY SUBCUTANEOUS ROUTE NIGHTLY 15 mL 0    metFORMIN (GLUCOPHAGE) 1,000 mg tablet TAKE 1 TABLET BY MOUTH TWICE A  Tablet 1    hydroCHLOROthiazide (HYDRODIURIL) 25 mg tablet TAKE 1 TABLET BY MOUTH EVERY DAY 90 Tablet 1    omeprazole (PRILOSEC) 20 mg capsule TAKE 1 CAPSULE BY MOUTH TWICE A  Capsule 1    Creon 36,000-114,000- 180,000 unit cpDR capsule TAKE 4 CAPSULES BY MOUTH THREE TIMES A DAY WITH EACH MEAL AND TAKE 2 CAPS WITH SNACKS 480 Capsule 1    gabapentin (NEURONTIN) 300 mg capsule Take 1 Capsule by mouth three (3) times daily. Indications: neuropathic pain 90 Capsule 0    OneTouch Ultra Test strip TEST BLOOD GLUCOSE THREE TIMES DAILY 100 Strip 0    ProAir HFA 90 mcg/actuation inhaler INHALE 1 PUFF BY MOUTH EVERY 4 HOURS AS NEEDED FOR WHEEZING OR COUGH 8.5 Each 5    ergocalciferol (ERGOCALCIFEROL) 1,250 mcg (50,000 unit) capsule TAKE 1 CAPSULE BY MOUTH ONE TIME PER WEEK 12 Capsule 0    BD Rosalba 2nd Gen Pen Needle 32 gauge x 5/32\" ndle FOR INJECTING INSULIN 4 TIMES A  Pen Needle 5    cetirizine (ZYRTEC) 10 mg tablet TAKE 1 TABLET BY MOUTH EVERY DAY AT NIGHT 90 Tablet 1    fluconazole (DIFLUCAN) 150 mg tablet TAKE 1 TABLET BY MOUTH DAILY FOR 1 DAY.  REPEAT IN 72 HOURS IF NEEDED 1 Tablet 0    mometasone (ELOCON) 0.1 % ointment APPLY TOPICALLY TO AFFECTED AREA EVERY DAY 15 g 0    cyclobenzaprine (FLEXERIL) 10 mg tablet TAKE 1 TABLET BY MOUTH THREE TIMES A DAY AS NEEDED FOR MUSCLE SPASM 30 Tablet 5    lidocaine (LIDODERM) 5 % 1 Patch by TransDERmal route daily. Apply for 12 hours a day and remove for 12 hours a day. 30 Patch 5    lancets (One Touch Delica) 33 gauge misc To check BS 2-3 x per day. E11.9 100 Each 5    amLODIPine (NORVASC) 10 mg tablet TAKE 1 TABLET BY MOUTH EVERY DAY 30 Tablet 5    ondansetron (ZOFRAN ODT) 8 mg disintegrating tablet Take 1 Tab by mouth every eight (8) hours as needed for Nausea or Vomiting. 12 Tab 0    NovoLOG Mix 70-30FlexPen U-100 100 unit/mL (70-30) inpn sliding scale, max daily amount 10 units. (Patient not taking: Reported on 10/18/2022) 5 Pen 5       OBJECTIVE:  Visit Vitals  /64 (BP 1 Location: Right arm, BP Patient Position: Sitting, BP Cuff Size: Adult)   Pulse 72   Temp 98.3 °F (36.8 °C) (Temporal)   Resp 18   Ht 5' 2\" (1.575 m)   Wt 176 lb (79.8 kg)   SpO2 98%   BMI 32.19 kg/m²       General appearance: alert, well appearing, and in no distress. Chest: clear to auscultation, no wheezes, rales or rhonchi, symmetric air entry. CVS exam: normal rate, regular rhythm, normal S1, S2, no murmurs, rubs, clicks or gallops. Abdominal exam: soft, nontender, nondistended, no masses or organomegaly. Exam of extremities: peripheral pulses normal, no pedal edema, no clubbing or cyanosis  Skin exam - normal coloration and turgor, no rashes, no suspicious skin lesions noted. Neurological exam reveals alert, oriented, normal speech, no focal findings or movement disorder noted. Diabetic foot exam:      Left Foot:              Visual Exam: normal               Pulse DP: 2+ (normal)              Filament test: normal sensation               Vibratory sensation: Vibratory sensation: normal         Onychomycosis great toe of both feet.                   Right Foot:              Visual Exam: normal Pulse DP: 2+ (normal)              Filament test: normal sensation               Vibratory sensation: Vibratory sensation: normal    Luis Hill MD  [facility]  10/18/22

## 2022-10-18 NOTE — PROGRESS NOTES
Identified pt with two pt identifiers(name and ). Chief Complaint   Patient presents with    Physical    Other     Concerned about Hot Flashes that have gotten more frequent    Medication Refill        Health Maintenance Due   Topic    Pneumococcal 0-64 years (1 - PCV)    Eye Exam Retinal or Dilated     Cervical cancer screen     Foot Exam Q1     A1C test (Diabetic or Prediabetic)     MICROALBUMIN Q1     Lipid Screen     Breast Cancer Screen Mammogram        Wt Readings from Last 3 Encounters:   10/18/22 176 lb (79.8 kg)   11/15/21 194 lb (88 kg)   21 199 lb 12.8 oz (90.6 kg)     Temp Readings from Last 3 Encounters:   10/18/22 98.3 °F (36.8 °C) (Temporal)   11/15/21 97.1 °F (36.2 °C) (Temporal)   21 98.2 °F (36.8 °C) (Oral)     BP Readings from Last 3 Encounters:   10/18/22 118/64   11/15/21 135/76   21 136/80     Pulse Readings from Last 3 Encounters:   10/18/22 72   11/15/21 70   21 76         Learning Assessment:  :     Learning Assessment 2016   PRIMARY LEARNER Patient   HIGHEST LEVEL OF EDUCATION - PRIMARY LEARNER  GRADUATED HIGH SCHOOL OR GED   BARRIERS PRIMARY LEARNER NONE   CO-LEARNER CAREGIVER No   PRIMARY LANGUAGE ENGLISH   LEARNER PREFERENCE PRIMARY OTHER (COMMENT)   ANSWERED BY self   RELATIONSHIP SELF       Depression Screening:  :     3 most recent PHQ Screens 10/18/2022   Little interest or pleasure in doing things Not at all   Feeling down, depressed, irritable, or hopeless Not at all   Total Score PHQ 2 0       Fall Risk Assessment:  :     Fall Risk Assessment, last 12 mths 2021   Able to walk? Yes   Fall in past 12 months? 0   Do you feel unsteady? 0   Are you worried about falling 0       Abuse Screening:  :     Abuse Screening Questionnaire 10/18/2022 2021 10/5/2020 2019 2018 2016   Do you ever feel afraid of your partner? N N N N N N   Are you in a relationship with someone who physically or mentally threatens you?  N N N N N N   Is it safe for you to go home? Y Y Y Y Y Y       Coordination of Care Questionnaire:  :     1) Have you been to an emergency room, urgent care clinic since your last visit? no   Hospitalized since your last visit? no             2) Have you seen or consulted any other health care providers outside of [address] [place] [address] since your last visit? no  (Include any pap smears or colon screenings in this section.)    3) Do you have an Advance Directive on file? no  Are you interested in receiving information about Advance Directives? no    Patient is accompanied by N/A I have received verbal consent from Yoanna Nolan to discuss any/all medical information while they are present in the room. 4.  For patients aged 39-70: Has the patient had a colonoscopy / FIT/ Cologuard? Yes - no Care Gap present      If the patient is female:    5. For patients aged 41-77: Has the patient had a mammogram within the past 2 years? No      6. For patients aged 21-65: Has the patient had a pap smear?  No

## 2022-10-19 ENCOUNTER — TELEPHONE (OUTPATIENT)
Dept: FAMILY MEDICINE CLINIC | Age: 62
End: 2022-10-19

## 2022-10-19 LAB
25(OH)D3 SERPL-MCNC: 33.9 NG/ML (ref 30–100)
ALBUMIN SERPL-MCNC: 3.4 G/DL (ref 3.5–5)
ALBUMIN/GLOB SERPL: 1 {RATIO} (ref 1.1–2.2)
ALP SERPL-CCNC: 135 U/L (ref 45–117)
ALT SERPL-CCNC: 17 U/L (ref 12–78)
ANION GAP SERPL CALC-SCNC: 6 MMOL/L (ref 5–15)
AST SERPL-CCNC: 10 U/L (ref 15–37)
BASOPHILS # BLD: 0.1 K/UL (ref 0–0.1)
BASOPHILS NFR BLD: 1 % (ref 0–1)
BILIRUB SERPL-MCNC: 0.2 MG/DL (ref 0.2–1)
BUN SERPL-MCNC: 23 MG/DL (ref 6–20)
BUN/CREAT SERPL: 14 (ref 12–20)
CALCIUM SERPL-MCNC: 9.2 MG/DL (ref 8.5–10.1)
CHLORIDE SERPL-SCNC: 111 MMOL/L (ref 97–108)
CHOLEST SERPL-MCNC: 146 MG/DL
CO2 SERPL-SCNC: 23 MMOL/L (ref 21–32)
CREAT SERPL-MCNC: 1.68 MG/DL (ref 0.55–1.02)
DIFFERENTIAL METHOD BLD: ABNORMAL
EOSINOPHIL # BLD: 0.1 K/UL (ref 0–0.4)
EOSINOPHIL NFR BLD: 1 % (ref 0–7)
ERYTHROCYTE [DISTWIDTH] IN BLOOD BY AUTOMATED COUNT: 13.3 % (ref 11.5–14.5)
EST. AVERAGE GLUCOSE BLD GHB EST-MCNC: 246 MG/DL
GLOBULIN SER CALC-MCNC: 3.3 G/DL (ref 2–4)
GLUCOSE SERPL-MCNC: 129 MG/DL (ref 65–100)
HBA1C MFR BLD: 10.2 % (ref 4–5.6)
HCT VFR BLD AUTO: 36.1 % (ref 35–47)
HDLC SERPL-MCNC: 60 MG/DL
HDLC SERPL: 2.4 {RATIO} (ref 0–5)
HGB BLD-MCNC: 11.3 G/DL (ref 11.5–16)
IMM GRANULOCYTES # BLD AUTO: 0.1 K/UL (ref 0–0.04)
IMM GRANULOCYTES NFR BLD AUTO: 0 % (ref 0–0.5)
LDLC SERPL CALC-MCNC: 70.6 MG/DL (ref 0–100)
LYMPHOCYTES # BLD: 5.4 K/UL (ref 0.8–3.5)
LYMPHOCYTES NFR BLD: 41 % (ref 12–49)
MCH RBC QN AUTO: 30.1 PG (ref 26–34)
MCHC RBC AUTO-ENTMCNC: 31.3 G/DL (ref 30–36.5)
MCV RBC AUTO: 96 FL (ref 80–99)
MONOCYTES # BLD: 0.5 K/UL (ref 0–1)
MONOCYTES NFR BLD: 4 % (ref 5–13)
NEUTS SEG # BLD: 7 K/UL (ref 1.8–8)
NEUTS SEG NFR BLD: 53 % (ref 32–75)
NRBC # BLD: 0 K/UL (ref 0–0.01)
NRBC BLD-RTO: 0 PER 100 WBC
PLATELET # BLD AUTO: 269 K/UL (ref 150–400)
PMV BLD AUTO: 11.5 FL (ref 8.9–12.9)
POTASSIUM SERPL-SCNC: 5.1 MMOL/L (ref 3.5–5.1)
PROT SERPL-MCNC: 6.7 G/DL (ref 6.4–8.2)
RBC # BLD AUTO: 3.76 M/UL (ref 3.8–5.2)
SODIUM SERPL-SCNC: 140 MMOL/L (ref 136–145)
TRIGL SERPL-MCNC: 77 MG/DL (ref ?–150)
VLDLC SERPL CALC-MCNC: 15.4 MG/DL
WBC # BLD AUTO: 13.2 K/UL (ref 3.6–11)

## 2022-10-19 NOTE — TELEPHONE ENCOUNTER
Called pt and relayed results. She did not have a virus at that time. Advised her to follow up in 2 weeks. Pt stated she would call back to schedule appt.

## 2022-10-19 NOTE — TELEPHONE ENCOUNTER
----- Message from Nima Lancaster MD sent at 10/19/2022  1:54 PM EDT -----  Please call and let patient know the following  1. Hemoglobin A1C came down from 12.0 to 10.2, but still very elevated. I would like her to follow up to discuss additional medication and perhaps increasing her evening Lantus to 40 U at bedtime. 2. Her kidney function is a little worse than the last time. Getting the blood sugars under control will help this. 3. She had an elevated white count. Was she feeling unwell or having a virus at the time of her visit that she did not mention? 4. Please see me in 2-weeks    Thanks!

## 2022-10-19 NOTE — PROGRESS NOTES
Please call and let patient know the following  1. Hemoglobin A1C came down from 12.0 to 10.2, but still very elevated. I would like her to follow up to discuss additional medication and perhaps increasing her evening Lantus to 40 U at bedtime. 2. Her kidney function is a little worse than the last time. Getting the blood sugars under control will help this. 3. She had an elevated white count. Was she feeling unwell or having a virus at the time of her visit that she did not mention? 4. Please see me in 2-weeks    Thanks!

## 2022-10-21 DIAGNOSIS — Z79.4 TYPE 2 DIABETES MELLITUS WITHOUT COMPLICATION, WITH LONG-TERM CURRENT USE OF INSULIN (HCC): Primary | ICD-10-CM

## 2022-10-21 DIAGNOSIS — E11.9 TYPE 2 DIABETES MELLITUS WITHOUT COMPLICATION, WITH LONG-TERM CURRENT USE OF INSULIN (HCC): Primary | ICD-10-CM

## 2022-10-30 DIAGNOSIS — E55.9 VITAMIN D DEFICIENCY: ICD-10-CM

## 2022-10-31 RX ORDER — ERGOCALCIFEROL 1.25 MG/1
CAPSULE ORAL
Qty: 4 CAPSULE | Refills: 2 | Status: SHIPPED | OUTPATIENT
Start: 2022-10-31

## 2022-11-02 ENCOUNTER — OFFICE VISIT (OUTPATIENT)
Dept: FAMILY MEDICINE CLINIC | Age: 62
End: 2022-11-02
Payer: MEDICAID

## 2022-11-02 ENCOUNTER — LAB ONLY (OUTPATIENT)
Dept: FAMILY MEDICINE CLINIC | Age: 62
End: 2022-11-02

## 2022-11-02 VITALS
TEMPERATURE: 97.2 F | WEIGHT: 178 LBS | DIASTOLIC BLOOD PRESSURE: 62 MMHG | OXYGEN SATURATION: 99 % | SYSTOLIC BLOOD PRESSURE: 130 MMHG | HEIGHT: 62 IN | HEART RATE: 71 BPM | BODY MASS INDEX: 32.76 KG/M2

## 2022-11-02 DIAGNOSIS — M43.16 ANTEROLISTHESIS OF LUMBAR SPINE: ICD-10-CM

## 2022-11-02 DIAGNOSIS — D72.829 LEUKOCYTOSIS, UNSPECIFIED TYPE: ICD-10-CM

## 2022-11-02 DIAGNOSIS — M51.36 DDD (DEGENERATIVE DISC DISEASE), LUMBAR: ICD-10-CM

## 2022-11-02 DIAGNOSIS — E11.9 TYPE 2 DIABETES MELLITUS WITHOUT COMPLICATION, WITH LONG-TERM CURRENT USE OF INSULIN (HCC): ICD-10-CM

## 2022-11-02 DIAGNOSIS — Z79.4 TYPE 2 DIABETES MELLITUS WITHOUT COMPLICATION, WITH LONG-TERM CURRENT USE OF INSULIN (HCC): ICD-10-CM

## 2022-11-02 DIAGNOSIS — E11.9 TYPE 2 DIABETES MELLITUS WITHOUT COMPLICATION, WITH LONG-TERM CURRENT USE OF INSULIN (HCC): Primary | ICD-10-CM

## 2022-11-02 DIAGNOSIS — Z79.4 TYPE 2 DIABETES MELLITUS WITHOUT COMPLICATION, WITH LONG-TERM CURRENT USE OF INSULIN (HCC): Primary | ICD-10-CM

## 2022-11-02 DIAGNOSIS — Z23 ENCOUNTER FOR IMMUNIZATION: ICD-10-CM

## 2022-11-02 PROCEDURE — 99214 OFFICE O/P EST MOD 30 MIN: CPT | Performed by: INTERNAL MEDICINE

## 2022-11-02 PROCEDURE — 90732 PPSV23 VACC 2 YRS+ SUBQ/IM: CPT | Performed by: INTERNAL MEDICINE

## 2022-11-02 PROCEDURE — 3078F DIAST BP <80 MM HG: CPT | Performed by: INTERNAL MEDICINE

## 2022-11-02 PROCEDURE — 3046F HEMOGLOBIN A1C LEVEL >9.0%: CPT | Performed by: INTERNAL MEDICINE

## 2022-11-02 PROCEDURE — 3074F SYST BP LT 130 MM HG: CPT | Performed by: INTERNAL MEDICINE

## 2022-11-02 RX ORDER — INSULIN GLARGINE 100 [IU]/ML
INJECTION, SOLUTION SUBCUTANEOUS
Qty: 12 ML | Refills: 5 | Status: SHIPPED | OUTPATIENT
Start: 2022-11-02 | End: 2022-12-02

## 2022-11-02 NOTE — PROGRESS NOTES
Chief Complaint   Patient presents with    Follow-up    Diabetes    Wax in Ear     B/l     Assessment/ Plan:   Diagnoses and all orders for this visit:    1. Type 2 diabetes mellitus without complication, with long-term current use of insulin ([facility])  -     insulin glargine (Lantus Solostar U-100 Insulin) 100 unit/mL (3 mL) inpn; 30 Units nightly AND 10 Units Every morning. Do all this for 30 days. Hold morning dose if BS <150  -     MICROALBUMIN, UR, RAND W/ MICROALB/CREAT RATIO; Future    2. Leukocytosis, unspecified type  -     CBC WITH AUTOMATED DIFF; Future    3. Encounter for immunization  -     PNEUMOCOCCAL, PPSV23, (AGE 2 YRS+), SC/IM    4. Anterolisthesis of lumbar spine  -     REFERRAL TO PAIN MANAGEMENT    5. DDD (degenerative disc disease), lumbar  -     REFERRAL TO PAIN MANAGEMENT     I have discussed the diagnosis with the patient and the intended treatment plan as seen in the above orders. The patient has received an after-visit summary and questions were answered concerning future plans. Asked to return should symptoms worsen or not improve with treatment. Any pending labs and studies will be relayed to patient when they become available. Pt verbalizes understanding of plan of care and denies further questions or concerns at this time. Follow-up and Dispositions    Return in about 2 months (around 1/2/2023), or if symptoms worsen or fail to improve. Subjective:   Jessi Calle is a 58 y.o. female who presents today for follow up of T2DM and needs fasting labs. The patient also c/o increased wax in both ears and feels very congested. She also discusses that she has worsening lumbar discussed disease. She was found on xray to have a grade 1 anterolisthesis at L4-5. Moderately severe degenerative disc disease and bilateral facet arthropathy at L5-S1. Apparently, she is not amenable to surgery and is finding that Gabapentin is no longer helpful and the pain can be relentless. We discussed sending her to see a pain management specialist for further evaluation and management of her pain. Lab Results   Component Value Date/Time    Hemoglobin A1c 10.2 (H) 10/18/2022 03:04 PM    Hemoglobin A1c, External 9.6 11/01/2017 12:00 AM     She reports that her BS have been better. Needs to have repeat labs in about January 2023. We reviewed her medications and plan to improve the HBA1C. HISTORICAL  PMH, PSH, FHX, SOCHX, ALLERGIES and MES were reviewed and updated today. Review of Systems  Review of Systems   Constitutional: Negative. HENT: Negative. Eyes: Negative. Respiratory: Negative. Cardiovascular: Negative. Gastrointestinal: Negative. Genitourinary: Negative. Musculoskeletal:  Positive for back pain and myalgias. Skin: Negative. Neurological: Negative. Endo/Heme/Allergies: Negative. Psychiatric/Behavioral: Negative. All other systems reviewed and are negative. Objective:     Visit Vitals  /62 (BP 1 Location: Right arm, BP Patient Position: Sitting)   Pulse 71   Temp 97.2 °F (36.2 °C) (Temporal)   Ht 5' 2\" (1.575 m)   Wt 178 lb (80.7 kg)   SpO2 99%   BMI 32.56 kg/m²     Physical Exam  Vitals and nursing note reviewed. Constitutional:       Appearance: Normal appearance. HENT:      Head: Normocephalic and atraumatic. Mouth/Throat:      Mouth: Mucous membranes are moist.   Eyes:      Extraocular Movements: Extraocular movements intact. Conjunctiva/sclera: Conjunctivae normal.      Pupils: Pupils are equal, round, and reactive to light. Cardiovascular:      Rate and Rhythm: Normal rate and regular rhythm. Pulses: Normal pulses. Heart sounds: Normal heart sounds. Pulmonary:      Effort: Pulmonary effort is normal.      Breath sounds: Normal breath sounds. Musculoskeletal:      Cervical back: Normal range of motion and neck supple. Neurological:      General: No focal deficit present.       Mental Status: She is alert. Mental status is at baseline. Cranial Nerves: No cranial nerve deficit. Sensory: No sensory deficit. Motor: Weakness present. Coordination: Coordination abnormal.      Gait: Gait abnormal.      Deep Tendon Reflexes: Reflexes abnormal.   Psychiatric:         Mood and Affect: Mood normal.         Behavior: Behavior normal.         Thought Content:  Thought content normal.         Judgment: Judgment normal.     Magnus Torrez MD  [facility]   11/03/22

## 2022-11-02 NOTE — PROGRESS NOTES
Identified pt with two pt identifiers. Reviewed record in preparation for visit and have obtained necessary documentation. All patient medications has been reviewed. Chief Complaint   Patient presents with    Follow-up    Diabetes    Wax in Ear     B/l     Additional information about chief complaint:    Visit Vitals  BP (!) 142/80 (BP 1 Location: Right upper arm, BP Patient Position: Sitting, BP Cuff Size: Adult)   Pulse 71   Temp 97.2 °F (36.2 °C) (Temporal)   Ht 5' 2\" (1.575 m)   Wt 178 lb (80.7 kg)   SpO2 99%   BMI 32.56 kg/m²       Health Maintenance Due   Topic    Pneumococcal 0-64 years (1 - PCV)    Eye Exam Retinal or Dilated     Cervical cancer screen     MICROALBUMIN Q1     Breast Cancer Screen Mammogram        1. Have you been to the ER, urgent care clinic since your last visit? Hospitalized since your last visit? No  2. Have you seen or consulted any other health care providers outside of the [address] [place] [address] since your last visit? Include any pap smears or colon screening.  No

## 2022-11-03 LAB
BASOPHILS # BLD: 0.1 K/UL (ref 0–0.1)
BASOPHILS NFR BLD: 1 % (ref 0–1)
CREAT UR-MCNC: 121 MG/DL
DIFFERENTIAL METHOD BLD: ABNORMAL
EOSINOPHIL # BLD: 0.3 K/UL (ref 0–0.4)
EOSINOPHIL NFR BLD: 2 % (ref 0–7)
ERYTHROCYTE [DISTWIDTH] IN BLOOD BY AUTOMATED COUNT: 13.3 % (ref 11.5–14.5)
HCT VFR BLD AUTO: 34.4 % (ref 35–47)
HGB BLD-MCNC: 11 G/DL (ref 11.5–16)
IMM GRANULOCYTES # BLD AUTO: 0 K/UL (ref 0–0.04)
IMM GRANULOCYTES NFR BLD AUTO: 0 % (ref 0–0.5)
LYMPHOCYTES # BLD: 4.1 K/UL (ref 0.8–3.5)
LYMPHOCYTES NFR BLD: 36 % (ref 12–49)
MCH RBC QN AUTO: 30.1 PG (ref 26–34)
MCHC RBC AUTO-ENTMCNC: 32 G/DL (ref 30–36.5)
MCV RBC AUTO: 94.2 FL (ref 80–99)
MICROALBUMIN UR-MCNC: 205 MG/DL
MICROALBUMIN/CREAT UR-RTO: 1694 MG/G (ref 0–30)
MONOCYTES # BLD: 0.5 K/UL (ref 0–1)
MONOCYTES NFR BLD: 4 % (ref 5–13)
NEUTS SEG # BLD: 6.4 K/UL (ref 1.8–8)
NEUTS SEG NFR BLD: 57 % (ref 32–75)
NRBC # BLD: 0 K/UL (ref 0–0.01)
NRBC BLD-RTO: 0 PER 100 WBC
PLATELET # BLD AUTO: 290 K/UL (ref 150–400)
PMV BLD AUTO: 11.2 FL (ref 8.9–12.9)
RBC # BLD AUTO: 3.65 M/UL (ref 3.8–5.2)
WBC # BLD AUTO: 11.4 K/UL (ref 3.6–11)

## 2022-11-04 ENCOUNTER — TELEPHONE (OUTPATIENT)
Dept: FAMILY MEDICINE CLINIC | Age: 62
End: 2022-11-04

## 2022-11-04 NOTE — TELEPHONE ENCOUNTER
----- Message from Pepe Lakhani MD sent at 11/4/2022  5:50 AM EDT -----  Please let patient know that her white count is almost back to baseline. She is slightly anemic. Recommend daily vitamin that has iron in it. Pharmacist can help her find a good one (eg. Centrum senior or 1-a-Day 50+). Her microablumin (protein in her urine) is slightly better, but still quite elevated. Strict adherence to diabetic diet and keeping HBA1C between 7-8 will help. She needs to follow up with me in 2-3 weeks. I also put her pain management referral in your blue folder. Thanks!

## 2022-11-04 NOTE — PROGRESS NOTES
Please let patient know that her white count is almost back to baseline. She is slightly anemic. Recommend daily vitamin that has iron in it. Pharmacist can help her find a good one (eg. Centrum senior or 1-a-Day 50+). Her microablumin (protein in her urine) is slightly better, but still quite elevated. Strict adherence to diabetic diet and keeping HBA1C between 7-8 will help. She needs to follow up with me in 2-3 weeks. I also put her pain management referral in your blue folder. Thanks!

## 2022-11-08 ENCOUNTER — TELEPHONE (OUTPATIENT)
Dept: FAMILY MEDICINE CLINIC | Age: 62
End: 2022-11-08

## 2022-11-08 NOTE — TELEPHONE ENCOUNTER
Called patient to let her know [facility] spine and pain specialist does not take her insurance and to call her insurance to see who is in network and let us know who she goes with and we will send the referral to whoever she chooses

## 2022-11-21 ENCOUNTER — TELEPHONE (OUTPATIENT)
Dept: FAMILY MEDICINE CLINIC | Age: 62
End: 2022-11-21

## 2022-11-21 DIAGNOSIS — B37.31 YEAST VAGINITIS: Primary | ICD-10-CM

## 2022-11-21 RX ORDER — FLUCONAZOLE 150 MG/1
TABLET ORAL
Qty: 2 TABLET | Refills: 5 | Status: SHIPPED | OUTPATIENT
Start: 2022-11-21

## 2022-11-21 NOTE — TELEPHONE ENCOUNTER
----- Message from Mirella Villela sent at 11/21/2022  8:14 AM EST -----  Subject: Medication Problem    Medication: cyclobenzaprine (FLEXERIL) 10 mg tablet  Dosage: TAKE 1 TABLET BY MOUTH THREE TIMES A DAY AS NEEDED FOR MUSCLE   [facility] Pharmacy: [id number] [address]   Class:  Ordering Provider: Cheryl Lin    Question/Problem: Pt stated that she has developed a yeast infection from   this medication.  She also had mentioned that she tried an OTC med and is   not having any relief       Pharmacy: [id number] [facility] Mak[facility]la Luque    ---------------------------------------------------------------------------  --------------  [id number]  [phone]; OK to leave message on voicemail  ---------------------------------------------------------------------------  --------------    SCRIPT ANSWERS  Relationship to Patient: Self

## 2022-11-23 NOTE — TELEPHONE ENCOUNTER
Called patient and left VM that she should have a prescription at the pharmacy and to call if she has any questions

## 2022-11-28 ENCOUNTER — HOSPITAL ENCOUNTER (OUTPATIENT)
Dept: MAMMOGRAPHY | Age: 62
Discharge: HOME | End: 2022-11-28
Attending: INTERNAL MEDICINE
Payer: MEDICAID

## 2022-11-28 DIAGNOSIS — Z12.31 VISIT FOR SCREENING MAMMOGRAM: ICD-10-CM

## 2022-11-28 DIAGNOSIS — E11.42 DIABETIC POLYNEUROPATHY ASSOCIATED WITH TYPE 2 DIABETES MELLITUS (HCC): ICD-10-CM

## 2022-11-28 PROCEDURE — 77063 BREAST TOMOSYNTHESIS BI: CPT

## 2022-11-28 RX ORDER — GABAPENTIN 300 MG/1
300 CAPSULE ORAL 3 TIMES DAILY
Qty: 90 CAPSULE | Refills: 0 | Status: SHIPPED | OUTPATIENT
Start: 2022-11-28

## 2022-11-30 ENCOUNTER — TELEPHONE (OUTPATIENT)
Dept: FAMILY MEDICINE CLINIC | Age: 62
End: 2022-11-30

## 2022-11-30 DIAGNOSIS — R30.0 DYSURIA: Primary | ICD-10-CM

## 2022-11-30 RX ORDER — CEPHALEXIN 500 MG/1
500 CAPSULE ORAL 2 TIMES DAILY
Qty: 10 CAPSULE | Refills: 0 | Status: SHIPPED | OUTPATIENT
Start: 2022-11-30 | End: 2022-12-05

## 2022-11-30 NOTE — TELEPHONE ENCOUNTER
Pt had an appt on 11/2 and was given medication diflucan but says that did not clear it up and still has a yeast infection and needs [facility] [place] (doxycycline 100 mg is what pt wants sent over)  Pt thinks that the diabetic medication is causing the infections    Questions call pt at [phone]

## 2022-12-15 DIAGNOSIS — M79.604 PAIN IN BOTH LOWER EXTREMITIES: ICD-10-CM

## 2022-12-15 DIAGNOSIS — M79.605 PAIN IN BOTH LOWER EXTREMITIES: ICD-10-CM

## 2022-12-15 DIAGNOSIS — K86.89 PANCREATIC INSUFFICIENCY: ICD-10-CM

## 2022-12-16 RX ORDER — PANCRELIPASE 36000; 180000; 114000 [USP'U]/1; [USP'U]/1; [USP'U]/1
CAPSULE, DELAYED RELEASE PELLETS ORAL
Qty: 480 CAPSULE | Refills: 1 | Status: SHIPPED | OUTPATIENT
Start: 2022-12-16

## 2022-12-16 RX ORDER — LIDOCAINE 50 MG/G
1 PATCH TOPICAL DAILY
Qty: 30 PATCH | Refills: 5 | Status: SHIPPED | OUTPATIENT
Start: 2022-12-16

## 2023-01-06 DIAGNOSIS — J45.20 MILD INTERMITTENT ASTHMA WITHOUT COMPLICATION: ICD-10-CM

## 2023-01-06 RX ORDER — ALBUTEROL SULFATE 90 UG/1
AEROSOL, METERED RESPIRATORY (INHALATION)
Qty: 8.5 EACH | Refills: 5 | Status: SHIPPED | OUTPATIENT
Start: 2023-01-06

## 2023-01-26 ENCOUNTER — OFFICE VISIT (OUTPATIENT)
Dept: FAMILY MEDICINE CLINIC | Age: 63
End: 2023-01-26
Payer: MEDICAID

## 2023-01-26 VITALS
SYSTOLIC BLOOD PRESSURE: 138 MMHG | TEMPERATURE: 97.5 F | HEIGHT: 62 IN | DIASTOLIC BLOOD PRESSURE: 84 MMHG | OXYGEN SATURATION: 100 % | RESPIRATION RATE: 16 BRPM | WEIGHT: 190.2 LBS | BODY MASS INDEX: 35 KG/M2 | HEART RATE: 92 BPM

## 2023-01-26 DIAGNOSIS — I10 PRIMARY HYPERTENSION: ICD-10-CM

## 2023-01-26 DIAGNOSIS — J45.20 MILD INTERMITTENT ASTHMA WITHOUT COMPLICATION: ICD-10-CM

## 2023-01-26 DIAGNOSIS — N95.2 ATROPHIC VAGINITIS: ICD-10-CM

## 2023-01-26 DIAGNOSIS — K86.89 PANCREATIC INSUFFICIENCY: ICD-10-CM

## 2023-01-26 DIAGNOSIS — E11.65 TYPE 2 DIABETES MELLITUS WITH HYPERGLYCEMIA, WITH LONG-TERM CURRENT USE OF INSULIN (HCC): Primary | ICD-10-CM

## 2023-01-26 DIAGNOSIS — B37.31 YEAST VAGINITIS: ICD-10-CM

## 2023-01-26 DIAGNOSIS — Z01.419 ROUTINE GYNECOLOGICAL EXAMINATION: ICD-10-CM

## 2023-01-26 DIAGNOSIS — Z12.4 SCREENING FOR CERVICAL CANCER: ICD-10-CM

## 2023-01-26 DIAGNOSIS — E78.2 MIXED HYPERLIPIDEMIA: ICD-10-CM

## 2023-01-26 DIAGNOSIS — Z79.4 TYPE 2 DIABETES MELLITUS WITH HYPERGLYCEMIA, WITH LONG-TERM CURRENT USE OF INSULIN (HCC): Primary | ICD-10-CM

## 2023-01-26 RX ORDER — FLUCONAZOLE 150 MG/1
TABLET ORAL
Qty: 2 TABLET | Refills: 5 | Status: SHIPPED | OUTPATIENT
Start: 2023-01-26

## 2023-01-26 RX ORDER — PANCRELIPASE 36000; 180000; 114000 [USP'U]/1; [USP'U]/1; [USP'U]/1
CAPSULE, DELAYED RELEASE PELLETS ORAL
Qty: 480 CAPSULE | Refills: 1 | Status: SHIPPED | OUTPATIENT
Start: 2023-01-26

## 2023-01-26 RX ORDER — INSULIN GLARGINE 100 [IU]/ML
INJECTION, SOLUTION SUBCUTANEOUS
COMMUNITY
Start: 2023-01-18 | End: 2023-01-26

## 2023-01-26 RX ORDER — ALBUTEROL SULFATE 90 UG/1
AEROSOL, METERED RESPIRATORY (INHALATION)
Qty: 3 EACH | Refills: 3 | Status: SHIPPED | OUTPATIENT
Start: 2023-01-26

## 2023-01-26 RX ORDER — OMEPRAZOLE 20 MG/1
20 CAPSULE, DELAYED RELEASE ORAL 2 TIMES DAILY
Qty: 180 CAPSULE | Refills: 1 | Status: SHIPPED | OUTPATIENT
Start: 2023-01-26 | End: 2023-04-26

## 2023-01-26 RX ORDER — LOSARTAN POTASSIUM AND HYDROCHLOROTHIAZIDE 25; 100 MG/1; MG/1
1 TABLET ORAL DAILY
Qty: 90 TABLET | Refills: 1 | Status: SHIPPED | OUTPATIENT
Start: 2023-01-26

## 2023-01-26 RX ORDER — INSULIN GLARGINE 100 [IU]/ML
30 INJECTION, SOLUTION SUBCUTANEOUS
Qty: 9 ML | Refills: 0
Start: 2023-01-26 | End: 2023-02-25

## 2023-01-26 NOTE — PROGRESS NOTES
Identified pt with two pt identifiers(name and ). Chief Complaint   Patient presents with    Brogade 68 Maintenance Due   Topic    Eye Exam Retinal or Dilated     Cervical cancer screen     A1C test (Diabetic or Prediabetic)        Wt Readings from Last 3 Encounters:   23 190 lb 3.2 oz (86.3 kg)   22 178 lb (80.7 kg)   10/18/22 176 lb (79.8 kg)     Temp Readings from Last 3 Encounters:   23 97.5 °F (36.4 °C) (Temporal)   22 97.2 °F (36.2 °C) (Temporal)   10/18/22 98.3 °F (36.8 °C) (Temporal)     BP Readings from Last 3 Encounters:   23 (!) 160/84   22 130/62   10/18/22 118/64     Pulse Readings from Last 3 Encounters:   23 92   22 71   10/18/22 72         Learning Assessment:  :     Learning Assessment 2016   PRIMARY LEARNER Patient   HIGHEST LEVEL OF EDUCATION - PRIMARY LEARNER  GRADUATED HIGH SCHOOL OR GED   BARRIERS PRIMARY LEARNER NONE   CO-LEARNER CAREGIVER No   PRIMARY LANGUAGE ENGLISH   LEARNER PREFERENCE PRIMARY OTHER (COMMENT)   ANSWERED BY self   RELATIONSHIP SELF       Depression Screening:  :     3 most recent PHQ Screens 2023   Little interest or pleasure in doing things Not at all   Feeling down, depressed, irritable, or hopeless Not at all   Total Score PHQ 2 0       Fall Risk Assessment:  :     Fall Risk Assessment, last 12 mths 2021   Able to walk? Yes   Fall in past 12 months? 0   Do you feel unsteady? 0   Are you worried about falling 0       Abuse Screening:  :     Abuse Screening Questionnaire 2023 10/18/2022 2021 10/5/2020 2019 2018 2016   Do you ever feel afraid of your partner? N N N N N N N   Are you in a relationship with someone who physically or mentally threatens you? N N N N N N N   Is it safe for you to go home?  Y Y Y Y Y Y Y       Coordination of Care Questionnaire:  :     1) Have you been to an emergency room, urgent care clinic since your last visit? no   Hospitalized since your last visit? no             2) Have you seen or consulted any other health care providers outside of [address] [place] [address] since your last visit? Yes Endocrinologist Dr Armando Pandey (Include any pap smears or colon screenings in this section.)    3) Do you have an Advance Directive on file? no  Are you interested in receiving information about Advance Directives? yes    Patient is accompanied by self I have received verbal consent from Deisy Gonzalez to discuss any/all medical information while they are present in the room. 4.  For patients aged 39-70: Has the patient had a colonoscopy / FIT/ Cologuard? Yes - no Care Gap present      If the patient is female:    5. For patients aged 41-77: Has the patient had a mammogram within the past 2 years? Yes - no Care Gap present      6. For patients aged 21-65: Has the patient had a pap smear?  Yes - no Care Gap present

## 2023-01-26 NOTE — PROGRESS NOTES
Chief Complaint   Patient presents with    Gyn Exam     Assessment/ Plan:   Diagnoses and all orders for this visit:    1. Type 2 diabetes mellitus with hyperglycemia, with long-term current use of insulin (HCC)  -     insulin glargine (LANTUS,BASAGLAR) 100 unit/mL (3 mL) inpn; 30 Units by SubCUTAneous route nightly for 30 days.  -     HEMOGLOBIN A1C WITH EAG; Future    2. Atrophic vaginitis  -     PAP IG, APTIMA HPV AND RFX 16/18,45 (821970); Future    3. Screening for cervical cancer  -     PAP IG, APTIMA HPV AND RFX 16/18,45 ([id number]); Future    4. Routine gynecological examination    5. Mixed hyperlipidemia  -     METABOLIC PANEL, COMPREHENSIVE; Future  -     LIPID PANEL; Future  -     CBC WITH AUTOMATED DIFF; Future    6. Primary hypertension  -     losartan-hydroCHLOROthiazide (HYZAAR) 100-25 mg per tablet; Take 1 Tablet by mouth daily. 7. Yeast vaginitis  -     fluconazole (DIFLUCAN) 150 mg tablet; TAKE 1 TABLET BY MOUTH DAILY FOR 1 DAY. REPEAT IN 72 HOURS IF NEEDED    8. Mild intermittent asthma without complication  -     albuterol (PROVENTIL HFA, VENTOLIN HFA, PROAIR HFA) 90 mcg/actuation inhaler; INHALE 1 PUFF BY MOUTH EVERY 4 HOURS AS NEEDED FOR WHEEZING OR COUGH    9. Pancreatic insufficiency  -     lipase-protease-amylase (Creon) 36,000-114,000- 180,000 unit cpDR capsule; TAKE 4 CAPSULES BY MOUTH THREE TIMES A DAY WITH EACH MEAL AND TAKE 2 CAPS WITH SNACKS    Other orders  -     omeprazole (PRILOSEC) 20 mg capsule; Take 1 Capsule by mouth two (2) times a day for 90 days. I have discussed the diagnosis with the patient and the intended treatment plan as seen in the above orders. The patient has received an after-visit summary and questions were answered concerning future plans. Asked to return should symptoms worsen or not improve with treatment. Any pending labs and studies will be relayed to patient when they become available.      Pt verbalizes understanding of plan of care and denies further questions or concerns at this time. Follow-up and Dispositions    Return in about 3 months (around 4/26/2023), or if symptoms worsen or fail to improve. Subjective:   Deisy Gonzalez is a 58 y.o. female who presents for follow up and also needs pap smear. Her only concern today is that occasionally, she has noticed that her feet have been swelling. Also, she has had some of her medications changed by her nephrologist.     Metformin was discontinued. She is on Losartant-HCTZ for BP controll. Lab Results   Component Value Date/Time    Hemoglobin A1c 10.2 (H) 10/18/2022 03:04 PM    Hemoglobin A1c, External 9.6 11/01/2017 12:00 AM     She is now only on Lantus at bedtime. Needs to have her labs rechecked. Has had poorly controlled T2DM. HISTORICAL  PMH, PSH, FHX, SOCHX, ALLERGIES and MES were reviewed and updated today. Review of Systems  Review of Systems   Cardiovascular:  Positive for leg swelling (mild). All other systems reviewed and are negative. Objective:     Visit Vitals  /84 (BP 1 Location: Right upper arm, BP Patient Position: Sitting)   Pulse 92   Temp 97.5 °F (36.4 °C) (Temporal)   Resp 16   Ht 5' 2\" (1.575 m)   Wt 190 lb 3.2 oz (86.3 kg)   SpO2 100%   BMI 34.79 kg/m²       Physical Exam  Vitals and nursing note reviewed. Constitutional:       Appearance: Normal appearance. HENT:      Head: Normocephalic and atraumatic. Mouth/Throat:      Mouth: Mucous membranes are moist.   Eyes:      Extraocular Movements: Extraocular movements intact. Conjunctiva/sclera: Conjunctivae normal.      Pupils: Pupils are equal, round, and reactive to light. Cardiovascular:      Rate and Rhythm: Normal rate and regular rhythm. Pulses: Normal pulses. Heart sounds: Normal heart sounds. Pulmonary:      Effort: Pulmonary effort is normal.      Breath sounds: Normal breath sounds.    Musculoskeletal:         General: Swelling (mild swelling of bilateral lower legs.) present. Cervical back: Normal range of motion and neck supple. Neurological:      General: No focal deficit present. Mental Status: She is alert. Mental status is at baseline. Cranial Nerves: No cranial nerve deficit. Sensory: No sensory deficit. Motor: No weakness. Coordination: Coordination normal.      Gait: Gait normal.      Deep Tendon Reflexes: Reflexes normal.   Psychiatric:         Mood and Affect: Mood normal.         Behavior: Behavior normal.         Thought Content:  Thought content normal.         Judgment: Judgment normal.       Aida Daniel MD  [facility]   01/26/2023

## 2023-01-27 ENCOUNTER — TELEPHONE (OUTPATIENT)
Dept: FAMILY MEDICINE CLINIC | Age: 63
End: 2023-01-27

## 2023-01-31 DIAGNOSIS — Z91.09 ENVIRONMENTAL ALLERGIES: ICD-10-CM

## 2023-02-01 ENCOUNTER — LAB ONLY (OUTPATIENT)
Dept: FAMILY MEDICINE CLINIC | Age: 63
End: 2023-02-01

## 2023-02-01 ENCOUNTER — TELEPHONE (OUTPATIENT)
Dept: FAMILY MEDICINE CLINIC | Age: 63
End: 2023-02-01

## 2023-02-01 DIAGNOSIS — E78.2 MIXED HYPERLIPIDEMIA: ICD-10-CM

## 2023-02-01 DIAGNOSIS — Z79.4 TYPE 2 DIABETES MELLITUS WITH HYPERGLYCEMIA, WITH LONG-TERM CURRENT USE OF INSULIN (HCC): ICD-10-CM

## 2023-02-01 DIAGNOSIS — E11.65 TYPE 2 DIABETES MELLITUS WITH HYPERGLYCEMIA, WITH LONG-TERM CURRENT USE OF INSULIN (HCC): ICD-10-CM

## 2023-02-01 RX ORDER — CETIRIZINE HYDROCHLORIDE 10 MG/1
TABLET ORAL
Qty: 90 TABLET | Refills: 1 | Status: SHIPPED | OUTPATIENT
Start: 2023-02-01

## 2023-02-01 NOTE — TELEPHONE ENCOUNTER
Pt came in for labs and asked me if Dr Luis Bradford is going to fill her water pill today, is that in progress?

## 2023-02-02 ENCOUNTER — TELEPHONE (OUTPATIENT)
Dept: FAMILY MEDICINE CLINIC | Age: 63
End: 2023-02-02

## 2023-02-02 DIAGNOSIS — R60.0 LOCALIZED EDEMA: Primary | ICD-10-CM

## 2023-02-02 LAB
ALBUMIN SERPL-MCNC: 2.6 G/DL (ref 3.5–5)
ALBUMIN/GLOB SERPL: 1 (ref 1.1–2.2)
ALP SERPL-CCNC: 110 U/L (ref 45–117)
ALT SERPL-CCNC: 14 U/L (ref 12–78)
ANION GAP SERPL CALC-SCNC: 2 MMOL/L (ref 5–15)
AST SERPL-CCNC: 7 U/L (ref 15–37)
BASOPHILS # BLD: 0.1 K/UL (ref 0–0.1)
BASOPHILS NFR BLD: 1 % (ref 0–1)
BILIRUB SERPL-MCNC: 0.2 MG/DL (ref 0.2–1)
BUN SERPL-MCNC: 25 MG/DL (ref 6–20)
BUN/CREAT SERPL: 19 (ref 12–20)
CALCIUM SERPL-MCNC: 8.6 MG/DL (ref 8.5–10.1)
CHLORIDE SERPL-SCNC: 115 MMOL/L (ref 97–108)
CHOLEST SERPL-MCNC: 154 MG/DL
CO2 SERPL-SCNC: 26 MMOL/L (ref 21–32)
CREAT SERPL-MCNC: 1.32 MG/DL (ref 0.55–1.02)
DIFFERENTIAL METHOD BLD: ABNORMAL
EOSINOPHIL # BLD: 0.2 K/UL (ref 0–0.4)
EOSINOPHIL NFR BLD: 2 % (ref 0–7)
ERYTHROCYTE [DISTWIDTH] IN BLOOD BY AUTOMATED COUNT: 14.2 % (ref 11.5–14.5)
EST. AVERAGE GLUCOSE BLD GHB EST-MCNC: 154 MG/DL
GLOBULIN SER CALC-MCNC: 2.5 G/DL (ref 2–4)
GLUCOSE SERPL-MCNC: 47 MG/DL (ref 65–100)
HBA1C MFR BLD: 7 % (ref 4–5.6)
HCT VFR BLD AUTO: 30.9 % (ref 35–47)
HDLC SERPL-MCNC: 64 MG/DL
HDLC SERPL: 2.4 (ref 0–5)
HGB BLD-MCNC: 9.3 G/DL (ref 11.5–16)
IMM GRANULOCYTES # BLD AUTO: 0 K/UL (ref 0–0.04)
IMM GRANULOCYTES NFR BLD AUTO: 0 % (ref 0–0.5)
LDLC SERPL CALC-MCNC: 75.6 MG/DL (ref 0–100)
LYMPHOCYTES # BLD: 3 K/UL (ref 0.8–3.5)
LYMPHOCYTES NFR BLD: 33 % (ref 12–49)
MCH RBC QN AUTO: 29.9 PG (ref 26–34)
MCHC RBC AUTO-ENTMCNC: 30.1 G/DL (ref 30–36.5)
MCV RBC AUTO: 99.4 FL (ref 80–99)
MONOCYTES # BLD: 0.6 K/UL (ref 0–1)
MONOCYTES NFR BLD: 7 % (ref 5–13)
NEUTS SEG # BLD: 5.3 K/UL (ref 1.8–8)
NEUTS SEG NFR BLD: 57 % (ref 32–75)
NRBC # BLD: 0 K/UL (ref 0–0.01)
NRBC BLD-RTO: 0 PER 100 WBC
PLATELET # BLD AUTO: 255 K/UL (ref 150–400)
PMV BLD AUTO: 11.6 FL (ref 8.9–12.9)
POTASSIUM SERPL-SCNC: 4.7 MMOL/L (ref 3.5–5.1)
PROT SERPL-MCNC: 5.1 G/DL (ref 6.4–8.2)
RBC # BLD AUTO: 3.11 M/UL (ref 3.8–5.2)
SODIUM SERPL-SCNC: 143 MMOL/L (ref 136–145)
TRIGL SERPL-MCNC: 72 MG/DL (ref ?–150)
VLDLC SERPL CALC-MCNC: 14.4 MG/DL
WBC # BLD AUTO: 9.1 K/UL (ref 3.6–11)

## 2023-02-02 RX ORDER — FUROSEMIDE 20 MG/1
20 TABLET ORAL
Qty: 30 TABLET | Refills: 0 | Status: SHIPPED | OUTPATIENT
Start: 2023-02-02

## 2023-02-02 NOTE — TELEPHONE ENCOUNTER
[facility] called and had a critical lab value to report  Please call at this number  [phone]

## 2023-02-02 NOTE — TELEPHONE ENCOUNTER
Called pt and relayed results. Pt stated that she is feeling well but her skin is \"tight\" per patient and her legs are swelling. Pt is wanting to know what to do. Advised I would send message to Dr. Cathie Mcintyre.

## 2023-02-02 NOTE — TELEPHONE ENCOUNTER
----- Message from Matthew Mcpherson MD sent at 2/2/2023 12:30 PM EST -----  Please let patient inow that her labs returned showing a dip in her hemoglobin. It is much lower than 3-months ago. Is she having any blood loss in her stool? This could be from her kidney function. I am hesitant to give a fluid pill with this finding. Her diabetes is better. HBA1C came down to 7.0, but glucose was 47. Want to make sure she is eating alright.  Cholesterol is completely normal.   Will need to have repeat labs in 1-2 weeks (non-fasting) and follow up to assess the swelling in her legs etc.

## 2023-02-02 NOTE — PROGRESS NOTES
Please let patient inow that her labs returned showing a dip in her hemoglobin. It is much lower than 3-months ago. Is she having any blood loss in her stool? This could be from her kidney function. I am hesitant to give a fluid pill with this finding. Her diabetes is better. HBA1C came down to 7.0, but glucose was 47. Want to make sure she is eating alright.  Cholesterol is completely normal.   Will need to have repeat labs in 1-2 weeks (non-fasting) and follow up to assess the swelling in her legs etc.

## 2023-02-03 DIAGNOSIS — E55.9 VITAMIN D DEFICIENCY: ICD-10-CM

## 2023-02-03 RX ORDER — ERGOCALCIFEROL 1.25 MG/1
CAPSULE ORAL
Qty: 4 CAPSULE | Refills: 2 | Status: SHIPPED | OUTPATIENT
Start: 2023-02-03

## 2023-03-16 ENCOUNTER — TELEPHONE (OUTPATIENT)
Dept: FAMILY MEDICINE CLINIC | Age: 63
End: 2023-03-16

## 2023-03-16 ENCOUNTER — HOSPITAL ENCOUNTER (EMERGENCY)
Age: 63
Discharge: HOME | End: 2023-03-16
Attending: EMERGENCY MEDICINE
Payer: MEDICAID

## 2023-03-16 VITALS
OXYGEN SATURATION: 98 % | HEART RATE: 82 BPM | SYSTOLIC BLOOD PRESSURE: 156 MMHG | DIASTOLIC BLOOD PRESSURE: 59 MMHG | TEMPERATURE: 98 F | RESPIRATION RATE: 16 BRPM

## 2023-03-16 DIAGNOSIS — E87.5 HYPERKALEMIA: Primary | ICD-10-CM

## 2023-03-16 DIAGNOSIS — E87.5 ACUTE HYPERKALEMIA: Primary | ICD-10-CM

## 2023-03-16 DIAGNOSIS — N28.9 RENAL INSUFFICIENCY: ICD-10-CM

## 2023-03-16 LAB
ANION GAP SERPL CALC-SCNC: 10 MMOL/L (ref 5–15)
ANION GAP SERPL CALC-SCNC: 7 MMOL/L (ref 5–15)
BUN SERPL-MCNC: 30 MG/DL (ref 6–20)
BUN SERPL-MCNC: 32 MG/DL (ref 6–20)
BUN/CREAT SERPL: 26 (ref 12–20)
BUN/CREAT SERPL: 28 (ref 12–20)
CALCIUM SERPL-MCNC: 8.5 MG/DL (ref 8.5–10.1)
CALCIUM SERPL-MCNC: 8.8 MG/DL (ref 8.5–10.1)
CHLORIDE SERPL-SCNC: 109 MMOL/L (ref 97–108)
CHLORIDE SERPL-SCNC: 109 MMOL/L (ref 97–108)
CO2 SERPL-SCNC: 25 MMOL/L (ref 21–32)
CO2 SERPL-SCNC: 26 MMOL/L (ref 21–32)
CREAT SERPL-MCNC: 1.15 MG/DL (ref 0.55–1.02)
CREAT SERPL-MCNC: 1.16 MG/DL (ref 0.55–1.02)
GLUCOSE BLD STRIP.AUTO-MCNC: 58 MG/DL (ref 65–117)
GLUCOSE SERPL-MCNC: 109 MG/DL (ref 65–100)
GLUCOSE SERPL-MCNC: 86 MG/DL (ref 65–100)
POTASSIUM SERPL-SCNC: 4.8 MMOL/L (ref 3.5–5.1)
POTASSIUM SERPL-SCNC: 6 MMOL/L (ref 3.5–5.1)
SERVICE CMNT-IMP: ABNORMAL
SODIUM SERPL-SCNC: 142 MMOL/L (ref 136–145)
SODIUM SERPL-SCNC: 144 MMOL/L (ref 136–145)

## 2023-03-16 PROCEDURE — 93005 ELECTROCARDIOGRAM TRACING: CPT

## 2023-03-16 PROCEDURE — 74011000250 HC RX REV CODE- 250: Performed by: EMERGENCY MEDICINE

## 2023-03-16 PROCEDURE — 80048 BASIC METABOLIC PNL TOTAL CA: CPT

## 2023-03-16 PROCEDURE — 96365 THER/PROPH/DIAG IV INF INIT: CPT

## 2023-03-16 PROCEDURE — 36415 COLL VENOUS BLD VENIPUNCTURE: CPT

## 2023-03-16 PROCEDURE — 99284 EMERGENCY DEPT VISIT MOD MDM: CPT

## 2023-03-16 PROCEDURE — 74011250636 HC RX REV CODE- 250/636: Performed by: EMERGENCY MEDICINE

## 2023-03-16 PROCEDURE — 74011250637 HC RX REV CODE- 250/637: Performed by: EMERGENCY MEDICINE

## 2023-03-16 PROCEDURE — 96361 HYDRATE IV INFUSION ADD-ON: CPT

## 2023-03-16 PROCEDURE — 82962 GLUCOSE BLOOD TEST: CPT

## 2023-03-16 RX ORDER — SODIUM POLYSTYRENE SULFONATE 15 G/60ML
30 SUSPENSION ORAL; RECTAL
Status: COMPLETED | OUTPATIENT
Start: 2023-03-16 | End: 2023-03-16

## 2023-03-16 RX ORDER — CALCIUM GLUCONATE 20 MG/ML
1 INJECTION, SOLUTION INTRAVENOUS ONCE
Status: COMPLETED | OUTPATIENT
Start: 2023-03-16 | End: 2023-03-16

## 2023-03-16 RX ORDER — ALBUTEROL SULFATE 0.83 MG/ML
10 SOLUTION RESPIRATORY (INHALATION)
Status: COMPLETED | OUTPATIENT
Start: 2023-03-16 | End: 2023-03-16

## 2023-03-16 RX ADMIN — SODIUM POLYSTYRENE SULFONATE 30 G: 15 SUSPENSION ORAL; RECTAL at 08:59

## 2023-03-16 RX ADMIN — CALCIUM GLUCONATE 1000 MG: 20 INJECTION, SOLUTION INTRAVENOUS at 08:40

## 2023-03-16 RX ADMIN — ALBUTEROL SULFATE 10 MG: 2.5 SOLUTION RESPIRATORY (INHALATION) at 08:44

## 2023-03-16 RX ADMIN — SODIUM CHLORIDE 1000 ML: 9 INJECTION, SOLUTION INTRAVENOUS at 08:40

## 2023-03-16 NOTE — ED NOTES
The patient was discharged home by Dr Ted Law in stable condition. The patient is alert and oriented, in no respiratory distress and discharge vital signs obtained. The patient's diagnosis, condition and treatment were explained. The patient expressed understanding. No prescriptions given/e-scribed to pharmacy. No work/school note given. A discharge plan has been developed. A  was not involved in the process. Aftercare instructions were given. Pt ambulatory out of the ED.

## 2023-03-16 NOTE — DISCHARGE INSTRUCTIONS
Your potassium today was 6.0. We repeated your potassium after treatment and it was 4.8. Call to make an appointment w/ your PCP for a \"labs only visit\" tomorrow 3/17. Dr Juliet Davidson will follow up with you.

## 2023-03-16 NOTE — ED PROVIDER NOTES
62F w/ hx DM, asthma, HTN p/w abnormal labs. Pt states that she received a call from her PCP 3hrs ago telling her to go to the ER. Pt reports that had routine outpatient labs that \"showed a high potassium\" K=6.7. Pt has no symptoms including no N/V/D, dyspnea or pain anyhwere. Eating/drinking w/o issue and urinating normally. Pt notes that she is taking \"Airborne\" and \"Women's Infusion\" daily supplements. Past Medical History:   Diagnosis Date    Arthritis     Asthma     Cataracts, bilateral     Diabetes ([address]     seen by endocrinology at [facility]    GERD (gastroesophageal reflux disease)     Hypertension     Obesity, Class II, BMI 35-39.9     Pancreatic insufficiency        Past Surgical History:   Procedure Laterality Date    CHG ELASTASE PANCREATIC FECAL QUAL/SEMI-QUANTITATIVE      growths in prancreatitis    COLONOSCOPY N/A 2016    COLONOSCOPY,EGD performed by Lily Davis MD at [id number] [place] [address],[address] CATARACT REMOVAL Left 10/2015    HX CATARACT REMOVAL Right 10/2016    HX CHOLECYSTECTOMY  ? HX COLONOSCOPY      VT UNLISTED PROCEDURE PANCREAS  ? Distal pancreatectomy.          Family History:   Problem Relation Age of Onset    Diabetes Mother     Heart Disease Father     Diabetes Brother     Breast Cancer Sister 64       Social History     Socioeconomic History    Marital status: LEGALLY      Spouse name: Not on file    Number of children: Not on file    Years of education: Not on file    Highest education level: Not on file   Occupational History    Not on file   Tobacco Use    Smoking status: Former     Packs/day: 0.50     Types: Cigarettes     Start date: 2016     Quit date: 2016     Years since quittin.0    Smokeless tobacco: Never   Vaping Use    Vaping Use: Never used   Substance and Sexual Activity    Alcohol use: No     Alcohol/week: 0.0 standard drinks     Comment: social    Drug use: No    Sexual activity: Not Currently   Other Topics Concern    Not on file   Social History Narrative    Not on file     Social Determinants of Health     Financial Resource Strain: Low Risk     Difficulty of Paying Living Expenses: Not hard at all   Food Insecurity: No Food Insecurity    Worried About Running Out of Food in the Last Year: Never true    Ran Out of Food in the Last Year: Never true   Transportation Needs: Not on file   Physical Activity: Not on file   Stress: Not on file   Social Connections: Not on file   Intimate Partner Violence: Not on file   Housing Stability: Not on file         ALLERGIES: Bactrim [sulfamethoprim], Ciprofloxacin, Lisinopril, and Sulfamethoxazole-trimethoprim    Review of Systems   Constitutional:  Negative for chills, diaphoresis and fever. HENT:  Negative for facial swelling, mouth sores, nosebleeds, trouble swallowing and voice change. Eyes:  Negative for pain and visual disturbance. Respiratory:  Negative for apnea, cough, choking, shortness of breath, wheezing and stridor. Cardiovascular:  Negative for chest pain, palpitations and leg swelling. Gastrointestinal:  Negative for abdominal distention, abdominal pain, blood in stool, diarrhea, nausea and vomiting. Genitourinary:  Negative for difficulty urinating, dysuria, flank pain, hematuria and pelvic pain. Musculoskeletal:  Negative for joint swelling. Skin:  Negative for color change and rash. Allergic/Immunologic: Negative for immunocompromised state. Neurological:  Negative for dizziness, seizures, syncope, speech difficulty and light-headedness. Hematological:  Does not bruise/bleed easily. Psychiatric/Behavioral:  Negative for agitation and behavioral problems. Vitals:    03/16/23 0658 03/16/23 0703 03/16/23 1025   BP: (!) 201/81 (!) 167/70 (!) 156/59   Pulse: 76  82   Resp: 16  16   Temp: 98 °F (36.7 °C)     SpO2: 100%  98%            Physical Exam  Vitals and nursing note reviewed. Constitutional:       General: She is not in acute distress. Appearance: Normal appearance. She is not ill-appearing or toxic-appearing. HENT:      Head: Normocephalic and atraumatic. Right Ear: External ear normal.      Left Ear: External ear normal.      Nose: Nose normal.      Mouth/Throat:      Mouth: Mucous membranes are moist.      Pharynx: Oropharynx is clear. No oropharyngeal exudate or posterior oropharyngeal erythema. Eyes:      General: No scleral icterus. Extraocular Movements: Extraocular movements intact. Conjunctiva/sclera: Conjunctivae normal.      Pupils: Pupils are equal, round, and reactive to light. Cardiovascular:      Rate and Rhythm: Normal rate and regular rhythm. Pulses: Normal pulses. Heart sounds: Normal heart sounds. No murmur heard. No friction rub. No gallop. Pulmonary:      Effort: Pulmonary effort is normal. No respiratory distress. Breath sounds: Normal breath sounds. No stridor. No wheezing, rhonchi or rales. Abdominal:      General: There is no distension. Palpations: Abdomen is soft. Tenderness: There is no abdominal tenderness. There is no guarding or rebound. Musculoskeletal:         General: No tenderness or deformity. Normal range of motion. Cervical back: Normal range of motion and neck supple. No rigidity. Right lower leg: No edema. Left lower leg: No edema. Skin:     General: Skin is warm. Capillary Refill: Capillary refill takes less than 2 seconds. Coloration: Skin is not jaundiced. Neurological:      General: No focal deficit present. Mental Status: She is alert. Cranial Nerves: No cranial nerve deficit. Sensory: No sensory deficit. Motor: No weakness. Coordination: Coordination normal.   Psychiatric:         Mood and Affect: Mood normal.         Behavior: Behavior normal.         Thought Content:  Thought content normal.         Judgment: Judgment normal.      I personally reviewed and independently interpreted EKG, labs and imaging results. EKG Interpretation   SR, narrow QRS, nl intervals, no JORI/STD/TWI    LABORATORY TESTS:  Admission on 03/16/2023, Discharged on 03/16/2023   Component Date Value Ref Range Status    Sodium 03/16/2023 142  136 - 145 mmol/L Final    Potassium 03/16/2023 6.0 (A)  3.5 - 5.1 mmol/L Final    Chloride 03/16/2023 109 (A)  97 - 108 mmol/L Final    CO2 03/16/2023 26  21 - 32 mmol/L Final    Anion gap 03/16/2023 7  5 - 15 mmol/L Final    Glucose 03/16/2023 86  65 - 100 mg/dL Final    BUN 03/16/2023 32 (A)  6 - 20 MG/DL Final    Creatinine 03/16/2023 1.15 (A)  0.55 - 1.02 MG/DL Final    BUN/Creatinine ratio 03/16/2023 28 (A)  12 - 20   Final    eGFR 03/16/2023 54 (A)  >60 ml/min/1.73m2 Final    Comment:      Pediatric calculator link: [employer].at. org/professionals/kdoqi/gfr_calculatorped       These results are not intended for use in patients <25years of age. eGFR results are calculated without a race factor using  the 2021 CKD-EPI equation. Careful clinical correlation is recommended, particularly when comparing to results calculated using previous equations. The CKD-EPI equation is less accurate in patients with extremes of muscle mass, extra-renal metabolism of creatinine, excessive creatine ingestion, or following therapy that affects renal tubular secretion.       Calcium 03/16/2023 8.5  8.5 - 10.1 MG/DL Final    Ventricular Rate 03/16/2023 70  BPM Preliminary    Atrial Rate 03/16/2023 70  BPM Preliminary    P-R Interval 03/16/2023 106  ms Preliminary    QRS Duration 03/16/2023 80  ms Preliminary    Q-T Interval 03/16/2023 386  ms Preliminary    QTC Calculation (Bezet) 03/16/2023 416  ms Preliminary    Calculated P Axis 03/16/2023 1  degrees Preliminary    Calculated R Axis 03/16/2023 -5  degrees Preliminary    Calculated T Axis 03/16/2023 42  degrees Preliminary    Diagnosis 03/16/2023    Preliminary                    Value:Sinus rhythm with sinus arrhythmia with short TX  Minimal voltage criteria for LVH, may be normal variant ( R in aVL )  Borderline ECG  No previous ECGs available      Sodium 03/16/2023 144  136 - 145 mmol/L Final    Potassium 03/16/2023 4.8  3.5 - 5.1 mmol/L Final    Chloride 03/16/2023 109 (A)  97 - 108 mmol/L Final    CO2 03/16/2023 25  21 - 32 mmol/L Final    Anion gap 03/16/2023 10  5 - 15 mmol/L Final    Glucose 03/16/2023 109 (A)  65 - 100 mg/dL Final    BUN 03/16/2023 30 (A)  6 - 20 MG/DL Final    Creatinine 03/16/2023 1.16 (A)  0.55 - 1.02 MG/DL Final    BUN/Creatinine ratio 03/16/2023 26 (A)  12 - 20   Final    eGFR 03/16/2023 53 (A)  >60 ml/min/1.73m2 Final    Comment:      Pediatric calculator link: [employer]ow.at. org/professionals/kdoqi/gfr_calculatorped       These results are not intended for use in patients <25years of age. eGFR results are calculated without a race factor using  the 2021 CKD-EPI equation. Careful clinical correlation is recommended, particularly when comparing to results calculated using previous equations. The CKD-EPI equation is less accurate in patients with extremes of muscle mass, extra-renal metabolism of creatinine, excessive creatine ingestion, or following therapy that affects renal tubular secretion. Calcium 03/16/2023 8.8  8.5 - 10.1 MG/DL Final    Glucose (POC) 03/16/2023 58 (A)  65 - 117 mg/dL Final    Comment: (NOTE)  The FDA has indicated that no capillary point of care blood glucose  monitoring systems are approved for use in \"critically ill\" patients,  however they have not defined this population. The College of  American Pathologists has recommended that these devices should not  be used in cases such as severe hypotension, dehydration, shock, and  hyperglycemic-hyperosmolar state, amongst others. Venous or arterial  collection is the recommended specimen for testing these patients.       Performed by 03/16/2023 Gregoria Sparks   Final       IMAGING RESULTS:  No orders to display       MEDICATIONS GIVEN:  Medications   dextrose 10 % infusion 125-250 mL (has no administration in time range)   calcium gluconate 1 gram in sodium chloride (ISO-OSM) 50 mL infusion (0 g IntraVENous IV Completed 3/16/23 0859)   albuterol (PROVENTIL VENTOLIN) nebulizer solution 10 mg (10 mg Nebulization Given 3/16/23 0844)   sodium chloride 0.9 % bolus infusion 1,000 mL (0 mL IntraVENous IV Completed 3/16/23 0950)   sodium polystyrene (KAYEXALATE) 15 gram/60 mL oral suspension 30 g (30 g Oral Given 3/16/23 0859)       IMPRESSION:  1. Acute hyperkalemia    2. Renal insufficiency        PLAN:  - Discharge    Tony Bond MD      Medical Decision Making  37C w/ hx DM, asthma, HTN p/w abnormal labs. Pt asymptomatic. Afebrile and hemodynamically stable w/o resp distress. Labs today show stable mild CKD w/ K 6.0. EKG SR w/o ischemia or changes c/w hyperK. Pt given IVF, albuterol, calcium gluconate and Kayexalte. Held insulin given BS 58. Pt given juice and crackers. Repeat gluc 109 and K 4.8 on labs here. I spoke w/ pt's PCP Dr Liu Ornelas who recommends pt go to her office for repeat labs tmrw. Advised her to stop taking nutritional supplements which are likely source of exogenous potassium. Patient given specific return precautions and explained signs/symptoms for which to come back to ED immediately but otherwise advised to f/u w/ PCP over next 2-3days. Amount and/or Complexity of Data Reviewed  Labs: ordered. Decision-making details documented in ED Course. ECG/medicine tests: ordered and independent interpretation performed. Decision-making details documented in ED Course. Discussion of management or test interpretation with external provider(s): Dr Karen Ko (pt's PCP)    Risk  OTC drugs. Prescription drug management.            Procedures

## 2023-03-16 NOTE — TELEPHONE ENCOUNTER
Dr Nayely Oneill called from [facility] ER and wanted to get a call back from Dr Cody Kinsey because this pt has a high potassium level, pt will most likely be released from the ER today he said but he wanted to follow up with the PCP    [phone] Khanh Shirley)

## 2023-03-17 ENCOUNTER — LAB ONLY (OUTPATIENT)
Dept: FAMILY MEDICINE CLINIC | Age: 63
End: 2023-03-17

## 2023-03-17 DIAGNOSIS — E87.5 HYPERKALEMIA: ICD-10-CM

## 2023-03-18 DIAGNOSIS — E87.5 HYPERKALEMIA: Primary | ICD-10-CM

## 2023-03-18 LAB
ANION GAP SERPL CALC-SCNC: ABNORMAL MMOL/L (ref 5–15)
BUN SERPL-MCNC: 28 MG/DL (ref 6–20)
BUN/CREAT SERPL: 24 (ref 12–20)
CALCIUM SERPL-MCNC: 8.7 MG/DL (ref 8.5–10.1)
CHLORIDE SERPL-SCNC: 112 MMOL/L (ref 97–108)
CO2 SERPL-SCNC: 27 MMOL/L (ref 21–32)
CREAT SERPL-MCNC: 1.15 MG/DL (ref 0.55–1.02)
GLUCOSE SERPL-MCNC: 47 MG/DL (ref 65–100)
POTASSIUM SERPL-SCNC: 5.8 MMOL/L (ref 3.5–5.1)
SODIUM SERPL-SCNC: 138 MMOL/L (ref 136–145)

## 2023-03-18 NOTE — PROGRESS NOTES
Called and spoke with patient. She feels fine. Potassium is still a little elevated. After being instructed not to take any of her \"vitamins\", she did take one yesterday. We will need to recheck labs in a few days. She is asymptomatic. She was in the ER for levels 6.0 and had a negative work up 2-days ago. Will continue to monitor. Orders placed for new labs.  Thanks

## 2023-03-19 LAB
ATRIAL RATE: 70 BPM
CALCULATED P AXIS, ECG09: 1 DEGREES
CALCULATED R AXIS, ECG10: -5 DEGREES
CALCULATED T AXIS, ECG11: 42 DEGREES
DIAGNOSIS, 93000: NORMAL
P-R INTERVAL, ECG05: 106 MS
Q-T INTERVAL, ECG07: 386 MS
QRS DURATION, ECG06: 80 MS
QTC CALCULATION (BEZET), ECG08: 416 MS
VENTRICULAR RATE, ECG03: 70 BPM

## 2023-03-20 NOTE — TELEPHONE ENCOUNTER
----- Message from Kunal Mcclendon MD sent at 3/18/2023  9:51 AM EDT -----  Hood Acharya and spoke with patient. She feels fine. Potassium is still a little elevated. After being instructed not to take any of her \"vitamins\", she did take one yesterday. We will need to recheck labs in a few days. She is asymptomatic. She was in the ER for levels 6.0 and had a negative work up 2-days ago. Will continue to monitor. Orders placed for new labs.  Thanks

## 2023-03-21 ENCOUNTER — APPOINTMENT (OUTPATIENT)
Dept: GENERAL RADIOLOGY | Age: 63
End: 2023-03-21
Attending: STUDENT IN AN ORGANIZED HEALTH CARE EDUCATION/TRAINING PROGRAM
Payer: MEDICAID

## 2023-03-21 ENCOUNTER — HOSPITAL ENCOUNTER (OUTPATIENT)
Age: 63
Setting detail: OBSERVATION
Discharge: HOME | End: 2023-03-22
Attending: STUDENT IN AN ORGANIZED HEALTH CARE EDUCATION/TRAINING PROGRAM | Admitting: FAMILY MEDICINE
Payer: MEDICAID

## 2023-03-21 ENCOUNTER — APPOINTMENT (OUTPATIENT)
Dept: CT IMAGING | Age: 63
End: 2023-03-21
Attending: STUDENT IN AN ORGANIZED HEALTH CARE EDUCATION/TRAINING PROGRAM
Payer: MEDICAID

## 2023-03-21 DIAGNOSIS — E55.9 VITAMIN D DEFICIENCY: ICD-10-CM

## 2023-03-21 DIAGNOSIS — I10 HYPERTENSION, UNSPECIFIED TYPE: ICD-10-CM

## 2023-03-21 DIAGNOSIS — M25.551 RIGHT HIP PAIN: ICD-10-CM

## 2023-03-21 DIAGNOSIS — E08.41 DIABETIC MONONEUROPATHY ASSOCIATED WITH DIABETES MELLITUS DUE TO UNDERLYING CONDITION (HCC): ICD-10-CM

## 2023-03-21 DIAGNOSIS — E16.2 HYPOGLYCEMIA: Primary | ICD-10-CM

## 2023-03-21 DIAGNOSIS — I10 ESSENTIAL HYPERTENSION: ICD-10-CM

## 2023-03-21 DIAGNOSIS — E11.21 TYPE 2 DIABETES WITH NEPHROPATHY (HCC): ICD-10-CM

## 2023-03-21 DIAGNOSIS — E87.5 HYPERKALEMIA: ICD-10-CM

## 2023-03-21 DIAGNOSIS — N18.4 CKD (CHRONIC KIDNEY DISEASE) STAGE 4, GFR 15-29 ML/MIN (HCC): ICD-10-CM

## 2023-03-21 LAB
ALBUMIN SERPL-MCNC: 2.9 G/DL (ref 3.5–5)
ALBUMIN/GLOB SERPL: 0.8 (ref 1.1–2.2)
ALP SERPL-CCNC: 133 U/L (ref 45–117)
ALT SERPL-CCNC: 19 U/L (ref 12–78)
ANION GAP SERPL CALC-SCNC: 7 MMOL/L (ref 5–15)
APPEARANCE UR: CLEAR
AST SERPL-CCNC: 19 U/L (ref 15–37)
ATRIAL RATE: 72 BPM
BACTERIA URNS QL MICRO: NEGATIVE /HPF
BASOPHILS # BLD: 0 K/UL (ref 0–0.1)
BASOPHILS NFR BLD: 0 % (ref 0–1)
BILIRUB SERPL-MCNC: 0.3 MG/DL (ref 0.2–1)
BILIRUB UR QL: NEGATIVE
BUN SERPL-MCNC: 21 MG/DL (ref 6–20)
BUN/CREAT SERPL: 21 (ref 12–20)
CALCIUM SERPL-MCNC: 9.1 MG/DL (ref 8.5–10.1)
CALCULATED P AXIS, ECG09: -3 DEGREES
CALCULATED R AXIS, ECG10: 44 DEGREES
CALCULATED T AXIS, ECG11: 44 DEGREES
CHLORIDE SERPL-SCNC: 108 MMOL/L (ref 97–108)
CO2 SERPL-SCNC: 27 MMOL/L (ref 21–32)
COLOR UR: ABNORMAL
CREAT SERPL-MCNC: 0.98 MG/DL (ref 0.55–1.02)
DIAGNOSIS, 93000: NORMAL
DIFFERENTIAL METHOD BLD: ABNORMAL
EOSINOPHIL # BLD: 0 K/UL (ref 0–0.4)
EOSINOPHIL NFR BLD: 0 % (ref 0–7)
EPITH CASTS URNS QL MICRO: ABNORMAL /LPF
ERYTHROCYTE [DISTWIDTH] IN BLOOD BY AUTOMATED COUNT: 13.2 % (ref 11.5–14.5)
ETHANOL SERPL-MCNC: <10 MG/DL
GLOBULIN SER CALC-MCNC: 3.8 G/DL (ref 2–4)
GLUCOSE BLD STRIP.AUTO-MCNC: 107 MG/DL (ref 65–117)
GLUCOSE BLD STRIP.AUTO-MCNC: 115 MG/DL (ref 65–117)
GLUCOSE BLD STRIP.AUTO-MCNC: 47 MG/DL (ref 65–117)
GLUCOSE BLD STRIP.AUTO-MCNC: 69 MG/DL (ref 65–117)
GLUCOSE BLD STRIP.AUTO-MCNC: 89 MG/DL (ref 65–117)
GLUCOSE SERPL-MCNC: 104 MG/DL (ref 65–100)
GLUCOSE UR STRIP.AUTO-MCNC: NEGATIVE MG/DL
HCT VFR BLD AUTO: 34.9 % (ref 35–47)
HGB BLD-MCNC: 11.4 G/DL (ref 11.5–16)
HGB UR QL STRIP: ABNORMAL
IMM GRANULOCYTES # BLD AUTO: 0 K/UL (ref 0–0.04)
IMM GRANULOCYTES NFR BLD AUTO: 0 % (ref 0–0.5)
KETONES UR QL STRIP.AUTO: NEGATIVE MG/DL
LEUKOCYTE ESTERASE UR QL STRIP.AUTO: NEGATIVE
LYMPHOCYTES # BLD: 1.6 K/UL (ref 0.8–3.5)
LYMPHOCYTES NFR BLD: 19 % (ref 12–49)
MAGNESIUM SERPL-MCNC: 2.1 MG/DL (ref 1.6–2.4)
MCH RBC QN AUTO: 30.9 PG (ref 26–34)
MCHC RBC AUTO-ENTMCNC: 32.7 G/DL (ref 30–36.5)
MCV RBC AUTO: 94.6 FL (ref 80–99)
MONOCYTES # BLD: 0.3 K/UL (ref 0–1)
MONOCYTES NFR BLD: 3 % (ref 5–13)
NEUTS SEG # BLD: 6.5 K/UL (ref 1.8–8)
NEUTS SEG NFR BLD: 77 % (ref 32–75)
NITRITE UR QL STRIP.AUTO: NEGATIVE
NRBC # BLD: 0 K/UL (ref 0–0.01)
NRBC BLD-RTO: 0 PER 100 WBC
P-R INTERVAL, ECG05: 86 MS
PH UR STRIP: 6.5 (ref 5–8)
PLATELET # BLD AUTO: 320 K/UL (ref 150–400)
PMV BLD AUTO: 11.3 FL (ref 8.9–12.9)
POTASSIUM SERPL-SCNC: 5 MMOL/L (ref 3.5–5.1)
POTASSIUM SERPL-SCNC: 5.7 MMOL/L (ref 3.5–5.1)
PROT SERPL-MCNC: 6.7 G/DL (ref 6.4–8.2)
PROT UR STRIP-MCNC: 100 MG/DL
Q-T INTERVAL, ECG07: 370 MS
QRS DURATION, ECG06: 70 MS
QTC CALCULATION (BEZET), ECG08: 405 MS
RBC # BLD AUTO: 3.69 M/UL (ref 3.8–5.2)
RBC #/AREA URNS HPF: ABNORMAL /HPF (ref 0–5)
SERVICE CMNT-IMP: ABNORMAL
SERVICE CMNT-IMP: NORMAL
SODIUM SERPL-SCNC: 142 MMOL/L (ref 136–145)
SP GR UR REFRACTOMETRY: 1.01 (ref 1–1.03)
TSH SERPL DL<=0.05 MIU/L-ACNC: 1.65 UIU/ML (ref 0.36–3.74)
UR CULT HOLD, URHOLD: NORMAL
UROBILINOGEN UR QL STRIP.AUTO: 0.2 EU/DL (ref 0.2–1)
VENTRICULAR RATE, ECG03: 72 BPM
WBC # BLD AUTO: 8.4 K/UL (ref 3.6–11)
WBC URNS QL MICRO: ABNORMAL /HPF (ref 0–4)

## 2023-03-21 PROCEDURE — 73502 X-RAY EXAM HIP UNI 2-3 VIEWS: CPT

## 2023-03-21 PROCEDURE — 93005 ELECTROCARDIOGRAM TRACING: CPT

## 2023-03-21 PROCEDURE — 36415 COLL VENOUS BLD VENIPUNCTURE: CPT

## 2023-03-21 PROCEDURE — 82962 GLUCOSE BLOOD TEST: CPT

## 2023-03-21 PROCEDURE — 84443 ASSAY THYROID STIM HORMONE: CPT

## 2023-03-21 PROCEDURE — 96375 TX/PRO/DX INJ NEW DRUG ADDON: CPT

## 2023-03-21 PROCEDURE — 74011000250 HC RX REV CODE- 250

## 2023-03-21 PROCEDURE — 74011250636 HC RX REV CODE- 250/636: Performed by: STUDENT IN AN ORGANIZED HEALTH CARE EDUCATION/TRAINING PROGRAM

## 2023-03-21 PROCEDURE — 84132 ASSAY OF SERUM POTASSIUM: CPT

## 2023-03-21 PROCEDURE — 74011250637 HC RX REV CODE- 250/637: Performed by: STUDENT IN AN ORGANIZED HEALTH CARE EDUCATION/TRAINING PROGRAM

## 2023-03-21 PROCEDURE — 85025 COMPLETE CBC W/AUTO DIFF WBC: CPT

## 2023-03-21 PROCEDURE — 70450 CT HEAD/BRAIN W/O DYE: CPT

## 2023-03-21 PROCEDURE — 74011250637 HC RX REV CODE- 250/637

## 2023-03-21 PROCEDURE — 80053 COMPREHEN METABOLIC PANEL: CPT

## 2023-03-21 PROCEDURE — 96365 THER/PROPH/DIAG IV INF INIT: CPT

## 2023-03-21 PROCEDURE — 83735 ASSAY OF MAGNESIUM: CPT

## 2023-03-21 PROCEDURE — G0378 HOSPITAL OBSERVATION PER HR: HCPCS

## 2023-03-21 PROCEDURE — 99285 EMERGENCY DEPT VISIT HI MDM: CPT

## 2023-03-21 PROCEDURE — 74011000250 HC RX REV CODE- 250: Performed by: STUDENT IN AN ORGANIZED HEALTH CARE EDUCATION/TRAINING PROGRAM

## 2023-03-21 PROCEDURE — 82077 ASSAY SPEC XCP UR&BREATH IA: CPT

## 2023-03-21 PROCEDURE — 81001 URINALYSIS AUTO W/SCOPE: CPT

## 2023-03-21 PROCEDURE — 71045 X-RAY EXAM CHEST 1 VIEW: CPT

## 2023-03-21 RX ORDER — OXYCODONE HYDROCHLORIDE 5 MG/1
2.5 TABLET ORAL
Status: DISCONTINUED | OUTPATIENT
Start: 2023-03-21 | End: 2023-03-22 | Stop reason: HOSPADM

## 2023-03-21 RX ORDER — HYDROMORPHONE HYDROCHLORIDE 2 MG/1
2 TABLET ORAL
Status: COMPLETED | OUTPATIENT
Start: 2023-03-21 | End: 2023-03-22

## 2023-03-21 RX ORDER — ACETAMINOPHEN 650 MG/1
650 SUPPOSITORY RECTAL
Status: DISCONTINUED | OUTPATIENT
Start: 2023-03-21 | End: 2023-03-22 | Stop reason: HOSPADM

## 2023-03-21 RX ORDER — LIDOCAINE 4 G/100G
1 PATCH TOPICAL EVERY 24 HOURS
Status: DISCONTINUED | OUTPATIENT
Start: 2023-03-21 | End: 2023-03-22 | Stop reason: HOSPADM

## 2023-03-21 RX ORDER — ACETAMINOPHEN 325 MG/1
650 TABLET ORAL
Status: DISCONTINUED | OUTPATIENT
Start: 2023-03-21 | End: 2023-03-22 | Stop reason: HOSPADM

## 2023-03-21 RX ORDER — GABAPENTIN 300 MG/1
300 CAPSULE ORAL 3 TIMES DAILY
Status: DISCONTINUED | OUTPATIENT
Start: 2023-03-21 | End: 2023-03-22 | Stop reason: HOSPADM

## 2023-03-21 RX ORDER — SODIUM CHLORIDE 0.9 % (FLUSH) 0.9 %
5-40 SYRINGE (ML) INJECTION AS NEEDED
Status: DISCONTINUED | OUTPATIENT
Start: 2023-03-21 | End: 2023-03-22 | Stop reason: HOSPADM

## 2023-03-21 RX ORDER — ACETAMINOPHEN 500 MG
1000 TABLET ORAL ONCE
Status: DISCONTINUED | OUTPATIENT
Start: 2023-03-21 | End: 2023-03-21

## 2023-03-21 RX ORDER — IBUPROFEN 200 MG
4 TABLET ORAL AS NEEDED
Status: DISCONTINUED | OUTPATIENT
Start: 2023-03-21 | End: 2023-03-22 | Stop reason: HOSPADM

## 2023-03-21 RX ORDER — ENOXAPARIN SODIUM 100 MG/ML
40 INJECTION SUBCUTANEOUS DAILY
Status: DISCONTINUED | OUTPATIENT
Start: 2023-03-22 | End: 2023-03-22 | Stop reason: HOSPADM

## 2023-03-21 RX ORDER — OXYCODONE AND ACETAMINOPHEN 5; 325 MG/1; MG/1
1 TABLET ORAL ONCE
Status: COMPLETED | OUTPATIENT
Start: 2023-03-21 | End: 2023-03-21

## 2023-03-21 RX ORDER — HYDROCHLOROTHIAZIDE 25 MG/1
25 TABLET ORAL DAILY
Status: DISCONTINUED | OUTPATIENT
Start: 2023-03-22 | End: 2023-03-22 | Stop reason: HOSPADM

## 2023-03-21 RX ORDER — POLYETHYLENE GLYCOL 3350 17 G/17G
17 POWDER, FOR SOLUTION ORAL DAILY PRN
Status: DISCONTINUED | OUTPATIENT
Start: 2023-03-21 | End: 2023-03-22 | Stop reason: HOSPADM

## 2023-03-21 RX ORDER — HYDRALAZINE HYDROCHLORIDE 20 MG/ML
10 INJECTION INTRAMUSCULAR; INTRAVENOUS ONCE
Status: COMPLETED | OUTPATIENT
Start: 2023-03-21 | End: 2023-03-21

## 2023-03-21 RX ORDER — DEXTROSE MONOHYDRATE 100 MG/ML
0-250 INJECTION, SOLUTION INTRAVENOUS AS NEEDED
Status: DISCONTINUED | OUTPATIENT
Start: 2023-03-21 | End: 2023-03-22 | Stop reason: HOSPADM

## 2023-03-21 RX ORDER — IPRATROPIUM BROMIDE AND ALBUTEROL SULFATE 2.5; .5 MG/3ML; MG/3ML
3 SOLUTION RESPIRATORY (INHALATION)
Status: DISCONTINUED | OUTPATIENT
Start: 2023-03-21 | End: 2023-03-22 | Stop reason: HOSPADM

## 2023-03-21 RX ORDER — HYDRALAZINE HYDROCHLORIDE 20 MG/ML
10 INJECTION INTRAMUSCULAR; INTRAVENOUS
Status: DISCONTINUED | OUTPATIENT
Start: 2023-03-21 | End: 2023-03-22 | Stop reason: HOSPADM

## 2023-03-21 RX ORDER — SODIUM CHLORIDE 0.9 % (FLUSH) 0.9 %
5-40 SYRINGE (ML) INJECTION EVERY 8 HOURS
Status: DISCONTINUED | OUTPATIENT
Start: 2023-03-21 | End: 2023-03-22 | Stop reason: HOSPADM

## 2023-03-21 RX ORDER — HYDROMORPHONE HYDROCHLORIDE 1 MG/ML
0.5 INJECTION, SOLUTION INTRAMUSCULAR; INTRAVENOUS; SUBCUTANEOUS
Status: DISCONTINUED | OUTPATIENT
Start: 2023-03-21 | End: 2023-03-21

## 2023-03-21 RX ORDER — PANTOPRAZOLE SODIUM 40 MG/1
40 TABLET, DELAYED RELEASE ORAL
Status: DISCONTINUED | OUTPATIENT
Start: 2023-03-22 | End: 2023-03-22 | Stop reason: HOSPADM

## 2023-03-21 RX ADMIN — SODIUM CHLORIDE, PRESERVATIVE FREE 10 ML: 5 INJECTION INTRAVENOUS at 23:22

## 2023-03-21 RX ADMIN — PANCRELIPASE LIPASE, PANCRELIPASE PROTEASE, PANCRELIPASE AMYLASE 4 CAPSULE: 15000; 47000; 63000 CAPSULE, DELAYED RELEASE ORAL at 19:14

## 2023-03-21 RX ADMIN — PANCRELIPASE LIPASE, PANCRELIPASE PROTEASE, PANCRELIPASE AMYLASE 4 CAPSULE: 20000; 63000; 84000 CAPSULE, DELAYED RELEASE ORAL at 19:31

## 2023-03-21 RX ADMIN — HYDRALAZINE HYDROCHLORIDE 10 MG: 20 INJECTION INTRAMUSCULAR; INTRAVENOUS at 14:16

## 2023-03-21 RX ADMIN — OXYCODONE HYDROCHLORIDE 2.5 MG: 5 TABLET ORAL at 19:13

## 2023-03-21 RX ADMIN — OXYCODONE HYDROCHLORIDE AND ACETAMINOPHEN 1 TABLET: 5; 325 TABLET ORAL at 14:16

## 2023-03-21 RX ADMIN — DEXTROSE MONOHYDRATE 250 ML: 100 INJECTION, SOLUTION INTRAVENOUS at 13:17

## 2023-03-21 RX ADMIN — GABAPENTIN 300 MG: 300 CAPSULE ORAL at 23:21

## 2023-03-21 RX ADMIN — SODIUM CHLORIDE, PRESERVATIVE FREE 10 ML: 5 INJECTION INTRAVENOUS at 19:14

## 2023-03-21 NOTE — H&P
[address] [facility] [address] [place] [address]   Office [phone]  Fax [phone]       Admission H&P     Name: Citlali Raphael MRN: [id number]  Sex: Female   YOB: 1960  Age: 58 y.o. PCP: Bc Quevedo MD     Date of admission: 3/21/2023    Source of Information: patient, medical records. Chief complaint: hypoglycemia    HPI:  Citlali Raphael is a 58 y.o. female with PMHx of HTN, T2DM, Krisatl Bourdon who presents to the ED for evaluation of hypogylcemia. Patient woke at 07:00 and called a friend on the phone. She does not remember ending the conversation. The next thing she remembers was being called out to by her brother around ~9-9:30. When she woke she was unable to walk and EMS was called. Patient reports right sided hip and head pain from a presumed ground level fall. No prior similar episodes. No prior hypoglycemic events. The patient previously took Jory Never, but this was stopped x1 month ago by her nephrology Dr. Denys Ackerman secondary to a yeast infection. Patient currently takes lantus 30u nightly for years which she took last night at 20:30. Patient recalls eating a salad and crackers for dinner and did not eat breakfast. Denies SOB, nausea, vomiting, chest pain, palpitations, or other symptoms at this time. Patient reports frequent recent blood work for elevated potassium. ER evaluation on 3/16 for this hyperkalemia revealed exogenous supplementation with vitamins to be the likely cause. Outpatient notes indicate recent hypoglycemia.     In the ED:  Vitals: T 97.8  HR 70  /63  RR 14  O2Sats 100% on RA  Labs: Hgb 11.4 wbc 8.4 Na 142 K 5.7 Cr 0.98 POC gluc 58  Imaging: CXR - NAP; CT head - NAP, XR hip/pelv - NAP  Treatment: Norco 5-325, hydralazine 10mg, D 10 250ml    EKG: NSR at 72bpm, short MT interval, no ST changes    Patient Vitals for the past 12 hrs:   Temp Pulse Resp BP SpO2   03/21/23 1503 97.8 °F (36.6 °C) 69 14 (!) 148/67 98 %   03/21/23 1245 -- 75 16 (!) 190/91 -- 03/21/23 1230 -- 74 17 (!) 168/134 --   03/21/23 1215 -- 75 17 (!) 174/92 --   03/21/23 1145 -- 81 23 (!) 170/61 99 %   03/21/23 1130 -- 74 13 (!) 203/66 99 %   03/21/23 1115 -- 75 18 (!) 191/66 --   03/21/23 1100 -- 77 18 (!) 199/82 --   03/21/23 1048 97.8 °F (36.6 °C) 70 14 (!) 195/63 100 %       Review of Systems  Review of Systems   Constitutional:  Negative for activity change, chills and fever. Eyes:  Negative for visual disturbance. Respiratory:  Negative for cough and shortness of breath. Cardiovascular:  Negative for chest pain and palpitations. Gastrointestinal:  Negative for abdominal pain, diarrhea and vomiting. Genitourinary:  Negative for dysuria. Musculoskeletal:  Positive for arthralgias (right hip). Skin:  Negative for rash. Allergic/Immunologic: Negative for immunocompromised state. Neurological:  Positive for weakness. Negative for dizziness and numbness. Psychiatric/Behavioral:  Negative for confusion. Home Medications   Prior to Admission medications    Medication Sig Start Date End Date Taking? Authorizing Provider   ergocalciferol (ERGOCALCIFEROL) 1,250 mcg (50,000 unit) capsule TAKE 1 CAPSULE BY MOUTH ONE TIME PER WEEK 2/3/23   Sosa Triplett MD   furosemide (LASIX) 20 mg tablet Take 1 Tablet by mouth daily as needed (ONLY FOR LEG SWELLING). 2/2/23   Sosa Triplett MD   cetirizine (ZYRTEC) 10 mg tablet TAKE 1 TABLET BY MOUTH EVERY DAY AT NIGHT 2/1/23   Sosa Triplett MD   fluconazole (DIFLUCAN) 150 mg tablet TAKE 1 TABLET BY MOUTH DAILY FOR 1 DAY. REPEAT IN 72 HOURS IF NEEDED 1/26/23   Sosa Triplett MD   omeprazole (PRILOSEC) 20 mg capsule Take 1 Capsule by mouth two (2) times a day for 90 days.  1/26/23 4/26/23  Sosa Triplett MD   albuterol (PROVENTIL HFA, VENTOLIN HFA, PROAIR HFA) 90 mcg/actuation inhaler INHALE 1 PUFF BY MOUTH EVERY 4 HOURS AS NEEDED FOR WHEEZING OR COUGH 1/26/23   Sosa Triplett MD   lipase-protease-amylase (Creon) 36,000-114,000- 180,000 unit cpDR capsule TAKE 4 CAPSULES BY MOUTH THREE TIMES A DAY WITH EACH MEAL AND TAKE 2 CAPS WITH SNACKS 1/26/23   Sosa Triplett MD   lidocaine (LIDODERM) 5 % 1 PATCH BY TRANSDERMAL ROUTE DAILY. APPLY FOR 12 HOURS A DAY AND REMOVE FOR 12 HOURS A DAY. 12/16/22   Sosa Triplett MD   gabapentin (NEURONTIN) 300 mg capsule TAKE 1 CAPSULE BY MOUTH THREE (3) TIMES DAILY. INDICATIONS: NEUROPATHIC PAIN 11/28/22   Sosa Triplett MD   SUMAtriptan (IMITREX) 100 mg tablet Take 100 mg by mouth. Provider, Historical   OneTouch Ultra Test strip TEST BLOOD GLUCOSE THREE TIMES DAILY 9/23/22   Sosa Triplett MD   BD Rosalba 2nd Gen Pen Needle 32 gauge x 5/32\" ndle FOR INJECTING INSULIN 4 TIMES A DAY 6/15/22   Sosa Triplett MD   mometasone (ELOCON) 0.1 % ointment APPLY TOPICALLY TO AFFECTED AREA EVERY DAY 1/24/22   Sosa Triplett MD   cyclobenzaprine (FLEXERIL) 10 mg tablet TAKE 1 TABLET BY MOUTH THREE TIMES A DAY AS NEEDED FOR MUSCLE SPASM 12/8/21   Sosa Triplett MD   lancets (One Touch Delica) 33 gauge misc To check BS 2-3 x per day. E11.9 11/17/21   Sosa Triplett MD   ondansetron (ZOFRAN ODT) 8 mg disintegrating tablet Take 1 Tab by mouth every eight (8) hours as needed for Nausea or Vomiting. 4/10/20   Reji Area, NP       Allergies  Allergies   Allergen Reactions    Bactrim [Sulfamethoprim] Nausea Only    Ciprofloxacin Cough    Lisinopril Cough    Sulfamethoxazole-Trimethoprim Nausea and Vomiting     Type: NonCodified; Reaction: nause.        Past Medical History  Past Medical History:   Diagnosis Date    Arthritis     Asthma     Cataracts, bilateral     Diabetes ([address]     seen by endocrinology at [facility]    GERD (gastroesophageal reflux disease)     Hypertension     Obesity, Class II, BMI 35-39.9     Pancreatic insufficiency        Previous Hospitalization(s)  Past Surgical History:   Procedure Laterality Date    CHG ELASTASE PANCREATIC FECAL QUAL/SEMI-QUANTITATIVE  1999 growths in prancreatitis    COLONOSCOPY N/A 11/30/2016    COLONOSCOPY,EGD performed by Arvin Kennedy MD at [address] CATARACT REMOVAL Left 10/2015    HX CATARACT REMOVAL Right 10/2016    HX CHOLECYSTECTOMY  2005? HX COLONOSCOPY      [place] UNLISTED PROCEDURE PANCREAS  2001? Distal pancreatectomy. Family History  Family History   Problem Relation Age of Onset    Diabetes Mother     Heart Disease Father     Diabetes Brother     Breast Cancer Sister 64       Social History  Alcohol history: Not at all  Smoking history: former. Quit a few years ago. Smoked for about 10 years. Illicit drug history: Not at all  Living arrangement: patient lives with their family. Ambulates: Independently     Vital Signs  Visit Vitals  BP (!) 148/67   Pulse 69   Temp 97.8 °F (36.6 °C)   Resp 14   Ht 5' 2\" (1.575 m)   Wt 179 lb (81.2 kg)   SpO2 98%   BMI 32.74 kg/m²       Physical Exam  General: no acute distress. Alert. Cooperative. Head: Normocephalic. Atraumatic. Eyes:              Conjunctiva pink. Sclera white. Ears:              Hearing grossly intact. Nose:             Septum midline. Mucosa pink. No drainage. Throat: Mucosa pink. Moist mucous membranes. No tonsillar exudates or erythema. Palate movement equal bilaterally. Neck: Supple. Normal ROM. No stiffness. Respiratory: CTAB. No w/r/r/c.   Cardiovascular: RRR. Normal S1,S2. No m/r/g. Pulses 2+ throughout. GI: + bowel sounds. Nontender. No rebound tenderness or guarding. Nondistended. Extremities: no LE edema. Distal pulses intact. Musculoskeletal: Full ROM in all extremities. Right sided hip tenderness   Skin: Warm, dry. No rashes. Neuro: CN II-XII grossly intact. Strength 5/5 in all extremities.         Laboratory Data  Recent Results (from the past 8 hour(s))   GLUCOSE, POC    Collection Time: 03/21/23 10:43 AM   Result Value Ref Range    Glucose (POC) 89 65 - 117 mg/dL    Performed by Emma Nick    CBC WITH AUTOMATED DIFF Collection Time: 03/21/23 10:51 AM   Result Value Ref Range    WBC 8.4 3.6 - 11.0 K/uL    RBC 3.69 (L) 3.80 - 5.20 M/uL    HGB 11.4 (L) 11.5 - 16.0 g/dL    HCT 34.9 (L) 35.0 - 47.0 %    MCV 94.6 80.0 - 99.0 FL    MCH 30.9 26.0 - 34.0 PG    MCHC 32.7 30.0 - 36.5 g/dL    RDW 13.2 11.5 - 14.5 %    PLATELET 790 313 - 070 K/uL    MPV 11.3 8.9 - 12.9 FL    NRBC 0.0 0.0  WBC    ABSOLUTE NRBC 0.00 0.00 - 0.01 K/uL    NEUTROPHILS 77 (H) 32 - 75 %    LYMPHOCYTES 19 12 - 49 %    MONOCYTES 3 (L) 5 - 13 %    EOSINOPHILS 0 0 - 7 %    BASOPHILS 0 0 - 1 %    IMMATURE GRANULOCYTES 0 0 - 0.5 %    ABS. NEUTROPHILS 6.5 1.8 - 8.0 K/UL    ABS. LYMPHOCYTES 1.6 0.8 - 3.5 K/UL    ABS. MONOCYTES 0.3 0.0 - 1.0 K/UL    ABS. EOSINOPHILS 0.0 0.0 - 0.4 K/UL    ABS. BASOPHILS 0.0 0.0 - 0.1 K/UL    ABS. IMM. GRANS. 0.0 0.00 - 0.04 K/UL    DF AUTOMATED     METABOLIC PANEL, COMPREHENSIVE    Collection Time: 03/21/23 10:51 AM   Result Value Ref Range    Sodium 142 136 - 145 mmol/L    Potassium 5.7 (H) 3.5 - 5.1 mmol/L    Chloride 108 97 - 108 mmol/L    CO2 27 21 - 32 mmol/L    Anion gap 7 5 - 15 mmol/L    Glucose 104 (H) 65 - 100 mg/dL    BUN 21 (H) 6 - 20 MG/DL    Creatinine 0.98 0.55 - 1.02 MG/DL    BUN/Creatinine ratio 21 (H) 12 - 20      eGFR >60 >60 ml/min/1.73m2    Calcium 9.1 8.5 - 10.1 MG/DL    Bilirubin, total 0.3 0.2 - 1.0 MG/DL    ALT (SGPT) 19 12 - 78 U/L    AST (SGOT) 19 15 - 37 U/L    Alk.  phosphatase 133 (H) 45 - 117 U/L    Protein, total 6.7 6.4 - 8.2 g/dL    Albumin 2.9 (L) 3.5 - 5.0 g/dL    Globulin 3.8 2.0 - 4.0 g/dL    A-G Ratio 0.8 (L) 1.1 - 2.2     URINALYSIS W/MICROSCOPIC    Collection Time: 03/21/23 10:51 AM   Result Value Ref Range    Color YELLOW/STRAW      Appearance CLEAR CLEAR      Specific gravity 1.015 1.003 - 1.030      pH (UA) 6.5 5.0 - 8.0      Protein 100 (A) NEG mg/dL    Glucose Negative NEG mg/dL    Ketone Negative NEG mg/dL    Bilirubin Negative NEG      Blood SMALL (A) NEG      Urobilinogen 0.2 0.2 - 1.0 EU/dL    Nitrites Negative NEG      Leukocyte Esterase Negative NEG      WBC 0-4 0 - 4 /hpf    RBC 0-5 0 - 5 /hpf    Epithelial cells FEW FEW /lpf    Bacteria Negative NEG /hpf   URINE CULTURE HOLD SAMPLE    Collection Time: 03/21/23 10:51 AM    Specimen: Urine   Result Value Ref Range    Urine culture hold        Urine on hold in Microbiology dept for 2 days. If unpreserved urine is submitted, it cannot be used for addtional testing after 24 hours, recollection will be required. MAGNESIUM    Collection Time: 03/21/23 10:51 AM   Result Value Ref Range    Magnesium 2.1 1.6 - 2.4 mg/dL   ETHYL ALCOHOL    Collection Time: 03/21/23 10:51 AM   Result Value Ref Range    ALCOHOL(ETHYL),SERUM <10 <10 MG/DL   EKG, 12 LEAD, INITIAL    Collection Time: 03/21/23 10:58 AM   Result Value Ref Range    Ventricular Rate 72 BPM    Atrial Rate 72 BPM    P-R Interval 86 ms    QRS Duration 70 ms    Q-T Interval 370 ms    QTC Calculation (Bezet) 405 ms    Calculated P Axis -3 degrees    Calculated R Axis 44 degrees    Calculated T Axis 44 degrees    Diagnosis       Sinus rhythm with short OR  Otherwise normal ECG  When compared with ECG of 16-MAR-2023 08:22,  No significant change was found     GLUCOSE, POC    Collection Time: 03/21/23 12:57 PM   Result Value Ref Range    Glucose (POC) 47 (LL) 65 - 117 mg/dL    Performed by Lenny Aguilar    POTASSIUM    Collection Time: 03/21/23 12:59 PM   Result Value Ref Range    Potassium 5.0 3.5 - 5.1 mmol/L   GLUCOSE, POC    Collection Time: 03/21/23  2:19 PM   Result Value Ref Range    Glucose (POC) 115 65 - 117 mg/dL    Performed by Lenny Aguilar        Imaging  CXR Results  (Last 48 hours)                 03/21/23 1128  XR CHEST PORT Final result    Impression:  No acute process.        Narrative:  INDICATION: Evaluate for pneumonia         EXAM:  AP CHEST RADIOGRAPH       COMPARISON: December 16, 2019       FINDINGS:       AP portable view of the chest demonstrates a normal cardiomediastinal   silhouette. There is no edema, effusion, consolidation, or pneumothorax. The   osseous structures are unremarkable. CT Results  (Last 48 hours)                 03/21/23 1154  CT HEAD WO CONT Final result    Impression:      No acute intracranial abnormality. Narrative:  EXAM:  CT HEAD WO CONT       INDICATION: Fall with unknown head injury. COMPARISON: None       TECHNIQUE: Noncontrast head CT. Coronal and sagittal reformats. CT dose   reduction was achieved through use of a standardized protocol tailored for this   examination and automatic exposure control for dose modulation. Adaptive   statistical iterative reconstruction (ASIR) was utilized. FINDINGS: The ventricles and sulci are age-appropriate without hydrocephalus. There is no mass effect or midline shift. There is no intracranial hemorrhage or   extra-axial fluid collection. There is no abnormal parenchymal attenuation. The   gray-white matter differentiation is maintained. The basal cisterns are patent. The osseous structures are intact. The visualized paranasal sinuses and mastoid   air cells are clear. Assessment and Plan     Alma Delia Monique is a 58 y.o. female with a PMHx of HTN, Serafin Can, T2DM who is admitted for hypoglycemia. Hypoglycemia: in the setting of nightly insulin use and decreased PO intake following her evening dose. POA POC glucose of 58. Now improved s/p glucagon, D10 in the ER PTA with resolved weakness. Recent hypoglycemia on outpatient labs. CT of the head was NAP. UA was unremarkable. EKG NSR. Low suspicion for cardiogenic, arrhythmogenic, toxicologic, or infectious cause of syncopal event/AMS. Favor home insulin use as the underlying cause for hypoglycemia given resolution of symptoms following glucose.   - admit to observation, vital signs per unit protocol  - q4h POC glucose checks  - encourage PO intake  - holding insulin at this time  - orthostatic Bps pending  - TSH pending  - daily CMP, CBC    Right sided hip pain: in the setting of suspected ground level fall due to hypoglycemic event. X-ray right hip and pelvis showing no acute process. - tylenol q6h scheduled  - Oxycodone 2.5mg q4h prn for breakthrough pain    Hyperkalemia: chronic in the outpatient setting. Patient endorses losartan-HCTZ use. Recent ER work-up notable for supplemental vitamin use daily. Stable at 5.7 today. EKG without T-wave changes. - hold losartan given hyperkalemia  - monitor with daily CMP    HTN: /63. Takes home losartan-HCTZ 100-25 daily. - prn hydralazine 10mg IV K7B prn for systolic greater than 899 and diastolic greater than 755.  - holding losartan in the setting of hyperkalemia  - Resumed home HCTZ    T2DM: last a1c of 7.0. presenting with hypoglycemia as above in the setting of home lantus 30u nightly. - hypoglycemia management as above  - hold insulin at this time  - continue home gabapentin TID prn for neuropathy  - holding home flexeril    Asthma:  home albuterol prn  - duo-neb prn q6h prn. Cautious use given hyperkalemia    Seasonal allergies: takes home zyrtec    Pancreatic insufficiency: creon 4 tablets TID. - resume home creon    GERD: home omeprazole 20mg daily  - Sub pantoprazole 40mg daily while inpatient    Vit D deficiency: takes home vitamin D once weekly    Obesity:  - The pt's Body mass index is 32.74 kg/m². - Encouraging lifestyle modifications and further follow up outpatient. FEN/GI - Regular diet. Activity - Ambulate as tolerated  DVT prophylaxis - Lovenox  GI prophylaxis - Protonix  Fall prophylaxis - Fall precautions ordered. Disposition - Admit to  observation . Plan to d/c to TBD. Code Status - Full Code: Discussed with patient / caregivers.   Point of Contact - aKrla Dumont,   brother  - [phone]    Patient Selwyn Pope will be discussed with Dr. Jen Wilder.    3:44 PM, 03/21/23  Juan Germain, MD  Family Medicine Resident       For Billing    Chief Complaint   Patient presents with    Low Blood Sugar       Hospital Problems  Date Reviewed: 1/29/2023            Codes Class Noted POA    Hypoglycemia ICD-10-CM: E16.2  ICD-9-CM: 251.2  3/21/2023 Unknown

## 2023-03-21 NOTE — ED TRIAGE NOTES
Patient to treatment area via EMS. Per EMS, patient was found unresponsive by family this morning. EMS found glucose to be 51; administered glucagon and most recent results 81. Patient alert and oriented at this time, but states she feels \"foggy\". Glucose 89.

## 2023-03-21 NOTE — ED PROVIDER NOTES
HPI     Date of Service:  3/21/2023    Patient:  Xander Kamara    Chief Complaint:  Low Blood Sugar       HPI:  Xander Kamara is a 58 y.o.  female with a past medical history of HTN, IDDM who presents for evaluation of hypoglycemia. Patient reports she remembers getting up this morning around 7 AM, states she felt \"off\" but cannot describe that further to me. Patient reports the next thing she knew paramedics were with her. Per family, they heard her fall in her room, subsequently found her on the ground unresponsive. On EMS arrival, glucose was low at 51, subsequently given IM glucagon with improvement to 81. On arrival blood glucose is 89. She endorses feeling foggy. Patient complains of left hip pain after fall this morning. No neck or back pain. She is unsure if she hit her head or had loss of consciousness. No blood thinner use. Denies any recent illness including fevers, chills, cough or congestion. No chest or abdominal pain. No dysuria or increased urinary frequency. Patient reports she takes Lantus 30 units at night. Last took it yesterday evening. Denies any other insulin. States she has been eating and drinking normally. Past Medical History:   Diagnosis Date    Arthritis     Asthma     Cataracts, bilateral     Diabetes ([address]     seen by endocrinology at [facility]    GERD (gastroesophageal reflux disease)     Hypertension     Obesity, Class II, BMI 35-39.9     Pancreatic insufficiency        Past Surgical History:   Procedure Laterality Date    CHG ELASTASE PANCREATIC FECAL QUAL/SEMI-QUANTITATIVE  1999    growths in prancreatitis    COLONOSCOPY N/A 11/30/2016    COLONOSCOPY,EGD performed by Siri Garcia MD at [id number] [place] [address],[address] CATARACT REMOVAL Left 10/2015    HX CATARACT REMOVAL Right 10/2016    HX CHOLECYSTECTOMY  2005? HX COLONOSCOPY      [place] UNLISTED PROCEDURE PANCREAS  2001? Distal pancreatectomy.          Family History:   Problem Relation Age of Onset Diabetes Mother     Heart Disease Father     Diabetes Brother     Breast Cancer Sister 64       Social History     Socioeconomic History    Marital status: LEGALLY      Spouse name: Not on file    Number of children: Not on file    Years of education: Not on file    Highest education level: Not on file   Occupational History    Not on file   Tobacco Use    Smoking status: Former     Packs/day: 0.50     Types: Cigarettes     Start date: 2016     Quit date: 2016     Years since quittin.0    Smokeless tobacco: Never   Vaping Use    Vaping Use: Never used   Substance and Sexual Activity    Alcohol use: No     Alcohol/week: 0.0 standard drinks     Comment: social    Drug use: No    Sexual activity: Not Currently   Other Topics Concern    Not on file   Social History Narrative    Not on file     Social Determinants of Health     Financial Resource Strain: Low Risk     Difficulty of Paying Living Expenses: Not hard at all   Food Insecurity: No Food Insecurity    Worried About Running Out of Food in the Last Year: Never true    Ran Out of Food in the Last Year: Never true   Transportation Needs: Not on file   Physical Activity: Not on file   Stress: Not on file   Social Connections: Not on file   Intimate Partner Violence: Not on file   Housing Stability: Not on file         ALLERGIES: Bactrim [sulfamethoprim], Ciprofloxacin, Lisinopril, and Sulfamethoxazole-trimethoprim    Review of Systems   Constitutional:  Negative for chills and fever. HENT:  Negative for congestion and rhinorrhea. Eyes:  Negative for discharge and redness. Respiratory:  Negative for cough and shortness of breath. Cardiovascular:  Negative for chest pain. Gastrointestinal:  Negative for abdominal pain, diarrhea, nausea and vomiting. Genitourinary:  Negative for dysuria and hematuria. Musculoskeletal:  Positive for arthralgias. Negative for back pain and neck pain.    Neurological:  Negative for speech difficulty and headaches. Psychiatric/Behavioral:  Negative for agitation and confusion. Vitals:    03/21/23 1048   BP: (!) 195/63   Pulse: 70   Resp: 14   Temp: 97.8 °F (36.6 °C)   SpO2: 100%   Weight: 81.2 kg (179 lb)   Height: 5' 2\" (1.575 m)            Physical Exam  Vitals and nursing note reviewed. Constitutional:       General: She is not in acute distress. Appearance: She is obese. She is not ill-appearing or toxic-appearing. HENT:      Head: Normocephalic and atraumatic. Eyes:      General: No scleral icterus. Right eye: No discharge. Left eye: No discharge. Conjunctiva/sclera: Conjunctivae normal.   Cardiovascular:      Rate and Rhythm: Normal rate and regular rhythm. Pulses: Normal pulses. Pulmonary:      Effort: Pulmonary effort is normal. No respiratory distress. Breath sounds: Normal breath sounds. Abdominal:      General: Abdomen is flat. Palpations: Abdomen is soft. Tenderness: There is no abdominal tenderness. There is no guarding or rebound. Musculoskeletal:         General: No deformity. Cervical back: No bony tenderness. Thoracic back: No bony tenderness. Lumbar back: No bony tenderness. Left hip: Tenderness present. Decreased range of motion. Right lower leg: No edema. Left lower leg: No edema. Skin:     General: Skin is warm and dry. Capillary Refill: Capillary refill takes less than 2 seconds. Neurological:      General: No focal deficit present. Mental Status: She is alert and oriented to person, place, and time. Cranial Nerves: No cranial nerve deficit. Sensory: No sensory deficit. Motor: No weakness. Psychiatric:         Mood and Affect: Mood normal.         Behavior: Behavior normal.        Medical Decision Making      DECISION MAKING:  Ora Nieves is a 58 y.o. female who comes in as above.   Vital signs on arrival notable for elevated blood pressure 195/63, vital signs otherwise stable. Blood glucose on arrival is 89. She is awake, alert and oriented x3. Patient does endorse falling this morning, is unsure if she hit her head or had any loss of consciousness. She denies any recent illness. Notes she last took her insulin last night, states she took 30 units of Lantus as she normally does. She has been eating and drinking normally. Patient will be evaluated with laboratory work and infectious work-up to rule out any cause for her hypoglycemia. Will evaluate with CT imaging of the head to rule out intracranial trauma as she did have a fall and will also obtain x-ray of the left hip to rule out fracture or dislocation. CBC without leukocytosis or significant anemia. Labs are notable for hyperkalemia at 5.7 but there is hemolysis and on repeat check potassium is normal at 5. Electrolytes otherwise normal.  BUN is 21, creatinine within normal limits. LFTs are unremarkable. UA is negative for infection. Chest x-ray negative for pneumonia. CT imaging of the head is negative for acute intracranial process. X-ray of the left hip and pelvis negative for fracture or dislocation. ECG shows a normal sinus rhythm, no acute ischemic changes. Despite juice and crackers, patient's glucose subsequently dropped to 47. Patient will be given D10. Patient will require admission for continued monitoring of her glucose. On reexamination, patient's blood pressure remains elevated, she does not believe she is on any medications for her blood pressure. We will treat with a dose of IV hydralazine. After hydralazine blood pressure has improved. Patient's case was discussed with family medicine resident at [address] [place] [address]  for admission. Patient accepted for transfer to [address] [place] [address]  by, Dr. Grupo Schuler.     Perfect Serve Consult for Admission  1:25 PM    ED Room Number: WER14/14  Patient Name and age:  Mara Aldana 58 y.o.  female  Working Diagnosis: Hypoglycemia  (primary encounter diagnosis)    COVID-19 Suspicion:  no  Sepsis present:  no  Reassessment needed: no  Code Status:  Full Code  Readmission: no  Isolation Requirements:  no  Recommended Level of Care:  med/surg  Department: [facility] ED - [phone]  Admitting Provider: Dr. Bernice Mcclain. Other:  58 y.o.  female with a past medical history of HTN, IDDM presented for hypoglycemia. Patient was unresponsive at home, found to be hypoglycemic in the 50s, given glucagon with improvement of glucose to the 80s. Glucose on arrival was 89. Last insulin yesterday evening, 30 units Lantus. She has been eating crackers and having juice here, despite this glucose again low at 47. Work-up otherwise unremarkable    Amount and/or Complexity of Data Reviewed  Labs: ordered. Decision-making details documented in ED Course. Radiology: ordered. Decision-making details documented in ED Course. ECG/medicine tests: ordered and independent interpretation performed. Decision-making details documented in ED Course. Risk  OTC drugs. Prescription drug management. Decision regarding hospitalization. ED Course as of 03/21/23 1325   Tue Mar 21, 2023   1126 EKG, 12 LEAD, INITIAL  ECG at 10:50 AM, interpreted by me: Sinus rhythm, rate 72 bpm.  ME interval 86 ms. Intervals otherwise normal.  No ST elevations.  [SH]      ED Course User Index  [SH] Kirsty Rosenberger, DO       Procedures        LABS:  Recent Results (from the past 6 hour(s))   GLUCOSE, POC    Collection Time: 03/21/23 10:43 AM   Result Value Ref Range    Glucose (POC) 89 65 - 117 mg/dL    Performed by Giovanny Lopes    CBC WITH AUTOMATED DIFF    Collection Time: 03/21/23 10:51 AM   Result Value Ref Range    WBC 8.4 3.6 - 11.0 K/uL    RBC 3.69 (L) 3.80 - 5.20 M/uL    HGB 11.4 (L) 11.5 - 16.0 g/dL    HCT 34.9 (L) 35.0 - 47.0 %    MCV 94.6 80.0 - 99.0 FL    MCH 30.9 26.0 - 34.0 PG    MCHC 32.7 30.0 - 36.5 g/dL    RDW 13.2 11.5 - 14.5 %    PLATELET 276 876 - 263 K/uL    MPV 11.3 8.9 - 12.9 FL    NRBC 0.0 0.0  WBC    ABSOLUTE NRBC 0.00 0.00 - 0.01 K/uL    NEUTROPHILS 77 (H) 32 - 75 %    LYMPHOCYTES 19 12 - 49 %    MONOCYTES 3 (L) 5 - 13 %    EOSINOPHILS 0 0 - 7 %    BASOPHILS 0 0 - 1 %    IMMATURE GRANULOCYTES 0 0 - 0.5 %    ABS. NEUTROPHILS 6.5 1.8 - 8.0 K/UL    ABS. LYMPHOCYTES 1.6 0.8 - 3.5 K/UL    ABS. MONOCYTES 0.3 0.0 - 1.0 K/UL    ABS. EOSINOPHILS 0.0 0.0 - 0.4 K/UL    ABS. BASOPHILS 0.0 0.0 - 0.1 K/UL    ABS. IMM. GRANS. 0.0 0.00 - 0.04 K/UL    DF AUTOMATED     METABOLIC PANEL, COMPREHENSIVE    Collection Time: 03/21/23 10:51 AM   Result Value Ref Range    Sodium 142 136 - 145 mmol/L    Potassium 5.7 (H) 3.5 - 5.1 mmol/L    Chloride 108 97 - 108 mmol/L    CO2 27 21 - 32 mmol/L    Anion gap 7 5 - 15 mmol/L    Glucose 104 (H) 65 - 100 mg/dL    BUN 21 (H) 6 - 20 MG/DL    Creatinine 0.98 0.55 - 1.02 MG/DL    BUN/Creatinine ratio 21 (H) 12 - 20      eGFR >60 >60 ml/min/1.73m2    Calcium 9.1 8.5 - 10.1 MG/DL    Bilirubin, total 0.3 0.2 - 1.0 MG/DL    ALT (SGPT) 19 12 - 78 U/L    AST (SGOT) 19 15 - 37 U/L    Alk.  phosphatase 133 (H) 45 - 117 U/L    Protein, total 6.7 6.4 - 8.2 g/dL    Albumin 2.9 (L) 3.5 - 5.0 g/dL    Globulin 3.8 2.0 - 4.0 g/dL    A-G Ratio 0.8 (L) 1.1 - 2.2     URINALYSIS W/MICROSCOPIC    Collection Time: 03/21/23 10:51 AM   Result Value Ref Range    Color YELLOW/STRAW      Appearance CLEAR CLEAR      Specific gravity 1.015 1.003 - 1.030      pH (UA) 6.5 5.0 - 8.0      Protein 100 (A) NEG mg/dL    Glucose Negative NEG mg/dL    Ketone Negative NEG mg/dL    Bilirubin Negative NEG      Blood SMALL (A) NEG      Urobilinogen 0.2 0.2 - 1.0 EU/dL    Nitrites Negative NEG      Leukocyte Esterase Negative NEG      WBC 0-4 0 - 4 /hpf    RBC 0-5 0 - 5 /hpf    Epithelial cells FEW FEW /lpf    Bacteria Negative NEG /hpf   URINE CULTURE HOLD SAMPLE    Collection Time: 03/21/23 10:51 AM    Specimen: Urine   Result Value Ref Range    Urine culture hold        Urine on hold in Microbiology dept for 2 days. If unpreserved urine is submitted, it cannot be used for addtional testing after 24 hours, recollection will be required. MAGNESIUM    Collection Time: 03/21/23 10:51 AM   Result Value Ref Range    Magnesium 2.1 1.6 - 2.4 mg/dL   ETHYL ALCOHOL    Collection Time: 03/21/23 10:51 AM   Result Value Ref Range    ALCOHOL(ETHYL),SERUM <10 <10 MG/DL   EKG, 12 LEAD, INITIAL    Collection Time: 03/21/23 10:58 AM   Result Value Ref Range    Ventricular Rate 72 BPM    Atrial Rate 72 BPM    P-R Interval 86 ms    QRS Duration 70 ms    Q-T Interval 370 ms    QTC Calculation (Bezet) 405 ms    Calculated P Axis -3 degrees    Calculated R Axis 44 degrees    Calculated T Axis 44 degrees    Diagnosis       Sinus rhythm with short MA  Otherwise normal ECG  When compared with ECG of 16-MAR-2023 08:22,  No significant change was found     GLUCOSE, POC    Collection Time: 03/21/23 12:57 PM   Result Value Ref Range    Glucose (POC) 47 (LL) 65 - 117 mg/dL    Performed by Hector Salcedo    POTASSIUM    Collection Time: 03/21/23 12:59 PM   Result Value Ref Range    Potassium 5.0 3.5 - 5.1 mmol/L        IMAGING:  XR HIP LT W OR WO PELV 2-3 VWS   Final Result   No acute abnormality. CT HEAD WO CONT   Final Result      No acute intracranial abnormality. XR CHEST PORT   Final Result            Medications During Visit:  Medications   acetaminophen (TYLENOL) tablet 1,000 mg (1,000 mg Oral Refused 3/21/23 1250)   dextrose 10 % infusion 250 mL (250 mL IntraVENous New Bag 3/21/23 1317)         IMPRESSION:  1. Hypoglycemia    2.  Hypertension    DISPOSITION:  Admitted         Charlotte Greenberg DO

## 2023-03-22 VITALS
RESPIRATION RATE: 18 BRPM | SYSTOLIC BLOOD PRESSURE: 144 MMHG | BODY MASS INDEX: 32.94 KG/M2 | OXYGEN SATURATION: 98 % | TEMPERATURE: 98.7 F | WEIGHT: 179 LBS | HEART RATE: 70 BPM | HEIGHT: 62 IN | DIASTOLIC BLOOD PRESSURE: 58 MMHG

## 2023-03-22 PROBLEM — E87.5 HYPERKALEMIA: Status: ACTIVE | Noted: 2023-03-22

## 2023-03-22 PROBLEM — M25.551 RIGHT HIP PAIN: Status: ACTIVE | Noted: 2023-03-22

## 2023-03-22 LAB
AMPHET UR QL SCN: NEGATIVE
ANION GAP SERPL CALC-SCNC: 1 MMOL/L (ref 5–15)
BARBITURATES UR QL SCN: NEGATIVE
BASOPHILS # BLD: 0 K/UL (ref 0–0.1)
BASOPHILS NFR BLD: 0 % (ref 0–1)
BENZODIAZ UR QL: NEGATIVE
BUN SERPL-MCNC: 24 MG/DL (ref 6–20)
BUN/CREAT SERPL: 21 (ref 12–20)
CALCIUM SERPL-MCNC: 8.1 MG/DL (ref 8.5–10.1)
CANNABINOIDS UR QL SCN: NEGATIVE
CHLORIDE SERPL-SCNC: 114 MMOL/L (ref 97–108)
CO2 SERPL-SCNC: 25 MMOL/L (ref 21–32)
COCAINE UR QL SCN: NEGATIVE
CREAT SERPL-MCNC: 1.13 MG/DL (ref 0.55–1.02)
DIFFERENTIAL METHOD BLD: ABNORMAL
DRUG SCRN COMMENT,DRGCM: NORMAL
EOSINOPHIL # BLD: 0 K/UL (ref 0–0.4)
EOSINOPHIL NFR BLD: 0 % (ref 0–7)
ERYTHROCYTE [DISTWIDTH] IN BLOOD BY AUTOMATED COUNT: 13.2 % (ref 11.5–14.5)
GLUCOSE BLD STRIP.AUTO-MCNC: 111 MG/DL (ref 65–117)
GLUCOSE BLD STRIP.AUTO-MCNC: 122 MG/DL (ref 65–117)
GLUCOSE BLD STRIP.AUTO-MCNC: 212 MG/DL (ref 65–117)
GLUCOSE BLD STRIP.AUTO-MCNC: 213 MG/DL (ref 65–117)
GLUCOSE BLD STRIP.AUTO-MCNC: 47 MG/DL (ref 65–117)
GLUCOSE BLD STRIP.AUTO-MCNC: 48 MG/DL (ref 65–117)
GLUCOSE BLD STRIP.AUTO-MCNC: 75 MG/DL (ref 65–117)
GLUCOSE SERPL-MCNC: 76 MG/DL (ref 65–100)
HCT VFR BLD AUTO: 27.8 % (ref 35–47)
HGB BLD-MCNC: 8.9 G/DL (ref 11.5–16)
IMM GRANULOCYTES # BLD AUTO: 0.1 K/UL (ref 0–0.04)
IMM GRANULOCYTES NFR BLD AUTO: 0 % (ref 0–0.5)
LYMPHOCYTES # BLD: 3.8 K/UL (ref 0.8–3.5)
LYMPHOCYTES NFR BLD: 33 % (ref 12–49)
MCH RBC QN AUTO: 30.2 PG (ref 26–34)
MCHC RBC AUTO-ENTMCNC: 32 G/DL (ref 30–36.5)
MCV RBC AUTO: 94.2 FL (ref 80–99)
METHADONE UR QL: NEGATIVE
MONOCYTES # BLD: 0.7 K/UL (ref 0–1)
MONOCYTES NFR BLD: 7 % (ref 5–13)
NEUTS SEG # BLD: 6.7 K/UL (ref 1.8–8)
NEUTS SEG NFR BLD: 60 % (ref 32–75)
NRBC # BLD: 0 K/UL (ref 0–0.01)
NRBC BLD-RTO: 0 PER 100 WBC
OPIATES UR QL: NEGATIVE
PCP UR QL: NEGATIVE
PLATELET # BLD AUTO: 236 K/UL (ref 150–400)
PMV BLD AUTO: 10.9 FL (ref 8.9–12.9)
POTASSIUM SERPL-SCNC: 4.8 MMOL/L (ref 3.5–5.1)
RBC # BLD AUTO: 2.95 M/UL (ref 3.8–5.2)
SERVICE CMNT-IMP: ABNORMAL
SERVICE CMNT-IMP: NORMAL
SERVICE CMNT-IMP: NORMAL
SODIUM SERPL-SCNC: 140 MMOL/L (ref 136–145)
WBC # BLD AUTO: 11.3 K/UL (ref 3.6–11)

## 2023-03-22 PROCEDURE — 82962 GLUCOSE BLOOD TEST: CPT

## 2023-03-22 PROCEDURE — 97116 GAIT TRAINING THERAPY: CPT

## 2023-03-22 PROCEDURE — 74011250637 HC RX REV CODE- 250/637

## 2023-03-22 PROCEDURE — 97162 PT EVAL MOD COMPLEX 30 MIN: CPT

## 2023-03-22 PROCEDURE — 96372 THER/PROPH/DIAG INJ SC/IM: CPT

## 2023-03-22 PROCEDURE — 80048 BASIC METABOLIC PNL TOTAL CA: CPT

## 2023-03-22 PROCEDURE — 97530 THERAPEUTIC ACTIVITIES: CPT

## 2023-03-22 PROCEDURE — 74011250636 HC RX REV CODE- 250/636: Performed by: STUDENT IN AN ORGANIZED HEALTH CARE EDUCATION/TRAINING PROGRAM

## 2023-03-22 PROCEDURE — 36415 COLL VENOUS BLD VENIPUNCTURE: CPT

## 2023-03-22 PROCEDURE — 74011000250 HC RX REV CODE- 250: Performed by: STUDENT IN AN ORGANIZED HEALTH CARE EDUCATION/TRAINING PROGRAM

## 2023-03-22 PROCEDURE — G0378 HOSPITAL OBSERVATION PER HR: HCPCS

## 2023-03-22 PROCEDURE — 80307 DRUG TEST PRSMV CHEM ANLYZR: CPT

## 2023-03-22 PROCEDURE — 97165 OT EVAL LOW COMPLEX 30 MIN: CPT

## 2023-03-22 PROCEDURE — 85025 COMPLETE CBC W/AUTO DIFF WBC: CPT

## 2023-03-22 RX ORDER — HYDROCHLOROTHIAZIDE 25 MG/1
25 TABLET ORAL DAILY
Qty: 30 TABLET | Refills: 0 | Status: SHIPPED | OUTPATIENT
Start: 2023-03-23

## 2023-03-22 RX ADMIN — GABAPENTIN 300 MG: 300 CAPSULE ORAL at 08:45

## 2023-03-22 RX ADMIN — PANCRELIPASE LIPASE, PANCRELIPASE PROTEASE, PANCRELIPASE AMYLASE 4 CAPSULE: 15000; 47000; 63000 CAPSULE, DELAYED RELEASE ORAL at 08:44

## 2023-03-22 RX ADMIN — OXYCODONE HYDROCHLORIDE 2.5 MG: 5 TABLET ORAL at 09:02

## 2023-03-22 RX ADMIN — PANCRELIPASE LIPASE, PANCRELIPASE PROTEASE, PANCRELIPASE AMYLASE 4 CAPSULE: 15000; 47000; 63000 CAPSULE, DELAYED RELEASE ORAL at 12:04

## 2023-03-22 RX ADMIN — HYDROMORPHONE HYDROCHLORIDE 2 MG: 2 TABLET ORAL at 00:46

## 2023-03-22 RX ADMIN — HYDROCHLOROTHIAZIDE 25 MG: 25 TABLET ORAL at 08:45

## 2023-03-22 RX ADMIN — GABAPENTIN 300 MG: 300 CAPSULE ORAL at 16:13

## 2023-03-22 RX ADMIN — SODIUM CHLORIDE, PRESERVATIVE FREE 5 ML: 5 INJECTION INTRAVENOUS at 08:57

## 2023-03-22 RX ADMIN — SODIUM CHLORIDE, PRESERVATIVE FREE 5 ML: 5 INJECTION INTRAVENOUS at 16:14

## 2023-03-22 RX ADMIN — PANCRELIPASE LIPASE, PANCRELIPASE PROTEASE, PANCRELIPASE AMYLASE 4 CAPSULE: 15000; 47000; 63000 CAPSULE, DELAYED RELEASE ORAL at 17:27

## 2023-03-22 RX ADMIN — PANTOPRAZOLE SODIUM 40 MG: 40 TABLET, DELAYED RELEASE ORAL at 07:41

## 2023-03-22 RX ADMIN — PANCRELIPASE LIPASE, PANCRELIPASE PROTEASE, PANCRELIPASE AMYLASE 4 CAPSULE: 20000; 63000; 84000 CAPSULE, DELAYED RELEASE ORAL at 08:44

## 2023-03-22 RX ADMIN — ENOXAPARIN SODIUM 40 MG: 100 INJECTION SUBCUTANEOUS at 08:45

## 2023-03-22 RX ADMIN — PANCRELIPASE LIPASE, PANCRELIPASE PROTEASE, PANCRELIPASE AMYLASE 4 CAPSULE: 20000; 63000; 84000 CAPSULE, DELAYED RELEASE ORAL at 17:27

## 2023-03-22 RX ADMIN — OXYCODONE HYDROCHLORIDE 2.5 MG: 5 TABLET ORAL at 16:13

## 2023-03-22 RX ADMIN — PANCRELIPASE LIPASE, PANCRELIPASE PROTEASE, PANCRELIPASE AMYLASE 4 CAPSULE: 20000; 63000; 84000 CAPSULE, DELAYED RELEASE ORAL at 12:04

## 2023-03-22 NOTE — ED NOTES
Bedside and Verbal shift change report given to Charlotte Dixon (oncoming nurse) by Mela Bansal (offgoing nurse). Report included the following information SBAR, Kardex, ED Summary, and Med Rec Status.

## 2023-03-22 NOTE — PROGRESS NOTES
Problem: Mobility Impaired (Adult and Pediatric)  Goal: *Acute Goals and Plan of Care (Insert Text)  Description: FUNCTIONAL STATUS PRIOR TO ADMISSION: Patient was independent and active without use of DME.    HOME SUPPORT PRIOR TO ADMISSION: The patient lived alone with local family to provide assistance. Physical Therapy Goals  Initiated 3/22/2023  1. Patient will move from supine to sit and sit to supine , scoot up and down, and roll side to side in bed with independence within 7 day(s). 2.  Patient will transfer from bed to chair and chair to bed with modified independence using the least restrictive device within 7 day(s). 3.  Patient will perform sit to stand with modified independence within 7 day(s). 4.  Patient will ambulate with modified independence for 150 feet with the least restrictive device within 7 day(s). 5.  Patient will ascend/descend 4 stairs with 1-2 handrail(s) with minimal assistance/contact guard assist within 7 day(s). Outcome: Not Met   PHYSICAL THERAPY EVALUATION  Patient: Koffi Edwards (72 y.o. female)  Date: 3/22/2023  Primary Diagnosis: Hypoglycemia [E16.2]       Precautions:    (Standard)      ASSESSMENT  Based on the objective data described below, the patient presents with antalgic gait, mildly impaired balance, decreased strength, reports of R hip and ankle pain and overall functional mobility that is below her independent baseline in the setting of hospital admission for hypoglycemia with suspected GLF. Patient does not recall events leading up to the fall and reports + head hit. CT head -, x-rays R hip -. Patient initially tolerates ambulation without AD in ED hallways with intermittent support from the walls. However after approximately 1 minute of ambulation, her gait speed slow and had increased antalgia. Provided RW with improved stability and CGA for ambulation. She toilets independently. Recommend HHPT with initial increased support upon d/c.  Patient indicates that she will likely be able to stay with her son for a few days upon d/c. Current Level of Function Impacting Discharge (mobility/balance): CGA with RW    Functional Outcome Measure: The patient scored Total Score: 18/28 on the Tinetti outcome measure which is indicative of high fall risk. Other factors to consider for discharge: none additional     Patient will benefit from skilled therapy intervention to address the above noted impairments. PLAN :  Recommendations and Planned Interventions: bed mobility training, transfer training, gait training, therapeutic exercises, modalities, edema management/control, patient and family training/education, and therapeutic activities      Frequency/Duration: Patient will be followed by physical therapy:  5 times a week to address goals. Recommendation for discharge: (in order for the patient to meet his/her long term goals)  Physical therapy at least 2 days/week in the home AND ensure assist and/or supervision for safety with initial functional mobility     This discharge recommendation:  Has been made in collaboration with the attending provider and/or case management    IF patient discharges home will need the following DME: rolling walker         SUBJECTIVE:   Patient stated what if I stay with my son.      OBJECTIVE DATA SUMMARY:   Patient received supine in bed and was agreeable to participate in PT session. Patient was cleared by nursing to participate in PT session.      Vitals:    03/22/23 1135 03/22/23 1439 03/22/23 1446   BP: 137/82 (!) 159/66 (!) 172/74   BP 1 Location: Left upper arm Left upper arm Left upper arm   BP Patient Position:  Semi fowlers Sitting   Pulse: 70 71 77   Temp: 98.7 °F (37.1 °C)     Resp: 18     Height:      Weight:      SpO2: 100% 99% 98%         HISTORY:    Past Medical History:   Diagnosis Date    Arthritis     Asthma     Cataracts, bilateral     Diabetes ([address]     seen by endocrinology at [facility]    GERD (gastroesophageal reflux disease)     Hypertension     Obesity, Class II, BMI 35-39.9     Pancreatic insufficiency      Past Surgical History:   Procedure Laterality Date    CHG ELASTASE PANCREATIC FECAL QUAL/SEMI-QUANTITATIVE  1999    growths in prancreatitis    COLONOSCOPY N/A 11/30/2016    COLONOSCOPY,EGD performed by Geovanni Brandt MD at [address] [facility] [address] CATARACT REMOVAL Left 10/2015    HX CATARACT REMOVAL Right 10/2016    HX CHOLECYSTECTOMY  2005? HX COLONOSCOPY      [place] UNLISTED PROCEDURE PANCREAS  2001? Distal pancreatectomy. Personal factors and/or comorbidities impacting plan of care: none additional    Home Situation  Home Environment: Private residence  # Steps to Enter: 3  Rails to Enter: Yes  One/Two Story Residence: One story  Living Alone: Yes  Support Systems:  (brother)  Current DME Used/Available at Home: None, Cane, straight, Shower chair, Grab bars, Raised toilet seat  Tub or Shower Type: Shower    EXAMINATION/PRESENTATION/DECISION MAKING:   Critical Behavior:  Neurologic State: Alert  Orientation Level: Oriented X4        Hearing: Auditory  Auditory Impairment: None  Skin:  all observed intact; no noticeable bruising to patient's R hip  Edema: none apparent   Range Of Motion:  AROM: Generally decreased, functional                       Strength:    Strength: Generally decreased, functional                    Tone & Sensation:   Tone: Normal                              Coordination:  Coordination: Generally decreased, functional  Vision:      Functional Mobility:  Bed Mobility:  Rolling: Stand-by assistance  Supine to Sit: Stand-by assistance  Sit to Supine: Stand-by assistance  Scooting: Modified independent  Transfers:  Sit to Stand: Contact guard assistance  Stand to Sit: Contact guard assistance                       Balance:   Sitting: Intact; With support  Standing: Impaired; Without support  Standing - Static: Good  Standing - Dynamic : Fair  Ambulation/Gait Training:  Distance (ft): 75 Feet (ft)  Assistive Device: Gait belt;Walker, rolling  Ambulation - Level of Assistance: Contact guard assistance        Gait Abnormalities: Antalgic        Base of Support: Widened                              Stairs: Therapeutic Exercises:       Functional Measure:  Tinetti test:    Sitting Balance: 1  Arises: 1  Attempts to Rise: 2  Immediate Standing Balance: 0  Standing Balance: 1  Nudged: 1  Eyes Closed: 1  Turn 360 Degrees - Continuous/Discontinuous: 1  Turn 360 Degrees - Steady/Unsteady: 1  Sitting Down: 1  Balance Score: 10 Balance total score  Indication of Gait: 1  R Step Length/Height: 1  L Step Length/Height: 1  R Foot Clearance: 1  L Foot Clearance: 1  Step Symmetry: 1  Step Continuity: 1  Path: 1  Trunk: 0  Walking Time: 0  Gait Score: 8 Gait total score  Total Score: 18/28 Overall total score         Tinetti Tool Score Risk of Falls  <19 = High Fall Risk  19-24 = Moderate Fall Risk  25-28 = Low Fall Risk  Tinetti ME. Performance-Oriented Assessment of Mobility Problems in Elderly Patients. [address]; [id number].  (Scoring Description: PT Bulletin Feb. 10, 1993)    Older adults: Aster Lopez et al, 2009; n = 1000 [facility] elderly evaluated with ABC, MAREK, ADL, and IADL)  · Mean MAREK score for males aged 69-68 years = 26.21(3.40)  · Mean MAREK score for females age 69-68 years = 25.16(4.30)  · Mean MAREK score for males over 80 years = 23.29(6.02)  · Mean MAREK score for females over 80 years = 17.20(8.32)        Physical Therapy Evaluation Charge Determination   History Examination Presentation Decision-Making   LOW Complexity : Zero comorbidities / personal factors that will impact the outcome / POC LOW Complexity : 1-2 Standardized tests and measures addressing body structure, function, activity limitation and / or participation in recreation  LOW Complexity : Stable, uncomplicated  LOW Complexity : FOTO score of       Based on the above components, the patient evaluation is determined to be of the following complexity level: LOW     Pain Rating:  Patient reports no pain at rest, 7/10 pain with ambulation - alerted RN patient requesting pain medication    Activity Tolerance:   Good and tolerates ADLs without rest breaks      After treatment patient left in no apparent distress:   Supine in bed, Call bell within reach, and Side rails x 3    COMMUNICATION/EDUCATION:   The patients plan of care was discussed with: Occupational therapist and Registered nurse. Fall prevention education was provided and the patient/caregiver indicated understanding. and Patient/family have participated as able in goal setting and plan of care.     Thank you for this referral.  Zainab William PT, DPT   Time Calculation: 34 mins

## 2023-03-22 NOTE — ED NOTES
Pt's initial bs check 47. Rechecked bs, it was 48. Gave pt orange juice. Pt also given a cup of coffee per request. Will recheck.

## 2023-03-22 NOTE — PROGRESS NOTES
Medicare Outpatient Observation Notice (MOON)/ [place] Outpatient Observation Notice (Ashwin Farooq) provided to patient/representative with verbal explanation of the notice. Time allotted for questions regarding the notice. Patient /representative provided a completed copy of the MOON/VOON notice. Copy placed on bedside chart.   Piero Adhikari CMS

## 2023-03-22 NOTE — PROGRESS NOTES
[address] for patient  2250  Perfect served MD related to paitent c/o back and requesting more pain medication. 36  MD responded orders received. Apply lidocaine patch and heating pad first if that does not work then pain medication one time dose.

## 2023-03-22 NOTE — PROGRESS NOTES
[facility] [address] [place] [address]   Office [phone]  Fax [phone]            Subjective / Objective   Arvin Kinney is a 58 y.o. female with a PMHx of HTN, Sherlynn Gary, T2DM who is admitted for hypoglycemia. 24 hour events: LELE Neal was seen and examined at bedside. Concerns overnight include: right hip pain. Patient denies lightheadedness, dizziness, weakness, nausea, vomiting, chest pain, or other symptoms.     Inpatient Medications:  Current Facility-Administered Medications   Medication Dose Route Frequency    sodium chloride (NS) flush 5-40 mL  5-40 mL IntraVENous Q8H    sodium chloride (NS) flush 5-40 mL  5-40 mL IntraVENous PRN    acetaminophen (TYLENOL) tablet 650 mg  650 mg Oral Q6H PRN    Or    acetaminophen (TYLENOL) suppository 650 mg  650 mg Rectal Q6H PRN    polyethylene glycol (MIRALAX) packet 17 g  17 g Oral DAILY PRN    enoxaparin (LOVENOX) injection 40 mg  40 mg SubCUTAneous DAILY    glucose chewable tablet 16 g  4 Tablet Oral PRN    glucagon (GLUCAGEN) injection 1 mg  1 mg IntraMUSCular PRN    dextrose 10% infusion 0-250 mL  0-250 mL IntraVENous PRN    hydrALAZINE (APRESOLINE) 20 mg/mL injection 10 mg  10 mg IntraVENous Q6H PRN    gabapentin (NEURONTIN) capsule 300 mg  300 mg Oral TID    pantoprazole (PROTONIX) tablet 40 mg  40 mg Oral ACB    albuterol-ipratropium (DUO-NEB) 2.5 MG-0.5 MG/3 ML  3 mL Nebulization Q6H PRN    oxyCODONE IR (ROXICODONE) tablet 2.5 mg  2.5 mg Oral Q4H PRN    lipase-protease-amylase (ZENPEP 15,000) capsule 4 Capsule  4 Capsule Oral TID WITH MEALS    And    lipase-protease-amylase (ZENPEP 20,000) capsule 4 Capsule  4 Capsule Oral TID WITH MEALS    lipase-protease-amylase (ZENPEP 15,000) capsule 2 Capsule  2 Capsule Oral PRN    And    lipase-protease-amylase (ZENPEP 20,000) capsule 2 Capsule  2 Capsule Oral PRN    hydroCHLOROthiazide (HYDRODIURIL) tablet 25 mg  25 mg Oral DAILY    lidocaine 4 % patch 1 Patch  1 Patch TransDERmal Q24H     Current Outpatient Medications   Medication Sig    ergocalciferol (ERGOCALCIFEROL) 1,250 mcg (50,000 unit) capsule TAKE 1 CAPSULE BY MOUTH ONE TIME PER WEEK    furosemide (LASIX) 20 mg tablet Take 1 Tablet by mouth daily as needed (ONLY FOR LEG SWELLING). cetirizine (ZYRTEC) 10 mg tablet TAKE 1 TABLET BY MOUTH EVERY DAY AT NIGHT    fluconazole (DIFLUCAN) 150 mg tablet TAKE 1 TABLET BY MOUTH DAILY FOR 1 DAY. REPEAT IN 72 HOURS IF NEEDED    omeprazole (PRILOSEC) 20 mg capsule Take 1 Capsule by mouth two (2) times a day for 90 days. albuterol (PROVENTIL HFA, VENTOLIN HFA, PROAIR HFA) 90 mcg/actuation inhaler INHALE 1 PUFF BY MOUTH EVERY 4 HOURS AS NEEDED FOR WHEEZING OR COUGH    lipase-protease-amylase (Creon) 36,000-114,000- 180,000 unit cpDR capsule TAKE 4 CAPSULES BY MOUTH THREE TIMES A DAY WITH EACH MEAL AND TAKE 2 CAPS WITH SNACKS    lidocaine (LIDODERM) 5 % 1 PATCH BY TRANSDERMAL ROUTE DAILY. APPLY FOR 12 HOURS A DAY AND REMOVE FOR 12 HOURS A DAY. gabapentin (NEURONTIN) 300 mg capsule TAKE 1 CAPSULE BY MOUTH THREE (3) TIMES DAILY. INDICATIONS: NEUROPATHIC PAIN    SUMAtriptan (IMITREX) 100 mg tablet Take 100 mg by mouth. OneTouch Ultra Test strip TEST BLOOD GLUCOSE THREE TIMES DAILY    BD Rosalba 2nd Gen Pen Needle 32 gauge x 5/32\" ndle FOR INJECTING INSULIN 4 TIMES A DAY    mometasone (ELOCON) 0.1 % ointment APPLY TOPICALLY TO AFFECTED AREA EVERY DAY    cyclobenzaprine (FLEXERIL) 10 mg tablet TAKE 1 TABLET BY MOUTH THREE TIMES A DAY AS NEEDED FOR MUSCLE SPASM    lancets (One Touch Delica) 33 gauge misc To check BS 2-3 x per day. E11.9    ondansetron (ZOFRAN ODT) 8 mg disintegrating tablet Take 1 Tab by mouth every eight (8) hours as needed for Nausea or Vomiting.          Physical exam:   Visit Vitals  /73 (BP 1 Location: Left upper arm, BP Patient Position: At rest;Sitting)   Pulse 68   Temp 98.9 °F (37.2 °C)   Resp 18   Ht 5' 2\" (1.575 m)   Wt 179 lb (81.2 kg)   SpO2 100%   BMI 32.74 kg/m²     General: AAO x 3. No acute distress. Head: Normocephalic. Atraumatic. ENT:             Sclera icteric. EOMI. Respiratory: CTAB. No w/r/r. No accessory muscle use. Cardiovascular: Regular Rhythm. No murmurs. GI: + bowel sounds. Nontender. Nondistended. Extremities: No LE edema or signs of DVT. Tenderness to right hip. Neuro: No focal deficits. Skin: Warm. No rashes. No jaundice or cyanosis. CBC:  Recent Labs     03/22/23  0334 03/21/23  1051   WBC 11.3* 8.4   HGB 8.9* 11.4*   HCT 27.8* 34.9*    143       Metabolic Panel:  Recent Labs     03/22/23  0334 03/21/23  1259 03/21/23  1051     --  142   K 4.8 5.0 5.7*   *  --  108   CO2 25  --  27   BUN 24*  --  21*   CREA 1.13*  --  0.98   GLU 76  --  104*   CA 8.1*  --  9.1   MG  --   --  2.1   ALB  --   --  2.9*   ALT  --   --  19              Assessment and Plan     Minna Alexander is a 58 y.o. female with a PMHx of HTN, CKDIIIa, T2DM who is admitted for hypoglycemia. Hypoglycemia: likely secondary to nightly insulin use. Ongoing hypoglycemia overnight 3/21-3/22 despite last lantus administration on 3/20 at 22:00  - q4h POC glucose checks  - encourage PO intake  - holding insulin at this time  - will consider insulin level, C-peptide if continues to be hypoglycemic     Right sided hip pain: in the setting of suspected ground level fall due to hypoglycemic event. X-ray right hip and pelvis showing no acute process. Pain is acute on chronic per patient. - tylenol q6h scheduled  - Oxycodone 2.5mg q4h prn for breakthrough pain and dilaudid 2mg   - lidocaine patch to right hip prn  - PT/OT to see     Hyperkalemia: chronic in the outpatient setting. Recent ER work-up notable for supplemental vitamin use daily. - hold losartan given hyperkalemia  - monitor with daily CMP     HTN: /63. Takes home losartan-HCTZ 100-25 daily.   - prn hydralazine 10mg IV S0E prn for systolic greater than 589 and diastolic greater than 803.  - holding losartan in the setting of hyperkalemia  - Resumed home HCTZ     T2DM: last a1c of 7.0. presenting with hypoglycemia as above in the setting of home lantus 30u nightly. - hypoglycemia management as above  - hold insulin at this time  - continue home gabapentin TID prn for neuropathy  - holding home flexeril     Asthma:  home albuterol prn  - duo-neb prn q6h prn. Cautious use given hyperkalemia     Seasonal allergies: takes home zyrtec     Pancreatic insufficiency: creon 4 tablets TID. - resume home creon     GERD: home omeprazole 20mg daily  - Sub pantoprazole 40mg daily while inpatient     Vit D deficiency: takes home vitamin D once weekly     Obesity:  - The pt's Body mass index is 32.74 kg/m². - Encouraging lifestyle modifications and further follow up outpatient. FEN/GI - Regular diet. Activity - Ambulate as tolerated  DVT prophylaxis - Lovenox  GI prophylaxis - Protonix  Fall prophylaxis - Fall precautions ordered. Disposition - Admit to  observation . Plan to d/c to TBD. Code Status - Full Code: Discussed with patient / caregivers.   Point of Contact - Ana Killianamelia,   brother  - [phone]     Patient Margie Ferguson will be discussed with Dr. Andi Thurston MD  Family Medicine Resident         For Billing    Chief Complaint   Patient presents with    [address] Problems  Date Reviewed: 1/29/2023            Codes Class Noted POA    Hypoglycemia ICD-10-CM: E16.2  ICD-9-CM: 251.2  3/21/2023 Unknown

## 2023-03-22 NOTE — PROGRESS NOTES
Problem: Self Care Deficits Care Plan (Adult)  Goal: *Acute Goals and Plan of Care (Insert Text)  Description: FUNCTIONAL STATUS PRIOR TO ADMISSION: Patient was independent and active without use of DME.     HOME SUPPORT: The patient lived alone with son to provide assistance. Per pt report, pt will be staying with son and his family upon dishcarge    Occupational Therapy Goals  Initiated 3/22/2023  1. Patient will perform grooming with modified independence within 7 day(s). 2.  Patient will perform upper body dressing with modified independence within 7 day(s). 3.  Patient will perform lower body dressing with modified independence within 7 day(s). 4.  Patient will perform toilet transfers with modified independence within 7 day(s). 5.  Patient will perform all aspects of toileting with modified independence within 7 day(s). 6.  Patient will participate in upper extremity therapeutic exercise/activities with modified independence for 10 minutes within 7 day(s). 7.  Patient will utilize energy conservation techniques during functional activities with verbal cues within 7 day(s). Outcome: Not Met     OCCUPATIONAL THERAPY EVALUATION  Patient: Margie Ferguson (77 y.o. female)  Date: 3/22/2023  Primary Diagnosis: Hypoglycemia [E16.2]       Precautions:   (Standard)    ASSESSMENT  Patient received semi supine in bed A&OX4 and agreeable for OT eval/tx. Per pt report, pt lives alone however to go stay with son and his family upon discharge in a two story home (will be staying on first floor) with 2 steps rails to enter and prior to admission was independent/MI for self care and independent for functional transfers/mobility.      Patient presents with decreased balance (improved with use of RW), decreased activity tolerance, generalized weakness and increased need for assist with self care (CGA toileting/cloth mgmt simulated, SBA LB dressing EOB, SBA grooming with set up) and functional transfers/mobility (SBA sup<->sit and scooting EOB increased time, CGA sit<->stand and toilet transfer simulated with RW and gait belt). Patient would benefit from continued skilled OT services while at [facility] in order to increase safety and independence with self care and functional transfers/mobility. Recommend discharge to home with [facility] and [facility] care when medically appropriate. Functional Outcome Measure: The patient scored 20/24 on the [address] [facility] outcome measure   Other factors to consider for discharge: time since onset, severity of deficits, PLOF        PLAN :  Recommendations and Planned Interventions: self care training, functional mobility training, therapeutic exercise, balance training, therapeutic activities, endurance activities, patient education, and home safety training    Frequency/Duration: Patient will be followed by occupational therapy 5 times a week to address goals. Recommendation for discharge: (in order for the patient to meet his/her long term goals)  Occupational therapy at least 2 days/week in the home     This discharge recommendation:  Has been made in collaboration with the attending provider and/or case management    IF patient discharges home will need the following DME: none       SUBJECTIVE:   Patient stated yes.  when asked if patients son can bring her shower chair from her home to his home    OBJECTIVE DATA SUMMARY:   HISTORY:   Past Medical History:   Diagnosis Date    Arthritis     Asthma     Cataracts, bilateral     Diabetes ([address]     seen by endocrinology at [facility]    GERD (gastroesophageal reflux disease)     Hypertension     Obesity, Class II, BMI 35-39.9     Pancreatic insufficiency      Past Surgical History:   Procedure Laterality Date    CHG ELASTASE PANCREATIC FECAL QUAL/SEMI-QUANTITATIVE  1999    growths in prancreatitis    COLONOSCOPY N/A 11/30/2016    COLONOSCOPY,EGD performed by Shana Novak MD at [facility] 10/2015    HX CATARACT REMOVAL Right 10/2016    HX CHOLECYSTECTOMY  2005? HX COLONOSCOPY      [place] UNLISTED PROCEDURE PANCREAS  2001? Distal pancreatectomy. Expanded or extensive additional review of patient history:     Home Situation  Home Environment: Private residence  # Steps to Enter: 3  Rails to Enter: Yes  One/Two Story Residence: One story  Living Alone: Yes  Support Systems:  (brother)  Current DME Used/Available at Home: None, Cane, straight, Shower chair, Grab bars, Raised toilet seat  Tub or Shower Type: Shower    EXAMINATION OF PERFORMANCE DEFICITS:  Cognitive/Behavioral Status:  Neurologic State: Alert  Orientation Level: Oriented X4    Hearing: Auditory  Auditory Impairment: None    Range of Motion:    AROM: Generally decreased, functional     Strength:    Strength: Generally decreased, functional     Coordination:  Coordination: Generally decreased, functional       Tone & Sensation:  Tone: Normal      Balance:  Sitting: Intact; With support  Standing: Impaired; Without support  Standing - Static: Good  Standing - Dynamic : Fair    Functional Mobility and Transfers for ADLs:  Bed Mobility:  Rolling: Stand-by assistance  Supine to Sit: Stand-by assistance  Sit to Supine: Stand-by assistance  Scooting: Modified independent    Transfers:  Sit to Stand: Contact guard assistance  Stand to Sit: Contact guard assistance  Bed to Chair: Contact guard assistance  Toilet Transfer : Contact guard assistance (simulated room level)  Assistive Device : Gait Belt;Walker, rolling    ADL Assessment:     Oral Facial Hygiene/Grooming: Stand-by assistance;Setup; Additional time    Lower Body Dressing: Stand-by assistance; Additional time (seated EOB simulated)    Toileting: Contact guard assistance (simulated)     ADL Intervention and task modifications:     Grooming  Grooming Assistance: Stand-by assistance  Position Performed: Seated edge of bed  Washing Face: Stand-by assistance  Washing Hands: Stand-by assistance  Brushing Teeth: Stand-by assistance    Lower Body Dressing Assistance  Socks: Stand-by assistance  Position Performed: Seated edge of bed    Toileting  Toileting Assistance: Contact guard assistance (simulated)  Bladder Hygiene: Contact guard assistance  Bowel Hygiene: Contact guard assistance  Clothing Management: Contact guard assistance      Functional Measure:  [employer] AM-PAC®      Daily Activity Inpatient Short Form (6-Clicks) Version 2  How much HELP from another person do you currently need. .. (If the patient hasn't done an activity recently, how much help from another person do you think they would need if they tried?) Total A Lot A Little None   1. Putting on and taking off regular lower body clothing? []   1 []   2 [x]   3 []   4   2. Bathing (including washing, rinsing, drying)? []   1 []   2 [x]   3 []   4   3. Toileting, which includes using toilet, bedpan, or urinal? []   1 []   2 [x]   3 []   4   4. Putting on and taking off regular upper body clothing? []   1 []   2 []   3 [x]   4   5. Taking care of personal grooming such as brushing teeth? []   1 []   2 [x]   3 []   4   6. Eating meals? []   1 []   2 []   3 [x]   4     Raw Score: 20/24                            Cutoff score ?191,2,3 had higher odds of discharging home with home health or need of SNF/IPR    1. Amada Parsons. Validity of the AM-PAC 6-Clicks Inpatient Daily Activity and Basic Mobility Short Forms. Physical Therapy Mar 2014, [phone]) 379-391; DOI: 10.2522/ptj.09204146  2. Meenu Lot. Association of AM-PAC \"6-Clicks\" Basic Mobility and Daily Activity Scores With Discharge Destination. Phys Ther. 2021 Apr 4;101(4):ifmy685. doi: 10.1093/ptj/gaix871. PMID: 90281449. V Jacqueline Lock, Martin FLORES, William Lucia, Satish K, Fidelia S.  Activity Measure for Post-Acute Care \"6-Clicks\" Basic Mobility Scores Predict Discharge Destination After Acute Care Hospitalization in Select Patient Groups: A Retrospective, Observational Study. Arch Rehabil Res Clin Transl. 2022;4(3):975019. doi: 10.1016/j.arrct. 2968.465723. PMID: 85982532; PMCID: VXI2506521. 4. Sheryl Cordero Ni P. AM-PAC Short Forms Manual 4.0. Revised 2020. Occupational Therapy Evaluation Charge Determination   History Examination Decision-Making   LOW Complexity : Brief history review  LOW Complexity : 1-3 performance deficits relating to physical, cognitive , or psychosocial skils that result in activity limitations and / or participation restrictions  LOW Complexity : No comorbidities that affect functional and no verbal or physical assistance needed to complete eval tasks       Based on the above components, the patient evaluation is determined to be of the following complexity level: LOW   Pain Ratin/10 right hip down leg    Activity Tolerance:   Good    After treatment patient left in no apparent distress:    Supine in bed and Call bell within reach    COMMUNICATION/EDUCATION:   The patients plan of care was discussed with: Physical therapist and Registered nurse. Home safety education was provided and the patient/caregiver indicated understanding. and Patient/family have participated as able in goal setting and plan of care. This patients plan of care is appropriate for delegation to [facility].     Thank you for this referral.  Marly Reynolds  Time Calculation: 19 mins

## 2023-03-22 NOTE — DISCHARGE INSTRUCTIONS
HOME DISCHARGE INSTRUCTIONS    Sheri Jacobs / [id number] : 1960    Admission date: 3/21/2023 Discharge date: 3/22/2023     Please bring this form with you to show your care provider at your follow-up appointment. Primary care provider:  Starr Booth MD    Discharging provider:  Radha Marie MD  - Family Medicine Resident  Gerrit Mohs, DO - Attending, Family Medicine     You have been admitted to the hospital with the following diagnoses:    ACUTE DIAGNOSES:  Hypoglycemia [E16.2]    . . . . . . . . . . . . . . . . . . . . . . . . . . . . . . . . . . . . . . . . . . . . . . . . . . . . . . . . . . . . . . . . . . . . . . . [employer] FOLLOW-UP CARE RECOMMENDATIONS:  You are well enough to be discharged from the hospital. However, because you were staying in a hospital, you are at greater risk of having been exposed to the coronavirus. PLEASE stay inside and quarantine for 14 days to prevent further spread of the coronavirus. Appointments  Future Appointments   Date Time Provider Nirali Salgado   3/28/2023 10:00 AM Reina Grover MD [facility]       Reina Grover MD  [place] [address]  [address] [place] [id number] [phone]    Follow up  Follow up visit at 10am with Dr. Ritesh Noyola    START:  HCTZ 25MG DAILY    STOP:  LOSARTAN 100MG DAILY    CHANGES:  DECREASE LANTUS TO 15 UNITS NIGHTLY      Please follow up with your PCP regarding:  - ongoing management of your diabetes and any changes to your medications. Please keep a log of your blood glucose levels at home and bring this with you to your next appointment. We decreased your home insulin to 15 units daily (instead of your previous dose of 30 units)  - ongoing work-up and management of your elevated potassium levels. - discuss ongoing management of your high blood pressure. Keep a log of home blood pressures and bring this with you to your next appointment. - pain management for your right hip.     Please follow up with Nephrology regarding:  - changes to your medications and ongoing management of your chronic kidney disease    Follow-up tests needed:   - repeat blood work to monitor your blood levels (a CBC test)  - repeat blood work to monitor your electrolytes (a BMP test)    Pending test results: At the time of your discharge the following test results are still pending: none. Please make sure you review these results with your outpatient follow-up provider(s). Specific symptoms to watch for: chest pain, shortness of breath, fever, chills, nausea, vomiting, diarrhea, change in mentation, falling, weakness, bleeding. DIET/what to eat:  Diabetic Diet    ACTIVITY:  Activity as tolerated    Wound care: none    Equipment needed:  none    What to do if new or unexpected symptoms occur? If you experience any of the above symptoms (or should other concerns or questions arise after discharge) please call your primary care physician. Return to the emergency room if you cannot get hold of your doctor. It is very important that you keep your follow-up appointment(s). Please bring discharge papers, medication list (and/or medication bottles) to your follow-up appointments for review by your outpatient provider(s). Please check the list of medications and be sure it includes every medication (even non-prescription medications) that your provider wants you to take. It is important that you take the medication exactly as they are prescribed. Keep your medication in the bottles provided by the pharmacist and keep a list of the medication names, dosages, and times to be taken in your wallet. Do not take other medications without consulting your doctor.    If you have any questions about your medications or other instructions, please talk to your nurse or care provider before you leave the hospital.     Information obtained by:     I understand that if any problems occur once I am at home I am to contact my physician. These instructions were explained to me and I had the opportunity to ask questions. I understand and acknowledge receipt of the instructions indicated above.                                                                                                                                                Physician's or R.N.'s Signature                                                                  Date/Time                                                                                                                                              Patient or Representative Signature                                                          Date/Time

## 2023-03-23 ENCOUNTER — TELEPHONE (OUTPATIENT)
Dept: FAMILY MEDICINE CLINIC | Age: 63
End: 2023-03-23

## 2023-03-23 DIAGNOSIS — E11.9 TYPE 2 DIABETES MELLITUS WITHOUT COMPLICATION, WITH LONG-TERM CURRENT USE OF INSULIN (HCC): ICD-10-CM

## 2023-03-23 DIAGNOSIS — R60.0 LOCALIZED EDEMA: ICD-10-CM

## 2023-03-23 DIAGNOSIS — Z79.4 TYPE 2 DIABETES MELLITUS WITHOUT COMPLICATION, WITH LONG-TERM CURRENT USE OF INSULIN (HCC): ICD-10-CM

## 2023-03-23 NOTE — TELEPHONE ENCOUNTER
Care Transitions Initial Follow Up Call    Outreach made within 2 business days of discharge: Yes    Patient: Ryan Dunn Patient : 1960   MRN: [id number]  Reason for Admission: hypoglycemia  Discharge Date: 3/22/23       Spoke with: patient     Discharge department/facility: [facility]    TCM Interactive Patient Contact:  Was patient able to fill all prescriptions: Yes  Was patient instructed to bring all medications to the follow-up visit: Yes  Is patient taking all medications as directed in the discharge summary?  Yes  Does patient understand their discharge instructions: Yes  Does patient have questions or concerns that need addressed prior to 7-14 day follow up office visit: no    Scheduled appointment with PCP within 7-14 days    Follow Up  Future Appointments   Date Time Provider Nirali Salgado   3/28/2023 10:00 AM MD LIANG Aguilera

## 2023-03-24 RX ORDER — INSULIN GLARGINE 100 [IU]/ML
INJECTION, SOLUTION SUBCUTANEOUS
Qty: 12 ML | Refills: 3 | Status: SHIPPED | OUTPATIENT
Start: 2023-03-24

## 2023-03-24 RX ORDER — FUROSEMIDE 20 MG/1
20 TABLET ORAL
Qty: 30 TABLET | Refills: 0 | Status: SHIPPED | OUTPATIENT
Start: 2023-03-24

## 2023-03-24 NOTE — DISCHARGE SUMMARY
Discharge Note     Name: Selwyn Pope MRN: [id number]  Sex: Female   YOB: 1960  Age: 58 y.o. PCP: Sosa Triplett MD     Date of admission: 3/21/2023  Date of discharge/transfer: 3/22/2023    Attending physician at admission: Jen Wilder DO  Attending physician at discharge/transfer: Jen Wilder DO  Resident physician at discharge/transfer: Juan Germain MD     Consultants during hospitalization  None     Admission diagnoses   Hypoglycemia [E16.2]    Recommended follow-up after discharge    1. PCP-Mihir Cota MD  2. Nephrology     Things to follow up on with PCP:   - medication changes  - Monitor blood pressure: losartan dc'd on discharge  - T2DM management: decreased lantus to 15u on dc 2/2 hypoglycemia  - ongoing pain management  - repeat BMP to monitor hyperkalemia    Medication Changes:  Discharge Medication List as of 3/22/2023  5:54 PM        START taking these medications    Details   hydroCHLOROthiazide (HYDRODIURIL) 25 mg tablet Take 1 Tablet by mouth daily. , Normal, Disp-30 Tablet, R-0           CONTINUE these medications which have NOT CHANGED    Details   ergocalciferol (ERGOCALCIFEROL) 1,250 mcg (50,000 unit) capsule TAKE 1 CAPSULE BY MOUTH ONE TIME PER WEEK, Normal, Disp-4 Capsule, R-2      furosemide (LASIX) 20 mg tablet Take 1 Tablet by mouth daily as needed (ONLY FOR LEG SWELLING). , Normal, Disp-30 Tablet, R-0      cetirizine (ZYRTEC) 10 mg tablet TAKE 1 TABLET BY MOUTH EVERY DAY AT NIGHT, Normal, Disp-90 Tablet, R-1      fluconazole (DIFLUCAN) 150 mg tablet TAKE 1 TABLET BY MOUTH DAILY FOR 1 DAY. REPEAT IN 72 HOURS IF NEEDED, Normal, Disp-2 Tablet, R-5      omeprazole (PRILOSEC) 20 mg capsule Take 1 Capsule by mouth two (2) times a day for 90 days. , Normal, Disp-180 Capsule, R-1      albuterol (PROVENTIL HFA, VENTOLIN HFA, PROAIR HFA) 90 mcg/actuation inhaler INHALE 1 PUFF BY MOUTH EVERY 4 HOURS AS NEEDED FOR WHEEZING OR COUGH, Normal, Disp-3 Each, R-3 lipase-protease-amylase (Creon) 36,000-114,000- 180,000 unit cpDR capsule TAKE 4 CAPSULES BY MOUTH THREE TIMES A DAY WITH EACH MEAL AND TAKE 2 CAPS WITH SNACKS, Normal, Disp-480 Capsule, R-1Please give 90 day supply if possible. lidocaine (LIDODERM) 5 % 1 PATCH BY TRANSDERMAL ROUTE DAILY. APPLY FOR 12 HOURS A DAY AND REMOVE FOR 12 HOURS A DAY., Normal, Disp-30 Patch, R-5      gabapentin (NEURONTIN) 300 mg capsule TAKE 1 CAPSULE BY MOUTH THREE (3) TIMES DAILY. INDICATIONS: NEUROPATHIC PAIN, Normal, Disp-90 Capsule, R-0Not to exceed 5 additional fills before 04/10/2023      SUMAtriptan (IMITREX) 100 mg tablet Take 100 mg by mouth., Historical Med      OneTouch Ultra Test strip TEST BLOOD GLUCOSE THREE TIMES DAILY, Normal, Disp-100 Strip, R-0      BD Rosalba 2nd Gen Pen Needle 32 gauge x 5/32\" ndle FOR INJECTING INSULIN 4 TIMES A DAY, Normal, Disp-100 Pen Needle, R-5DX Code Needed  . mometasone (ELOCON) 0.1 % ointment APPLY TOPICALLY TO AFFECTED AREA EVERY DAY, Normal, Disp-15 g, R-0      cyclobenzaprine (FLEXERIL) 10 mg tablet TAKE 1 TABLET BY MOUTH THREE TIMES A DAY AS NEEDED FOR MUSCLE SPASM, Normal, Disp-30 Tablet, R-5      lancets (One Touch Delica) 33 gauge misc To check BS 2-3 x per day. E11.9, Normal, Disp-100 Each, R-5      ondansetron (ZOFRAN ODT) 8 mg disintegrating tablet Take 1 Tab by mouth every eight (8) hours as needed for Nausea or Vomiting., Normal, Disp-12 Tab, R-0               History of Present Illness    As per admitting provider:   Jason Sheffield is a 58 y.o. female with PMHx of HTN, T2DM, Lawerance Dowse who presents to the ED for evaluation of hypogylcemia. Patient woke at 07:00 and called a friend on the phone. She does not remember ending the conversation. The next thing she remembers was being called out to by her brother around ~9-9:30. When she woke she was unable to walk and EMS was called. Patient reports right sided hip and head pain from a presumed ground level fall.  No prior similar episodes. No prior hypoglycemic events. The patient previously took Orien Hence, but this was stopped x1 month ago by her nephrology Dr. Vinay Valderrama secondary to a yeast infection. Patient currently takes lantus 30u nightly for years which she took last night at 20:30. Patient recalls eating a salad and crackers for dinner and did not eat breakfast. Denies SOB, nausea, vomiting, chest pain, palpitations, or other symptoms at this time. Patient reports frequent recent blood work for elevated potassium. ER evaluation on 3/16 for this hyperkalemia revealed exogenous supplementation with vitamins to be the likely cause. Outpatient notes indicate recent hypoglycemia. [facility]    Lavelle Kaur is a 58 y.o. female with a PMHx of HTN, CKDIIIa, T2DM who is admitted for hypoglycemia. Patient with POC glucose of 58 at the time of arrival. The patient altered mental status rapidly improved with glucose. Remaining work-up including CT head and EKG were unremarkable. The patient was monitored overnight, and no additional episodes of hypoglycemia occurred the afternoon of admission ~30 hours after last lantus dose. Hypoglycemia: likely secondary to nightly insulin use. - decreased lantus to 15u from 30u at discharge  - follow up outpatient     Right sided hip pain: in the setting of suspected ground level fall due to hypoglycemic event. X-ray right hip and pelvis showing no acute process. Pain is acute on chronic per patient. - continue home medications  - consider pain management referral as indicated outpatient     Hyperkalemia: stable, improved  - losartan discontinued outpatient  - follow-up with nephro, PCP regarding risk/benefit of discontinuing losartan with h/o CKD and T2DM     HTN: /63.  Resolved, normotensive  - losartan discontinued  - HCTZ resumed  - follow up outpatient      T2DM: last a1c of 7.0.  - insulin as above  - resume home flexeril, gabapentin     Asthma:  home albuterol prn     Seasonal allergies: takes home zyrtec     Pancreatic insufficiency: creon 4 tablets TID. - resume home creon     GERD: home omeprazole 20mg daily     Vit D deficiency: takes home vitamin D once weekly     Obesity:  - The pt's Body mass index is 32.74 kg/m². - Encouraging lifestyle modifications and further follow up outpatient. Physical exam at discharge:    Visit Vitals  BP (!) 144/58 (BP 1 Location: Left upper arm, BP Patient Position: Sitting; At rest)   Pulse 70   Temp 98.7 °F (37.1 °C)   Resp 18   Ht 5' 2\" (1.575 m)   Wt 179 lb (81.2 kg)   SpO2 98%   BMI 32.74 kg/m²       General Oriented to person, place, and time and well-developed. Appears well-nourished, no distress. Not diaphoretic. HENT Head Normocephalic and atraumatic. Eyes Conjunctivae are normal, no discharge. No scleral icterus. Nose Nose normal, clear turbinates. Oral Oropharynx is clear and moist. No oropharyngeal exudate. Neck No thyromegaly present. No cervical adenopathy. Cardio Normal rate, regular rhythm. Exam reveals no gallop and no friction rub. No murmur heard. No chest wall tenderness. Pulmonary Effort normal and breath sounds normal. No respiratory distress. No wheezes, no rales. Abdominal Soft. Bowel sounds normal. No distension. No tenderness.  Deferred. Extremities No edema of lower extremities. Tenderness to right hip. Distal pulses intact. Neurological Alert and oriented to person, place, and time. Dermatology Skin is warm and dry. No rash noted. No erythema or pallor. Psychiatric Affect and judgment normal.        Condition at discharge: Stable.     Labs  Recent Labs     03/22/23  0334 03/21/23  1051   WBC 11.3* 8.4   HGB 8.9* 11.4*   HCT 27.8* 34.9*    320     Recent Labs     03/22/23  0334 03/21/23  1259 03/21/23  1051     --  142   K 4.8 5.0 5.7*   *  --  108   CO2 25  --  27   BUN 24*  --  21*   CREA 1.13*  --  0.98   GLU 76  --  104*   CA 8.1*  -- 9.1   MG  --   --  2.1     Recent Labs     03/21/23  1051   ALT 19   *   TBILI 0.3   TP 6.7   ALB 2.9*   GLOB 3.8     Recent Labs     03/22/23  1536 03/22/23  1136 03/22/23  0643 03/22/23  0554 03/22/23  0527   GLUCPOC 213* 212* 122* 75 48*       Micro:  Lab Results   Component Value Date/Time    Culture result: NO GROWTH 6 DAYS 01/16/2017 05:08 AM    Culture result: KLEBSIELLA PNEUMONIAE 01/15/2017 02:17 PM    Culture result: KLEBSIELLA PNEUMONIAE  (SECOND COLONY TYPE)   01/15/2017 02:17 PM       Imaging:  XR HIP LT W OR WO PELV 2-3 VWS    Result Date: 3/21/2023  EXAM: XR HIP LT W OR WO PELV 2-3 VWS INDICATION: Left hip pain after fall. COMPARISON: None. FINDINGS: AP view of the pelvis and a frogleg lateral view of the left hip demonstrate no acute fracture or dislocation. The sacroiliac and hip joint spaces are maintained. The soft tissues are unremarkable. No acute abnormality. CT HEAD WO CONT    Result Date: 3/21/2023  EXAM:  CT HEAD WO CONT INDICATION: Fall with unknown head injury. COMPARISON: None TECHNIQUE: Noncontrast head CT. Coronal and sagittal reformats. CT dose reduction was achieved through use of a standardized protocol tailored for this examination and automatic exposure control for dose modulation. Adaptive statistical iterative reconstruction (ASIR) was utilized. FINDINGS: The ventricles and sulci are age-appropriate without hydrocephalus. There is no mass effect or midline shift. There is no intracranial hemorrhage or extra-axial fluid collection. There is no abnormal parenchymal attenuation. The gray-white matter differentiation is maintained. The basal cisterns are patent. The osseous structures are intact. The visualized paranasal sinuses and mastoid air cells are clear. No acute intracranial abnormality.      XR CHEST PORT    Result Date: 3/21/2023  INDICATION: Evaluate for pneumonia  EXAM:  AP CHEST RADIOGRAPH COMPARISON: December 16, 2019 FINDINGS: AP portable view of the chest demonstrates a normal cardiomediastinal silhouette. There is no edema, effusion, consolidation, or pneumothorax. The osseous structures are unremarkable. No acute process. Procedures / Diagnostic Studies  none    Chronic diagnoses   Problem List as of 3/22/2023 Date Reviewed: 1/29/2023            Codes Class Noted - Resolved    * (Principal) Hypoglycemia ICD-10-CM: E16.2  ICD-9-CM: 251.2  3/21/2023 - Present        Right hip pain ICD-10-CM: M25.551  ICD-9-CM: 719.45  3/22/2023 - Present        Hyperkalemia ICD-10-CM: E87.5  ICD-9-CM: 276.7  3/22/2023 - Present        Proteinuria ICD-10-CM: R80.9  ICD-9-CM: 791.0  12/17/2021 - Present    Overview Signed 10/18/2022  2:26 PM by Ela Reynoso MD     Formatting of this note might be different from the original.  12/17/2021. Nephrotic range, presumably diabetic nephropathy. Will check limited serologies. Needs to be on ARB. Would strongly consider SGLT2i. Uncomplicated asthma AOV-10-FT: J45.909  ICD-9-CM: 493.90  12/17/2021 - Present        Pancreatic insufficiency ICD-10-CM: K86.89  ICD-9-CM: 577.8  12/18/2020 - Present        Epidermal inclusion cyst ICD-10-CM: L72.0  ICD-9-CM: 706.2  12/18/2020 - Present    Overview Signed 12/18/2020  9:46 AM by Farideh Ocasio MD     Right posterior neck.              CKD (chronic kidney disease) stage 4, GFR 15-29 ml/min ([facility]) ICD-10-CM: N18.4  ICD-9-CM: 585.4  11/20/2020 - Present        Severe obesity ([address] ICD-10-CM: E66.01  ICD-9-CM: 278.01  10/3/2018 - Present        Type 2 diabetes with nephropathy ([facility] ICD-10-CM: E11.21  ICD-9-CM: 250.40, 583.81  8/8/2018 - Present        Tobacco abuse ICD-10-CM: Z72.0  ICD-9-CM: 305.1  5/24/2017 - Present        Closed fracture of right talus ICD-10-CM: S92.101A  ICD-9-CM: 825.21  1/16/2017 - Present        Synovial cyst of right knee ICD-10-CM: M71.21  ICD-9-CM: 727.51  1/16/2017 - Present        Mild intermittent asthma without complication WRS-47-NQ: J45.20  ICD-9-CM: 493.90  7/6/2016 - Present        Diabetes ([facility] ICD-10-CM: E11.9  ICD-9-CM: 250.00  Unknown - Present        GERD (gastroesophageal reflux disease) ICD-10-CM: K21.9  ICD-9-CM: 530.81  Unknown - Present        Essential hypertension ICD-10-CM: I10  ICD-9-CM: 401.9  2/8/2016 - Present        Diabetic neuropathy ([facility] ICD-10-CM: E11.40  ICD-9-CM: 250.60, 357.2  2/8/2016 - Present        Vitamin D deficiency ICD-10-CM: E55.9  ICD-9-CM: 268.9  2/8/2016 - Present        RESOLVED: Vaginal itching ICD-10-CM: N89.8  ICD-9-CM: 698.1  6/21/2017 - 12/18/2020        RESOLVED: Tingling ICD-10-CM: R20.2  ICD-9-CM: 782.0  5/24/2017 - 12/18/2020        RESOLVED: Screening for breast cancer ICD-10-CM: Z12.39  ICD-9-CM: V76.10  5/24/2017 - 9/25/2019        RESOLVED: Screening for colon cancer ICD-10-CM: Z12.11  ICD-9-CM: V76.51  5/24/2017 - 9/25/2019        RESOLVED: Need for hepatitis C screening test ICD-10-CM: Z11.59  ICD-9-CM: V73.89  5/24/2017 - 12/18/2020        RESOLVED: Screening for thyroid disorder ICD-10-CM: Z13.29  ICD-9-CM: V77.0  5/24/2017 - 9/25/2019        RESOLVED: Screening for cholesterol level ICD-10-CM: Z13.220  ICD-9-CM: V77.91  5/24/2017 - 9/25/2019        RESOLVED: Hospital discharge follow-up ICD-10-CM: Z09  ICD-9-CM: V67.59  1/19/2017 - 12/18/2020        RESOLVED: Dehydration ICD-10-CM: E86.0  ICD-9-CM: 276.51  1/15/2017 - 12/18/2020        RESOLVED: Antibiotic-induced yeast infection ICD-10-CM: B37.9, T36.95XA  ICD-9-CM: 112.9, E930.9  7/15/2016 - 1/16/2017        RESOLVED: UTI (urinary tract infection) ICD-10-CM: N39.0  ICD-9-CM: 599.0  7/14/2016 - 12/18/2020            Diet:  Regular diet.     Activity:  As tolerated     Disposition: Home or Self Care    Discharge instructions to patient/family  Please seek medical attention for any new or worsening symptoms particularly fever, chest pain, shortness of breath, abdominal pain, nausea, vomiting    Follow up plans/appointments  Follow-up Information       Follow up With Specialties Details Why Contact Info    Argentina Enrique MD Family Medicine Follow up on 3/28/2023 Follow up visit at 10am with Dr. Selam Veloz [address]      Maciej Vaz MD Family Medicine, Pediatric Medicine   [address] [facility] [address] [facility]               Kelsey Somers MD  Family Medicine Resident, PGY1       For Billing    Chief Complaint   Patient presents with    [address] Problems  Date Reviewed: 1/29/2023            Codes Class Noted POA    * (Principal) Hypoglycemia ICD-10-CM: E16.2  ICD-9-CM: 251.2  3/21/2023 Unknown        Right hip pain ICD-10-CM: M25.551  ICD-9-CM: 719.45  3/22/2023 Unknown        Hyperkalemia ICD-10-CM: E87.5  ICD-9-CM: 276.7  3/22/2023 Unknown        Type 2 diabetes with nephropathy ([facility] ICD-10-CM: E11.21  ICD-9-CM: 250.40, 583.81  8/8/2018 Yes        Mild intermittent asthma without complication TTO-06-JA: Q37.39  ICD-9-CM: 493.90  7/6/2016 Yes        GERD (gastroesophageal reflux disease) ICD-10-CM: K21.9  ICD-9-CM: 530.81  Unknown Yes        Essential hypertension ICD-10-CM: I10  ICD-9-CM: 401.9  2/8/2016 Yes        Vitamin D deficiency ICD-10-CM: E55.9  ICD-9-CM: 268.9  2/8/2016 Yes

## 2023-03-28 ENCOUNTER — TELEPHONE (OUTPATIENT)
Dept: FAMILY MEDICINE CLINIC | Age: 63
End: 2023-03-28

## 2023-03-28 ENCOUNTER — LAB ONLY (OUTPATIENT)
Dept: FAMILY MEDICINE CLINIC | Age: 63
End: 2023-03-28

## 2023-03-28 ENCOUNTER — OFFICE VISIT (OUTPATIENT)
Dept: FAMILY MEDICINE CLINIC | Age: 63
End: 2023-03-28

## 2023-03-28 VITALS
SYSTOLIC BLOOD PRESSURE: 160 MMHG | OXYGEN SATURATION: 99 % | RESPIRATION RATE: 18 BRPM | WEIGHT: 183.2 LBS | HEART RATE: 73 BPM | DIASTOLIC BLOOD PRESSURE: 80 MMHG | TEMPERATURE: 97.6 F | BODY MASS INDEX: 33.71 KG/M2 | HEIGHT: 62 IN

## 2023-03-28 DIAGNOSIS — I10 PRIMARY HYPERTENSION: ICD-10-CM

## 2023-03-28 DIAGNOSIS — Z79.4 TYPE 2 DIABETES MELLITUS WITHOUT COMPLICATION, WITH LONG-TERM CURRENT USE OF INSULIN (HCC): ICD-10-CM

## 2023-03-28 DIAGNOSIS — E55.9 VITAMIN D DEFICIENCY: ICD-10-CM

## 2023-03-28 DIAGNOSIS — Z09 HOSPITAL DISCHARGE FOLLOW-UP: ICD-10-CM

## 2023-03-28 DIAGNOSIS — E11.9 TYPE 2 DIABETES MELLITUS WITHOUT COMPLICATION, WITH LONG-TERM CURRENT USE OF INSULIN (HCC): ICD-10-CM

## 2023-03-28 DIAGNOSIS — K86.89 PANCREATIC INSUFFICIENCY: ICD-10-CM

## 2023-03-28 DIAGNOSIS — Z79.4 CONTROLLED TYPE 2 DIABETES MELLITUS WITHOUT COMPLICATION, WITH LONG-TERM CURRENT USE OF INSULIN (HCC): Primary | ICD-10-CM

## 2023-03-28 DIAGNOSIS — E16.2 HYPOGLYCEMIA: ICD-10-CM

## 2023-03-28 DIAGNOSIS — Z79.4 CONTROLLED TYPE 2 DIABETES MELLITUS WITHOUT COMPLICATION, WITH LONG-TERM CURRENT USE OF INSULIN (HCC): ICD-10-CM

## 2023-03-28 DIAGNOSIS — E11.9 CONTROLLED TYPE 2 DIABETES MELLITUS WITHOUT COMPLICATION, WITH LONG-TERM CURRENT USE OF INSULIN (HCC): Primary | ICD-10-CM

## 2023-03-28 DIAGNOSIS — E11.9 CONTROLLED TYPE 2 DIABETES MELLITUS WITHOUT COMPLICATION, WITH LONG-TERM CURRENT USE OF INSULIN (HCC): ICD-10-CM

## 2023-03-28 RX ORDER — METFORMIN HYDROCHLORIDE 1000 MG/1
1000 TABLET ORAL 2 TIMES DAILY
COMMUNITY
Start: 2023-02-03 | End: 2023-03-28

## 2023-03-28 RX ORDER — AMLODIPINE BESYLATE 5 MG/1
5 TABLET ORAL DAILY
Qty: 120 TABLET | Refills: 0 | Status: SHIPPED | OUTPATIENT
Start: 2023-03-28

## 2023-03-28 RX ORDER — FLASH GLUCOSE SENSOR
1 KIT MISCELLANEOUS DAILY
Qty: 2 KIT | Refills: 5 | Status: SHIPPED | OUTPATIENT
Start: 2023-03-28

## 2023-03-28 RX ORDER — INSULIN GLARGINE 100 [IU]/ML
INJECTION, SOLUTION SUBCUTANEOUS
Qty: 12 ML | Refills: 3 | OUTPATIENT
Start: 2023-03-28

## 2023-03-28 RX ORDER — FLASH GLUCOSE SCANNING READER
1 EACH MISCELLANEOUS DAILY
Qty: 2 EACH | Refills: 5 | Status: SHIPPED | OUTPATIENT
Start: 2023-03-28

## 2023-03-28 NOTE — PROGRESS NOTES
Identified pt with two pt identifiers(name and ). Chief Complaint   Patient presents with    Hospital Follow Up     Patient is here for hospital follow up from having  seizure and falling and her potassium being high and sugar was low, patient states she is feeling okay         Health Maintenance Due   Topic    Eye Exam Retinal or Dilated        Wt Readings from Last 3 Encounters:   23 179 lb (81.2 kg)   23 190 lb 3.2 oz (86.3 kg)   22 178 lb (80.7 kg)     Temp Readings from Last 3 Encounters:   23 98.7 °F (37.1 °C)   23 98 °F (36.7 °C)   23 97.5 °F (36.4 °C) (Temporal)     BP Readings from Last 3 Encounters:   23 (!) 144/58   23 (!) 156/59   23 138/84     Pulse Readings from Last 3 Encounters:   23 70   23 82   23 92         Learning Assessment:  :     Learning Assessment 2016   PRIMARY LEARNER Patient   HIGHEST LEVEL OF EDUCATION - PRIMARY LEARNER  GRADUATED HIGH SCHOOL OR GED   BARRIERS PRIMARY LEARNER NONE   CO-LEARNER CAREGIVER No   PRIMARY LANGUAGE ENGLISH   LEARNER PREFERENCE PRIMARY OTHER (COMMENT)   ANSWERED BY self   RELATIONSHIP SELF       Depression Screening:  :     3 most recent PHQ Screens 2023   Little interest or pleasure in doing things Not at all   Feeling down, depressed, irritable, or hopeless Not at all   Total Score PHQ 2 0       Fall Risk Assessment:  :     Fall Risk Assessment, last 12 mths 2021   Able to walk? Yes   Fall in past 12 months? 0   Do you feel unsteady? 0   Are you worried about falling 0       Abuse Screening:  :     Abuse Screening Questionnaire 2023 10/18/2022 2021 10/5/2020 2019 2018 2016   Do you ever feel afraid of your partner? N N N N N N N   Are you in a relationship with someone who physically or mentally threatens you? N N N N N N N   Is it safe for you to go home?  Y Y Y Y Y Y Y       Coordination of Care Questionnaire:  :     1) Have you been to an emergency room, urgent care clinic since your last visit? yes   Hospitalized since your last visit? yes             2) Have you seen or consulted any other health care providers outside of [address] [place] [address] since your last visit? no  (Include any pap smears or colon screenings in this section.)    3) Do you have an Advance Directive on file? no  Are you interested in receiving information about Advance Directives? no    Patient is accompanied by self I have received verbal consent from Charmayne Lame to discuss any/all medical information while they are present in the room. 4.  For patients aged 39-70: Has the patient had a colonoscopy / FIT/ Cologuard? Yes - no Care Gap present      If the patient is female:    5. For patients aged 41-77: Has the patient had a mammogram within the past 2 years? Yes - no Care Gap present      6. For patients aged 21-65: Has the patient had a pap smear?  Yes - no Care Gap present

## 2023-03-28 NOTE — PROGRESS NOTES
ER VISIT    Patient: Joana Nichols  : 1960  PCP: Eboni Geronimo MD    Date of office visit: 3/28/2023   Date of admission: 3/21/23  Date of discharge: 3/22/23  Hospital: [facility]    Call initiated w/i 2 business dates of discharge: Yes   Date of the most recent call to the patient: 3/23/2023 11:35 AM      Assessment/Plan:   Diagnoses and all orders for this visit:    1. Controlled type 2 diabetes mellitus without complication, with long-term current use of insulin (HCC)  -     CBC WITH AUTOMATED DIFF; Future  -     NE DISCHRG MEDS RECONCILED W/CURRENT MED LIST  -     FULL CODE    2. Primary hypertension  -     CBC WITH AUTOMATED DIFF; Future  -     METABOLIC PANEL, COMPREHENSIVE; Future  -     CK; Future  -     NE DISCHRG MEDS RECONCILED W/CURRENT MED LIST    3. Hypoglycemia  -     NE DISCHRG MEDS RECONCILED W/CURRENT MED LIST  -     HYDROcodone-acetaminophen (Norco) 5-325 mg per tablet; Take 1 Tablet by mouth every six (6) hours as needed for Pain for up to 3 days. Max Daily Amount: 4 Tablets. 4. Pancreatic insufficiency  -     NE DISCHRG MEDS RECONCILED W/CURRENT MED LIST  -     HYDROcodone-acetaminophen (Norco) 5-325 mg per tablet; Take 1 Tablet by mouth every six (6) hours as needed for Pain for up to 3 days. Max Daily Amount: 4 Tablets. 5. Type 2 diabetes mellitus without complication, with long-term current use of insulin (HCC)  -     insulin glargine (Lantus Solostar U-100 Insulin) 100 unit/mL (3 mL) inpn; INJECT 15 UNITS BY SUBCUTANEOUS ROUTE NIGHTLY  Indications: type 2 diabetes mellitus    6. Hospital discharge follow-up  -     NE [address] MEDS RECONCILED W/CURRENT MED LIST  -     FULL CODE    7. Vitamin D deficiency  -     ergocalciferol (ERGOCALCIFEROL) 1,250 mcg (50,000 unit) capsule; Patient should use it once a week    Other orders  -     flash glucose sensor (FreeStyle Bettina 14 Day Sensor) kit; 1 Each by Does Not Apply route daily.  Indications: hypoglycemia, pancreatic insufficiency  -     flash glucose scanning reader (FreeStyle Bettina 14 Day [place]) misc; 1 Each by Does Not Apply route daily. Indications: hypoglycemia, pancreatic insufficiency  -     amLODIPine (NORVASC) 5 mg tablet; Take 1 Tablet by mouth daily. Subjective:   Nasima Cervantes is a 58 y.o. female presenting today for follow-up after hospital discharge. This encounter and supporting documentation was reviewed if available. Medication reconciliation was performed today. The main problem requiring admission was hypoglycemia. Complications during admission: hyperkalemia      Interval history/Current status: none, patient complains of leg swelling, along with back pain correlating to the area where she fell. Admitting symptoms have: improved      Medications marked \"taking\" at this time:  Prior to Admission medications    Medication Sig Start Date End Date Taking? Authorizing Provider   flash glucose sensor (FreeStyle Bettina 14 Day Sensor) kit 1 Each by Does Not Apply route daily. Indications: hypoglycemia, pancreatic insufficiency 3/28/23  Yes Kathe Rodriguez MD   flash glucose scanning reader (FreeStyle Bettina 14 Day [place]) misc 1 Each by Does Not Apply route daily. Indications: hypoglycemia, pancreatic insufficiency 3/28/23  Yes Kathe Rodriguez MD   insulin glargine (Lantus Solostar U-100 Insulin) 100 unit/mL (3 mL) inpn INJECT 15 UNITS BY SUBCUTANEOUS ROUTE NIGHTLY  Indications: type 2 diabetes mellitus 3/28/23  Yes Kathe Rodriguez MD   furosemide (LASIX) 20 mg tablet TAKE 1 TABLET BY MOUTH DAILY AS NEEDED (ONLY FOR LEG SWELLING).  3/24/23  Yes Kathe Rodriguez MD   ergocalciferol (ERGOCALCIFEROL) 1,250 mcg (50,000 unit) capsule TAKE 1 CAPSULE BY MOUTH ONE TIME PER WEEK 2/3/23  Yes Anand Paulson MD   cetirizine (ZYRTEC) 10 mg tablet TAKE 1 TABLET BY MOUTH EVERY DAY AT NIGHT 2/1/23  Yes Anand Paulson MD   albuterol (PROVENTIL HFA, VENTOLIN HFA, PROAIR HFA) 90 mcg/actuation inhaler INHALE 1 PUFF BY MOUTH EVERY 4 HOURS AS NEEDED FOR WHEEZING OR COUGH 1/26/23  Yes Anastacio Ruiz MD   lipase-protease-amylase (Creon) 36,000-114,000- 180,000 unit cpDR capsule TAKE 4 CAPSULES BY MOUTH THREE TIMES A DAY WITH EACH MEAL AND TAKE 2 CAPS WITH SNACKS 1/26/23  Yes Anastacio Ruiz MD   lidocaine (LIDODERM) 5 % 1 PATCH BY TRANSDERMAL ROUTE DAILY. APPLY FOR 12 HOURS A DAY AND REMOVE FOR 12 HOURS A DAY. 12/16/22  Yes Anastacio Ruiz MD   gabapentin (NEURONTIN) 300 mg capsule TAKE 1 CAPSULE BY MOUTH THREE (3) TIMES DAILY. INDICATIONS: NEUROPATHIC PAIN 11/28/22  Yes Anastacio Ruiz MD   SUMAtriptan (IMITREX) 100 mg tablet Take 100 mg by mouth. Yes Provider, Historical   OneTouch Ultra Test strip TEST BLOOD GLUCOSE THREE TIMES DAILY 9/23/22  Yes Anastacio Ruiz MD   BD Rosalba 2nd Gen Pen Needle 32 gauge x 5/32\" ndle FOR INJECTING INSULIN 4 TIMES A DAY 6/15/22  Yes Anastacio Ruiz MD   mometasone (ELOCON) 0.1 % ointment APPLY TOPICALLY TO AFFECTED AREA EVERY DAY 1/24/22  Yes Anastacio Ruiz MD   cyclobenzaprine (FLEXERIL) 10 mg tablet TAKE 1 TABLET BY MOUTH THREE TIMES A DAY AS NEEDED FOR MUSCLE SPASM 12/8/21  Yes Anastacio Ruiz MD   lancets (One Touch Delica) 33 gauge misc To check BS 2-3 x per day. E11.9 11/17/21  Yes Anastacio Ruiz MD   ondansetron (ZOFRAN ODT) 8 mg disintegrating tablet Take 1 Tab by mouth every eight (8) hours as needed for Nausea or Vomiting. 4/10/20  Yes Martha Almonte, MEG   metFORMIN (GLUCOPHAGE) 1,000 mg tablet Take 1,000 mg by mouth two (2) times a day. 2/3/23 3/28/23  Provider, Historical   Lantus Solostar U-100 Insulin 100 unit/mL (3 mL) inpn INJECT 30 UNITS BY SUBCUTANEOUS ROUTE NIGHTLY 3/24/23 3/28/23  Dotty Kaminski MD   hydroCHLOROthiazide (HYDRODIURIL) 25 mg tablet Take 1 Tablet by mouth daily. 3/23/23 3/28/23  Monica Brooks MD   fluconazole (DIFLUCAN) 150 mg tablet TAKE 1 TABLET BY MOUTH DAILY FOR 1 DAY.  REPEAT IN 72 HOURS IF NEEDED 1/26/23 3/28/23 Ric Patino MD   omeprazole (PRILOSEC) 20 mg capsule Take 1 Capsule by mouth two (2) times a day for 90 days. 1/26/23 3/28/23  Ric Patino MD        ROS:  {ros master:[id number]}       {Choose one or more SmartLinks; press DELETE if none desired:[id number]}       Objective:   Visit Vitals  BP (!) 160/80 (BP 1 Location: Left arm, BP Patient Position: Sitting, BP Cuff Size: Adult)   Pulse 73   Temp 97.6 °F (36.4 °C) (Temporal)   Resp 18   Ht 5' 2\" (1.575 m)   Wt 183 lb 3.2 oz (83.1 kg)   SpO2 99%   BMI 33.51 kg/m²        {Physical Examination:52145}       We discussed the expected course, resolution and complications of the diagnosis(es) in detail. Medication risks, benefits, costs, interactions, and alternatives were discussed as indicated. I advised her to contact the office if her condition worsens, changes or fails to improve as anticipated. She expressed understanding with the diagnosis(es) and plan.      Estrada Perera MD by Does Not Apply route daily. Indications: hypoglycemia, pancreatic insufficiency 2 Kit 5    flash glucose scanning reader (FreeStyle Bettina 14 Day [place]) misc 1 Each by Does Not Apply route daily. Indications: hypoglycemia, pancreatic insufficiency 2 Each 5    insulin glargine (Lantus Solostar U-100 Insulin) 100 unit/mL (3 mL) inpn INJECT 15 UNITS BY SUBCUTANEOUS ROUTE NIGHTLY  Indications: type 2 diabetes mellitus 12 mL 3    amLODIPine (NORVASC) 5 mg tablet Take 1 Tablet by mouth daily. 120 Tablet 0    furosemide (LASIX) 20 mg tablet TAKE 1 TABLET BY MOUTH DAILY AS NEEDED (ONLY FOR LEG SWELLING). 30 Tablet 0    cetirizine (ZYRTEC) 10 mg tablet TAKE 1 TABLET BY MOUTH EVERY DAY AT NIGHT 90 Tablet 1    albuterol (PROVENTIL HFA, VENTOLIN HFA, PROAIR HFA) 90 mcg/actuation inhaler INHALE 1 PUFF BY MOUTH EVERY 4 HOURS AS NEEDED FOR WHEEZING OR COUGH 3 Each 3    lipase-protease-amylase (Creon) 36,000-114,000- 180,000 unit cpDR capsule TAKE 4 CAPSULES BY MOUTH THREE TIMES A DAY WITH EACH MEAL AND TAKE 2 CAPS WITH SNACKS 480 Capsule 1    lidocaine (LIDODERM) 5 % 1 PATCH BY TRANSDERMAL ROUTE DAILY. APPLY FOR 12 HOURS A DAY AND REMOVE FOR 12 HOURS A DAY. 30 Patch 5    gabapentin (NEURONTIN) 300 mg capsule TAKE 1 CAPSULE BY MOUTH THREE (3) TIMES DAILY. INDICATIONS: NEUROPATHIC PAIN 90 Capsule 0    SUMAtriptan (IMITREX) 100 mg tablet Take 100 mg by mouth. OneTouch Ultra Test strip TEST BLOOD GLUCOSE THREE TIMES DAILY 100 Strip 0    BD Rosalba 2nd Gen Pen Needle 32 gauge x 5/32\" ndle FOR INJECTING INSULIN 4 TIMES A  Pen Needle 5    mometasone (ELOCON) 0.1 % ointment APPLY TOPICALLY TO AFFECTED AREA EVERY DAY 15 g 0    cyclobenzaprine (FLEXERIL) 10 mg tablet TAKE 1 TABLET BY MOUTH THREE TIMES A DAY AS NEEDED FOR MUSCLE SPASM 30 Tablet 5    lancets (One Touch Delica) 33 gauge misc To check BS 2-3 x per day.  E11.9 100 Each 5    ondansetron (ZOFRAN ODT) 8 mg disintegrating tablet Take 1 Tab by mouth every eight (8) hours as needed for Nausea or Vomiting. 12 Tab 0     Allergies   Allergen Reactions    Bactrim [Sulfamethoprim] Nausea Only    Ciprofloxacin Cough    Lisinopril Cough    Sulfamethoxazole-Trimethoprim Nausea and Vomiting     Type: NonCodified; Reaction: nause. Past Medical History:   Diagnosis Date    Arthritis     Asthma     Cataracts, bilateral     Diabetes ([address]     seen by endocrinology at [facility]    GERD (gastroesophageal reflux disease)     Hypertension     Obesity, Class II, BMI 35-39.9     Pancreatic insufficiency      Past Surgical History:   Procedure Laterality Date    CHG ELASTASE PANCREATIC FECAL QUAL/SEMI-QUANTITATIVE      growths in prancreatitis    COLONOSCOPY N/A 2016    COLONOSCOPY,EGD performed by Karen Mancuso MD at [address] CATARACT REMOVAL Left 10/2015    HX CATARACT REMOVAL Right 10/2016    HX CHOLECYSTECTOMY  ? HX COLONOSCOPY      [place] UNLISTED PROCEDURE PANCREAS  ? Distal pancreatectomy.      Family History   Problem Relation Age of Onset    Diabetes Mother     Heart Disease Father     Diabetes Brother     Breast Cancer Sister 64     Social History     Tobacco Use    Smoking status: Former     Packs/day: 0.50     Types: Cigarettes     Start date: 2016     Quit date: 2016     Years since quittin.0    Smokeless tobacco: Never   Substance Use Topics    Alcohol use: No     Alcohol/week: 0.0 standard drinks     Comment: social          Objective:   Visit Vitals  BP (!) 160/80 (BP 1 Location: Left arm, BP Patient Position: Sitting, BP Cuff Size: Adult)   Pulse 73   Temp 97.6 °F (36.4 °C) (Temporal)   Resp 18   Ht 5' 2\" (1.575 m)   Wt 183 lb 3.2 oz (83.1 kg)   SpO2 99%   BMI 33.51 kg/m²        Physical Examination: General appearance - alert, well appearing, and in no distress and oriented to person, place, and time  Mental status - alert, oriented to person, place, and time  Lymphatics - no palpable lymphadenopathy, no hepatosplenomegaly  Chest - clear to auscultation, no wheezes, rales or rhonchi, symmetric air entry  Heart - normal rate, regular rhythm, normal S1, S2, no murmurs, rubs, clicks or gallops  Musculoskeletal - no joint tenderness, deformity or swelling  Extremities - pedal edema 1 +       We discussed the expected course, resolution and complications of the diagnosis(es) in detail. Medication risks, benefits, costs, interactions, and alternatives were discussed as indicated. I advised her to contact the office if her condition worsens, changes or fails to improve as anticipated. She expressed understanding with the diagnosis(es) and plan.      Ryan Meza MD

## 2023-03-29 LAB
ALBUMIN SERPL-MCNC: 2.9 G/DL (ref 3.5–5)
ALBUMIN/GLOB SERPL: 1 (ref 1.1–2.2)
ALP SERPL-CCNC: 110 U/L (ref 45–117)
ALT SERPL-CCNC: 19 U/L (ref 12–78)
ANION GAP SERPL CALC-SCNC: 0 MMOL/L (ref 5–15)
AST SERPL-CCNC: 14 U/L (ref 15–37)
BASOPHILS # BLD: 0.1 K/UL (ref 0–0.1)
BASOPHILS NFR BLD: 1 % (ref 0–1)
BILIRUB SERPL-MCNC: 0.2 MG/DL (ref 0.2–1)
BUN SERPL-MCNC: 33 MG/DL (ref 6–20)
BUN/CREAT SERPL: 22 (ref 12–20)
CALCIUM SERPL-MCNC: 9 MG/DL (ref 8.5–10.1)
CHLORIDE SERPL-SCNC: 115 MMOL/L (ref 97–108)
CK SERPL-CCNC: 231 U/L (ref 26–192)
CO2 SERPL-SCNC: 26 MMOL/L (ref 21–32)
CREAT SERPL-MCNC: 1.53 MG/DL (ref 0.55–1.02)
DIFFERENTIAL METHOD BLD: ABNORMAL
EOSINOPHIL # BLD: 0 K/UL (ref 0–0.4)
EOSINOPHIL NFR BLD: 0 % (ref 0–7)
ERYTHROCYTE [DISTWIDTH] IN BLOOD BY AUTOMATED COUNT: 13.4 % (ref 11.5–14.5)
GLOBULIN SER CALC-MCNC: 3 G/DL (ref 2–4)
GLUCOSE SERPL-MCNC: 93 MG/DL (ref 65–100)
HCT VFR BLD AUTO: 34 % (ref 35–47)
HGB BLD-MCNC: 10.3 G/DL (ref 11.5–16)
IMM GRANULOCYTES # BLD AUTO: 0 K/UL (ref 0–0.04)
IMM GRANULOCYTES NFR BLD AUTO: 0 % (ref 0–0.5)
LYMPHOCYTES # BLD: 2.9 K/UL (ref 0.8–3.5)
LYMPHOCYTES NFR BLD: 30 % (ref 12–49)
MCH RBC QN AUTO: 30 PG (ref 26–34)
MCHC RBC AUTO-ENTMCNC: 30.3 G/DL (ref 30–36.5)
MCV RBC AUTO: 99.1 FL (ref 80–99)
MONOCYTES # BLD: 0.5 K/UL (ref 0–1)
MONOCYTES NFR BLD: 5 % (ref 5–13)
NEUTS SEG # BLD: 6.3 K/UL (ref 1.8–8)
NEUTS SEG NFR BLD: 64 % (ref 32–75)
NRBC # BLD: 0 K/UL (ref 0–0.01)
NRBC BLD-RTO: 0 PER 100 WBC
PLATELET # BLD AUTO: 261 K/UL (ref 150–400)
PMV BLD AUTO: 11.8 FL (ref 8.9–12.9)
POTASSIUM SERPL-SCNC: 5 MMOL/L (ref 3.5–5.1)
PROT SERPL-MCNC: 5.9 G/DL (ref 6.4–8.2)
RBC # BLD AUTO: 3.43 M/UL (ref 3.8–5.2)
SODIUM SERPL-SCNC: 141 MMOL/L (ref 136–145)
WBC # BLD AUTO: 9.8 K/UL (ref 3.6–11)

## 2023-03-29 RX ORDER — HYDROCODONE BITARTRATE AND ACETAMINOPHEN 5; 325 MG/1; MG/1
1 TABLET ORAL
Qty: 10 TABLET | Refills: 0 | Status: SHIPPED | OUTPATIENT
Start: 2023-03-29 | End: 2023-04-01

## 2023-03-29 RX ORDER — ALLOPURINOL 100 MG/1
100 TABLET ORAL DAILY
Qty: 120 TABLET | Refills: 0 | Status: CANCELLED | OUTPATIENT
Start: 2023-03-29

## 2023-03-29 RX ORDER — ERGOCALCIFEROL 1.25 MG/1
CAPSULE ORAL
Qty: 4 CAPSULE | Refills: 2 | Status: SHIPPED | OUTPATIENT
Start: 2023-03-29

## 2023-03-31 DIAGNOSIS — N18.30 STAGE 3 CHRONIC KIDNEY DISEASE, UNSPECIFIED WHETHER STAGE 3A OR 3B CKD (HCC): ICD-10-CM

## 2023-03-31 DIAGNOSIS — E11.9 TYPE 2 DIABETES MELLITUS WITHOUT COMPLICATION, WITH LONG-TERM CURRENT USE OF INSULIN (HCC): ICD-10-CM

## 2023-03-31 DIAGNOSIS — Z79.4 TYPE 2 DIABETES MELLITUS WITHOUT COMPLICATION, WITH LONG-TERM CURRENT USE OF INSULIN (HCC): ICD-10-CM

## 2023-03-31 NOTE — PROGRESS NOTES
Patient remains dehydrated, needs to drink more fluids and continue to take her diuretic medications. Ideally should drink gatorade once a day in addition to her water intake. I have spoken to patient about these results already would like to remind her.

## 2023-04-03 RX ORDER — DAPAGLIFLOZIN 10 MG/1
TABLET, FILM COATED ORAL
Qty: 90 TABLET | Refills: 1 | Status: SHIPPED | OUTPATIENT
Start: 2023-04-03

## 2023-04-18 RX ORDER — AMLODIPINE BESYLATE 5 MG/1
TABLET ORAL
Qty: 90 TABLET | Refills: 1 | Status: SHIPPED | OUTPATIENT
Start: 2023-04-18 | End: 2023-04-20

## 2023-04-20 ENCOUNTER — APPOINTMENT (OUTPATIENT)
Dept: FAMILY MEDICINE CLINIC | Age: 63
End: 2023-04-20

## 2023-04-20 ENCOUNTER — OFFICE VISIT (OUTPATIENT)
Dept: FAMILY MEDICINE CLINIC | Age: 63
End: 2023-04-20
Payer: MEDICAID

## 2023-04-20 ENCOUNTER — TELEPHONE (OUTPATIENT)
Dept: FAMILY MEDICINE CLINIC | Age: 63
End: 2023-04-20

## 2023-04-20 VITALS
BODY MASS INDEX: 32.17 KG/M2 | SYSTOLIC BLOOD PRESSURE: 190 MMHG | TEMPERATURE: 97.4 F | DIASTOLIC BLOOD PRESSURE: 76 MMHG | RESPIRATION RATE: 18 BRPM | HEIGHT: 62 IN | OXYGEN SATURATION: 99 % | HEART RATE: 66 BPM | WEIGHT: 174.8 LBS

## 2023-04-20 DIAGNOSIS — E16.2 HYPOGLYCEMIA: ICD-10-CM

## 2023-04-20 DIAGNOSIS — R60.0 LOCALIZED EDEMA: ICD-10-CM

## 2023-04-20 DIAGNOSIS — I10 PRIMARY HYPERTENSION: ICD-10-CM

## 2023-04-20 DIAGNOSIS — E16.2 HYPOGLYCEMIA: Primary | ICD-10-CM

## 2023-04-20 DIAGNOSIS — K86.89 PANCREATIC INSUFFICIENCY: ICD-10-CM

## 2023-04-20 PROCEDURE — 3074F SYST BP LT 130 MM HG: CPT | Performed by: FAMILY MEDICINE

## 2023-04-20 PROCEDURE — 99213 OFFICE O/P EST LOW 20 MIN: CPT | Performed by: FAMILY MEDICINE

## 2023-04-20 PROCEDURE — 3078F DIAST BP <80 MM HG: CPT | Performed by: FAMILY MEDICINE

## 2023-04-20 RX ORDER — TRIAMCINOLONE ACETONIDE 5 MG/G
OINTMENT TOPICAL 2 TIMES DAILY
Qty: 30 G | Refills: 0 | Status: SHIPPED | OUTPATIENT
Start: 2023-04-20

## 2023-04-20 RX ORDER — FLASH GLUCOSE SCANNING READER
1 EACH MISCELLANEOUS
Qty: 2 EACH | Refills: 11 | Status: SHIPPED | OUTPATIENT
Start: 2023-04-20 | End: 2023-04-20 | Stop reason: SDUPTHER

## 2023-04-20 RX ORDER — INSULIN PUMP SYRINGE, 3 ML
EACH MISCELLANEOUS
Qty: 1 KIT | Refills: 0 | Status: SHIPPED | OUTPATIENT
Start: 2023-04-20

## 2023-04-20 RX ORDER — AMLODIPINE BESYLATE 10 MG/1
10 TABLET ORAL DAILY
Qty: 60 TABLET | Refills: 3 | Status: SHIPPED | OUTPATIENT
Start: 2023-04-20 | End: 2023-04-20

## 2023-04-20 RX ORDER — OMEPRAZOLE 20 MG/1
20 CAPSULE, DELAYED RELEASE ORAL 2 TIMES DAILY
Qty: 60 CAPSULE | Refills: 2 | COMMUNITY
Start: 2023-01-26 | End: 2023-04-26

## 2023-04-20 RX ORDER — FLUCONAZOLE 150 MG/1
TABLET ORAL
COMMUNITY
Start: 2023-01-26

## 2023-04-20 RX ORDER — METFORMIN HYDROCHLORIDE 1000 MG/1
1000 TABLET ORAL 2 TIMES DAILY
COMMUNITY
Start: 2023-03-28 | End: 2023-04-20

## 2023-04-20 RX ORDER — FUROSEMIDE 20 MG/1
20 TABLET ORAL
Qty: 90 TABLET | Refills: 3 | Status: SHIPPED | OUTPATIENT
Start: 2023-04-20

## 2023-04-20 RX ORDER — AMLODIPINE BESYLATE 10 MG/1
10 TABLET ORAL DAILY
Qty: 90 TABLET | Refills: 3 | Status: SHIPPED | OUTPATIENT
Start: 2023-04-20

## 2023-04-20 RX ORDER — FLASH GLUCOSE SENSOR
1 KIT MISCELLANEOUS
Qty: 1 KIT | Refills: 0 | Status: SHIPPED | OUTPATIENT
Start: 2023-04-20 | End: 2023-04-20 | Stop reason: SDUPTHER

## 2023-04-20 RX ORDER — FLASH GLUCOSE SENSOR
1 KIT MISCELLANEOUS
Qty: 1 KIT | Refills: 12 | Status: SHIPPED | OUTPATIENT
Start: 2023-04-20

## 2023-04-20 RX ORDER — LANCETS 33 GAUGE
EACH MISCELLANEOUS
Qty: 100 EACH | Refills: 5 | Status: SHIPPED | OUTPATIENT
Start: 2023-04-20

## 2023-04-20 RX ORDER — FUROSEMIDE 20 MG/1
20 TABLET ORAL
Qty: 60 TABLET | Refills: 3 | Status: SHIPPED | OUTPATIENT
Start: 2023-04-20 | End: 2023-04-20

## 2023-04-20 RX ORDER — FLASH GLUCOSE SCANNING READER
1 EACH MISCELLANEOUS
Qty: 1 EACH | Refills: 0 | Status: SHIPPED | OUTPATIENT
Start: 2023-04-20

## 2023-04-20 RX ORDER — METFORMIN HYDROCHLORIDE 1000 MG/1
TABLET ORAL
Qty: 60 TABLET | Refills: 5 | Status: SHIPPED | OUTPATIENT
Start: 2023-04-20

## 2023-04-20 NOTE — TELEPHONE ENCOUNTER
Missing illegible information on Freestyle Bettina 2 reader Rx    Comments:     Missing/Illegible information on Rx - further clarification: RX had multiples and refills on reader rather than sensors. Switched? Please resend or confirm correct.

## 2023-04-21 LAB
ALBUMIN SERPL-MCNC: 3.1 G/DL (ref 3.5–5)
ALBUMIN/GLOB SERPL: 1.1 (ref 1.1–2.2)
ALP SERPL-CCNC: 156 U/L (ref 45–117)
ALT SERPL-CCNC: 15 U/L (ref 12–78)
ANION GAP SERPL CALC-SCNC: 3 MMOL/L (ref 5–15)
AST SERPL-CCNC: 11 U/L (ref 15–37)
BILIRUB SERPL-MCNC: 0.2 MG/DL (ref 0.2–1)
BUN SERPL-MCNC: 28 MG/DL (ref 6–20)
BUN/CREAT SERPL: 18 (ref 12–20)
CALCIUM SERPL-MCNC: 9.1 MG/DL (ref 8.5–10.1)
CHLORIDE SERPL-SCNC: 108 MMOL/L (ref 97–108)
CO2 SERPL-SCNC: 25 MMOL/L (ref 21–32)
CREAT SERPL-MCNC: 1.52 MG/DL (ref 0.55–1.02)
GLOBULIN SER CALC-MCNC: 2.9 G/DL (ref 2–4)
GLUCOSE SERPL-MCNC: 263 MG/DL (ref 65–100)
POTASSIUM SERPL-SCNC: 5.2 MMOL/L (ref 3.5–5.1)
PROT SERPL-MCNC: 6 G/DL (ref 6.4–8.2)
SODIUM SERPL-SCNC: 136 MMOL/L (ref 136–145)

## 2023-04-25 NOTE — PROGRESS NOTES
Assessment/Plan:   Diagnoses and all orders for this visit:    1. Hypoglycemia  -     METABOLIC PANEL, COMPREHENSIVE; Future    2. Primary hypertension  -     METABOLIC PANEL, COMPREHENSIVE; Future    3. Localized edema  -     furosemide (LASIX) 20 mg tablet; Take 1 Tablet by mouth daily as needed (ONLY FOR LEG SWELLING). 4. Pancreatic insufficiency    Other orders  -     Blood-Glucose Meter monitoring kit; 1 kit  -     glucose blood VI test strips strip; Use 2-3 times a day, E 11.9  -     lancets (One Touch [place]ica) 33 gauge misc; To check BS 2-3 x per day. E11.9  -     amLODIPine (NORVASC) 10 mg tablet; Take 1 Tablet by mouth daily. Indications: high blood pressure  -     triamcinolone acetonide (KENALOG) 0.5 % ointment; Apply  to affected area two (2) times a day. use thin layer         Subjective:   Stan Ontiveros is a 61 y.o. female presenting today for follow-up after hospital discharge. This encounter and supporting documentation was reviewed if available. Medication reconciliation was performed today. The main problem requiring admission was hypoglycemia. Complications during admission: hyperkalemia      Interval history/Current status: none, patient complains of leg swelling, along with back pain correlating to the area where she fell. Admitting symptoms have: improved      Medications marked \"taking\" at this time:  Prior to Admission medications    Medication Sig Start Date End Date Taking? Authorizing Provider   fluconazole (DIFLUCAN) 150 mg tablet TAKE 1 TABLET BY MOUTH DAILY FOR 1 DAY. REPEAT IN 72 HOURS IF NEEDED 1/26/23  Yes Provider, Historical   omeprazole (PRILOSEC) 20 mg capsule Take 1 Capsule by mouth two (2) times a day.  1/26/23 4/26/23 Yes Provider, Historical   Blood-Glucose Meter monitoring kit 1 kit 4/20/23  Yes Bill Early MD   glucose blood VI test strips strip Use 2-3 times a day, E 11.9 4/20/23  Yes Bill Early MD   lancets (One Touch [place]) 33 gauge misc To check BS 2-3 x per day. E11.9 4/20/23  Yes Karlee Padilla MD   amLODIPine (NORVASC) 10 mg tablet Take 1 Tablet by mouth daily. Indications: high blood pressure 4/20/23  Yes Karlee Padilla MD   furosemide (LASIX) 20 mg tablet Take 1 Tablet by mouth daily as needed (ONLY FOR LEG SWELLING). 4/20/23  Yes Karlee Padilla MD   triamcinolone acetonide (KENALOG) 0.5 % ointment Apply  to affected area two (2) times a day. use thin layer 4/20/23  Yes Karlee Padilla MD   ergocalciferol (ERGOCALCIFEROL) 1,250 mcg (50,000 unit) capsule Patient should use it once a week 3/29/23  Yes Karlee Padilla MD   insulin glargine (Lantus Solostar U-100 Insulin) 100 unit/mL (3 mL) inpn INJECT 15 UNITS BY SUBCUTANEOUS ROUTE NIGHTLY  Indications: type 2 diabetes mellitus 3/28/23  Yes Karlee Padilla MD   cetirizine (ZYRTEC) 10 mg tablet TAKE 1 TABLET BY MOUTH EVERY DAY AT NIGHT 2/1/23  Yes Atif Sun MD   albuterol (PROVENTIL HFA, VENTOLIN HFA, PROAIR HFA) 90 mcg/actuation inhaler INHALE 1 PUFF BY MOUTH EVERY 4 HOURS AS NEEDED FOR WHEEZING OR COUGH 1/26/23  Yes Atif Sun MD   lipase-protease-amylase (Creon) 36,000-114,000- 180,000 unit cpDR capsule TAKE 4 CAPSULES BY MOUTH THREE TIMES A DAY WITH EACH MEAL AND TAKE 2 CAPS WITH SNACKS 1/26/23  Yes Atif Sun MD   lidocaine (LIDODERM) 5 % 1 PATCH BY TRANSDERMAL ROUTE DAILY. APPLY FOR 12 HOURS A DAY AND REMOVE FOR 12 HOURS A DAY. 12/16/22  Yes Atif Sun MD   gabapentin (NEURONTIN) 300 mg capsule TAKE 1 CAPSULE BY MOUTH THREE (3) TIMES DAILY.  INDICATIONS: NEUROPATHIC PAIN 11/28/22  Yes Atif Sun MD   OneTouch Ultra Test strip TEST BLOOD GLUCOSE THREE TIMES DAILY 9/23/22  Yes Atif Sun MD   cyclobenzaprine (FLEXERIL) 10 mg tablet TAKE 1 TABLET BY MOUTH THREE TIMES A DAY AS NEEDED FOR MUSCLE SPASM 12/8/21  Yes Atif Sun MD   metFORMIN (GLUCOPHAGE) 1,000 mg tablet TAKE 1 TABLET BY MOUTH TWICE A DAY 4/20/23   Atif Sun MD   flash glucose scanning reader (FreeStyle Bettina 2 [place]) misc 1 Each by Does Not Apply route every fourteen (14) days. Indications: K86.89,E16.2 4/20/23   Nahum Cannon MD   flash glucose sensor (FreeStyle Bettina 2 Sensor) kit 1 Each by Does Not Apply route every fourteen (14) days. Indications: K86.89,E16.2 4/20/23   Nahum Cannon MD   BD Rosalba 2nd Gen Pen Needle 32 gauge x 5/32\" ndle FOR INJECTING INSULIN 4 TIMES A DAY 6/15/22   Fabricio Austin MD   ondansetron [facility] ODT) 8 mg disintegrating tablet Take 1 Tab by mouth every eight (8) hours as needed for Nausea or Vomiting.   Patient not taking: Reported on 4/20/2023 4/10/20   Eusebio Genao NP        ROS:  Negative except above       Patient Active Problem List   Diagnosis Code    Diabetes ([facility] E11.9    GERD (gastroesophageal reflux disease) K21.9    Essential hypertension I10    Diabetic neuropathy ([facility] E11.40    Vitamin D deficiency E55.9    Mild intermittent asthma without complication H88.93    Closed fracture of right talus S92.101A    Synovial cyst of right knee M71.21    Tobacco abuse Z72.0    Type 2 diabetes with nephropathy ([facility]) E11.21    Severe obesity ([facility]) E66.01    CKD (chronic kidney disease) stage 4, GFR 15-29 ml/min ([facility]) N18.4    Pancreatic insufficiency K86.89    Epidermal inclusion cyst L72.0    Proteinuria L88.6    Uncomplicated asthma B77.024    Hypoglycemia E16.2    Right hip pain M25.551    Hyperkalemia E87.5     Patient Active Problem List    Diagnosis Date Noted    Right hip pain 03/22/2023    Hyperkalemia 03/22/2023    Hypoglycemia 03/21/2023    Proteinuria 74/21/1972    Uncomplicated asthma 95/86/9403    Pancreatic insufficiency 12/18/2020    Epidermal inclusion cyst 12/18/2020    CKD (chronic kidney disease) stage 4, GFR 15-29 ml/min ([facility]) 11/20/2020    Severe obesity ([address] 10/03/2018    Type 2 diabetes with nephropathy ([facility] 08/08/2018    Tobacco abuse 05/24/2017    Closed fracture of right talus 01/16/2017    Synovial cyst of right knee 01/16/2017    Mild intermittent asthma without complication 27/48/3176    Essential hypertension 02/08/2016    Diabetic neuropathy ([facility] 02/08/2016    Vitamin D deficiency 02/08/2016    Diabetes (HCC)     GERD (gastroesophageal reflux disease)      Current Outpatient Medications   Medication Sig Dispense Refill    fluconazole (DIFLUCAN) 150 mg tablet TAKE 1 TABLET BY MOUTH DAILY FOR 1 DAY. REPEAT IN 72 HOURS IF NEEDED      omeprazole (PRILOSEC) 20 mg capsule Take 1 Capsule by mouth two (2) times a day. 60 Capsule 2    Blood-Glucose Meter monitoring kit 1 kit 1 Kit 0    glucose blood VI test strips strip Use 2-3 times a day, E 11.9 200 Strip 5    lancets (One Touch [place]) 33 gauge misc To check BS 2-3 x per day. E11.9 100 Each 5    amLODIPine (NORVASC) 10 mg tablet Take 1 Tablet by mouth daily. Indications: high blood pressure 90 Tablet 3    furosemide (LASIX) 20 mg tablet Take 1 Tablet by mouth daily as needed (ONLY FOR LEG SWELLING). 90 Tablet 3    triamcinolone acetonide (KENALOG) 0.5 % ointment Apply  to affected area two (2) times a day. use thin layer 30 g 0    ergocalciferol (ERGOCALCIFEROL) 1,250 mcg (50,000 unit) capsule Patient should use it once a week 4 Capsule 2    insulin glargine (Lantus Solostar U-100 Insulin) 100 unit/mL (3 mL) inpn INJECT 15 UNITS BY SUBCUTANEOUS ROUTE NIGHTLY  Indications: type 2 diabetes mellitus 12 mL 3    cetirizine (ZYRTEC) 10 mg tablet TAKE 1 TABLET BY MOUTH EVERY DAY AT NIGHT 90 Tablet 1    albuterol (PROVENTIL HFA, VENTOLIN HFA, PROAIR HFA) 90 mcg/actuation inhaler INHALE 1 PUFF BY MOUTH EVERY 4 HOURS AS NEEDED FOR WHEEZING OR COUGH 3 Each 3    lipase-protease-amylase (Creon) 36,000-114,000- 180,000 unit cpDR capsule TAKE 4 CAPSULES BY MOUTH THREE TIMES A DAY WITH EACH MEAL AND TAKE 2 CAPS WITH SNACKS 480 Capsule 1    lidocaine (LIDODERM) 5 % 1 PATCH BY TRANSDERMAL ROUTE DAILY. APPLY FOR 12 HOURS A DAY AND REMOVE FOR 12 HOURS A DAY.  30 Patch 5    gabapentin (NEURONTIN) 300 mg capsule TAKE 1 CAPSULE BY MOUTH THREE (3) TIMES DAILY. INDICATIONS: NEUROPATHIC PAIN 90 Capsule 0    OneTouch Ultra Test strip TEST BLOOD GLUCOSE THREE TIMES DAILY 100 Strip 0    cyclobenzaprine (FLEXERIL) 10 mg tablet TAKE 1 TABLET BY MOUTH THREE TIMES A DAY AS NEEDED FOR MUSCLE SPASM 30 Tablet 5    metFORMIN (GLUCOPHAGE) 1,000 mg tablet TAKE 1 TABLET BY MOUTH TWICE A DAY 60 Tablet 5    flash glucose scanning reader (FreeStyle Bettina 2 [place]) misc 1 Each by Does Not Apply route every fourteen (14) days. Indications: K86.89,E16.2 1 Each 0    flash glucose sensor (FreeStyle Bettina 2 Sensor) kit 1 Each by Does Not Apply route every fourteen (14) days. Indications: K86.89,E16.2 1 Kit 12    BD Rosalba 2nd Gen Pen Needle 32 gauge x 5/32\" ndle FOR INJECTING INSULIN 4 TIMES A  Pen Needle 5    ondansetron (ZOFRAN ODT) 8 mg disintegrating tablet Take 1 Tab by mouth every eight (8) hours as needed for Nausea or Vomiting. (Patient not taking: Reported on 4/20/2023) 12 Tab 0     Allergies   Allergen Reactions    Bactrim [Sulfamethoprim] Nausea Only    Ciprofloxacin Cough    Lisinopril Cough    Sulfamethoxazole-Trimethoprim Nausea and Vomiting and Nausea Only     Type: NonCodified; Reaction: nause. Type: NonCodified; Reaction: nause. Past Medical History:   Diagnosis Date    Arthritis     Asthma     Cataracts, bilateral     Diabetes ([address]     seen by [facility]ocrinology at [facility]    GERD (gastroesophageal reflux disease)     Hypertension     Obesity, Class II, BMI 35-39.9     Pancreatic insufficiency      Past Surgical History:   Procedure Laterality Date    CHG ELASTASE PANCREATIC FECAL QUAL/SEMI-QUANTITATIVE  1999    growths in prancreatitis    COLONOSCOPY N/A 11/30/2016    COLONOSCOPY,EGD performed by Jorge Mcdaniel MD at [address] CATARACT REMOVAL Left 10/2015    HX CATARACT REMOVAL Right 10/2016    HX CHOLECYSTECTOMY  2005? HX COLONOSCOPY      [place] UNLISTED PROCEDURE PANCREAS  2001?     Distal pancreatectomy. Family History   Problem Relation Age of Onset    Diabetes Mother     Heart Disease Father     Diabetes Brother     Breast Cancer Sister 64     Social History     Tobacco Use    Smoking status: Former     Packs/day: 0.50     Years: 1.00     Pack years: 0.50     Types: Cigarettes     Start date: 2016     Quit date: 2016     Years since quittin.1     Passive exposure: Past    Smokeless tobacco: Never   Substance Use Topics    Alcohol use: No     Alcohol/week: 0.0 standard drinks     Comment: social          Objective:   Visit Vitals  BP (!) 190/76 (BP 1 Location: Left arm, BP Patient Position: Sitting, BP Cuff Size: Adult)   Pulse 66   Temp 97.4 °F (36.3 °C) (Temporal)   Resp 18   Ht 5' 2\" (1.575 m)   Wt 174 lb 12.8 oz (79.3 kg)   SpO2 99%   BMI 31.97 kg/m²        Physical Examination: General appearance - alert, well appearing, and in no distress and oriented to person, place, and time  Mental status - alert, oriented to person, place, and time  Lymphatics - no palpable lymphadenopathy, no hepatosplenomegaly  Chest - clear to auscultation, no wheezes, rales or rhonchi, symmetric air entry  Heart - normal rate, regular rhythm, normal S1, S2, no murmurs, rubs, clicks or gallops  Musculoskeletal - no joint tenderness, deformity or swelling  Extremities - pedal edema 1 +       We discussed the expected course, resolution and complications of the diagnosis(es) in detail. Medication risks, benefits, costs, interactions, and alternatives were discussed as indicated. I advised her to contact the office if her condition worsens, changes or fails to improve as anticipated. She expressed understanding with the diagnosis(es) and plan.      Ac Tellez MD

## 2023-04-26 ENCOUNTER — TELEPHONE (OUTPATIENT)
Dept: FAMILY MEDICINE CLINIC | Age: 63
End: 2023-04-26

## 2023-04-26 NOTE — PROGRESS NOTES
Patient has elevated potassium , should continue to take her diuretic for blood pressure control. Follow up in one month.

## 2023-05-03 ENCOUNTER — TELEPHONE (OUTPATIENT)
Dept: FAMILY MEDICINE CLINIC | Age: 63
End: 2023-05-03

## 2023-05-03 DIAGNOSIS — E11.42 DIABETIC POLYNEUROPATHY ASSOCIATED WITH TYPE 2 DIABETES MELLITUS (HCC): ICD-10-CM

## 2023-05-03 RX ORDER — GABAPENTIN 300 MG/1
300 CAPSULE ORAL 3 TIMES DAILY
Qty: 90 CAPSULE | Refills: 0 | Status: SHIPPED | OUTPATIENT
Start: 2023-05-03

## 2023-05-08 DIAGNOSIS — I10 ESSENTIAL (PRIMARY) HYPERTENSION: ICD-10-CM

## 2023-05-08 RX ORDER — LOSARTAN POTASSIUM AND HYDROCHLOROTHIAZIDE 25; 100 MG/1; MG/1
1 TABLET ORAL DAILY
COMMUNITY
Start: 2023-04-26

## 2023-05-08 RX ORDER — TRIAMCINOLONE ACETONIDE 5 MG/G
OINTMENT TOPICAL
COMMUNITY
Start: 2023-04-20

## 2023-05-08 RX ORDER — LOSARTAN POTASSIUM AND HYDROCHLOROTHIAZIDE 25; 100 MG/1; MG/1
TABLET ORAL
Qty: 90 TABLET | Refills: 1 | Status: SHIPPED | OUTPATIENT
Start: 2023-05-08

## 2023-05-08 RX ORDER — BLOOD SUGAR DIAGNOSTIC
STRIP MISCELLANEOUS
COMMUNITY
Start: 2023-04-20

## 2023-05-08 RX ORDER — COVID-19 ANTIGEN TEST
KIT MISCELLANEOUS
COMMUNITY
Start: 2023-03-29

## 2023-05-09 RX ORDER — TRIAMCINOLONE ACETONIDE 5 MG/G
OINTMENT TOPICAL
OUTPATIENT
Start: 2023-05-09

## 2023-05-09 RX ORDER — TRIAMCINOLONE ACETONIDE 5 MG/G
OINTMENT TOPICAL
Qty: 30 G | Refills: 5 | Status: SHIPPED | OUTPATIENT
Start: 2023-05-09

## 2023-07-05 DIAGNOSIS — Z91.09 OTHER ALLERGY STATUS, OTHER THAN TO DRUGS AND BIOLOGICAL SUBSTANCES: ICD-10-CM

## 2023-07-05 RX ORDER — CETIRIZINE HYDROCHLORIDE 10 MG/1
TABLET ORAL
Qty: 90 TABLET | Refills: 1 | Status: SHIPPED | OUTPATIENT
Start: 2023-07-05

## 2023-08-12 ENCOUNTER — HOSPITAL ENCOUNTER (EMERGENCY)
Facility: HOSPITAL | Age: 63
Discharge: HOME | End: 2023-08-12
Attending: EMERGENCY MEDICINE
Payer: COMMERCIAL

## 2023-08-12 ENCOUNTER — APPOINTMENT (OUTPATIENT)
Facility: HOSPITAL | Age: 63
End: 2023-08-12
Payer: COMMERCIAL

## 2023-08-12 VITALS
HEART RATE: 78 BPM | HEIGHT: 62 IN | RESPIRATION RATE: 16 BRPM | SYSTOLIC BLOOD PRESSURE: 166 MMHG | TEMPERATURE: 98.4 F | OXYGEN SATURATION: 100 % | BODY MASS INDEX: 33.13 KG/M2 | DIASTOLIC BLOOD PRESSURE: 64 MMHG | WEIGHT: 180 LBS

## 2023-08-12 DIAGNOSIS — M25.551 ACUTE PAIN OF BOTH HIPS: Primary | ICD-10-CM

## 2023-08-12 DIAGNOSIS — M25.552 ACUTE PAIN OF BOTH HIPS: Primary | ICD-10-CM

## 2023-08-12 PROCEDURE — 96372 THER/PROPH/DIAG INJ SC/IM: CPT

## 2023-08-12 PROCEDURE — 6360000002 HC RX W HCPCS: Performed by: EMERGENCY MEDICINE

## 2023-08-12 PROCEDURE — 73521 X-RAY EXAM HIPS BI 2 VIEWS: CPT

## 2023-08-12 PROCEDURE — 99284 EMERGENCY DEPT VISIT MOD MDM: CPT

## 2023-08-12 RX ORDER — KETOROLAC TROMETHAMINE 30 MG/ML
30 INJECTION, SOLUTION INTRAMUSCULAR; INTRAVENOUS ONCE
Status: COMPLETED | OUTPATIENT
Start: 2023-08-12 | End: 2023-08-12

## 2023-08-12 RX ORDER — DICLOFENAC SODIUM 75 MG/1
75 TABLET, DELAYED RELEASE ORAL 2 TIMES DAILY
Qty: 14 TABLET | Refills: 0 | Status: SHIPPED | OUTPATIENT
Start: 2023-08-12 | End: 2023-08-19

## 2023-08-12 RX ADMIN — KETOROLAC TROMETHAMINE 30 MG: 30 INJECTION, SOLUTION INTRAMUSCULAR at 10:24

## 2023-08-12 ASSESSMENT — LIFESTYLE VARIABLES
HOW OFTEN DO YOU HAVE A DRINK CONTAINING ALCOHOL: NEVER
HOW MANY STANDARD DRINKS CONTAINING ALCOHOL DO YOU HAVE ON A TYPICAL DAY: PATIENT DOES NOT DRINK

## 2023-08-12 ASSESSMENT — PAIN DESCRIPTION - DESCRIPTORS
DESCRIPTORS: ACHING;SHARP
DESCRIPTORS: ACHING;SHARP

## 2023-08-12 ASSESSMENT — PAIN SCALES - GENERAL
PAINLEVEL_OUTOF10: 10
PAINLEVEL_OUTOF10: 8
PAINLEVEL_OUTOF10: 10

## 2023-08-12 ASSESSMENT — PAIN DESCRIPTION - LOCATION
LOCATION: BACK;HIP
LOCATION: BACK;HIP
LOCATION: HIP

## 2023-08-12 ASSESSMENT — PAIN - FUNCTIONAL ASSESSMENT: PAIN_FUNCTIONAL_ASSESSMENT: 0-10

## 2023-08-12 ASSESSMENT — PAIN DESCRIPTION - ORIENTATION
ORIENTATION: LEFT
ORIENTATION: LEFT;RIGHT
ORIENTATION: LEFT;RIGHT

## 2023-08-12 ASSESSMENT — PAIN DESCRIPTION - PAIN TYPE: TYPE: ACUTE PAIN;CHRONIC PAIN

## 2023-08-12 NOTE — ED PROVIDER NOTES
[facility] EMERGENCY DEPT  EMERGENCY DEPARTMENT ENCOUNTER      Pt Name: Porfirio Yates  MRN: [id number]  [address] [facility] 1960  Date of evaluation: 8/12/2023  Provider: Ramona Santos MD    CHIEF COMPLAINT       Chief Complaint   Patient presents with    Hip Pain         HISTORY OF PRESENT ILLNESS   (Location/Symptom, Timing/Onset, Context/Setting, Quality, Duration, Modifying Factors, Severity)  Note limiting factors. 59-year-old with a history of hypertension, diabetes, obesity, arthritis, asthma, GERD. She presents with complaints of a 2-day history of bilateral hip pain. She states that the pain began on the right but is now worse on the left. She states that she has a history of low back problems (\"deteriorating discs\"). She has had pain that radiates to her lower extremities in the past.  However, this feels different. She can think of nothing that she has done recently that would have led to this current pain. The pain is moderate and worse with movement. She states that she is having difficulty sitting due to the pain. No numbness, tingling, weakness of her lower extremities. No bowel or bladder incontinence. No fever or other systemic symptoms. Review of External Medical Records:     Nursing Notes were reviewed. REVIEW OF SYSTEMS    (2-9 systems for level 4, 10 or more for level 5)     Review of Systems    Except as noted above the remainder of the review of systems was reviewed and negative. PAST MEDICAL HISTORY     Past Medical History:   Diagnosis Date    Arthritis     Asthma     Cataracts, bilateral     Diabetes ([address])     seen by endocrinology at [facility]    GERD (gastroesophageal reflux disease)     Hypertension     Obesity, Class II, BMI 35-39.9     Pancreatic insufficiency          SURGICAL HISTORY       Past Surgical History:   Procedure Laterality Date    CATARACT REMOVAL Right 10/2016    CATARACT REMOVAL Left 10/2015    CHOLECYSTECTOMY  2005?     COLONOSCOPY N/A 11/30/2016

## 2023-08-12 NOTE — ED TRIAGE NOTES
Pt rpts right hip and left hip pain for the past 2 days. Denies injury. Right hurts worse than left. Denies bowel/bladder symptoms. Ambulates with pain.

## 2023-08-14 DIAGNOSIS — E11.9 TYPE 2 DIABETES MELLITUS WITHOUT COMPLICATIONS (HCC): ICD-10-CM

## 2023-08-14 DIAGNOSIS — E55.9 VITAMIN D DEFICIENCY, UNSPECIFIED: ICD-10-CM

## 2023-08-14 RX ORDER — PEN NEEDLE, DIABETIC 32GX 5/32"
NEEDLE, DISPOSABLE MISCELLANEOUS
Qty: 100 EACH | Refills: 5 | Status: SHIPPED | OUTPATIENT
Start: 2023-08-14

## 2023-08-14 RX ORDER — ERGOCALCIFEROL 1.25 MG/1
CAPSULE ORAL
Qty: 4 CAPSULE | Refills: 2 | Status: SHIPPED | OUTPATIENT
Start: 2023-08-14

## 2023-08-31 RX ORDER — LIDOCAINE 50 MG/G
PATCH TOPICAL
Qty: 30 PATCH | Refills: 2 | Status: SHIPPED | OUTPATIENT
Start: 2023-08-31

## 2023-10-03 ENCOUNTER — OFFICE VISIT (OUTPATIENT)
Age: 63
End: 2023-10-03
Payer: COMMERCIAL

## 2023-10-03 VITALS
HEIGHT: 62 IN | HEART RATE: 68 BPM | OXYGEN SATURATION: 96 % | DIASTOLIC BLOOD PRESSURE: 70 MMHG | SYSTOLIC BLOOD PRESSURE: 165 MMHG | WEIGHT: 168 LBS | RESPIRATION RATE: 16 BRPM | TEMPERATURE: 98.6 F | BODY MASS INDEX: 30.91 KG/M2

## 2023-10-03 DIAGNOSIS — I10 ESSENTIAL HYPERTENSION: Primary | ICD-10-CM

## 2023-10-03 DIAGNOSIS — K86.89 PANCREATIC INSUFFICIENCY: ICD-10-CM

## 2023-10-03 DIAGNOSIS — B37.9 YEAST INFECTION: ICD-10-CM

## 2023-10-03 DIAGNOSIS — J45.20 MILD INTERMITTENT ASTHMA WITHOUT COMPLICATION: ICD-10-CM

## 2023-10-03 DIAGNOSIS — N18.4 CKD (CHRONIC KIDNEY DISEASE) STAGE 4, GFR 15-29 ML/MIN (HCC): ICD-10-CM

## 2023-10-03 DIAGNOSIS — Z91.09 OTHER ALLERGY STATUS, OTHER THAN TO DRUGS AND BIOLOGICAL SUBSTANCES: ICD-10-CM

## 2023-10-03 DIAGNOSIS — E11.9 TYPE 2 DIABETES MELLITUS WITHOUT COMPLICATION, WITHOUT LONG-TERM CURRENT USE OF INSULIN (HCC): ICD-10-CM

## 2023-10-03 DIAGNOSIS — K21.9 GASTROESOPHAGEAL REFLUX DISEASE WITHOUT ESOPHAGITIS: ICD-10-CM

## 2023-10-03 PROCEDURE — 3078F DIAST BP <80 MM HG: CPT | Performed by: FAMILY MEDICINE

## 2023-10-03 PROCEDURE — 99214 OFFICE O/P EST MOD 30 MIN: CPT | Performed by: FAMILY MEDICINE

## 2023-10-03 PROCEDURE — 3074F SYST BP LT 130 MM HG: CPT | Performed by: FAMILY MEDICINE

## 2023-10-03 PROCEDURE — 3051F HG A1C>EQUAL 7.0%<8.0%: CPT | Performed by: FAMILY MEDICINE

## 2023-10-03 RX ORDER — BLOOD SUGAR DIAGNOSTIC
1 STRIP MISCELLANEOUS 4 TIMES DAILY
Qty: 100 EACH | Refills: 0 | Status: SHIPPED | OUTPATIENT
Start: 2023-10-03

## 2023-10-03 RX ORDER — GABAPENTIN 300 MG/1
300 CAPSULE ORAL 3 TIMES DAILY
Qty: 180 CAPSULE | Refills: 0 | Status: SHIPPED | OUTPATIENT
Start: 2023-10-03 | End: 2023-12-02

## 2023-10-03 RX ORDER — FUROSEMIDE 20 MG/1
20 TABLET ORAL 3 TIMES DAILY
Qty: 120 TABLET | Refills: 0 | Status: SHIPPED | OUTPATIENT
Start: 2023-10-03

## 2023-10-03 RX ORDER — FLUCONAZOLE 150 MG/1
150 TABLET ORAL DAILY
Qty: 6 TABLET | Refills: 0 | Status: SHIPPED | OUTPATIENT
Start: 2023-10-03

## 2023-10-03 RX ORDER — ONDANSETRON 8 MG/1
8 TABLET, ORALLY DISINTEGRATING ORAL EVERY 8 HOURS PRN
Qty: 30 TABLET | Refills: 0 | Status: SHIPPED | OUTPATIENT
Start: 2023-10-03

## 2023-10-03 RX ORDER — ARNICA MONTANA, BELLIS PERENNIS, HYPERICUM PERFORATUM, LEDUM PALUSTRE TWIG, AND RUTA GRAVEOLENS FLOWERING TOP 2; 3; 3; 3; 3 [HP_X]/G; [HP_X]/G; [HP_X]/G; [HP_X]/G; [HP_X]/G
1 GEL TOPICAL 3 TIMES DAILY
Qty: 85 G | Refills: 0 | Status: SHIPPED | OUTPATIENT
Start: 2023-10-03

## 2023-10-03 RX ORDER — ALBUTEROL SULFATE 90 UG/1
2 AEROSOL, METERED RESPIRATORY (INHALATION) 4 TIMES DAILY PRN
Qty: 18 G | Refills: 0 | Status: SHIPPED | OUTPATIENT
Start: 2023-10-03

## 2023-10-03 RX ORDER — CYCLOBENZAPRINE HCL 10 MG
10 TABLET ORAL 3 TIMES DAILY PRN
Qty: 90 TABLET | Refills: 0 | Status: SHIPPED | OUTPATIENT
Start: 2023-10-03

## 2023-10-03 RX ORDER — SODIUM BICARBONATE 650 MG/1
650 TABLET ORAL 3 TIMES DAILY
Qty: 90 TABLET | Refills: 11 | Status: SHIPPED | OUTPATIENT
Start: 2023-10-03 | End: 2024-10-02

## 2023-10-03 RX ORDER — PEN NEEDLE, DIABETIC 32GX 5/32"
1 NEEDLE, DISPOSABLE MISCELLANEOUS 4 TIMES DAILY
Qty: 100 EACH | Refills: 5 | Status: SHIPPED | OUTPATIENT
Start: 2023-10-03

## 2023-10-03 RX ORDER — CETIRIZINE HYDROCHLORIDE 10 MG/1
10 TABLET ORAL NIGHTLY
Qty: 90 TABLET | Refills: 1 | Status: SHIPPED | OUTPATIENT
Start: 2023-10-03

## 2023-10-03 SDOH — ECONOMIC STABILITY: INCOME INSECURITY: HOW HARD IS IT FOR YOU TO PAY FOR THE VERY BASICS LIKE FOOD, HOUSING, MEDICAL CARE, AND HEATING?: NOT HARD AT ALL

## 2023-10-03 SDOH — ECONOMIC STABILITY: HOUSING INSECURITY
IN THE LAST 12 MONTHS, WAS THERE A TIME WHEN YOU DID NOT HAVE A STEADY PLACE TO SLEEP OR SLEPT IN A SHELTER (INCLUDING NOW)?: NO

## 2023-10-03 SDOH — ECONOMIC STABILITY: FOOD INSECURITY: WITHIN THE PAST 12 MONTHS, THE FOOD YOU BOUGHT JUST DIDN'T LAST AND YOU DIDN'T HAVE MONEY TO GET MORE.: NEVER TRUE

## 2023-10-03 SDOH — ECONOMIC STABILITY: FOOD INSECURITY: WITHIN THE PAST 12 MONTHS, YOU WORRIED THAT YOUR FOOD WOULD RUN OUT BEFORE YOU GOT MONEY TO BUY MORE.: NEVER TRUE

## 2023-10-05 ENCOUNTER — TELEPHONE (OUTPATIENT)
Age: 63
End: 2023-10-05

## 2023-10-05 NOTE — TELEPHONE ENCOUNTER
[phone]   When patient was here 10/3/2023 Dr James Cook was going to send her something in for congestion.  She did send a lot of other medications so she wants sure if that was just overlooked

## 2023-10-07 PROBLEM — E11.9 TYPE 2 DIABETES MELLITUS WITHOUT COMPLICATION, WITHOUT LONG-TERM CURRENT USE OF INSULIN (HCC): Status: ACTIVE | Noted: 2023-10-07

## 2023-10-07 ASSESSMENT — ENCOUNTER SYMPTOMS
VISUAL CHANGE: 0
BLURRED VISION: 0

## 2023-10-12 ENCOUNTER — TELEMEDICINE (OUTPATIENT)
Age: 63
End: 2023-10-12

## 2023-10-12 DIAGNOSIS — E11.9 TYPE 2 DIABETES MELLITUS WITHOUT COMPLICATION, WITHOUT LONG-TERM CURRENT USE OF INSULIN (HCC): Primary | ICD-10-CM

## 2023-10-12 RX ORDER — AMLODIPINE BESYLATE 10 MG/1
1 TABLET ORAL DAILY
COMMUNITY
Start: 2021-07-11

## 2023-10-13 NOTE — PROGRESS NOTES
Kaylie Wihtley is a 61 y.o. female evaluated via audio-only technology on 10/12/2023 for Discuss Medications (Would like to discuss metformin and losartan )  . Assessment & Plan:   Type 2 diabetes mellitus without complication, without long-term current use of insulin (HCC)    No follow-ups on file. Subjective: Advised that she can continue to take metformin. Prior to Admission medications    Medication Sig Start Date End Date Taking? Authorizing Provider   amLODIPine (NORVASC) 10 MG tablet Take 1 tablet by mouth daily 7/11/21  Yes Provider, MD Zena   ondansetron (ZOFRAN-ODT) 8 MG TBDP disintegrating tablet Take 1 tablet by mouth every 8 hours as needed for Nausea 10/3/23  Yes Julisa Espinosa MD   fluconazole (DIFLUCAN) 150 MG tablet Take 1 tablet by mouth daily 10/3/23  Yes Julisa Espinosa MD   sodium bicarbonate 650 MG tablet Take 1 tablet by mouth 3 times daily 10/3/23 10/2/24 Yes Julisa Espinosa MD   gabapentin (NEURONTIN) 300 MG capsule Take 1 capsule by mouth 3 times daily for 60 days.  Max Daily Amount: 900 mg 10/3/23 12/2/23 Yes Julisa Espinosa MD   albuterol sulfate HFA (VENTOLIN HFA) 108 (90 Base) MCG/ACT inhaler Inhale 2 puffs into the lungs 4 times daily as needed for Wheezing 10/3/23  Yes Julisa Espinosa MD   Homeopathic Products (MUSCLE THERAPY/ARNICA) GEL Apply 1 g topically in the morning, at noon, and at bedtime 10/3/23  Yes Julisa Espinosa MD   Insulin Pen Needle (BD PEN NEEDLE ANTHONY 2ND GEN) 32G X 4 MM MISC Inject 1 each into the skin in the morning, at noon, in the evening, and at bedtime 10/3/23  Yes Julisa Espinosa MD   cetirizine (ZYRTEC) 10 MG tablet Take 1 tablet by mouth nightly 10/3/23  Yes Julisa Espinosa MD   metFORMIN (GLUCOPHAGE) 1000 MG tablet Take 1 tablet by mouth 2 times daily 10/3/23  Yes Julisa Espinosa MD   cyclobenzaprine (FLEXERIL) 10 MG tablet Take 1 tablet by mouth 3 times daily as needed for Muscle spasms 10/3/23  Yes Julisa Espinosa MD   [facility]

## 2023-12-14 ENCOUNTER — TELEMEDICINE (OUTPATIENT)
Age: 63
End: 2023-12-14
Payer: COMMERCIAL

## 2023-12-14 ENCOUNTER — TELEPHONE (OUTPATIENT)
Age: 63
End: 2023-12-14

## 2023-12-14 DIAGNOSIS — E55.9 VITAMIN D DEFICIENCY: ICD-10-CM

## 2023-12-14 DIAGNOSIS — R30.0 DYSURIA: Primary | ICD-10-CM

## 2023-12-14 PROCEDURE — 99442 PR PHYS/QHP TELEPHONE EVALUATION 11-20 MIN: CPT | Performed by: INTERNAL MEDICINE

## 2023-12-14 RX ORDER — CEPHALEXIN 500 MG/1
500 CAPSULE ORAL 2 TIMES DAILY
Qty: 10 CAPSULE | Refills: 0 | Status: SHIPPED | OUTPATIENT
Start: 2023-12-14 | End: 2023-12-19

## 2023-12-14 RX ORDER — ERGOCALCIFEROL 1.25 MG/1
50000 CAPSULE ORAL WEEKLY
Qty: 4 CAPSULE | Refills: 0 | Status: SHIPPED | OUTPATIENT
Start: 2023-12-14

## 2023-12-14 ASSESSMENT — PATIENT HEALTH QUESTIONNAIRE - PHQ9
SUM OF ALL RESPONSES TO PHQ QUESTIONS 1-9: 0
SUM OF ALL RESPONSES TO PHQ QUESTIONS 1-9: 0
SUM OF ALL RESPONSES TO PHQ9 QUESTIONS 1 & 2: 0
1. LITTLE INTEREST OR PLEASURE IN DOING THINGS: 0
SUM OF ALL RESPONSES TO PHQ QUESTIONS 1-9: 0
2. FEELING DOWN, DEPRESSED OR HOPELESS: 0
SUM OF ALL RESPONSES TO PHQ QUESTIONS 1-9: 0

## 2023-12-14 NOTE — TELEPHONE ENCOUNTER
Patient believes she has a UTI. . hurts when she urinates, urinating frequently. Started a couple weeks ago. She tried the azo pills first but they are not helping. She DOES NOT drive so she was hoping to get a antibiotic called in or if she could do a virtual.       She is also a requesting a vitamin D (ERGOCALCIFEROL) 1.25 MG (88924 UT) CAPS capsule refill. . Colleen Figueroa        [phone]

## 2024-01-08 DIAGNOSIS — E55.9 VITAMIN D DEFICIENCY: ICD-10-CM

## 2024-01-08 RX ORDER — ERGOCALCIFEROL 1.25 MG/1
50000 CAPSULE ORAL WEEKLY
Qty: 4 CAPSULE | Refills: 0 | Status: SHIPPED | OUTPATIENT
Start: 2024-01-08

## 2024-01-08 NOTE — TELEPHONE ENCOUNTER
[id number] - [place], [place] - [address] [facility] -  [phone] - F [phone]     vitamin D (ERGOCALCIFEROL) 1.25 MG (22254 UT) CAPS capsule

## 2024-01-27 DIAGNOSIS — K86.89 OTHER SPECIFIED DISEASES OF PANCREAS: ICD-10-CM

## 2024-01-29 RX ORDER — PANCRELIPASE 36000; 180000; 114000 [USP'U]/1; [USP'U]/1; [USP'U]/1
CAPSULE, DELAYED RELEASE PELLETS ORAL
Qty: 480 CAPSULE | Refills: 1 | Status: SHIPPED | OUTPATIENT
Start: 2024-01-29

## 2024-02-15 ENCOUNTER — NURSE ONLY (OUTPATIENT)
Age: 64
End: 2024-02-15
Payer: MEDICAID

## 2024-02-15 ENCOUNTER — OFFICE VISIT (OUTPATIENT)
Age: 64
End: 2024-02-15
Payer: MEDICAID

## 2024-02-15 VITALS
TEMPERATURE: 98.1 F | BODY MASS INDEX: 29.74 KG/M2 | SYSTOLIC BLOOD PRESSURE: 159 MMHG | WEIGHT: 161.6 LBS | DIASTOLIC BLOOD PRESSURE: 60 MMHG | OXYGEN SATURATION: 96 % | HEIGHT: 62 IN | RESPIRATION RATE: 16 BRPM | HEART RATE: 72 BPM

## 2024-02-15 DIAGNOSIS — B37.9 YEAST INFECTION: ICD-10-CM

## 2024-02-15 DIAGNOSIS — I10 ESSENTIAL HYPERTENSION: ICD-10-CM

## 2024-02-15 DIAGNOSIS — E55.9 VITAMIN D DEFICIENCY, UNSPECIFIED: ICD-10-CM

## 2024-02-15 DIAGNOSIS — N18.4 CKD (CHRONIC KIDNEY DISEASE) STAGE 4, GFR 15-29 ML/MIN (HCC): ICD-10-CM

## 2024-02-15 DIAGNOSIS — R30.0 DYSURIA: ICD-10-CM

## 2024-02-15 DIAGNOSIS — N18.4 CKD (CHRONIC KIDNEY DISEASE) STAGE 4, GFR 15-29 ML/MIN (HCC): Primary | ICD-10-CM

## 2024-02-15 DIAGNOSIS — K21.9 GASTROESOPHAGEAL REFLUX DISEASE WITHOUT ESOPHAGITIS: ICD-10-CM

## 2024-02-15 DIAGNOSIS — K86.89 OTHER SPECIFIED DISEASES OF PANCREAS: ICD-10-CM

## 2024-02-15 DIAGNOSIS — E11.9 TYPE 2 DIABETES MELLITUS WITHOUT COMPLICATION, WITHOUT LONG-TERM CURRENT USE OF INSULIN (HCC): ICD-10-CM

## 2024-02-15 LAB
BILIRUBIN, URINE, POC: NEGATIVE
GLUCOSE URINE, POC: NORMAL
KETONES, URINE, POC: NEGATIVE
LEUKOCYTE ESTERASE, URINE, POC: NEGATIVE
NITRITE, URINE, POC: NEGATIVE
PH, URINE, POC: 5 (ref 4.6–8)
PROTEIN,URINE, POC: NORMAL
SPECIFIC GRAVITY, URINE, POC: 1.02 (ref 1–1.03)
URINALYSIS CLARITY, POC: NORMAL
URINALYSIS COLOR, POC: YELLOW
UROBILINOGEN, POC: NORMAL

## 2024-02-15 PROCEDURE — 99214 OFFICE O/P EST MOD 30 MIN: CPT | Performed by: FAMILY MEDICINE

## 2024-02-15 PROCEDURE — 3077F SYST BP >= 140 MM HG: CPT | Performed by: FAMILY MEDICINE

## 2024-02-15 PROCEDURE — 81003 URINALYSIS AUTO W/O SCOPE: CPT | Performed by: FAMILY MEDICINE

## 2024-02-15 PROCEDURE — 3078F DIAST BP <80 MM HG: CPT | Performed by: FAMILY MEDICINE

## 2024-02-15 PROCEDURE — PBSHW AMB POC URINALYSIS DIP STICK AUTO W/O MICRO: Performed by: FAMILY MEDICINE

## 2024-02-15 PROCEDURE — 3052F HG A1C>EQUAL 8.0%<EQUAL 9.0%: CPT | Performed by: FAMILY MEDICINE

## 2024-02-15 RX ORDER — INSULIN GLARGINE 100 [IU]/ML
15 INJECTION, SOLUTION SUBCUTANEOUS NIGHTLY
Qty: 5 ADJUSTABLE DOSE PRE-FILLED PEN SYRINGE | Refills: 1 | Status: SHIPPED | OUTPATIENT
Start: 2024-02-15

## 2024-02-15 RX ORDER — AMLODIPINE BESYLATE 10 MG/1
10 TABLET ORAL DAILY
Qty: 90 TABLET | Refills: 1 | Status: SHIPPED | OUTPATIENT
Start: 2024-02-15

## 2024-02-15 RX ORDER — ONDANSETRON 8 MG/1
8 TABLET, ORALLY DISINTEGRATING ORAL EVERY 8 HOURS PRN
Qty: 30 TABLET | Refills: 0 | Status: SHIPPED | OUTPATIENT
Start: 2024-02-15

## 2024-02-15 RX ORDER — OMEPRAZOLE 20 MG/1
20 CAPSULE, DELAYED RELEASE ORAL 2 TIMES DAILY
Qty: 60 CAPSULE | Refills: 8 | Status: SHIPPED | OUTPATIENT
Start: 2024-02-15 | End: 2024-10-22

## 2024-02-15 RX ORDER — PANCRELIPASE 36000; 180000; 114000 [USP'U]/1; [USP'U]/1; [USP'U]/1
1 CAPSULE, DELAYED RELEASE PELLETS ORAL
Qty: 480 CAPSULE | Refills: 1 | Status: SHIPPED | OUTPATIENT
Start: 2024-02-15

## 2024-02-15 RX ORDER — ERGOCALCIFEROL 1.25 MG/1
50000 CAPSULE ORAL WEEKLY
Qty: 4 CAPSULE | Refills: 2 | Status: SHIPPED | OUTPATIENT
Start: 2024-02-15

## 2024-02-15 RX ORDER — PEN NEEDLE, DIABETIC 32GX 5/32"
1 NEEDLE, DISPOSABLE MISCELLANEOUS 4 TIMES DAILY
Qty: 100 EACH | Refills: 5 | Status: SHIPPED | OUTPATIENT
Start: 2024-02-15

## 2024-02-15 RX ORDER — CYCLOBENZAPRINE HCL 10 MG
10 TABLET ORAL 3 TIMES DAILY PRN
Qty: 90 TABLET | Refills: 0 | Status: SHIPPED | OUTPATIENT
Start: 2024-02-15

## 2024-02-15 RX ORDER — SUMATRIPTAN 100 MG/1
100 TABLET, FILM COATED ORAL PRN
Qty: 9 TABLET | Refills: 0 | Status: SHIPPED | OUTPATIENT
Start: 2024-02-15

## 2024-02-15 RX ORDER — GABAPENTIN 300 MG/1
300 CAPSULE ORAL 3 TIMES DAILY
Qty: 180 CAPSULE | Refills: 0 | Status: SHIPPED | OUTPATIENT
Start: 2024-02-15 | End: 2024-04-15

## 2024-02-15 RX ORDER — FUROSEMIDE 20 MG/1
20 TABLET ORAL 3 TIMES DAILY
Qty: 270 TABLET | Refills: 0 | Status: SHIPPED | OUTPATIENT
Start: 2024-02-15

## 2024-02-15 NOTE — PROGRESS NOTES
Identified pt with two pt identifiers(name and ).    Chief Complaint   Patient presents with    Follow-up     Patient is here for a follow up sciatica pain as well as left arm getting hot randomly and has not noticed it until getting her covid immunizations and patient is needing med refills    Headache     Patient has had a headache off and on the past few days     Edema     Patient has noticed a she is retaining fluid on her legs and hands the past week         Health Maintenance Due   Topic    HIV screen     Diabetic retinal exam     Respiratory Syncytial Virus (RSV) Pregnant or age 60 yrs+ (1 - 1-dose 60+ series)    Flu vaccine (1)    COVID-19 Vaccine ( season)    Diabetic foot exam     Diabetic Alb to Cr ratio (uACR) test     Pneumococcal 0-64 years Vaccine (2 - PCV)    Breast cancer screen     A1C test (Diabetic or Prediabetic)     Lipids        Wt Readings from Last 3 Encounters:   10/03/23 76.2 kg (168 lb)   23 81.6 kg (180 lb)   23 79.3 kg (174 lb 12.8 oz)     Temp Readings from Last 3 Encounters:   10/03/23 98.6 °F (37 °C) (Temporal)   23 98.4 °F (36.9 °C) (Oral)     BP Readings from Last 3 Encounters:   10/03/23 (!) 165/70   23 (!) 166/64   23 (!) 190/76     Pulse Readings from Last 3 Encounters:   10/03/23 68   23 78   23 66           Depression Screening:  :         2/15/2024     2:21 PM 2023     1:24 PM 2023     3:00 PM 2022     1:50 PM 10/18/2022     2:00 PM 2021    10:00 AM   PHQ-9 Questionaire   Little interest or pleasure in doing things 0 0 0 0 0 0   Feeling down, depressed, or hopeless 0 0 0 0 0 0   PHQ-9 Total Score 0 0 0 0 0 0        Fall Risk Assessment:  :          No data to display                 Abuse Screening:  :          No data to display                 Coordination of Care Questionnaire:  :     \"Have you been to the ER, urgent care clinic since your last visit?  Hospitalized since your last visit?\"

## 2024-02-16 LAB
ALBUMIN SERPL-MCNC: 2.7 G/DL (ref 3.5–5)
ALBUMIN/GLOB SERPL: 0.9 (ref 1.1–2.2)
ALP SERPL-CCNC: 156 U/L (ref 45–117)
ALT SERPL-CCNC: 17 U/L (ref 12–78)
ANION GAP SERPL CALC-SCNC: 1 MMOL/L (ref 5–15)
APPEARANCE UR: ABNORMAL
AST SERPL-CCNC: 13 U/L (ref 15–37)
BACTERIA URNS QL MICRO: ABNORMAL /HPF
BILIRUB SERPL-MCNC: 0.2 MG/DL (ref 0.2–1)
BILIRUB UR QL: NEGATIVE
BUN SERPL-MCNC: 30 MG/DL (ref 6–20)
BUN/CREAT SERPL: 22 (ref 12–20)
CALCIUM SERPL-MCNC: 9.1 MG/DL (ref 8.5–10.1)
CHLORIDE SERPL-SCNC: 112 MMOL/L (ref 97–108)
CO2 SERPL-SCNC: 26 MMOL/L (ref 21–32)
COLOR UR: ABNORMAL
CREAT SERPL-MCNC: 1.38 MG/DL (ref 0.55–1.02)
EPITH CASTS URNS QL MICRO: ABNORMAL /LPF
EST. AVERAGE GLUCOSE BLD GHB EST-MCNC: 203 MG/DL
GLOBULIN SER CALC-MCNC: 2.9 G/DL (ref 2–4)
GLUCOSE SERPL-MCNC: 351 MG/DL (ref 65–100)
GLUCOSE UR STRIP.AUTO-MCNC: >1000 MG/DL
HBA1C MFR BLD: 8.7 % (ref 4–5.6)
HGB UR QL STRIP: ABNORMAL
HYALINE CASTS URNS QL MICRO: ABNORMAL /LPF (ref 0–5)
KETONES UR QL STRIP.AUTO: NEGATIVE MG/DL
LEUKOCYTE ESTERASE UR QL STRIP.AUTO: ABNORMAL
NITRITE UR QL STRIP.AUTO: NEGATIVE
PH UR STRIP: 5 (ref 5–8)
POTASSIUM SERPL-SCNC: 5 MMOL/L (ref 3.5–5.1)
PROT SERPL-MCNC: 5.6 G/DL (ref 6.4–8.2)
PROT UR STRIP-MCNC: 300 MG/DL
RBC #/AREA URNS HPF: ABNORMAL /HPF (ref 0–5)
SODIUM SERPL-SCNC: 139 MMOL/L (ref 136–145)
SP GR UR REFRACTOMETRY: 1.02 (ref 1–1.03)
URINE CULTURE IF INDICATED: ABNORMAL
UROBILINOGEN UR QL STRIP.AUTO: 0.2 EU/DL (ref 0.2–1)
WBC URNS QL MICRO: ABNORMAL /HPF (ref 0–4)

## 2024-02-16 RX ORDER — FLUCONAZOLE 150 MG/1
150 TABLET ORAL DAILY
Qty: 3 TABLET | Refills: 0 | Status: SHIPPED | OUTPATIENT
Start: 2024-02-16

## 2024-02-16 RX ORDER — CEPHALEXIN 500 MG/1
500 CAPSULE ORAL 2 TIMES DAILY
Qty: 14 CAPSULE | Refills: 0 | Status: SHIPPED | OUTPATIENT
Start: 2024-02-16 | End: 2024-02-23

## 2024-02-17 RX ORDER — CEPHALEXIN 500 MG/1
500 CAPSULE ORAL 2 TIMES DAILY
Qty: 14 CAPSULE | Refills: 0 | Status: SHIPPED
Start: 2024-02-17

## 2024-02-17 NOTE — PROGRESS NOTES
Candida Maza (:  1960) is a 63 y.o. female,Established patient, here for evaluation of the following chief complaint(s):  Follow-up (Patient is here for a follow up sciatica pain as well as left arm getting hot randomly and has not noticed it until getting her covid immunizations and patient is needing med refills), Headache (Patient has had a headache off and on the past few days ), and Edema (Patient has noticed a she is retaining fluid on her legs and hands the past week )         ASSESSMENT/PLAN:  1. CKD (chronic kidney disease) stage 4, GFR 15-29 ml/min ([facility])  -     furosemide (LASIX) 20 MG tablet; Take 1 tablet by mouth in the morning, at noon, and at bedtime, Disp-270 tablet, R-0Normal  -     gabapentin (NEURONTIN) 300 MG capsule; Take 1 capsule by mouth 3 times daily for 60 days. Max Daily Amount: 900 mg, Disp-180 capsule, R-0Normal  -     Comprehensive Metabolic Panel; Future  -     Hemoglobin A1C; Future  -     Urinalysis with Reflex to Culture; Future  -     AMB POC URINALYSIS DIP STICK AUTO W/O MICRO  2. Type 2 diabetes mellitus without complication, without long-term current use of insulin ([facility])  -     Insulin Pen Needle (BD PEN NEEDLE ANTHONY 2ND GEN) 32G X 4 MM MISC; Inject 1 each into the skin in the morning, at noon, in the evening, and at bedtime, Disp-100 each, R-5DX Code Needed  .Normal  -     metFORMIN (GLUCOPHAGE) 1000 MG tablet; Take 1 tablet by mouth 2 times daily, Disp-180 tablet, R-1Normal  -     AMB POC URINALYSIS DIP STICK AUTO W/O MICRO  3. Other specified diseases of pancreas  -     lipase-protease-amylase (CREON) 15806-845363 units CPEP delayed release capsule; Take 1 capsule by mouth 3 times daily (with meals), Disp-480 capsule, R-1DX Code Needed  PATIENT REQUESTS NEW SCRIPT/MORE REFILLS PLEASE.Normal  4. Essential hypertension  -     furosemide (LASIX) 20 MG tablet; Take 1 tablet by mouth in the morning, at noon, and at bedtime, Disp-270 tablet, R-0Normal  -

## 2024-02-17 NOTE — RESULT ENCOUNTER NOTE
Patients labs are stable and consistent with DM, CKD and HTN. She does have a UTI and medications were given during the office visit, she should continue to take it while we wait for the cultures, she was sensitive to the ABX that was ordered.

## 2024-02-18 LAB
BACTERIA SPEC CULT: ABNORMAL
CC UR VC: ABNORMAL
SERVICE CMNT-IMP: ABNORMAL

## 2024-02-22 DIAGNOSIS — J45.20 MILD INTERMITTENT ASTHMA WITHOUT COMPLICATION: ICD-10-CM

## 2024-02-22 DIAGNOSIS — I10 ESSENTIAL HYPERTENSION: ICD-10-CM

## 2024-02-22 RX ORDER — SUMATRIPTAN 100 MG/1
100 TABLET, FILM COATED ORAL PRN
Qty: 9 TABLET | Refills: 0 | Status: SHIPPED | OUTPATIENT
Start: 2024-02-22

## 2024-02-22 RX ORDER — CYCLOBENZAPRINE HCL 10 MG
TABLET ORAL
Qty: 90 TABLET | Refills: 0 | Status: SHIPPED | OUTPATIENT
Start: 2024-02-22

## 2024-02-22 RX ORDER — ALBUTEROL SULFATE 90 UG/1
2 AEROSOL, METERED RESPIRATORY (INHALATION) 4 TIMES DAILY PRN
Qty: 18 EACH | Refills: 0 | Status: SHIPPED | OUTPATIENT
Start: 2024-02-22

## 2024-03-21 ENCOUNTER — TRANSCRIBE ORDERS (OUTPATIENT)
Facility: HOSPITAL | Age: 64
End: 2024-03-21

## 2024-03-21 DIAGNOSIS — Z12.31 BREAST CANCER SCREENING BY MAMMOGRAM: Primary | ICD-10-CM

## 2024-04-23 DIAGNOSIS — J45.20 MILD INTERMITTENT ASTHMA WITHOUT COMPLICATION: ICD-10-CM

## 2024-04-24 ENCOUNTER — HOSPITAL ENCOUNTER (OUTPATIENT)
Facility: HOSPITAL | Age: 64
Discharge: HOME | End: 2024-04-27
Attending: INTERNAL MEDICINE
Payer: MEDICAID

## 2024-04-24 VITALS — WEIGHT: 161 LBS | BODY MASS INDEX: 29.45 KG/M2

## 2024-04-24 DIAGNOSIS — Z12.31 BREAST CANCER SCREENING BY MAMMOGRAM: ICD-10-CM

## 2024-04-24 PROCEDURE — 77063 BREAST TOMOSYNTHESIS BI: CPT

## 2024-04-24 RX ORDER — ALBUTEROL SULFATE 90 UG/1
2 AEROSOL, METERED RESPIRATORY (INHALATION) 4 TIMES DAILY PRN
Qty: 18 EACH | Refills: 5 | Status: SHIPPED | OUTPATIENT
Start: 2024-04-24

## 2024-04-26 ENCOUNTER — TELEPHONE (OUTPATIENT)
Age: 64
End: 2024-04-26

## 2024-04-26 DIAGNOSIS — K86.89 OTHER SPECIFIED DISEASES OF PANCREAS: ICD-10-CM

## 2024-04-26 NOTE — TELEPHONE ENCOUNTER
Pt called and said that she went and picked up her   lipase-protease-amylase (CREON) 65907-627825 units CPEP delayed release capsule   But it said for her to take it 1 capsule 3x a day with meals but she said she normally takes 4 capsules 3x day with meals and then 2 capsules with snacks. She asked if it could please be resent like that so she can take it how she normally does. She uses [facility]/pharmacy #[id number] - [place] - [facility] -  [phone] - F [phone]

## 2024-05-07 RX ORDER — PANCRELIPASE 36000; 180000; 114000 [USP'U]/1; [USP'U]/1; [USP'U]/1
CAPSULE, DELAYED RELEASE PELLETS ORAL
Qty: 630 CAPSULE | Refills: 1 | Status: SHIPPED | OUTPATIENT
Start: 2024-05-07

## 2024-05-14 DIAGNOSIS — E55.9 VITAMIN D DEFICIENCY, UNSPECIFIED: ICD-10-CM

## 2024-05-15 RX ORDER — ERGOCALCIFEROL 1.25 MG/1
50000 CAPSULE ORAL WEEKLY
Qty: 12 CAPSULE | Refills: 1 | Status: SHIPPED | OUTPATIENT
Start: 2024-05-15

## 2024-05-16 DIAGNOSIS — N18.4 CKD (CHRONIC KIDNEY DISEASE) STAGE 4, GFR 15-29 ML/MIN (HCC): ICD-10-CM

## 2024-05-16 DIAGNOSIS — I10 ESSENTIAL HYPERTENSION: ICD-10-CM

## 2024-05-16 RX ORDER — FUROSEMIDE 20 MG/1
20 TABLET ORAL 3 TIMES DAILY
Qty: 270 TABLET | Refills: 1 | Status: SHIPPED | OUTPATIENT
Start: 2024-05-16

## 2024-07-18 ENCOUNTER — TELEPHONE (OUTPATIENT)
Age: 64
End: 2024-07-18

## 2024-07-18 NOTE — TELEPHONE ENCOUNTER
Patient called [facility] requesting a hospital follow and disconnected the call before they could transfer them. Attempted to call them back and they did not answer. Left voicemail

## 2024-07-25 ENCOUNTER — TELEMEDICINE (OUTPATIENT)
Age: 64
End: 2024-07-25
Payer: MEDICAID

## 2024-07-25 DIAGNOSIS — M79.89 LEG SWELLING: ICD-10-CM

## 2024-07-25 DIAGNOSIS — E11.9 TYPE 2 DIABETES MELLITUS WITHOUT COMPLICATION, WITHOUT LONG-TERM CURRENT USE OF INSULIN (HCC): Primary | ICD-10-CM

## 2024-07-25 DIAGNOSIS — I10 PRIMARY HYPERTENSION: ICD-10-CM

## 2024-07-25 DIAGNOSIS — K21.9 GASTROESOPHAGEAL REFLUX DISEASE WITHOUT ESOPHAGITIS: ICD-10-CM

## 2024-07-25 DIAGNOSIS — K86.89 PANCREATIC INSUFFICIENCY: ICD-10-CM

## 2024-07-25 DIAGNOSIS — Z09 HOSPITAL DISCHARGE FOLLOW-UP: ICD-10-CM

## 2024-07-25 DIAGNOSIS — R10.84 GENERALIZED ABDOMINAL PAIN: ICD-10-CM

## 2024-07-25 PROCEDURE — 99215 OFFICE O/P EST HI 40 MIN: CPT | Performed by: FAMILY MEDICINE

## 2024-07-25 PROCEDURE — 1111F DSCHRG MED/CURRENT MED MERGE: CPT | Performed by: FAMILY MEDICINE

## 2024-07-25 RX ORDER — ACETAMINOPHEN AND CODEINE PHOSPHATE 300; 30 MG/1; MG/1
1 TABLET ORAL EVERY 12 HOURS
Qty: 6 TABLET | Refills: 0 | Status: SHIPPED | OUTPATIENT
Start: 2024-07-25 | End: 2024-07-28

## 2024-07-25 RX ORDER — TAMSULOSIN HYDROCHLORIDE 0.4 MG/1
0.4 CAPSULE ORAL NIGHTLY
COMMUNITY
Start: 2024-07-10

## 2024-07-25 ASSESSMENT — PATIENT HEALTH QUESTIONNAIRE - PHQ9
SUM OF ALL RESPONSES TO PHQ9 QUESTIONS 1 & 2: 0
SUM OF ALL RESPONSES TO PHQ QUESTIONS 1-9: 0
2. FEELING DOWN, DEPRESSED OR HOPELESS: NOT AT ALL
SUM OF ALL RESPONSES TO PHQ QUESTIONS 1-9: 0
1. LITTLE INTEREST OR PLEASURE IN DOING THINGS: NOT AT ALL

## 2024-07-25 NOTE — PROGRESS NOTES
VV-Pt is aware of billable appt depending on insurance plan.        Pt verbally agrees to labs, orders, and others to be mailed to confirmed home address.    Identified pt with two pt identifiers(name and ). Reviewed record in preparation for visit and have obtained necessary documentation. All patient medications has been reviewed.  Chief Complaint   Patient presents with    Follow-up     0 pain    Follow-Up from Hospital     Pt was admitted to [facility] on  thru  she was then  transferred to  from  to . Pt was dx with bowel movement issues and had surgery.       Health Maintenance Review: Patient reminded of \"due or due soon\" health maintenance. I have asked the patient to contact his/her primary care provider (PCP) for follow-up on his/her health maintenance.         No data to display                 Wt Readings from Last 3 Encounters:   24 73 kg (161 lb)   02/15/24 73.3 kg (161 lb 9.6 oz)   10/03/23 76.2 kg (168 lb)     Temp Readings from Last 3 Encounters:   02/15/24 98.1 °F (36.7 °C) (Temporal)   10/03/23 98.6 °F (37 °C) (Temporal)   23 98.4 °F (36.9 °C) (Oral)     BP Readings from Last 3 Encounters:   02/15/24 (!) 159/60   10/03/23 (!) 165/70   23 (!) 166/64     Pulse Readings from Last 3 Encounters:   02/15/24 72   10/03/23 68   23 78         Coordination of Care Questionnaire:   1) Have you been to an emergency room, urgent care, or hospitalized since your last visit?   yes     2. Have seen or consulted any other health care provider since your last visit? no

## 2024-08-01 NOTE — PROGRESS NOTES
Post-Discharge Transitional Care  Follow Up      Candida Maza   YOB: 1960    Date of Office Visit:  7/25/2024  Date of Hospital Admission: 6/21/24  Date of Hospital Discharge: 7/16/24  Risk of hospital readmission (high >=14%. Medium >=10%) :No data recorded    Care management risk score Rising risk (score 2-5) and Complex Care (Scores >=6): No Risk Score On File     Non face to face  following discharge, date last encounter closed (first attempt may have been earlier): *No documented post hospital discharge outreach found in the last 14 days    Call initiated 2 business days of discharge: *No response recorded in the last 14 days    ASSESSMENT/PLAN:   Type 2 diabetes mellitus without complication, without long-term current use of insulin ([facility])  Comments:  BG:  Leg swelling  Pancreatic insufficiency  Gastroesophageal reflux disease without esophagitis  Primary hypertension  Comments:  clonidine  furosemide  amlodipine    147-154/ 50-68 mmHg  has some swelling  Generalized abdominal pain  -     acetaminophen-codeine (TYLENOL/CODEINE #3) 300-30 MG per tablet; Take 1 tablet by mouth in the morning and 1 tablet in the evening. Do all this for 3 days. Max Daily Amount: 2 tablets., Disp-6 tablet, R-0Normal  Hospital discharge follow-up  -     NH DISCHARGE MEDS RECONCILED W/ CURRENT OUTPATIENT MED LIST      Medical Decision Making: moderate complexity  No follow-ups on file.           Subjective:   HPI:  Follow up of Hospital problems/diagnosis(es): see above    Inpatient course: Discharge summary reviewed- see chart.    Interval history/Current status: doing well. FlowPay was sent to the patients home.     Patient Active Problem List   Diagnosis    Severe obesity (HCC)    Essential hypertension    GERD (gastroesophageal reflux disease)    Diabetic neuropathy (HCC)    Type 2 diabetes with nephropathy (HCC)    Diabetes (HCC)    CKD (chronic kidney disease) stage 4, GFR 15-29 ml/min ([facility])

## 2024-08-06 DIAGNOSIS — J45.20 MILD INTERMITTENT ASTHMA WITHOUT COMPLICATION: ICD-10-CM

## 2024-08-06 DIAGNOSIS — Z91.09 OTHER ALLERGY STATUS, OTHER THAN TO DRUGS AND BIOLOGICAL SUBSTANCES: ICD-10-CM

## 2024-08-08 DIAGNOSIS — I10 ESSENTIAL HYPERTENSION: ICD-10-CM

## 2024-08-08 NOTE — TELEPHONE ENCOUNTER
SUMAtriptan (IMITREX) 100 MG tablet    [id number] [place] [facility] [address] - P [phone] - F [phone] [[id number]]

## 2024-08-09 ENCOUNTER — TELEPHONE (OUTPATIENT)
Age: 64
End: 2024-08-09

## 2024-08-09 RX ORDER — CETIRIZINE HYDROCHLORIDE 10 MG/1
10 TABLET ORAL NIGHTLY
Qty: 90 TABLET | Refills: 1 | Status: SHIPPED | OUTPATIENT
Start: 2024-08-09

## 2024-08-09 RX ORDER — INSULIN GLARGINE 100 [IU]/ML
15 INJECTION, SOLUTION SUBCUTANEOUS NIGHTLY
Qty: 2 ADJUSTABLE DOSE PRE-FILLED PEN SYRINGE | Refills: 9 | Status: SHIPPED | OUTPATIENT
Start: 2024-08-09

## 2024-08-09 RX ORDER — CYCLOBENZAPRINE HCL 10 MG
TABLET ORAL
Qty: 90 TABLET | Refills: 0 | Status: SHIPPED | OUTPATIENT
Start: 2024-08-09

## 2024-08-09 RX ORDER — LIDOCAINE 50 MG/G
PATCH TOPICAL
Qty: 30 PATCH | Refills: 2 | Status: SHIPPED | OUTPATIENT
Start: 2024-08-09

## 2024-08-09 RX ORDER — SUMATRIPTAN 100 MG/1
100 TABLET, FILM COATED ORAL PRN
Qty: 9 TABLET | Refills: 0 | Status: SHIPPED | OUTPATIENT
Start: 2024-08-09

## 2024-08-09 NOTE — TELEPHONE ENCOUNTER
Patient said she's been in the hospital ([facility]) and they want her to start using the freestyle maximiliano 3 for her diabetes. They want us to prescribe this.     [facility]/pharmacy #[id number] - [place] - [facility] [address] - P [phone] - F [phone]

## 2024-08-14 ENCOUNTER — TELEMEDICINE (OUTPATIENT)
Age: 64
End: 2024-08-14
Payer: MEDICAID

## 2024-08-14 DIAGNOSIS — J45.20 MILD INTERMITTENT ASTHMA WITHOUT COMPLICATION: ICD-10-CM

## 2024-08-14 DIAGNOSIS — Z91.09 OTHER ALLERGY STATUS, OTHER THAN TO DRUGS AND BIOLOGICAL SUBSTANCES: ICD-10-CM

## 2024-08-14 DIAGNOSIS — K59.01 SLOW TRANSIT CONSTIPATION: Primary | ICD-10-CM

## 2024-08-14 DIAGNOSIS — K86.89 OTHER SPECIFIED DISEASES OF PANCREAS: ICD-10-CM

## 2024-08-14 PROCEDURE — 99213 OFFICE O/P EST LOW 20 MIN: CPT | Performed by: FAMILY MEDICINE

## 2024-08-14 RX ORDER — ONDANSETRON 4 MG/1
TABLET, ORALLY DISINTEGRATING ORAL
COMMUNITY
Start: 2024-08-03

## 2024-08-14 RX ORDER — LANCETS 33 GAUGE
EACH MISCELLANEOUS
COMMUNITY
Start: 2024-08-03

## 2024-08-14 RX ORDER — SENNA AND DOCUSATE SODIUM 50; 8.6 MG/1; MG/1
1 TABLET, FILM COATED ORAL DAILY
Qty: 120 TABLET | Refills: 0 | Status: SHIPPED | OUTPATIENT
Start: 2024-08-14

## 2024-08-14 RX ORDER — BLOOD-GLUCOSE METER
EACH MISCELLANEOUS
COMMUNITY
Start: 2024-08-03

## 2024-08-14 RX ORDER — BLOOD-GLUCOSE SENSOR
EACH MISCELLANEOUS
COMMUNITY
End: 2024-08-14 | Stop reason: SDUPTHER

## 2024-08-14 RX ORDER — PANCRELIPASE 36000; 180000; 114000 [USP'U]/1; [USP'U]/1; [USP'U]/1
CAPSULE, DELAYED RELEASE PELLETS ORAL
Qty: 630 CAPSULE | Refills: 1 | Status: SHIPPED
Start: 2024-08-14

## 2024-08-14 RX ORDER — KETOROLAC TROMETHAMINE 30 MG/ML
INJECTION, SOLUTION INTRAMUSCULAR; INTRAVENOUS
COMMUNITY

## 2024-08-14 RX ORDER — CETIRIZINE HYDROCHLORIDE 10 MG/1
10 TABLET ORAL NIGHTLY
Qty: 90 TABLET | Refills: 1 | Status: SHIPPED | OUTPATIENT
Start: 2024-08-14

## 2024-08-14 RX ORDER — FERROUS SULFATE 325(65) MG
1 TABLET ORAL 2 TIMES DAILY
COMMUNITY
Start: 2024-08-03

## 2024-08-14 NOTE — TELEPHONE ENCOUNTER
Patient forgot to mention freestyle maximiliano 3 in her virtual appointment this morning with Dr Heard

## 2024-08-17 NOTE — ASSESSMENT & PLAN NOTE
Uncontrolled, continue current plan pending work up below    Orders:    cetirizine (ZYRTEC) 10 MG tablet; Take 1 tablet by mouth nightly

## 2024-08-17 NOTE — PROGRESS NOTES
Identified pt with two pt identifiers(name and ).    Chief Complaint   Patient presents with    Follow-Up from Hospital     Patient went to [facility] and went to  for rehab        Health Maintenance Due   Topic    HIV screen     Diabetic retinal exam     Respiratory Syncytial Virus (RSV) Pregnant or age 60 yrs+ (1 - 1-dose 60+ series)    COVID-19 Vaccine ( season)    Diabetic foot exam     Diabetic Alb to Cr ratio (uACR) test     Pneumococcal 0-64 years Vaccine (2 of 2 - PCV)    Lipids     Flu vaccine (1)       Wt Readings from Last 3 Encounters:   24 73 kg (161 lb)   02/15/24 73.3 kg (161 lb 9.6 oz)   10/03/23 76.2 kg (168 lb)     Temp Readings from Last 3 Encounters:   02/15/24 98.1 °F (36.7 °C) (Temporal)   10/03/23 98.6 °F (37 °C) (Temporal)   23 98.4 °F (36.9 °C) (Oral)     BP Readings from Last 3 Encounters:   02/15/24 (!) 159/60   10/03/23 (!) 165/70   23 (!) 166/64     Pulse Readings from Last 3 Encounters:   02/15/24 72   10/03/23 68   23 78           Depression Screening:  :         2024     9:33 AM 2/15/2024     2:21 PM 2023     1:24 PM 2023     3:00 PM 2022     1:50 PM 10/18/2022     2:00 PM 2021    10:00 AM   PHQ-9 Questionaire   Little interest or pleasure in doing things 0 0 0 0 0 0 0   Feeling down, depressed, or hopeless 0 0 0 0 0 0 0   PHQ-9 Total Score 0 0 0 0 0 0 0        Fall Risk Assessment:  :          No data to display                 Abuse Screening:  :          No data to display                 Coordination of Care Questionnaire:  :     \"Have you been to the ER, urgent care clinic since your last visit?  Hospitalized since your last visit?\"    YES - When: approximately 3  weeks ago.  Where and Why:  .    “Have you seen or consulted any other health care providers outside of [facility] since your last visit?”                Click Here for Release of Records Request       
(with meals). May also take 1 capsule daily as needed (snacks).    sennosides-docusate sodium (SENOKOT-S) 8.6-50 MG tablet; Take 1 tablet by mouth daily      No follow-ups on file.       Subjective   HPI  Review of Systems       Objective   Patient-Reported Vitals  No data recorded     Physical Exam  [INSTRUCTIONS:  \"[x]\" Indicates a positive item  \"[]\" Indicates a negative item  -- DELETE ALL ITEMS NOT EXAMINED]    Constitutional: [x] Appears well-developed and well-nourished [x] No apparent distress      [] Abnormal -     Mental status: [x] Alert and awake  [x] Oriented to person/place/time [x] Able to follow commands    [] Abnormal -     Eyes:   EOM    [x]  Normal    [] Abnormal -   Sclera  [x]  Normal    [] Abnormal -          Discharge [x]  None visible   [] Abnormal -     HENT: [x] Normocephalic, atraumatic  [] Abnormal -   [x] Mouth/Throat: Mucous membranes are moist    External Ears [x] Normal  [] Abnormal -    Neck: [x] No visualized mass [] Abnormal -     Pulmonary/Chest: [x] Respiratory effort normal   [x] No visualized signs of difficulty breathing or respiratory distress        [] Abnormal -      Musculoskeletal:   [x] Normal gait with no signs of ataxia         [x] Normal range of motion of neck        [] Abnormal -     Neurological:        [x] No Facial Asymmetry (Cranial nerve 7 motor function) (limited exam due to video visit)          [x] No gaze palsy        [] Abnormal -          Skin:        [x] No significant exanthematous lesions or discoloration noted on facial skin         [] Abnormal -            Psychiatric:       [x] Normal Affect [] Abnormal -        [x] No Hallucinations    Other pertinent observable physical exam findings:-             --Liudmila Heard MD

## 2024-08-19 RX ORDER — KETOROLAC TROMETHAMINE 30 MG/ML
1 INJECTION, SOLUTION INTRAMUSCULAR; INTRAVENOUS
OUTPATIENT
Start: 2024-08-19

## 2024-08-19 RX ORDER — BLOOD-GLUCOSE SENSOR
2 EACH MISCELLANEOUS
Qty: 2 EACH | Refills: 5 | Status: SHIPPED | OUTPATIENT
Start: 2024-08-19

## 2024-08-21 ENCOUNTER — TELEPHONE (OUTPATIENT)
Age: 64
End: 2024-08-21

## 2024-08-21 NOTE — TELEPHONE ENCOUNTER
Patient had a virtual with Dr Heard last week and asked about her prescribing   acetaminophen-codeine (TYLENOL/CODEINE #3) 300-30 MG per tablet [[id number]]  She was wondering if a different doctor could prescribe it     [facility]/pharmacy #[id number] - [place] - [address] [place] [address] -  [phone] - F [phone]

## 2024-09-04 ENCOUNTER — TELEPHONE (OUTPATIENT)
Age: 64
End: 2024-09-04

## 2024-09-04 DIAGNOSIS — I10 ESSENTIAL HYPERTENSION: ICD-10-CM

## 2024-09-04 DIAGNOSIS — N18.4 CKD (CHRONIC KIDNEY DISEASE) STAGE 4, GFR 15-29 ML/MIN (HCC): ICD-10-CM

## 2024-09-04 DIAGNOSIS — E11.9 TYPE 2 DIABETES MELLITUS WITHOUT COMPLICATION, WITH LONG-TERM CURRENT USE OF INSULIN (HCC): Primary | ICD-10-CM

## 2024-09-04 DIAGNOSIS — Z79.4 TYPE 2 DIABETES MELLITUS WITHOUT COMPLICATION, WITH LONG-TERM CURRENT USE OF INSULIN (HCC): Primary | ICD-10-CM

## 2024-09-04 DIAGNOSIS — J45.20 MILD INTERMITTENT ASTHMA WITHOUT COMPLICATION: ICD-10-CM

## 2024-09-04 RX ORDER — AMLODIPINE BESYLATE 10 MG/1
10 TABLET ORAL DAILY
Qty: 90 TABLET | Refills: 1 | Status: SHIPPED | OUTPATIENT
Start: 2024-09-04

## 2024-09-04 RX ORDER — BLOOD SUGAR DIAGNOSTIC
STRIP MISCELLANEOUS
Qty: 100 STRIP | Refills: 3 | Status: SHIPPED | OUTPATIENT
Start: 2024-09-04

## 2024-09-04 RX ORDER — CYCLOBENZAPRINE HCL 10 MG
TABLET ORAL
Qty: 90 TABLET | Refills: 0 | Status: SHIPPED | OUTPATIENT
Start: 2024-09-04

## 2024-09-04 NOTE — TELEPHONE ENCOUNTER
Pt picked up the Shustir MAGEN 3 SENSOR and said she thought another piece was supposed to come with it. Do they sell them separately or together? Pt said she paid $80 for 2 of them and each sensor has a 14 day supply. Please advise. Please lvm if pt does not answer.

## 2024-09-05 RX ORDER — ALBUTEROL SULFATE 90 UG/1
2 AEROSOL, METERED RESPIRATORY (INHALATION) 4 TIMES DAILY PRN
Qty: 8.5 EACH | Refills: 5 | Status: SHIPPED | OUTPATIENT
Start: 2024-09-05

## 2024-09-05 RX ORDER — FUROSEMIDE 20 MG
20 TABLET ORAL 3 TIMES DAILY
Qty: 270 TABLET | Refills: 1 | Status: SHIPPED | OUTPATIENT
Start: 2024-09-05

## 2024-09-05 RX ORDER — SUMATRIPTAN 100 MG/1
100 TABLET, FILM COATED ORAL PRN
Qty: 9 TABLET | Refills: 0 | Status: SHIPPED | OUTPATIENT
Start: 2024-09-05

## 2024-09-13 ENCOUNTER — OFFICE VISIT (OUTPATIENT)
Age: 64
End: 2024-09-13
Payer: MEDICAID

## 2024-09-13 ENCOUNTER — LAB (OUTPATIENT)
Age: 64
End: 2024-09-13
Payer: MEDICAID

## 2024-09-13 VITALS
BODY MASS INDEX: 28.63 KG/M2 | RESPIRATION RATE: 20 BRPM | SYSTOLIC BLOOD PRESSURE: 132 MMHG | WEIGHT: 155.6 LBS | OXYGEN SATURATION: 98 % | TEMPERATURE: 97.7 F | HEIGHT: 62 IN | HEART RATE: 80 BPM | DIASTOLIC BLOOD PRESSURE: 80 MMHG

## 2024-09-13 DIAGNOSIS — L97.919 VENOUS ULCER OF RIGHT LOWER EXTREMITY WITH VARICOSE VEINS (HCC): Primary | ICD-10-CM

## 2024-09-13 DIAGNOSIS — E66.3 OVERWEIGHT (BMI 25.0-29.9): ICD-10-CM

## 2024-09-13 DIAGNOSIS — I83.019 VENOUS ULCER OF RIGHT LOWER EXTREMITY WITH VARICOSE VEINS (HCC): Primary | ICD-10-CM

## 2024-09-13 DIAGNOSIS — E11.65 TYPE 2 DIABETES MELLITUS WITH HYPERGLYCEMIA, WITH LONG-TERM CURRENT USE OF INSULIN (HCC): ICD-10-CM

## 2024-09-13 DIAGNOSIS — Z79.4 TYPE 2 DIABETES MELLITUS WITH HYPERGLYCEMIA, WITH LONG-TERM CURRENT USE OF INSULIN (HCC): ICD-10-CM

## 2024-09-13 PROCEDURE — 3052F HG A1C>EQUAL 8.0%<EQUAL 9.0%: CPT | Performed by: FAMILY MEDICINE

## 2024-09-13 PROCEDURE — 99214 OFFICE O/P EST MOD 30 MIN: CPT | Performed by: FAMILY MEDICINE

## 2024-09-13 PROCEDURE — 3079F DIAST BP 80-89 MM HG: CPT | Performed by: FAMILY MEDICINE

## 2024-09-13 PROCEDURE — 3075F SYST BP GE 130 - 139MM HG: CPT | Performed by: FAMILY MEDICINE

## 2024-09-13 RX ORDER — FUROSEMIDE 40 MG
40 TABLET ORAL DAILY
Qty: 90 TABLET | Refills: 1 | Status: SHIPPED | OUTPATIENT
Start: 2024-09-13

## 2024-09-13 ASSESSMENT — PATIENT HEALTH QUESTIONNAIRE - PHQ9
SUM OF ALL RESPONSES TO PHQ9 QUESTIONS 1 & 2: 0
SUM OF ALL RESPONSES TO PHQ QUESTIONS 1-9: 0
1. LITTLE INTEREST OR PLEASURE IN DOING THINGS: NOT AT ALL
SUM OF ALL RESPONSES TO PHQ QUESTIONS 1-9: 0
SUM OF ALL RESPONSES TO PHQ QUESTIONS 1-9: 0
2. FEELING DOWN, DEPRESSED OR HOPELESS: NOT AT ALL
SUM OF ALL RESPONSES TO PHQ QUESTIONS 1-9: 0

## 2024-09-14 LAB
EST. AVERAGE GLUCOSE BLD GHB EST-MCNC: 192 MG/DL
HBA1C MFR BLD: 8.3 % (ref 4–5.6)

## 2024-10-16 ENCOUNTER — TELEPHONE (OUTPATIENT)
Age: 64
End: 2024-10-16

## 2024-10-17 ENCOUNTER — TELEMEDICINE (OUTPATIENT)
Age: 64
End: 2024-10-17

## 2024-10-17 DIAGNOSIS — E11.9 TYPE 2 DIABETES MELLITUS WITHOUT COMPLICATION, WITH LONG-TERM CURRENT USE OF INSULIN (HCC): Primary | ICD-10-CM

## 2024-10-17 DIAGNOSIS — N18.4 CKD (CHRONIC KIDNEY DISEASE) STAGE 4, GFR 15-29 ML/MIN (HCC): ICD-10-CM

## 2024-10-17 DIAGNOSIS — L97.909 VENOUS ULCER (HCC): ICD-10-CM

## 2024-10-17 DIAGNOSIS — Z79.4 TYPE 2 DIABETES MELLITUS WITHOUT COMPLICATION, WITH LONG-TERM CURRENT USE OF INSULIN (HCC): Primary | ICD-10-CM

## 2024-10-17 DIAGNOSIS — I83.009 VENOUS ULCER (HCC): ICD-10-CM

## 2024-10-17 ASSESSMENT — ENCOUNTER SYMPTOMS
NAUSEA: 0
ANAL BLEEDING: 0
COUGH: 0
SHORTNESS OF BREATH: 0
ABDOMINAL DISTENTION: 0
SORE THROAT: 0
BACK PAIN: 0
RHINORRHEA: 0
BLOOD IN STOOL: 0
SINUS PRESSURE: 0
DIARRHEA: 0
CONSTIPATION: 0
ABDOMINAL PAIN: 0
WHEEZING: 0
VOMITING: 0
SINUS PAIN: 0
CHEST TIGHTNESS: 0

## 2024-10-17 NOTE — PROGRESS NOTES
Candida Maza, was evaluated through a synchronous (real-time) audio-video encounter. The patient (or guardian if applicable) is aware that this is a billable service, which includes applicable co-pays. This Virtual Visit was conducted with patient's (and/or legal guardian's) consent. Patient identification was verified, and a caregiver was present when appropriate.   The patient was located at Home: [address] [place] [id number]  Provider was located at Facility (Appt Dept): [address] [place] [address]  Confirm you are appropriately licensed, registered, or certified to deliver care in the state where the patient is located as indicated above. If you are not or unsure, please re-schedule the visit: Yes, I confirm.     Candida Maza (:  1960) is a Established patient, presenting virtually for evaluation of the following:      Below is the assessment and plan developed based on review of pertinent history, physical exam, labs, studies, and medications.     Assessment & Plan  Type 2 diabetes mellitus without complication, with long-term current use of insulin (HCC)     Metformin 500 mg BID  Insulin 15 units daily         CKD (chronic kidney disease) stage 4, GFR 15-29 ml/min (HCC)   Lasix 40 mg daily  Amlodipine 10 mg daily           Venous ulcer ([facility])   [facility]            No follow-ups on file.       Subjective   Candida Maza is a 64 y.o. female presents with complaints of a poor healing ulcer on her shin.     She also complains of diarrhea and her blood sugars have been elevated as well.       Review of Systems   Constitutional:  Negative for activity change, appetite change, fatigue and fever.   HENT:  Negative for congestion, postnasal drip, rhinorrhea, sinus pressure, sinus pain, sneezing and sore throat.    Respiratory:  Negative for cough, chest tightness, shortness of breath and wheezing.    Cardiovascular:  Negative

## 2024-10-30 ENCOUNTER — APPOINTMENT (OUTPATIENT)
Facility: HOSPITAL | Age: 64
DRG: 420 | End: 2024-10-30
Payer: MEDICAID

## 2024-10-30 ENCOUNTER — HOSPITAL ENCOUNTER (INPATIENT)
Facility: HOSPITAL | Age: 64
LOS: 2 days | Discharge: HOME HEALTH | DRG: 420 | End: 2024-11-01
Attending: EMERGENCY MEDICINE | Admitting: FAMILY MEDICINE
Payer: MEDICAID

## 2024-10-30 DIAGNOSIS — E16.2 HYPOGLYCEMIA: Primary | ICD-10-CM

## 2024-10-30 DIAGNOSIS — E11.9 TYPE 2 DIABETES MELLITUS WITHOUT COMPLICATION, WITHOUT LONG-TERM CURRENT USE OF INSULIN (HCC): ICD-10-CM

## 2024-10-30 DIAGNOSIS — D72.829 LEUKOCYTOSIS, UNSPECIFIED TYPE: ICD-10-CM

## 2024-10-30 DIAGNOSIS — T68.XXXA HYPOTHERMIA, INITIAL ENCOUNTER: ICD-10-CM

## 2024-10-30 LAB
ALBUMIN SERPL-MCNC: 1.7 G/DL (ref 3.5–5)
ALBUMIN/GLOB SERPL: 0.4 (ref 1.1–2.2)
ALP SERPL-CCNC: 166 U/L (ref 45–117)
ALT SERPL-CCNC: 28 U/L (ref 12–78)
ANION GAP BLD CALC-SCNC: 7 (ref 10–20)
ANION GAP SERPL CALC-SCNC: 4 MMOL/L (ref 2–12)
AST SERPL-CCNC: 19 U/L (ref 15–37)
BASE DEFICIT BLD-SCNC: 3.3 MMOL/L
BASOPHILS # BLD: 0 K/UL (ref 0–0.1)
BASOPHILS NFR BLD: 0 % (ref 0–1)
BILIRUB SERPL-MCNC: 0.2 MG/DL (ref 0.2–1)
BUN SERPL-MCNC: 31 MG/DL (ref 6–20)
BUN/CREAT SERPL: 20 (ref 12–20)
CA-I BLD-MCNC: 1.19 MMOL/L (ref 1.15–1.33)
CALCIUM SERPL-MCNC: 8.5 MG/DL (ref 8.5–10.1)
CHLORIDE BLD-SCNC: 106 MMOL/L (ref 100–111)
CHLORIDE SERPL-SCNC: 110 MMOL/L (ref 97–108)
CO2 BLD-SCNC: 24 MMOL/L (ref 22–29)
CO2 SERPL-SCNC: 26 MMOL/L (ref 21–32)
COMMENT:: NORMAL
CREAT SERPL-MCNC: 1.58 MG/DL (ref 0.55–1.02)
CREAT UR-MCNC: 1.3 MG/DL (ref 0.6–1.3)
DIFFERENTIAL METHOD BLD: ABNORMAL
EOSINOPHIL # BLD: 0 K/UL (ref 0–0.4)
EOSINOPHIL NFR BLD: 0 % (ref 0–7)
ERYTHROCYTE [DISTWIDTH] IN BLOOD BY AUTOMATED COUNT: 13.6 % (ref 11.5–14.5)
GLOBULIN SER CALC-MCNC: 3.9 G/DL (ref 2–4)
GLUCOSE BLD STRIP.AUTO-MCNC: 105 MG/DL (ref 65–117)
GLUCOSE BLD STRIP.AUTO-MCNC: 77 MG/DL (ref 74–99)
GLUCOSE BLD STRIP.AUTO-MCNC: 85 MG/DL (ref 65–117)
GLUCOSE BLD STRIP.AUTO-MCNC: 94 MG/DL (ref 65–117)
GLUCOSE BLD STRIP.AUTO-MCNC: 96 MG/DL (ref 65–117)
GLUCOSE BLD STRIP.AUTO-MCNC: 98 MG/DL (ref 65–117)
GLUCOSE BLD STRIP.AUTO-MCNC: 98 MG/DL (ref 65–117)
GLUCOSE SERPL-MCNC: 84 MG/DL (ref 65–100)
HCO3 BLDA-SCNC: 24 MMOL/L
HCT VFR BLD AUTO: 35.7 % (ref 35–47)
HGB BLD-MCNC: 11.5 G/DL (ref 11.5–16)
IMM GRANULOCYTES # BLD AUTO: 0.1 K/UL (ref 0–0.04)
IMM GRANULOCYTES NFR BLD AUTO: 1 % (ref 0–0.5)
LACTATE BLD-SCNC: 0.53 MMOL/L (ref 0.4–2)
LACTATE BLD-SCNC: 2 MMOL/L (ref 0.4–2)
LYMPHOCYTES # BLD: 1.8 K/UL (ref 0.8–3.5)
LYMPHOCYTES NFR BLD: 10 % (ref 12–49)
MCH RBC QN AUTO: 29.1 PG (ref 26–34)
MCHC RBC AUTO-ENTMCNC: 32.2 G/DL (ref 30–36.5)
MCV RBC AUTO: 90.4 FL (ref 80–99)
MONOCYTES # BLD: 0.5 K/UL (ref 0–1)
MONOCYTES NFR BLD: 3 % (ref 5–13)
NEUTS SEG # BLD: 16.6 K/UL (ref 1.8–8)
NEUTS SEG NFR BLD: 86 % (ref 32–75)
NRBC # BLD: 0 K/UL (ref 0–0.01)
NRBC BLD-RTO: 0 PER 100 WBC
PCO2 BLDV: 53 MMHG (ref 41–51)
PH BLDV: 7.27 (ref 7.32–7.42)
PLATELET # BLD AUTO: 310 K/UL (ref 150–400)
PMV BLD AUTO: 10.9 FL (ref 8.9–12.9)
PO2 BLDV: <27 MMHG (ref 25–40)
POTASSIUM BLD-SCNC: 4.8 MMOL/L (ref 3.5–5.5)
POTASSIUM SERPL-SCNC: 4.7 MMOL/L (ref 3.5–5.1)
PROT SERPL-MCNC: 5.6 G/DL (ref 6.4–8.2)
RBC # BLD AUTO: 3.95 M/UL (ref 3.8–5.2)
SERVICE CMNT-IMP: NORMAL
SODIUM BLD-SCNC: 137 MMOL/L (ref 136–145)
SODIUM SERPL-SCNC: 140 MMOL/L (ref 136–145)
SPECIMEN HOLD: NORMAL
SPECIMEN SITE: ABNORMAL
TROPONIN I SERPL HS-MCNC: 8 NG/L (ref 0–51)
WBC # BLD AUTO: 19.1 K/UL (ref 3.6–11)

## 2024-10-30 PROCEDURE — 99285 EMERGENCY DEPT VISIT HI MDM: CPT

## 2024-10-30 PROCEDURE — 84132 ASSAY OF SERUM POTASSIUM: CPT

## 2024-10-30 PROCEDURE — 82962 GLUCOSE BLOOD TEST: CPT

## 2024-10-30 PROCEDURE — 36415 COLL VENOUS BLD VENIPUNCTURE: CPT

## 2024-10-30 PROCEDURE — 85025 COMPLETE CBC W/AUTO DIFF WBC: CPT

## 2024-10-30 PROCEDURE — 96365 THER/PROPH/DIAG IV INF INIT: CPT

## 2024-10-30 PROCEDURE — 82330 ASSAY OF CALCIUM: CPT

## 2024-10-30 PROCEDURE — 84484 ASSAY OF TROPONIN QUANT: CPT

## 2024-10-30 PROCEDURE — 82947 ASSAY GLUCOSE BLOOD QUANT: CPT

## 2024-10-30 PROCEDURE — 6360000002 HC RX W HCPCS: Performed by: EMERGENCY MEDICINE

## 2024-10-30 PROCEDURE — 80053 COMPREHEN METABOLIC PANEL: CPT

## 2024-10-30 PROCEDURE — 6370000000 HC RX 637 (ALT 250 FOR IP)

## 2024-10-30 PROCEDURE — 71045 X-RAY EXAM CHEST 1 VIEW: CPT

## 2024-10-30 PROCEDURE — 82803 BLOOD GASES ANY COMBINATION: CPT

## 2024-10-30 PROCEDURE — 96376 TX/PRO/DX INJ SAME DRUG ADON: CPT

## 2024-10-30 PROCEDURE — 84443 ASSAY THYROID STIM HORMONE: CPT

## 2024-10-30 PROCEDURE — 1100000000 HC RM PRIVATE

## 2024-10-30 PROCEDURE — 96375 TX/PRO/DX INJ NEW DRUG ADDON: CPT

## 2024-10-30 PROCEDURE — 84295 ASSAY OF SERUM SODIUM: CPT

## 2024-10-30 PROCEDURE — 93005 ELECTROCARDIOGRAM TRACING: CPT | Performed by: EMERGENCY MEDICINE

## 2024-10-30 PROCEDURE — 83605 ASSAY OF LACTIC ACID: CPT

## 2024-10-30 PROCEDURE — 2580000003 HC RX 258: Performed by: EMERGENCY MEDICINE

## 2024-10-30 RX ORDER — KETOROLAC TROMETHAMINE 30 MG/ML
30 INJECTION, SOLUTION INTRAMUSCULAR; INTRAVENOUS
Status: COMPLETED | OUTPATIENT
Start: 2024-10-30 | End: 2024-10-30

## 2024-10-30 RX ORDER — ONDANSETRON 2 MG/ML
4 INJECTION INTRAMUSCULAR; INTRAVENOUS EVERY 6 HOURS PRN
Status: DISCONTINUED | OUTPATIENT
Start: 2024-10-30 | End: 2024-11-01 | Stop reason: HOSPADM

## 2024-10-30 RX ORDER — ACETAMINOPHEN 325 MG/1
650 TABLET ORAL EVERY 6 HOURS PRN
Status: DISCONTINUED | OUTPATIENT
Start: 2024-10-30 | End: 2024-11-01 | Stop reason: HOSPADM

## 2024-10-30 RX ORDER — ONDANSETRON 4 MG/1
4 TABLET, ORALLY DISINTEGRATING ORAL EVERY 8 HOURS PRN
Status: DISCONTINUED | OUTPATIENT
Start: 2024-10-30 | End: 2024-11-01 | Stop reason: HOSPADM

## 2024-10-30 RX ORDER — SENNA AND DOCUSATE SODIUM 50; 8.6 MG/1; MG/1
1 TABLET, FILM COATED ORAL DAILY
Status: DISCONTINUED | OUTPATIENT
Start: 2024-10-31 | End: 2024-11-01 | Stop reason: HOSPADM

## 2024-10-30 RX ORDER — AMLODIPINE BESYLATE 5 MG/1
10 TABLET ORAL DAILY
Status: DISCONTINUED | OUTPATIENT
Start: 2024-10-31 | End: 2024-11-01 | Stop reason: HOSPADM

## 2024-10-30 RX ORDER — TAMSULOSIN HYDROCHLORIDE 0.4 MG/1
0.4 CAPSULE ORAL NIGHTLY
Status: DISCONTINUED | OUTPATIENT
Start: 2024-10-30 | End: 2024-11-01 | Stop reason: HOSPADM

## 2024-10-30 RX ORDER — POLYETHYLENE GLYCOL 3350 17 G/17G
17 POWDER, FOR SOLUTION ORAL DAILY PRN
Status: DISCONTINUED | OUTPATIENT
Start: 2024-10-30 | End: 2024-11-01 | Stop reason: HOSPADM

## 2024-10-30 RX ORDER — LIDOCAINE 4 G/G
1 PATCH TOPICAL DAILY
Status: DISCONTINUED | OUTPATIENT
Start: 2024-10-31 | End: 2024-11-01 | Stop reason: HOSPADM

## 2024-10-30 RX ORDER — ACETAMINOPHEN 650 MG/1
650 SUPPOSITORY RECTAL EVERY 6 HOURS PRN
Status: DISCONTINUED | OUTPATIENT
Start: 2024-10-30 | End: 2024-11-01 | Stop reason: HOSPADM

## 2024-10-30 RX ORDER — CETIRIZINE HYDROCHLORIDE 10 MG/1
10 TABLET ORAL NIGHTLY
Status: DISCONTINUED | OUTPATIENT
Start: 2024-10-30 | End: 2024-11-01 | Stop reason: HOSPADM

## 2024-10-30 RX ORDER — FUROSEMIDE 40 MG/1
40 TABLET ORAL DAILY
Status: DISCONTINUED | OUTPATIENT
Start: 2024-10-31 | End: 2024-11-01 | Stop reason: HOSPADM

## 2024-10-30 RX ORDER — SODIUM CHLORIDE 0.9 % (FLUSH) 0.9 %
5-40 SYRINGE (ML) INJECTION EVERY 12 HOURS SCHEDULED
Status: DISCONTINUED | OUTPATIENT
Start: 2024-10-30 | End: 2024-11-01 | Stop reason: HOSPADM

## 2024-10-30 RX ORDER — FERROUS SULFATE 325(65) MG
325 TABLET ORAL EVERY OTHER DAY
Status: DISCONTINUED | OUTPATIENT
Start: 2024-10-31 | End: 2024-11-01 | Stop reason: HOSPADM

## 2024-10-30 RX ORDER — PANTOPRAZOLE SODIUM 40 MG/1
40 TABLET, DELAYED RELEASE ORAL
Status: DISCONTINUED | OUTPATIENT
Start: 2024-10-31 | End: 2024-11-01 | Stop reason: HOSPADM

## 2024-10-30 RX ORDER — CYCLOBENZAPRINE HCL 10 MG
10 TABLET ORAL 3 TIMES DAILY
Status: DISCONTINUED | OUTPATIENT
Start: 2024-10-30 | End: 2024-10-31

## 2024-10-30 RX ORDER — SODIUM CHLORIDE 9 MG/ML
INJECTION, SOLUTION INTRAVENOUS PRN
Status: DISCONTINUED | OUTPATIENT
Start: 2024-10-30 | End: 2024-11-01 | Stop reason: HOSPADM

## 2024-10-30 RX ORDER — ARNICA MONTANA, BELLIS PERENNIS, HYPERICUM PERFORATUM, LEDUM PALUSTRE TWIG, AND RUTA GRAVEOLENS FLOWERING TOP 2; 3; 3; 3; 3 [HP_X]/G; [HP_X]/G; [HP_X]/G; [HP_X]/G; [HP_X]/G
1 GEL TOPICAL 3 TIMES DAILY
Status: DISCONTINUED | OUTPATIENT
Start: 2024-10-30 | End: 2024-10-30 | Stop reason: CLARIF

## 2024-10-30 RX ORDER — ALBUTEROL SULFATE 0.83 MG/ML
2.5 SOLUTION RESPIRATORY (INHALATION) 4 TIMES DAILY PRN
Status: DISCONTINUED | OUTPATIENT
Start: 2024-10-30 | End: 2024-11-01 | Stop reason: HOSPADM

## 2024-10-30 RX ORDER — GABAPENTIN 300 MG/1
300 CAPSULE ORAL 3 TIMES DAILY
Status: DISCONTINUED | OUTPATIENT
Start: 2024-10-30 | End: 2024-10-31

## 2024-10-30 RX ORDER — ENOXAPARIN SODIUM 100 MG/ML
40 INJECTION SUBCUTANEOUS DAILY
Status: DISCONTINUED | OUTPATIENT
Start: 2024-10-31 | End: 2024-11-01 | Stop reason: HOSPADM

## 2024-10-30 RX ORDER — ERGOCALCIFEROL 1.25 MG/1
50000 CAPSULE, LIQUID FILLED ORAL WEEKLY
Status: DISCONTINUED | OUTPATIENT
Start: 2024-11-04 | End: 2024-11-01 | Stop reason: HOSPADM

## 2024-10-30 RX ORDER — SODIUM CHLORIDE 0.9 % (FLUSH) 0.9 %
5-40 SYRINGE (ML) INJECTION PRN
Status: DISCONTINUED | OUTPATIENT
Start: 2024-10-30 | End: 2024-11-01 | Stop reason: HOSPADM

## 2024-10-30 RX ADMIN — KETOROLAC TROMETHAMINE 30 MG: 30 INJECTION, SOLUTION INTRAMUSCULAR at 19:46

## 2024-10-30 RX ADMIN — CETIRIZINE HYDROCHLORIDE 10 MG: 10 TABLET, FILM COATED ORAL at 23:06

## 2024-10-30 RX ADMIN — TAMSULOSIN HYDROCHLORIDE 0.4 MG: 0.4 CAPSULE ORAL at 23:05

## 2024-10-30 RX ADMIN — CYCLOBENZAPRINE HYDROCHLORIDE 10 MG: 10 TABLET, FILM COATED ORAL at 23:06

## 2024-10-30 RX ADMIN — GABAPENTIN 300 MG: 300 CAPSULE ORAL at 23:04

## 2024-10-30 RX ADMIN — DEXTROSE MONOHYDRATE 250 ML: 100 INJECTION, SOLUTION INTRAVENOUS at 19:05

## 2024-10-30 ASSESSMENT — PAIN SCALES - GENERAL
PAINLEVEL_OUTOF10: 0
PAINLEVEL_OUTOF10: 7

## 2024-10-30 ASSESSMENT — PAIN DESCRIPTION - LOCATION: LOCATION: BACK

## 2024-10-30 ASSESSMENT — PAIN - FUNCTIONAL ASSESSMENT: PAIN_FUNCTIONAL_ASSESSMENT: NONE - DENIES PAIN

## 2024-10-30 NOTE — ED PROVIDER NOTES
[facility] EMERGENCY DEPT  EMERGENCY DEPARTMENT ENCOUNTER      Pt Name: Candida Maza  MRN: [id number]  Birthdate 1960  Date of evaluation: 10/30/2024  Provider: Pito Stanley DO    CHIEF COMPLAINT       Chief Complaint   Patient presents with    Blood Sugar Problem         HISTORY OF PRESENT ILLNESS   (Location/Symptom, Timing/Onset, Context/Setting, Quality, Duration, Modifying Factors, Severity)  Note limiting factors.   64-year-old female comes in for unresponsive episode.  Patient has a history of diabetes.  She remembers laying down this morning on the couch son found her sleeping ever since 730.  EMS arrived and found the patient unresponsive covered in urine with a blood sugar of 20.  They placed an IO and were able to give the patient D10 now awake answering questions complaining of generalized pain all over and being cold.  She denies other complaints.            Review of External Medical Records:     Nursing Notes were reviewed.    REVIEW OF SYSTEMS    (2-9 systems for level 4, 10 or more for level 5)     Review of Systems    Except as noted above the remainder of the review of systems was reviewed and negative.       PAST MEDICAL HISTORY     Past Medical History:   Diagnosis Date    Arthritis     Asthma     Cataracts, bilateral     Diabetes (HCC)     seen by endocrinology at [facility]    GERD (gastroesophageal reflux disease)     Hypertension     Obesity, Class II, BMI 35-39.9     Pancreatic insufficiency          SURGICAL HISTORY       Past Surgical History:   Procedure Laterality Date    CATARACT REMOVAL Right 10/2016    CATARACT REMOVAL Left 10/2015    CHOLECYSTECTOMY  2005?    COLONOSCOPY N/A 11/30/2016    COLONOSCOPY,EGD performed by Jarocho Tim MD at [facility] ENDOSCOPY    COLONOSCOPY      PANCREAS SURGERY PROC UNLISTED  2001?    Distal pancreatectomy.    PANCREATIC ELASTASE, FECAL, QUAL/SEMI-QUANT  1999    growths in prancreatitis         CURRENT MEDICATIONS       Previous Medications

## 2024-10-30 NOTE — ED TRIAGE NOTES
Patient arrives alert from home. Per EMS patient BG on arrival was 20 inserted left IO, patient was given 250mL D10, last .    Per son patient has been sleeping since 0730 and son called EMS when patient was unresponsive.

## 2024-10-30 NOTE — ED NOTES
1900: SBAR report received from EMILIANO Mejia.     1920: Pt currently has 125mL D10 bolus infusing.    1945: This RN checked patient's BG which was 94 after 15 minutes of completion. Spoke with Dr. Stanley to continue the rest of D10 bolus. Pt becoming more alert. AxO2. Provided pain relief for back pain & oral moisturizer for lips. Pt's temperature rising as well. Bear hugger continued.     2000: This RN allowed family back to sit with pt at this time.    2020: BG recheck 105. Spoke with Dr. Stanley to consider food/oral replenishment. Dr. Stanley told this RN to wait until 2100 to assess blood sugar again and then to try food.     2100: This RN rechecked BG which was 85. This RN fed pt pudding and an apple juice.    2200: Bear hugger removed. Pt's oral temperature 98.4. BG 98 upon recheck 1 hr post food. Pt still saying she is hungry, this RN feeding pt microwave meal.

## 2024-10-31 PROBLEM — N18.32 CKD STAGE 3B, GFR 30-44 ML/MIN (HCC): Status: ACTIVE | Noted: 2024-10-31

## 2024-10-31 PROBLEM — D72.829 LEUKOCYTOSIS: Status: ACTIVE | Noted: 2024-10-31

## 2024-10-31 PROBLEM — T68.XXXA HYPOTHERMIA: Status: ACTIVE | Noted: 2024-10-31

## 2024-10-31 LAB
AMPHET UR QL SCN: NEGATIVE
ANION GAP SERPL CALC-SCNC: 5 MMOL/L (ref 2–12)
BARBITURATES UR QL SCN: NEGATIVE
BASOPHILS # BLD: 0 K/UL (ref 0–0.1)
BASOPHILS NFR BLD: 0 % (ref 0–1)
BENZODIAZ UR QL: NEGATIVE
BUN SERPL-MCNC: 33 MG/DL (ref 6–20)
BUN/CREAT SERPL: 20 (ref 12–20)
CALCIUM SERPL-MCNC: 7.5 MG/DL (ref 8.5–10.1)
CANNABINOIDS UR QL SCN: NEGATIVE
CHLORIDE SERPL-SCNC: 111 MMOL/L (ref 97–108)
CK SERPL-CCNC: 118 U/L (ref 26–192)
CO2 SERPL-SCNC: 23 MMOL/L (ref 21–32)
COCAINE UR QL SCN: NEGATIVE
CREAT SERPL-MCNC: 1.61 MG/DL (ref 0.55–1.02)
DIFFERENTIAL METHOD BLD: ABNORMAL
EOSINOPHIL # BLD: 0 K/UL (ref 0–0.4)
EOSINOPHIL NFR BLD: 0 % (ref 0–7)
ERYTHROCYTE [DISTWIDTH] IN BLOOD BY AUTOMATED COUNT: 13.9 % (ref 11.5–14.5)
ETHANOL SERPL-MCNC: <10 MG/DL (ref 0–0.08)
GLUCOSE BLD STRIP.AUTO-MCNC: 108 MG/DL (ref 65–117)
GLUCOSE BLD STRIP.AUTO-MCNC: 122 MG/DL (ref 65–117)
GLUCOSE BLD STRIP.AUTO-MCNC: 141 MG/DL (ref 65–117)
GLUCOSE BLD STRIP.AUTO-MCNC: 170 MG/DL (ref 65–117)
GLUCOSE BLD STRIP.AUTO-MCNC: 223 MG/DL (ref 65–117)
GLUCOSE BLD STRIP.AUTO-MCNC: 263 MG/DL (ref 65–117)
GLUCOSE BLD STRIP.AUTO-MCNC: 57 MG/DL (ref 65–117)
GLUCOSE BLD STRIP.AUTO-MCNC: 57 MG/DL (ref 65–117)
GLUCOSE BLD STRIP.AUTO-MCNC: 64 MG/DL (ref 65–117)
GLUCOSE BLD STRIP.AUTO-MCNC: 74 MG/DL (ref 65–117)
GLUCOSE BLD STRIP.AUTO-MCNC: 76 MG/DL (ref 65–117)
GLUCOSE BLD STRIP.AUTO-MCNC: 98 MG/DL (ref 65–117)
GLUCOSE SERPL-MCNC: 83 MG/DL (ref 65–100)
HCT VFR BLD AUTO: 24.7 % (ref 35–47)
HGB BLD-MCNC: 8.3 G/DL (ref 11.5–16)
IMM GRANULOCYTES # BLD AUTO: 0.1 K/UL (ref 0–0.04)
IMM GRANULOCYTES NFR BLD AUTO: 0 % (ref 0–0.5)
LYMPHOCYTES # BLD: 3.4 K/UL (ref 0.8–3.5)
LYMPHOCYTES NFR BLD: 29 % (ref 12–49)
Lab: NORMAL
MCH RBC QN AUTO: 29.6 PG (ref 26–34)
MCHC RBC AUTO-ENTMCNC: 33.6 G/DL (ref 30–36.5)
MCV RBC AUTO: 88.2 FL (ref 80–99)
METHADONE UR QL: NEGATIVE
MONOCYTES # BLD: 0.5 K/UL (ref 0–1)
MONOCYTES NFR BLD: 4 % (ref 5–13)
NEUTS SEG # BLD: 8 K/UL (ref 1.8–8)
NEUTS SEG NFR BLD: 67 % (ref 32–75)
NRBC # BLD: 0 K/UL (ref 0–0.01)
NRBC BLD-RTO: 0 PER 100 WBC
OPIATES UR QL: NEGATIVE
PCP UR QL: NEGATIVE
PLATELET # BLD AUTO: 218 K/UL (ref 150–400)
PMV BLD AUTO: 10.3 FL (ref 8.9–12.9)
POTASSIUM SERPL-SCNC: 5 MMOL/L (ref 3.5–5.1)
RBC # BLD AUTO: 2.8 M/UL (ref 3.8–5.2)
SERVICE CMNT-IMP: ABNORMAL
SERVICE CMNT-IMP: NORMAL
SODIUM SERPL-SCNC: 139 MMOL/L (ref 136–145)
TSH SERPL DL<=0.05 MIU/L-ACNC: 0.69 UIU/ML (ref 0.36–3.74)
WBC # BLD AUTO: 12 K/UL (ref 3.6–11)

## 2024-10-31 PROCEDURE — 82550 ASSAY OF CK (CPK): CPT

## 2024-10-31 PROCEDURE — 80307 DRUG TEST PRSMV CHEM ANLYZR: CPT

## 2024-10-31 PROCEDURE — 6370000000 HC RX 637 (ALT 250 FOR IP)

## 2024-10-31 PROCEDURE — 1100000000 HC RM PRIVATE

## 2024-10-31 PROCEDURE — 97165 OT EVAL LOW COMPLEX 30 MIN: CPT

## 2024-10-31 PROCEDURE — 2580000003 HC RX 258

## 2024-10-31 PROCEDURE — 6360000002 HC RX W HCPCS

## 2024-10-31 PROCEDURE — 82962 GLUCOSE BLOOD TEST: CPT

## 2024-10-31 PROCEDURE — 97530 THERAPEUTIC ACTIVITIES: CPT

## 2024-10-31 PROCEDURE — 82077 ASSAY SPEC XCP UR&BREATH IA: CPT

## 2024-10-31 PROCEDURE — 97161 PT EVAL LOW COMPLEX 20 MIN: CPT

## 2024-10-31 PROCEDURE — 99222 1ST HOSP IP/OBS MODERATE 55: CPT

## 2024-10-31 PROCEDURE — 85025 COMPLETE CBC W/AUTO DIFF WBC: CPT

## 2024-10-31 PROCEDURE — 94761 N-INVAS EAR/PLS OXIMETRY MLT: CPT

## 2024-10-31 PROCEDURE — 80048 BASIC METABOLIC PNL TOTAL CA: CPT

## 2024-10-31 PROCEDURE — 36415 COLL VENOUS BLD VENIPUNCTURE: CPT

## 2024-10-31 PROCEDURE — 97116 GAIT TRAINING THERAPY: CPT

## 2024-10-31 RX ORDER — GABAPENTIN 100 MG/1
100 CAPSULE ORAL 3 TIMES DAILY
Status: DISCONTINUED | OUTPATIENT
Start: 2024-10-31 | End: 2024-11-01 | Stop reason: HOSPADM

## 2024-10-31 RX ORDER — INSULIN LISPRO 100 [IU]/ML
0-4 INJECTION, SOLUTION INTRAVENOUS; SUBCUTANEOUS
Status: DISCONTINUED | OUTPATIENT
Start: 2024-10-31 | End: 2024-11-01

## 2024-10-31 RX ORDER — ACETAMINOPHEN 325 MG/1
650 TABLET ORAL EVERY 6 HOURS SCHEDULED
Status: DISCONTINUED | OUTPATIENT
Start: 2024-10-31 | End: 2024-11-01 | Stop reason: HOSPADM

## 2024-10-31 RX ORDER — CYCLOBENZAPRINE HCL 10 MG
10 TABLET ORAL 3 TIMES DAILY PRN
Status: DISCONTINUED | OUTPATIENT
Start: 2024-10-31 | End: 2024-11-01 | Stop reason: HOSPADM

## 2024-10-31 RX ORDER — DEXTROSE MONOHYDRATE 100 MG/ML
INJECTION, SOLUTION INTRAVENOUS CONTINUOUS PRN
Status: DISCONTINUED | OUTPATIENT
Start: 2024-10-31 | End: 2024-11-01 | Stop reason: HOSPADM

## 2024-10-31 RX ADMIN — PANTOPRAZOLE SODIUM 40 MG: 40 TABLET, DELAYED RELEASE ORAL at 05:58

## 2024-10-31 RX ADMIN — AMLODIPINE BESYLATE 10 MG: 5 TABLET ORAL at 09:18

## 2024-10-31 RX ADMIN — GABAPENTIN 100 MG: 100 CAPSULE ORAL at 21:00

## 2024-10-31 RX ADMIN — SODIUM CHLORIDE, PRESERVATIVE FREE 10 ML: 5 INJECTION INTRAVENOUS at 21:00

## 2024-10-31 RX ADMIN — SENNOSIDES AND DOCUSATE SODIUM 1 TABLET: 50; 8.6 TABLET ORAL at 09:18

## 2024-10-31 RX ADMIN — INSULIN LISPRO 2 UNITS: 100 INJECTION, SOLUTION INTRAVENOUS; SUBCUTANEOUS at 17:48

## 2024-10-31 RX ADMIN — TAMSULOSIN HYDROCHLORIDE 0.4 MG: 0.4 CAPSULE ORAL at 21:00

## 2024-10-31 RX ADMIN — SODIUM CHLORIDE, PRESERVATIVE FREE 10 ML: 5 INJECTION INTRAVENOUS at 09:19

## 2024-10-31 RX ADMIN — ACETAMINOPHEN 650 MG: 325 TABLET ORAL at 17:55

## 2024-10-31 RX ADMIN — CETIRIZINE HYDROCHLORIDE 10 MG: 10 TABLET, FILM COATED ORAL at 21:00

## 2024-10-31 RX ADMIN — ACETAMINOPHEN 650 MG: 325 TABLET ORAL at 12:17

## 2024-10-31 RX ADMIN — PANCRELIPASE LIPASE, PANCRELIPASE PROTEASE, PANCRELIPASE AMYLASE 15000 UNITS: 15000; 47000; 63000 CAPSULE, DELAYED RELEASE ORAL at 09:21

## 2024-10-31 RX ADMIN — PANCRELIPASE LIPASE, PANCRELIPASE PROTEASE, PANCRELIPASE AMYLASE 20000 UNITS: 20000; 63000; 84000 CAPSULE, DELAYED RELEASE ORAL at 12:18

## 2024-10-31 RX ADMIN — PANCRELIPASE LIPASE, PANCRELIPASE PROTEASE, PANCRELIPASE AMYLASE 20000 UNITS: 20000; 63000; 84000 CAPSULE, DELAYED RELEASE ORAL at 09:18

## 2024-10-31 RX ADMIN — PANCRELIPASE LIPASE, PANCRELIPASE PROTEASE, PANCRELIPASE AMYLASE 15000 UNITS: 15000; 47000; 63000 CAPSULE, DELAYED RELEASE ORAL at 17:48

## 2024-10-31 RX ADMIN — INSULIN LISPRO 1 UNITS: 100 INJECTION, SOLUTION INTRAVENOUS; SUBCUTANEOUS at 20:59

## 2024-10-31 RX ADMIN — FUROSEMIDE 40 MG: 40 TABLET ORAL at 09:19

## 2024-10-31 RX ADMIN — FERROUS SULFATE TAB 325 MG (65 MG ELEMENTAL FE) 325 MG: 325 (65 FE) TAB at 09:18

## 2024-10-31 RX ADMIN — PANCRELIPASE LIPASE, PANCRELIPASE PROTEASE, PANCRELIPASE AMYLASE 20000 UNITS: 20000; 63000; 84000 CAPSULE, DELAYED RELEASE ORAL at 17:48

## 2024-10-31 RX ADMIN — ENOXAPARIN SODIUM 40 MG: 100 INJECTION SUBCUTANEOUS at 09:19

## 2024-10-31 RX ADMIN — ACETAMINOPHEN 650 MG: 325 TABLET ORAL at 23:40

## 2024-10-31 RX ADMIN — CYCLOBENZAPRINE HYDROCHLORIDE 10 MG: 10 TABLET, FILM COATED ORAL at 17:58

## 2024-10-31 RX ADMIN — PANCRELIPASE LIPASE, PANCRELIPASE PROTEASE, PANCRELIPASE AMYLASE 15000 UNITS: 15000; 47000; 63000 CAPSULE, DELAYED RELEASE ORAL at 12:17

## 2024-10-31 ASSESSMENT — PAIN SCALES - GENERAL: PAINLEVEL_OUTOF10: 0

## 2024-10-31 NOTE — CARE COORDINATION
2487 Updates:    [facility] declined.  Patient is not open to their services.  Referral sent to multiple HH agencies, per patient's request.  Pending acceptance at this time.  Will continue to follow.       Care Management Initial Assessment  10/31/2024 2:46 PM    Reason for Admission:   Hypoglycemia [E16.2]  Hypothermia, initial encounter [T68.XXXA]  Leukocytosis, unspecified type [D72.829]         Patient Admission Status: Inpatient  Date Admitted to INP: 10/30/2024  RUR: Readmission Risk Score: 15.7    Hospitalization in the last 30 days (Readmission):  No        Advance Care Planning:  Code Status: Full Code  Primary Healthcare Decision Maker: (P) Legal Next of Kin (Son: Pavan Maza (Ernest)  [phone])  Primary Decision Maker: Corona Jama Other - [phone]    Secondary Decision Maker: Pavan Maza - Child - [phone]   Advance Directive: has NO advanced directive - not interested in additional information     __________________________________________________________________________  Assessment:     Pt was admitted on 10/30/2024 for hypoglycemia. CM met with Pt to complete initial assessment. CM introduced self, CM role, and verified demographics.     Patient stated that she is open to  services, which she thinks is [facility].  Referral sent to confirm.  Pending acceptance at this time.         10/31/24 1441   Service Assessment   Patient Orientation Alert and Oriented   Cognition Alert   History Provided By Patient   Primary Caregiver Self   Support Systems Children  (Son: Pavan Maza (Ernest) [phone])   Patient's Healthcare Decision Maker is: Legal Next of Kin  (Son: Pavan Maza (Ernest)  [phone])   PCP Verified by CM Yes  (Gordon COPELAND, Keena [phone])   Last Visit to PCP Within last 3 months   Prior Functional Level Independent in ADLs/IADLs   Current Functional Level Independent in ADLs/IADLs   Can patient return to prior living  supports the patient's individualized plan of care/goals, treatment preferences, and shares the quality data associated with the providers?  Yes         Comments:     Discharge Concerns: []Yes [x]No []Unknown   Describe:    Financial concerns/barriers: []Yes, explain: [x]No []Unknown/Not discussed  __________________________________________________________________________    Insurer:   Active Insurance as of 10/30/2024       Primary Coverage       Payor Plan Insurance Group Employer/Plan Group    [place] [facility] COMMUNITY PLAN [facility]        Payor Plan Address Payor Plan Phone Number Payor Plan Fax Number Effective Dates    [address] [phone]  2/15/2024 - None Entered    [facility] [address]         Subscriber Name Subscriber Birth Date Member ID       ZEE HERZOG 1960 [id number]                     PCP: Keena Leyva MD   Address: [address] [place] [address] / [place] [address]   Phone number: [phone]    Pharmacy:   [facility]/pharmacy #[id number] [facility] [address] [facility] [phone] - F [phone]  [address] [place] [address]  Phone: [phone] Fax: [phone]    [facility]/pharmacy #[id number] - [place], [place] - [address] [facility] [place] -  [phone] - F [phone]  [address] [facility] [address]  Phone: [phone] Fax: [phone]      PR Transport: (P) Family       Transition of care plan:    [x] Home with Home Health   - [place] of Choice offered? [x] Yes, Preference: Patient stated that she is open to HHC services.  She thinks that it's [facility].  Referral sent.       ______________________________________  EZEKIEL Marx, RN-CM  [facility]- Care Management  Available via [employer]  10/31/2024  2:50 PM        ______________________________________  EZEKIEL Marx, RN-CM  [facility]- Care Management  Available via [employer]  10/31/2024  2:48 PM

## 2024-10-31 NOTE — PROGRESS NOTES
[address] [place] [address]   Office [phone]  Fax [phone]          Subjective / Objective     Subjective  Overnight Events: none  Patient seen and examined at bedside. Reports feeling tired this morning, and has pain in her abdomen. Denies CP, SOB, N/V, dysuria.    Respiratory:   RA  Vitals:    10/31/24 0433   BP: (!) 108/51   Pulse: 74   Resp: 18   Temp: 98.2 °F (36.8 °C)   SpO2: 95%     Physical Examination:   General appearance - alert but falling asleep, well appearing, and in no distress  Chest - clear to auscultation, no wheezes, rales or rhonchi, symmetric air entry  Heart - normal rate, regular rhythm, normal S1, S2, no murmurs, rubs, clicks or gallops,   Abdomen - soft, TTP in epigastrium and LLQ, nondistended, no masses or organomegaly  Neurological - A&Ox2 (knew name, year), normal speech, no focal findings  Skin - warm, dry. No notable rashes  Extremities - peripheral pulses normal, +1 pitting pedal edema, no clubbing or cyanosis  Psychiatric - normal speech and thought processes    I/O:  No intake/output data recorded.  Inpatient Medications    Current Facility-Administered Medications   Medication Dose Route Frequency    glucose chewable tablet 16 g  4 tablet Oral PRN    dextrose bolus 10% 125 mL  125 mL IntraVENous PRN    Or    dextrose bolus 10% 250 mL  250 mL IntraVENous PRN    glucagon injection 1 mg  1 mg SubCUTAneous PRN    dextrose 10 % infusion   IntraVENous Continuous PRN    sodium chloride flush 0.9 % injection 5-40 mL  5-40 mL IntraVENous 2 times per day    sodium chloride flush 0.9 % injection 5-40 mL  5-40 mL IntraVENous PRN    0.9 % sodium chloride infusion   IntraVENous PRN    enoxaparin (LOVENOX) injection 40 mg  40 mg SubCUTAneous Daily    ondansetron (ZOFRAN-ODT) disintegrating tablet 4 mg  4 mg Oral Q8H PRN    Or    ondansetron (ZOFRAN) injection 4 mg  4 mg IntraVENous Q6H PRN    polyethylene glycol (GLYCOLAX) packet 17 g  17 g Oral Daily PRN     [id number] 2 capsules TID. Mild epigastric TTP on exam  - Substitute Zenpep for creon     Asthma: Chronic, stable  - Home albuterol      Muscle spams: Chronic, stable  - Flexeril 10 mg TID prn     Vitamin D deficiency: Chronic, stable  - Home cholecalciferol 1.25 mg qd     Iron deficiency anemia: Chronic. Hgb 11.3  - FeroSul 325 mg qod      GERD: Chronic, stable  - Substiture protonix for home omeprazole 20 mg qd     Migraine: Chronic, stable on sumatriptan 100 mg prn     Seasonal Allergies: Chronic, stable  - Cetirizine 10 mg qd     FEN/GI - Diabetic diet.   Activity - Ambulate as tolerated  DVT prophylaxis - SCDs  GI prophylaxis - home omeprazole 20 mg   Fall prophylaxis - Not indicated at this time.  Disposition - Plan to d/c to TBD. Consulting PT/OT  Code Status - Full, discussed with patient / caregivers.  Next of Kin Name and Contact Ted Maza [phone]    Patient discussed with Ester Rebolledo MD Grace E Le, MD  [facility] Resident       For Billing    Chief Complaint   Patient presents with    Blood Sugar Problem

## 2024-10-31 NOTE — PLAN OF CARE
Problem: Physical Therapy - Adult  Goal: By Discharge: Performs mobility at highest level of function for planned discharge setting.  See evaluation for individualized goals.  Description: FUNCTIONAL STATUS PRIOR TO ADMISSION: Patient was independent and active without use of DME.    HOME SUPPORT PRIOR TO ADMISSION: The patient lived with son but did not require assistance.    Physical Therapy Goals  Initiated 10/31/2024  1.  Patient will move from supine to sit and sit to supine , scoot up and down, and roll side to side in bed with modified independence within 7 day(s).    2.  Patient will transfer from bed to chair and chair to bed with modified independence using the least restrictive device within 7 day(s).  3.  Patient will perform sit to stand with modified independence within 7 day(s).  4.  Patient will ambulate with independence for 300 feet with the least restrictive device within 7 day(s).   5.  Patient will ascend/descend 4 stairs with 1 handrail(s) with modified independence within 7 day(s).    Outcome: Progressing   PHYSICAL THERAPY EVALUATION    Patient: Candida Maza (64 y.o. female)  Date: 10/31/2024  Primary Diagnosis: Hypoglycemia [E16.2]  Hypothermia, initial encounter [T68.XXXA]  Leukocytosis, unspecified type [D72.829]       Precautions:                        ASSESSMENT :   DEFICITS/IMPAIRMENTS:   The patient is limited by decreased functional mobility, strength, activity tolerance, endurance, balance, and gait dysfunction s/p admission for hypoglycemia. Patient's blood sugar improved and cleared to be seen but still low-normal.  Patient overall sleepy and somewhat lethargic, which likely is primary factor impacting mobility as patient was fully indep prior to admission.  Today needed CGA for safety and RW for support with transfers and gait, cues for safe use and body position within frame.       Based on the impairments listed above, anticipate once blood sugar stabilizes and medical  assistance  Toilet Transfer: Contact-guard assistance  Balance:               Balance  Sitting: Intact  Standing: Impaired  Standing - Static: Constant support;Good  Standing - Dynamic: Constant support;Fair;Good  Ambulation/Gait Training:                       Gait  Gait Training: Yes  Assistive Device: Gait belt;Walker, rolling  Interventions: Safety awareness training;Verbal cues  Speed/Belinda: Pace decreased (< 100 feet/min)  Step Length: Right shortened;Left shortened  Gait Abnormalities: Path deviations;Decreased step clearance                                                                                                                                                                                                                                                                [facility] AM-PAC®      Basic Mobility Inpatient Short Form (6-Clicks) Version 2    How much help is needed turning from your back to your side while in a flat bed without using bedrails?: A Little  How much help is needed moving from lying on your back to sitting on the side of a flat bed without using bedrails?: A Little  How much help is needed moving to and from a bed to a chair?: A Little  How much help is needed standing up from a chair using your arms?: A Little  How much help is needed walking in hospital room?: A Little  How much help is needed climbing 3-5 steps with a railing?: A Little    AM-PAC Inpatient Mobility Raw Score : 18  AM-PAC Inpatient T-Scale Score : 43.63     Cutoff score <=171,2,3 had higher odds of discharging home with home health or need of SNF/IPR.    1. Ashlee Farmer, Idalia Bradford, Edgardo Kevin, Candida Brooks, Nolan Story, Reese Farmer.  Validity of the AM-PAC “6-Clicks” Inpatient Daily Activity and Basic Mobility Short Forms. Physical Therapy Mar 2014, [phone]; DOI: 10.2522/ptj.01293512  2. Meek Aguilar, Shira ASTUDILLO, Reynaldo ASTUDILLO. Association of AM-PAC \"6-Clicks\"

## 2024-10-31 NOTE — H&P
Other reaction(s): Cough  Type: Ingredient; Reaction: cough.  Other reaction(s): Cough      Sulfamethoxazole-Trimethoprim Nausea And Vomiting and Nausea Only     Type: NonCodified; Reaction: nause.    Type: NonCodified; Reaction: nause.  Type: NonCodified; Reaction: nause.  Type: NonCodified; Reaction: nause.  Type: NonCodified; Reaction: nause.         Past Medical History:   Diagnosis Date    Arthritis     Asthma     Cataracts, bilateral     Diabetes (HCC)     seen by endocrinology at [facility]    GERD (gastroesophageal reflux disease)     Hypertension     Obesity, Class II, BMI 35-39.9     Pancreatic insufficiency        Past Surgical History:   Procedure Laterality Date    CATARACT REMOVAL Right 10/2016    CATARACT REMOVAL Left 10/2015    CHOLECYSTECTOMY  ?    COLONOSCOPY N/A 2016    COLONOSCOPY,EGD performed by Jarocho Tim MD at [facility] ENDOSCOPY    COLONOSCOPY      PANCREAS SURGERY PROC UNLISTED  ?    Distal pancreatectomy.    PANCREATIC ELASTASE, FECAL, QUAL/SEMI-QUANT      growths in prancreatitis       Family History   Problem Relation Age of Onset    Diabetes Brother     Breast Cancer Sister 56    Heart Disease Father     Diabetes Mother        Social History  Social History     Socioeconomic History    Marital status: Legally      Spouse name: Not on file    Number of children: Not on file    Years of education: Not on file    Highest education level: Not on file   Occupational History    Not on file   Tobacco Use    Smoking status: Former     Current packs/day: 0.00     Average packs/day: 0.5 packs/day for 10.0 years (5.0 ttl pk-yrs)     Types: Cigarettes     Start date: 2016     Quit date: 2016     Years since quittin.6    Smokeless tobacco: Never   Substance and Sexual Activity    Alcohol use: No     Alcohol/week: 0.0 standard drinks of alcohol    Drug use: No    Sexual activity: Not on file   Other Topics Concern    Not on file   Social History Narrative    Not  Time: 10/30/24  6:41 PM   Result Value Ref Range    Sodium 140 136 - 145 mmol/L    Potassium 4.7 3.5 - 5.1 mmol/L    Chloride 110 (H) 97 - 108 mmol/L    CO2 26 21 - 32 mmol/L    Anion Gap 4 2 - 12 mmol/L    Glucose 84 65 - 100 mg/dL    BUN 31 (H) 6 - 20 MG/DL    Creatinine 1.58 (H) 0.55 - 1.02 MG/DL    BUN/Creatinine Ratio 20 12 - 20      Est, Glom Filt Rate 36 (L) >60 ml/min/1.73m2    Calcium 8.5 8.5 - 10.1 MG/DL    Total Bilirubin 0.2 0.2 - 1.0 MG/DL    ALT 28 12 - 78 U/L    AST 19 15 - 37 U/L    Alk Phosphatase 166 (H) 45 - 117 U/L    Total Protein 5.6 (L) 6.4 - 8.2 g/dL    Albumin 1.7 (L) 3.5 - 5.0 g/dL    Globulin 3.9 2.0 - 4.0 g/dL    Albumin/Globulin Ratio 0.4 (L) 1.1 - 2.2     Troponin    Collection Time: 10/30/24  6:41 PM   Result Value Ref Range    Troponin, High Sensitivity 8 0 - 51 ng/L   Extra Tubes Hold    Collection Time: 10/30/24  6:41 PM   Result Value Ref Range    Specimen HOld  2 BC BOTTLES     Comment:        Add-on orders for these samples will be processed based on acceptable specimen integrity and analyte stability, which may vary by analyte.   POCT Blood Gas & Electrolytes    Collection Time: 10/30/24  6:53 PM   Result Value Ref Range    PH, VENOUS (POC) 7.27 (L) 7.32 - 7.42      PCO2, [place], POC 53.0 (H) 41 - 51 MMHG    PO2, VENOUS (POC) <27 25 - 40 mmHg    HCO3, Arterial 24 mmol/L    Base Deficit (POC) 3.3 mmol/L    POC Sodium 137 136 - 145 MMOL/L    POC Potassium 4.8 3.5 - 5.5 MMOL/L    POC Chloride 106 100 - 111 MMOL/L    POC TCO2 24 22 - 29 MMOL/L    Anion Gap, POC 7 (L) 10 - 20      POC Glucose 77 74 - 99 MG/DL    POC Creatinine 1.3 0.6 - 1.3 MG/DL    eGFR, POC 46 (L) >60 ml/min/1.73m2    POC Ionized Calcium 1.19 1.15 - 1.33 mmol/L    POC Lactic Acid 2.00 0.40 - 2.00 mmol/L    Source VENOUS BLOOD     POC Lactic Acid    Collection Time: 10/30/24  7:40 PM   Result Value Ref Range    POC Lactic Acid 0.53 0.40 - 2.00 mmol/L   POCT Glucose    Collection Time: 10/30/24  7:46 PM   Result Value

## 2024-10-31 NOTE — ED NOTES
TRANSFER - OUT REPORT:    Verbal report given to EMILIANO Espinal on Candida Maza  being transferred to [facility] for routine progression of patient care       Report consisted of patient's Situation, Background, Assessment and   Recommendations(SBAR).     Information from the following report(s) Nurse Handoff Report, Index, ED Encounter Summary, ED SBAR, Adult Overview, MAR, Recent Results, and Cardiac Rhythm NSR  was reviewed with the receiving nurse.    [place] Fall Assessment:    Presents to emergency department  because of falls (Syncope, seizure, or loss of consciousness): No  Age > 70: No  Altered Mental Status, Intoxication with alcohol or substance confusion (Disorientation, impaired judgment, poor safety awaremess, or inability to follow instructions): Yes  Impaired Mobility: Ambulates or transfers with assistive devices or assistance; Unable to ambulate or transer.: Yes  Nursing Judgement: Yes          Lines:   Extended Dwell Peripherial IV 10/30/24 Left Cephalic (Active)       Intraosseous Line 10/30/24 Left;Proximal Tibia (Active)        Opportunity for questions and clarification was provided.      Patient transported with:  Monitor and Tech

## 2024-10-31 NOTE — PROGRESS NOTES
[facility] RESIDENCY PROGRAM   Senior Resident Admission Note    Chart reviewed. Patient seen, examined, and discussed with Dr. Garibay (PGY-1). See H&P note for more details.    CC: Low blood sugar    HPI:  Candida Maza is a 64 y.o. female w/ a known history of T2DM, CKD4, and HTN who presents after unresponsive episode due to low blood sugar at home.     Pertinent ED Findings:  VS: Temp 95.2, HR 81, /86, RR 25, SpO2 98% on RA  Labs: WBC 19.  Imaging: CXR negative.  Treatment: dextrose 250cc bolus x2, IV toradol 30mg    PE:  General: No acute distress. Somnolent, easily awakened.   Head: Normocephalic. Atraumatic.  Mouth: Mucosa pink. MMM.   Respiratory: CTAB. Effort normal.  Cardiovascular: RRR. No m/r/g.   GI: NTND. +BS.   Extremities: Moving all extremities spontaneously. No obvious deformities.  Skin: Warm, dry. No rashes. Healing venous ulcer with eschar on LLE without evidence of infection.  Neuro: Alert and oriented x4, at baseline. No focal deficit. Baseline large amplitude tremor, particularly prominent in right hand.    A/P: 64 y.o. female admitted for hypoglycemia.    Hypoglycemia in s/o uncontrolled T2DM: Pt unresponsive and BG 20 at home when found by EMS. Likely 2/2 poor PO intake vs insulin overdose. Ddx includes hypoglycemic seizure as pt was found soiled with urine and confused, however return to baseline mentation after hypoglycemia/hypothermia were corrected is reassuring. Low c/f infection or adrenal insuffiencey. Chronic T2DM on metformin and insulin 15U. Last A1c 8.3 (9/13/24). At this time, tolerating PO intake and glucoses in normal range.  - Monitor POC glucose q1h x4, then space  - D10 bolus as needed   - Encourage PO intake  - Obtain UDS, TSH  - Plan to start SSI as blood sugars rise    Leukocytosis: WBC 19.1. Likely reactive to episode of hypoglycemia. No obvious source of infection - chronic venous ulcer on left leg with no evidence of infection.  No s/sx of  infection at this time. CXR clear. VBG showing primary respiratory acidosis c/w unresponsive episode. Continue to monitor   - UA pending  - Daily CBC  - Monitor temperatures, low threshold to start broad spectrum abx    Hypothermia: Rectal temperature of 95.2 POA. Likely reactive to hypoglycemic episode. Temperature normalized after placement of bob hugger.   - Continue to monitor    I agree with remaining assessment and plan as documented in H&P.    Pt discussed with Dr. Meredith (on-call attending physician)    Celina Winter MD  Family Medicine Resident (PGY-3)

## 2024-10-31 NOTE — PROGRESS NOTES
0315 Patients BG was 57. Gave two cups of OJ. Patient drank it with no issue. Will recheck the BG in 15 mins.     0325 Paged MD Winter for some hypoglycemic prn orders for the patients chart.     0330 Orders were added to patients chart. Patients BG after q15 min check is 74. Gave patient some mac and cheese along with some OJ. Patients son is at the bedside and offered to feed patient the food since patient stated she has not eaten all day. Will recheck BG in one hour.     0430 Patients q1 hr  BG post food and oj was 108. Patient is resting comfortably in bed and complains of no pain. Son is at the bedside.     0617 Patient was not able to void with the purewick after six hours. Patient was bladder scanned and was retaining 330. Walked patient to the bathroom via walker and noticed that patient had a BM in the bed. Will document post void residual.     0642 Patient was ambulated to the bathroom where she voided into the hat 400ccs malodorous, hazy orange colored urine.

## 2024-10-31 NOTE — PLAN OF CARE
Problem: Occupational Therapy - Adult  Goal: By Discharge: Performs self-care activities at highest level of function for planned discharge setting.  See evaluation for individualized goals.  Description: FUNCTIONAL STATUS PRIOR TO ADMISSION:  Patient is independent/MI for self care and functional transfers/mobility with SPC.     HOME SUPPORT: Patient lived with son but didn't require assistance.    Occupational Therapy Goals:  Initiated 10/31/2024  1.  Patient will perform grooming with Modified [place] within 7 day(s).  2.  Patient will perform upper body dressing with [place] within 7 day(s).  3.  Patient will perform lower body dressing with Modified [place] within 7 day(s).  4.  Patient will perform toilet transfers with Modified [place]  within 7 day(s).  5.  Patient will perform all aspects of toileting with Modified [place] within 7 day(s).  6.  Patient will participate in upper extremity therapeutic exercise/activities with Modified [place] for 10 minutes within 7 day(s).    7.  Patient will utilize energy conservation techniques during functional activities with verbal cues within 7 day(s).    Outcome: Progressing     OCCUPATIONAL THERAPY EVALUATION    Patient: Candida Maza (64 y.o. female)  Date: 10/31/2024  Primary Diagnosis: Hypoglycemia [E16.2]  Hypothermia, initial encounter [T68.XXXA]  Leukocytosis, unspecified type [D72.829]         Precautions:                    ASSESSMENT :  Patient received semi supine in bed A&O to self, place and time (reoriented to sitaution, pt requiring increased time to answer A&O questions) and agreeable for OT/PT eval/tx. Per pt report, pt lives with son in a one story home with 3 steps jenni rails to enter and is MI/Independent for self care and functional transfers/mobility with SPC.     Patient presents with decreased balance (improved with RW), decreased activity tolerance (noted drowsy throughout treatment), generalized weakness

## 2024-10-31 NOTE — PLAN OF CARE
Problem: Chronic Conditions and Co-morbidities  Goal: Patient's chronic conditions and co-morbidity symptoms are monitored and maintained or improved  Outcome: Progressing  Flowsheets (Taken 10/30/2024 3557)  Care Plan - Patient's Chronic Conditions and Co-Morbidity Symptoms are Monitored and Maintained or Improved: Monitor and assess patient's chronic conditions and comorbid symptoms for stability, deterioration, or improvement     Problem: Discharge Planning  Goal: Discharge to home or other facility with appropriate resources  Outcome: Progressing  Flowsheets (Taken 10/30/2024 4894)  Discharge to home or other facility with appropriate resources: Identify barriers to discharge with patient and caregiver     Problem: Safety - Adult  Goal: Free from fall injury  Outcome: Progressing     Problem: ABCDS Injury Assessment  Goal: Absence of physical injury  Outcome: Progressing

## 2024-11-01 VITALS
TEMPERATURE: 98.4 F | DIASTOLIC BLOOD PRESSURE: 58 MMHG | BODY MASS INDEX: 28.52 KG/M2 | SYSTOLIC BLOOD PRESSURE: 119 MMHG | RESPIRATION RATE: 17 BRPM | OXYGEN SATURATION: 92 % | WEIGHT: 155 LBS | HEART RATE: 81 BPM | HEIGHT: 62 IN

## 2024-11-01 LAB
ANION GAP SERPL CALC-SCNC: 2 MMOL/L (ref 2–12)
ANION GAP SERPL CALC-SCNC: 3 MMOL/L (ref 2–12)
APPEARANCE UR: ABNORMAL
BACTERIA URNS QL MICRO: ABNORMAL /HPF
BASOPHILS # BLD: 0 K/UL (ref 0–0.1)
BASOPHILS NFR BLD: 0 % (ref 0–1)
BILIRUB UR QL: NEGATIVE
BUN SERPL-MCNC: 41 MG/DL (ref 6–20)
BUN SERPL-MCNC: 42 MG/DL (ref 6–20)
BUN/CREAT SERPL: 23 (ref 12–20)
BUN/CREAT SERPL: 23 (ref 12–20)
CALCIUM SERPL-MCNC: 7.9 MG/DL (ref 8.5–10.1)
CALCIUM SERPL-MCNC: 8 MG/DL (ref 8.5–10.1)
CHLORIDE SERPL-SCNC: 109 MMOL/L (ref 97–108)
CHLORIDE SERPL-SCNC: 111 MMOL/L (ref 97–108)
CO2 SERPL-SCNC: 26 MMOL/L (ref 21–32)
CO2 SERPL-SCNC: 26 MMOL/L (ref 21–32)
COLOR UR: ABNORMAL
CREAT SERPL-MCNC: 1.81 MG/DL (ref 0.55–1.02)
CREAT SERPL-MCNC: 1.86 MG/DL (ref 0.55–1.02)
DIFFERENTIAL METHOD BLD: ABNORMAL
EKG ATRIAL RATE: 77 BPM
EKG DIAGNOSIS: NORMAL
EKG Q-T INTERVAL: 378 MS
EKG QRS DURATION: 64 MS
EKG QTC CALCULATION (BAZETT): 427 MS
EKG R AXIS: -25 DEGREES
EKG T AXIS: 150 DEGREES
EKG VENTRICULAR RATE: 77 BPM
EOSINOPHIL # BLD: 0.2 K/UL (ref 0–0.4)
EOSINOPHIL NFR BLD: 2 % (ref 0–7)
EPITH CASTS URNS QL MICRO: ABNORMAL /LPF
ERYTHROCYTE [DISTWIDTH] IN BLOOD BY AUTOMATED COUNT: 13.9 % (ref 11.5–14.5)
GLUCOSE BLD STRIP.AUTO-MCNC: 149 MG/DL (ref 65–117)
GLUCOSE BLD STRIP.AUTO-MCNC: 150 MG/DL (ref 65–117)
GLUCOSE BLD STRIP.AUTO-MCNC: 155 MG/DL (ref 65–117)
GLUCOSE BLD STRIP.AUTO-MCNC: 185 MG/DL (ref 65–117)
GLUCOSE BLD STRIP.AUTO-MCNC: 305 MG/DL (ref 65–117)
GLUCOSE BLD STRIP.AUTO-MCNC: 342 MG/DL (ref 65–117)
GLUCOSE SERPL-MCNC: 168 MG/DL (ref 65–100)
GLUCOSE SERPL-MCNC: 345 MG/DL (ref 65–100)
GLUCOSE UR STRIP.AUTO-MCNC: NEGATIVE MG/DL
GRAN CASTS URNS QL MICRO: ABNORMAL /LPF
HCT VFR BLD AUTO: 25 % (ref 35–47)
HGB BLD-MCNC: 8.1 G/DL (ref 11.5–16)
HGB UR QL STRIP: NEGATIVE
IMM GRANULOCYTES # BLD AUTO: 0 K/UL (ref 0–0.04)
IMM GRANULOCYTES NFR BLD AUTO: 0 % (ref 0–0.5)
KETONES UR QL STRIP.AUTO: NEGATIVE MG/DL
LEUKOCYTE ESTERASE UR QL STRIP.AUTO: ABNORMAL
LYMPHOCYTES # BLD: 4.2 K/UL (ref 0.8–3.5)
LYMPHOCYTES NFR BLD: 43 % (ref 12–49)
MCH RBC QN AUTO: 29.1 PG (ref 26–34)
MCHC RBC AUTO-ENTMCNC: 32.4 G/DL (ref 30–36.5)
MCV RBC AUTO: 89.9 FL (ref 80–99)
MONOCYTES # BLD: 0.6 K/UL (ref 0–1)
MONOCYTES NFR BLD: 6 % (ref 5–13)
NEUTS SEG # BLD: 4.8 K/UL (ref 1.8–8)
NEUTS SEG NFR BLD: 49 % (ref 32–75)
NITRITE UR QL STRIP.AUTO: NEGATIVE
NRBC # BLD: 0 K/UL (ref 0–0.01)
NRBC BLD-RTO: 0 PER 100 WBC
PH UR STRIP: 5 (ref 5–8)
PLATELET # BLD AUTO: 205 K/UL (ref 150–400)
PMV BLD AUTO: 10.7 FL (ref 8.9–12.9)
POTASSIUM SERPL-SCNC: 4.8 MMOL/L (ref 3.5–5.1)
POTASSIUM SERPL-SCNC: 4.9 MMOL/L (ref 3.5–5.1)
PROT UR STRIP-MCNC: 100 MG/DL
RBC # BLD AUTO: 2.78 M/UL (ref 3.8–5.2)
RBC #/AREA URNS HPF: ABNORMAL /HPF (ref 0–5)
SERVICE CMNT-IMP: ABNORMAL
SODIUM SERPL-SCNC: 137 MMOL/L (ref 136–145)
SODIUM SERPL-SCNC: 140 MMOL/L (ref 136–145)
SP GR UR REFRACTOMETRY: 1.01 (ref 1–1.03)
URINE CULTURE IF INDICATED: ABNORMAL
UROBILINOGEN UR QL STRIP.AUTO: 0.2 EU/DL (ref 0.2–1)
WBC # BLD AUTO: 9.7 K/UL (ref 3.6–11)
WBC URNS QL MICRO: ABNORMAL /HPF (ref 0–4)

## 2024-11-01 PROCEDURE — 2580000003 HC RX 258

## 2024-11-01 PROCEDURE — 80048 BASIC METABOLIC PNL TOTAL CA: CPT

## 2024-11-01 PROCEDURE — 87186 SC STD MICRODIL/AGAR DIL: CPT

## 2024-11-01 PROCEDURE — 85025 COMPLETE CBC W/AUTO DIFF WBC: CPT

## 2024-11-01 PROCEDURE — 82962 GLUCOSE BLOOD TEST: CPT

## 2024-11-01 PROCEDURE — 6370000000 HC RX 637 (ALT 250 FOR IP)

## 2024-11-01 PROCEDURE — 36415 COLL VENOUS BLD VENIPUNCTURE: CPT

## 2024-11-01 PROCEDURE — 94761 N-INVAS EAR/PLS OXIMETRY MLT: CPT

## 2024-11-01 PROCEDURE — 87088 URINE BACTERIA CULTURE: CPT

## 2024-11-01 PROCEDURE — 93010 ELECTROCARDIOGRAM REPORT: CPT | Performed by: INTERNAL MEDICINE

## 2024-11-01 PROCEDURE — 6360000002 HC RX W HCPCS

## 2024-11-01 PROCEDURE — 99238 HOSP IP/OBS DSCHRG MGMT 30/<: CPT | Performed by: FAMILY MEDICINE

## 2024-11-01 PROCEDURE — 87086 URINE CULTURE/COLONY COUNT: CPT

## 2024-11-01 PROCEDURE — 97535 SELF CARE MNGMENT TRAINING: CPT

## 2024-11-01 PROCEDURE — 81001 URINALYSIS AUTO W/SCOPE: CPT

## 2024-11-01 PROCEDURE — 97116 GAIT TRAINING THERAPY: CPT

## 2024-11-01 RX ORDER — SODIUM CHLORIDE, SODIUM LACTATE, POTASSIUM CHLORIDE, AND CALCIUM CHLORIDE .6; .31; .03; .02 G/100ML; G/100ML; G/100ML; G/100ML
1000 INJECTION, SOLUTION INTRAVENOUS ONCE
Status: COMPLETED | OUTPATIENT
Start: 2024-11-01 | End: 2024-11-01

## 2024-11-01 RX ORDER — INSULIN GLARGINE 100 [IU]/ML
5 INJECTION, SOLUTION SUBCUTANEOUS NIGHTLY
Qty: 2 ADJUSTABLE DOSE PRE-FILLED PEN SYRINGE | Refills: 0 | Status: SHIPPED | OUTPATIENT
Start: 2024-11-01 | End: 2024-11-01

## 2024-11-01 RX ORDER — GABAPENTIN 100 MG/1
100 CAPSULE ORAL 3 TIMES DAILY
Qty: 90 CAPSULE | Refills: 0 | Status: SHIPPED | OUTPATIENT
Start: 2024-11-01 | End: 2024-12-01

## 2024-11-01 RX ORDER — INSULIN LISPRO 100 [IU]/ML
5 INJECTION, SOLUTION INTRAVENOUS; SUBCUTANEOUS ONCE
Status: COMPLETED | OUTPATIENT
Start: 2024-11-01 | End: 2024-11-01

## 2024-11-01 RX ORDER — CEPHALEXIN 500 MG/1
500 CAPSULE ORAL EVERY 12 HOURS SCHEDULED
Qty: 14 CAPSULE | Refills: 0 | Status: SHIPPED | OUTPATIENT
Start: 2024-11-01 | End: 2024-11-08

## 2024-11-01 RX ORDER — INSULIN GLARGINE 100 [IU]/ML
10 INJECTION, SOLUTION SUBCUTANEOUS NIGHTLY
Qty: 2 ADJUSTABLE DOSE PRE-FILLED PEN SYRINGE | Refills: 0 | Status: SHIPPED | OUTPATIENT
Start: 2024-11-01

## 2024-11-01 RX ADMIN — ACETAMINOPHEN 650 MG: 325 TABLET ORAL at 17:10

## 2024-11-01 RX ADMIN — PANTOPRAZOLE SODIUM 40 MG: 40 TABLET, DELAYED RELEASE ORAL at 06:36

## 2024-11-01 RX ADMIN — PANCRELIPASE LIPASE, PANCRELIPASE PROTEASE, PANCRELIPASE AMYLASE 20000 UNITS: 20000; 63000; 84000 CAPSULE, DELAYED RELEASE ORAL at 17:09

## 2024-11-01 RX ADMIN — ACETAMINOPHEN 650 MG: 325 TABLET ORAL at 06:36

## 2024-11-01 RX ADMIN — SODIUM CHLORIDE, PRESERVATIVE FREE 10 ML: 5 INJECTION INTRAVENOUS at 08:32

## 2024-11-01 RX ADMIN — ENOXAPARIN SODIUM 40 MG: 100 INJECTION SUBCUTANEOUS at 08:31

## 2024-11-01 RX ADMIN — SENNOSIDES AND DOCUSATE SODIUM 1 TABLET: 50; 8.6 TABLET ORAL at 08:32

## 2024-11-01 RX ADMIN — INSULIN LISPRO 3 UNITS: 100 INJECTION, SOLUTION INTRAVENOUS; SUBCUTANEOUS at 12:36

## 2024-11-01 RX ADMIN — AMLODIPINE BESYLATE 10 MG: 5 TABLET ORAL at 08:32

## 2024-11-01 RX ADMIN — GABAPENTIN 100 MG: 100 CAPSULE ORAL at 08:32

## 2024-11-01 RX ADMIN — CEPHALEXIN 500 MG: 250 CAPSULE ORAL at 12:35

## 2024-11-01 RX ADMIN — INSULIN LISPRO 5 UNITS: 100 INJECTION, SOLUTION INTRAVENOUS; SUBCUTANEOUS at 14:25

## 2024-11-01 RX ADMIN — PANCRELIPASE LIPASE, PANCRELIPASE PROTEASE, PANCRELIPASE AMYLASE 20000 UNITS: 20000; 63000; 84000 CAPSULE, DELAYED RELEASE ORAL at 08:32

## 2024-11-01 RX ADMIN — FUROSEMIDE 40 MG: 40 TABLET ORAL at 08:32

## 2024-11-01 RX ADMIN — PANCRELIPASE LIPASE, PANCRELIPASE PROTEASE, PANCRELIPASE AMYLASE 15000 UNITS: 15000; 47000; 63000 CAPSULE, DELAYED RELEASE ORAL at 08:32

## 2024-11-01 RX ADMIN — PANCRELIPASE LIPASE, PANCRELIPASE PROTEASE, PANCRELIPASE AMYLASE 20000 UNITS: 20000; 63000; 84000 CAPSULE, DELAYED RELEASE ORAL at 12:36

## 2024-11-01 RX ADMIN — PANCRELIPASE LIPASE, PANCRELIPASE PROTEASE, PANCRELIPASE AMYLASE 15000 UNITS: 15000; 47000; 63000 CAPSULE, DELAYED RELEASE ORAL at 17:09

## 2024-11-01 RX ADMIN — PANCRELIPASE LIPASE, PANCRELIPASE PROTEASE, PANCRELIPASE AMYLASE 15000 UNITS: 15000; 47000; 63000 CAPSULE, DELAYED RELEASE ORAL at 12:36

## 2024-11-01 RX ADMIN — SODIUM CHLORIDE, POTASSIUM CHLORIDE, SODIUM LACTATE AND CALCIUM CHLORIDE 1000 ML: 600; 310; 30; 20 INJECTION, SOLUTION INTRAVENOUS at 08:42

## 2024-11-01 RX ADMIN — GABAPENTIN 100 MG: 100 CAPSULE ORAL at 12:36

## 2024-11-01 RX ADMIN — ACETAMINOPHEN 650 MG: 325 TABLET ORAL at 12:35

## 2024-11-01 ASSESSMENT — PAIN SCALES - GENERAL: PAINLEVEL_OUTOF10: 2

## 2024-11-01 NOTE — PLAN OF CARE
Problem: Occupational Therapy - Adult  Goal: By Discharge: Performs self-care activities at highest level of function for planned discharge setting.  See evaluation for individualized goals.  Description: FUNCTIONAL STATUS PRIOR TO ADMISSION:  Patient is independent/MI for self care and functional transfers/mobility with SPC.     HOME SUPPORT: Patient lived with son but didn't require assistance.    Occupational Therapy Goals:  Initiated 10/31/2024  1.  Patient will perform grooming with Modified [place] within 7 day(s).  2.  Patient will perform upper body dressing with [place] within 7 day(s).  3.  Patient will perform lower body dressing with Modified [place] within 7 day(s).  4.  Patient will perform toilet transfers with Modified [place]  within 7 day(s).  5.  Patient will perform all aspects of toileting with Modified [place] within 7 day(s).  6.  Patient will participate in upper extremity therapeutic exercise/activities with Modified [place] for 10 minutes within 7 day(s).    7.  Patient will utilize energy conservation techniques during functional activities with verbal cues within 7 day(s).    Outcome: Progressing    OCCUPATIONAL THERAPY TREATMENT  Patient: Candida Maza (64 y.o. female)  Date: 11/1/2024  Primary Diagnosis: Hypoglycemia [E16.2]  Hypothermia, initial encounter [T68.XXXA]  Leukocytosis, unspecified type [D72.829]       Precautions:                  Chart, occupational therapy assessment, plan of care, and goals were reviewed.    ASSESSMENT  Patient continues to benefit from skilled OT services and is progressing towards goals. She continues to be limited by decreased activity tolerance, standing balance, and cognition (attention, initiation, sequencing) with ADLs and functional mobility. Patient with improved participation this session, though slightly lethargic at session start. Patient required CGA with functional mobility in and out of the bathroom with    Balance  Sitting: Intact  Standing: Impaired  Standing - Static: Good;Constant support  Standing - Dynamic: Good;Fair;Constant support      ADL Intervention:                   Grooming: Contact guard assistance   Grooming Skilled Clinical Factors: verbal cues to initiate washing hands at sink after bowel movement                                    Toileting: Minimal assistance  Toileting Skilled Clinical Factors: toileting in bathroom, assist with balance, bowle hygiene. verbal cues for thoroughness                    Skin Care: Chlorhexidine wipes        Pain Rating:  None reported      Activity Tolerance:   Fair   Please refer to the flowsheet for vital signs taken during this treatment.    After treatment:   Patient left in no apparent distress sitting up in chair, Call bell within reach, and Bed/ chair alarm activated    COMMUNICATION/EDUCATION:   The patient's plan of care was discussed with: physical therapist and registered nurse    Patient Education  Education Given To: Patient  Education Provided: Role of Therapy;Plan of Care;ADL Adaptive Strategies;Transfer Training;Fall Prevention Strategies;Precautions;Equipment  Education Method: Demonstration;Verbal  Barriers to Learning: Cognition  Education Outcome: Verbalized understanding;Demonstrated understanding;Continued education needed    Thank you for this referral.  Jacque Cruz OTR/L  Minutes: 16

## 2024-11-01 NOTE — DISCHARGE SUMMARY
[address] [place] [address]   Office [phone]  Fax [phone]       Discharge / Transfer / Off-Service Note     Name: Candida Maza MRN: [id number]  Sex: Female   YOB: 1960  Age: 64 y.o.  PCP: Keena Leyva MD     Date of admission: 10/30/2024  Date of discharge/transfer: 11/1/2024    Attending physician at admission: Ester Rebolledo.  Attending physician at discharge/transfer: Yayo Santacruz.  Resident physician at discharge/transfer: Fransico Colin MD     Consultants during hospitalization  IP CONSULT TO CASE MANAGEMENT     Admission diagnoses   Hypoglycemia [E16.2]  Hypothermia, initial encounter [T68.XXXA]  Leukocytosis, unspecified type [D72.829]    Recommended follow-up after discharge    1. PCP-Keena Leyva MD  2. Endocrinology   3. Gastroenterology     To follow up on with PCP:   - follow up kidney function with labs  - DM control and modification to insulin regimen, may benefit from prandial insulin  ------------------------------------------------------------------------------------------------------------------    History of Present Illness    As per admitting provider, Dr. Garibay:   \"Candida Maza is a 64 y.o. female with known history of T2DM, HTN, CKD4, pancreatic insufficiency, asthma who presents to the ER by EMS after being found unconscious with BG of 20. Pt last seen awake and well by her son at 0730, son called EMS when she was not rousable.      On interview patient is awake and responding to questions but unsure of what happened prior to arriving at the hospital. She is hungry. Patient's two brother's at bedside provide history however left prior to interview being completed. Son not present at time of interview. Patient taking insulin at home, 15U lantus nightly. Pt has had no oral intake for 1 day. Reports back pain and feeling very hungry. No headache, fever/chills, cough/sore throat, SOB/CP, abdominal pain, n/v, diarrhea,  PROVENTIL;VENTOLIN;PROAIR  INHALE 2 PUFFS INTO THE LUNGS 4 TIMES DAILY AS NEEDED FOR WHEEZING.     amLODIPine 10 MG tablet  Commonly known as: NORVASC  TAKE 1 TABLET BY MOUTH EVERY DAY     BD Pen Needle Nerissa 2nd Gen 32G X 4 MM Misc  Generic drug: Insulin Pen Needle  Inject 1 each into the skin in the morning, at noon, in the evening, and at bedtime     cetirizine 10 MG tablet  Commonly known as: ZYRTEC  Take 1 tablet by mouth nightly     Creon [id number] units Cpep delayed release capsule  Generic drug: lipase-protease-amylase  Take 2 capsules by mouth 3 times daily (with meals). May also take 1 capsule daily as needed (snacks).     cyclobenzaprine 10 MG tablet  Commonly known as: FLEXERIL  TAKE 1 TABLET BY MOUTH THREE TIMES A DAY AS NEEDED FOR MUSCLE SPASM     diclofenac sodium 1 % Gel  Commonly known as: VOLTAREN     FeroSul 325 (65 Fe) MG tablet  Generic drug: ferrous sulfate     FreeStyle Nicola 3 Sensor Misc  2 Devices by Does not apply route every 14 days     furosemide 40 MG tablet  Commonly known as: LASIX  Take 1 tablet by mouth daily     lidocaine 5 %  Commonly known as: LIDODERM  APPLY 1 PATCH BY TRANSDERMAL ROUTE DAILY FOR 12 HOURS A DAY AND REMOVE FOR 12 HOURS A DAY     metFORMIN 500 MG tablet  Commonly known as: GLUCOPHAGE     Muscle Therapy/Arnica Gel  Apply 1 g topically in the morning, at noon, and at bedtime     omeprazole 20 MG delayed release capsule  Commonly known as: PRILOSEC  TAKE 1 CAPSULE BY MOUTH TWICE A DAY     ondansetron 4 MG disintegrating tablet  Commonly known as: ZOFRAN-ODT     ONE TOUCH ULTRA 2 w/Device Kit     OneTouch Delica Plus [id number] Misc     OneTouch Verio strip  Generic drug: blood glucose test strips  USE 1 TEST STIP IN THE MORNING,AT NOON,IN THE EVENING AND AT BEDTIME AS NEEDED     sennosides-docusate sodium 8.6-50 MG tablet  Commonly known as: SENOKOT-S  Take 1 tablet by mouth daily     SUMAtriptan 100 MG tablet  Commonly known as: IMITREX  TAKE 1 TABLET BY MOUTH AS

## 2024-11-01 NOTE — DISCHARGE INSTRUCTIONS
HOME DISCHARGE INSTRUCTIONS    Candida Maza / [id number] : 1960    Admission date: 10/30/2024 Discharge date: 2024     Please bring this form with you to show your care provider at your follow-up appointment.    Primary care provider:  Keena Leyva    Discharging provider:  Dorothy Mckinnon MD  - Family Medicine Resident  Yayo Santacruz MD - Attending     You have been admitted to the hospital with the following diagnoses:    ACUTE DIAGNOSES:  Hypoglycemia [E16.2]  Hypothermia, initial encounter [T68.XXXA]  Leukocytosis, unspecified type [D72.829]      . . . . . . . . . . . . . . . . . . . . . . . . . . . . . . . . . . . . . . . . . . . . . . . . . . . . . . . . . . . . . . . . . . . . . . . .   You were hospitalized for an extremely low blood sugar level (hypoglycemia) and a possible seizure due to the low blood sugar. We believe this happened because your Lantus dose is more than you need and you did not have enough to eat after taking the Lantus. It is very important that you eat small, regular meals! You were also found to have a UTI which we started antibiotics for.   . . . . . . . . . . . . . . . . . . . . . . . . . . . . . . . . . . . . . . . . . . . . . . . . . . . . . . . . . . . . . . . . . . . . . . . .     MEDICATION CHANGES  START  Keflex 500mg, one tablet, twice daily for 7 days total. This is an antibiotic for your UTI.  - Take next dose on evening of   - Take last dose on evening of     CHANGES  Lantus: We reduced your dose to 10 units nightly. This is to prevent your blood sugar becoming too low on a higher dose.    Gabapentin: We reduced your dose to 100mg, 1 tablet, three times daily. This is because of your kidney function.     No other changes were made to your medications, please take all your other home medicines as previously prescribed.    Appointments  No future appointments.  Keena Leyva MD  [address] [facility]  talk to your nurse or care provider before you leave the hospital.     Information obtained by:     I understand that if any problems occur once I am at home I am to contact my physician.    These instructions were explained to me and I had the opportunity to ask questions.    I understand and acknowledge receipt of the instructions indicated above.                                                                                                                                               Physician's or R.N.'s Signature                                                                  Date/Time                                                                                                                                              Patient or Representative Signature                                                          Date/Time

## 2024-11-01 NOTE — PROGRESS NOTES
[address] [place] [address]   Office [phone]  Fax [phone]          Subjective / Objective     Subjective  Overnight Events: Pt declined UA last night, wanted to sleep.    Patient seen and examined at bedside. Reports feeling very tired/weak still since admission. Still has trouble remembering what happened to bring her to the hospital. Denies CP, SOB, N/V, dysuria.    Respiratory:   RA  Vitals:    11/01/24 0724   BP: (!) 148/67   Pulse: 74   Resp: 16   Temp: 98.1 °F (36.7 °C)   SpO2: 98%     Physical Examination:   General appearance - alert, well appearing, and in no distress  Chest - clear to auscultation, no wheezes, rales or rhonchi, symmetric air entry  Heart - normal rate, regular rhythm, normal S1, S2, no murmurs, rubs, clicks or gallops,   Abdomen - soft, no tenderness, nondistended, no masses or organomegaly  Neurological - A&Ox2 (knew name, year), normal speech, no focal findings  Skin - warm, dry. No notable rashes  Extremities - peripheral pulses normal, trace pedal edema, no clubbing or cyanosis  Psychiatric - normal speech and thought processes    I/O:  No intake/output data recorded.    Inpatient Medications    Current Facility-Administered Medications   Medication Dose Route Frequency    lactated ringers bolus 1,000 mL  1,000 mL IntraVENous Once    glucose chewable tablet 16 g  4 tablet Oral PRN    dextrose bolus 10% 125 mL  125 mL IntraVENous PRN    Or    dextrose bolus 10% 250 mL  250 mL IntraVENous PRN    glucagon injection 1 mg  1 mg SubCUTAneous PRN    dextrose 10 % infusion   IntraVENous Continuous PRN    cyclobenzaprine (FLEXERIL) tablet 10 mg  10 mg Oral TID PRN    gabapentin (NEURONTIN) capsule 100 mg  100 mg Oral TID    acetaminophen (TYLENOL) tablet 650 mg  650 mg Oral 4 times per day    insulin lispro (HUMALOG,ADMELOG) injection vial 0-4 Units  0-4 Units SubCUTAneous 4x Daily AC & HS    sodium chloride flush 0.9 % injection 5-40 mL  5-40 mL IntraVENous 2  cause for ^WBC     Diabetes Mellitus T2: Uncontrolled. Last A1c 8.3 in 9/2024. Complicated by neuropathy.   - Holding home metformin and Lantus 15u qhs.  - low dose SSI ordered  - Hypoglycemia protocols ordered.  - Continue gabapentin for neuropathy, renally dose to 100mg TID     Hypertension:   - Continuing home medications of amlodipine 10 mg qd.      CKD3b: Chronic suspect relation to T2DM. Cr/GFR at baseline.      Pancreatic insufficiency: Chronic, stable and well controlled on Creon [id number] 2 capsules TID.  - Substitute Zenpep for creon     Asthma: Chronic, stable  - Home albuterol      Muscle spams: Chronic, stable  - Flexeril 10 mg TID prn     Vitamin D deficiency: Chronic, stable  - Home cholecalciferol 1.25 mg qd     Iron deficiency anemia: Chronic. Hgb 11.3  - FeroSul 325 mg qod      GERD: Chronic, stable  - Substiture protonix for home omeprazole 20 mg qd     Migraine: Chronic, stable on sumatriptan 100 mg prn     Seasonal Allergies: Chronic, stable  - Cetirizine 10 mg qd     FEN/GI - Diabetic diet.   Activity - Ambulate as tolerated  DVT prophylaxis - SCDs  GI prophylaxis - home omeprazole 20 mg   Fall prophylaxis - Not indicated at this time.  Disposition - Plan to d/c to Home with HH.  Code Status - Full, discussed with patient / caregivers.  Next of Kin Name and Contact Ted Maza [phone]    Patient discussed with Yayo Santacruz MD Grace E Le, MD  [facility] Medicine Resident       For Billing    Chief Complaint   Patient presents with    Blood Sugar Problem

## 2024-11-01 NOTE — PLAN OF CARE
Problem: Physical Therapy - Adult  Goal: By Discharge: Performs mobility at highest level of function for planned discharge setting.  See evaluation for individualized goals.  Description: FUNCTIONAL STATUS PRIOR TO ADMISSION: Patient was independent and active without use of DME.    HOME SUPPORT PRIOR TO ADMISSION: The patient lived with son but did not require assistance.    Physical Therapy Goals  Initiated 10/31/2024  1.  Patient will move from supine to sit and sit to supine , scoot up and down, and roll side to side in bed with modified independence within 7 day(s).    2.  Patient will transfer from bed to chair and chair to bed with modified independence using the least restrictive device within 7 day(s).  3.  Patient will perform sit to stand with modified independence within 7 day(s).  4.  Patient will ambulate with independence for 300 feet with the least restrictive device within 7 day(s).   5.  Patient will ascend/descend 4 stairs with 1 handrail(s) with modified independence within 7 day(s).    Outcome: Progressing   PHYSICAL THERAPY TREATMENT    Patient: Candida Maza (64 y.o. female)  Date: 11/1/2024  Diagnosis: Hypoglycemia [E16.2]  Hypothermia, initial encounter [T68.XXXA]  Leukocytosis, unspecified type [D72.829] Hypoglycemia      Precautions:                        ASSESSMENT:  Patient continues to benefit from skilled PT services and is progressing towards goals. Patient received sitting up in chair, requesting to use restroom.  Overall mobilizing better today, more alert with fewer cues required for safe mobility using RW.  Gait still guarded and slowed with occasional mild path deviations but improved from prior session. Returned to chair and session ended due to phlebotomy needing to draw blood.  Patient endorses no concerns with d/c home when medically ready.  Will benefit from HHPT.       PLAN:  Patient continues to benefit from skilled intervention to address the above impairments.

## 2024-11-01 NOTE — CARE COORDINATION
11/1/2024    4:46 PM  Due to late DC, CM received request from nursing to assist with scheduling a Roundtrip pickup as pt no longer has transport. Roundtrip ride scheduled for pickup at 5:30 pm. Nursing notified to have pt outside at 530 for pickup. Roundtrip to text pt and call 4th floor nurses station with make/model of [employer].     4:24 PM  Care Management Progress Note    Reason for Admission:   Hypoglycemia [E16.2]  Hypothermia, initial encounter [T68.XXXA]  Leukocytosis, unspecified type [D72.829]         Patient Admission Status: Inpatient  Date Admitted to INP: 10/30/24 []NA - OBS/Outpatient  RUR: Readmission Risk Score: 15    Hospitalization in the last 30 days (Readmission):  No        Transition of care plan:  Discharge order submitted. Pt has medically cleared.   Home with  - [facility] accepted pt, and DC summary attached in AllScripts notifying them of pt's discharge.   Date IM given: 10/31/24 []NA  Outpatient follow-up.  Discharge transport: Family       10/31/24 1441   Service Assessment   Patient Orientation Alert and Oriented   Cognition Alert   History Provided By Patient   Primary Caregiver Self   Support Systems Children  (Son: Pavan Maza (Ernest) [phone])   Patient's Healthcare Decision Maker is: Legal Next of Kin  (Son: Pavan Maza (Ernest)  [phone])   PCP Verified by CM Yes  (Gordon COPELAND, Keena [phone])   Last Visit to PCP Within last 3 months   Prior Functional Level Independent in ADLs/IADLs   Current Functional Level Independent in ADLs/IADLs   Can patient return to prior living arrangement Yes   Ability to make needs known: Good   Family able to assist with home care needs: Yes   Would you like for me to discuss the discharge plan with any other family members/significant others, and if so, who? Yes  (Son: Pavan Maza (Ernest) [phone])   Financial Resources Medicaid  ([facility]   Community Resources None   CM/SW Referral Other (see

## 2024-11-02 LAB
BACTERIA SPEC CULT: NORMAL
CC UR VC: NORMAL
SERVICE CMNT-IMP: NORMAL

## 2024-11-04 ENCOUNTER — TELEPHONE (OUTPATIENT)
Age: 64
End: 2024-11-04

## 2024-11-04 LAB
BACTERIA SPEC CULT: ABNORMAL
BACTERIA SPEC CULT: ABNORMAL
CC UR VC: ABNORMAL
SERVICE CMNT-IMP: ABNORMAL

## 2024-11-04 NOTE — CARE COORDINATION
11/4/2024  2:24 PM  Chart accessed per [facility]'s request for HH order. HH order attached in AllScripts.

## 2024-11-04 NOTE — TELEPHONE ENCOUNTER
Lv to schedule hos fup    ----- Message from Dr. Nathaly Linares MD sent at 11/1/2024 12:00 PM EDT -----  Regarding: [facility],    This patient will be discharged today from the family medicine service at [facility] for hypoglycemia. The patient follows at your practice. Can you please call the patient to schedule a hospital follow-up, preferably within 2 weeks of discharge?    Thank you,  Nathaly Linares MD   Resident Physician

## 2024-11-04 NOTE — TELEPHONE ENCOUNTER
Care Transitions Initial Follow Up Call    Outreach made within 2 business days of discharge: Yes    Patient: Candida Maza Patient : 1960   MRN: [id number]  Reason for Admission: hypoglycemia  Discharge Date: 24       Spoke with: l/isela on to call back to do FRANCISCO call and schedule appt.     Scheduled appointment with PCP within 7-14 days    Follow Up  No future appointments.    JAVED FREDERICK MA

## 2024-11-07 ENCOUNTER — OFFICE VISIT (OUTPATIENT)
Age: 64
End: 2024-11-07
Payer: MEDICAID

## 2024-11-07 VITALS
HEART RATE: 76 BPM | SYSTOLIC BLOOD PRESSURE: 142 MMHG | HEIGHT: 62 IN | OXYGEN SATURATION: 99 % | RESPIRATION RATE: 17 BRPM | WEIGHT: 151.4 LBS | BODY MASS INDEX: 27.86 KG/M2 | DIASTOLIC BLOOD PRESSURE: 65 MMHG | TEMPERATURE: 97.6 F

## 2024-11-07 DIAGNOSIS — Z09 HOSPITAL DISCHARGE FOLLOW-UP: ICD-10-CM

## 2024-11-07 DIAGNOSIS — M54.50 CHRONIC MIDLINE LOW BACK PAIN WITHOUT SCIATICA: ICD-10-CM

## 2024-11-07 DIAGNOSIS — G89.29 CHRONIC MIDLINE LOW BACK PAIN WITHOUT SCIATICA: ICD-10-CM

## 2024-11-07 DIAGNOSIS — E74.39 GLUCOSE INTOLERANCE: Primary | ICD-10-CM

## 2024-11-07 PROCEDURE — 90653 IIV ADJUVANT VACCINE IM: CPT | Performed by: FAMILY MEDICINE

## 2024-11-07 PROCEDURE — 99496 TRANSJ CARE MGMT HIGH F2F 7D: CPT | Performed by: FAMILY MEDICINE

## 2024-11-07 RX ORDER — METHOCARBAMOL 750 MG/1
750 TABLET, FILM COATED ORAL 4 TIMES DAILY
Qty: 40 TABLET | Refills: 0 | Status: SHIPPED | OUTPATIENT
Start: 2024-11-07 | End: 2024-11-17

## 2024-11-07 RX ORDER — GLUCOSAMINE HCL/CHONDROITIN SU 500-400 MG
1 CAPSULE ORAL 4 TIMES DAILY
Qty: 400 STRIP | Refills: 0 | Status: SHIPPED | OUTPATIENT
Start: 2024-11-07

## 2024-11-07 RX ORDER — ONDANSETRON 4 MG/1
4 TABLET, FILM COATED ORAL DAILY PRN
Qty: 30 TABLET | Refills: 0 | Status: SHIPPED | OUTPATIENT
Start: 2024-11-07

## 2024-11-07 RX ORDER — DICYCLOMINE HYDROCHLORIDE 10 MG/1
10 CAPSULE ORAL
Qty: 120 CAPSULE | Refills: 0 | Status: SHIPPED | OUTPATIENT
Start: 2024-11-07

## 2024-11-07 RX ORDER — GLUCOSAMINE HCL/CHONDROITIN SU 500-400 MG
1 CAPSULE ORAL
COMMUNITY
End: 2024-11-07 | Stop reason: SDUPTHER

## 2024-11-07 RX ORDER — FUROSEMIDE 20 MG/1
20 TABLET ORAL 3 TIMES DAILY
COMMUNITY
Start: 2024-11-01

## 2024-11-07 SDOH — ECONOMIC STABILITY: FOOD INSECURITY: WITHIN THE PAST 12 MONTHS, THE FOOD YOU BOUGHT JUST DIDN'T LAST AND YOU DIDN'T HAVE MONEY TO GET MORE.: NEVER TRUE

## 2024-11-07 SDOH — ECONOMIC STABILITY: FOOD INSECURITY: WITHIN THE PAST 12 MONTHS, YOU WORRIED THAT YOUR FOOD WOULD RUN OUT BEFORE YOU GOT MONEY TO BUY MORE.: NEVER TRUE

## 2024-11-07 SDOH — ECONOMIC STABILITY: INCOME INSECURITY: HOW HARD IS IT FOR YOU TO PAY FOR THE VERY BASICS LIKE FOOD, HOUSING, MEDICAL CARE, AND HEATING?: NOT HARD AT ALL

## 2024-11-07 NOTE — PROGRESS NOTES
Post-Discharge Transitional Care  Follow Up      Candida Maza   YOB: 1960    Date of Office Visit:  11/7/2024  Date of Hospital Admission: 10/30/24  Date of Hospital Discharge: 11/1/24  Risk of hospital readmission (high >=14%. Medium >=10%) :Readmission Risk Score: 15      Care management risk score Rising risk (score 2-5) and Complex Care (Scores >=6): No Risk Score On File     Non face to face  following discharge, date last encounter closed (first attempt may have been earlier): 11/04/2024    Call initiated 2 business days of discharge: Yes    ASSESSMENT/PLAN:   Glucose intolerance  -     blood glucose monitor strips; 1 strip by Other route in the morning, at noon, in the evening, and at bedtime Test 4 times a day & as needed for symptoms of irregular blood glucose. Dispense sufficient amount for indicated testing frequency plus additional to accommodate PRN testing needs., Other, 4 times daily Starting Thu 11/7/2024, Disp-400 strip, R-0, Normal  -     Influenza, FLUAD Trivalent, (age 65 y+), IM, Preservative Free, 0.5mL  -     Basic Metabolic Panel; Future  Hospital discharge follow-up  -     GA DISCHARGE MEDS RECONCILED W/ CURRENT OUTPATIENT MED LIST  Chronic midline low back pain without sciatica  -     diclofenac sodium (VOLTAREN) 1 % GEL; Apply 2 g topically 4 times daily as needed for Pain, Topical, 4 TIMES DAILY PRN Starting Thu 11/7/2024, Disp-350 g, R-0, Normal  -     dicyclomine (BENTYL) 10 MG capsule; Take 1 capsule by mouth 4 times daily (before meals and nightly), Disp-120 capsule, R-0Normal  -     ondansetron (ZOFRAN) 4 MG tablet; Take 1 tablet by mouth daily as needed for Nausea or Vomiting, Disp-30 tablet, R-0Normal  -     methocarbamol (ROBAXIN-750) 750 MG tablet; Take 1 tablet by mouth 4 times daily for 10 days, Disp-40 tablet, R-0Normal      Medical Decision Making: high complexity  Return if symptoms worsen or fail to improve.           Subjective:   HPI:  Follow up of

## 2024-11-07 NOTE — PROGRESS NOTES
Identified pt with two pt identifiers(name and )    Chief Complaint   Patient presents with    Follow-Up from Hospital     Patient was at [facility] for Hypoglycemia and is feeling a little off today and states she is retaining fluid         Health Maintenance Due   Topic    HIV screen     Diabetic retinal exam     Respiratory Syncytial Virus (RSV) Pregnant or age 60 yrs+ (1 - 1-dose 60+ series)    Diabetic foot exam     Diabetic Alb to Cr ratio (uACR) test     Pneumococcal 0-64 years Vaccine (2 of 2 - PCV)    Lipids     Flu vaccine (1)    COVID-19 Vaccine ( season)       Wt Readings from Last 3 Encounters:   10/30/24 70.3 kg (155 lb)   24 70.6 kg (155 lb 9.6 oz)   24 73 kg (161 lb)     Temp Readings from Last 3 Encounters:   24 98.4 °F (36.9 °C) (Oral)   24 97.7 °F (36.5 °C)   02/15/24 98.1 °F (36.7 °C) (Temporal)     BP Readings from Last 3 Encounters:   24 (!) 119/58   24 132/80   02/15/24 (!) 159/60     Pulse Readings from Last 3 Encounters:   24 81   24 80   02/15/24 72           Depression Screening:  :         2024    12:59 PM 2024     9:33 AM 2/15/2024     2:21 PM 2023     1:24 PM 2023     3:00 PM 2022     1:50 PM 10/18/2022     2:00 PM   PHQ-9 Questionaire   Little interest or pleasure in doing things 0 0 0 0 0 0 0   Feeling down, depressed, or hopeless 0 0 0 0 0 0 0   PHQ-9 Total Score 0 0 0 0 0 0 0        Fall Risk Assessment:  :          No data to display                 Abuse Screening:  :          No data to display                 Coordination of Care Questionnaire:  :     \"Have you been to the ER, urgent care clinic since your last visit?  Hospitalized since your last visit?\"    Hypoglycemia [facility]     “Have you seen or consulted any other health care providers outside our system since your last visit?”    NO      “Have you had a diabetic eye exam?”    NO     No diabetic eye exam on file       Click Here for

## 2024-11-12 ENCOUNTER — LAB (OUTPATIENT)
Age: 64
End: 2024-11-12

## 2024-11-12 DIAGNOSIS — E74.39 GLUCOSE INTOLERANCE: ICD-10-CM

## 2024-11-12 DIAGNOSIS — G89.29 CHRONIC MIDLINE LOW BACK PAIN WITHOUT SCIATICA: ICD-10-CM

## 2024-11-12 DIAGNOSIS — M54.50 CHRONIC MIDLINE LOW BACK PAIN WITHOUT SCIATICA: ICD-10-CM

## 2024-11-12 LAB
ANION GAP SERPL CALC-SCNC: 4 MMOL/L (ref 2–12)
BUN SERPL-MCNC: 23 MG/DL (ref 6–20)
BUN/CREAT SERPL: 14 (ref 12–20)
CALCIUM SERPL-MCNC: 8.5 MG/DL (ref 8.5–10.1)
CHLORIDE SERPL-SCNC: 115 MMOL/L (ref 97–108)
CO2 SERPL-SCNC: 24 MMOL/L (ref 21–32)
CREAT SERPL-MCNC: 1.65 MG/DL (ref 0.55–1.02)
ERYTHROCYTE [SEDIMENTATION RATE] IN BLOOD: 14 MM/HR (ref 0–30)
GLUCOSE SERPL-MCNC: 170 MG/DL (ref 65–100)
POTASSIUM SERPL-SCNC: 4.1 MMOL/L (ref 3.5–5.1)
SODIUM SERPL-SCNC: 143 MMOL/L (ref 136–145)

## 2024-11-13 NOTE — RESULT ENCOUNTER NOTE
Patients blood work and kidney numbers are improving. Please continue to work on blood pressure goals of 125-130 top number and 65-70 bottom number.

## 2024-11-29 ENCOUNTER — TELEPHONE (OUTPATIENT)
Age: 64
End: 2024-11-29

## 2024-11-29 NOTE — TELEPHONE ENCOUNTER
vitamin D (ERGOCALCIFEROL) 1.25 MG (71243 UT) CAPS capsule    methocarbamol 750mg -- patient said that this medication is not working for her.     dicyclomine (BENTYL) 10 MG capsule -- patient is requesting a dosage increase of this medication.     [id number] [place] [facility] [address] [place] [address] - P [phone] - F [phone]

## 2024-12-03 ENCOUNTER — TELEMEDICINE (OUTPATIENT)
Age: 64
End: 2024-12-03

## 2024-12-03 DIAGNOSIS — R10.9 ABDOMINAL BLOATING WITH CRAMPS: ICD-10-CM

## 2024-12-03 DIAGNOSIS — R10.84 GENERALIZED ABDOMINAL PAIN: Primary | ICD-10-CM

## 2024-12-03 DIAGNOSIS — G89.29 CHRONIC MIDLINE LOW BACK PAIN WITHOUT SCIATICA: ICD-10-CM

## 2024-12-03 DIAGNOSIS — E11.9 TYPE 2 DIABETES MELLITUS WITHOUT COMPLICATION, WITHOUT LONG-TERM CURRENT USE OF INSULIN (HCC): ICD-10-CM

## 2024-12-03 DIAGNOSIS — K86.89 PANCREATIC INSUFFICIENCY: ICD-10-CM

## 2024-12-03 DIAGNOSIS — K90.9 DIARRHEA DUE TO MALABSORPTION: ICD-10-CM

## 2024-12-03 DIAGNOSIS — M54.50 CHRONIC MIDLINE LOW BACK PAIN WITHOUT SCIATICA: ICD-10-CM

## 2024-12-03 DIAGNOSIS — R19.7 DIARRHEA DUE TO MALABSORPTION: ICD-10-CM

## 2024-12-03 DIAGNOSIS — K59.01 SLOW TRANSIT CONSTIPATION: ICD-10-CM

## 2024-12-03 DIAGNOSIS — R14.0 ABDOMINAL BLOATING WITH CRAMPS: ICD-10-CM

## 2024-12-03 RX ORDER — TIZANIDINE 2 MG/1
2 TABLET ORAL 2 TIMES DAILY
Qty: 10 TABLET | Refills: 0 | Status: SHIPPED | OUTPATIENT
Start: 2024-12-03

## 2024-12-03 RX ORDER — GABAPENTIN 100 MG/1
100 CAPSULE ORAL 3 TIMES DAILY
Qty: 90 CAPSULE | Refills: 0 | Status: SHIPPED | OUTPATIENT
Start: 2024-12-03 | End: 2025-01-02

## 2024-12-03 RX ORDER — DICYCLOMINE HYDROCHLORIDE 10 MG/1
10 CAPSULE ORAL
Qty: 360 CAPSULE | Refills: 0 | Status: SHIPPED | OUTPATIENT
Start: 2024-12-03

## 2024-12-03 RX ORDER — TRAMADOL HYDROCHLORIDE 50 MG/1
50 TABLET ORAL 2 TIMES DAILY
Qty: 60 TABLET | Refills: 0 | Status: SHIPPED | OUTPATIENT
Start: 2024-12-03 | End: 2025-01-02

## 2024-12-03 ASSESSMENT — ENCOUNTER SYMPTOMS
CONSTIPATION: 0
COUGH: 0
RHINORRHEA: 0
BACK PAIN: 0
WHEEZING: 0
CHEST TIGHTNESS: 0
ANAL BLEEDING: 0
NAUSEA: 0
SINUS PAIN: 0
DIARRHEA: 1
ABDOMINAL DISTENTION: 1
SINUS PRESSURE: 0
SORE THROAT: 0
SHORTNESS OF BREATH: 0
VOMITING: 0
BLOOD IN STOOL: 0
ABDOMINAL PAIN: 1

## 2024-12-03 NOTE — PROGRESS NOTES
Identified pt with two pt identifiers(name and )    Chief Complaint   Patient presents with    Discuss Medications        Health Maintenance Due   Topic    HIV screen     Diabetic retinal exam     Respiratory Syncytial Virus (RSV) Pregnant or age 60 yrs+ (1 - Risk 60-74 years 1-dose series)    Diabetic foot exam     Diabetic Alb to Cr ratio (uACR) test     Pneumococcal 0-64 years Vaccine (2 of 2 - PCV)    Lipids     COVID-19 Vaccine (2023- season)       Wt Readings from Last 3 Encounters:   24 68.7 kg (151 lb 6.4 oz)   10/30/24 70.3 kg (155 lb)   24 70.6 kg (155 lb 9.6 oz)     Temp Readings from Last 3 Encounters:   24 97.6 °F (36.4 °C) (Temporal)   24 98.4 °F (36.9 °C) (Oral)   24 97.7 °F (36.5 °C)     BP Readings from Last 3 Encounters:   24 (!) 142/65   24 (!) 119/58   24 132/80     Pulse Readings from Last 3 Encounters:   24 76   24 81   24 80           Depression Screening:  :         2024    12:59 PM 2024     9:33 AM 2/15/2024     2:21 PM 2023     1:24 PM 2023     3:00 PM 2022     1:50 PM 10/18/2022     2:00 PM   PHQ-9 Questionaire   Little interest or pleasure in doing things 0 0 0 0 0 0 0   Feeling down, depressed, or hopeless 0 0 0 0 0 0 0   PHQ-9 Total Score 0 0 0 0 0 0 0        Fall Risk Assessment:  :          No data to display                 Abuse Screening:  :          No data to display                 Coordination of Care Questionnaire:  :     \"Have you been to the ER, urgent care clinic since your last visit?  Hospitalized since your last visit?\"    NO    “Have you seen or consulted any other health care providers outside our system since your last visit?”    NO      “Have you had a diabetic eye exam?”    NO     No diabetic eye exam on file       Click Here for Release of Records Request

## 2024-12-03 NOTE — ASSESSMENT & PLAN NOTE
Orders:    CT ABDOMEN PELVIS WO CONTRAST Additional Contrast? None; Future    dicyclomine (BENTYL) 10 MG capsule; Take 1 capsule by mouth 4 times daily (before meals and nightly)

## 2024-12-03 NOTE — ASSESSMENT & PLAN NOTE
Orders:    gabapentin (NEURONTIN) 100 MG capsule; Take 1 capsule by mouth 3 times daily for 30 days. Max Daily Amount: 300 mg

## 2024-12-03 NOTE — ASSESSMENT & PLAN NOTE
Patient has pancreatic insufficiency, she has worked on dietary changes, but is finding it difficult to continue these changes. She hardly eats anything that is fried, fatty or processed, takes her creon on time.     We also discussed that she shouldn't take creon unless she is eating a heavy meal.       Orders:    CT ABDOMEN PELVIS WO CONTRAST Additional Contrast? None; Future    dicyclomine (BENTYL) 10 MG capsule; Take 1 capsule by mouth 4 times daily (before meals and nightly)

## 2024-12-03 NOTE — PROGRESS NOTES
Candida Maza, was evaluated through a synchronous (real-time) audio-video encounter. The patient (or guardian if applicable) is aware that this is a billable service, which includes applicable co-pays. This Virtual Visit was conducted with patient's (and/or legal guardian's) consent. Patient identification was verified, and a caregiver was present when appropriate.   The patient was located at Home: [address] [place] [id number]  Provider was located at Home [place]Appt Dept State[place]  Confirm you are appropriately licensed, registered, or certified to deliver care in the state where the patient is located as indicated above. If you are not or unsure, please re-schedule the visit: Yes, I confirm.     Candida Maza (:  1960) is a Established patient, presenting virtually for evaluation of the following:      Below is the assessment and plan developed based on review of pertinent history, physical exam, labs, studies, and medications.     Assessment & Plan  Generalized abdominal pain  Patient has pancreatic insufficiency, she has worked on dietary changes, but is finding it difficult to continue these changes. She hardly eats anything that is fried, fatty or processed, takes her creon on time.     We also discussed that she shouldn't take creon unless she is eating a heavy meal.       Orders:    CT ABDOMEN PELVIS WO CONTRAST Additional Contrast? None; Future    dicyclomine (BENTYL) 10 MG capsule; Take 1 capsule by mouth 4 times daily (before meals and nightly)    Abdominal bloating with cramps       Orders:    CT ABDOMEN PELVIS WO CONTRAST Additional Contrast? None; Future    dicyclomine (BENTYL) 10 MG capsule; Take 1 capsule by mouth 4 times daily (before meals and nightly)    Slow transit constipation       Orders:    CT ABDOMEN PELVIS WO CONTRAST Additional Contrast? None; Future    dicyclomine (BENTYL) 10 MG capsule; Take 1 capsule by mouth 4 times daily (before meals and

## 2024-12-03 NOTE — ASSESSMENT & PLAN NOTE
Patient also continues to have back pain, she has extensive disease in her back and has several areas that are stenotic. She is not able to find a good position to sleep in, one that doesn't aggravate the back pain. She is not interested in going to a pain specialist at the moment.     Discussed using tylenol and gabapentin three times a day, along with tramadol twice a day.  This will help easing the pain.     Orders:    traMADol (ULTRAM) 50 MG tablet; Take 1 tablet by mouth in the morning and at bedtime for 30 days. Max Daily Amount: 100 mg    tiZANidine (ZANAFLEX) 2 MG tablet; Take 1 tablet by mouth in the morning and at bedtime    Juan Manuel Turk MD, Pain Medicine, [place]    gabapentin (NEURONTIN) 100 MG capsule; Take 1 capsule by mouth 3 times daily for 30 days. Max Daily Amount: 300 mg

## 2024-12-20 ENCOUNTER — TRANSCRIBE ORDERS (OUTPATIENT)
Facility: HOSPITAL | Age: 64
End: 2024-12-20

## 2024-12-20 DIAGNOSIS — Z12.31 OTHER SCREENING MAMMOGRAM: Primary | ICD-10-CM

## 2024-12-21 ENCOUNTER — HOSPITAL ENCOUNTER (INPATIENT)
Facility: HOSPITAL | Age: 64
LOS: 7 days | Discharge: NURSING, REHAB OR LONG-TERM CARE | End: 2024-12-28
Attending: EMERGENCY MEDICINE | Admitting: FAMILY MEDICINE
Payer: MEDICAID

## 2024-12-21 ENCOUNTER — APPOINTMENT (OUTPATIENT)
Facility: HOSPITAL | Age: 64
End: 2024-12-21
Payer: MEDICAID

## 2024-12-21 DIAGNOSIS — S00.83XA CONTUSION OF FACE, INITIAL ENCOUNTER: ICD-10-CM

## 2024-12-21 DIAGNOSIS — T68.XXXA HYPOTHERMIA, INITIAL ENCOUNTER: ICD-10-CM

## 2024-12-21 DIAGNOSIS — R41.82 ALTERED MENTAL STATUS, UNSPECIFIED ALTERED MENTAL STATUS TYPE: Primary | ICD-10-CM

## 2024-12-21 DIAGNOSIS — E16.2 HYPOGLYCEMIA: ICD-10-CM

## 2024-12-21 DIAGNOSIS — T78.3XXA ANGIOEDEMA, INITIAL ENCOUNTER: ICD-10-CM

## 2024-12-21 LAB
ALBUMIN SERPL-MCNC: 1.3 G/DL (ref 3.5–5)
ALBUMIN/GLOB SERPL: 0.3 (ref 1.1–2.2)
ALP SERPL-CCNC: 189 U/L (ref 45–117)
ALT SERPL-CCNC: 27 U/L (ref 12–78)
AMPHET UR QL SCN: NEGATIVE
ANION GAP BLD CALC-SCNC: 9 (ref 10–20)
ANION GAP SERPL CALC-SCNC: 4 MMOL/L (ref 2–12)
APAP SERPL-MCNC: 5 UG/ML (ref 10–30)
APPEARANCE UR: ABNORMAL
APTT PPP: 23.9 SEC (ref 22.1–31)
AST SERPL-CCNC: 28 U/L (ref 15–37)
BACTERIA URNS QL MICRO: ABNORMAL /HPF
BARBITURATES UR QL SCN: NEGATIVE
BASOPHILS # BLD: 0 K/UL (ref 0–0.1)
BASOPHILS NFR BLD: 0 % (ref 0–1)
BENZODIAZ UR QL: NEGATIVE
BILIRUB SERPL-MCNC: 0.3 MG/DL (ref 0.2–1)
BILIRUB UR QL: NEGATIVE
BUN SERPL-MCNC: 26 MG/DL (ref 6–20)
BUN/CREAT SERPL: 18 (ref 12–20)
CA-I BLD-MCNC: 1.16 MMOL/L (ref 1.15–1.33)
CALCIUM SERPL-MCNC: 8.6 MG/DL (ref 8.5–10.1)
CANNABINOIDS UR QL SCN: NEGATIVE
CHLORIDE BLD-SCNC: 107 MMOL/L (ref 100–111)
CHLORIDE SERPL-SCNC: 109 MMOL/L (ref 97–108)
CK SERPL-CCNC: 160 U/L (ref 26–192)
CO2 BLD-SCNC: 25 MMOL/L (ref 22–29)
CO2 SERPL-SCNC: 27 MMOL/L (ref 21–32)
COCAINE UR QL SCN: NEGATIVE
COLOR UR: ABNORMAL
CREAT SERPL-MCNC: 1.47 MG/DL (ref 0.55–1.02)
CREAT UR-MCNC: 1.3 MG/DL (ref 0.6–1.3)
CRP SERPL-MCNC: <0.29 MG/DL (ref 0–0.3)
DIFFERENTIAL METHOD BLD: ABNORMAL
EOSINOPHIL # BLD: 0 K/UL (ref 0–0.4)
EOSINOPHIL NFR BLD: 0 % (ref 0–7)
EPITH CASTS URNS QL MICRO: ABNORMAL /LPF
ERYTHROCYTE [DISTWIDTH] IN BLOOD BY AUTOMATED COUNT: 14.6 % (ref 11.5–14.5)
ETHANOL SERPL-MCNC: <10 MG/DL (ref 0–0.08)
GLOBULIN SER CALC-MCNC: 4.1 G/DL (ref 2–4)
GLUCOSE BLD STRIP.AUTO-MCNC: 117 MG/DL (ref 65–117)
GLUCOSE BLD STRIP.AUTO-MCNC: 123 MG/DL (ref 65–117)
GLUCOSE BLD STRIP.AUTO-MCNC: 133 MG/DL (ref 74–99)
GLUCOSE BLD STRIP.AUTO-MCNC: 56 MG/DL (ref 65–117)
GLUCOSE BLD STRIP.AUTO-MCNC: 58 MG/DL (ref 65–117)
GLUCOSE SERPL-MCNC: 131 MG/DL (ref 65–100)
GLUCOSE UR STRIP.AUTO-MCNC: NEGATIVE MG/DL
HCO3 BLDA-SCNC: 26 MMOL/L
HCT VFR BLD AUTO: 31.4 % (ref 35–47)
HGB BLD-MCNC: 10.6 G/DL (ref 11.5–16)
HGB UR QL STRIP: NEGATIVE
IMM GRANULOCYTES # BLD AUTO: 0.1 K/UL (ref 0–0.04)
IMM GRANULOCYTES NFR BLD AUTO: 1 % (ref 0–0.5)
INR PPP: 1 (ref 0.9–1.1)
KETONES UR QL STRIP.AUTO: NEGATIVE MG/DL
LACTATE BLD-SCNC: 1.29 MMOL/L (ref 0.4–2)
LACTATE SERPL-SCNC: 1.2 MMOL/L (ref 0.4–2)
LEUKOCYTE ESTERASE UR QL STRIP.AUTO: NEGATIVE
LIPASE SERPL-CCNC: 6 U/L (ref 13–75)
LYMPHOCYTES # BLD: 1.8 K/UL (ref 0.8–3.5)
LYMPHOCYTES NFR BLD: 13 % (ref 12–49)
Lab: NORMAL
MAGNESIUM SERPL-MCNC: 2.1 MG/DL (ref 1.6–2.4)
MCH RBC QN AUTO: 30.5 PG (ref 26–34)
MCHC RBC AUTO-ENTMCNC: 33.8 G/DL (ref 30–36.5)
MCV RBC AUTO: 90.2 FL (ref 80–99)
METHADONE UR QL: NEGATIVE
MONOCYTES # BLD: 0.4 K/UL (ref 0–1)
MONOCYTES NFR BLD: 3 % (ref 5–13)
NEUTS SEG # BLD: 11.4 K/UL (ref 1.8–8)
NEUTS SEG NFR BLD: 83 % (ref 32–75)
NITRITE UR QL STRIP.AUTO: NEGATIVE
NRBC # BLD: 0 K/UL (ref 0–0.01)
NRBC BLD-RTO: 0 PER 100 WBC
NT PRO BNP: 760 PG/ML (ref 0–125)
OPIATES UR QL: NEGATIVE
PCO2 BLDV: 47.8 MMHG (ref 41–51)
PCP UR QL: NEGATIVE
PH BLDV: 7.34 (ref 7.32–7.42)
PH UR STRIP: 6 (ref 5–8)
PHOSPHATE SERPL-MCNC: 4.4 MG/DL (ref 2.6–4.7)
PLATELET # BLD AUTO: 280 K/UL (ref 150–400)
PMV BLD AUTO: 10.8 FL (ref 8.9–12.9)
PO2 BLDV: 29 MMHG (ref 25–40)
POTASSIUM BLD-SCNC: 5 MMOL/L (ref 3.5–5.5)
POTASSIUM SERPL-SCNC: 5.1 MMOL/L (ref 3.5–5.1)
PROCALCITONIN SERPL-MCNC: 0.05 NG/ML
PROT SERPL-MCNC: 5.4 G/DL (ref 6.4–8.2)
PROT UR STRIP-MCNC: 30 MG/DL
PROTHROMBIN TIME: 9.6 SEC (ref 9–11.1)
RBC # BLD AUTO: 3.48 M/UL (ref 3.8–5.2)
RBC #/AREA URNS HPF: ABNORMAL /HPF (ref 0–5)
SALICYLATES SERPL-MCNC: 3.8 MG/DL (ref 2.8–20)
SAO2 % BLD: 49 % (ref 94–98)
SERVICE CMNT-IMP: ABNORMAL
SERVICE CMNT-IMP: NORMAL
SODIUM BLD-SCNC: 141 MMOL/L (ref 136–145)
SODIUM SERPL-SCNC: 140 MMOL/L (ref 136–145)
SP GR UR REFRACTOMETRY: 1.02 (ref 1–1.03)
SPECIMEN SITE: ABNORMAL
THERAPEUTIC RANGE: NORMAL SECS (ref 58–77)
TROPONIN I SERPL HS-MCNC: 19 NG/L (ref 0–51)
URINE CULTURE IF INDICATED: ABNORMAL
UROBILINOGEN UR QL STRIP.AUTO: 0.2 EU/DL (ref 0.2–1)
WBC # BLD AUTO: 13.7 K/UL (ref 3.6–11)
WBC URNS QL MICRO: ABNORMAL /HPF (ref 0–4)

## 2024-12-21 PROCEDURE — 70450 CT HEAD/BRAIN W/O DYE: CPT

## 2024-12-21 PROCEDURE — 82077 ASSAY SPEC XCP UR&BREATH IA: CPT

## 2024-12-21 PROCEDURE — 82947 ASSAY GLUCOSE BLOOD QUANT: CPT

## 2024-12-21 PROCEDURE — 93005 ELECTROCARDIOGRAM TRACING: CPT | Performed by: EMERGENCY MEDICINE

## 2024-12-21 PROCEDURE — 1100000000 HC RM PRIVATE

## 2024-12-21 PROCEDURE — 84145 PROCALCITONIN (PCT): CPT

## 2024-12-21 PROCEDURE — 80053 COMPREHEN METABOLIC PANEL: CPT

## 2024-12-21 PROCEDURE — 36680 INSERT NEEDLE BONE CAVITY: CPT

## 2024-12-21 PROCEDURE — 82550 ASSAY OF CK (CPK): CPT

## 2024-12-21 PROCEDURE — 96375 TX/PRO/DX INJ NEW DRUG ADDON: CPT

## 2024-12-21 PROCEDURE — 83690 ASSAY OF LIPASE: CPT

## 2024-12-21 PROCEDURE — 2580000003 HC RX 258: Performed by: EMERGENCY MEDICINE

## 2024-12-21 PROCEDURE — 6360000002 HC RX W HCPCS: Performed by: EMERGENCY MEDICINE

## 2024-12-21 PROCEDURE — 80179 DRUG ASSAY SALICYLATE: CPT

## 2024-12-21 PROCEDURE — 70486 CT MAXILLOFACIAL W/O DYE: CPT

## 2024-12-21 PROCEDURE — 2500000003 HC RX 250 WO HCPCS: Performed by: EMERGENCY MEDICINE

## 2024-12-21 PROCEDURE — 84295 ASSAY OF SERUM SODIUM: CPT

## 2024-12-21 PROCEDURE — 87086 URINE CULTURE/COLONY COUNT: CPT

## 2024-12-21 PROCEDURE — 80307 DRUG TEST PRSMV CHEM ANLYZR: CPT

## 2024-12-21 PROCEDURE — 36415 COLL VENOUS BLD VENIPUNCTURE: CPT

## 2024-12-21 PROCEDURE — 80143 DRUG ASSAY ACETAMINOPHEN: CPT

## 2024-12-21 PROCEDURE — 96374 THER/PROPH/DIAG INJ IV PUSH: CPT

## 2024-12-21 PROCEDURE — 87088 URINE BACTERIA CULTURE: CPT

## 2024-12-21 PROCEDURE — 86140 C-REACTIVE PROTEIN: CPT

## 2024-12-21 PROCEDURE — 72125 CT NECK SPINE W/O DYE: CPT

## 2024-12-21 PROCEDURE — 87040 BLOOD CULTURE FOR BACTERIA: CPT

## 2024-12-21 PROCEDURE — 99285 EMERGENCY DEPT VISIT HI MDM: CPT

## 2024-12-21 PROCEDURE — 81001 URINALYSIS AUTO W/SCOPE: CPT

## 2024-12-21 PROCEDURE — 82330 ASSAY OF CALCIUM: CPT

## 2024-12-21 PROCEDURE — 85610 PROTHROMBIN TIME: CPT

## 2024-12-21 PROCEDURE — 83735 ASSAY OF MAGNESIUM: CPT

## 2024-12-21 PROCEDURE — 84100 ASSAY OF PHOSPHORUS: CPT

## 2024-12-21 PROCEDURE — 85025 COMPLETE CBC W/AUTO DIFF WBC: CPT

## 2024-12-21 PROCEDURE — 83605 ASSAY OF LACTIC ACID: CPT

## 2024-12-21 PROCEDURE — 82962 GLUCOSE BLOOD TEST: CPT

## 2024-12-21 PROCEDURE — 84132 ASSAY OF SERUM POTASSIUM: CPT

## 2024-12-21 PROCEDURE — 87186 SC STD MICRODIL/AGAR DIL: CPT

## 2024-12-21 PROCEDURE — 71045 X-RAY EXAM CHEST 1 VIEW: CPT

## 2024-12-21 PROCEDURE — 83880 ASSAY OF NATRIURETIC PEPTIDE: CPT

## 2024-12-21 PROCEDURE — 85730 THROMBOPLASTIN TIME PARTIAL: CPT

## 2024-12-21 PROCEDURE — 82803 BLOOD GASES ANY COMBINATION: CPT

## 2024-12-21 PROCEDURE — 84484 ASSAY OF TROPONIN QUANT: CPT

## 2024-12-21 RX ORDER — ACETAMINOPHEN 500 MG
1000 TABLET ORAL
Status: DISPENSED | OUTPATIENT
Start: 2024-12-21 | End: 2024-12-22

## 2024-12-21 RX ORDER — DEXTROSE MONOHYDRATE 100 MG/ML
INJECTION, SOLUTION INTRAVENOUS CONTINUOUS PRN
Status: DISCONTINUED | OUTPATIENT
Start: 2024-12-21 | End: 2024-12-28 | Stop reason: HOSPADM

## 2024-12-21 RX ADMIN — DEXTROSE MONOHYDRATE: 100 INJECTION, SOLUTION INTRAVENOUS at 22:26

## 2024-12-21 RX ADMIN — DEXTROSE MONOHYDRATE 250 ML: 100 INJECTION, SOLUTION INTRAVENOUS at 20:58

## 2024-12-21 RX ADMIN — WATER 125 MG: 1 INJECTION INTRAMUSCULAR; INTRAVENOUS; SUBCUTANEOUS at 20:06

## 2024-12-21 RX ADMIN — FAMOTIDINE 20 MG: 10 INJECTION, SOLUTION INTRAVENOUS at 20:02

## 2024-12-21 ASSESSMENT — PAIN DESCRIPTION - FREQUENCY: FREQUENCY: CONTINUOUS

## 2024-12-21 ASSESSMENT — PAIN DESCRIPTION - LOCATION: LOCATION: BACK

## 2024-12-21 ASSESSMENT — PAIN DESCRIPTION - PAIN TYPE: TYPE: CHRONIC PAIN

## 2024-12-21 ASSESSMENT — PAIN DESCRIPTION - ORIENTATION: ORIENTATION: MID;LOWER

## 2024-12-21 ASSESSMENT — PAIN DESCRIPTION - DESCRIPTORS: DESCRIPTORS: ACHING

## 2024-12-21 ASSESSMENT — PAIN SCALES - GENERAL: PAINLEVEL_OUTOF10: 8

## 2024-12-22 PROBLEM — R41.82 ALTERED MENTAL STATUS: Status: ACTIVE | Noted: 2024-12-22

## 2024-12-22 LAB
ANION GAP SERPL CALC-SCNC: 3 MMOL/L (ref 2–12)
BUN SERPL-MCNC: 27 MG/DL (ref 6–20)
BUN/CREAT SERPL: 19 (ref 12–20)
CALCIUM SERPL-MCNC: 7.5 MG/DL (ref 8.5–10.1)
CHLORIDE SERPL-SCNC: 112 MMOL/L (ref 97–108)
CO2 SERPL-SCNC: 25 MMOL/L (ref 21–32)
CREAT SERPL-MCNC: 1.41 MG/DL (ref 0.55–1.02)
ERYTHROCYTE [DISTWIDTH] IN BLOOD BY AUTOMATED COUNT: 15 % (ref 11.5–14.5)
GLUCOSE BLD STRIP.AUTO-MCNC: 108 MG/DL (ref 65–117)
GLUCOSE BLD STRIP.AUTO-MCNC: 149 MG/DL (ref 65–117)
GLUCOSE BLD STRIP.AUTO-MCNC: 176 MG/DL (ref 65–117)
GLUCOSE BLD STRIP.AUTO-MCNC: 196 MG/DL (ref 65–117)
GLUCOSE BLD STRIP.AUTO-MCNC: 71 MG/DL (ref 65–117)
GLUCOSE BLD STRIP.AUTO-MCNC: 72 MG/DL (ref 65–117)
GLUCOSE BLD STRIP.AUTO-MCNC: 92 MG/DL (ref 65–117)
GLUCOSE SERPL-MCNC: 164 MG/DL (ref 65–100)
HCT VFR BLD AUTO: 26.6 % (ref 35–47)
HGB BLD-MCNC: 8.6 G/DL (ref 11.5–16)
MCH RBC QN AUTO: 29.9 PG (ref 26–34)
MCHC RBC AUTO-ENTMCNC: 32.3 G/DL (ref 30–36.5)
MCV RBC AUTO: 92.4 FL (ref 80–99)
NRBC # BLD: 0 K/UL (ref 0–0.01)
NRBC BLD-RTO: 0 PER 100 WBC
PLATELET # BLD AUTO: 251 K/UL (ref 150–400)
PMV BLD AUTO: 11.1 FL (ref 8.9–12.9)
POTASSIUM SERPL-SCNC: 4.5 MMOL/L (ref 3.5–5.1)
RBC # BLD AUTO: 2.88 M/UL (ref 3.8–5.2)
SERVICE CMNT-IMP: ABNORMAL
SERVICE CMNT-IMP: NORMAL
SODIUM SERPL-SCNC: 140 MMOL/L (ref 136–145)
WBC # BLD AUTO: 14.5 K/UL (ref 3.6–11)

## 2024-12-22 PROCEDURE — 36415 COLL VENOUS BLD VENIPUNCTURE: CPT

## 2024-12-22 PROCEDURE — 6370000000 HC RX 637 (ALT 250 FOR IP)

## 2024-12-22 PROCEDURE — 97530 THERAPEUTIC ACTIVITIES: CPT

## 2024-12-22 PROCEDURE — 94761 N-INVAS EAR/PLS OXIMETRY MLT: CPT

## 2024-12-22 PROCEDURE — 6360000002 HC RX W HCPCS

## 2024-12-22 PROCEDURE — 97116 GAIT TRAINING THERAPY: CPT

## 2024-12-22 PROCEDURE — 2580000003 HC RX 258: Performed by: EMERGENCY MEDICINE

## 2024-12-22 PROCEDURE — 97165 OT EVAL LOW COMPLEX 30 MIN: CPT

## 2024-12-22 PROCEDURE — 99223 1ST HOSP IP/OBS HIGH 75: CPT | Performed by: STUDENT IN AN ORGANIZED HEALTH CARE EDUCATION/TRAINING PROGRAM

## 2024-12-22 PROCEDURE — 85027 COMPLETE CBC AUTOMATED: CPT

## 2024-12-22 PROCEDURE — 2500000003 HC RX 250 WO HCPCS

## 2024-12-22 PROCEDURE — 97535 SELF CARE MNGMENT TRAINING: CPT

## 2024-12-22 PROCEDURE — 80048 BASIC METABOLIC PNL TOTAL CA: CPT

## 2024-12-22 PROCEDURE — 97163 PT EVAL HIGH COMPLEX 45 MIN: CPT

## 2024-12-22 PROCEDURE — 2060000000 HC ICU INTERMEDIATE R&B

## 2024-12-22 RX ORDER — ALBUTEROL SULFATE 0.83 MG/ML
2.5 SOLUTION RESPIRATORY (INHALATION) EVERY 4 HOURS PRN
Status: DISCONTINUED | OUTPATIENT
Start: 2024-12-22 | End: 2024-12-28 | Stop reason: HOSPADM

## 2024-12-22 RX ORDER — GABAPENTIN 100 MG/1
100 CAPSULE ORAL 3 TIMES DAILY
Status: DISCONTINUED | OUTPATIENT
Start: 2024-12-22 | End: 2024-12-28 | Stop reason: HOSPADM

## 2024-12-22 RX ORDER — ENOXAPARIN SODIUM 100 MG/ML
40 INJECTION SUBCUTANEOUS DAILY
Status: DISCONTINUED | OUTPATIENT
Start: 2024-12-22 | End: 2024-12-28 | Stop reason: HOSPADM

## 2024-12-22 RX ORDER — FUROSEMIDE 20 MG/1
20 TABLET ORAL 3 TIMES DAILY
Status: DISCONTINUED | OUTPATIENT
Start: 2024-12-22 | End: 2024-12-28 | Stop reason: HOSPADM

## 2024-12-22 RX ORDER — CYCLOBENZAPRINE HCL 10 MG
10 TABLET ORAL 3 TIMES DAILY PRN
Status: DISCONTINUED | OUTPATIENT
Start: 2024-12-22 | End: 2024-12-28 | Stop reason: HOSPADM

## 2024-12-22 RX ORDER — SODIUM CHLORIDE 0.9 % (FLUSH) 0.9 %
5-40 SYRINGE (ML) INJECTION EVERY 12 HOURS SCHEDULED
Status: DISCONTINUED | OUTPATIENT
Start: 2024-12-22 | End: 2024-12-28 | Stop reason: HOSPADM

## 2024-12-22 RX ORDER — FUROSEMIDE 20 MG/1
20 TABLET ORAL 3 TIMES DAILY
Status: DISCONTINUED | OUTPATIENT
Start: 2024-12-22 | End: 2024-12-22

## 2024-12-22 RX ORDER — FERROUS SULFATE 325(65) MG
325 TABLET ORAL 2 TIMES DAILY
Status: DISCONTINUED | OUTPATIENT
Start: 2024-12-22 | End: 2024-12-28 | Stop reason: HOSPADM

## 2024-12-22 RX ORDER — AMLODIPINE BESYLATE 5 MG/1
10 TABLET ORAL DAILY
Status: DISCONTINUED | OUTPATIENT
Start: 2024-12-22 | End: 2024-12-28 | Stop reason: HOSPADM

## 2024-12-22 RX ORDER — SODIUM CHLORIDE 0.9 % (FLUSH) 0.9 %
5-40 SYRINGE (ML) INJECTION PRN
Status: DISCONTINUED | OUTPATIENT
Start: 2024-12-22 | End: 2024-12-28 | Stop reason: HOSPADM

## 2024-12-22 RX ORDER — ONDANSETRON 2 MG/ML
4 INJECTION INTRAMUSCULAR; INTRAVENOUS EVERY 6 HOURS PRN
Status: DISCONTINUED | OUTPATIENT
Start: 2024-12-22 | End: 2024-12-28 | Stop reason: HOSPADM

## 2024-12-22 RX ORDER — TRAMADOL HYDROCHLORIDE 50 MG/1
50 TABLET ORAL 2 TIMES DAILY
Status: DISCONTINUED | OUTPATIENT
Start: 2024-12-22 | End: 2024-12-28 | Stop reason: HOSPADM

## 2024-12-22 RX ORDER — POLYETHYLENE GLYCOL 3350 17 G/17G
17 POWDER, FOR SOLUTION ORAL DAILY PRN
Status: DISCONTINUED | OUTPATIENT
Start: 2024-12-22 | End: 2024-12-26

## 2024-12-22 RX ORDER — TAMSULOSIN HYDROCHLORIDE 0.4 MG/1
0.4 CAPSULE ORAL NIGHTLY
Status: DISCONTINUED | OUTPATIENT
Start: 2024-12-22 | End: 2024-12-28 | Stop reason: HOSPADM

## 2024-12-22 RX ORDER — INSULIN GLARGINE 100 [IU]/ML
10 INJECTION, SOLUTION SUBCUTANEOUS NIGHTLY
Status: DISCONTINUED | OUTPATIENT
Start: 2024-12-22 | End: 2024-12-24

## 2024-12-22 RX ORDER — SODIUM CHLORIDE 9 MG/ML
INJECTION, SOLUTION INTRAVENOUS PRN
Status: DISCONTINUED | OUTPATIENT
Start: 2024-12-22 | End: 2024-12-28 | Stop reason: HOSPADM

## 2024-12-22 RX ORDER — ALBUTEROL SULFATE 90 UG/1
2 INHALANT RESPIRATORY (INHALATION) EVERY 4 HOURS PRN
Status: DISCONTINUED | OUTPATIENT
Start: 2024-12-22 | End: 2024-12-22 | Stop reason: CLARIF

## 2024-12-22 RX ORDER — DEXTROSE MONOHYDRATE 100 MG/ML
INJECTION, SOLUTION INTRAVENOUS CONTINUOUS PRN
Status: DISCONTINUED | OUTPATIENT
Start: 2024-12-22 | End: 2024-12-22

## 2024-12-22 RX ORDER — ONDANSETRON 4 MG/1
4 TABLET, ORALLY DISINTEGRATING ORAL EVERY 8 HOURS PRN
Status: DISCONTINUED | OUTPATIENT
Start: 2024-12-22 | End: 2024-12-28 | Stop reason: HOSPADM

## 2024-12-22 RX ORDER — INSULIN LISPRO 100 [IU]/ML
0-8 INJECTION, SOLUTION INTRAVENOUS; SUBCUTANEOUS
Status: DISCONTINUED | OUTPATIENT
Start: 2024-12-22 | End: 2024-12-23

## 2024-12-22 RX ORDER — FUROSEMIDE 40 MG/1
80 TABLET ORAL DAILY
Status: DISCONTINUED | OUTPATIENT
Start: 2024-12-22 | End: 2024-12-22

## 2024-12-22 RX ORDER — DICYCLOMINE HYDROCHLORIDE 10 MG/1
10 CAPSULE ORAL
Status: DISCONTINUED | OUTPATIENT
Start: 2024-12-22 | End: 2024-12-28 | Stop reason: HOSPADM

## 2024-12-22 RX ORDER — PANTOPRAZOLE SODIUM 40 MG/1
40 TABLET, DELAYED RELEASE ORAL
Status: DISCONTINUED | OUTPATIENT
Start: 2024-12-22 | End: 2024-12-28 | Stop reason: HOSPADM

## 2024-12-22 RX ORDER — ACETAMINOPHEN 325 MG/1
650 TABLET ORAL EVERY 6 HOURS PRN
Status: DISCONTINUED | OUTPATIENT
Start: 2024-12-22 | End: 2024-12-28 | Stop reason: HOSPADM

## 2024-12-22 RX ORDER — ACETAMINOPHEN 650 MG/1
650 SUPPOSITORY RECTAL EVERY 6 HOURS PRN
Status: DISCONTINUED | OUTPATIENT
Start: 2024-12-22 | End: 2024-12-28 | Stop reason: HOSPADM

## 2024-12-22 RX ORDER — SENNA AND DOCUSATE SODIUM 50; 8.6 MG/1; MG/1
1 TABLET, FILM COATED ORAL DAILY
Status: DISCONTINUED | OUTPATIENT
Start: 2024-12-22 | End: 2024-12-26

## 2024-12-22 RX ORDER — CETIRIZINE HYDROCHLORIDE 10 MG/1
10 TABLET ORAL NIGHTLY
Status: DISCONTINUED | OUTPATIENT
Start: 2024-12-22 | End: 2024-12-28 | Stop reason: HOSPADM

## 2024-12-22 RX ADMIN — GABAPENTIN 100 MG: 100 CAPSULE ORAL at 09:04

## 2024-12-22 RX ADMIN — CETIRIZINE HYDROCHLORIDE 10 MG: 10 TABLET, FILM COATED ORAL at 21:10

## 2024-12-22 RX ADMIN — FERROUS SULFATE TAB 325 MG (65 MG ELEMENTAL FE) 325 MG: 325 (65 FE) TAB at 09:04

## 2024-12-22 RX ADMIN — ACETAMINOPHEN 650 MG: 325 TABLET ORAL at 23:57

## 2024-12-22 RX ADMIN — GABAPENTIN 100 MG: 100 CAPSULE ORAL at 21:10

## 2024-12-22 RX ADMIN — TRAMADOL HYDROCHLORIDE 50 MG: 50 TABLET, COATED ORAL at 21:09

## 2024-12-22 RX ADMIN — DICYCLOMINE HYDROCHLORIDE 10 MG: 10 CAPSULE ORAL at 12:35

## 2024-12-22 RX ADMIN — PANCRELIPASE LIPASE, PANCRELIPASE PROTEASE, PANCRELIPASE AMYLASE 30000 UNITS: 15000; 47000; 63000 CAPSULE, DELAYED RELEASE ORAL at 18:18

## 2024-12-22 RX ADMIN — DICYCLOMINE HYDROCHLORIDE 10 MG: 10 CAPSULE ORAL at 16:45

## 2024-12-22 RX ADMIN — FERROUS SULFATE TAB 325 MG (65 MG ELEMENTAL FE) 325 MG: 325 (65 FE) TAB at 21:09

## 2024-12-22 RX ADMIN — GABAPENTIN 100 MG: 100 CAPSULE ORAL at 14:43

## 2024-12-22 RX ADMIN — AMLODIPINE BESYLATE 10 MG: 5 TABLET ORAL at 09:04

## 2024-12-22 RX ADMIN — DICYCLOMINE HYDROCHLORIDE 10 MG: 10 CAPSULE ORAL at 04:19

## 2024-12-22 RX ADMIN — PANTOPRAZOLE SODIUM 40 MG: 40 TABLET, DELAYED RELEASE ORAL at 16:45

## 2024-12-22 RX ADMIN — FUROSEMIDE 20 MG: 20 TABLET ORAL at 14:43

## 2024-12-22 RX ADMIN — ONDANSETRON 4 MG: 4 TABLET, ORALLY DISINTEGRATING ORAL at 21:10

## 2024-12-22 RX ADMIN — DICYCLOMINE HYDROCHLORIDE 10 MG: 10 CAPSULE ORAL at 21:10

## 2024-12-22 RX ADMIN — AVOBENZONE, HOMOSALATE, OCTISALATE, OCTOCRYLENE, AND OXYBENZONE 1 PACKET: 29.4; 147; 49; 25.4; 58.8 LOTION TOPICAL at 14:43

## 2024-12-22 RX ADMIN — DICYCLOMINE HYDROCHLORIDE 10 MG: 10 CAPSULE ORAL at 09:03

## 2024-12-22 RX ADMIN — DEXTROSE MONOHYDRATE: 100 INJECTION, SOLUTION INTRAVENOUS at 01:22

## 2024-12-22 RX ADMIN — SODIUM CHLORIDE, PRESERVATIVE FREE 10 ML: 5 INJECTION INTRAVENOUS at 21:11

## 2024-12-22 RX ADMIN — PANCRELIPASE LIPASE, PANCRELIPASE PROTEASE, PANCRELIPASE AMYLASE 40000 UNITS: 20000; 63000; 84000 CAPSULE, DELAYED RELEASE ORAL at 12:35

## 2024-12-22 RX ADMIN — ENOXAPARIN SODIUM 40 MG: 100 INJECTION SUBCUTANEOUS at 09:05

## 2024-12-22 RX ADMIN — SODIUM CHLORIDE, PRESERVATIVE FREE 10 ML: 5 INJECTION INTRAVENOUS at 09:05

## 2024-12-22 RX ADMIN — FUROSEMIDE 20 MG: 20 TABLET ORAL at 09:05

## 2024-12-22 RX ADMIN — TRAMADOL HYDROCHLORIDE 50 MG: 50 TABLET, COATED ORAL at 09:04

## 2024-12-22 RX ADMIN — PANCRELIPASE LIPASE, PANCRELIPASE PROTEASE, PANCRELIPASE AMYLASE 40000 UNITS: 20000; 63000; 84000 CAPSULE, DELAYED RELEASE ORAL at 18:18

## 2024-12-22 RX ADMIN — PANTOPRAZOLE SODIUM 40 MG: 40 TABLET, DELAYED RELEASE ORAL at 09:04

## 2024-12-22 RX ADMIN — DICLOFENAC SODIUM 2 G: 10 GEL TOPICAL at 23:10

## 2024-12-22 RX ADMIN — PANCRELIPASE LIPASE, PANCRELIPASE PROTEASE, PANCRELIPASE AMYLASE 30000 UNITS: 15000; 47000; 63000 CAPSULE, DELAYED RELEASE ORAL at 12:35

## 2024-12-22 RX ADMIN — TRAMADOL HYDROCHLORIDE 50 MG: 50 TABLET, COATED ORAL at 02:42

## 2024-12-22 RX ADMIN — INSULIN LISPRO 2 UNITS: 100 INJECTION, SOLUTION INTRAVENOUS; SUBCUTANEOUS at 21:28

## 2024-12-22 RX ADMIN — AVOBENZONE, HOMOSALATE, OCTISALATE, OCTOCRYLENE, AND OXYBENZONE 1 PACKET: 29.4; 147; 49; 25.4; 58.8 LOTION TOPICAL at 21:10

## 2024-12-22 ASSESSMENT — PAIN DESCRIPTION - DESCRIPTORS
DESCRIPTORS: ACHING

## 2024-12-22 ASSESSMENT — PAIN DESCRIPTION - LOCATION
LOCATION: BACK

## 2024-12-22 ASSESSMENT — PAIN SCALES - GENERAL
PAINLEVEL_OUTOF10: 2
PAINLEVEL_OUTOF10: 8
PAINLEVEL_OUTOF10: 8
PAINLEVEL_OUTOF10: 7
PAINLEVEL_OUTOF10: 5
PAINLEVEL_OUTOF10: 4
PAINLEVEL_OUTOF10: 8
PAINLEVEL_OUTOF10: 8
PAINLEVEL_OUTOF10: 4

## 2024-12-22 ASSESSMENT — PAIN DESCRIPTION - ORIENTATION
ORIENTATION: LOWER;POSTERIOR
ORIENTATION: MID
ORIENTATION: LOWER
ORIENTATION: LOWER
ORIENTATION: POSTERIOR;LOWER

## 2024-12-22 ASSESSMENT — PAIN - FUNCTIONAL ASSESSMENT: PAIN_FUNCTIONAL_ASSESSMENT: ACTIVITIES ARE NOT PREVENTED

## 2024-12-22 NOTE — ED NOTES
TRANSFER - OUT REPORT:    Verbal report given to [facility] on Candida Maza  being transferred to [facility] for routine progression of patient care       Report consisted of patient's Situation, Background, Assessment and   Recommendations(SBAR).     Information from the following report(s) ED SBAR was reviewed with the receiving nurse.    Tiffani Fall Assessment:    Presents to emergency department  because of falls (Syncope, seizure, or loss of consciousness): No  Age > 70: No  Altered Mental Status, Intoxication with alcohol or substance confusion (Disorientation, impaired judgment, poor safety awaremess, or inability to follow instructions): Yes  Impaired Mobility: Ambulates or transfers with assistive devices or assistance; Unable to ambulate or transer.: No             Lines:   Peripheral IV 12/21/24 Left Antecubital (Active)       Peripheral IV 12/21/24 Proximal;Right;Anterior Forearm (Active)        Opportunity for questions and clarification was provided.      Patient transported with:  Monitor  D10 @100mL/hr

## 2024-12-22 NOTE — PLAN OF CARE
Clicks\"                                                       Daily Activity Inpatient Short Form  How much help from another person does the patient currently need... Total; A Lot A Little None   1.  Putting on and taking off regular lower body clothing? []  1 []  2 [x]  3 []  4   2.  Bathing (including washing, rinsing, drying)? []  1 [x]  2 []  3 []  4   3.  Toileting, which includes using toilet, bedpan or urinal? [] 1 []  2 [x]  3 []  4   4.  Putting on and taking off regular upper body clothing? []  1 []  2 [x]  3 []  4   5.  Taking care of personal grooming such as brushing teeth? []  1 []  2 []  3 [x]  4   6.  Eating meals? []  1 []  2 []  3 [x]  4   © 2007, Trustees of [facility], under license to [employer]. All rights reserved     Score: 19/24     Interpretation of Tool:  Represents clinically-significant functional categories (i.e. Activities of daily living).    Cutoff score 39.4 (19) correlates to a good likelihood of discharging home versus a facility  Ashlee Farmer, Idalia Bradford, Edgardo Kevin, Candida Brooks, Nolan Story, Reese Farmer, AM-PAC “6-Clicks” Functional Assessment Scores Predict Acute Care Hospital Discharge Destination, Physical Therapy, Volume 94, Issue 9, 1 September 2014, Pages 4261-4473, [email or web address]    Pain Rating:  Pain in R calf with standing activity   Pain Intervention(s):   nursing notified, rest, elevation, and repositioning    Activity Tolerance:   Fair , SpO2 stable on room air, and signs and symptoms of orthostatic hypotension    After treatment:   Patient left in no apparent distress sitting up in chair, Call bell within reach, and Bed/ chair alarm activated    COMMUNICATION/EDUCATION:   The patient's plan of care was discussed with: physical therapist and registered nurse    Patient Education  Education Given To: Patient;Family  Education Provided: Role of Therapy;Plan of Care;Transfer Training;Energy  Conservation;Orientation;Equipment;Fall Prevention Strategies  Education Provided Comments: safety with activity progression due to orthostatic hypotension  Education Method: Verbal  Barriers to Learning: None  Education Outcome: Verbalized understanding;Continued education needed    Thank you for this referral.  Catherine Downs OT  Minutes: 33

## 2024-12-22 NOTE — H&P
[address] [id number]   Office [phone]  Fax [phone]      Admission H&P     Name: Candida Maza MRN: [id number]  Sex: Female   YOB: 1960  Age: 64 y.o.  PCP: Keena Leyva MD     Source of Information: patient, medical records    Chief complaint: hypoglycemia, cold exposure    History of Present Illness  Candida Maza is a 64 y.o. female with PMHx of T2DM, HTN, CKD4, pancreatic insufficiency, asthma who presents to the ED by EMS altered mental status and facial swelling. According to the son who called EMS, patient was found face down on the floor for unknown duration and unresponsive. EMS stated BG was 39, gave IV D10 after which mentation improved. Patient does not recall this event at this time. Patient admitted with similar complaints on 10/30/2024.         In the ED:  Vitals: Temp 88.1   /125   HR 61   RR 15   SatO2  100% on RA  Labs: Cr 1.47, Gluc 131, Pro-, CK, CRP, Trop wnl. WBC 13.7, Hb 10.6,   Imaging: CXR, CT Head & C-spine: NAP.  Treatment: IV D10.    EKG: Junctional; and regular . Rate (approx.): 63; Axis: normal; QRS interval: normal ; ST/T wave. Significant Change from the EKG performed on October 30, 2024 as the patient now has junctional rhythm with low voltage     Patient Vitals for the past 12 hrs:   Temp Pulse Resp BP SpO2   12/22/24 0105 -- 77 -- -- --   12/22/24 0018 98.3 °F (36.8 °C) 65 14 131/68 100 %   12/21/24 2359 -- 69 12 131/68 99 %   12/21/24 2345 -- 70 10 (!) 127/54 99 %   12/21/24 2343 97.6 °F (36.4 °C) -- -- -- --   12/21/24 2330 -- 69 14 111/60 100 %   12/21/24 2310 -- 68 (!) 9 (!) 121/56 100 %   12/21/24 2300 -- 69 13 123/67 100 %   12/21/24 2245 -- 68 17 127/69 100 %   12/21/24 2230 -- 65 12 119/62 100 %   12/21/24 2215 -- 67 14 (!) 128/57 100 %   12/21/24 2200 (!) 90.6 °F (32.6 °C) -- -- -- --   12/21/24 2140 -- 66 13 130/74 100 %   12/21/24 2130 -- -- -- 123/81 --   12/21/24 2110 -- 61 14 139/67 100 %    insulin glargine (LANTUS SOLOSTAR) 100 UNIT/ML injection pen Inject 10 Units into the skin nightly 11/1/24   Jero Zheng DO   albuterol sulfate HFA (PROVENTIL;VENTOLIN;PROAIR) 108 (90 Base) MCG/ACT inhaler INHALE 2 PUFFS INTO THE LUNGS 4 TIMES DAILY AS NEEDED FOR WHEEZING. 9/5/24   Keena Leyva MD   SUMAtriptan (IMITREX) 100 MG tablet TAKE 1 TABLET BY MOUTH AS NEEDED FOR MIGRAINE. 9/5/24   Keena Leyva MD   ONETOUCH VERIO strip USE 1 TEST STIP IN THE MORNING,AT NOON,IN THE EVENING AND AT BEDTIME AS NEEDED 9/4/24   Liudmila Heard MD   amLODIPine (NORVASC) 10 MG tablet TAKE 1 TABLET BY MOUTH EVERY DAY 9/4/24   Liudmila Heard MD   omeprazole (PRILOSEC) 20 MG delayed release capsule TAKE 1 CAPSULE BY MOUTH TWICE A DAY 9/4/24   Liudmila Heard MD   cyclobenzaprine (FLEXERIL) 10 MG tablet TAKE 1 TABLET BY MOUTH THREE TIMES A DAY AS NEEDED FOR MUSCLE SPASM 9/4/24   Liudmila Heard MD   Continuous Glucose Sensor (FREESTYLE MAGEN 3 SENSOR) MISC 2 Devices by Does not apply route every 14 days 8/19/24   Liudmila Heard MD   FEROSUL 325 (65 Fe) MG tablet Take 1 tablet by mouth 2 times daily 8/3/24   Zena Panchal MD   Blood Glucose Monitoring Suppl (ONE TOUCH ULTRA 2) w/Device KIT USE AS DIRECTED ONCE DAILY TO MEASURE BLOOD SUGAR 8/3/24   Zena Panchal MD   Lancets (ONETOUCH DELICA PLUS VEBYTA98O) MISC USE AS DIRECTED ONCE DAILY WITH METER TO MEASURE BLOOD SUGAR 8/3/24   Zena Panchal MD   ondansetron (ZOFRAN-ODT) 4 MG disintegrating tablet DISSOLVE 1 TABLET UNDER THE TONGUE EVERY 8 HOURS AS NEEDED FOR  NAUSEA AND VOMITING 8/3/24   Zena Panchal MD   cetirizine (ZYRTEC) 10 MG tablet Take 1 tablet by mouth nightly 8/14/24   Liudmila Heard MD   lipase-protease-amylase (CREON) 51454-465206 units CPEP delayed release capsule Take 2 capsules by mouth 3 times daily (with meals). May also take 1 capsule daily as needed (snacks). 8/14/24   Liudmila Heard MD   sennosides-docusate

## 2024-12-22 NOTE — PLAN OF CARE
REMOVAL Left 10/2015    CHOLECYSTECTOMY  2005?    COLONOSCOPY N/A 11/30/2016    COLONOSCOPY,EGD performed by Jarocho Tim MD at [facility] ENDOSCOPY    COLONOSCOPY      PANCREAS SURGERY PROC UNLISTED  2001?    Distal pancreatectomy.    PANCREATIC ELASTASE, FECAL, QUAL/SEMI-QUANT  1999    growths in prancreatitis       Home Situation:  Social/Functional History  Lives With: Son  Type of Home: House  Home Layout: One level  Home Access: Stairs to enter with rails  Entrance Stairs - Number of Steps: 3  Bathroom Shower/Tub: Walk-in shower  Bathroom Equipment: Grab bars in shower, Toilet raiser  Home Equipment: Cane, Rollator, Walker - Rolling  Has the patient had two or more falls in the past year or any fall with injury in the past year?: No  Prior Level of Assist for ADLs: Independent  Prior Level of Assist for Ambulation: Independent household ambulator, with or without device, Independent community ambulator, with or without device  Active : No  Occupation: Retired    Cognitive/Behavioral Status:  Orientation  Overall Orientation Status: Within Normal Limits  Orientation Level: Oriented to place;Oriented to time;Oriented to person  Cognition  Overall Cognitive Status: Exceptions  Arousal/Alertness: Delayed responses to stimuli  Following Commands: Follows one step commands with increased time;Follows one step commands with repetition  Memory: Decreased recall of recent events  Initiation: Appears intact  Sequencing: Appears intact  Cognition Comment: flat affect; has no recollection of event that lead to her hospitalization  Strength:    Strength: Generally decreased, functional    Tone & Sensation:           Coordination:  Coordination: Generally decreased, functional    Range Of Motion:  AROM: Generally decreased, functional       Functional Mobility:  Bed Mobility:     Bed Mobility Training  Bed Mobility Training: Yes  Supine to Sit: Stand-by assistance;Contact-guard assistance;Additional time  Scooting:  Contact-guard assistance;Additional time  Transfers:     Transfer Training  Transfer Training: Yes  Sit to Stand: Contact-guard assistance  Stand to Sit: Contact-guard assistance;Minimum assistance  Bed to Chair: Minimum assistance;Moderate assistance;Assist X1;Adaptive equipment;Additional time (posterior leaning; RW use)  Balance:               Balance  Sitting: Intact  Standing: Impaired  Standing - Static: Fair  Standing - Dynamic: Fair;Poor;Constant support  Ambulation/Gait Training:                       Gait  Gait Training: Yes  Overall Level of Assistance: Minimum assistance;Additional time;Assist X1 (chair brought by second - symptomatic orthostatic)  Assistive Device: Gait belt;Walker, rolling  Interventions: Tactile cues;Safety awareness training;Verbal cues  Base of Support: Narrowed  Speed/Belinda: Slow  Step Length: Right shortened  Gait Abnormalities: Decreased step clearance;Antalgic (increased R calf pain with WBAT)                    Pain Rating:  /10   Pain Intervention(s):   nursing notified and addressing, elevation, and repositioning    Activity Tolerance:   Poor, requires rest breaks, SpO2 stable on room air, and signs and symptoms of orthostatic hypotension    After treatment:   Patient left in no apparent distress sitting up in chair, Call bell within reach, and Bed/ chair alarm activated    COMMUNICATION/EDUCATION:   The patient's plan of care was discussed with: occupational therapist and registered nurse         Thank you for this referral.  YURY RANGEL, PT  Minutes: 22

## 2024-12-22 NOTE — PROGRESS NOTES
1315 Family practice attending at bedside. Notified MD that patient has been stuck three times for CBC blood draw and were unsuccessful and had reached out to RRT RN at 1301 for assistance.    1655 Notified Family Practice of PT/OT concern for patient's RT leg pain. Assessed patient leg and patient stated pain is on anterior aspect of RT leg where a wound is located. There is no posterior pain.Wound care consult was ordered this morning at 1026 by RN. Family Practice stated will wait for wound care RN to assess wound.

## 2024-12-22 NOTE — ED PROVIDER NOTES
AREA TWICE A DAY    VITAMIN D (ERGOCALCIFEROL) 1.25 MG (82467 UT) CAPS CAPSULE    TAKE 1 CAPSULE BY MOUTH ONCE A WEEK AT 5 PM       ALLERGIES     Penicillins, Ciprofloxacin, Lisinopril, and Sulfamethoxazole-trimethoprim    FAMILY HISTORY       Family History   Problem Relation Age of Onset    Diabetes Brother     Breast Cancer Sister 56    Heart Disease Father     Diabetes Mother           SOCIAL HISTORY       Social History     Socioeconomic History    Marital status: Legally      Spouse name: None    Number of children: None    Years of education: None    Highest education level: None   Tobacco Use    Smoking status: Former     Current packs/day: 0.00     Average packs/day: 0.5 packs/day for 10.0 years (5.0 ttl pk-yrs)     Types: Cigarettes     Start date: 2016     Quit date: 2016     Years since quittin.8    Smokeless tobacco: Never   Substance and Sexual Activity    Alcohol use: No     Alcohol/week: 0.0 standard drinks of alcohol    Drug use: No     Social Determinants of Health     Financial Resource Strain: Low Risk  (2024)    Overall Financial Resource Strain (CARDIA)     Difficulty of Paying Living Expenses: Not hard at all   Food Insecurity: No Food Insecurity (2024)    Hunger Vital Sign     Worried About Running Out of Food in the Last Year: Never true     Ran Out of Food in the Last Year: Never true   Transportation Needs: No Transportation Needs (2024)    PRAPARE - Transportation     Lack of Transportation (Medical): No     Lack of Transportation (Non-Medical): No   Housing Stability: Low Risk  (2024)    Housing Stability Vital Sign     Unable to Pay for Housing in the Last Year: No     Number of Times Moved in the Last Year: 1     Homeless in the Last Year: No           PHYSICAL EXAM    (up to 7 for level 4, 8 or more for level 5)     ED Triage Vitals   BP Systolic BP Percentile Diastolic BP Percentile Temp Temp src Pulse Respirations SpO2   24 -- --  derangement and electrolyte normality, ICH, CVA, toxicology and sepsis with occult bacteremia.    Plan: CT scan of the head/CT of the cervical spine/CT scan maxillofacial bone/EKG/chest x-ray/IV fluid/monitor electrolytes especially blood sugar/serial exam/ Monitor and Reevaluate.      Amount and/or Complexity of Data Reviewed  Labs: ordered.  Radiology: ordered.  ECG/medicine tests: ordered.    Risk  OTC drugs.  Decision regarding hospitalization.            REASSESSMENT        PROGRESS NOTE:    Pt has been reexamined by Jose Krueger MD all available results have been reviewed with pt and any available family.  The patient is awake, alert and more conversant with family at the bedside.  She follow commands appropriately.  Blood sugar continues to remain unstable and the patient will receive another D50.facial swelling angioedema likely secondary to trauma.  Pt understands sx, dx, and tx in ED. Care plan has been outlined and questions have been answered. Pt and any available family understands and agrees to need for admission to hospital for further tx not available in ED. Pt is ready for admission.    Written by Jose Krueger MD,  9:42 PM     CONSULT NOTE:  Jose Krueger MD spoke with FP residency Teamteam. Discussed patient's presentation, history, physical assessment, and available diagnostic results. They will evaluate, write orders and admit the patient to the hospital. 9:42 PM     Perfect Serve Consult for Admission  9:42 PM    ED Room Number: WT13/TR13  Patient Name and age:  Candida Maza 64 y.o.  female  Working Diagnosis:   1. Altered mental status, unspecified altered mental status type    2. Hypoglycemia    3. Angioedema, initial encounter    4. Hypothermia, initial encounter    5. Contusion of face, initial encounter        COVID-19 Suspicion: No  Sepsis present:  No  Reassessment needed: No  Code Status:  Full Code  Readmission: No  Isolation Requirements: no  Recommended Level of Care:

## 2024-12-22 NOTE — ED NOTES
TRANSFER - OUT REPORT:    Verbal report given to Idri on Candida Maza  being transferred to [facility] for routine progression of patient care       Report consisted of patient's Situation, Background, Assessment and   Recommendations(SBAR).     Information from the following report(s) ED SBAR was reviewed with the receiving nurse.    Tiffani Fall Assessment:    Presents to emergency department  because of falls (Syncope, seizure, or loss of consciousness): No  Age > 70: No  Altered Mental Status, Intoxication with alcohol or substance confusion (Disorientation, impaired judgment, poor safety awaremess, or inability to follow instructions): Yes  Impaired Mobility: Ambulates or transfers with assistive devices or assistance; Unable to ambulate or transer.: No             Lines:   Peripheral IV 12/21/24 Left Antecubital (Active)       Peripheral IV 12/21/24 Proximal;Right;Anterior Forearm (Active)        Opportunity for questions and clarification was provided.      Patient transported with:  Monitor  D10 @100mL/hr

## 2024-12-22 NOTE — DISCHARGE INSTRUCTIONS
Patient Discharge Instructions    Candida Maza / [id number] : 1960    Admitted 2024 Discharged: 2024 2:02 PM     Primary care provider: Keena Leyva    Discharging provider:  Jarocho Severino DO  - Family Medicine Resident  Dr. Chris Ornelas. Attending, Family Medicine - [facility], Attending    . . . . . . . . . . . . . . . . . . . . . . . . . . . . . . . . . . . . . . . . . . . . . . . . . . . . . . . . . . . . . . . . . . . . . . . .   MEDICATION CHANGES    START  - NPH 8units the morning.     STOP  - Home lantus and metformin.   - Flomax and Lasix     CHANGES    No other changes were made to your medications, please take all your other home medicines as previously prescribed.  . . . . . . . . . . . . . . . . . . . . . . . . . . . . . . . . . . . . . . . . . . . . . . . . . . . . . . . . . . . . . . . . . . . . . . .     FINAL DIAGNOSES & HOSPITAL COURSE:    History of Present Illness     Per admitting physician Dr. Riley   \"Candida Maza is a 64 y.o. female with PMHx of T2DM, HTN, CKD4, pancreatic insufficiency, asthma who presents to the ED by EMS altered mental status and facial swelling. According to the son who called EMS, patient was found face down on the floor for unknown duration and unresponsive. EMS stated BG was 39, gave IV D10 after which mentation improved. Patient does not recall this event at this time. Patient admitted with similar complaints on 10/30/2024.   \"        Hospital course     Hypoglycemia in setting of T2DM: Resolved. Now hyperglycemia. Suspect poor po intake given pt has not eaten since 10/29 however also possibly overdose of insulin or infectious etiology however less likely given no other signs/symptoms.  Potentially complicated by hypoglycemic seizure given intial LOC however now at baseline mentation.  - DM educator consulted              - NPH 8u in AM.               - Given CGM to help with monitoring.               - Stop metformin

## 2024-12-22 NOTE — PLAN OF CARE
Problem: Skin/Tissue Integrity  Goal: Absence of new skin breakdown  Description: 1.  Monitor for areas of redness and/or skin breakdown  2.  Assess vascular access sites hourly  3.  Every 4-6 hours minimum:  Change oxygen saturation probe site  4.  Every 4-6 hours:  If on nasal continuous positive airway pressure, respiratory therapy assess nares and determine need for appliance change or resting period.  Outcome: [facility] Progressing     Problem: Chronic Conditions and Co-morbidities  Goal: Patient's chronic conditions and co-morbidity symptoms are monitored and maintained or improved  Outcome: [facility] Progressing  Flowsheets (Taken 12/22/2024 0400 by Gregg oHng, RN)  Care Plan - Patient's Chronic Conditions and Co-Morbidity Symptoms are Monitored and Maintained or Improved:   Monitor and assess patient's chronic conditions and comorbid symptoms for stability, deterioration, or improvement   Collaborate with multidisciplinary team to address chronic and comorbid conditions and prevent exacerbation or deterioration   Update acute care plan with appropriate goals if chronic or comorbid symptoms are exacerbated and prevent overall improvement and discharge     Problem: Discharge Planning  Goal: Discharge to home or other facility with appropriate resources  Outcome: [facility] Progressing  Flowsheets (Taken 12/22/2024 0400 by Gregg Hong, RN)  Discharge to home or other facility with appropriate resources:   Identify barriers to discharge with patient and caregiver   Arrange for needed discharge resources and transportation as appropriate   Identify discharge learning needs (meds, wound care, etc)   Arrange for interpreters to assist at discharge as needed   Refer to discharge planning if patient needs post-hospital services based on physician order or complex needs related to functional status, cognitive ability or social support system     Problem: Pain  Goal: Verbalizes/displays adequate comfort level or  baseline comfort level  Outcome: [facility] Progressing     Problem: Safety - Adult  Goal: Free from fall injury  Outcome: [facility] Progressing

## 2024-12-22 NOTE — CARE COORDINATION
Case Management Discharge Note    Discharge Plan:  Anticipate DC to home today pending medical stability.  Patient is requesting transportation assistance.      Transportation:  CM met with patient at bedside to confirm DC address on file.  Patient stated that she lives with her son, Pavan Maza [phone].  However, she does not have a key to her house and she does not know if her son is home.  CM called patient's son and LM, pending call back.      Will continue to follow.  Medical treatment team informed of updates.    ______________________________________  EZEKIEL Marx, RN-CM  [facility]- Care Management  Available via [email or web address]  12/22/2024  1:50 PM

## 2024-12-22 NOTE — ED NOTES
[facility] will transport pt to [facility]/ B323/01 for inpatient admission. ETA 0032

## 2024-12-23 LAB
ANION GAP SERPL CALC-SCNC: 1 MMOL/L (ref 2–12)
BASOPHILS # BLD: 0 K/UL (ref 0–0.1)
BASOPHILS NFR BLD: 0 % (ref 0–1)
BUN SERPL-MCNC: 34 MG/DL (ref 6–20)
BUN/CREAT SERPL: 18 (ref 12–20)
CALCIUM SERPL-MCNC: 7.3 MG/DL (ref 8.5–10.1)
CHLORIDE SERPL-SCNC: 109 MMOL/L (ref 97–108)
CO2 SERPL-SCNC: 27 MMOL/L (ref 21–32)
COMMENT:: NORMAL
CREAT SERPL-MCNC: 1.9 MG/DL (ref 0.55–1.02)
DIFFERENTIAL METHOD BLD: ABNORMAL
EKG ATRIAL RATE: 63 BPM
EKG DIAGNOSIS: NORMAL
EKG P AXIS: 12 DEGREES
EKG P-R INTERVAL: 102 MS
EKG Q-T INTERVAL: 426 MS
EKG Q-T INTERVAL: 430 MS
EKG Q-T INTERVAL: 434 MS
EKG QRS DURATION: 66 MS
EKG QRS DURATION: 68 MS
EKG QRS DURATION: 70 MS
EKG QTC CALCULATION (BAZETT): 435 MS
EKG QTC CALCULATION (BAZETT): 444 MS
EKG QTC CALCULATION (BAZETT): 447 MS
EKG R AXIS: 10 DEGREES
EKG R AXIS: 44 DEGREES
EKG R AXIS: 77 DEGREES
EKG T AXIS: 63 DEGREES
EKG T AXIS: 68 DEGREES
EKG T AXIS: 77 DEGREES
EKG VENTRICULAR RATE: 63 BPM
EKG VENTRICULAR RATE: 63 BPM
EKG VENTRICULAR RATE: 65 BPM
EOSINOPHIL # BLD: 0 K/UL (ref 0–0.4)
EOSINOPHIL NFR BLD: 0 % (ref 0–7)
ERYTHROCYTE [DISTWIDTH] IN BLOOD BY AUTOMATED COUNT: 15.4 % (ref 11.5–14.5)
EST. AVERAGE GLUCOSE BLD GHB EST-MCNC: 128 MG/DL
FERRITIN SERPL-MCNC: 259 NG/ML (ref 8–252)
GLUCOSE BLD STRIP.AUTO-MCNC: 154 MG/DL (ref 65–117)
GLUCOSE BLD STRIP.AUTO-MCNC: 171 MG/DL (ref 65–117)
GLUCOSE BLD STRIP.AUTO-MCNC: 221 MG/DL (ref 65–117)
GLUCOSE BLD STRIP.AUTO-MCNC: 352 MG/DL (ref 65–117)
GLUCOSE BLD STRIP.AUTO-MCNC: 396 MG/DL (ref 65–117)
GLUCOSE BLD STRIP.AUTO-MCNC: 81 MG/DL (ref 65–117)
GLUCOSE BLD STRIP.AUTO-MCNC: 91 MG/DL (ref 65–117)
GLUCOSE SERPL-MCNC: 53 MG/DL (ref 65–100)
HAPTOGLOB SERPL-MCNC: 105 MG/DL (ref 30–200)
HBA1C MFR BLD: 6.1 % (ref 4–5.6)
HCT VFR BLD AUTO: 20.8 % (ref 35–47)
HCT VFR BLD AUTO: 22.7 % (ref 35–47)
HGB BLD-MCNC: 6.9 G/DL (ref 11.5–16)
HGB BLD-MCNC: 7.2 G/DL (ref 11.5–16)
IMM GRANULOCYTES # BLD AUTO: 0 K/UL (ref 0–0.04)
IMM GRANULOCYTES NFR BLD AUTO: 0 % (ref 0–0.5)
IRON SATN MFR SERPL: 73 % (ref 20–50)
IRON SERPL-MCNC: 47 UG/DL (ref 35–150)
LDH SERPL L TO P-CCNC: 358 U/L (ref 81–246)
LYMPHOCYTES # BLD: 3 K/UL (ref 0.8–3.5)
LYMPHOCYTES NFR BLD: 27 % (ref 12–49)
MCH RBC QN AUTO: 30.4 PG (ref 26–34)
MCHC RBC AUTO-ENTMCNC: 33.2 G/DL (ref 30–36.5)
MCV RBC AUTO: 91.6 FL (ref 80–99)
MONOCYTES # BLD: 0.8 K/UL (ref 0–1)
MONOCYTES NFR BLD: 7 % (ref 5–13)
NEUTS SEG # BLD: 7.3 K/UL (ref 1.8–8)
NEUTS SEG NFR BLD: 65 % (ref 32–75)
NRBC # BLD: 0 K/UL (ref 0–0.01)
NRBC BLD-RTO: 0 PER 100 WBC
PLATELET # BLD AUTO: 211 K/UL (ref 150–400)
PMV BLD AUTO: 11 FL (ref 8.9–12.9)
POTASSIUM SERPL-SCNC: 5.1 MMOL/L (ref 3.5–5.1)
RBC # BLD AUTO: 2.27 M/UL (ref 3.8–5.2)
RETICS # AUTO: 0.06 M/UL (ref 0.02–0.08)
RETICS/RBC NFR AUTO: 2.5 % (ref 0.7–2.1)
SERVICE CMNT-IMP: ABNORMAL
SERVICE CMNT-IMP: NORMAL
SERVICE CMNT-IMP: NORMAL
SODIUM SERPL-SCNC: 137 MMOL/L (ref 136–145)
SPECIMEN HOLD: NORMAL
TIBC SERPL-MCNC: 64 UG/DL (ref 250–450)
WBC # BLD AUTO: 11.2 K/UL (ref 3.6–11)

## 2024-12-23 PROCEDURE — 6370000000 HC RX 637 (ALT 250 FOR IP)

## 2024-12-23 PROCEDURE — 85045 AUTOMATED RETICULOCYTE COUNT: CPT

## 2024-12-23 PROCEDURE — 83540 ASSAY OF IRON: CPT

## 2024-12-23 PROCEDURE — 83036 HEMOGLOBIN GLYCOSYLATED A1C: CPT

## 2024-12-23 PROCEDURE — 83010 ASSAY OF HAPTOGLOBIN QUANT: CPT

## 2024-12-23 PROCEDURE — 2500000003 HC RX 250 WO HCPCS

## 2024-12-23 PROCEDURE — 1100000000 HC RM PRIVATE

## 2024-12-23 PROCEDURE — 36415 COLL VENOUS BLD VENIPUNCTURE: CPT

## 2024-12-23 PROCEDURE — 6360000002 HC RX W HCPCS

## 2024-12-23 PROCEDURE — 83615 LACTATE (LD) (LDH) ENZYME: CPT

## 2024-12-23 PROCEDURE — 97530 THERAPEUTIC ACTIVITIES: CPT

## 2024-12-23 PROCEDURE — 83550 IRON BINDING TEST: CPT

## 2024-12-23 PROCEDURE — 94761 N-INVAS EAR/PLS OXIMETRY MLT: CPT

## 2024-12-23 PROCEDURE — 97535 SELF CARE MNGMENT TRAINING: CPT

## 2024-12-23 PROCEDURE — 82728 ASSAY OF FERRITIN: CPT

## 2024-12-23 PROCEDURE — 99221 1ST HOSP IP/OBS SF/LOW 40: CPT

## 2024-12-23 PROCEDURE — 80048 BASIC METABOLIC PNL TOTAL CA: CPT

## 2024-12-23 PROCEDURE — 99233 SBSQ HOSP IP/OBS HIGH 50: CPT | Performed by: STUDENT IN AN ORGANIZED HEALTH CARE EDUCATION/TRAINING PROGRAM

## 2024-12-23 PROCEDURE — 85014 HEMATOCRIT: CPT

## 2024-12-23 PROCEDURE — 85018 HEMOGLOBIN: CPT

## 2024-12-23 PROCEDURE — 93010 ELECTROCARDIOGRAM REPORT: CPT | Performed by: HOSPITALIST

## 2024-12-23 PROCEDURE — 82962 GLUCOSE BLOOD TEST: CPT

## 2024-12-23 PROCEDURE — 85025 COMPLETE CBC W/AUTO DIFF WBC: CPT

## 2024-12-23 RX ORDER — INSULIN LISPRO 100 [IU]/ML
0-4 INJECTION, SOLUTION INTRAVENOUS; SUBCUTANEOUS
Status: DISCONTINUED | OUTPATIENT
Start: 2024-12-23 | End: 2024-12-24

## 2024-12-23 RX ADMIN — FERROUS SULFATE TAB 325 MG (65 MG ELEMENTAL FE) 325 MG: 325 (65 FE) TAB at 09:55

## 2024-12-23 RX ADMIN — GABAPENTIN 100 MG: 100 CAPSULE ORAL at 09:55

## 2024-12-23 RX ADMIN — INSULIN LISPRO 4 UNITS: 100 INJECTION, SOLUTION INTRAVENOUS; SUBCUTANEOUS at 22:53

## 2024-12-23 RX ADMIN — SODIUM CHLORIDE, PRESERVATIVE FREE 10 ML: 5 INJECTION INTRAVENOUS at 09:56

## 2024-12-23 RX ADMIN — PANCRELIPASE LIPASE, PANCRELIPASE PROTEASE, PANCRELIPASE AMYLASE 30000 UNITS: 15000; 47000; 63000 CAPSULE, DELAYED RELEASE ORAL at 14:17

## 2024-12-23 RX ADMIN — WATER 1000 MG: 1 INJECTION INTRAMUSCULAR; INTRAVENOUS; SUBCUTANEOUS at 12:40

## 2024-12-23 RX ADMIN — DICYCLOMINE HYDROCHLORIDE 10 MG: 10 CAPSULE ORAL at 09:55

## 2024-12-23 RX ADMIN — TRAMADOL HYDROCHLORIDE 50 MG: 50 TABLET, COATED ORAL at 22:20

## 2024-12-23 RX ADMIN — DICYCLOMINE HYDROCHLORIDE 10 MG: 10 CAPSULE ORAL at 18:13

## 2024-12-23 RX ADMIN — TRAMADOL HYDROCHLORIDE 50 MG: 50 TABLET, COATED ORAL at 09:55

## 2024-12-23 RX ADMIN — PANTOPRAZOLE SODIUM 40 MG: 40 TABLET, DELAYED RELEASE ORAL at 06:33

## 2024-12-23 RX ADMIN — FERROUS SULFATE TAB 325 MG (65 MG ELEMENTAL FE) 325 MG: 325 (65 FE) TAB at 22:20

## 2024-12-23 RX ADMIN — DICYCLOMINE HYDROCHLORIDE 10 MG: 10 CAPSULE ORAL at 22:20

## 2024-12-23 RX ADMIN — TAMSULOSIN HYDROCHLORIDE 0.4 MG: 0.4 CAPSULE ORAL at 22:20

## 2024-12-23 RX ADMIN — PANCRELIPASE LIPASE, PANCRELIPASE PROTEASE, PANCRELIPASE AMYLASE 40000 UNITS: 20000; 63000; 84000 CAPSULE, DELAYED RELEASE ORAL at 14:20

## 2024-12-23 RX ADMIN — GABAPENTIN 100 MG: 100 CAPSULE ORAL at 22:20

## 2024-12-23 RX ADMIN — PANCRELIPASE LIPASE, PANCRELIPASE PROTEASE, PANCRELIPASE AMYLASE 30000 UNITS: 15000; 47000; 63000 CAPSULE, DELAYED RELEASE ORAL at 18:13

## 2024-12-23 RX ADMIN — PANCRELIPASE LIPASE, PANCRELIPASE PROTEASE, PANCRELIPASE AMYLASE 30000 UNITS: 15000; 47000; 63000 CAPSULE, DELAYED RELEASE ORAL at 09:56

## 2024-12-23 RX ADMIN — AMLODIPINE BESYLATE 10 MG: 5 TABLET ORAL at 09:55

## 2024-12-23 RX ADMIN — FUROSEMIDE 20 MG: 20 TABLET ORAL at 09:55

## 2024-12-23 RX ADMIN — ENOXAPARIN SODIUM 40 MG: 100 INJECTION SUBCUTANEOUS at 09:56

## 2024-12-23 RX ADMIN — AVOBENZONE, HOMOSALATE, OCTISALATE, OCTOCRYLENE, AND OXYBENZONE 1 PACKET: 29.4; 147; 49; 25.4; 58.8 LOTION TOPICAL at 15:57

## 2024-12-23 RX ADMIN — DICYCLOMINE HYDROCHLORIDE 10 MG: 10 CAPSULE ORAL at 06:33

## 2024-12-23 RX ADMIN — FUROSEMIDE 20 MG: 20 TABLET ORAL at 14:20

## 2024-12-23 RX ADMIN — FUROSEMIDE 20 MG: 20 TABLET ORAL at 22:20

## 2024-12-23 RX ADMIN — GABAPENTIN 100 MG: 100 CAPSULE ORAL at 14:20

## 2024-12-23 RX ADMIN — AVOBENZONE, HOMOSALATE, OCTISALATE, OCTOCRYLENE, AND OXYBENZONE 1 PACKET: 29.4; 147; 49; 25.4; 58.8 LOTION TOPICAL at 12:00

## 2024-12-23 RX ADMIN — PANCRELIPASE LIPASE, PANCRELIPASE PROTEASE, PANCRELIPASE AMYLASE 40000 UNITS: 20000; 63000; 84000 CAPSULE, DELAYED RELEASE ORAL at 18:13

## 2024-12-23 RX ADMIN — PANTOPRAZOLE SODIUM 40 MG: 40 TABLET, DELAYED RELEASE ORAL at 18:13

## 2024-12-23 RX ADMIN — CETIRIZINE HYDROCHLORIDE 10 MG: 10 TABLET, FILM COATED ORAL at 22:20

## 2024-12-23 RX ADMIN — SENNOSIDES AND DOCUSATE SODIUM 1 TABLET: 50; 8.6 TABLET ORAL at 09:55

## 2024-12-23 RX ADMIN — AVOBENZONE, HOMOSALATE, OCTISALATE, OCTOCRYLENE, AND OXYBENZONE 1 PACKET: 29.4; 147; 49; 25.4; 58.8 LOTION TOPICAL at 22:21

## 2024-12-23 RX ADMIN — PANCRELIPASE LIPASE, PANCRELIPASE PROTEASE, PANCRELIPASE AMYLASE 40000 UNITS: 20000; 63000; 84000 CAPSULE, DELAYED RELEASE ORAL at 09:55

## 2024-12-23 ASSESSMENT — PAIN DESCRIPTION - ORIENTATION
ORIENTATION: LOWER
ORIENTATION: LOWER

## 2024-12-23 ASSESSMENT — PAIN DESCRIPTION - LOCATION
LOCATION: ABDOMEN;BACK
LOCATION: BACK

## 2024-12-23 ASSESSMENT — PAIN - FUNCTIONAL ASSESSMENT: PAIN_FUNCTIONAL_ASSESSMENT: PREVENTS OR INTERFERES SOME ACTIVE ACTIVITIES AND ADLS

## 2024-12-23 ASSESSMENT — PAIN DESCRIPTION - DESCRIPTORS
DESCRIPTORS: ACHING
DESCRIPTORS: ACHING

## 2024-12-23 ASSESSMENT — PAIN SCALES - GENERAL
PAINLEVEL_OUTOF10: 8
PAINLEVEL_OUTOF10: 8

## 2024-12-23 NOTE — CARE COORDINATION
her most times, however he works outside the home and pt is  alone most of the day.    Pt lives in a 1 level home w/ 3 ALMA, at baseline pt is ambulatory  w/ sp cane.  Pt reports to be independent w/ her ADLs and iADLs.  Pt is not able to drive, family provides transport or Medicaid   Pt has history of falls  Pt uses no DME  Pt states she is current and open to [facility] for SN/PT  and OT.  Pt has not stayed at SNF, has had previous rehab at [facility]  PCP: Keena Leyva MD   Rx: Medicaid, pt uses [facility]   Transport TBD pending progress   Pt is agreeable to IPR at [facility], [facility] offered, and pt is agreeable to referrals to [facility], [facility], [facility] and [facility], referrals sent in  Care Port and are pending review, will require insurance auth  CM will follow           Discharge Concerns: [x]Yes []No []Unknown   Describe:pt  lives alone but son stays there frequently, son works outside the home, pt is alone most of the day     Financial concerns/barriers: []Yes, explain: [x]No []Unknown/Not discussed  __________________________________________________________________________    Insurer:   Active Insurance as of 12/21/2024       Primary Coverage       Payor Plan Insurance Group Employer/Plan Group    [place] [facility] PLAN [facility]        Payor Plan Address Payor Plan Phone Number Payor Plan Fax Number Effective Dates    [address] [phone]  2/15/2024 - None Entered    [facility] [id number]         Subscriber Name Subscriber Birth Date Member ID       ZEE HERZOG 1960 [id number]                     PCP: Keena Leyva MD   Address: [facility] [address] [facility] [address] [facility] [address]   Phone number: [phone]    Pharmacy:   [facility]/pharmacy #[id number] - [place], [place] - [address] [place] [address] - P [phone] - F [phone]  [address] [place] [address]  Phone: [phone] Fax: [phone]    [facility]/pharmacy #[id number] - [place] - [address] - P [phone] - I  [phone]  [facility] [address]  Phone: [phone] Fax: [phone]    [place] Transport: (P) Family       Transition of care plan:    []Unable to determine at this time. Awaiting clinical progress, and disposition recommendations.    [] Home. No assistance required.     [] Home. Pt refused recommended services.    [] Home with family assistance as needed, and outpatient follow-up.    [] Home with Outpatient PT and outpatient follow-up   Pt aware of OP appt? []Yes, Provider:   []Not scheduled   Transport provider:     [] Home with outpatient services.    Specify:    [] Home with Home Health   - [place] of Choice offered? [] Yes, Preference:   [] NA    [x]SNF/IPR   -[x]Freedom of Choice offered, and preferences given: [facility], [facility], [facility], [facility]   []Listing provided and preferences requested   -Status: [x]Pending []Accepted:    -Auth required: [x]Yes []No    -Auth initiated date:   -3 midnight stay required: []Yes [x]No  Date satisfied:     [] LTC:     [] Home with Hospice   - [place] of Choice offered? [] Yes, Preference:   [] NA    [] [facility] information provided.     [] Other:       Rivera Shah  Case Management Department  For questions or concerns, please [employer]

## 2024-12-23 NOTE — CARE COORDINATION
12/23/2024  11:05 AM  CM contacted by MD resident re: dispo, SNF is reccommended.  Pt has [employer], and no SNF benefit.    CM contacted therapy to see if pt is appropriate for Rehab.  Would need insurance auth.  CM updated MD resident  Will follow for complete assessment  RADHIKA Kruger

## 2024-12-23 NOTE — PROGRESS NOTES
Affiliated with [facility]  [address] [place] [address]   Office [phone], Fax [phone]       Subjective / Objective     Subjective:  Overnight events: BS to 53, improved to 91 on morning BMP.   Hgb 6.9 on AM labs, 7.1 on repeat    Patient seen and examined at bedside. Doing well.   Bowel movement yesterday was normal. Denies bleeding from any source.        Vital Signs  Vitals:    12/23/24 0745   BP: 105/66   Pulse: 69   Resp: 14   Temp: 97.8 °F (36.6 °C)   SpO2:          Physical Exam  General: no acute distress. Alert. Cooperative.   Head: Some swelling in eyelids, but improved from prior exam.    ENT:             Mucosa pink. Moist mucous membranes. Conjunctiva pink. Sclera white. PERRL.   Respiratory: CTAB. No w/r/r. No accessory muscle use.   Cardiovascular: RRR. Normal S1,S2. No m/r/g.    GI: + bowel sounds. Nontender. No rebound tenderness or guarding. Nondistended.   Extremities: no LE edema. Distal pulses present.    Skin:  Neuro: Warm. No rashes.  CN II-XII grossly intact. No functional neurologic deficit noted.        I/O:    Intake/Output Summary (Last 24 hours) at 12/23/2024 0908  Last data filed at 12/23/2024 0248  Gross per 24 hour   Intake 620 ml   Output --   Net 620 ml        CBC:  Recent Labs     12/21/24 1927 12/22/24  1900 12/23/24  0213 12/23/24  0700   WBC 13.7* 14.5* 11.2*  --    HGB 10.6* 8.6* 6.9* 7.2*   HCT 31.4* 26.6* 20.8* 22.7*    251 211  --        Metabolic Panel:  Recent Labs     12/21/24 1927 12/22/24  0324 12/23/24  0213    140 137   K 5.1 4.5 5.1   * 112* 109*   CO2 27 25 27   BUN 26* 27* 34*   MG 2.1  --   --    PHOS 4.4  --   --    ALT 27  --   --    INR 1.0  --   --          Imaging  CT MAXILLOFACIAL WO CONTRAST  Narrative: EXAM: CT MAXILLOFACIAL WO CONTRAST    INDICATION: Found down, facial swelling    COMPARISON: None.    CONTRAST:   None.    TECHNIQUE:   etiology however less likely given no other signs/symptoms.   Potentially c/b hypoglycemic seizure given intial LOC however now at baseline mentation.   - BS POC ACQHS  - Daily CBC, BMP  - Diabetic diet  - Hypoglycemia protocol ordered  - Stop SSI for now,      Hypothermic: POA, now resolved. Suspect reaction to hypoglycemia. Was initially hypothermic to 88 and treated with Ramiro hugger however this improved and now normal without treatment. No signs/symptoms of infection at this time.   - Follow ucx, bcx  - Low threshold for Abx if worsens or new signs of infection present      Anemia: Hgb 6.9, repeat 7.1 this AM. Hgb BL ~10. Hx of IAN in the past, on iron in past. No signs of bleeding on exam.   - Anemia labs ordered.  - Monitor for any signs of bleeding  - Transfuse <7    Fall w/ Facial swelling: New onset. Likely dependent edema Vs trauma. CK normal.   - CTM     Diabetes Mellitus T2: Uncontrolled. Last A1c 8.3 in 9/2024. Complicated by neuropathy.   - Repeat A1c ordered, however with anemia.   - Holding home metformin and Lantus 15u qhs. Will likely need to come off lantus at home.   - No SSI at this time given hypoglycemia,   - Hypoglycemia protocols ordered.  - Continue gabapentin for neuropathy      Hypertension: POA /119.  - Continuing home medications of amlodipine 10 mg qd.   - Will continue to monitor at this time and readjust as BP's trend.     CKD3b:  BL Cr 1.5  - Lasix 40mg po qd  - Tamsulosin 0.4mg qd  - Avoid nephrotoxic medications     Pancreatic insufficiency: Chronic, stable and well controlled on Creon [id number] 2 capsules TID. Mild epigastric TTP on exam  - Substitute Zenpep for creon     Asthma: Chronic, stable and well controlled, no complaints at this time  - Home albuterol      Muscle spams: Chronic, stable  - Flexeril 10 mg TID prn     Vitamin D deficiency: Chronic, stable  - Home cholecalciferol 1.25 mg qd     Iron deficiency anemia: Chronic. Hgb 11.3  - FeroSul 325 mg qod

## 2024-12-23 NOTE — WOUND CARE
Wound Care Note:     New consult for \"RT leg\"  Seen in B323/01    64 y.o. y/o female admitted on 12/21/2024   Admitted for Hypoglycemia [E16.2]  Hypothermia, initial encounter [T68.XXXA]  Contusion of face, initial encounter [S00.83XA]  Angioedema, initial encounter [T78.3XXA]  Altered mental status, unspecified altered mental status type [R41.82]     Past Medical History:   Diagnosis Date    Arthritis     Asthma     Cataracts, bilateral     Diabetes (HCC)     seen by endocrinology at [facility]    GERD (gastroesophageal reflux disease)     Hypertension     Obesity, Class II, BMI 35-39.9     Pancreatic insufficiency      WBC = 11.2  Diet: ADULT DIET; Regular; 4 carb choices (60 gm/meal)           Assessment:   Patient is alert, cooperative and reports no pain.    Requires 1 assist to reposition for assessment.   Surface: Magna bed with MICHEAL mattress    1. POA Right anterior lower leg  1.4  x 1.4 cm serous filled bullae, scattered open areas of unroofed bullae  Moist, red, serosanguinous drainage, beginning to resurface, no pain, no odor  Tx: Cleansed with wound cleanser, applied xeroform and covered with foam dressing     Recommendations:    Right anterior lower leg Cleanse with wound cleanser, apply Xeroform, and cover with foam dressing. Change every 48 hours.     Turn/reposition approximately every 2 hours  Offload heels with heels hanging off end of pillow at all times while in bed.  Sacral Foam dressing: lift to assess regularly; change as needed. Discontinue if incontinence is frequently soiling dressing.     Z-guard cream to buttocks and sacrum daily and as needed with incontinence care  Low Air Loss mattress: Use only flat sheet and one incontinence pad.     No concerns to relay to provider.    Transition of Care: Please re-consult if needed.     Samina Shepard RN  [facility] Inpatient Wound Care Department  Office [phone]  Available via [employer]

## 2024-12-23 NOTE — PLAN OF CARE
Problem: Occupational Therapy - Adult  Goal: By Discharge: Performs self-care activities at highest level of function for planned discharge setting.  See evaluation for individualized goals.  Description: FUNCTIONAL STATUS PRIOR TO ADMISSION:  Patient is normally independent with ADLs and mobility using SPC intermittently (\"when her leg bothers her\"). Retired.    , Prior Level of Assist for ADLs: Independent, Active : No     HOME SUPPORT: Patient lived with her son who works so she is alone occasionally.    Occupational Therapy Goals:  Initiated 12/22/2024  1.  Patient will perform grooming, standing at sink for 2 minutes, with Modified [place] within 7 day(s).  2.  Patient will perform lower body dressing with Set-up and Supervision within 7 day(s).  3.  Patient will perform anterior bathing from neck to knees with Supervision within 7 day(s).  4.  Patient will perform toilet transfers with Modified [place]  within 7 day(s).  5.  Patient will perform all aspects of toileting with Modified [place] within 7 day(s).  6.  Patient will participate in upper extremity therapeutic exercise/activities with [place] for 10 minutes within 7 day(s).    Outcome: Progressing     OCCUPATIONAL THERAPY TREATMENT  Patient: Candida Maza (64 y.o. female)  Date: 12/23/2024  Primary Diagnosis: Hypoglycemia [E16.2]  Hypothermia, initial encounter [T68.XXXA]  Contusion of face, initial encounter [S00.83XA]  Angioedema, initial encounter [T78.3XXA]  Altered mental status, unspecified altered mental status type [R41.82]       Precautions: Fall Risk, Bed Alarm                Chart, occupational therapy assessment, plan of care, and goals were reviewed.    ASSESSMENT  Patient continues to benefit from skilled OT services and is slowly progressing towards goals. Patient today is more alert and has brighter affect, is motivated to participate and more responsive and engaged. She continues to be most limited by

## 2024-12-24 LAB
ANION GAP SERPL CALC-SCNC: 2 MMOL/L (ref 2–12)
ANION GAP SERPL CALC-SCNC: 4 MMOL/L (ref 2–12)
BACTERIA SPEC CULT: ABNORMAL
BASOPHILS # BLD: 0 K/UL (ref 0–0.1)
BASOPHILS NFR BLD: 0 % (ref 0–1)
BUN SERPL-MCNC: 41 MG/DL (ref 6–20)
BUN SERPL-MCNC: 45 MG/DL (ref 6–20)
BUN/CREAT SERPL: 21 (ref 12–20)
BUN/CREAT SERPL: 22 (ref 12–20)
CALCIUM SERPL-MCNC: 7.3 MG/DL (ref 8.5–10.1)
CALCIUM SERPL-MCNC: 7.4 MG/DL (ref 8.5–10.1)
CC UR VC: ABNORMAL
CHLORIDE SERPL-SCNC: 107 MMOL/L (ref 97–108)
CHLORIDE SERPL-SCNC: 109 MMOL/L (ref 97–108)
CO2 SERPL-SCNC: 26 MMOL/L (ref 21–32)
CO2 SERPL-SCNC: 26 MMOL/L (ref 21–32)
CREAT SERPL-MCNC: 1.96 MG/DL (ref 0.55–1.02)
CREAT SERPL-MCNC: 2.09 MG/DL (ref 0.55–1.02)
DIFFERENTIAL METHOD BLD: ABNORMAL
EOSINOPHIL # BLD: 0.1 K/UL (ref 0–0.4)
EOSINOPHIL NFR BLD: 1 % (ref 0–7)
ERYTHROCYTE [DISTWIDTH] IN BLOOD BY AUTOMATED COUNT: 14.8 % (ref 11.5–14.5)
GLUCOSE BLD STRIP.AUTO-MCNC: 129 MG/DL (ref 65–117)
GLUCOSE BLD STRIP.AUTO-MCNC: 258 MG/DL (ref 65–117)
GLUCOSE BLD STRIP.AUTO-MCNC: 377 MG/DL (ref 65–117)
GLUCOSE BLD STRIP.AUTO-MCNC: 434 MG/DL (ref 65–117)
GLUCOSE BLD STRIP.AUTO-MCNC: 434 MG/DL (ref 65–117)
GLUCOSE SERPL-MCNC: 124 MG/DL (ref 65–100)
GLUCOSE SERPL-MCNC: 295 MG/DL (ref 65–100)
HCT VFR BLD AUTO: 22.1 % (ref 35–47)
HCT VFR BLD AUTO: 22.5 % (ref 35–47)
HGB BLD-MCNC: 7.3 G/DL (ref 11.5–16)
HGB BLD-MCNC: 7.4 G/DL (ref 11.5–16)
IMM GRANULOCYTES # BLD AUTO: 0.1 K/UL (ref 0–0.04)
IMM GRANULOCYTES NFR BLD AUTO: 1 % (ref 0–0.5)
LYMPHOCYTES # BLD: 4.2 K/UL (ref 0.8–3.5)
LYMPHOCYTES NFR BLD: 32 % (ref 12–49)
MCH RBC QN AUTO: 31.1 PG (ref 26–34)
MCHC RBC AUTO-ENTMCNC: 33.5 G/DL (ref 30–36.5)
MCV RBC AUTO: 92.9 FL (ref 80–99)
MONOCYTES # BLD: 0.8 K/UL (ref 0–1)
MONOCYTES NFR BLD: 6 % (ref 5–13)
NEUTS SEG # BLD: 8.1 K/UL (ref 1.8–8)
NEUTS SEG NFR BLD: 60 % (ref 32–75)
NRBC # BLD: 0 K/UL (ref 0–0.01)
NRBC BLD-RTO: 0 PER 100 WBC
OSMOLALITY SERPL: 292 MOSM/KG H2O
OSMOLALITY UR: 202 MOSM/KG H2O
PERIPHERAL SMEAR, MD REVIEW: NORMAL
PLATELET # BLD AUTO: 231 K/UL (ref 150–400)
PMV BLD AUTO: 10.3 FL (ref 8.9–12.9)
POTASSIUM SERPL-SCNC: 4.7 MMOL/L (ref 3.5–5.1)
POTASSIUM SERPL-SCNC: 4.7 MMOL/L (ref 3.5–5.1)
RBC # BLD AUTO: 2.38 M/UL (ref 3.8–5.2)
SERVICE CMNT-IMP: ABNORMAL
SODIUM SERPL-SCNC: 137 MMOL/L (ref 136–145)
SODIUM SERPL-SCNC: 137 MMOL/L (ref 136–145)
SODIUM UR-SCNC: 38 MMOL/L
WBC # BLD AUTO: 13.2 K/UL (ref 3.6–11)

## 2024-12-24 PROCEDURE — 6370000000 HC RX 637 (ALT 250 FOR IP)

## 2024-12-24 PROCEDURE — 2580000003 HC RX 258

## 2024-12-24 PROCEDURE — 80048 BASIC METABOLIC PNL TOTAL CA: CPT

## 2024-12-24 PROCEDURE — 83930 ASSAY OF BLOOD OSMOLALITY: CPT

## 2024-12-24 PROCEDURE — 82962 GLUCOSE BLOOD TEST: CPT

## 2024-12-24 PROCEDURE — 99231 SBSQ HOSP IP/OBS SF/LOW 25: CPT

## 2024-12-24 PROCEDURE — 6360000002 HC RX W HCPCS

## 2024-12-24 PROCEDURE — 85025 COMPLETE CBC W/AUTO DIFF WBC: CPT

## 2024-12-24 PROCEDURE — 36415 COLL VENOUS BLD VENIPUNCTURE: CPT

## 2024-12-24 PROCEDURE — 1100000000 HC RM PRIVATE

## 2024-12-24 PROCEDURE — 97116 GAIT TRAINING THERAPY: CPT

## 2024-12-24 PROCEDURE — 2500000003 HC RX 250 WO HCPCS

## 2024-12-24 PROCEDURE — 84300 ASSAY OF URINE SODIUM: CPT

## 2024-12-24 PROCEDURE — 94761 N-INVAS EAR/PLS OXIMETRY MLT: CPT

## 2024-12-24 PROCEDURE — 85014 HEMATOCRIT: CPT

## 2024-12-24 PROCEDURE — 99233 SBSQ HOSP IP/OBS HIGH 50: CPT | Performed by: STUDENT IN AN ORGANIZED HEALTH CARE EDUCATION/TRAINING PROGRAM

## 2024-12-24 PROCEDURE — 85018 HEMOGLOBIN: CPT

## 2024-12-24 PROCEDURE — 83935 ASSAY OF URINE OSMOLALITY: CPT

## 2024-12-24 RX ORDER — SODIUM CHLORIDE 9 MG/ML
INJECTION, SOLUTION INTRAVENOUS CONTINUOUS
Status: DISCONTINUED | OUTPATIENT
Start: 2024-12-24 | End: 2024-12-25

## 2024-12-24 RX ORDER — 0.9 % SODIUM CHLORIDE 0.9 %
500 INTRAVENOUS SOLUTION INTRAVENOUS ONCE
Status: COMPLETED | OUTPATIENT
Start: 2024-12-24 | End: 2024-12-24

## 2024-12-24 RX ORDER — INSULIN LISPRO 100 [IU]/ML
0-4 INJECTION, SOLUTION INTRAVENOUS; SUBCUTANEOUS
Status: DISCONTINUED | OUTPATIENT
Start: 2024-12-25 | End: 2024-12-28 | Stop reason: HOSPADM

## 2024-12-24 RX ADMIN — PANCRELIPASE LIPASE, PANCRELIPASE PROTEASE, PANCRELIPASE AMYLASE 40000 UNITS: 20000; 63000; 84000 CAPSULE, DELAYED RELEASE ORAL at 17:37

## 2024-12-24 RX ADMIN — TRAMADOL HYDROCHLORIDE 50 MG: 50 TABLET, COATED ORAL at 08:25

## 2024-12-24 RX ADMIN — INSULIN LISPRO 5 UNITS: 100 INJECTION, SOLUTION INTRAVENOUS; SUBCUTANEOUS at 23:58

## 2024-12-24 RX ADMIN — FERROUS SULFATE TAB 325 MG (65 MG ELEMENTAL FE) 325 MG: 325 (65 FE) TAB at 20:06

## 2024-12-24 RX ADMIN — AVOBENZONE, HOMOSALATE, OCTISALATE, OCTOCRYLENE, AND OXYBENZONE 1 PACKET: 29.4; 147; 49; 25.4; 58.8 LOTION TOPICAL at 16:00

## 2024-12-24 RX ADMIN — PANCRELIPASE LIPASE, PANCRELIPASE PROTEASE, PANCRELIPASE AMYLASE 30000 UNITS: 15000; 47000; 63000 CAPSULE, DELAYED RELEASE ORAL at 08:30

## 2024-12-24 RX ADMIN — GABAPENTIN 100 MG: 100 CAPSULE ORAL at 08:25

## 2024-12-24 RX ADMIN — PANCRELIPASE LIPASE, PANCRELIPASE PROTEASE, PANCRELIPASE AMYLASE 40000 UNITS: 20000; 63000; 84000 CAPSULE, DELAYED RELEASE ORAL at 12:36

## 2024-12-24 RX ADMIN — GABAPENTIN 100 MG: 100 CAPSULE ORAL at 12:59

## 2024-12-24 RX ADMIN — AVOBENZONE, HOMOSALATE, OCTISALATE, OCTOCRYLENE, AND OXYBENZONE 1 PACKET: 29.4; 147; 49; 25.4; 58.8 LOTION TOPICAL at 08:23

## 2024-12-24 RX ADMIN — PANCRELIPASE LIPASE, PANCRELIPASE PROTEASE, PANCRELIPASE AMYLASE 30000 UNITS: 15000; 47000; 63000 CAPSULE, DELAYED RELEASE ORAL at 17:37

## 2024-12-24 RX ADMIN — PANTOPRAZOLE SODIUM 40 MG: 40 TABLET, DELAYED RELEASE ORAL at 06:25

## 2024-12-24 RX ADMIN — GABAPENTIN 100 MG: 100 CAPSULE ORAL at 20:05

## 2024-12-24 RX ADMIN — DICYCLOMINE HYDROCHLORIDE 10 MG: 10 CAPSULE ORAL at 20:06

## 2024-12-24 RX ADMIN — PANTOPRAZOLE SODIUM 40 MG: 40 TABLET, DELAYED RELEASE ORAL at 16:02

## 2024-12-24 RX ADMIN — AVOBENZONE, HOMOSALATE, OCTISALATE, OCTOCRYLENE, AND OXYBENZONE 1 PACKET: 29.4; 147; 49; 25.4; 58.8 LOTION TOPICAL at 20:05

## 2024-12-24 RX ADMIN — TAMSULOSIN HYDROCHLORIDE 0.4 MG: 0.4 CAPSULE ORAL at 20:05

## 2024-12-24 RX ADMIN — PANCRELIPASE LIPASE, PANCRELIPASE PROTEASE, PANCRELIPASE AMYLASE 30000 UNITS: 15000; 47000; 63000 CAPSULE, DELAYED RELEASE ORAL at 12:36

## 2024-12-24 RX ADMIN — CETIRIZINE HYDROCHLORIDE 10 MG: 10 TABLET, FILM COATED ORAL at 20:05

## 2024-12-24 RX ADMIN — SENNOSIDES AND DOCUSATE SODIUM 1 TABLET: 50; 8.6 TABLET ORAL at 08:25

## 2024-12-24 RX ADMIN — PANCRELIPASE LIPASE, PANCRELIPASE PROTEASE, PANCRELIPASE AMYLASE 40000 UNITS: 20000; 63000; 84000 CAPSULE, DELAYED RELEASE ORAL at 08:24

## 2024-12-24 RX ADMIN — SODIUM CHLORIDE: 9 INJECTION, SOLUTION INTRAVENOUS at 21:13

## 2024-12-24 RX ADMIN — DICYCLOMINE HYDROCHLORIDE 10 MG: 10 CAPSULE ORAL at 06:25

## 2024-12-24 RX ADMIN — DICYCLOMINE HYDROCHLORIDE 10 MG: 10 CAPSULE ORAL at 16:00

## 2024-12-24 RX ADMIN — WATER 1000 MG: 1 INJECTION INTRAMUSCULAR; INTRAVENOUS; SUBCUTANEOUS at 08:25

## 2024-12-24 RX ADMIN — SODIUM CHLORIDE: 9 INJECTION, SOLUTION INTRAVENOUS at 08:38

## 2024-12-24 RX ADMIN — SODIUM CHLORIDE 500 ML: 9 INJECTION, SOLUTION INTRAVENOUS at 08:37

## 2024-12-24 RX ADMIN — AMLODIPINE BESYLATE 10 MG: 5 TABLET ORAL at 08:24

## 2024-12-24 RX ADMIN — FERROUS SULFATE TAB 325 MG (65 MG ELEMENTAL FE) 325 MG: 325 (65 FE) TAB at 08:25

## 2024-12-24 RX ADMIN — DICYCLOMINE HYDROCHLORIDE 10 MG: 10 CAPSULE ORAL at 12:36

## 2024-12-24 RX ADMIN — SODIUM CHLORIDE, PRESERVATIVE FREE 10 ML: 5 INJECTION INTRAVENOUS at 08:26

## 2024-12-24 RX ADMIN — TRAMADOL HYDROCHLORIDE 50 MG: 50 TABLET, COATED ORAL at 20:05

## 2024-12-24 ASSESSMENT — PAIN SCALES - GENERAL
PAINLEVEL_OUTOF10: 5
PAINLEVEL_OUTOF10: 6
PAINLEVEL_OUTOF10: 8

## 2024-12-24 ASSESSMENT — PAIN DESCRIPTION - LOCATION
LOCATION: BACK
LOCATION: BACK

## 2024-12-24 NOTE — PROGRESS NOTES
Physical Therapy    Attempted PT session. Lunch tray arriving to room. Patient wishing to eat prior to PT. Will try again in afternoon as time permits.    Thank you,  Maco Thakur, PT, DPT

## 2024-12-24 NOTE — PROGRESS NOTES
anemia: Chronic. Hgb 11.3  - FeroSul 325 mg qod      GERD: Chronic, stable  - Substiture protonix for home omeprazole 20 mg qd     Migraine: Chronic, stable on sumatriptan 100 mg prn     Seasonal Allergies: Chronic, stable  - Cetirizine 10 mg qd     FEN/GI - Diabetic diet.   Activity - Ambulate as tolerated  DVT prophylaxis - Lovenox  GI prophylaxis - Not indicated at this time  Fall prophylaxis - Fall precautions ordered.  Disposition - Admit to Remote Telemetry. Plan to d/c to TBD. Consulting PT, OT, and CM  Code Status - Full. Discussed with patient / caregivers.  Next of Kin Name and Contact - Ted Maza [phone]     Patient discussed with Dr. Mei (attending)  Jarocho Severino DO  Family Medicine Resident       For Billing    Chief Complaint   Patient presents with   • Hypoglycemia   • Cold Exposure       Principal Problem:    Hypoglycemia  Active Problems:    Altered mental status  Resolved Problems:    * No resolved hospital problems. *

## 2024-12-24 NOTE — CONSULTS
determinants of health impacting diabetes self-management practices    Lives at home with adult son  Chronic loose stools  Overall evaluation:    [x] A1c pending    Subjective   ”I still feel shaky when I walk.”     Objective   Physical exam  General   Female in no acute distress. Conversant and cooperative  Neuro  Alert, oriented   Vital Signs   Vitals:    12/24/24 1154   BP: 126/74   Pulse: 76   Resp: 18   Temp: 98.1 °F (36.7 °C)   SpO2: 99%       Diabetic foot exam:    Left Foot     Visual Exam:swelling,    Pulse DP: poor ciculation  Right Foot   Visual Exam: mild swelling; wound to lower leg with bandage    Laboratory  Recent Labs     12/22/24  1900 12/23/24  0213 12/23/24  0700 12/24/24  0316   WBC 14.5* 11.2*  --  13.2*   HGB 8.6* 6.9* 7.2* 7.4*   HCT 26.6* 20.8* 22.7* 22.1*   MCV 92.4 91.6  --  92.9    211  --  231     Recent Labs     12/21/24  1927 12/22/24  0324 12/23/24  0213 12/24/24  0316    140 137 137   K 5.1 4.5 5.1 4.7   * 112* 109* 109*   CO2 27 25 27 26   PHOS 4.4  --   --   --    BUN 26* 27* 34* 45*   CREATININE 1.47* 1.41* 1.90* 2.09*     Lab Results   Component Value Date    ALT 27 12/21/2024    AST 28 12/21/2024    ALKPHOS 189 (H) 12/21/2024    BILITOT 0.3 12/21/2024     Lab Results   Component Value Date    TSH 0.69 10/30/2024         Factors impacting BG management  Factor Dose Comments   Nutrition:  Standard meals   60 grams/meal    Pain PRN    Infection  +4 bacteria in urine   Kidney function CKD 3    Pancreatic insufficiency           Assessment and Nursing Intervention   Nursing Diagnosis Risk for unstable blood glucose pattern   Nursing Intervention Domain 8227 Decision-making Support   Nursing Interventions Examined current inpatient diabetes/blood glucose control   Explored factors facilitating and impeding inpatient management  Explored corrective strategies with patient and responsible inpatient provider   Informed patient of rational for insulin strategy while  hospitalized     Nursing Diagnosis 58836 Ineffective Health Management   Nursing Intervention Domain 5250 Decision-making Support   Nursing Interventions Identified diabetes self-management practices impeding diabetes control  Discussed diabetes survival skills related to  Healthy Plate eating plan; given handouts  Role of physical activity in improving insulin sensitivity and action  Procedure for blood glucose monitoring & options for low-cost products  Medications plan at discharge     Billing Code(s)   [] 22703 IP subsequent hospital care - 50 minutes   [] 32707 IP subsequent hospital care - 35 minutes   [x] 51747 IP subsequent hospital care - 25 minutes   [] 08993 IP initial hospital care - 40 minutes     Before making these care recommendations, I personally reviewed the hospitalization record, including notes, laboratory & diagnostic data and current medications, and examined the patient at the bedside.  Total minutes: 25    JACKIE SUN - CNS   Diabetes Clinical Nurse Specialist   [facility]  Access via [employer]

## 2024-12-24 NOTE — PLAN OF CARE
Problem: Physical Therapy - Adult  Goal: By Discharge: Performs mobility at highest level of function for planned discharge setting.  See evaluation for individualized goals.  Description: FUNCTIONAL STATUS PRIOR TO ADMISSION: Patient was independent and active with/ without use of SPC for community amb.    HOME SUPPORT PRIOR TO ADMISSION: The patient lived with son but did not require assistance.    Physical Therapy Goals  Initiated 12/22/2024  1.  Patient will move from supine to sit and sit to supine, scoot up and down, and roll side to side in bed with modified independence within 7 day(s).    2.  Patient will perform sit to stand with modified independence within 7 day(s).  3.  Patient will transfer from bed to chair and chair to bed with modified independence using the least restrictive device within 7 day(s).  4.  Patient will ambulate with modified independence for 50 feet with the least restrictive device within 7 day(s).   5.  Patient will ascend/descend 3 stairs with 2 handrail(s) with contact guard assist within 7 day(s).   Outcome: Progressing     PHYSICAL THERAPY TREATMENT    Patient: Candida Maza (64 y.o. female)  Date: 12/24/2024  Diagnosis: Hypoglycemia [E16.2]  Hypothermia, initial encounter [T68.XXXA]  Contusion of face, initial encounter [S00.83XA]  Angioedema, initial encounter [T78.3XXA]  Altered mental status, unspecified altered mental status type [R41.82] Hypoglycemia      Precautions: Fall Risk, Bed Alarm                      ASSESSMENT:  Patient continues to benefit from skilled PT services and is progressing towards goals. Patient tolerated session well, offering good participation in functional mobility and motivated to progress. She remains below baseline with reduced insight into her deficits and reduced activity tolerance, strength, balance, gait, and safety awareness. Patient completed bed mobility, transfers, and gait training with SBA to Min A and required Min cues for  transfer safety and gait enhancement. Patient ambulated with slow, shuffled gait pattern and altered COG; she demo'd one incomplete posterior LOB with absent stepping strategy requiring therapist assist to prevent fall. Patient with stable BP and asymptomatic. Patient lives without consistent social support and remains at increased fall risk. Continuing to recommend post-acute rehab to maximize her functional independence and safety.        PLAN:  Patient continues to benefit from skilled intervention to address the above impairments.  Continue treatment per established plan of care.        Recommendation for discharge: (in order for the patient to meet his/her long term goals):   Moderate intensity short-term skilled physical therapy up to 5x/week    Other factors to consider for discharge: lives alone, available support system works or is unable to provide adequate supervision and the patient would be alone, patient's current support system is unable to meet their requirements for physical assistance, and concern for safely navigating or managing the home environment    IF patient discharges home will need the following DME: continuing to assess with progress       SUBJECTIVE:   Patient stated, \"Tie it in the back.\"    OBJECTIVE DATA SUMMARY:     Functional Mobility Training:  Bed Mobility:  Bed Mobility Training  Bed Mobility Training: Yes  Supine to Sit: Stand-by assistance  Scooting: Stand-by assistance  Transfers:  Transfer Training  Transfer Training: Yes  Interventions: Verbal cues;Safety awareness training  Sit to Stand: Minimum assistance  Stand to Sit: Minimum assistance  Balance:  Balance  Sitting: Intact  Standing: Impaired  Standing - Static: Fair;Constant support  Standing - Dynamic: Fair;Constant support   Ambulation/Gait Training:     Gait  Gait Training: Yes  Overall Level of Assistance: Minimum assistance;Contact-guard assistance  Distance (ft): 45 Feet  Assistive Device: Gait belt;Walker,

## 2024-12-24 NOTE — PROGRESS NOTES
Physical Therapy    Session attempted @1149. Patient received in semi-fowlers and unarousable despite repeated multimodal stimuli. Patient observed to move head and arms spontaneously, though kept eyes closed, and did not follow any commands. Will defer PT until patient is able to participate in and tolerate skilled intervention. RN informed of patient status.     Thank you,  Maco Thakur, PT, DPT

## 2024-12-24 NOTE — PLAN OF CARE
Problem: Skin/Tissue Integrity  Goal: Absence of new skin breakdown  Description: 1.  Monitor for areas of redness and/or skin breakdown  2.  Assess vascular access sites hourly  3.  Every 4-6 hours minimum:  Change oxygen saturation probe site  4.  Every 4-6 hours:  If on nasal continuous positive airway pressure, respiratory therapy assess nares and determine need for appliance change or resting period.  12/24/2024 1038 by Cammy Higgins RN  Outcome: Progressing  12/24/2024 0506 by Key Hawkins RN  Outcome: Progressing     Problem: Chronic Conditions and Co-morbidities  Goal: Patient's chronic conditions and co-morbidity symptoms are monitored and maintained or improved  12/24/2024 1038 by Cammy Higgins RN  Outcome: Progressing  12/24/2024 0506 by Key Hawkins RN  Outcome: Progressing     Problem: Discharge Planning  Goal: Discharge to home or other facility with appropriate resources  12/24/2024 1038 by Cammy Higgins RN  Outcome: Progressing  12/24/2024 0506 by Key Hawkins RN  Outcome: Progressing     Problem: Pain  Goal: Verbalizes/displays adequate comfort level or baseline comfort level  12/24/2024 1038 by Cammy Higgins RN  Outcome: Progressing  12/24/2024 0506 by Key Hawkins RN  Outcome: Progressing     Problem: Safety - Adult  Goal: Free from fall injury  12/24/2024 1038 by Cammy Higgins RN  Outcome: Progressing  12/24/2024 0506 by Key Hawkins RN  Outcome: Progressing

## 2024-12-25 LAB
ANION GAP SERPL CALC-SCNC: 4 MMOL/L (ref 2–12)
BASOPHILS # BLD: 0 K/UL (ref 0–0.1)
BASOPHILS NFR BLD: 0 % (ref 0–1)
BUN SERPL-MCNC: 41 MG/DL (ref 6–20)
BUN/CREAT SERPL: 23 (ref 12–20)
CALCIUM SERPL-MCNC: 7.3 MG/DL (ref 8.5–10.1)
CHLORIDE SERPL-SCNC: 110 MMOL/L (ref 97–108)
CO2 SERPL-SCNC: 24 MMOL/L (ref 21–32)
CREAT SERPL-MCNC: 1.76 MG/DL (ref 0.55–1.02)
DIFFERENTIAL METHOD BLD: ABNORMAL
EOSINOPHIL # BLD: 0.1 K/UL (ref 0–0.4)
EOSINOPHIL NFR BLD: 1 % (ref 0–7)
ERYTHROCYTE [DISTWIDTH] IN BLOOD BY AUTOMATED COUNT: 14.7 % (ref 11.5–14.5)
GLUCOSE BLD STRIP.AUTO-MCNC: 121 MG/DL (ref 65–117)
GLUCOSE BLD STRIP.AUTO-MCNC: 166 MG/DL (ref 65–117)
GLUCOSE BLD STRIP.AUTO-MCNC: 219 MG/DL (ref 65–117)
GLUCOSE BLD STRIP.AUTO-MCNC: 246 MG/DL (ref 65–117)
GLUCOSE SERPL-MCNC: 114 MG/DL (ref 65–100)
HCT VFR BLD AUTO: 19.2 % (ref 35–47)
HCT VFR BLD AUTO: 21.2 % (ref 35–47)
HCT VFR BLD AUTO: 26.2 % (ref 35–47)
HGB BLD-MCNC: 6.2 G/DL (ref 11.5–16)
HGB BLD-MCNC: 6.8 G/DL (ref 11.5–16)
HGB BLD-MCNC: 8.6 G/DL (ref 11.5–16)
HISTORY CHECK: NORMAL
IMM GRANULOCYTES # BLD AUTO: 0.1 K/UL (ref 0–0.04)
IMM GRANULOCYTES NFR BLD AUTO: 0 % (ref 0–0.5)
LYMPHOCYTES # BLD: 3.3 K/UL (ref 0.8–3.5)
LYMPHOCYTES NFR BLD: 29 % (ref 12–49)
MCH RBC QN AUTO: 30.1 PG (ref 26–34)
MCHC RBC AUTO-ENTMCNC: 32.1 G/DL (ref 30–36.5)
MCV RBC AUTO: 93.8 FL (ref 80–99)
MONOCYTES # BLD: 0.8 K/UL (ref 0–1)
MONOCYTES NFR BLD: 7 % (ref 5–13)
NEUTS SEG # BLD: 7.2 K/UL (ref 1.8–8)
NEUTS SEG NFR BLD: 63 % (ref 32–75)
NRBC # BLD: 0 K/UL (ref 0–0.01)
NRBC BLD-RTO: 0 PER 100 WBC
PLATELET # BLD AUTO: 215 K/UL (ref 150–400)
PMV BLD AUTO: 10.3 FL (ref 8.9–12.9)
POTASSIUM SERPL-SCNC: 4.5 MMOL/L (ref 3.5–5.1)
RBC # BLD AUTO: 2.26 M/UL (ref 3.8–5.2)
SERVICE CMNT-IMP: ABNORMAL
SODIUM SERPL-SCNC: 138 MMOL/L (ref 136–145)
WBC # BLD AUTO: 11.5 K/UL (ref 3.6–11)

## 2024-12-25 PROCEDURE — 99233 SBSQ HOSP IP/OBS HIGH 50: CPT | Performed by: STUDENT IN AN ORGANIZED HEALTH CARE EDUCATION/TRAINING PROGRAM

## 2024-12-25 PROCEDURE — 6370000000 HC RX 637 (ALT 250 FOR IP)

## 2024-12-25 PROCEDURE — 80048 BASIC METABOLIC PNL TOTAL CA: CPT

## 2024-12-25 PROCEDURE — 36430 TRANSFUSION BLD/BLD COMPNT: CPT

## 2024-12-25 PROCEDURE — 82962 GLUCOSE BLOOD TEST: CPT

## 2024-12-25 PROCEDURE — 85025 COMPLETE CBC W/AUTO DIFF WBC: CPT

## 2024-12-25 PROCEDURE — 86901 BLOOD TYPING SEROLOGIC RH(D): CPT

## 2024-12-25 PROCEDURE — 2500000003 HC RX 250 WO HCPCS

## 2024-12-25 PROCEDURE — 85018 HEMOGLOBIN: CPT

## 2024-12-25 PROCEDURE — 86900 BLOOD TYPING SEROLOGIC ABO: CPT

## 2024-12-25 PROCEDURE — 85014 HEMATOCRIT: CPT

## 2024-12-25 PROCEDURE — 86850 RBC ANTIBODY SCREEN: CPT

## 2024-12-25 PROCEDURE — 94761 N-INVAS EAR/PLS OXIMETRY MLT: CPT

## 2024-12-25 PROCEDURE — 1100000000 HC RM PRIVATE

## 2024-12-25 PROCEDURE — P9016 RBC LEUKOCYTES REDUCED: HCPCS

## 2024-12-25 PROCEDURE — 30233N1 TRANSFUSION OF NONAUTOLOGOUS RED BLOOD CELLS INTO PERIPHERAL VEIN, PERCUTANEOUS APPROACH: ICD-10-PCS

## 2024-12-25 PROCEDURE — 6360000002 HC RX W HCPCS

## 2024-12-25 PROCEDURE — 99222 1ST HOSP IP/OBS MODERATE 55: CPT | Performed by: INTERNAL MEDICINE

## 2024-12-25 PROCEDURE — 36415 COLL VENOUS BLD VENIPUNCTURE: CPT

## 2024-12-25 PROCEDURE — 86923 COMPATIBILITY TEST ELECTRIC: CPT

## 2024-12-25 RX ORDER — SODIUM CHLORIDE 9 MG/ML
INJECTION, SOLUTION INTRAVENOUS PRN
Status: DISCONTINUED | OUTPATIENT
Start: 2024-12-25 | End: 2024-12-28 | Stop reason: HOSPADM

## 2024-12-25 RX ORDER — LIDOCAINE 4 G/G
1 PATCH TOPICAL DAILY
Status: DISCONTINUED | OUTPATIENT
Start: 2024-12-25 | End: 2024-12-28 | Stop reason: HOSPADM

## 2024-12-25 RX ADMIN — SODIUM CHLORIDE, PRESERVATIVE FREE 10 ML: 5 INJECTION INTRAVENOUS at 08:54

## 2024-12-25 RX ADMIN — PANCRELIPASE LIPASE, PANCRELIPASE PROTEASE, PANCRELIPASE AMYLASE 40000 UNITS: 20000; 63000; 84000 CAPSULE, DELAYED RELEASE ORAL at 17:23

## 2024-12-25 RX ADMIN — DICYCLOMINE HYDROCHLORIDE 10 MG: 10 CAPSULE ORAL at 17:23

## 2024-12-25 RX ADMIN — FERROUS SULFATE TAB 325 MG (65 MG ELEMENTAL FE) 325 MG: 325 (65 FE) TAB at 22:17

## 2024-12-25 RX ADMIN — GABAPENTIN 100 MG: 100 CAPSULE ORAL at 22:18

## 2024-12-25 RX ADMIN — AMLODIPINE BESYLATE 10 MG: 5 TABLET ORAL at 08:54

## 2024-12-25 RX ADMIN — PANCRELIPASE LIPASE, PANCRELIPASE PROTEASE, PANCRELIPASE AMYLASE 30000 UNITS: 15000; 47000; 63000 CAPSULE, DELAYED RELEASE ORAL at 08:52

## 2024-12-25 RX ADMIN — TRAMADOL HYDROCHLORIDE 50 MG: 50 TABLET, COATED ORAL at 22:17

## 2024-12-25 RX ADMIN — DICYCLOMINE HYDROCHLORIDE 10 MG: 10 CAPSULE ORAL at 22:17

## 2024-12-25 RX ADMIN — DICLOFENAC SODIUM 2 G: 10 GEL TOPICAL at 05:30

## 2024-12-25 RX ADMIN — GABAPENTIN 100 MG: 100 CAPSULE ORAL at 14:20

## 2024-12-25 RX ADMIN — TRAMADOL HYDROCHLORIDE 50 MG: 50 TABLET, COATED ORAL at 08:53

## 2024-12-25 RX ADMIN — TAMSULOSIN HYDROCHLORIDE 0.4 MG: 0.4 CAPSULE ORAL at 22:17

## 2024-12-25 RX ADMIN — PANCRELIPASE LIPASE, PANCRELIPASE PROTEASE, PANCRELIPASE AMYLASE 40000 UNITS: 20000; 63000; 84000 CAPSULE, DELAYED RELEASE ORAL at 12:10

## 2024-12-25 RX ADMIN — SODIUM CHLORIDE, PRESERVATIVE FREE 10 ML: 5 INJECTION INTRAVENOUS at 22:25

## 2024-12-25 RX ADMIN — PANCRELIPASE LIPASE, PANCRELIPASE PROTEASE, PANCRELIPASE AMYLASE 40000 UNITS: 20000; 63000; 84000 CAPSULE, DELAYED RELEASE ORAL at 08:52

## 2024-12-25 RX ADMIN — PANCRELIPASE LIPASE, PANCRELIPASE PROTEASE, PANCRELIPASE AMYLASE 30000 UNITS: 15000; 47000; 63000 CAPSULE, DELAYED RELEASE ORAL at 17:23

## 2024-12-25 RX ADMIN — INSULIN LISPRO 1 UNITS: 100 INJECTION, SOLUTION INTRAVENOUS; SUBCUTANEOUS at 17:23

## 2024-12-25 RX ADMIN — AVOBENZONE, HOMOSALATE, OCTISALATE, OCTOCRYLENE, AND OXYBENZONE 1 PACKET: 29.4; 147; 49; 25.4; 58.8 LOTION TOPICAL at 22:18

## 2024-12-25 RX ADMIN — AVOBENZONE, HOMOSALATE, OCTISALATE, OCTOCRYLENE, AND OXYBENZONE 1 PACKET: 29.4; 147; 49; 25.4; 58.8 LOTION TOPICAL at 08:52

## 2024-12-25 RX ADMIN — GABAPENTIN 100 MG: 100 CAPSULE ORAL at 08:54

## 2024-12-25 RX ADMIN — SENNOSIDES AND DOCUSATE SODIUM 1 TABLET: 50; 8.6 TABLET ORAL at 08:53

## 2024-12-25 RX ADMIN — WATER 1000 MG: 1 INJECTION INTRAMUSCULAR; INTRAVENOUS; SUBCUTANEOUS at 08:53

## 2024-12-25 RX ADMIN — DICYCLOMINE HYDROCHLORIDE 10 MG: 10 CAPSULE ORAL at 12:10

## 2024-12-25 RX ADMIN — AVOBENZONE, HOMOSALATE, OCTISALATE, OCTOCRYLENE, AND OXYBENZONE 1 PACKET: 29.4; 147; 49; 25.4; 58.8 LOTION TOPICAL at 14:22

## 2024-12-25 RX ADMIN — INSULIN LISPRO 1 UNITS: 100 INJECTION, SOLUTION INTRAVENOUS; SUBCUTANEOUS at 22:25

## 2024-12-25 RX ADMIN — PANTOPRAZOLE SODIUM 40 MG: 40 TABLET, DELAYED RELEASE ORAL at 05:31

## 2024-12-25 RX ADMIN — CETIRIZINE HYDROCHLORIDE 10 MG: 10 TABLET, FILM COATED ORAL at 22:17

## 2024-12-25 RX ADMIN — DICYCLOMINE HYDROCHLORIDE 10 MG: 10 CAPSULE ORAL at 05:31

## 2024-12-25 RX ADMIN — INSULIN HUMAN 6 UNITS: 100 INJECTION, SUSPENSION SUBCUTANEOUS at 08:54

## 2024-12-25 RX ADMIN — FERROUS SULFATE TAB 325 MG (65 MG ELEMENTAL FE) 325 MG: 325 (65 FE) TAB at 08:54

## 2024-12-25 RX ADMIN — PANCRELIPASE LIPASE, PANCRELIPASE PROTEASE, PANCRELIPASE AMYLASE 30000 UNITS: 15000; 47000; 63000 CAPSULE, DELAYED RELEASE ORAL at 12:10

## 2024-12-25 RX ADMIN — PANTOPRAZOLE SODIUM 40 MG: 40 TABLET, DELAYED RELEASE ORAL at 17:23

## 2024-12-25 RX ADMIN — CYCLOBENZAPRINE HYDROCHLORIDE 10 MG: 10 TABLET, FILM COATED ORAL at 14:20

## 2024-12-25 ASSESSMENT — PAIN DESCRIPTION - LOCATION
LOCATION: ABDOMEN;BACK
LOCATION: BACK

## 2024-12-25 ASSESSMENT — PAIN SCALES - GENERAL
PAINLEVEL_OUTOF10: 8

## 2024-12-25 ASSESSMENT — PAIN DESCRIPTION - DESCRIPTORS
DESCRIPTORS: DISCOMFORT
DESCRIPTORS: DISCOMFORT

## 2024-12-25 NOTE — PLAN OF CARE
Patient's blood sugar 434 after eating dinner tonight. Recheck 2.5 hours after eating was still 434. On call family medicine notified. 5 units Humalog given. Sliding scale ordered ACHS. Patient updated on plan of care. Care continued.     Problem: Skin/Tissue Integrity  Goal: Absence of new skin breakdown  Description: 1.  Monitor for areas of redness and/or skin breakdown  2.  Assess vascular access sites hourly  3.  Every 4-6 hours minimum:  Change oxygen saturation probe site  4.  Every 4-6 hours:  If on nasal continuous positive airway pressure, respiratory therapy assess nares and determine need for appliance change or resting period.  Outcome: Progressing     Problem: Chronic Conditions and Co-morbidities  Goal: Patient's chronic conditions and co-morbidity symptoms are monitored and maintained or improved  Outcome: Progressing     Problem: Pain  Goal: Verbalizes/displays adequate comfort level or baseline comfort level  Outcome: Progressing     Problem: Safety - Adult  Goal: Free from fall injury  Outcome: Progressing

## 2024-12-25 NOTE — CONSULTS
[facility]  [address], [address] [facility] [address]  W: [phone]  F: [phone] Patient ID  Name: Candida Maza  YOB: 1960  MRN: [id number]  Referring Provider:   No referring provider defined for this encounter.  Primary Care Provider:   Keena Leyva MD       HEMATOLOGY/MEDICAL ONCOLOGY  NOTE     Reason for Evaluation:     Chief Complaint   Patient presents with    Hypoglycemia    Cold Exposure       Subjective:     History of Present Illness:   Date of Visit: 2024  Candida Maza is a 64 y.o. female who presents as an inpatient consultation for anemia. She reportedly is admitted for a syncopal episode at home. Has severe anemia and a mild leukocytosis. Hematology consulted for assistance in anemia work-up..     Past Medical History:   Diagnosis Date    Arthritis     Asthma     Cataracts, bilateral     Diabetes (HCC)     [place] [facility]    GERD (gastroesophageal reflux disease)     Hypertension     Obesity, Class II, BMI 35-39.9     Pancreatic insufficiency       Past Surgical History:   Procedure Laterality Date    CATARACT REMOVAL Right 10/2016    CATARACT REMOVAL Left 10/2015    CHOLECYSTECTOMY  ?    COLONOSCOPY N/A 2016    COLONOSCOPY,EGD performed by Jarocho Tim MD at [facility] ENDOSCOPY    COLONOSCOPY      PANCREAS SURGERY PROC UNLISTED  ?    Distal pancreatectomy.    PANCREATIC ELASTASE, FECAL, QUAL/SEMI-QUANT      growths in prancreatitis      Social History     Tobacco Use    Smoking status: Former     Current packs/day: 0.00     Average packs/day: 0.5 packs/day for 10.0 years (5.0 ttl pk-yrs)     Types: Cigarettes     Start date: 2016     Quit date: 2016     Years since quittin.8    Smokeless tobacco: Never   Substance Use Topics    Alcohol use: No     Alcohol/week: 0.0 standard drinks of alcohol      Family History   Problem Relation Age of Onset    Diabetes Brother

## 2024-12-25 NOTE — CONSENT
Informed Consent for Blood Component Transfusion Note    I have discussed with the patient the rationale for blood component transfusion; its benefits in treating or preventing fatigue, organ damage, or death; and its risk which includes mild transfusion reactions, rare risk of blood borne infection, or more serious but rare reactions. I have discussed the alternatives to transfusion, including the risk and consequences of not receiving transfusion. The patient had an opportunity to ask questions and had agreed to proceed with transfusion of blood components.    Electronically signed by Nathaly Linares MD on 12/25/24 at 7:49 AM EST

## 2024-12-25 NOTE — PLAN OF CARE
Problem: Skin/Tissue Integrity  Goal: Absence of new skin breakdown  Description: 1.  Monitor for areas of redness and/or skin breakdown  2.  Assess vascular access sites hourly  3.  Every 4-6 hours minimum:  Change oxygen saturation probe site  4.  Every 4-6 hours:  If on nasal continuous positive airway pressure, respiratory therapy assess nares and determine need for appliance change or resting period.  12/25/2024 0939 by Aubrie Johnson RN  Outcome: Progressing  12/25/2024 0123 by Nathalie Soria RN  Outcome: Progressing     Problem: Chronic Conditions and Co-morbidities  Goal: Patient's chronic conditions and co-morbidity symptoms are monitored and maintained or improved  12/25/2024 0939 by Aubrie Johnson RN  Outcome: Progressing  12/25/2024 0123 by Nathalie Soria RN  Outcome: Progressing     Problem: Discharge Planning  Goal: Discharge to home or other facility with appropriate resources  12/25/2024 0939 by Aubrie Johnson RN  Outcome: Progressing  12/25/2024 0123 by Nathalie Soria RN  Outcome: Progressing     Problem: Pain  Goal: Verbalizes/displays adequate comfort level or baseline comfort level  12/25/2024 0939 by Aubrie Johnson RN  Outcome: Progressing  12/25/2024 0123 by Nathalie Soria RN  Outcome: Progressing     Problem: Safety - Adult  Goal: Free from fall injury  12/25/2024 0939 by Aubrie Johnson RN  Outcome: Progressing  12/25/2024 0123 by Nathalie Soria RN  Outcome: Progressing

## 2024-12-25 NOTE — PROGRESS NOTES
[address] [id number]   Office [phone]  Fax [phone]          Subjective / Objective     Subjective  Overnight Events: Glucose up to 400s yesterday evening, patient received 5u of lispro and insulin went down to 114.  Morning Hgb 6.8, repeat 6.2.     Patient seen and examined at bedside. Reports feeling okay this AM. Has bowel movement yesterday which was loose but no blood. Denies bleeding from urinary source. States back pain is stable.     Respiratory:   Room air  Vitals:    12/25/24 0735   BP: 122/67   Pulse: 72   Resp: 16   Temp: 98.1 °F (36.7 °C)   SpO2: 93%     Physical Examination:   General appearance - alert, well appearing, and in no distress  Chest - clear to auscultation, no wheezes, rales or rhonchi, symmetric air entry  Heart - normal rate, regular rhythm, normal S1, S2, no murmurs, rubs, clicks or gallops,   Abdomen - soft, nontender, nondistended, no masses or organomegaly  Neurological - alert, oriented, normal speech, no focal findings  Skin - warm, dry. No notable rashes  Extremities - peripheral pulses normal, no pedal edema, no clubbing or cyanosis  Psychiatric - normal speech and thought processes    I/O:  No intake/output data recorded.  Inpatient Medications  [unfilled]  Current Facility-Administered Medications   Medication Dose Route Frequency    0.9 % sodium chloride infusion   IntraVENous PRN    insulin NPH (HumuLIN N;NovoLIN N) injection vial 6 Units  6 Units SubCUTAneous QAM    insulin lispro (HUMALOG,ADMELOG) injection vial 0-4 Units  0-4 Units SubCUTAneous 4x Daily AC & HS    cefTRIAXone (ROCEPHIN) 1,000 mg in sterile water 10 mL IV syringe  1,000 mg IntraVENous Daily    sodium chloride flush 0.9 % injection 5-40 mL  5-40 mL IntraVENous 2 times per day    sodium chloride flush 0.9 % injection 5-40 mL  5-40 mL IntraVENous PRN    0.9 % sodium chloride infusion   IntraVENous PRN    [Held by provider] enoxaparin (LOVENOX) injection 40 mg  40 mg

## 2024-12-26 PROBLEM — D53.9 MACROCYTIC ANEMIA: Status: ACTIVE | Noted: 2024-12-25

## 2024-12-26 PROBLEM — R55 SYNCOPE: Status: ACTIVE | Noted: 2024-12-25

## 2024-12-26 PROBLEM — R74.02 ELEVATED LDH: Status: ACTIVE | Noted: 2024-12-25

## 2024-12-26 PROBLEM — D64.9 NORMOCYTIC ANEMIA: Status: ACTIVE | Noted: 2024-12-26

## 2024-12-26 LAB
ABO + RH BLD: NORMAL
ANION GAP SERPL CALC-SCNC: 2 MMOL/L (ref 2–12)
BASOPHILS # BLD: 0 K/UL (ref 0–0.1)
BASOPHILS NFR BLD: 0 % (ref 0–1)
BLD PROD TYP BPU: NORMAL
BLOOD BANK BLOOD PRODUCT EXPIRATION DATE: NORMAL
BLOOD BANK DISPENSE STATUS: NORMAL
BLOOD BANK ISBT PRODUCT BLOOD TYPE: 5100
BLOOD BANK PRODUCT CODE: NORMAL
BLOOD BANK UNIT TYPE AND RH: NORMAL
BLOOD GROUP ANTIBODIES SERPL: NORMAL
BPU ID: NORMAL
BUN SERPL-MCNC: 38 MG/DL (ref 6–20)
BUN/CREAT SERPL: 24 (ref 12–20)
CALCIUM SERPL-MCNC: 7.6 MG/DL (ref 8.5–10.1)
CHLORIDE SERPL-SCNC: 111 MMOL/L (ref 97–108)
CO2 SERPL-SCNC: 25 MMOL/L (ref 21–32)
COMMENT:: NORMAL
CREAT SERPL-MCNC: 1.56 MG/DL (ref 0.55–1.02)
CROSSMATCH RESULT: NORMAL
DIFFERENTIAL METHOD BLD: ABNORMAL
EOSINOPHIL # BLD: 0.1 K/UL (ref 0–0.4)
EOSINOPHIL NFR BLD: 1 % (ref 0–7)
ERYTHROCYTE [DISTWIDTH] IN BLOOD BY AUTOMATED COUNT: 15 % (ref 11.5–14.5)
FOLATE SERPL-MCNC: 10.6 NG/ML (ref 5–21)
GLUCOSE BLD STRIP.AUTO-MCNC: 140 MG/DL (ref 65–117)
GLUCOSE BLD STRIP.AUTO-MCNC: 155 MG/DL (ref 65–117)
GLUCOSE BLD STRIP.AUTO-MCNC: 203 MG/DL (ref 65–117)
GLUCOSE BLD STRIP.AUTO-MCNC: 255 MG/DL (ref 65–117)
GLUCOSE SERPL-MCNC: 154 MG/DL (ref 65–100)
HCT VFR BLD AUTO: 24.3 % (ref 35–47)
HCT VFR BLD AUTO: 26.2 % (ref 35–47)
HGB BLD-MCNC: 7.9 G/DL (ref 11.5–16)
HGB BLD-MCNC: 8.7 G/DL (ref 11.5–16)
IMM GRANULOCYTES # BLD AUTO: 0.1 K/UL (ref 0–0.04)
IMM GRANULOCYTES NFR BLD AUTO: 1 % (ref 0–0.5)
LYMPHOCYTES # BLD: 3.4 K/UL (ref 0.8–3.5)
LYMPHOCYTES NFR BLD: 31 % (ref 12–49)
MCH RBC QN AUTO: 30.3 PG (ref 26–34)
MCHC RBC AUTO-ENTMCNC: 32.5 G/DL (ref 30–36.5)
MCV RBC AUTO: 93.1 FL (ref 80–99)
MONOCYTES # BLD: 0.9 K/UL (ref 0–1)
MONOCYTES NFR BLD: 8 % (ref 5–13)
NEUTS SEG # BLD: 6.4 K/UL (ref 1.8–8)
NEUTS SEG NFR BLD: 59 % (ref 32–75)
NRBC # BLD: 0 K/UL (ref 0–0.01)
NRBC BLD-RTO: 0 PER 100 WBC
PLATELET # BLD AUTO: 210 K/UL (ref 150–400)
PMV BLD AUTO: 10.1 FL (ref 8.9–12.9)
POTASSIUM SERPL-SCNC: 4.2 MMOL/L (ref 3.5–5.1)
RBC # BLD AUTO: 2.61 M/UL (ref 3.8–5.2)
SERVICE CMNT-IMP: ABNORMAL
SODIUM SERPL-SCNC: 138 MMOL/L (ref 136–145)
SPECIMEN EXP DATE BLD: NORMAL
SPECIMEN HOLD: NORMAL
UNIT DIVISION: 0
UNIT ISSUE DATE/TIME: NORMAL
VIT B12 SERPL-MCNC: 794 PG/ML (ref 193–986)
WBC # BLD AUTO: 10.9 K/UL (ref 3.6–11)

## 2024-12-26 PROCEDURE — 2500000003 HC RX 250 WO HCPCS

## 2024-12-26 PROCEDURE — 99233 SBSQ HOSP IP/OBS HIGH 50: CPT | Performed by: STUDENT IN AN ORGANIZED HEALTH CARE EDUCATION/TRAINING PROGRAM

## 2024-12-26 PROCEDURE — 97530 THERAPEUTIC ACTIVITIES: CPT

## 2024-12-26 PROCEDURE — 82746 ASSAY OF FOLIC ACID SERUM: CPT

## 2024-12-26 PROCEDURE — 94761 N-INVAS EAR/PLS OXIMETRY MLT: CPT

## 2024-12-26 PROCEDURE — 82962 GLUCOSE BLOOD TEST: CPT

## 2024-12-26 PROCEDURE — 82607 VITAMIN B-12: CPT

## 2024-12-26 PROCEDURE — 85018 HEMOGLOBIN: CPT

## 2024-12-26 PROCEDURE — 6360000002 HC RX W HCPCS

## 2024-12-26 PROCEDURE — 6370000000 HC RX 637 (ALT 250 FOR IP)

## 2024-12-26 PROCEDURE — 85014 HEMATOCRIT: CPT

## 2024-12-26 PROCEDURE — 97116 GAIT TRAINING THERAPY: CPT

## 2024-12-26 PROCEDURE — 85025 COMPLETE CBC W/AUTO DIFF WBC: CPT

## 2024-12-26 PROCEDURE — 1100000000 HC RM PRIVATE

## 2024-12-26 PROCEDURE — 80048 BASIC METABOLIC PNL TOTAL CA: CPT

## 2024-12-26 PROCEDURE — 99231 SBSQ HOSP IP/OBS SF/LOW 25: CPT

## 2024-12-26 RX ORDER — TRAMADOL HYDROCHLORIDE 50 MG/1
100 TABLET ORAL ONCE
Status: COMPLETED | OUTPATIENT
Start: 2024-12-26 | End: 2024-12-26

## 2024-12-26 RX ORDER — SENNA AND DOCUSATE SODIUM 50; 8.6 MG/1; MG/1
1 TABLET, FILM COATED ORAL DAILY PRN
Status: DISCONTINUED | OUTPATIENT
Start: 2024-12-26 | End: 2024-12-28 | Stop reason: HOSPADM

## 2024-12-26 RX ORDER — ACETAMINOPHEN 500 MG
1000 TABLET ORAL ONCE
Status: COMPLETED | OUTPATIENT
Start: 2024-12-26 | End: 2024-12-26

## 2024-12-26 RX ORDER — POLYETHYLENE GLYCOL 3350 17 G/17G
17 POWDER, FOR SOLUTION ORAL DAILY
Status: DISCONTINUED | OUTPATIENT
Start: 2024-12-26 | End: 2024-12-28 | Stop reason: HOSPADM

## 2024-12-26 RX ADMIN — DICYCLOMINE HYDROCHLORIDE 10 MG: 10 CAPSULE ORAL at 06:02

## 2024-12-26 RX ADMIN — PANCRELIPASE LIPASE, PANCRELIPASE PROTEASE, PANCRELIPASE AMYLASE 30000 UNITS: 15000; 47000; 63000 CAPSULE, DELAYED RELEASE ORAL at 17:08

## 2024-12-26 RX ADMIN — SODIUM CHLORIDE, PRESERVATIVE FREE 10 ML: 5 INJECTION INTRAVENOUS at 20:37

## 2024-12-26 RX ADMIN — TRAMADOL HYDROCHLORIDE 50 MG: 50 TABLET, COATED ORAL at 08:14

## 2024-12-26 RX ADMIN — ACETAMINOPHEN 1000 MG: 500 TABLET ORAL at 15:10

## 2024-12-26 RX ADMIN — PANTOPRAZOLE SODIUM 40 MG: 40 TABLET, DELAYED RELEASE ORAL at 15:10

## 2024-12-26 RX ADMIN — AMLODIPINE BESYLATE 10 MG: 5 TABLET ORAL at 08:14

## 2024-12-26 RX ADMIN — DICYCLOMINE HYDROCHLORIDE 10 MG: 10 CAPSULE ORAL at 20:33

## 2024-12-26 RX ADMIN — PANCRELIPASE LIPASE, PANCRELIPASE PROTEASE, PANCRELIPASE AMYLASE 40000 UNITS: 20000; 63000; 84000 CAPSULE, DELAYED RELEASE ORAL at 08:14

## 2024-12-26 RX ADMIN — INSULIN LISPRO 1 UNITS: 100 INJECTION, SOLUTION INTRAVENOUS; SUBCUTANEOUS at 17:08

## 2024-12-26 RX ADMIN — INSULIN HUMAN 6 UNITS: 100 INJECTION, SUSPENSION SUBCUTANEOUS at 08:14

## 2024-12-26 RX ADMIN — GABAPENTIN 100 MG: 100 CAPSULE ORAL at 08:14

## 2024-12-26 RX ADMIN — PANCRELIPASE LIPASE, PANCRELIPASE PROTEASE, PANCRELIPASE AMYLASE 30000 UNITS: 15000; 47000; 63000 CAPSULE, DELAYED RELEASE ORAL at 12:18

## 2024-12-26 RX ADMIN — AVOBENZONE, HOMOSALATE, OCTISALATE, OCTOCRYLENE, AND OXYBENZONE 1 PACKET: 29.4; 147; 49; 25.4; 58.8 LOTION TOPICAL at 15:09

## 2024-12-26 RX ADMIN — DICYCLOMINE HYDROCHLORIDE 10 MG: 10 CAPSULE ORAL at 15:10

## 2024-12-26 RX ADMIN — AVOBENZONE, HOMOSALATE, OCTISALATE, OCTOCRYLENE, AND OXYBENZONE 1 PACKET: 29.4; 147; 49; 25.4; 58.8 LOTION TOPICAL at 20:35

## 2024-12-26 RX ADMIN — TRAMADOL HYDROCHLORIDE 100 MG: 50 TABLET, COATED ORAL at 15:10

## 2024-12-26 RX ADMIN — FERROUS SULFATE TAB 325 MG (65 MG ELEMENTAL FE) 325 MG: 325 (65 FE) TAB at 20:33

## 2024-12-26 RX ADMIN — DICYCLOMINE HYDROCHLORIDE 10 MG: 10 CAPSULE ORAL at 12:18

## 2024-12-26 RX ADMIN — PANTOPRAZOLE SODIUM 40 MG: 40 TABLET, DELAYED RELEASE ORAL at 06:02

## 2024-12-26 RX ADMIN — SODIUM CHLORIDE, PRESERVATIVE FREE 10 ML: 5 INJECTION INTRAVENOUS at 08:15

## 2024-12-26 RX ADMIN — PANCRELIPASE LIPASE, PANCRELIPASE PROTEASE, PANCRELIPASE AMYLASE 30000 UNITS: 15000; 47000; 63000 CAPSULE, DELAYED RELEASE ORAL at 08:14

## 2024-12-26 RX ADMIN — INSULIN LISPRO 2 UNITS: 100 INJECTION, SOLUTION INTRAVENOUS; SUBCUTANEOUS at 20:34

## 2024-12-26 RX ADMIN — GABAPENTIN 100 MG: 100 CAPSULE ORAL at 15:10

## 2024-12-26 RX ADMIN — WATER 1000 MG: 1 INJECTION INTRAMUSCULAR; INTRAVENOUS; SUBCUTANEOUS at 08:12

## 2024-12-26 RX ADMIN — TAMSULOSIN HYDROCHLORIDE 0.4 MG: 0.4 CAPSULE ORAL at 20:36

## 2024-12-26 RX ADMIN — FERROUS SULFATE TAB 325 MG (65 MG ELEMENTAL FE) 325 MG: 325 (65 FE) TAB at 08:14

## 2024-12-26 RX ADMIN — GABAPENTIN 100 MG: 100 CAPSULE ORAL at 20:34

## 2024-12-26 RX ADMIN — PANCRELIPASE LIPASE, PANCRELIPASE PROTEASE, PANCRELIPASE AMYLASE 40000 UNITS: 20000; 63000; 84000 CAPSULE, DELAYED RELEASE ORAL at 12:18

## 2024-12-26 RX ADMIN — AVOBENZONE, HOMOSALATE, OCTISALATE, OCTOCRYLENE, AND OXYBENZONE 1 PACKET: 29.4; 147; 49; 25.4; 58.8 LOTION TOPICAL at 08:13

## 2024-12-26 RX ADMIN — CETIRIZINE HYDROCHLORIDE 10 MG: 10 TABLET, FILM COATED ORAL at 20:32

## 2024-12-26 RX ADMIN — TRAMADOL HYDROCHLORIDE 50 MG: 50 TABLET, COATED ORAL at 20:36

## 2024-12-26 RX ADMIN — PANCRELIPASE LIPASE, PANCRELIPASE PROTEASE, PANCRELIPASE AMYLASE 40000 UNITS: 20000; 63000; 84000 CAPSULE, DELAYED RELEASE ORAL at 17:08

## 2024-12-26 RX ADMIN — POLYETHYLENE GLYCOL 3350 17 G: 17 POWDER, FOR SOLUTION ORAL at 15:10

## 2024-12-26 ASSESSMENT — PAIN DESCRIPTION - DESCRIPTORS
DESCRIPTORS: DISCOMFORT
DESCRIPTORS: ACHING
DESCRIPTORS: DISCOMFORT
DESCRIPTORS: DISCOMFORT

## 2024-12-26 ASSESSMENT — PAIN SCALES - GENERAL
PAINLEVEL_OUTOF10: 7
PAINLEVEL_OUTOF10: 7
PAINLEVEL_OUTOF10: 8
PAINLEVEL_OUTOF10: 8

## 2024-12-26 ASSESSMENT — PAIN DESCRIPTION - LOCATION
LOCATION: BACK

## 2024-12-26 NOTE — DISCHARGE SUMMARY
Discharge / Transfer / Off-Service Note     Name: Candida Maza MRN: [id number]  Sex: Female   YOB: 1960  Age: 64 y.o.  PCP: Keena Leyva MD     Date of admission: 12/21/2024  Date of discharge/transfer: 12/28/2024    Attending physician at admission: Dr. Rudolph Mei  Attending physician at discharge/transfer: Dr. Chris Ornelas  Resident physician at discharge/transfer: Jarocho Severino DO     Consultants during hospitalization  IP CONSULT TO FAMILY MEDICINE  IP CONSULT TO DIABETES MANAGEMENT  IP CONSULT TO HEMATOLOGY  IP CONSULT TO CASE MANAGEMENT     Admission diagnoses   Hypoglycemia [E16.2]  Hypothermia, initial encounter [T68.XXXA]  Contusion of face, initial encounter [S00.83XA]  Angioedema, initial encounter [T78.3XXA]  Altered mental status, unspecified altered mental status type [R41.82]    Recommended follow-up after discharge    1. PCP-Keena Leyva MD  2. Hematology/Oncology - Dr. Solo.   3. Urology     Things to follow up on with PCP:  - Admitted for hypoglycemia, possible hypoglycemic seizure, likely 2/2 poor po intake vs insulin overdose. Returned to baseline, now hyperglycemic. Diabetes management recommending 8u NPH in the morning.     Things to follow up on with Oncology:  - New diagnosis of normocytic anemia. Required 1u PRBC.     Things to follow up on with Urology:  - Chronic urinary incontinence. Stopped flomax during admission as possible cause of lightheaded and dizziness. B12, Copper labs still pending. Inpatient heme notes to consider bone marrow biopsy.     Medication Changes:     Medication List        START taking these medications      insulin  UNIT/ML injection vial  Commonly known as: HumuLIN N;NovoLIN N  Inject 8 Units into the skin every morning            CONTINUE taking these medications      albuterol sulfate  (90 Base) MCG/ACT inhaler  Commonly known as: PROVENTIL;VENTOLIN;PROAIR  INHALE 2 PUFFS INTO THE LUNGS 4 TIMES    IMPRESSION:  Bilateral periorbital and lip soft tissue edema without acute osseous  abnormality.     EXAM:  CT CERVICAL SPINE WITHOUT CONTRAST     IMPRESSION:  No acute fracture.  Fluid within dilated esophagus, close to the thoracic inlet.     EXAM: CT HEAD WO CONTRAST     IMPRESSION:     No acute process on portable chest.    Procedures:  No procedures performed.     Diet: Diabetic diet   Activity:  As tolerated     Disposition: IPR    Discharge instructions to patient/family  Please seek medical attention for any new or worsening symptoms particularly fever, chest pain, shortness of breath, abdominal pain, nausea, vomiting    Follow up plans/appointments  Follow-up Information       Follow up With Specialties Details Why Contact Info    u Department Of Gastroenterology  Call in 2 day(s)  [address]  [facility] [address]  [phone]    Roney Solo MD Hematology and Oncology, Hematology, Oncology Follow up in 2 week(s)  [address]  [facility] [id number] [phone]      Keena Leyva MD Internal Medicine, Family Medicine, Pediatrics Schedule an appointment as soon as possible for a visit  [address]  [facility] [address] [phone]      [facility]  Follow up [place] [facility] [phone]           Jarocho Severino DO  Family Medicine Resident     For Billing    Chief Complaint   Patient presents with    Hypoglycemia    Cold Exposure       Principal Problem:    Hypoglycemia  Active Problems:    Type 2 diabetes with nephropathy (HCC)    CKD (chronic kidney disease) stage 4, GFR 15-29 ml/min (HCC)    Pancreatic insufficiency    Leukocytosis    Altered mental status    Macrocytic anemia    Elevated LDH    Syncope    Normocytic anemia    Contusion of face  Resolved Problems:    * No resolved hospital problems. *

## 2024-12-26 NOTE — CONSULTS
12/26/24  0907   WBC 13.2*  --  11.5*  --   --  10.9  --    HGB 7.4*   < > 6.8*   < > 8.6* 7.9* 8.7*   HCT 22.1*   < > 21.2*   < > 26.2* 24.3* 26.2*   MCV 92.9  --  93.8  --   --  93.1  --      --  215  --   --  210  --     < > = values in this interval not displayed.     Recent Labs     12/24/24  1344 12/25/24  0222 12/26/24  0434    138 138   K 4.7 4.5 4.2    110* 111*   CO2 26 24 25   BUN 41* 41* 38*   CREATININE 1.96* 1.76* 1.56*     Lab Results   Component Value Date    ALT 27 12/21/2024    AST 28 12/21/2024    ALKPHOS 189 (H) 12/21/2024    BILITOT 0.3 12/21/2024     Lab Results   Component Value Date    TSH 0.69 10/30/2024         Factors impacting BG management  Factor Dose Comments   Nutrition:  Standard meals   60 grams/meal    Pain PRN    Infection  +4 bacteria in urine   Kidney function CKD 3    Pancreatic insufficiency           Assessment and Nursing Intervention   Nursing Diagnosis Risk for unstable blood glucose pattern   Nursing Intervention Domain 5250 Decision-making Support   Nursing Interventions Examined current inpatient diabetes/blood glucose control   Explored factors facilitating and impeding inpatient management  Explored corrective strategies with patient and responsible inpatient provider   Informed patient of rational for insulin strategy while hospitalized     Nursing Diagnosis 26101 Ineffective Health Management   Nursing Intervention Domain 5250 Decision-making Support   Nursing Interventions Identified diabetes self-management practices impeding diabetes control  Discussed diabetes survival skills related to  Healthy Plate eating plan; given handouts  Role of physical activity in improving insulin sensitivity and action  Procedure for blood glucose monitoring & options for low-cost products  Medications plan at discharge     Billing Code(s)   [] 39661 IP subsequent hospital care - 50 minutes   [] 88009 IP subsequent hospital care - 35 minutes   [x] 92407 IP  subsequent hospital care - 25 minutes   [] 21812 IP initial hospital care - 40 minutes     Before making these care recommendations, I personally reviewed the hospitalization record, including notes, laboratory & diagnostic data and current medications, and examined the patient at the bedside.  Total minutes: 25    JACKIE SUN - CNS   Diabetes Clinical Nurse Specialist   [facility]  Access via [employer]

## 2024-12-26 NOTE — PROGRESS NOTES
[address] [place] [address]   Office [phone]  Fax [phone]          Subjective / Objective     Subjective  Overnight Events: No acute overnight events.   Patient seen and examined at bedside. Reports feeling okay this AM. Denies any bleeding episodes. Feels that her shakiness episodes have been improving. Denies CP, SOB, N/V, dysuria.    Respiratory:   Room air  Vitals:    12/26/24 0334   BP: 124/77   Pulse: 76   Resp: 18   Temp: 98.8 °F (37.1 °C)   SpO2: 91%     Physical Examination:   General appearance - alert, well appearing, and in no distress  Chest - clear to auscultation, no wheezes, rales or rhonchi, symmetric air entry  Heart - normal rate, regular rhythm, normal S1, S2, no murmurs, rubs, clicks or gallops,   Abdomen - soft, nontender, nondistended, no masses or organomegaly  Neurological - alert, oriented, normal speech, no focal findings  Skin - warm, dry. No notable rashes  Extremities - peripheral pulses normal, no pedal edema, no clubbing or cyanosis  Psychiatric - normal speech and thought processes    I/O:  12/25 0701 - 12/26 0700  In: 286.3 [I.V.:10]  Out: -   Inpatient Medications  [email or web address]  Current Facility-Administered Medications   Medication Dose Route Frequency   • 0.9 % sodium chloride infusion   IntraVENous PRN   • insulin NPH (HumuLIN N;NovoLIN N) injection vial 6 Units  6 Units SubCUTAneous QAM   • lidocaine 4 % external patch 1 patch  1 patch TransDERmal Daily   • insulin lispro (HUMALOG,ADMELOG) injection vial 0-4 Units  0-4 Units SubCUTAneous 4x Daily AC & HS   • cefTRIAXone (ROCEPHIN) 1,000 mg in sterile water 10 mL IV syringe  1,000 mg IntraVENous Daily   • sodium chloride flush 0.9 % injection 5-40 mL  5-40 mL IntraVENous 2 times per day   • sodium chloride flush 0.9 % injection 5-40 mL  5-40 mL IntraVENous PRN   • 0.9 % sodium chloride infusion   IntraVENous PRN   • [Held by provider] enoxaparin (LOVENOX) injection 40 mg  40 mg SubCUTAneous Daily

## 2024-12-26 NOTE — PLAN OF CARE
Problem: Skin/Tissue Integrity  Goal: Absence of new skin breakdown  Description: 1.  Monitor for areas of redness and/or skin breakdown  2.  Assess vascular access sites hourly  3.  Every 4-6 hours minimum:  Change oxygen saturation probe site  4.  Every 4-6 hours:  If on nasal continuous positive airway pressure, respiratory therapy assess nares and determine need for appliance change or resting period.  12/26/2024 1003 by Aubrie Johnson RN  Outcome: Progressing  12/26/2024 1002 by Aubrie Johnson RN  Outcome: Progressing  12/25/2024 2341 by Sky White RN  Outcome: Progressing     Problem: Chronic Conditions and Co-morbidities  Goal: Patient's chronic conditions and co-morbidity symptoms are monitored and maintained or improved  12/26/2024 1003 by Aubrie Johnson RN  Outcome: Progressing  12/26/2024 1002 by Aubrie Johnson RN  Outcome: Progressing  12/25/2024 2341 by Sky White RN  Outcome: Progressing     Problem: Discharge Planning  Goal: Discharge to home or other facility with appropriate resources  12/26/2024 1003 by Aubrie Johnson RN  Outcome: Progressing  12/26/2024 1002 by Aubrie Johnson RN  Outcome: Progressing  12/25/2024 2341 by Sky White RN  Outcome: Progressing     Problem: Pain  Goal: Verbalizes/displays adequate comfort level or baseline comfort level  12/26/2024 1003 by Aubrie Johnson RN  Outcome: Progressing  12/26/2024 1002 by Aubrie Johnson RN  Outcome: Progressing  12/25/2024 2341 by Sky White RN  Outcome: Progressing     Problem: Safety - Adult  Goal: Free from fall injury  12/26/2024 1003 by Aubrie Johnson RN  Outcome: Progressing  12/26/2024 1002 by Aubrie Johnson RN  Outcome: Progressing  12/25/2024 2341 by Sky White RN  Outcome: Progressing

## 2024-12-26 NOTE — PLAN OF CARE
Problem: Physical Therapy - Adult  Goal: By Discharge: Performs mobility at highest level of function for planned discharge setting.  See evaluation for individualized goals.  Description: FUNCTIONAL STATUS PRIOR TO ADMISSION: Patient was independent and active with/ without use of SPC for community amb.    HOME SUPPORT PRIOR TO ADMISSION: The patient lived with son but did not require assistance.    Physical Therapy Goals  Initiated 12/22/2024  1.  Patient will move from supine to sit and sit to supine, scoot up and down, and roll side to side in bed with modified independence within 7 day(s).    2.  Patient will perform sit to stand with modified independence within 7 day(s).  3.  Patient will transfer from bed to chair and chair to bed with modified independence using the least restrictive device within 7 day(s).  4.  Patient will ambulate with modified independence for 50 feet with the least restrictive device within 7 day(s).   5.  Patient will ascend/descend 3 stairs with 2 handrail(s) with contact guard assist within 7 day(s).   Outcome: Progressing   PHYSICAL THERAPY TREATMENT    Patient: Candida Maza (64 y.o. female)  Date: 12/26/2024  Diagnosis: Hypoglycemia [E16.2]  Hypothermia, initial encounter [T68.XXXA]  Contusion of face, initial encounter [S00.83XA]  Angioedema, initial encounter [T78.3XXA]  Altered mental status, unspecified altered mental status type [R41.82] Hypoglycemia      Precautions: Fall Risk, Bed Alarm                      ASSESSMENT:  Patient continues to benefit from skilled PT services and is slowly progressing towards goals. Limited by global pain, decreased activity tolerance, increased time and RW for steadying with gait training. Pt reports independent without an AD. BP on low side 115/67 prior to activity,denies dizziness, lightheadedness with activity. Encouraged OOB to chair 3x/day and transfers into  vs BSC.          PLAN:  Patient continues to benefit from skilled

## 2024-12-26 NOTE — PLAN OF CARE
Problem: Skin/Tissue Integrity  Goal: Absence of new skin breakdown  Description: 1.  Monitor for areas of redness and/or skin breakdown  2.  Assess vascular access sites hourly  3.  Every 4-6 hours minimum:  Change oxygen saturation probe site  4.  Every 4-6 hours:  If on nasal continuous positive airway pressure, respiratory therapy assess nares and determine need for appliance change or resting period.  12/26/2024 1002 by Aubrie Johnson RN  Outcome: Progressing  12/25/2024 2341 by Sky White RN  Outcome: Progressing     Problem: Chronic Conditions and Co-morbidities  Goal: Patient's chronic conditions and co-morbidity symptoms are monitored and maintained or improved  12/26/2024 1002 by Aubrie Johnson RN  Outcome: Progressing  12/25/2024 2341 by Sky White RN  Outcome: Progressing     Problem: Discharge Planning  Goal: Discharge to home or other facility with appropriate resources  12/26/2024 1002 by Aubrie Johnson RN  Outcome: Progressing  12/25/2024 2341 by Sky White RN  Outcome: Progressing     Problem: Pain  Goal: Verbalizes/displays adequate comfort level or baseline comfort level  12/26/2024 1002 by Aubrie Johnson RN  Outcome: Progressing  12/25/2024 2341 by Sky White RN  Outcome: Progressing     Problem: Safety - Adult  Goal: Free from fall injury  12/26/2024 1002 by Aubrie Johnson RN  Outcome: Progressing  12/25/2024 2341 by Sky White, RN  Outcome: Progressing

## 2024-12-26 NOTE — CARE COORDINATION
12/26/2024 3:06 PM CM met with pt to discuss discharge plan. Pt requesting to discharge home with home health resumption through [facility] instead of IPR.   CM sent resumption referral to [facility] via [facility]. Requested to know disciplines pt was receiving.  [facility] referral remains pending. Spoke with Catia who will follow up with CM once response received for their MD.    CM will follow. [facility]    Care Management Progress Note    Reason for Admission:   Hypoglycemia [E16.2]  Hypothermia, initial encounter [T68.XXXA]  Contusion of face, initial encounter [S00.83XA]  Angioedema, initial encounter [T78.3XXA]  Altered mental status, unspecified altered mental status type [R41.82]         Patient Admission Status: Inpatient  RUR: 19%  Hospitalization in the last 30 days (Readmission):  No        Transition of care plan:  Ongoing medical management  Pt requesting to discharge home with home health resumption through [facility] at discharge   IPR referrals have been sent to:  [facility], pending  [facility], denied  [facility], pending   Pt will need auth for placement  Discharge plan communicated with patient and/or discharge caregiver: Yes    Date [facility]st [facility] letter given: n/a  Outpatient follow-up.  Transport at discharge: Pt's brother, Corona

## 2024-12-27 LAB
ANION GAP SERPL CALC-SCNC: 2 MMOL/L (ref 2–12)
BACTERIA SPEC CULT: NORMAL
BACTERIA SPEC CULT: NORMAL
BASOPHILS # BLD: 0 K/UL (ref 0–0.1)
BASOPHILS NFR BLD: 1 % (ref 0–1)
BUN SERPL-MCNC: 38 MG/DL (ref 6–20)
BUN/CREAT SERPL: 24 (ref 12–20)
CALCIUM SERPL-MCNC: 7.5 MG/DL (ref 8.5–10.1)
CHLORIDE SERPL-SCNC: 112 MMOL/L (ref 97–108)
CO2 SERPL-SCNC: 24 MMOL/L (ref 21–32)
CREAT SERPL-MCNC: 1.57 MG/DL (ref 0.55–1.02)
DIFFERENTIAL METHOD BLD: ABNORMAL
EOSINOPHIL # BLD: 0.1 K/UL (ref 0–0.4)
EOSINOPHIL NFR BLD: 2 % (ref 0–7)
ERYTHROCYTE [DISTWIDTH] IN BLOOD BY AUTOMATED COUNT: 15.4 % (ref 11.5–14.5)
GLUCOSE BLD STRIP.AUTO-MCNC: 166 MG/DL (ref 65–117)
GLUCOSE BLD STRIP.AUTO-MCNC: 178 MG/DL (ref 65–117)
GLUCOSE BLD STRIP.AUTO-MCNC: 220 MG/DL (ref 65–117)
GLUCOSE BLD STRIP.AUTO-MCNC: 239 MG/DL (ref 65–117)
GLUCOSE SERPL-MCNC: 203 MG/DL (ref 65–100)
HCT VFR BLD AUTO: 25.2 % (ref 35–47)
HCT VFR BLD AUTO: 26.4 % (ref 35–47)
HGB BLD-MCNC: 8.1 G/DL (ref 11.5–16)
HGB BLD-MCNC: 8.6 G/DL (ref 11.5–16)
IMM GRANULOCYTES # BLD AUTO: 0.1 K/UL (ref 0–0.04)
IMM GRANULOCYTES NFR BLD AUTO: 1 % (ref 0–0.5)
LYMPHOCYTES # BLD: 2.7 K/UL (ref 0.8–3.5)
LYMPHOCYTES NFR BLD: 33 % (ref 12–49)
MCH RBC QN AUTO: 30 PG (ref 26–34)
MCHC RBC AUTO-ENTMCNC: 32.1 G/DL (ref 30–36.5)
MCV RBC AUTO: 93.3 FL (ref 80–99)
MONOCYTES # BLD: 0.7 K/UL (ref 0–1)
MONOCYTES NFR BLD: 8 % (ref 5–13)
NEUTS SEG # BLD: 4.7 K/UL (ref 1.8–8)
NEUTS SEG NFR BLD: 55 % (ref 32–75)
NRBC # BLD: 0 K/UL (ref 0–0.01)
NRBC BLD-RTO: 0 PER 100 WBC
PLATELET # BLD AUTO: 226 K/UL (ref 150–400)
PMV BLD AUTO: 10.4 FL (ref 8.9–12.9)
POTASSIUM SERPL-SCNC: 4.7 MMOL/L (ref 3.5–5.1)
RBC # BLD AUTO: 2.7 M/UL (ref 3.8–5.2)
SERVICE CMNT-IMP: ABNORMAL
SERVICE CMNT-IMP: NORMAL
SERVICE CMNT-IMP: NORMAL
SODIUM SERPL-SCNC: 138 MMOL/L (ref 136–145)
WBC # BLD AUTO: 8.3 K/UL (ref 3.6–11)

## 2024-12-27 PROCEDURE — 6370000000 HC RX 637 (ALT 250 FOR IP)

## 2024-12-27 PROCEDURE — 2500000003 HC RX 250 WO HCPCS

## 2024-12-27 PROCEDURE — 97535 SELF CARE MNGMENT TRAINING: CPT

## 2024-12-27 PROCEDURE — 6360000002 HC RX W HCPCS

## 2024-12-27 PROCEDURE — 1100000000 HC RM PRIVATE

## 2024-12-27 PROCEDURE — 97110 THERAPEUTIC EXERCISES: CPT

## 2024-12-27 PROCEDURE — 97530 THERAPEUTIC ACTIVITIES: CPT

## 2024-12-27 PROCEDURE — 82525 ASSAY OF COPPER: CPT

## 2024-12-27 PROCEDURE — 36415 COLL VENOUS BLD VENIPUNCTURE: CPT

## 2024-12-27 PROCEDURE — 85025 COMPLETE CBC W/AUTO DIFF WBC: CPT

## 2024-12-27 PROCEDURE — 6370000000 HC RX 637 (ALT 250 FOR IP): Performed by: CLINICAL NURSE SPECIALIST

## 2024-12-27 PROCEDURE — 80048 BASIC METABOLIC PNL TOTAL CA: CPT

## 2024-12-27 PROCEDURE — 85018 HEMOGLOBIN: CPT

## 2024-12-27 PROCEDURE — 99231 SBSQ HOSP IP/OBS SF/LOW 25: CPT | Performed by: CLINICAL NURSE SPECIALIST

## 2024-12-27 PROCEDURE — 97116 GAIT TRAINING THERAPY: CPT

## 2024-12-27 PROCEDURE — 85014 HEMATOCRIT: CPT

## 2024-12-27 PROCEDURE — 99232 SBSQ HOSP IP/OBS MODERATE 35: CPT | Performed by: FAMILY MEDICINE

## 2024-12-27 PROCEDURE — 82962 GLUCOSE BLOOD TEST: CPT

## 2024-12-27 PROCEDURE — 94761 N-INVAS EAR/PLS OXIMETRY MLT: CPT

## 2024-12-27 RX ORDER — INSULIN HUMAN 100 [IU]/ML
8 INJECTION, SUSPENSION SUBCUTANEOUS
Qty: 6 EACH | Refills: 1 | Status: CANCELLED | OUTPATIENT
Start: 2024-12-27

## 2024-12-27 RX ORDER — CALCIUM CARBONATE 500 MG/1
500 TABLET, CHEWABLE ORAL 3 TIMES DAILY PRN
Status: DISCONTINUED | OUTPATIENT
Start: 2024-12-27 | End: 2024-12-28 | Stop reason: HOSPADM

## 2024-12-27 RX ADMIN — WATER 1000 MG: 1 INJECTION INTRAMUSCULAR; INTRAVENOUS; SUBCUTANEOUS at 08:08

## 2024-12-27 RX ADMIN — FERROUS SULFATE TAB 325 MG (65 MG ELEMENTAL FE) 325 MG: 325 (65 FE) TAB at 21:49

## 2024-12-27 RX ADMIN — AMLODIPINE BESYLATE 10 MG: 5 TABLET ORAL at 08:10

## 2024-12-27 RX ADMIN — FERROUS SULFATE TAB 325 MG (65 MG ELEMENTAL FE) 325 MG: 325 (65 FE) TAB at 08:11

## 2024-12-27 RX ADMIN — INSULIN LISPRO 1 UNITS: 100 INJECTION, SOLUTION INTRAVENOUS; SUBCUTANEOUS at 17:00

## 2024-12-27 RX ADMIN — PANCRELIPASE LIPASE, PANCRELIPASE PROTEASE, PANCRELIPASE AMYLASE 40000 UNITS: 20000; 63000; 84000 CAPSULE, DELAYED RELEASE ORAL at 11:54

## 2024-12-27 RX ADMIN — GABAPENTIN 100 MG: 100 CAPSULE ORAL at 21:49

## 2024-12-27 RX ADMIN — GABAPENTIN 100 MG: 100 CAPSULE ORAL at 08:11

## 2024-12-27 RX ADMIN — GABAPENTIN 100 MG: 100 CAPSULE ORAL at 14:12

## 2024-12-27 RX ADMIN — DICYCLOMINE HYDROCHLORIDE 10 MG: 10 CAPSULE ORAL at 21:49

## 2024-12-27 RX ADMIN — DICYCLOMINE HYDROCHLORIDE 10 MG: 10 CAPSULE ORAL at 17:00

## 2024-12-27 RX ADMIN — PANCRELIPASE LIPASE, PANCRELIPASE PROTEASE, PANCRELIPASE AMYLASE 30000 UNITS: 15000; 47000; 63000 CAPSULE, DELAYED RELEASE ORAL at 17:00

## 2024-12-27 RX ADMIN — POLYETHYLENE GLYCOL 3350 17 G: 17 POWDER, FOR SOLUTION ORAL at 08:09

## 2024-12-27 RX ADMIN — SODIUM CHLORIDE, PRESERVATIVE FREE 10 ML: 5 INJECTION INTRAVENOUS at 08:12

## 2024-12-27 RX ADMIN — TRAMADOL HYDROCHLORIDE 50 MG: 50 TABLET, COATED ORAL at 21:48

## 2024-12-27 RX ADMIN — PANCRELIPASE LIPASE, PANCRELIPASE PROTEASE, PANCRELIPASE AMYLASE 40000 UNITS: 20000; 63000; 84000 CAPSULE, DELAYED RELEASE ORAL at 08:10

## 2024-12-27 RX ADMIN — INSULIN HUMAN 8 UNITS: 100 INJECTION, SUSPENSION SUBCUTANEOUS at 08:11

## 2024-12-27 RX ADMIN — TRAMADOL HYDROCHLORIDE 50 MG: 50 TABLET, COATED ORAL at 08:09

## 2024-12-27 RX ADMIN — AVOBENZONE, HOMOSALATE, OCTISALATE, OCTOCRYLENE, AND OXYBENZONE 1 PACKET: 29.4; 147; 49; 25.4; 58.8 LOTION TOPICAL at 21:49

## 2024-12-27 RX ADMIN — CETIRIZINE HYDROCHLORIDE 10 MG: 10 TABLET, FILM COATED ORAL at 21:49

## 2024-12-27 RX ADMIN — INSULIN LISPRO 1 UNITS: 100 INJECTION, SOLUTION INTRAVENOUS; SUBCUTANEOUS at 08:10

## 2024-12-27 RX ADMIN — PANCRELIPASE LIPASE, PANCRELIPASE PROTEASE, PANCRELIPASE AMYLASE 40000 UNITS: 20000; 63000; 84000 CAPSULE, DELAYED RELEASE ORAL at 17:00

## 2024-12-27 RX ADMIN — PANCRELIPASE LIPASE, PANCRELIPASE PROTEASE, PANCRELIPASE AMYLASE 30000 UNITS: 15000; 47000; 63000 CAPSULE, DELAYED RELEASE ORAL at 11:54

## 2024-12-27 RX ADMIN — PANTOPRAZOLE SODIUM 40 MG: 40 TABLET, DELAYED RELEASE ORAL at 17:00

## 2024-12-27 RX ADMIN — PANCRELIPASE LIPASE, PANCRELIPASE PROTEASE, PANCRELIPASE AMYLASE 30000 UNITS: 15000; 47000; 63000 CAPSULE, DELAYED RELEASE ORAL at 08:10

## 2024-12-27 RX ADMIN — DICYCLOMINE HYDROCHLORIDE 10 MG: 10 CAPSULE ORAL at 11:54

## 2024-12-27 RX ADMIN — DICYCLOMINE HYDROCHLORIDE 10 MG: 10 CAPSULE ORAL at 05:59

## 2024-12-27 RX ADMIN — SODIUM CHLORIDE, PRESERVATIVE FREE 10 ML: 5 INJECTION INTRAVENOUS at 21:50

## 2024-12-27 RX ADMIN — PANTOPRAZOLE SODIUM 40 MG: 40 TABLET, DELAYED RELEASE ORAL at 05:58

## 2024-12-27 RX ADMIN — AVOBENZONE, HOMOSALATE, OCTISALATE, OCTOCRYLENE, AND OXYBENZONE 1 PACKET: 29.4; 147; 49; 25.4; 58.8 LOTION TOPICAL at 14:12

## 2024-12-27 RX ADMIN — ACETAMINOPHEN 650 MG: 325 TABLET ORAL at 08:17

## 2024-12-27 RX ADMIN — AVOBENZONE, HOMOSALATE, OCTISALATE, OCTOCRYLENE, AND OXYBENZONE 1 PACKET: 29.4; 147; 49; 25.4; 58.8 LOTION TOPICAL at 08:08

## 2024-12-27 ASSESSMENT — PAIN SCALES - GENERAL
PAINLEVEL_OUTOF10: 7
PAINLEVEL_OUTOF10: 7
PAINLEVEL_OUTOF10: 5
PAINLEVEL_OUTOF10: 8
PAINLEVEL_OUTOF10: 5
PAINLEVEL_OUTOF10: 8
PAINLEVEL_OUTOF10: 5

## 2024-12-27 ASSESSMENT — PAIN DESCRIPTION - ORIENTATION: ORIENTATION: LOWER

## 2024-12-27 ASSESSMENT — PAIN DESCRIPTION - LOCATION
LOCATION: ABDOMEN
LOCATION: BACK
LOCATION: BACK

## 2024-12-27 ASSESSMENT — PAIN DESCRIPTION - DESCRIPTORS
DESCRIPTORS: ACHING
DESCRIPTORS: ACHING

## 2024-12-27 NOTE — PROGRESS NOTES
Comprehensive Nutrition Assessment    Type and Reason for Visit: Initial, LOS    Nutrition Recommendations/Plan:   Continue 4 carb diet   Monitor BG, weight     Malnutrition Assessment:  Malnutrition Status:  Mild malnutrition (12/27/24 1334)    Context:  Chronic Illness     Findings of the 6 clinical characteristics of malnutrition:  Energy Intake:  Mild decrease in energy intake  Weight Loss:  Mild weight loss     Body Fat Loss:  Mild body fat loss     Muscle Mass Loss:  Mild muscle mass loss    Fluid Accumulation:  No fluid accumulation     Strength:  Not Performed        Nutrition Assessment:    Past medical hx:       Diagnosis Date    Arthritis     Asthma     Cataracts, bilateral     Diabetes (HCC)     seen by endocrinology at [facility]    GERD (gastroesophageal reflux disease)     Hypertension     Obesity, Class II, BMI 35-39.9     Pancreatic insufficiency        Pt screened for LOS. Pt weight has been slowly down trending over the last year. Not significant for time frame but concerning as this is an ongoing issue. Could be partly related to pancreatic insufficiency. BG have been 140-255 mg/dL. PO intake a few days ago was <50% but now mostly eating >75%. No known food allergies. No reported chew/swallow difficulties. No c/d, n/v. Edema 1+ all extremities.       Lab Results   Component Value Date/Time    POCGLU 166 12/27/2024 11:39 AM    POCGLU 220 12/27/2024 07:56 AM    POCGLU 255 12/26/2024 08:18 PM    POCGLU 203 12/26/2024 03:13 PM    POCGLU 140 12/26/2024 11:45 AM    POCGLU 155 12/26/2024 07:39 AM           Current Nutrition Therapies:  ADULT DIET; Regular; 4 carb choices (60 gm/meal)  ORAL HYDRATION; Water; 600 ml (20 oz)  DIET ONE TIME MESSAGE;  Meal Intake:   Patient Vitals for the past 168 hrs:   PO Meals Eaten (%)   12/22/24 0925 1 - 25%     Supplement Intake:  No data found.  Nutrition Support: n/a    Nutritionally Significant Medications:  Scheduled Meds:   insulin NPH  8 Units SubCUTAneous QAM

## 2024-12-27 NOTE — PROGRESS NOTES
OCCUPATIONAL THERAPY TREATMENT  Patient: Candida Maza (64 y.o. female)  Date: 12/27/2024  Primary Diagnosis: Hypoglycemia [E16.2]  Hypothermia, initial encounter [T68.XXXA]  Contusion of face, initial encounter [S00.83XA]  Angioedema, initial encounter [T78.3XXA]  Altered mental status, unspecified altered mental status type [R41.82]       Precautions: Fall Risk, Bed Alarm                Chart, occupational therapy assessment, plan of care, and goals were reviewed.    ASSESSMENT  Patient continues to benefit from skilled OT services and is progressing towards goals. Patient continues to be limited by decreased activity tolerance, standing balance, and strength with ADLs and functional mobility this session. Patient received in high fowlers and agreeable to participate in therapy. She required SBA with bed mobility tasks and ambulated to the bathroom with CGA/Min A with RW. Patient required significantly increased time with all functional mobility. She completed toileting tasks with Min A and returned to the chair at session end. Patient's vitals remained stable throughout. She remains below her functional baseline and would greatly benefit from continued skilled acute OT and multidisciplinary therapy in a rehab setting upon d/c.         PLAN :  Patient continues to benefit from skilled intervention to address the above impairments.  Continue treatment per established plan of care to address goals.    Recommend with staff: Recommend with nursing, ADLs with assist, OOB to chair 3x/day via rolling walker and toileting via functional mobility to and from bathroom. Thank you for completing as able in order to maintain patient strength, endurance and independence.       Recommend next OT session: continue with POC    Recommendation for discharge: (in order for the patient to meet his/her long term goals):   High intensity/comprehensive skilled occupational therapy in a multidisciplinary setting as patient is working

## 2024-12-27 NOTE — PLAN OF CARE
Problem: Physical Therapy - Adult  Goal: By Discharge: Performs mobility at highest level of function for planned discharge setting.  See evaluation for individualized goals.  Description: FUNCTIONAL STATUS PRIOR TO ADMISSION: Patient was independent and active with/ without use of SPC for community amb.    HOME SUPPORT PRIOR TO ADMISSION: The patient lived with son but did not require assistance.    Physical Therapy Goals  Initiated 12/22/2024  1.  Patient will move from supine to sit and sit to supine, scoot up and down, and roll side to side in bed with modified independence within 7 day(s).    2.  Patient will perform sit to stand with modified independence within 7 day(s).  3.  Patient will transfer from bed to chair and chair to bed with modified independence using the least restrictive device within 7 day(s).  4.  Patient will ambulate with modified independence for 50 feet with the least restrictive device within 7 day(s).   5.  Patient will ascend/descend 3 stairs with 2 handrail(s) with contact guard assist within 7 day(s).   Outcome: Progressing    PHYSICAL THERAPY TREATMENT    Patient: Candida Maza (64 y.o. female)  Date: 12/27/2024  Diagnosis: Hypoglycemia [E16.2]  Hypothermia, initial encounter [T68.XXXA]  Contusion of face, initial encounter [S00.83XA]  Angioedema, initial encounter [T78.3XXA]  Altered mental status, unspecified altered mental status type [R41.82] Hypoglycemia      Precautions: Fall Risk, Bed Alarm                      ASSESSMENT:  Patient continues to benefit from skilled PT services and is progressing slowly towards goals. Pt continues to present with impaired transfers, gait, balance and activity tolerance from baseline and requires overall min-mod A for mobility during today's session. Pt c/o lightheadedness and increased thirst during session--VSS, nurse notified re: symptoms. Lightheadedness limiting progression of ambulation. Ongoing LE weakness and delayed initiation.

## 2024-12-27 NOTE — PLAN OF CARE
Problem: Skin/Tissue Integrity  Goal: Absence of new skin breakdown  Description: 1.  Monitor for areas of redness and/or skin breakdown  2.  Assess vascular access sites hourly  3.  Every 4-6 hours minimum:  Change oxygen saturation probe site  4.  Every 4-6 hours:  If on nasal continuous positive airway pressure, respiratory therapy assess nares and determine need for appliance change or resting period.  12/26/2024 2309 by Missy Odom RN  Outcome: Progressing  12/26/2024 1003 by Aubrie Johnson RN  Outcome: Progressing  12/26/2024 1002 by Aubrie Johnson RN  Outcome: Progressing     Problem: Chronic Conditions and Co-morbidities  Goal: Patient's chronic conditions and co-morbidity symptoms are monitored and maintained or improved  12/26/2024 2309 by Missy Odom RN  Outcome: Progressing  12/26/2024 1003 by Aubrie Johnson RN  Outcome: Progressing  12/26/2024 1002 by Aubrie Johnson RN  Outcome: Progressing     Problem: Discharge Planning  Goal: Discharge to home or other facility with appropriate resources  12/26/2024 2309 by Missy Odom RN  Outcome: Progressing  12/26/2024 1003 by Aubrie Johnson RN  Outcome: Progressing  12/26/2024 1002 by Aubrie Johnson RN  Outcome: Progressing     Problem: Pain  Goal: Verbalizes/displays adequate comfort level or baseline comfort level  12/26/2024 2309 by Missy Odom RN  Outcome: Progressing  12/26/2024 1003 by Aubrie Johnson RN  Outcome: Progressing  12/26/2024 1002 by Aubrie Johnson RN  Outcome: Progressing     Problem: Safety - Adult  Goal: Free from fall injury  12/26/2024 2309 by Missy Odom RN  Outcome: Progressing  12/26/2024 1003 by Aubrie Johnson RN  Outcome: Progressing  12/26/2024 1002 by Aubrie Johnson RN  Outcome: Progressing     Problem: Physical Therapy - Adult  Goal: By Discharge: Performs mobility at highest level of function for planned discharge setting.  See evaluation for individualized

## 2024-12-27 NOTE — PROGRESS NOTES
Physician Progress Note      PATIENT:               ZEE HERZOG  [facility] #:                  [id number]  :                       1960  ADMIT DATE:       2024 7:12 PM  DISCH DATE:  RESPONDING  PROVIDER #:        TA BLOUNT          QUERY TEXT:    Good afternoon.    Patient admitted with hypoglycemia in the setting of DM II. Noted to have mild   malnutrition in  Registered Dietician note.    If possible, please document in progress notes and discharge summary if you   are evaluating and /or treating any of the following:    The medical record reflects the following:    Risk Factors: 64 yr old female, DM II, Anemia, HTN, acute illness    Clinical Indicators: from  Registered Dietician note: \"Malnutrition   Assessment: Malnutrition Status:  Mild malnutrition (24 1334) Context:    Chronic Illness Findings of the 6 clinical characteristics of malnutrition:   Energy Intake:  Mild decrease in energy intake Weight Loss:  Mild weight loss  Body Fat Loss:  Mild body fat loss Muscle Mass Loss:  Mild muscle mass loss   Fluid Accumulation:  No fluid accumulation  Strength: Not Performed\";   24 19:27 Albumin: 1.3; 24 19:27 Hemoglobin Quant: 10.6, 24   19:00 Hemoglobin Quant: 8.6, 24 02:13 Hemoglobin Quant: 6.9,  24 07:00 Hemoglobin Quant: 7.2, 24 03:16 Hemoglobin Quant: 7.4 ,   24 13:44 Hemoglobin Quant: 7.3, 24 02:22 Hemoglobin Quant: 6.8,   24 05:33 Hemoglobin Quant: 6.2, 24 17:07 Hemoglobin Quant: 8.6,   24 04:34 Hemoglobin Quant: 7.9, 24 09:07 Hemoglobin Quant: 8.7,   24 03:09 Hemoglobin Quant: 8.1,24 12:05  Hemoglobin Quant: 8.6    Treatment: Registered Dietician Malnutrition Assessment, labs, monitor      Thank you,  Lala Sewell RN, CDI  _________________    ASPEN Criteria:    https://aspenjournals.onlinelibrary.monique.com/doi/full/10.1177/416504023270289  5  Options provided:  -- Protein calorie

## 2024-12-27 NOTE — DIABETES MGMT
[facility]  DIABETES MANAGEMENT CONSULT    Consulted by Provider for advanced nursing evaluation and care for inpatient blood glucose management.    Evaluation and Action Plan   Candida Maza is a 64 y.o. female with PMHx of T2DM (20 years), HTN, CKD4, pancreatic insufficiency (partial pancreatectomy years ago), asthma, who was brought in by EMS after being found face down by son with AMS. EMS found BG to be 39, gave D10 infusion, and patient's mentation improved. Per chart review, this is not the first time patient has had issues with extreme hypoglycemia. She checks BG via finger stick 2-3 times a day and states she will occasionally have low BG in the 50s. She has been on Metformin 500 mg bid and Lantus 8 units nightly. According to patient, she eats 3 meals a day and enjoys cooking for her and her son. Current A1c 6.1%, which correlates with reports of low BG.      - Fasting  with noted prandial hyperglycemia .  Would continue with once daily NPH.  Adjusted dose as per below. HARPER on CKD appears to be improving.  Would advocate to stick with once daily NPH insulin in AM at discharge.  NPH would work nicely and would likely cover metabolic needs over 24h given reduced renal function and eGFR 37. Appears had a CGM prescription filled in Aug. 2024. No refills since.  Discussed plan of care with patient re: adjusting type and dose of insulin at discharge.  Discussed her CGM prescription and counseled her on starting to use it again to monitor BG to say ahead of hypoglycemia.  She appears disengaged in her overall diabetes mgmt and not receptive to suggestion to using CGM.  Consuming breakfast at 100% this AM.      Blood glucose pattern    Significant diabetes-related events over the past 24-72 hours  A1C 6.1%  Fasting B  Pre-prandial: 140 - 203  Basal: NPH 8 units in AM only   Bolus: 0  Correction: 3 units  Total daily insulin dose in the last 24 hours: 9

## 2024-12-27 NOTE — PROGRESS NOTES
[address] [place] [address]   Office [phone]  Fax [phone]          Subjective / Objective     Subjective  Overnight Events: No acute overnight events.   Patient seen and examined at bedside. Reports feeling okay this AM. Still having occasional episodes of lightheadedness. She is also complaining of urinary incontinence which she does not notice at times. Using restroom about 7x/day. Denies any bleeding episodes. Denies CP, SOB, N/V, dysuria.    Respiratory:   Room air  Vitals:    12/27/24 0333   BP: 123/67   Pulse: 68   Resp: 18   Temp: 98.2 °F (36.8 °C)   SpO2: 92%     Physical Examination:   General appearance - alert, well appearing, and in no distress  Chest - clear to auscultation, no wheezes, rales or rhonchi, symmetric air entry  Heart - normal rate, regular rhythm, normal S1, S2, no murmurs, rubs, clicks or gallops,   Abdomen - soft, nontender, nondistended, no masses or organomegaly  Neurological - alert, oriented, normal speech, no focal findings  Skin - warm, dry. No notable rashes  Extremities - peripheral pulses normal, no pedal edema, no clubbing or cyanosis  Psychiatric - normal speech and thought processes    I/O:  No intake/output data recorded.  Inpatient Medications  Current Facility-Administered Medications   Medication Dose Route Frequency    sennosides-docusate sodium (SENOKOT-S) 8.6-50 MG tablet 1 tablet  1 tablet Oral Daily PRN    polyethylene glycol (GLYCOLAX) packet 17 g  17 g Oral Daily    0.9 % sodium chloride infusion   IntraVENous PRN    insulin NPH (HumuLIN N;NovoLIN N) injection vial 6 Units  6 Units SubCUTAneous QAM    lidocaine 4 % external patch 1 patch  1 patch TransDERmal Daily    insulin lispro (HUMALOG,ADMELOG) injection vial 0-4 Units  0-4 Units SubCUTAneous 4x Daily AC & HS    cefTRIAXone (ROCEPHIN) 1,000 mg in sterile water 10 mL IV syringe  1,000 mg IntraVENous Daily    sodium chloride flush 0.9 % injection 5-40 mL  5-40 mL IntraVENous 2  normal...

## 2024-12-27 NOTE — CARE COORDINATION
12/27/2024 4:06 PM Spoke with pt at bedside and also spoke with pt's primary team, discharge tomorrow.   Pt confirmed she does not want IPR. Pt reported she only wants to discharge home with home health, declines rehab placement. Pt's primary team is aware. Confirmed with pt her brother will transport her home.   Home Health order sent to [facility] via [facility]. CM relayed to Brick with [facility] anticipating discharge on 12/28.  ART Cleaning     Care Management Progress Note     Reason for Admission:   Hypoglycemia [E16.2]  Hypothermia, initial encounter [T68.XXXA]  Contusion of face, initial encounter [S00.83XA]  Angioedema, initial encounter [T78.3XXA]  Altered mental status, unspecified altered mental status type [R41.82]        Patient Admission Status: Inpatient  RUR: 19%  Hospitalization in the last 30 days (Readmission):  No          Transition of care plan:  Ongoing medical management  Pt requesting to discharge home with home health resumption through [facility] at discharge, pt's primary team is aware and agreeable.  [facility] had accepted for IPR. Pt would need auth for IPR.   Discharge plan communicated with patient and/or discharge caregiver: Yes    Date 1st IMM letter given: n/a  Outpatient follow-up.  Transport at discharge: Pt's brother, Corona

## 2024-12-28 VITALS
BODY MASS INDEX: 27.79 KG/M2 | HEART RATE: 74 BPM | HEIGHT: 62 IN | WEIGHT: 151 LBS | SYSTOLIC BLOOD PRESSURE: 132 MMHG | OXYGEN SATURATION: 97 % | RESPIRATION RATE: 14 BRPM | TEMPERATURE: 98.1 F | DIASTOLIC BLOOD PRESSURE: 73 MMHG

## 2024-12-28 PROBLEM — E11.9 TYPE 2 DIABETES MELLITUS WITHOUT COMPLICATION, WITHOUT LONG-TERM CURRENT USE OF INSULIN (HCC): Status: RESOLVED | Noted: 2023-10-07 | Resolved: 2024-12-28

## 2024-12-28 PROBLEM — S00.83XA CONTUSION OF FACE: Status: ACTIVE | Noted: 2024-12-28

## 2024-12-28 PROBLEM — N18.32 CKD STAGE 3B, GFR 30-44 ML/MIN (HCC): Status: RESOLVED | Noted: 2024-10-31 | Resolved: 2024-12-28

## 2024-12-28 LAB
ANION GAP SERPL CALC-SCNC: 3 MMOL/L (ref 2–12)
BASOPHILS # BLD: 0.1 K/UL (ref 0–0.1)
BASOPHILS NFR BLD: 1 % (ref 0–1)
BUN SERPL-MCNC: 41 MG/DL (ref 6–20)
BUN/CREAT SERPL: 27 (ref 12–20)
CALCIUM SERPL-MCNC: 7.6 MG/DL (ref 8.5–10.1)
CHLORIDE SERPL-SCNC: 110 MMOL/L (ref 97–108)
CO2 SERPL-SCNC: 23 MMOL/L (ref 21–32)
CREAT SERPL-MCNC: 1.52 MG/DL (ref 0.55–1.02)
DIFFERENTIAL METHOD BLD: ABNORMAL
EOSINOPHIL # BLD: 0.2 K/UL (ref 0–0.4)
EOSINOPHIL NFR BLD: 2 % (ref 0–7)
ERYTHROCYTE [DISTWIDTH] IN BLOOD BY AUTOMATED COUNT: 15.3 % (ref 11.5–14.5)
GLUCOSE BLD STRIP.AUTO-MCNC: 215 MG/DL (ref 65–117)
GLUCOSE BLD STRIP.AUTO-MCNC: 220 MG/DL (ref 65–117)
GLUCOSE SERPL-MCNC: 180 MG/DL (ref 65–100)
HCT VFR BLD AUTO: 24.3 % (ref 35–47)
HGB BLD-MCNC: 7.9 G/DL (ref 11.5–16)
IMM GRANULOCYTES # BLD AUTO: 0.1 K/UL (ref 0–0.04)
IMM GRANULOCYTES NFR BLD AUTO: 1 % (ref 0–0.5)
LYMPHOCYTES # BLD: 2.1 K/UL (ref 0.8–3.5)
LYMPHOCYTES NFR BLD: 21 % (ref 12–49)
MCH RBC QN AUTO: 30.6 PG (ref 26–34)
MCHC RBC AUTO-ENTMCNC: 32.5 G/DL (ref 30–36.5)
MCV RBC AUTO: 94.2 FL (ref 80–99)
MONOCYTES # BLD: 0.8 K/UL (ref 0–1)
MONOCYTES NFR BLD: 8 % (ref 5–13)
NEUTS SEG # BLD: 6.6 K/UL (ref 1.8–8)
NEUTS SEG NFR BLD: 67 % (ref 32–75)
NRBC # BLD: 0 K/UL (ref 0–0.01)
NRBC BLD-RTO: 0 PER 100 WBC
PLATELET # BLD AUTO: 229 K/UL (ref 150–400)
PMV BLD AUTO: 10.2 FL (ref 8.9–12.9)
POTASSIUM SERPL-SCNC: 5.2 MMOL/L (ref 3.5–5.1)
RBC # BLD AUTO: 2.58 M/UL (ref 3.8–5.2)
SERVICE CMNT-IMP: ABNORMAL
SERVICE CMNT-IMP: ABNORMAL
SODIUM SERPL-SCNC: 136 MMOL/L (ref 136–145)
WBC # BLD AUTO: 9.8 K/UL (ref 3.6–11)

## 2024-12-28 PROCEDURE — 99238 HOSP IP/OBS DSCHRG MGMT 30/<: CPT | Performed by: FAMILY MEDICINE

## 2024-12-28 PROCEDURE — 85025 COMPLETE CBC W/AUTO DIFF WBC: CPT

## 2024-12-28 PROCEDURE — 6370000000 HC RX 637 (ALT 250 FOR IP)

## 2024-12-28 PROCEDURE — 36415 COLL VENOUS BLD VENIPUNCTURE: CPT

## 2024-12-28 PROCEDURE — 82962 GLUCOSE BLOOD TEST: CPT

## 2024-12-28 PROCEDURE — 6370000000 HC RX 637 (ALT 250 FOR IP): Performed by: CLINICAL NURSE SPECIALIST

## 2024-12-28 PROCEDURE — 94761 N-INVAS EAR/PLS OXIMETRY MLT: CPT

## 2024-12-28 PROCEDURE — 80048 BASIC METABOLIC PNL TOTAL CA: CPT

## 2024-12-28 RX ADMIN — ONDANSETRON 4 MG: 4 TABLET, ORALLY DISINTEGRATING ORAL at 03:17

## 2024-12-28 RX ADMIN — AMLODIPINE BESYLATE 10 MG: 5 TABLET ORAL at 08:42

## 2024-12-28 RX ADMIN — FERROUS SULFATE TAB 325 MG (65 MG ELEMENTAL FE) 325 MG: 325 (65 FE) TAB at 08:42

## 2024-12-28 RX ADMIN — TRAMADOL HYDROCHLORIDE 50 MG: 50 TABLET, COATED ORAL at 08:42

## 2024-12-28 RX ADMIN — DICYCLOMINE HYDROCHLORIDE 10 MG: 10 CAPSULE ORAL at 12:42

## 2024-12-28 RX ADMIN — AVOBENZONE, HOMOSALATE, OCTISALATE, OCTOCRYLENE, AND OXYBENZONE 1 PACKET: 29.4; 147; 49; 25.4; 58.8 LOTION TOPICAL at 08:42

## 2024-12-28 RX ADMIN — POLYETHYLENE GLYCOL 3350 17 G: 17 POWDER, FOR SOLUTION ORAL at 08:43

## 2024-12-28 RX ADMIN — PANCRELIPASE LIPASE, PANCRELIPASE PROTEASE, PANCRELIPASE AMYLASE 30000 UNITS: 15000; 47000; 63000 CAPSULE, DELAYED RELEASE ORAL at 08:42

## 2024-12-28 RX ADMIN — PANTOPRAZOLE SODIUM 40 MG: 40 TABLET, DELAYED RELEASE ORAL at 05:40

## 2024-12-28 RX ADMIN — PANCRELIPASE LIPASE, PANCRELIPASE PROTEASE, PANCRELIPASE AMYLASE 40000 UNITS: 20000; 63000; 84000 CAPSULE, DELAYED RELEASE ORAL at 12:42

## 2024-12-28 RX ADMIN — PANCRELIPASE LIPASE, PANCRELIPASE PROTEASE, PANCRELIPASE AMYLASE 40000 UNITS: 20000; 63000; 84000 CAPSULE, DELAYED RELEASE ORAL at 08:41

## 2024-12-28 RX ADMIN — GABAPENTIN 100 MG: 100 CAPSULE ORAL at 08:42

## 2024-12-28 RX ADMIN — INSULIN HUMAN 8 UNITS: 100 INJECTION, SUSPENSION SUBCUTANEOUS at 08:41

## 2024-12-28 RX ADMIN — PANCRELIPASE LIPASE, PANCRELIPASE PROTEASE, PANCRELIPASE AMYLASE 30000 UNITS: 15000; 47000; 63000 CAPSULE, DELAYED RELEASE ORAL at 12:42

## 2024-12-28 RX ADMIN — DICYCLOMINE HYDROCHLORIDE 10 MG: 10 CAPSULE ORAL at 05:40

## 2024-12-28 RX ADMIN — INSULIN LISPRO 1 UNITS: 100 INJECTION, SOLUTION INTRAVENOUS; SUBCUTANEOUS at 12:41

## 2024-12-28 NOTE — CARE COORDINATION
@131 Case management follow up  [employer] BLS transport- [place] [id number], set for 4pm.     Case management follow up  Marie liaison from [facility] has contacted cm to report auth has been approved.    Met with patient at bedside who would like some time to think about decision.     Followed up with patient who does wish to transition to [facility].    Call to Marie to confirm bed is still open for Saturday admission.     Confirmed admission-  nurse to call report to [phone].    Will set up Medicaid Transport- 4pm.      Provider made aware.  CASSIUS

## 2024-12-28 NOTE — PROGRESS NOTES
[address] [place] [address]   Office [phone]  Fax [phone]          Subjective / Objective     Subjective  Overnight Events: NO acute events.   Patient seen and examined at bedside. Reports feeling well this AM. Still noting some weakness in her legs when she gets up along with some nausea this am. Denies CP, SOB, V, dysuria.    Respiratory:   Room air.   Vitals:    12/28/24 0323   BP: 131/63   Pulse: 71   Resp: 14   Temp: 98.4 °F (36.9 °C)   SpO2: 96%     Physical Examination:   General appearance - alert, well appearing, and in no distress  Chest - clear to auscultation, no wheezes, rales or rhonchi, symmetric air entry  Heart - normal rate, regular rhythm, normal S1, S2, no murmurs, rubs, clicks or gallops,   Abdomen - soft, nontender, nondistended, no masses or organomegaly  Neurological - alert, oriented, normal speech, no focal findings  Skin - warm, dry. No notable rashes  Extremities - peripheral pulses normal, no pedal edema, no clubbing or cyanosis  Psychiatric - normal speech and thought processes    I/O:  No intake/output data recorded.  Inpatient Medications  [unfilled]  Current Facility-Administered Medications   Medication Dose Route Frequency    calcium carbonate (TUMS) chewable tablet 500 mg  500 mg Oral TID PRN    insulin NPH (HumuLIN N;NovoLIN N) injection vial 8 Units  8 Units SubCUTAneous QAM    sennosides-docusate sodium (SENOKOT-S) 8.6-50 MG tablet 1 tablet  1 tablet Oral Daily PRN    polyethylene glycol (GLYCOLAX) packet 17 g  17 g Oral Daily    0.9 % sodium chloride infusion   IntraVENous PRN    lidocaine 4 % external patch 1 patch  1 patch TransDERmal Daily    insulin lispro (HUMALOG,ADMELOG) injection vial 0-4 Units  0-4 Units SubCUTAneous 4x Daily AC & HS    sodium chloride flush 0.9 % injection 5-40 mL  5-40 mL IntraVENous 2 times per day    sodium chloride flush 0.9 % injection 5-40 mL  5-40 mL IntraVENous PRN    0.9 % sodium chloride infusion     Elevated LDH    Syncope    Normocytic anemia    Contusion of face  Resolved Problems:    * No resolved hospital problems. *

## 2024-12-30 ENCOUNTER — TELEPHONE (OUTPATIENT)
Age: 64
End: 2024-12-30

## 2024-12-30 LAB — COPPER SERPL-MCNC: 50 UG/DL (ref 80–158)

## 2024-12-30 NOTE — PROGRESS NOTES
Physician Progress Note      PATIENT:               ZEE HERZOG  [facility] #:                  [id number]  :                       1960  ADMIT DATE:       2024 7:12 PM  DISCH DATE:        2024 4:45 PM  RESPONDING  PROVIDER #:        FALGUNI TREJO          QUERY TEXT:    Good morning.    Patient admitted with hypoglycemia. Noted documentation of UTI in progress   notes dated -.  urine culture showed E.coli. Patient was noted   to be asymptomatic.    In order to support the diagnosis of UTI, please include additional clinical   indicators in your documentation.  Or please document if the diagnosis of UTI   has been ruled out after further study.    The medical record reflects the following:    Risk Factors: 64 yr old female, DM II, Anemia, HTN, acute illness    Clinical Indicators:  urine culture: Escherichia coli; from  Family   Medicine PN: \"UTI. Urine culture growing gram negative rods. No growth on   blood cultures. Denying symptoms. Was previously hypothermic to 88 and treated   with bob hugger. This has since resolved. Will continue to monitor.  - S/p IV rocephin (-) - Continue to follow blood and urine cultures.   - ucx - e coli.- bcx - no growth 5 days.    Treatment: Urine culture, IV Ceftriaxone, monitor      Thank you,  Lala Sewell RN, CDI  Options provided:  -- UTI present as evidenced by, Please document evidence.  -- UTI was ruled out, This patient had bacteriuria  -- Other - I will add my own diagnosis  -- Disagree - Not applicable / Not valid  -- Disagree - Clinically unable to determine / Unknown  -- Refer to Clinical Documentation Reviewer    PROVIDER RESPONSE TEXT:    UTI is present as evidenced by urine culure growing gram negative rods.    Query created by: Lala Sewell on 2024 8:05 AM      Electronically signed by:  FALGUNI TREJO 2024 8:57 AM

## 2025-01-14 ENCOUNTER — LAB (OUTPATIENT)
Age: 65
End: 2025-01-14
Payer: MEDICAID

## 2025-01-14 ENCOUNTER — OFFICE VISIT (OUTPATIENT)
Age: 65
End: 2025-01-14
Payer: MEDICAID

## 2025-01-14 VITALS
OXYGEN SATURATION: 98 % | DIASTOLIC BLOOD PRESSURE: 80 MMHG | HEART RATE: 62 BPM | WEIGHT: 165 LBS | SYSTOLIC BLOOD PRESSURE: 124 MMHG | HEIGHT: 62 IN | BODY MASS INDEX: 30.36 KG/M2

## 2025-01-14 DIAGNOSIS — R53.83 MALAISE AND FATIGUE: ICD-10-CM

## 2025-01-14 DIAGNOSIS — K86.89 PANCREATIC INSUFFICIENCY: ICD-10-CM

## 2025-01-14 DIAGNOSIS — R53.81 MALAISE AND FATIGUE: ICD-10-CM

## 2025-01-14 DIAGNOSIS — E11.649 HYPOGLYCEMIA ASSOCIATED WITH DIABETES (HCC): ICD-10-CM

## 2025-01-14 DIAGNOSIS — E55.9 VITAMIN D DEFICIENCY, UNSPECIFIED: ICD-10-CM

## 2025-01-14 DIAGNOSIS — D64.9 ANEMIA, UNSPECIFIED TYPE: ICD-10-CM

## 2025-01-14 DIAGNOSIS — I10 PRIMARY HYPERTENSION: ICD-10-CM

## 2025-01-14 DIAGNOSIS — E11.9 TYPE 2 DIABETES MELLITUS WITHOUT COMPLICATION, WITHOUT LONG-TERM CURRENT USE OF INSULIN (HCC): ICD-10-CM

## 2025-01-14 DIAGNOSIS — Z09 HOSPITAL DISCHARGE FOLLOW-UP: ICD-10-CM

## 2025-01-14 DIAGNOSIS — E11.649 HYPOGLYCEMIA ASSOCIATED WITH DIABETES (HCC): Primary | ICD-10-CM

## 2025-01-14 PROCEDURE — 99496 TRANSJ CARE MGMT HIGH F2F 7D: CPT | Performed by: INTERNAL MEDICINE

## 2025-01-14 RX ORDER — INSULIN HUMAN 100 [IU]/ML
INJECTION, SUSPENSION SUBCUTANEOUS
COMMUNITY
Start: 2025-01-08

## 2025-01-14 NOTE — PROGRESS NOTES
Identified pt with two pt identifiers(name and ). Reviewed record in preparation for visit and have obtained necessary documentation. All patient medications has been reviewed.  Chief Complaint   Patient presents with     Hypoglycemia Seziures    Follow-Up from Hospital       Vitals:    25 1445   BP: 124/80   Pulse: 62   SpO2: 98%                   Coordination of Care Questionnaire:   1) Have you been to an emergency room, urgent care, or hospitalized since your last visit?   Yes       2. Have seen or consulted any other health care provider since your last visit? No        Patient is accompanied by self I have received verbal consent from Candida Maza to discuss any/all medical information while they are present in the room.  
breath sounds.   Abdominal:      General: Abdomen is flat. Bowel sounds are normal.      Palpations: Abdomen is soft.   Musculoskeletal:         General: Signs of injury (lower right leg) present. Normal range of motion.      Cervical back: Normal range of motion and neck supple.        Legs:    Neurological:      General: No focal deficit present.      Mental Status: She is alert and oriented to person, place, and time. Mental status is at baseline.   Psychiatric:         Mood and Affect: Mood normal.         Behavior: Behavior normal.         Thought Content: Thought content normal.         Judgment: Judgment normal.      Keena Leyva MD  [facility]  1/14/2025

## 2025-01-14 NOTE — PATIENT INSTRUCTIONS
Dexcom G7 given. You will need to download the liz to your phone.   Have home care nurse review and place on arm.   Follow up in 1-2 weeks to recheck BS and see how they are doing. You can look at a video on YOUTube to see how the Dexcom G7 works and how to place.

## 2025-01-16 ENCOUNTER — TELEPHONE (OUTPATIENT)
Age: 65
End: 2025-01-16

## 2025-01-16 LAB
25(OH)D3 SERPL-MCNC: 36.7 NG/ML (ref 30–100)
ALBUMIN SERPL-MCNC: 2.2 G/DL (ref 3.5–5)
ALBUMIN/GLOB SERPL: 0.6 (ref 1.1–2.2)
ALP SERPL-CCNC: 169 U/L (ref 45–117)
ALT SERPL-CCNC: 15 U/L (ref 12–78)
ANION GAP SERPL CALC-SCNC: 5 MMOL/L (ref 2–12)
AST SERPL-CCNC: 9 U/L (ref 15–37)
BASOPHILS # BLD: 0.05 K/UL (ref 0–0.1)
BASOPHILS NFR BLD: 0.5 % (ref 0–1)
BILIRUB SERPL-MCNC: 0.2 MG/DL (ref 0.2–1)
BUN SERPL-MCNC: 25 MG/DL (ref 6–20)
BUN/CREAT SERPL: 18 (ref 12–20)
CALCIUM SERPL-MCNC: 8.4 MG/DL (ref 8.5–10.1)
CHLORIDE SERPL-SCNC: 114 MMOL/L (ref 97–108)
CO2 SERPL-SCNC: 20 MMOL/L (ref 21–32)
CREAT SERPL-MCNC: 1.41 MG/DL (ref 0.55–1.02)
CREAT UR-MCNC: 45.8 MG/DL
DIFFERENTIAL METHOD BLD: ABNORMAL
EOSINOPHIL # BLD: 0.17 K/UL (ref 0–0.4)
EOSINOPHIL NFR BLD: 1.7 % (ref 0–7)
ERYTHROCYTE [DISTWIDTH] IN BLOOD BY AUTOMATED COUNT: 14.4 % (ref 11.5–14.5)
EST. AVERAGE GLUCOSE BLD GHB EST-MCNC: 143 MG/DL
FERRITIN SERPL-MCNC: 292 NG/ML (ref 26–388)
GLOBULIN SER CALC-MCNC: 3.4 G/DL (ref 2–4)
GLUCOSE SERPL-MCNC: 389 MG/DL (ref 65–100)
HBA1C MFR BLD: 6.6 % (ref 4–5.6)
HCT VFR BLD AUTO: 27.1 % (ref 35–47)
HGB BLD-MCNC: 8.8 G/DL (ref 11.5–16)
IMM GRANULOCYTES # BLD AUTO: 0.02 K/UL (ref 0–0.04)
IMM GRANULOCYTES NFR BLD AUTO: 0.2 % (ref 0–0.5)
IRON SATN MFR SERPL: 31 % (ref 20–50)
IRON SERPL-MCNC: 48 UG/DL (ref 35–150)
LYMPHOCYTES # BLD: 2.64 K/UL (ref 0.8–3.5)
LYMPHOCYTES NFR BLD: 25.7 % (ref 12–49)
MCH RBC QN AUTO: 30.9 PG (ref 26–34)
MCHC RBC AUTO-ENTMCNC: 32.5 G/DL (ref 30–36.5)
MCV RBC AUTO: 95.1 FL (ref 80–99)
MICROALBUMIN UR-MCNC: 291 MG/DL
MICROALBUMIN/CREAT UR-RTO: 6354 MG/G (ref 0–30)
MONOCYTES # BLD: 0.35 K/UL (ref 0–1)
MONOCYTES NFR BLD: 3.4 % (ref 5–13)
NEUTS SEG # BLD: 7.06 K/UL (ref 1.8–8)
NEUTS SEG NFR BLD: 68.5 % (ref 32–75)
NRBC # BLD: 0 K/UL (ref 0–0.01)
NRBC BLD-RTO: 0 PER 100 WBC
PLATELET # BLD AUTO: 261 K/UL (ref 150–400)
PMV BLD AUTO: 10.7 FL (ref 8.9–12.9)
POTASSIUM SERPL-SCNC: 4 MMOL/L (ref 3.5–5.1)
PROT SERPL-MCNC: 5.6 G/DL (ref 6.4–8.2)
RBC # BLD AUTO: 2.85 M/UL (ref 3.8–5.2)
SODIUM SERPL-SCNC: 139 MMOL/L (ref 136–145)
T4 FREE SERPL-MCNC: 0.9 NG/DL (ref 0.8–1.5)
TIBC SERPL-MCNC: 155 UG/DL (ref 250–450)
TSH SERPL DL<=0.05 MIU/L-ACNC: 1.15 UIU/ML (ref 0.36–3.74)
WBC # BLD AUTO: 10.3 K/UL (ref 3.6–11)

## 2025-01-16 NOTE — TELEPHONE ENCOUNTER
----- Message from Dr. Keena Leyva MD sent at 1/16/2025  3:41 PM EST -----  Please let patient know that her labs are showing very elevated albumin in her urine. She is seen by nephrology and should make an appointment to be seen soon, if she does not already have an appointment.   Anemia is slightly better. She does have a follow up with oncology for this on 1/17/2025.   Blood sugars are very elevated, but her HBA1C is lower and stable.   Continue with current medications.   Follow up for diabetes in 3-month  Thanks!

## 2025-01-17 ENCOUNTER — OFFICE VISIT (OUTPATIENT)
Age: 65
End: 2025-01-17
Payer: MEDICAID

## 2025-01-17 ENCOUNTER — TELEPHONE (OUTPATIENT)
Age: 65
End: 2025-01-17

## 2025-01-17 VITALS
SYSTOLIC BLOOD PRESSURE: 140 MMHG | TEMPERATURE: 97 F | DIASTOLIC BLOOD PRESSURE: 74 MMHG | HEART RATE: 74 BPM | OXYGEN SATURATION: 98 % | HEIGHT: 62 IN | WEIGHT: 164.9 LBS | BODY MASS INDEX: 30.35 KG/M2

## 2025-01-17 DIAGNOSIS — D53.9 MACROCYTIC ANEMIA: Primary | ICD-10-CM

## 2025-01-17 DIAGNOSIS — D53.9 MACROCYTIC ANEMIA: ICD-10-CM

## 2025-01-17 DIAGNOSIS — I10 ESSENTIAL HYPERTENSION: ICD-10-CM

## 2025-01-17 DIAGNOSIS — Z98.84 HISTORY OF GASTRIC BYPASS: ICD-10-CM

## 2025-01-17 LAB
BACTERIA SPEC CULT: NORMAL
BLOOD BANK CMNT PATIENT-IMP: NORMAL
CC UR VC: NORMAL
DAT POLY-SP REAG RBC QL: NORMAL
SERVICE CMNT-IMP: NORMAL

## 2025-01-17 PROCEDURE — 3077F SYST BP >= 140 MM HG: CPT | Performed by: INTERNAL MEDICINE

## 2025-01-17 PROCEDURE — 99214 OFFICE O/P EST MOD 30 MIN: CPT | Performed by: INTERNAL MEDICINE

## 2025-01-17 PROCEDURE — 3078F DIAST BP <80 MM HG: CPT | Performed by: INTERNAL MEDICINE

## 2025-01-17 NOTE — PROGRESS NOTES
CMP-Normal electrolyte levels, renal, and liver function. Alkaline phosphatase may be elevated due to hip fracture. CBC-Normal red and white blood cell levels.
Candida Maza is a 64 y.o. female   Follow-Up from [facility]    Vitals:    01/17/25 1040 01/17/25 1053   BP: (!) 168/80 (!) 140/74   Site: Left Upper Arm Right Upper Arm   Pulse: 74    Temp: 97 °F (36.1 °C)    SpO2: 98%    Weight: 74.8 kg (164 lb 14.5 oz)    Height: 1.575 m (5' 2.01\")      No LMP recorded. Patient is postmenopausal.    \"Have you been to the ER, urgent care clinic since your last visit?  Hospitalized since your last visit?\"    NO    “Have you seen or consulted any other health care providers outside of [facility] since your last visit?”    NO      
REMOVAL Right 10/2016    CATARACT REMOVAL Left 10/2015    CHOLECYSTECTOMY  2005?    COLONOSCOPY N/A 2016    COLONOSCOPY,EGD performed by Jarocho Tim MD at [facility] ENDOSCOPY    COLONOSCOPY      PANCREAS SURGERY PROC UNLISTED  ?    Distal pancreatectomy.    PANCREATIC ELASTASE, FECAL, QUAL/SEMI-QUANT      growths in prancreatitis      Social History     Tobacco Use    Smoking status: Former     Current packs/day: 0.00     Average packs/day: 0.5 packs/day for 10.0 years (5.0 ttl pk-yrs)     Types: Cigarettes     Start date: 2016     Quit date: 2016     Years since quittin.8    Smokeless tobacco: Never   Substance Use Topics    Alcohol use: No     Alcohol/week: 0.0 standard drinks of alcohol      Family History   Problem Relation Age of Onset    Diabetes Brother     Breast Cancer Sister 56    Heart Disease Father     Diabetes Mother      Current Outpatient Medications   Medication Sig    metFORMIN (GLUCOPHAGE) 1000 MG tablet Take 1 tablet by mouth 2 times daily    HUMULIN N KWIKPEN 100 UNIT/ML injection pen INJECT 3 UNITS SUBCUTANEOUS TWICE A DAY    dicyclomine (BENTYL) 10 MG capsule Take 1 capsule by mouth 4 times daily (before meals and nightly)    tiZANidine (ZANAFLEX) 2 MG tablet Take 1 tablet by mouth in the morning and at bedtime    blood glucose monitor strips 1 strip by Other route in the morning, at noon, in the evening, and at bedtime Test 4 times a day & as needed for symptoms of irregular blood glucose. Dispense sufficient amount for indicated testing frequency plus additional to accommodate PRN testing needs.    diclofenac sodium (VOLTAREN) 1 % GEL Apply 2 g topically 4 times daily as needed for Pain    albuterol sulfate HFA (PROVENTIL;VENTOLIN;PROAIR) 108 (90 Base) MCG/ACT inhaler INHALE 2 PUFFS INTO THE LUNGS 4 TIMES DAILY AS NEEDED FOR WHEEZING.    SUMAtriptan (IMITREX) 100 MG tablet TAKE 1 TABLET BY MOUTH AS NEEDED FOR MIGRAINE.    ONETOUCH VERIO strip USE 1 TEST STIP IN

## 2025-01-17 NOTE — TELEPHONE ENCOUNTER
----- Message from Dr. Keena Leyva MD sent at 1/17/2025  8:46 AM EST -----  Please let patient know that her urine was NEG for any infection at this time.   Thanks!

## 2025-01-18 LAB
BASOPHILS # BLD: 0.04 K/UL (ref 0–0.1)
BASOPHILS NFR BLD: 0.5 % (ref 0–1)
DIFFERENTIAL METHOD BLD: ABNORMAL
EOSINOPHIL # BLD: 0.08 K/UL (ref 0–0.4)
EOSINOPHIL NFR BLD: 0.9 % (ref 0–7)
ERYTHROCYTE [DISTWIDTH] IN BLOOD BY AUTOMATED COUNT: 15.1 % (ref 11.5–14.5)
HCT VFR BLD AUTO: 27.3 % (ref 35–47)
HGB BLD-MCNC: 8.5 G/DL (ref 11.5–16)
IMM GRANULOCYTES # BLD AUTO: 0.03 K/UL (ref 0–0.04)
IMM GRANULOCYTES NFR BLD AUTO: 0.3 % (ref 0–0.5)
LDH SERPL L TO P-CCNC: 348 U/L (ref 81–246)
LYMPHOCYTES # BLD: 2.1 K/UL (ref 0.8–3.5)
LYMPHOCYTES NFR BLD: 24.2 % (ref 12–49)
MCH RBC QN AUTO: 30.4 PG (ref 26–34)
MCHC RBC AUTO-ENTMCNC: 31.1 G/DL (ref 30–36.5)
MCV RBC AUTO: 97.5 FL (ref 80–99)
MONOCYTES # BLD: 0.24 K/UL (ref 0–1)
MONOCYTES NFR BLD: 2.8 % (ref 5–13)
NEUTS SEG # BLD: 6.17 K/UL (ref 1.8–8)
NEUTS SEG NFR BLD: 71.3 % (ref 32–75)
NRBC # BLD: 0 K/UL (ref 0–0.01)
NRBC BLD-RTO: 0 PER 100 WBC
PERIPHERAL SMEAR, MD REVIEW: NORMAL
PLATELET # BLD AUTO: 257 K/UL (ref 150–400)
PMV BLD AUTO: 11.3 FL (ref 8.9–12.9)
RBC # BLD AUTO: 2.8 M/UL (ref 3.8–5.2)
WBC # BLD AUTO: 8.7 K/UL (ref 3.6–11)

## 2025-01-19 LAB — CERULOPLASMIN SERPL-MCNC: 19.7 MG/DL (ref 19–39)

## 2025-01-20 LAB — COPPER SERPL-MCNC: 87 UG/DL (ref 80–158)

## 2025-01-20 RX ORDER — SUMATRIPTAN SUCCINATE 100 MG/1
100 TABLET ORAL PRN
Qty: 9 TABLET | Refills: 3 | Status: SHIPPED | OUTPATIENT
Start: 2025-01-20

## 2025-01-24 PROBLEM — Z98.84 HISTORY OF GASTRIC BYPASS: Status: ACTIVE | Noted: 2025-01-24

## 2025-01-24 LAB
ALBUMIN SERPL ELPH-MCNC: 2.4 G/DL (ref 2.9–4.4)
ALBUMIN/GLOB SERPL: 0.9 (ref 0.7–1.7)
ALPHA1 GLOB SERPL ELPH-MCNC: 0.2 G/DL (ref 0–0.4)
ALPHA2 GLOB SERPL ELPH-MCNC: 0.7 G/DL (ref 0.4–1)
B-GLOBULIN SERPL ELPH-MCNC: 0.8 G/DL (ref 0.7–1.3)
GAMMA GLOB SERPL ELPH-MCNC: 0.9 G/DL (ref 0.4–1.8)
GLOBULIN SER-MCNC: 2.7 G/DL (ref 2.2–3.9)
IGA SERPL-MCNC: 180 MG/DL (ref 87–352)
IGG SERPL-MCNC: 1054 MG/DL (ref 586–1602)
IGM SERPL-MCNC: 124 MG/DL (ref 26–217)
INTERPRETATION SERPL IEP-IMP: ABNORMAL
KAPPA LC FREE SER-MCNC: 71.3 MG/L (ref 3.3–19.4)
KAPPA LC FREE/LAMBDA FREE SER: 1.19 (ref 0.26–1.65)
LAMBDA LC FREE SERPL-MCNC: 60 MG/L (ref 5.7–26.3)
M PROTEIN SERPL ELPH-MCNC: 0.3 G/DL
PROT SERPL-MCNC: 5.1 G/DL (ref 6–8.5)

## 2025-01-30 DIAGNOSIS — K86.89 OTHER SPECIFIED DISEASES OF PANCREAS: ICD-10-CM

## 2025-01-30 DIAGNOSIS — N18.4 CKD (CHRONIC KIDNEY DISEASE) STAGE 4, GFR 15-29 ML/MIN (HCC): ICD-10-CM

## 2025-01-30 DIAGNOSIS — I10 ESSENTIAL HYPERTENSION: ICD-10-CM

## 2025-01-30 DIAGNOSIS — K59.01 SLOW TRANSIT CONSTIPATION: ICD-10-CM

## 2025-01-30 RX ORDER — SODIUM BICARBONATE 650 MG/1
650 TABLET ORAL 3 TIMES DAILY
Qty: 270 TABLET | Refills: 3 | Status: SHIPPED | OUTPATIENT
Start: 2025-01-30

## 2025-01-30 RX ORDER — PANCRELIPASE 36000; 180000; 114000 [USP'U]/1; [USP'U]/1; [USP'U]/1
CAPSULE, DELAYED RELEASE PELLETS ORAL
Qty: 630 CAPSULE | Refills: 1 | Status: SHIPPED | OUTPATIENT
Start: 2025-01-30

## 2025-01-30 RX ORDER — AMLODIPINE BESYLATE 10 MG/1
10 TABLET ORAL DAILY
Qty: 90 TABLET | Refills: 1 | Status: SHIPPED | OUTPATIENT
Start: 2025-01-30

## 2025-01-30 RX ORDER — FUROSEMIDE 20 MG/1
20 TABLET ORAL 3 TIMES DAILY
Qty: 270 TABLET | Refills: 1 | OUTPATIENT
Start: 2025-01-30

## 2025-02-04 ENCOUNTER — TELEPHONE (OUTPATIENT)
Age: 65
End: 2025-02-04

## 2025-02-04 NOTE — TELEPHONE ENCOUNTER
Patient called and stated that she needed to cancel her appt due to her not having transportation. She stated that she will call back to reschedule.

## 2025-02-13 ENCOUNTER — TELEPHONE (OUTPATIENT)
Age: 65
End: 2025-02-13

## 2025-02-13 DIAGNOSIS — K86.89 PANCREATIC INSUFFICIENCY: ICD-10-CM

## 2025-02-13 DIAGNOSIS — R10.84 GENERALIZED ABDOMINAL PAIN: ICD-10-CM

## 2025-02-13 DIAGNOSIS — K90.9 DIARRHEA DUE TO MALABSORPTION: ICD-10-CM

## 2025-02-13 DIAGNOSIS — K59.01 SLOW TRANSIT CONSTIPATION: ICD-10-CM

## 2025-02-13 DIAGNOSIS — R14.0 ABDOMINAL BLOATING WITH CRAMPS: ICD-10-CM

## 2025-02-13 DIAGNOSIS — R10.9 ABDOMINAL BLOATING WITH CRAMPS: ICD-10-CM

## 2025-02-13 DIAGNOSIS — R19.7 DIARRHEA DUE TO MALABSORPTION: ICD-10-CM

## 2025-02-13 RX ORDER — DICYCLOMINE HYDROCHLORIDE 10 MG/1
10 CAPSULE ORAL
Qty: 360 CAPSULE | Refills: 0 | Status: CANCELLED | OUTPATIENT
Start: 2025-02-13

## 2025-02-13 NOTE — TELEPHONE ENCOUNTER
Patient fell 3 weeks ago and scraped skin off of her right leg, not a deep wound. She states it is beginning to heal. I have also left a sticky note on the paperwork in your signing folder.    Patient would also like a refill on her dicyclomine (BENTYL) 10 MG capsule sent to [facility] in [place].

## 2025-02-17 ENCOUNTER — APPOINTMENT (OUTPATIENT)
Facility: HOSPITAL | Age: 65
DRG: 284 | End: 2025-02-17
Payer: MEDICAID

## 2025-02-17 ENCOUNTER — HOSPITAL ENCOUNTER (INPATIENT)
Facility: HOSPITAL | Age: 65
LOS: 4 days | Discharge: HOME | DRG: 284 | End: 2025-02-21
Attending: STUDENT IN AN ORGANIZED HEALTH CARE EDUCATION/TRAINING PROGRAM | Admitting: FAMILY MEDICINE
Payer: MEDICAID

## 2025-02-17 DIAGNOSIS — R10.84 GENERALIZED ABDOMINAL PAIN: Primary | ICD-10-CM

## 2025-02-17 DIAGNOSIS — K83.8 DILATION OF COMMON BILE DUCT: ICD-10-CM

## 2025-02-17 DIAGNOSIS — K86.89 DILATION OF PANCREATIC DUCT: ICD-10-CM

## 2025-02-17 LAB
ALBUMIN SERPL-MCNC: 1.5 G/DL (ref 3.5–5)
ALBUMIN/GLOB SERPL: 0.5 (ref 1.1–2.2)
ALP SERPL-CCNC: 150 U/L (ref 45–117)
ALT SERPL-CCNC: 18 U/L (ref 12–78)
ANION GAP SERPL CALC-SCNC: 3 MMOL/L (ref 2–12)
APPEARANCE UR: ABNORMAL
AST SERPL-CCNC: 15 U/L (ref 15–37)
BACTERIA URNS QL MICRO: ABNORMAL /HPF
BASOPHILS # BLD: 0.04 K/UL (ref 0–0.1)
BASOPHILS NFR BLD: 0.5 % (ref 0–1)
BILIRUB SERPL-MCNC: 0.1 MG/DL (ref 0.2–1)
BILIRUB UR QL: NEGATIVE
BUN SERPL-MCNC: 26 MG/DL (ref 6–20)
BUN/CREAT SERPL: 15 (ref 12–20)
CALCIUM SERPL-MCNC: 7.9 MG/DL (ref 8.5–10.1)
CHLORIDE SERPL-SCNC: 110 MMOL/L (ref 97–108)
CO2 SERPL-SCNC: 27 MMOL/L (ref 21–32)
COLOR UR: ABNORMAL
CREAT SERPL-MCNC: 1.76 MG/DL (ref 0.55–1.02)
DIFFERENTIAL METHOD BLD: ABNORMAL
EOSINOPHIL # BLD: 0.02 K/UL (ref 0–0.4)
EOSINOPHIL NFR BLD: 0.2 % (ref 0–7)
EPITH CASTS URNS QL MICRO: ABNORMAL /LPF
ERYTHROCYTE [DISTWIDTH] IN BLOOD BY AUTOMATED COUNT: 13.5 % (ref 11.5–14.5)
FLUAV RNA SPEC QL NAA+PROBE: NOT DETECTED
FLUBV RNA SPEC QL NAA+PROBE: NOT DETECTED
GLOBULIN SER CALC-MCNC: 3.3 G/DL (ref 2–4)
GLUCOSE BLD STRIP.AUTO-MCNC: 222 MG/DL (ref 65–117)
GLUCOSE BLD STRIP.AUTO-MCNC: 252 MG/DL (ref 65–117)
GLUCOSE SERPL-MCNC: 215 MG/DL (ref 65–100)
GLUCOSE UR STRIP.AUTO-MCNC: 100 MG/DL
HCT VFR BLD AUTO: 29.8 % (ref 35–47)
HGB BLD-MCNC: 9.8 G/DL (ref 11.5–16)
HGB UR QL STRIP: ABNORMAL
IMM GRANULOCYTES # BLD AUTO: 0.03 K/UL (ref 0–0.04)
IMM GRANULOCYTES NFR BLD AUTO: 0.4 % (ref 0–0.5)
KETONES UR QL STRIP.AUTO: NEGATIVE MG/DL
LEUKOCYTE ESTERASE UR QL STRIP.AUTO: ABNORMAL
LIPASE SERPL-CCNC: 8 U/L (ref 13–75)
LYMPHOCYTES # BLD: 2.36 K/UL (ref 0.8–3.5)
LYMPHOCYTES NFR BLD: 28 % (ref 12–49)
MCH RBC QN AUTO: 30.4 PG (ref 26–34)
MCHC RBC AUTO-ENTMCNC: 32.9 G/DL (ref 30–36.5)
MCV RBC AUTO: 92.5 FL (ref 80–99)
MONOCYTES # BLD: 0.31 K/UL (ref 0–1)
MONOCYTES NFR BLD: 3.7 % (ref 5–13)
NEUTS SEG # BLD: 5.67 K/UL (ref 1.8–8)
NEUTS SEG NFR BLD: 67.2 % (ref 32–75)
NITRITE UR QL STRIP.AUTO: NEGATIVE
NRBC # BLD: 0 K/UL (ref 0–0.01)
NRBC BLD-RTO: 0 PER 100 WBC
PH UR STRIP: 6 (ref 5–8)
PLATELET # BLD AUTO: 173 K/UL (ref 150–400)
PMV BLD AUTO: 11.9 FL (ref 8.9–12.9)
POTASSIUM SERPL-SCNC: 5.5 MMOL/L (ref 3.5–5.1)
PROT SERPL-MCNC: 4.8 G/DL (ref 6.4–8.2)
PROT UR STRIP-MCNC: 100 MG/DL
RBC # BLD AUTO: 3.22 M/UL (ref 3.8–5.2)
RBC #/AREA URNS HPF: ABNORMAL /HPF (ref 0–5)
SARS-COV-2 RNA RESP QL NAA+PROBE: NOT DETECTED
SERVICE CMNT-IMP: ABNORMAL
SERVICE CMNT-IMP: ABNORMAL
SODIUM SERPL-SCNC: 140 MMOL/L (ref 136–145)
SOURCE: NORMAL
SP GR UR REFRACTOMETRY: 1.02 (ref 1–1.03)
SPECIMEN HOLD: NORMAL
UROBILINOGEN UR QL STRIP.AUTO: 0.2 EU/DL (ref 0.2–1)
WBC # BLD AUTO: 8.4 K/UL (ref 3.6–11)
WBC URNS QL MICRO: ABNORMAL /HPF (ref 0–4)

## 2025-02-17 PROCEDURE — 87088 URINE BACTERIA CULTURE: CPT

## 2025-02-17 PROCEDURE — 99285 EMERGENCY DEPT VISIT HI MDM: CPT

## 2025-02-17 PROCEDURE — 94761 N-INVAS EAR/PLS OXIMETRY MLT: CPT

## 2025-02-17 PROCEDURE — 87086 URINE CULTURE/COLONY COUNT: CPT

## 2025-02-17 PROCEDURE — 6360000004 HC RX CONTRAST MEDICATION

## 2025-02-17 PROCEDURE — 82962 GLUCOSE BLOOD TEST: CPT

## 2025-02-17 PROCEDURE — 2580000003 HC RX 258

## 2025-02-17 PROCEDURE — 2060000000 HC ICU INTERMEDIATE R&B

## 2025-02-17 PROCEDURE — 87636 SARSCOV2 & INF A&B AMP PRB: CPT

## 2025-02-17 PROCEDURE — 83690 ASSAY OF LIPASE: CPT

## 2025-02-17 PROCEDURE — 87186 SC STD MICRODIL/AGAR DIL: CPT

## 2025-02-17 PROCEDURE — 74177 CT ABD & PELVIS W/CONTRAST: CPT

## 2025-02-17 PROCEDURE — 80053 COMPREHEN METABOLIC PANEL: CPT

## 2025-02-17 PROCEDURE — 81001 URINALYSIS AUTO W/SCOPE: CPT

## 2025-02-17 PROCEDURE — 85025 COMPLETE CBC W/AUTO DIFF WBC: CPT

## 2025-02-17 PROCEDURE — 36415 COLL VENOUS BLD VENIPUNCTURE: CPT

## 2025-02-17 PROCEDURE — 6370000000 HC RX 637 (ALT 250 FOR IP): Performed by: STUDENT IN AN ORGANIZED HEALTH CARE EDUCATION/TRAINING PROGRAM

## 2025-02-17 RX ORDER — DEXTROSE MONOHYDRATE 100 MG/ML
INJECTION, SOLUTION INTRAVENOUS CONTINUOUS PRN
Status: DISCONTINUED | OUTPATIENT
Start: 2025-02-17 | End: 2025-02-21 | Stop reason: HOSPADM

## 2025-02-17 RX ORDER — IOPAMIDOL 755 MG/ML
100 INJECTION, SOLUTION INTRAVASCULAR
Status: COMPLETED | OUTPATIENT
Start: 2025-02-17 | End: 2025-02-17

## 2025-02-17 RX ORDER — CETIRIZINE HYDROCHLORIDE 10 MG/1
10 TABLET ORAL NIGHTLY
Status: DISCONTINUED | OUTPATIENT
Start: 2025-02-17 | End: 2025-02-21 | Stop reason: HOSPADM

## 2025-02-17 RX ORDER — AMLODIPINE BESYLATE 5 MG/1
10 TABLET ORAL
Status: COMPLETED | OUTPATIENT
Start: 2025-02-17 | End: 2025-02-17

## 2025-02-17 RX ORDER — ACETAMINOPHEN 325 MG/1
650 TABLET ORAL EVERY 6 HOURS PRN
Status: DISCONTINUED | OUTPATIENT
Start: 2025-02-17 | End: 2025-02-21 | Stop reason: HOSPADM

## 2025-02-17 RX ORDER — LIDOCAINE 4 G/G
1 PATCH TOPICAL DAILY
Status: DISCONTINUED | OUTPATIENT
Start: 2025-02-18 | End: 2025-02-21 | Stop reason: HOSPADM

## 2025-02-17 RX ORDER — AMLODIPINE BESYLATE 5 MG/1
10 TABLET ORAL DAILY
Status: DISCONTINUED | OUTPATIENT
Start: 2025-02-18 | End: 2025-02-21 | Stop reason: HOSPADM

## 2025-02-17 RX ORDER — DICYCLOMINE HYDROCHLORIDE 10 MG/1
10 CAPSULE ORAL
Status: DISCONTINUED | OUTPATIENT
Start: 2025-02-17 | End: 2025-02-21 | Stop reason: HOSPADM

## 2025-02-17 RX ORDER — SODIUM CHLORIDE, SODIUM LACTATE, POTASSIUM CHLORIDE, CALCIUM CHLORIDE 600; 310; 30; 20 MG/100ML; MG/100ML; MG/100ML; MG/100ML
INJECTION, SOLUTION INTRAVENOUS CONTINUOUS
Status: DISPENSED | OUTPATIENT
Start: 2025-02-18 | End: 2025-02-18

## 2025-02-17 RX ORDER — 0.9 % SODIUM CHLORIDE 0.9 %
1000 INTRAVENOUS SOLUTION INTRAVENOUS ONCE
Status: COMPLETED | OUTPATIENT
Start: 2025-02-17 | End: 2025-02-17

## 2025-02-17 RX ORDER — GABAPENTIN 100 MG/1
100 CAPSULE ORAL 3 TIMES DAILY
Status: DISCONTINUED | OUTPATIENT
Start: 2025-02-17 | End: 2025-02-21 | Stop reason: HOSPADM

## 2025-02-17 RX ORDER — ALBUTEROL SULFATE 90 UG/1
2 INHALANT RESPIRATORY (INHALATION) 4 TIMES DAILY PRN
Status: DISCONTINUED | OUTPATIENT
Start: 2025-02-17 | End: 2025-02-17 | Stop reason: CLARIF

## 2025-02-17 RX ORDER — ONDANSETRON 4 MG/1
4 TABLET, ORALLY DISINTEGRATING ORAL EVERY 8 HOURS PRN
Status: DISCONTINUED | OUTPATIENT
Start: 2025-02-17 | End: 2025-02-21 | Stop reason: HOSPADM

## 2025-02-17 RX ORDER — ENOXAPARIN SODIUM 100 MG/ML
40 INJECTION SUBCUTANEOUS DAILY
Status: DISCONTINUED | OUTPATIENT
Start: 2025-02-18 | End: 2025-02-21 | Stop reason: HOSPADM

## 2025-02-17 RX ORDER — LOSARTAN POTASSIUM 50 MG/1
50 TABLET ORAL
Status: COMPLETED | OUTPATIENT
Start: 2025-02-17 | End: 2025-02-17

## 2025-02-17 RX ORDER — SUMATRIPTAN SUCCINATE 25 MG/1
100 TABLET ORAL 2 TIMES DAILY PRN
Status: DISCONTINUED | OUTPATIENT
Start: 2025-02-17 | End: 2025-02-21 | Stop reason: HOSPADM

## 2025-02-17 RX ORDER — ONDANSETRON 2 MG/ML
4 INJECTION INTRAMUSCULAR; INTRAVENOUS EVERY 6 HOURS PRN
Status: DISCONTINUED | OUTPATIENT
Start: 2025-02-17 | End: 2025-02-21 | Stop reason: HOSPADM

## 2025-02-17 RX ORDER — SODIUM CHLORIDE 0.9 % (FLUSH) 0.9 %
5-40 SYRINGE (ML) INJECTION PRN
Status: DISCONTINUED | OUTPATIENT
Start: 2025-02-17 | End: 2025-02-21 | Stop reason: HOSPADM

## 2025-02-17 RX ORDER — INSULIN LISPRO 100 [IU]/ML
0-8 INJECTION, SOLUTION INTRAVENOUS; SUBCUTANEOUS
Status: DISCONTINUED | OUTPATIENT
Start: 2025-02-17 | End: 2025-02-18

## 2025-02-17 RX ORDER — ACETAMINOPHEN 650 MG/1
650 SUPPOSITORY RECTAL EVERY 6 HOURS PRN
Status: DISCONTINUED | OUTPATIENT
Start: 2025-02-17 | End: 2025-02-21 | Stop reason: HOSPADM

## 2025-02-17 RX ORDER — AMLODIPINE AND OLMESARTAN MEDOXOMIL 10; 20 MG/1; MG/1
1 TABLET ORAL
Status: DISCONTINUED | OUTPATIENT
Start: 2025-02-17 | End: 2025-02-17

## 2025-02-17 RX ORDER — SODIUM CHLORIDE 0.9 % (FLUSH) 0.9 %
5-40 SYRINGE (ML) INJECTION EVERY 12 HOURS SCHEDULED
Status: DISCONTINUED | OUTPATIENT
Start: 2025-02-17 | End: 2025-02-21 | Stop reason: HOSPADM

## 2025-02-17 RX ORDER — SODIUM CHLORIDE 9 MG/ML
INJECTION, SOLUTION INTRAVENOUS PRN
Status: DISCONTINUED | OUTPATIENT
Start: 2025-02-17 | End: 2025-02-21 | Stop reason: HOSPADM

## 2025-02-17 RX ORDER — POLYETHYLENE GLYCOL 3350 17 G/17G
17 POWDER, FOR SOLUTION ORAL DAILY PRN
Status: DISCONTINUED | OUTPATIENT
Start: 2025-02-17 | End: 2025-02-21 | Stop reason: HOSPADM

## 2025-02-17 RX ORDER — ALBUTEROL SULFATE 0.83 MG/ML
2.5 SOLUTION RESPIRATORY (INHALATION) 4 TIMES DAILY PRN
Status: DISCONTINUED | OUTPATIENT
Start: 2025-02-17 | End: 2025-02-21 | Stop reason: HOSPADM

## 2025-02-17 RX ADMIN — AMLODIPINE BESYLATE 10 MG: 5 TABLET ORAL at 17:41

## 2025-02-17 RX ADMIN — IOPAMIDOL 100 ML: 755 INJECTION, SOLUTION INTRAVENOUS at 16:44

## 2025-02-17 RX ADMIN — LOSARTAN POTASSIUM 50 MG: 50 TABLET, FILM COATED ORAL at 17:41

## 2025-02-17 RX ADMIN — SODIUM CHLORIDE 1000 ML: 0.9 INJECTION, SOLUTION INTRAVENOUS at 15:27

## 2025-02-17 ASSESSMENT — PAIN SCALES - GENERAL
PAINLEVEL_OUTOF10: 7
PAINLEVEL_OUTOF10: 7

## 2025-02-17 ASSESSMENT — PAIN DESCRIPTION - PAIN TYPE: TYPE: CHRONIC PAIN

## 2025-02-17 ASSESSMENT — PAIN DESCRIPTION - ORIENTATION
ORIENTATION: RIGHT;LEFT
ORIENTATION: RIGHT;LEFT

## 2025-02-17 ASSESSMENT — PAIN DESCRIPTION - LOCATION
LOCATION: LEG;ABDOMEN
LOCATION: LEG;BACK;ABDOMEN

## 2025-02-17 ASSESSMENT — PAIN - FUNCTIONAL ASSESSMENT: PAIN_FUNCTIONAL_ASSESSMENT: 0-10

## 2025-02-17 NOTE — ED PROVIDER NOTES
[place] EMERGENCY DEPARTMENT  EMERGENCY DEPARTMENT ENCOUNTER      Pt Name: Candida Maza  MRN: [id number]  Birthdate 1960  Date of evaluation: 2/17/2025  Provider: Alisha Spivey PA-C    CHIEF COMPLAINT       Chief Complaint   Patient presents with    Extremity Weakness    Blood Sugar Problem         HISTORY OF PRESENT ILLNESS   (Location/Symptom, Timing/Onset, Context/Setting, Quality, Duration, Modifying Factors, Severity)  Note limiting factors.   64-year-old female presenting with her brother by wheelchair for concerns of low blood sugar this morning.  Patient states that when she woke up this morning she could not move her legs and states that they felt very heavy and she could not get out of bed.  She reports that PT came to her house and noticed that she was unable to get out of bed so they called 911 and the ambulance came.  At the time, her sugar was 40.  By the time that EMS left, her sugar was up to around 118.  EMS offered to bring her to the hospital, but she declined.  Her brother later heard of the incident that happened this morning and he is who brought her here today to be checked.  Past medical history significant for type 2 diabetes, hypertension, asthma, GERD.  Patient reports surgery about a month ago for small bowel obstruction.  Patient denies abdominal pain, nausea vomiting, headache, dizziness, lightheadedness, urinary changes, bowel changes-most recent bowel movement yesterday.  Patient states that her legs have been feeling weaker and heavy for few months now, but that is never happened before that she was able to get out of bed. She also reports chronic leg swelling. Patient denies chest pain or shortness of breath.           Review of External Medical Records:     Nursing Notes were reviewed.    REVIEW OF SYSTEMS    (2-9 systems for level 4, 10 or more for level 5)     Review of Systems    Except as noted above the remainder of the review of systems was reviewed and

## 2025-02-17 NOTE — ED TRIAGE NOTES
Pt wheeled to treatment area c/o waking up and unable to get up. Pt reported heaviness in legs, denies numbness and tingling. Pt states her physical therapist came to her home and checked her blood sugar which was 40. Pt states ambulance was called and helped her get sugar up to 118. Pt family brought her to ER. Pt reports R leg weakness since bowel obstruction surgery    Denies headache, dizziness.    On assessment pt glucose 222

## 2025-02-18 LAB
ANION GAP SERPL CALC-SCNC: 4 MMOL/L (ref 2–12)
BASOPHILS # BLD: 0.03 K/UL (ref 0–0.1)
BASOPHILS NFR BLD: 0.3 % (ref 0–1)
BUN SERPL-MCNC: 22 MG/DL (ref 6–20)
BUN/CREAT SERPL: 14 (ref 12–20)
CALCIUM SERPL-MCNC: 8 MG/DL (ref 8.5–10.1)
CHLORIDE SERPL-SCNC: 117 MMOL/L (ref 97–108)
CO2 SERPL-SCNC: 22 MMOL/L (ref 21–32)
CREAT SERPL-MCNC: 1.54 MG/DL (ref 0.55–1.02)
DIFFERENTIAL METHOD BLD: ABNORMAL
EOSINOPHIL # BLD: 0.06 K/UL (ref 0–0.4)
EOSINOPHIL NFR BLD: 0.5 % (ref 0–7)
ERYTHROCYTE [DISTWIDTH] IN BLOOD BY AUTOMATED COUNT: 13.7 % (ref 11.5–14.5)
EST. AVERAGE GLUCOSE BLD GHB EST-MCNC: 171 MG/DL
GLUCOSE BLD STRIP.AUTO-MCNC: 107 MG/DL (ref 65–117)
GLUCOSE BLD STRIP.AUTO-MCNC: 152 MG/DL (ref 65–117)
GLUCOSE BLD STRIP.AUTO-MCNC: 164 MG/DL (ref 65–117)
GLUCOSE BLD STRIP.AUTO-MCNC: 190 MG/DL (ref 65–117)
GLUCOSE BLD STRIP.AUTO-MCNC: 33 MG/DL (ref 65–117)
GLUCOSE SERPL-MCNC: 234 MG/DL (ref 65–100)
HBA1C MFR BLD: 7.6 % (ref 4–5.6)
HCT VFR BLD AUTO: 26.1 % (ref 35–47)
HGB BLD-MCNC: 8.4 G/DL (ref 11.5–16)
IMM GRANULOCYTES # BLD AUTO: 0.03 K/UL (ref 0–0.04)
IMM GRANULOCYTES NFR BLD AUTO: 0.3 % (ref 0–0.5)
LYMPHOCYTES # BLD: 3.17 K/UL (ref 0.8–3.5)
LYMPHOCYTES NFR BLD: 28.7 % (ref 12–49)
MAGNESIUM SERPL-MCNC: 1.9 MG/DL (ref 1.6–2.4)
MCH RBC QN AUTO: 30.2 PG (ref 26–34)
MCHC RBC AUTO-ENTMCNC: 32.2 G/DL (ref 30–36.5)
MCV RBC AUTO: 93.9 FL (ref 80–99)
MONOCYTES # BLD: 0.5 K/UL (ref 0–1)
MONOCYTES NFR BLD: 4.5 % (ref 5–13)
NEUTS SEG # BLD: 7.24 K/UL (ref 1.8–8)
NEUTS SEG NFR BLD: 65.7 % (ref 32–75)
NRBC # BLD: 0 K/UL (ref 0–0.01)
NRBC BLD-RTO: 0 PER 100 WBC
PLATELET # BLD AUTO: 194 K/UL (ref 150–400)
PMV BLD AUTO: 10.9 FL (ref 8.9–12.9)
POTASSIUM SERPL-SCNC: 5.2 MMOL/L (ref 3.5–5.1)
RBC # BLD AUTO: 2.78 M/UL (ref 3.8–5.2)
SERVICE CMNT-IMP: ABNORMAL
SERVICE CMNT-IMP: NORMAL
SODIUM SERPL-SCNC: 143 MMOL/L (ref 136–145)
WBC # BLD AUTO: 11 K/UL (ref 3.6–11)

## 2025-02-18 PROCEDURE — 6370000000 HC RX 637 (ALT 250 FOR IP)

## 2025-02-18 PROCEDURE — 83735 ASSAY OF MAGNESIUM: CPT

## 2025-02-18 PROCEDURE — 2500000003 HC RX 250 WO HCPCS

## 2025-02-18 PROCEDURE — 99223 1ST HOSP IP/OBS HIGH 75: CPT | Performed by: STUDENT IN AN ORGANIZED HEALTH CARE EDUCATION/TRAINING PROGRAM

## 2025-02-18 PROCEDURE — 83036 HEMOGLOBIN GLYCOSYLATED A1C: CPT

## 2025-02-18 PROCEDURE — 6360000002 HC RX W HCPCS

## 2025-02-18 PROCEDURE — 80048 BASIC METABOLIC PNL TOTAL CA: CPT

## 2025-02-18 PROCEDURE — 2060000000 HC ICU INTERMEDIATE R&B

## 2025-02-18 PROCEDURE — 2580000003 HC RX 258

## 2025-02-18 PROCEDURE — 94761 N-INVAS EAR/PLS OXIMETRY MLT: CPT

## 2025-02-18 PROCEDURE — 99222 1ST HOSP IP/OBS MODERATE 55: CPT

## 2025-02-18 PROCEDURE — 36415 COLL VENOUS BLD VENIPUNCTURE: CPT

## 2025-02-18 PROCEDURE — 97161 PT EVAL LOW COMPLEX 20 MIN: CPT

## 2025-02-18 PROCEDURE — 85025 COMPLETE CBC W/AUTO DIFF WBC: CPT

## 2025-02-18 PROCEDURE — 82962 GLUCOSE BLOOD TEST: CPT

## 2025-02-18 RX ORDER — INSULIN LISPRO 100 [IU]/ML
0-4 INJECTION, SOLUTION INTRAVENOUS; SUBCUTANEOUS
Status: DISCONTINUED | OUTPATIENT
Start: 2025-02-18 | End: 2025-02-21 | Stop reason: HOSPADM

## 2025-02-18 RX ORDER — ACYCLOVIR 400 MG/1
TABLET ORAL
Qty: 2 EACH | Refills: 5 | Status: SHIPPED | OUTPATIENT
Start: 2025-02-18

## 2025-02-18 RX ADMIN — PANCRELIPASE LIPASE, PANCRELIPASE PROTEASE, PANCRELIPASE AMYLASE 60000 UNITS: 20000; 63000; 84000 CAPSULE, DELAYED RELEASE ORAL at 13:45

## 2025-02-18 RX ADMIN — GABAPENTIN 100 MG: 100 CAPSULE ORAL at 20:49

## 2025-02-18 RX ADMIN — GABAPENTIN 100 MG: 100 CAPSULE ORAL at 09:43

## 2025-02-18 RX ADMIN — SODIUM CHLORIDE, PRESERVATIVE FREE 10 ML: 5 INJECTION INTRAVENOUS at 09:43

## 2025-02-18 RX ADMIN — ENOXAPARIN SODIUM 40 MG: 100 INJECTION SUBCUTANEOUS at 09:40

## 2025-02-18 RX ADMIN — WATER 1000 MG: 1 INJECTION INTRAMUSCULAR; INTRAVENOUS; SUBCUTANEOUS at 00:46

## 2025-02-18 RX ADMIN — CETIRIZINE HYDROCHLORIDE 10 MG: 10 TABLET, FILM COATED ORAL at 20:49

## 2025-02-18 RX ADMIN — DICYCLOMINE HYDROCHLORIDE 10 MG: 10 CAPSULE ORAL at 00:57

## 2025-02-18 RX ADMIN — DICYCLOMINE HYDROCHLORIDE 10 MG: 10 CAPSULE ORAL at 20:49

## 2025-02-18 RX ADMIN — ACETAMINOPHEN 650 MG: 325 TABLET ORAL at 15:13

## 2025-02-18 RX ADMIN — WATER 1000 MG: 1 INJECTION INTRAMUSCULAR; INTRAVENOUS; SUBCUTANEOUS at 20:49

## 2025-02-18 RX ADMIN — SODIUM CHLORIDE 40 MG: 9 INJECTION INTRAMUSCULAR; INTRAVENOUS; SUBCUTANEOUS at 09:40

## 2025-02-18 RX ADMIN — DEXTROSE MONOHYDRATE 250 ML: 100 INJECTION, SOLUTION INTRAVENOUS at 07:24

## 2025-02-18 RX ADMIN — SODIUM CHLORIDE, POTASSIUM CHLORIDE, SODIUM LACTATE AND CALCIUM CHLORIDE: 600; 310; 30; 20 INJECTION, SOLUTION INTRAVENOUS at 00:44

## 2025-02-18 RX ADMIN — DICYCLOMINE HYDROCHLORIDE 10 MG: 10 CAPSULE ORAL at 09:40

## 2025-02-18 RX ADMIN — DICLOFENAC SODIUM 4 G: 10 GEL TOPICAL at 01:12

## 2025-02-18 RX ADMIN — SODIUM CHLORIDE, PRESERVATIVE FREE 10 ML: 5 INJECTION INTRAVENOUS at 00:47

## 2025-02-18 RX ADMIN — DICYCLOMINE HYDROCHLORIDE 10 MG: 10 CAPSULE ORAL at 18:06

## 2025-02-18 RX ADMIN — SODIUM CHLORIDE, PRESERVATIVE FREE 10 ML: 5 INJECTION INTRAVENOUS at 20:50

## 2025-02-18 RX ADMIN — DICYCLOMINE HYDROCHLORIDE 10 MG: 10 CAPSULE ORAL at 12:03

## 2025-02-18 RX ADMIN — INSULIN LISPRO 4 UNITS: 100 INJECTION, SOLUTION INTRAVENOUS; SUBCUTANEOUS at 00:35

## 2025-02-18 RX ADMIN — GABAPENTIN 100 MG: 100 CAPSULE ORAL at 13:45

## 2025-02-18 RX ADMIN — DICLOFENAC SODIUM 4 G: 10 GEL TOPICAL at 20:50

## 2025-02-18 RX ADMIN — PANCRELIPASE LIPASE, PANCRELIPASE PROTEASE, PANCRELIPASE AMYLASE 80000 UNITS: 20000; 63000; 84000 CAPSULE, DELAYED RELEASE ORAL at 13:45

## 2025-02-18 RX ADMIN — CETIRIZINE HYDROCHLORIDE 10 MG: 10 TABLET, FILM COATED ORAL at 00:36

## 2025-02-18 RX ADMIN — DICLOFENAC SODIUM 4 G: 10 GEL TOPICAL at 09:43

## 2025-02-18 RX ADMIN — GABAPENTIN 100 MG: 100 CAPSULE ORAL at 00:36

## 2025-02-18 ASSESSMENT — PAIN SCALES - GENERAL
PAINLEVEL_OUTOF10: 10
PAINLEVEL_OUTOF10: 6
PAINLEVEL_OUTOF10: 7
PAINLEVEL_OUTOF10: 7

## 2025-02-18 ASSESSMENT — PAIN DESCRIPTION - LOCATION
LOCATION: BACK;ABDOMEN;LEG
LOCATION: BACK;ABDOMEN;LEG

## 2025-02-18 ASSESSMENT — PAIN DESCRIPTION - PAIN TYPE
TYPE: CHRONIC PAIN
TYPE: CHRONIC PAIN;NEUROPATHIC PAIN
TYPE: CHRONIC PAIN;NEUROPATHIC PAIN

## 2025-02-18 ASSESSMENT — PAIN DESCRIPTION - ORIENTATION: ORIENTATION: RIGHT;LEFT

## 2025-02-18 NOTE — CONSULTS
Cammy Medrano PA-C                       [phone] office             Monday-Friday 8:00 am-4:30 pm  I am not permitted to use \"perfect serve\" use above for contact, thanks.      Gastroenterology Consultation Note      Admit Date: 2/17/2025  Consult Date: 2/18/2025   I greatly appreciate your asking me to see Candida Maza, thank you very much for the opportunity to participate in her care.    Narrative Assessment and Plan   64-year-old female being abided by GI for chronic pancreatitis and diarrhea.  MRI/MRCP ordered for further evaluation and is pending.  ALT 18, AST 15, total bilirubin 0.1, lipase 8, alkaline phosphatase mildly elevated at 150.      Impression:    Plan:  Continue with Zenpep, she is on a sufficient dose to prevent diarrhea from EPI or gastric bypass.  Dose can likely be decreased to approximately 37 lipase units per kilogram with meals but will further review with patient tomorrow.  Agree with obtaining MRI/MRCP and will follow for results.    Cammy Medrano PA-C  Have discussed with Ms. Medrano, have reviewed [facility] data of admission in June this year, reviewed labs and CT imagery.  Although Ms. 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  Most recent EGD reveals no e/o gastric bypass.  The dilation of PD/CBD has led to request for MR imagery although bilirubin normal.  I suspect her diarrhea is either functional or related to EPI.  Awaiting MR results.  [facility] mine.  Jarocho Tim MD     Subjective:     Chief Complaint: Abdominal pain, diarrhea    History of Present Illness: GI consultation requested by Dr. Manjarrez for chronic pancreatitis and chronic diarrhea. 63 yo F with history of chronic pancreatitis,

## 2025-02-18 NOTE — PLAN OF CARE
Problem: Chronic Conditions and Co-morbidities  Goal: Patient's chronic conditions and co-morbidity symptoms are monitored and maintained or improved  2/18/2025 1552 by Cari Dowling RN  Outcome: Progressing  Flowsheets (Taken 2/18/2025 0800)  Care Plan - Patient's Chronic Conditions and Co-Morbidity Symptoms are Monitored and Maintained or Improved: Monitor and assess patient's chronic conditions and comorbid symptoms for stability, deterioration, or improvement  2/18/2025 0617 by Harrison Griffith RN  Outcome: Progressing  Flowsheets (Taken 2/17/2025 2127)  Care Plan - Patient's Chronic Conditions and Co-Morbidity Symptoms are Monitored and Maintained or Improved: Monitor and assess patient's chronic conditions and comorbid symptoms for stability, deterioration, or improvement     Problem: Discharge Planning  Goal: Discharge to home or other facility with appropriate resources  2/18/2025 1552 by Cari Dowling RN  Outcome: Progressing  Flowsheets (Taken 2/18/2025 0800)  Discharge to home or other facility with appropriate resources:   Identify barriers to discharge with patient and caregiver   Arrange for needed discharge resources and transportation as appropriate  2/18/2025 0617 by Harrison Griffith RN  Outcome: Progressing  Flowsheets (Taken 2/17/2025 2127)  Discharge to home or other facility with appropriate resources: Identify barriers to discharge with patient and caregiver     Problem: Pain  Goal: Verbalizes/displays adequate comfort level or baseline comfort level  2/18/2025 1552 by Cari Dowling RN  Outcome: Progressing  2/18/2025 0617 by Harirson Griffith RN  Outcome: Progressing     Problem: Safety - Adult  Goal: Free from fall injury  2/18/2025 1552 by Cari Dowling RN  Outcome: Progressing  2/18/2025 0617 by Harrison Griffith RN  Outcome: Progressing     Problem: Skin/Tissue Integrity  Goal: Skin integrity remains intact  Description: 1.  Monitor for areas of redness and/or skin breakdown  2.   05-Nov-2018 21:55:39

## 2025-02-18 NOTE — CONSULTS
[facility]  DIABETES MANAGEMENT CONSULT    Consulted by Provider for advanced nursing evaluation and care for inpatient blood glucose management.    Evaluation and Action Plan   Candida Maza is a 64 y.o. female with PMHx of T2DM, chronic pancreatitis, CKD 4, asthma, HTN, HLD, GERD, s/p gastric bypass who brought to ED via EMS after being found by her PT at home with extreme weakness. EMS found BG to be 40 and was given treatment for hypoglycemia. BG on arrival was 222. Patient also experiencing abdominal pain and diarrhea; GI consulted and work up in progress. DM consulted to assess home management and inpatient glycemic management.     Patient states she has been on a regimen of NPH 3 units bid and Metformin (which should be discontinued due to renal status). She notes frequent overnight/early morning hypoglycemia at home, but also does not experience s/s hypoglycemia when it starts to come on. She just knows BG is low when she checks in the morning. Sometimes can be as low as 50, but often up to 100. She takes NPH 3 units at breakfast, but then takes NPH 3 units again at bedtime, noting her BG at bedtime might be around 250. I suggested she take her evening dose of NPH prior to dinner, which would help post dinner BG spike and then help avoid overnight hypoglycemia. So this may be a matter of timing. Family notes patient is not a big eater at home either. Patient states she feels she is eating okay. No recent weight loss. In fact, she feels she has gained some weight. Working on getting her CGM with reader.     Patient NPO since last evening. She was given 4 units of Humalog last evening for BG of 252, which caused her BG to bottom out this am to 33 (dextrose given). She will be allowed clear liquids today for further testing. Adjusted for no concentrated sweets. Would monitor Bg for now and give only low dose correctional. Once eating, will try to incorporate basal insulin back

## 2025-02-18 NOTE — CARE COORDINATION
Care Management Initial Assessment  2/18/2025 3:50 PM  If patient is discharged prior to next notation, then this note serves as note for discharge by case management.    Reason for Admission:   Generalized abdominal pain [R10.84]  Dilation of pancreatic duct [K86.89]  Dilation of common bile duct [K83.8]         Patient Admission Status: Inpatient  Date Admitted to INP: 2/17/25  RUR: Readmission Risk Score: 23.8    Hospitalization in the last 30 days (Readmission):  No        Advance Care Planning:  Code Status: Limited  Primary Healthcare Decision Maker: Legal Next of Kin  Primary Decision Maker: Corona Jama - Other - [phone]    Secondary Decision Maker: LinkElodiatravis Stone Child - [phone]   Advance Directive: has NO advanced directive - not interested in additional information     __________________________________________________________________________  Assessment:      02/18/25 1546   Service Assessment   Patient Orientation Alert and Oriented   Cognition Alert   History Provided By Patient   Primary Caregiver Self   Support Systems Children;Family Members   Patient's Healthcare Decision Maker is: Legal Next of Kin   PCP Verified by CM Yes   Last Visit to PCP Within last 6 months   Prior Functional Level Independent in ADLs/IADLs   Current Functional Level Independent in ADLs/IADLs   Can patient return to prior living arrangement Yes   Ability to make needs known: Good   Family able to assist with home care needs: Yes   Would you like for me to discuss the discharge plan with any other family members/significant others, and if so, who? No   Financial Resources Medicaid   Community Resources None   Social/Functional History   Lives With Son   Type of Home House   Home Layout One level   Home Access Stairs to enter with rails   Bathroom Shower/Tub Walk-in shower   Bathroom Equipment Grab bars in shower;Toilet raiser   Home Equipment Cane;Walker - Rolling;Rollator   Prior Level of Assist for

## 2025-02-18 NOTE — PLAN OF CARE
Problem: Physical Therapy - Adult  Goal: By Discharge: Performs mobility at highest level of function for planned discharge setting.  See evaluation for individualized goals.  Description: FUNCTIONAL STATUS PRIOR TO ADMISSION: Patient was independent and sometimes using SPC to ambulate. Patient was receiving  PT prior to this admission.    HOME SUPPORT PRIOR TO ADMISSION: Patient lives with son in a one level home with 3 stairs to enter.     Physical Therapy Goals  Initiated 2/18/2025  1.  Patient will move from supine to sit and sit to supine in bed with independence within 7 day(s).    2.  Patient will perform sit to stand with independence within 7 day(s).  3.  Patient will transfer from bed to chair and chair to bed with independence using the least restrictive device within 7 day(s).  4.  Patient will ambulate with independence for 100 feet with the least restrictive device within 7 day(s).   5.  Patient will ascend/descend 3 stairs with two handrail(s) with contact guard assist within 7 day(s).   Outcome: Progressing     PHYSICAL THERAPY EVALUATION    Patient: Candida Maza (64 y.o. female)  Date: 2/18/2025  Primary Diagnosis: Generalized abdominal pain [R10.84]  Dilation of pancreatic duct [K86.89]  Dilation of common bile duct [K83.8]       Precautions: Restrictions/Precautions: General Precautions                      ASSESSMENT:  Patient is a 64 y.o. female  with  history of chronic pancreatitis, DM, neuropathy, CKD, asthma, HTN admitted to [facility] on 02/17/2025 diagnosed with generalized abdominal pain, dilation of pancreatic duct, dilation of common bile duct. Currently awaiting MRCP. Patient seen for PT evaluation at this time and is agreeable to participation.     DEFICITS/IMPAIRMENTS:   The patient is limited by decreased functional mobility, independence in ADLs, ROM, strength, sensation, activity tolerance, endurance, coordination, balance .    Based on the impairments

## 2025-02-18 NOTE — H&P
tolerated  DVT prophylaxis - Lovenox  GI prophylaxis - Protonix  Fall prophylaxis - Not indicated at this time.  Disposition - Admit to Medical. Plan to d/c to TBD. Consulting PT/OT/CM  Code Status - Full code, discussed with patient / caregivers.  Next of Kin Name and Contact     Corona Jama (Other)  [phone] (Mobile)     Patient Candida Maza will be discussed with Dr. Ohara.    11:01 PM, 02/17/25  Dona Arias MD  Family Medicine Resident       For Billing    Chief Complaint   Patient presents with    Extremity Weakness    Blood Sugar Problem       Principal Problem:    Generalized abdominal pain  Resolved Problems:    * No resolved hospital problems. *

## 2025-02-18 NOTE — PLAN OF CARE
Problem: Chronic Conditions and Co-morbidities  Goal: Patient's chronic conditions and co-morbidity symptoms are monitored and maintained or improved  Outcome: Progressing  Flowsheets (Taken 2/17/2025 2127)  Care Plan - Patient's Chronic Conditions and Co-Morbidity Symptoms are Monitored and Maintained or Improved: Monitor and assess patient's chronic conditions and comorbid symptoms for stability, deterioration, or improvement     Problem: Discharge Planning  Goal: Discharge to home or other facility with appropriate resources  Outcome: Progressing  Flowsheets (Taken 2/17/2025 2127)  Discharge to home or other facility with appropriate resources: Identify barriers to discharge with patient and caregiver     Problem: Pain  Goal: Verbalizes/displays adequate comfort level or baseline comfort level  Outcome: Progressing     Problem: Safety - Adult  Goal: Free from fall injury  Outcome: Progressing     Problem: Skin/Tissue Integrity  Goal: Skin integrity remains intact  Description: 1.  Monitor for areas of redness and/or skin breakdown  2.  Assess vascular access sites hourly  3.  Every 4-6 hours minimum:  Change oxygen saturation probe site  4.  Every 4-6 hours:  If on nasal continuous positive airway pressure, respiratory therapy assess nares and determine need for appliance change or resting period  Outcome: Progressing  Flowsheets (Taken 2/17/2025 2127)  Skin Integrity Remains Intact: Monitor for areas of redness and/or skin breakdown

## 2025-02-18 NOTE — ED NOTES
TRANSFER - OUT REPORT:    Verbal report given to EMILIANO Terry on Candida Maza  being transferred to [facility] for routine progression of patient care       Report consisted of patient's Situation, Background, Assessment and   Recommendations(SBAR).     Information from the following report(s) Nurse Handoff Report, Index, ED Encounter Summary, Adult Overview, Surgery Report, MAR, Recent Results, Med Rec Status, Cardiac Rhythm  , Neuro Assessment, and Event Log was reviewed with the receiving nurse.    [place] Fall Assessment:                         Reassessment at this time unchanged. Pt stable for discharge as ordered by Dr. Gaitan and ALTHEA Mena   Lines:   Peripheral IV 02/17/25 Left;Proximal;Anterior Forearm (Active)        Opportunity for questions and clarification was provided.      Patient transported with:  Monitor

## 2025-02-19 ENCOUNTER — APPOINTMENT (OUTPATIENT)
Facility: HOSPITAL | Age: 65
DRG: 284 | End: 2025-02-19
Payer: MEDICAID

## 2025-02-19 LAB
ANION GAP SERPL CALC-SCNC: 6 MMOL/L (ref 2–12)
BASOPHILS # BLD: 0.03 K/UL (ref 0–0.1)
BASOPHILS NFR BLD: 0.4 % (ref 0–1)
BUN SERPL-MCNC: 22 MG/DL (ref 6–20)
BUN/CREAT SERPL: 13 (ref 12–20)
CALCIUM SERPL-MCNC: 7.7 MG/DL (ref 8.5–10.1)
CHLORIDE SERPL-SCNC: 114 MMOL/L (ref 97–108)
CO2 SERPL-SCNC: 21 MMOL/L (ref 21–32)
CREAT SERPL-MCNC: 1.64 MG/DL (ref 0.55–1.02)
DIFFERENTIAL METHOD BLD: ABNORMAL
EOSINOPHIL # BLD: 0.03 K/UL (ref 0–0.4)
EOSINOPHIL NFR BLD: 0.4 % (ref 0–7)
ERYTHROCYTE [DISTWIDTH] IN BLOOD BY AUTOMATED COUNT: 13.8 % (ref 11.5–14.5)
GLUCOSE BLD STRIP.AUTO-MCNC: 183 MG/DL (ref 65–117)
GLUCOSE BLD STRIP.AUTO-MCNC: 224 MG/DL (ref 65–117)
GLUCOSE BLD STRIP.AUTO-MCNC: 273 MG/DL (ref 65–117)
GLUCOSE BLD STRIP.AUTO-MCNC: 298 MG/DL (ref 65–117)
GLUCOSE BLD STRIP.AUTO-MCNC: 330 MG/DL (ref 65–117)
GLUCOSE BLD STRIP.AUTO-MCNC: 70 MG/DL (ref 65–117)
GLUCOSE BLD STRIP.AUTO-MCNC: 89 MG/DL (ref 65–117)
GLUCOSE SERPL-MCNC: 344 MG/DL (ref 65–100)
HCT VFR BLD AUTO: 22.7 % (ref 35–47)
HGB BLD-MCNC: 7.3 G/DL (ref 11.5–16)
IMM GRANULOCYTES # BLD AUTO: 0.02 K/UL (ref 0–0.04)
IMM GRANULOCYTES NFR BLD AUTO: 0.3 % (ref 0–0.5)
LYMPHOCYTES # BLD: 3.35 K/UL (ref 0.8–3.5)
LYMPHOCYTES NFR BLD: 45.1 % (ref 12–49)
MCH RBC QN AUTO: 30.4 PG (ref 26–34)
MCHC RBC AUTO-ENTMCNC: 32.2 G/DL (ref 30–36.5)
MCV RBC AUTO: 94.6 FL (ref 80–99)
MONOCYTES # BLD: 0.4 K/UL (ref 0–1)
MONOCYTES NFR BLD: 5.4 % (ref 5–13)
NEUTS SEG # BLD: 3.6 K/UL (ref 1.8–8)
NEUTS SEG NFR BLD: 48.4 % (ref 32–75)
NRBC # BLD: 0 K/UL (ref 0–0.01)
NRBC BLD-RTO: 0 PER 100 WBC
PLATELET # BLD AUTO: 167 K/UL (ref 150–400)
PMV BLD AUTO: 10.7 FL (ref 8.9–12.9)
POTASSIUM SERPL-SCNC: 4.7 MMOL/L (ref 3.5–5.1)
RBC # BLD AUTO: 2.4 M/UL (ref 3.8–5.2)
SERVICE CMNT-IMP: ABNORMAL
SERVICE CMNT-IMP: NORMAL
SERVICE CMNT-IMP: NORMAL
SODIUM SERPL-SCNC: 141 MMOL/L (ref 136–145)
WBC # BLD AUTO: 7.4 K/UL (ref 3.6–11)

## 2025-02-19 PROCEDURE — 80048 BASIC METABOLIC PNL TOTAL CA: CPT

## 2025-02-19 PROCEDURE — 6360000004 HC RX CONTRAST MEDICATION: Performed by: RADIOLOGY

## 2025-02-19 PROCEDURE — 6360000002 HC RX W HCPCS

## 2025-02-19 PROCEDURE — 6370000000 HC RX 637 (ALT 250 FOR IP)

## 2025-02-19 PROCEDURE — 82962 GLUCOSE BLOOD TEST: CPT

## 2025-02-19 PROCEDURE — 99231 SBSQ HOSP IP/OBS SF/LOW 25: CPT

## 2025-02-19 PROCEDURE — 2580000003 HC RX 258

## 2025-02-19 PROCEDURE — 1100000000 HC RM PRIVATE

## 2025-02-19 PROCEDURE — 2500000003 HC RX 250 WO HCPCS

## 2025-02-19 PROCEDURE — 94761 N-INVAS EAR/PLS OXIMETRY MLT: CPT

## 2025-02-19 PROCEDURE — A9579 GAD-BASE MR CONTRAST NOS,1ML: HCPCS | Performed by: RADIOLOGY

## 2025-02-19 PROCEDURE — 85025 COMPLETE CBC W/AUTO DIFF WBC: CPT

## 2025-02-19 PROCEDURE — 74183 MRI ABD W/O CNTR FLWD CNTR: CPT

## 2025-02-19 PROCEDURE — 99233 SBSQ HOSP IP/OBS HIGH 50: CPT | Performed by: STUDENT IN AN ORGANIZED HEALTH CARE EDUCATION/TRAINING PROGRAM

## 2025-02-19 RX ORDER — INSULIN GLARGINE 100 [IU]/ML
7 INJECTION, SOLUTION SUBCUTANEOUS DAILY
Status: DISCONTINUED | OUTPATIENT
Start: 2025-02-19 | End: 2025-02-20

## 2025-02-19 RX ADMIN — DICYCLOMINE HYDROCHLORIDE 10 MG: 10 CAPSULE ORAL at 08:12

## 2025-02-19 RX ADMIN — GADOTERIDOL 14 ML: 279.3 INJECTION, SOLUTION INTRAVENOUS at 09:15

## 2025-02-19 RX ADMIN — DICLOFENAC SODIUM 4 G: 10 GEL TOPICAL at 20:50

## 2025-02-19 RX ADMIN — SODIUM CHLORIDE 40 MG: 9 INJECTION INTRAMUSCULAR; INTRAVENOUS; SUBCUTANEOUS at 08:10

## 2025-02-19 RX ADMIN — SODIUM CHLORIDE, PRESERVATIVE FREE 10 ML: 5 INJECTION INTRAVENOUS at 20:50

## 2025-02-19 RX ADMIN — PANCRELIPASE LIPASE, PANCRELIPASE PROTEASE, PANCRELIPASE AMYLASE 80000 UNITS: 20000; 63000; 84000 CAPSULE, DELAYED RELEASE ORAL at 17:35

## 2025-02-19 RX ADMIN — SODIUM CHLORIDE, PRESERVATIVE FREE 10 ML: 5 INJECTION INTRAVENOUS at 08:13

## 2025-02-19 RX ADMIN — GABAPENTIN 100 MG: 100 CAPSULE ORAL at 13:38

## 2025-02-19 RX ADMIN — INSULIN LISPRO 2 UNITS: 100 INJECTION, SOLUTION INTRAVENOUS; SUBCUTANEOUS at 08:11

## 2025-02-19 RX ADMIN — CETIRIZINE HYDROCHLORIDE 10 MG: 10 TABLET, FILM COATED ORAL at 20:49

## 2025-02-19 RX ADMIN — DICYCLOMINE HYDROCHLORIDE 10 MG: 10 CAPSULE ORAL at 20:49

## 2025-02-19 RX ADMIN — ACETAMINOPHEN 650 MG: 325 TABLET ORAL at 13:38

## 2025-02-19 RX ADMIN — ENOXAPARIN SODIUM 40 MG: 100 INJECTION SUBCUTANEOUS at 08:13

## 2025-02-19 RX ADMIN — AMLODIPINE BESYLATE 10 MG: 5 TABLET ORAL at 08:12

## 2025-02-19 RX ADMIN — DICYCLOMINE HYDROCHLORIDE 10 MG: 10 CAPSULE ORAL at 17:36

## 2025-02-19 RX ADMIN — PANCRELIPASE LIPASE, PANCRELIPASE PROTEASE, PANCRELIPASE AMYLASE 80000 UNITS: 20000; 63000; 84000 CAPSULE, DELAYED RELEASE ORAL at 11:22

## 2025-02-19 RX ADMIN — PANCRELIPASE LIPASE, PANCRELIPASE PROTEASE, PANCRELIPASE AMYLASE 60000 UNITS: 20000; 63000; 84000 CAPSULE, DELAYED RELEASE ORAL at 17:36

## 2025-02-19 RX ADMIN — GABAPENTIN 100 MG: 100 CAPSULE ORAL at 20:49

## 2025-02-19 RX ADMIN — GABAPENTIN 100 MG: 100 CAPSULE ORAL at 08:12

## 2025-02-19 RX ADMIN — PANCRELIPASE LIPASE, PANCRELIPASE PROTEASE, PANCRELIPASE AMYLASE 60000 UNITS: 20000; 63000; 84000 CAPSULE, DELAYED RELEASE ORAL at 11:23

## 2025-02-19 RX ADMIN — WATER 1000 MG: 1 INJECTION INTRAMUSCULAR; INTRAVENOUS; SUBCUTANEOUS at 20:50

## 2025-02-19 RX ADMIN — DICLOFENAC SODIUM 4 G: 10 GEL TOPICAL at 08:10

## 2025-02-19 RX ADMIN — DICYCLOMINE HYDROCHLORIDE 10 MG: 10 CAPSULE ORAL at 11:23

## 2025-02-19 RX ADMIN — INSULIN GLARGINE 7 UNITS: 100 INJECTION, SOLUTION SUBCUTANEOUS at 11:22

## 2025-02-19 ASSESSMENT — PAIN DESCRIPTION - ONSET: ONSET: ON-GOING

## 2025-02-19 ASSESSMENT — PAIN DESCRIPTION - DESCRIPTORS
DESCRIPTORS: SHARP
DESCRIPTORS: ACHING
DESCRIPTORS: SHARP
DESCRIPTORS: ACHING

## 2025-02-19 ASSESSMENT — PAIN DESCRIPTION - LOCATION
LOCATION: FOOT
LOCATION: FOOT;BACK
LOCATION: FOOT
LOCATION: BACK;LEG

## 2025-02-19 ASSESSMENT — PAIN SCALES - GENERAL
PAINLEVEL_OUTOF10: 7
PAINLEVEL_OUTOF10: 6
PAINLEVEL_OUTOF10: 7
PAINLEVEL_OUTOF10: 7

## 2025-02-19 ASSESSMENT — PAIN DESCRIPTION - ORIENTATION
ORIENTATION: LEFT
ORIENTATION: LEFT
ORIENTATION: RIGHT
ORIENTATION: LEFT

## 2025-02-19 ASSESSMENT — PAIN DESCRIPTION - PAIN TYPE: TYPE: CHRONIC PAIN

## 2025-02-19 NOTE — PLAN OF CARE
Problem: Chronic Conditions and Co-morbidities  Goal: Patient's chronic conditions and co-morbidity symptoms are monitored and maintained or improved  2/19/2025 0432 by Harrison Griffith RN  Outcome: Progressing  Flowsheets (Taken 2/18/2025 2015)  Care Plan - Patient's Chronic Conditions and Co-Morbidity Symptoms are Monitored and Maintained or Improved: Monitor and assess patient's chronic conditions and comorbid symptoms for stability, deterioration, or improvement  2/18/2025 1552 by Cari Dowling RN  Outcome: Progressing  Flowsheets (Taken 2/18/2025 0800)  Care Plan - Patient's Chronic Conditions and Co-Morbidity Symptoms are Monitored and Maintained or Improved: Monitor and assess patient's chronic conditions and comorbid symptoms for stability, deterioration, or improvement     Problem: Discharge Planning  Goal: Discharge to home or other facility with appropriate resources  2/19/2025 0432 by Harrison Griffith RN  Outcome: Progressing  Flowsheets (Taken 2/18/2025 2015)  Discharge to home or other facility with appropriate resources: Identify barriers to discharge with patient and caregiver  2/18/2025 1552 by Cari Dowling RN  Outcome: Progressing  Flowsheets (Taken 2/18/2025 0800)  Discharge to home or other facility with appropriate resources:   Identify barriers to discharge with patient and caregiver   Arrange for needed discharge resources and transportation as appropriate     Problem: Pain  Goal: Verbalizes/displays adequate comfort level or baseline comfort level  2/19/2025 0432 by Harrison Griffith RN  Outcome: Progressing  2/18/2025 1552 by Cari Dowling RN  Outcome: Progressing     Problem: Safety - Adult  Goal: Free from fall injury  2/19/2025 0432 by Harrison Griffith RN  Outcome: Progressing  2/18/2025 1552 by Cari Dowling RN  Outcome: Progressing     Problem: Skin/Tissue Integrity  Goal: Skin integrity remains intact  Description: 1.  Monitor for areas of redness and/or skin breakdown  2.

## 2025-02-19 NOTE — PLAN OF CARE
Problem: Chronic Conditions and Co-morbidities  Goal: Patient's chronic conditions and co-morbidity symptoms are monitored and maintained or improved  2/19/2025 1225 by Cari Dowling RN  Outcome: Progressing  Flowsheets (Taken 2/19/2025 0730)  Care Plan - Patient's Chronic Conditions and Co-Morbidity Symptoms are Monitored and Maintained or Improved: Monitor and assess patient's chronic conditions and comorbid symptoms for stability, deterioration, or improvement  2/19/2025 0432 by Harrison Griffith RN  Outcome: Progressing  Flowsheets (Taken 2/18/2025 2015)  Care Plan - Patient's Chronic Conditions and Co-Morbidity Symptoms are Monitored and Maintained or Improved: Monitor and assess patient's chronic conditions and comorbid symptoms for stability, deterioration, or improvement     Problem: Discharge Planning  Goal: Discharge to home or other facility with appropriate resources  2/19/2025 1225 by Cari Dowling RN  Outcome: Progressing  Flowsheets (Taken 2/19/2025 0730)  Discharge to home or other facility with appropriate resources:   Identify barriers to discharge with patient and caregiver   Arrange for needed discharge resources and transportation as appropriate  2/19/2025 0432 by Harrison Griffith RN  Outcome: Progressing  Flowsheets (Taken 2/18/2025 2015)  Discharge to home or other facility with appropriate resources: Identify barriers to discharge with patient and caregiver     Problem: Pain  Goal: Verbalizes/displays adequate comfort level or baseline comfort level  2/19/2025 1225 by Cari Dowling RN  Outcome: Progressing  2/19/2025 0432 by Harrison Griffith RN  Outcome: Progressing     Problem: Safety - Adult  Goal: Free from fall injury  2/19/2025 1225 by Cari Dowling RN  Outcome: Progressing  2/19/2025 0432 by Harrison Griffith RN  Outcome: Progressing     Problem: Skin/Tissue Integrity  Goal: Skin integrity remains intact  Description: 1.  Monitor for areas of redness and/or skin breakdown  2.

## 2025-02-19 NOTE — CONSULTS
[facility]  DIABETES MANAGEMENT CONSULT    Consulted by Provider for advanced nursing evaluation and care for inpatient blood glucose management.    Evaluation and Action Plan   Candida Maza is a 64 y.o. female with PMHx of T2DM, chronic pancreatitis, CKD 4, asthma, HTN, HLD, GERD, s/p gastric bypass who brought to ED via EMS after being found by her PT at home with extreme weakness. EMS found BG to be 40 and was given treatment for hypoglycemia. BG on arrival was 222. Patient also experiencing abdominal pain and diarrhea; GI consulted and work up in progress. DM consulted to assess home management and inpatient glycemic management.     Patient states she has been on a regimen of NPH 3 units bid and Metformin (which should be discontinued due to renal status). She notes frequent overnight/early morning hypoglycemia at home, but also does not experience s/s hypoglycemia when it starts to come on. She just knows BG is low when she checks in the morning. Sometimes can be as low as 50, but often up to 100. She takes NPH 3 units at breakfast, but then takes NPH 3 units again at bedtime, noting her BG at bedtime might be around 250. I suggested she take her evening dose of NPH prior to dinner, which would help post dinner BG spike and then help avoid overnight hypoglycemia. So this may be a matter of timing. Family notes patient is not a big eater at home either. Patient states she feels she is eating okay at home. No recent weight loss. In fact, she feels she has gained some weight. Working on getting her CGM with reader.     2/19 - Patient about to go to MRI this morning. Her BG are back up in the 200 range. She did eat some chicken noodle soup last night. I discussed the possibility of going back on Lantus (she was on this before) instead of NPH, which may help her avoid the overnight low BG she is having since there is no peak with Lantus.  She was open to trying this while here in

## 2025-02-19 NOTE — CARE COORDINATION
1:15 PM  02/19/25    Care Management Progress Note    Reason for Admission:   Generalized abdominal pain [R10.84]  Dilation of pancreatic duct [K86.89]  Dilation of common bile duct [K83.8]         Patient Admission Status: Inpatient  Date Admitted to INP:  2/17/25      RUR: Readmission Risk Score: 24    Hospitalization in the last 30 days (Readmission):  No        Transition of care plan:  Pt discussed during IDR- GI and therapy following. MRI and MRCP pending.  Anticipated discharge plan: Home with family assistance and home health. Pt is current with [facility] for SN, PT and OT. CM sent referral via [facility] with SWETHA order and KM.  Date IM given: [x]NA  Outpatient follow-up.  Discharge transport: Family will transport at dc.      CM will continue to follow and assist with discharge needs.    GRACIELA Osuna

## 2025-02-20 ENCOUNTER — ANESTHESIA (OUTPATIENT)
Facility: HOSPITAL | Age: 65
DRG: 284 | End: 2025-02-20
Payer: MEDICAID

## 2025-02-20 ENCOUNTER — ANESTHESIA EVENT (OUTPATIENT)
Facility: HOSPITAL | Age: 65
DRG: 284 | End: 2025-02-20
Payer: MEDICAID

## 2025-02-20 LAB
ANION GAP SERPL CALC-SCNC: 4 MMOL/L (ref 2–12)
BASOPHILS # BLD: 0.03 K/UL (ref 0–0.1)
BASOPHILS NFR BLD: 0.4 % (ref 0–1)
BUN SERPL-MCNC: 23 MG/DL (ref 6–20)
BUN/CREAT SERPL: 14 (ref 12–20)
CALCIUM SERPL-MCNC: 7.6 MG/DL (ref 8.5–10.1)
CHLORIDE SERPL-SCNC: 115 MMOL/L (ref 97–108)
CO2 SERPL-SCNC: 23 MMOL/L (ref 21–32)
CREAT SERPL-MCNC: 1.62 MG/DL (ref 0.55–1.02)
DIFFERENTIAL METHOD BLD: ABNORMAL
EOSINOPHIL # BLD: 0.02 K/UL (ref 0–0.4)
EOSINOPHIL NFR BLD: 0.3 % (ref 0–7)
ERYTHROCYTE [DISTWIDTH] IN BLOOD BY AUTOMATED COUNT: 13.6 % (ref 11.5–14.5)
GLUCOSE BLD STRIP.AUTO-MCNC: 136 MG/DL (ref 65–117)
GLUCOSE BLD STRIP.AUTO-MCNC: 218 MG/DL (ref 65–117)
GLUCOSE BLD STRIP.AUTO-MCNC: 252 MG/DL (ref 65–117)
GLUCOSE SERPL-MCNC: 239 MG/DL (ref 65–100)
HCT VFR BLD AUTO: 22.9 % (ref 35–47)
HGB BLD-MCNC: 7.3 G/DL (ref 11.5–16)
IMM GRANULOCYTES # BLD AUTO: 0.02 K/UL (ref 0–0.04)
IMM GRANULOCYTES NFR BLD AUTO: 0.3 % (ref 0–0.5)
LYMPHOCYTES # BLD: 3.03 K/UL (ref 0.8–3.5)
LYMPHOCYTES NFR BLD: 38.5 % (ref 12–49)
MCH RBC QN AUTO: 30.2 PG (ref 26–34)
MCHC RBC AUTO-ENTMCNC: 31.9 G/DL (ref 30–36.5)
MCV RBC AUTO: 94.6 FL (ref 80–99)
MONOCYTES # BLD: 0.47 K/UL (ref 0–1)
MONOCYTES NFR BLD: 6 % (ref 5–13)
NEUTS SEG # BLD: 4.31 K/UL (ref 1.8–8)
NEUTS SEG NFR BLD: 54.5 % (ref 32–75)
NRBC # BLD: 0 K/UL (ref 0–0.01)
NRBC BLD-RTO: 0 PER 100 WBC
PLATELET # BLD AUTO: 164 K/UL (ref 150–400)
PMV BLD AUTO: 10.1 FL (ref 8.9–12.9)
POTASSIUM SERPL-SCNC: 4.7 MMOL/L (ref 3.5–5.1)
RBC # BLD AUTO: 2.42 M/UL (ref 3.8–5.2)
SERVICE CMNT-IMP: ABNORMAL
SODIUM SERPL-SCNC: 142 MMOL/L (ref 136–145)
WBC # BLD AUTO: 7.9 K/UL (ref 3.6–11)

## 2025-02-20 PROCEDURE — 88305 TISSUE EXAM BY PATHOLOGIST: CPT

## 2025-02-20 PROCEDURE — 6360000002 HC RX W HCPCS: Performed by: NURSE ANESTHETIST, CERTIFIED REGISTERED

## 2025-02-20 PROCEDURE — 6370000000 HC RX 637 (ALT 250 FOR IP)

## 2025-02-20 PROCEDURE — 2500000003 HC RX 250 WO HCPCS

## 2025-02-20 PROCEDURE — 3700000001 HC ADD 15 MINUTES (ANESTHESIA): Performed by: INTERNAL MEDICINE

## 2025-02-20 PROCEDURE — 2709999900 HC NON-CHARGEABLE SUPPLY: Performed by: INTERNAL MEDICINE

## 2025-02-20 PROCEDURE — 2580000003 HC RX 258

## 2025-02-20 PROCEDURE — 6360000002 HC RX W HCPCS

## 2025-02-20 PROCEDURE — 0DB98ZX EXCISION OF DUODENUM, VIA NATURAL OR ARTIFICIAL OPENING ENDOSCOPIC, DIAGNOSTIC: ICD-10-PCS | Performed by: INTERNAL MEDICINE

## 2025-02-20 PROCEDURE — 1100000000 HC RM PRIVATE

## 2025-02-20 PROCEDURE — 7100000010 HC PHASE II RECOVERY - FIRST 15 MIN: Performed by: INTERNAL MEDICINE

## 2025-02-20 PROCEDURE — 99233 SBSQ HOSP IP/OBS HIGH 50: CPT | Performed by: STUDENT IN AN ORGANIZED HEALTH CARE EDUCATION/TRAINING PROGRAM

## 2025-02-20 PROCEDURE — 94761 N-INVAS EAR/PLS OXIMETRY MLT: CPT

## 2025-02-20 PROCEDURE — 80048 BASIC METABOLIC PNL TOTAL CA: CPT

## 2025-02-20 PROCEDURE — 3600007502: Performed by: INTERNAL MEDICINE

## 2025-02-20 PROCEDURE — 97116 GAIT TRAINING THERAPY: CPT

## 2025-02-20 PROCEDURE — 82962 GLUCOSE BLOOD TEST: CPT

## 2025-02-20 PROCEDURE — 3600007512: Performed by: INTERNAL MEDICINE

## 2025-02-20 PROCEDURE — 7100000011 HC PHASE II RECOVERY - ADDTL 15 MIN: Performed by: INTERNAL MEDICINE

## 2025-02-20 PROCEDURE — 2580000003 HC RX 258: Performed by: NURSE ANESTHETIST, CERTIFIED REGISTERED

## 2025-02-20 PROCEDURE — 3700000000 HC ANESTHESIA ATTENDED CARE: Performed by: INTERNAL MEDICINE

## 2025-02-20 PROCEDURE — 85025 COMPLETE CBC W/AUTO DIFF WBC: CPT

## 2025-02-20 RX ORDER — SODIUM CHLORIDE 9 MG/ML
INJECTION, SOLUTION INTRAVENOUS CONTINUOUS
Status: DISCONTINUED | OUTPATIENT
Start: 2025-02-20 | End: 2025-02-20 | Stop reason: HOSPADM

## 2025-02-20 RX ORDER — PROPOFOL 10 MG/ML
INJECTION, EMULSION INTRAVENOUS
Status: DISCONTINUED | OUTPATIENT
Start: 2025-02-20 | End: 2025-02-20 | Stop reason: SDUPTHER

## 2025-02-20 RX ORDER — INSULIN GLARGINE 100 [IU]/ML
7 INJECTION, SOLUTION SUBCUTANEOUS NIGHTLY
Qty: 5 ADJUSTABLE DOSE PRE-FILLED PEN SYRINGE | Refills: 3 | Status: SHIPPED | OUTPATIENT
Start: 2025-02-20

## 2025-02-20 RX ORDER — LEVOFLOXACIN 750 MG/1
750 TABLET, FILM COATED ORAL
Status: DISCONTINUED | OUTPATIENT
Start: 2025-02-20 | End: 2025-02-21 | Stop reason: HOSPADM

## 2025-02-20 RX ORDER — LEVOFLOXACIN 250 MG/1
250 TABLET, FILM COATED ORAL DAILY
Status: CANCELLED | OUTPATIENT
Start: 2025-02-20 | End: 2025-02-23

## 2025-02-20 RX ORDER — GLYCOPYRROLATE 0.2 MG/ML
INJECTION INTRAMUSCULAR; INTRAVENOUS
Status: DISCONTINUED | OUTPATIENT
Start: 2025-02-20 | End: 2025-02-20 | Stop reason: SDUPTHER

## 2025-02-20 RX ORDER — INSULIN GLARGINE 100 [IU]/ML
7 INJECTION, SOLUTION SUBCUTANEOUS
Status: DISCONTINUED | OUTPATIENT
Start: 2025-02-20 | End: 2025-02-21 | Stop reason: HOSPADM

## 2025-02-20 RX ORDER — LEVOFLOXACIN 750 MG/1
750 TABLET, FILM COATED ORAL
Qty: 2 TABLET | Refills: 0 | Status: CANCELLED | OUTPATIENT
Start: 2025-02-22 | End: 2025-02-25

## 2025-02-20 RX ORDER — SODIUM CHLORIDE 9 MG/ML
INJECTION, SOLUTION INTRAVENOUS
Status: DISCONTINUED | OUTPATIENT
Start: 2025-02-20 | End: 2025-02-20 | Stop reason: SDUPTHER

## 2025-02-20 RX ADMIN — LIDOCAINE HYDROCHLORIDE 70 MG: 20 INJECTION, SOLUTION INFILTRATION; PERINEURAL at 11:30

## 2025-02-20 RX ADMIN — GLYCOPYRROLATE 0.1 MG: 0.2 INJECTION INTRAMUSCULAR; INTRAVENOUS at 11:31

## 2025-02-20 RX ADMIN — PANCRELIPASE LIPASE, PANCRELIPASE PROTEASE, PANCRELIPASE AMYLASE 60000 UNITS: 20000; 63000; 84000 CAPSULE, DELAYED RELEASE ORAL at 16:45

## 2025-02-20 RX ADMIN — DICLOFENAC SODIUM 4 G: 10 GEL TOPICAL at 09:38

## 2025-02-20 RX ADMIN — DICYCLOMINE HYDROCHLORIDE 10 MG: 10 CAPSULE ORAL at 06:43

## 2025-02-20 RX ADMIN — PANCRELIPASE LIPASE, PANCRELIPASE PROTEASE, PANCRELIPASE AMYLASE 60000 UNITS: 20000; 63000; 84000 CAPSULE, DELAYED RELEASE ORAL at 14:03

## 2025-02-20 RX ADMIN — SODIUM CHLORIDE: 9 INJECTION, SOLUTION INTRAVENOUS at 11:50

## 2025-02-20 RX ADMIN — DICYCLOMINE HYDROCHLORIDE 10 MG: 10 CAPSULE ORAL at 20:59

## 2025-02-20 RX ADMIN — LEVOFLOXACIN 750 MG: 500 TABLET, FILM COATED ORAL at 16:46

## 2025-02-20 RX ADMIN — PROPOFOL 30 MG: 10 INJECTION, EMULSION INTRAVENOUS at 11:32

## 2025-02-20 RX ADMIN — SODIUM CHLORIDE, PRESERVATIVE FREE 10 ML: 5 INJECTION INTRAVENOUS at 09:36

## 2025-02-20 RX ADMIN — PROPOFOL 30 MG: 10 INJECTION, EMULSION INTRAVENOUS at 11:37

## 2025-02-20 RX ADMIN — GABAPENTIN 100 MG: 100 CAPSULE ORAL at 14:03

## 2025-02-20 RX ADMIN — GLYCOPYRROLATE 0.1 MG: 0.2 INJECTION INTRAMUSCULAR; INTRAVENOUS at 11:26

## 2025-02-20 RX ADMIN — INSULIN GLARGINE 7 UNITS: 100 INJECTION, SOLUTION SUBCUTANEOUS at 16:48

## 2025-02-20 RX ADMIN — GABAPENTIN 100 MG: 100 CAPSULE ORAL at 20:59

## 2025-02-20 RX ADMIN — CETIRIZINE HYDROCHLORIDE 10 MG: 10 TABLET, FILM COATED ORAL at 21:00

## 2025-02-20 RX ADMIN — SODIUM CHLORIDE 40 MG: 9 INJECTION INTRAMUSCULAR; INTRAVENOUS; SUBCUTANEOUS at 09:34

## 2025-02-20 RX ADMIN — DICYCLOMINE HYDROCHLORIDE 10 MG: 10 CAPSULE ORAL at 14:02

## 2025-02-20 RX ADMIN — SODIUM CHLORIDE: 9 INJECTION, SOLUTION INTRAVENOUS at 11:16

## 2025-02-20 RX ADMIN — SODIUM CHLORIDE, PRESERVATIVE FREE 10 ML: 5 INJECTION INTRAVENOUS at 21:01

## 2025-02-20 RX ADMIN — GABAPENTIN 100 MG: 100 CAPSULE ORAL at 09:36

## 2025-02-20 RX ADMIN — DICLOFENAC SODIUM 4 G: 10 GEL TOPICAL at 21:01

## 2025-02-20 RX ADMIN — PROPOFOL 100 MCG/KG/MIN: 10 INJECTION, EMULSION INTRAVENOUS at 11:30

## 2025-02-20 RX ADMIN — PROPOFOL 80 MG: 10 INJECTION, EMULSION INTRAVENOUS at 11:29

## 2025-02-20 RX ADMIN — AMLODIPINE BESYLATE 10 MG: 5 TABLET ORAL at 09:36

## 2025-02-20 RX ADMIN — DICYCLOMINE HYDROCHLORIDE 10 MG: 10 CAPSULE ORAL at 16:47

## 2025-02-20 ASSESSMENT — PAIN SCALES - GENERAL
PAINLEVEL_OUTOF10: 0
PAINLEVEL_OUTOF10: 7
PAINLEVEL_OUTOF10: 0
PAINLEVEL_OUTOF10: 5
PAINLEVEL_OUTOF10: 0
PAINLEVEL_OUTOF10: 0
PAINLEVEL_OUTOF10: 6
PAINLEVEL_OUTOF10: 0

## 2025-02-20 ASSESSMENT — PAIN DESCRIPTION - ORIENTATION: ORIENTATION: RIGHT;LEFT

## 2025-02-20 ASSESSMENT — PAIN DESCRIPTION - DESCRIPTORS
DESCRIPTORS: PRESSURE;SHARP
DESCRIPTORS: CRAMPING
DESCRIPTORS: ACHING

## 2025-02-20 ASSESSMENT — PAIN DESCRIPTION - LOCATION
LOCATION: ABDOMEN
LOCATION: FOOT
LOCATION: ABDOMEN
LOCATION: ABDOMEN
LOCATION: KNEE

## 2025-02-20 ASSESSMENT — PAIN - FUNCTIONAL ASSESSMENT
PAIN_FUNCTIONAL_ASSESSMENT: 0-10
PAIN_FUNCTIONAL_ASSESSMENT: ACTIVITIES ARE NOT PREVENTED

## 2025-02-20 NOTE — PLAN OF CARE
Problem: Chronic Conditions and Co-morbidities  Goal: Patient's chronic conditions and co-morbidity symptoms are monitored and maintained or improved  2/19/2025 2228 by Blair Adames RN  Outcome: Progressing  2/19/2025 1225 by Cari Dowling RN  Outcome: Progressing  Flowsheets (Taken 2/19/2025 0730)  Care Plan - Patient's Chronic Conditions and Co-Morbidity Symptoms are Monitored and Maintained or Improved: Monitor and assess patient's chronic conditions and comorbid symptoms for stability, deterioration, or improvement     Problem: Pain  Goal: Verbalizes/displays adequate comfort level or baseline comfort level  2/19/2025 2228 by Blair Adames RN  Outcome: Progressing  2/19/2025 1225 by Cari Dowling RN  Outcome: Progressing     Problem: Safety - Adult  Goal: Free from fall injury  2/19/2025 2228 by Blair Adames RN  Outcome: Progressing  2/19/2025 1225 by Cari Dowling RN  Outcome: Progressing     Problem: Skin/Tissue Integrity  Goal: Skin integrity remains intact  Description: 1.  Monitor for areas of redness and/or skin breakdown  2.  Assess vascular access sites hourly  3.  Every 4-6 hours minimum:  Change oxygen saturation probe site  4.  Every 4-6 hours:  If on nasal continuous positive airway pressure, respiratory therapy assess nares and determine need for appliance change or resting period  2/19/2025 2228 by Blair Adames RN  Outcome: Progressing  Flowsheets (Taken 2/19/2025 2000)  Skin Integrity Remains Intact: Monitor for areas of redness and/or skin breakdown  2/19/2025 1225 by Cari Dowling RN  Outcome: Progressing  Flowsheets (Taken 2/19/2025 0730)  Skin Integrity Remains Intact: Monitor for areas of redness and/or skin breakdown

## 2025-02-20 NOTE — CONSULTS
[facility]  DIABETES MANAGEMENT CONSULT    Consulted by Provider for advanced nursing evaluation and care for inpatient blood glucose management.    Evaluation and Action Plan   Candida Maza is a 64 y.o. female with PMHx of T2DM, chronic pancreatitis, CKD 4, asthma, HTN, HLD, GERD, s/p gastric bypass who brought to ED via EMS after being found by her PT at home with extreme weakness. EMS found BG to be 40 and was given treatment for hypoglycemia. BG on arrival was 222. Patient also experiencing abdominal pain and diarrhea; GI consulted and work up in progress. DM consulted to assess home management and inpatient glycemic management.     Patient states she has been on a regimen of NPH 3 units bid and Metformin (which should be discontinued due to renal status). She notes frequent overnight/early morning hypoglycemia at home, but also does not experience s/s hypoglycemia when it starts to come on. She just knows BG is low when she checks in the morning. Sometimes can be as low as 50, but often up to 100. She takes NPH 3 units at breakfast, but then takes NPH 3 units again at bedtime, noting her BG at bedtime might be around 250. I suggested she take her evening dose of NPH prior to dinner, which would help post dinner BG spike and then help avoid overnight hypoglycemia. So this may be a matter of timing. Family notes patient is not a big eater at home either. Patient states she feels she is eating okay at home. No recent weight loss. In fact, she feels she has gained some weight. Working on getting her CGM with reader.     2/20 - Patient's BG dropped with small amount of Humalog yesterday. BG was okay overnight and no hypoglycemia with Lantus given yesterday. . She was NPO earlier today for EUS. She seemed content to continue using Lantus rather than NPH, so will discharge on that. Got her set up on Dexcom G7 with reader (set up reader for her) for BG monitoring. Instructed on

## 2025-02-20 NOTE — ANESTHESIA POSTPROCEDURE EVALUATION
Department of Anesthesiology  Postprocedure Note    Patient: Candida Maza  MRN: [id number]  YOB: 1960  Date of evaluation: 2/20/2025    Procedure Summary       Date: 02/20/25 Room / Location: [facility] ENDO 03 / [facility] ENDOSCOPY    Anesthesia Start: 1123 Anesthesia Stop: 1154    Procedures:       ESOPHAGOGASTRODUODENOSCOPY (Upper GI Region)      ENDOSCOPIC ULTRASOUND (Upper GI Region)      ESOPHAGOGASTRODUODENOSCOPY BIOPSY (Upper GI Region) Diagnosis:       Abnormal MRI of abdomen      (Abnormal MRI of abdomen [R93.5])    Surgeons: Artem Diaz MD Responsible Provider: Emilee Slade MD    Anesthesia Type: MAC ASA Status: 3            Anesthesia Type: No value filed.    Mady Phase I: Mady Score: 10    Mady Phase II: Mady Score: 10    Anesthesia Post Evaluation    Patient location during evaluation: PACU  Patient participation: complete - patient participated  Level of consciousness: awake  Airway patency: patent  Nausea & Vomiting: no vomiting and no nausea  Cardiovascular status: hemodynamically stable  Respiratory status: acceptable  Hydration status: stable  Pain management: adequate    No notable events documented.

## 2025-02-20 NOTE — DISCHARGE SUMMARY
[id number] [place] [address]   Office [phone]  Fax [phone]       Discharge / Transfer / Off-Service Note     Name: Candida Maza MRN: [id number]  Sex: Female   YOB: 1960  Age: 64 y.o.  PCP: Keena Leyva MD     Date of admission: 2/17/2025  Date of discharge/transfer: 2/21/2025    Attending physician at admission: Rudolph Mei.  Attending physician at discharge/transfer: Chris Ornelas.  Resident physician at discharge/transfer: Dorothy Mckinnon MD     Consultants during hospitalization  IP CONSULT TO GI  IP CONSULT TO DIABETES MANAGEMENT  IP CONSULT TO CASE MANAGEMENT     Admission diagnoses   Generalized abdominal pain [R10.84]  Dilation of pancreatic duct [K86.89]  Dilation of common bile duct [K83.8]    Recommended follow-up after discharge    1. PCP-Keena Leyva MD  2. GI-[facility]     To follow up on with PCP:   - diabetes management, new Lantus regimen, CGM data  - Urine culture results; resolution of UTI  ------------------------------------------------------------------------------------------------------------------    History of Present Illness    As per admitting provider, Dr. Arias:   \"Candida Maza is a 64 y.o. female with known history of Chronic pancreatitis, DMT2 with neuropathy, CKD IV, asthma, HTN, HLD, GERD, s/p gastric bypass, migraine who presents to the ER with the above complaint.       Patient endorsing sudden BL lower extremity weakness this AM, R > L. Onset was sudden after waking up. Was unable to move legs for several minutes. Home PT checked BG which was 44. EMS called and evaluated patient and got sugar to 118. Patient reports weakness self resolved, lasting < 1 hour. Felt sx were returning after several hours and presented to ED.     Weakness a/w mild abdominal and BL LE pain. Both are chronic, intermittent.     Denies HA, blurry vision, dizziness, CP, SOB, abdominal pain, changes in BM or urination. Patient

## 2025-02-20 NOTE — PLAN OF CARE
Problem: Physical Therapy - Adult  Goal: By Discharge: Performs mobility at highest level of function for planned discharge setting.  See evaluation for individualized goals.  Description: FUNCTIONAL STATUS PRIOR TO ADMISSION: Patient was independent and sometimes using SPC to ambulate.    HOME SUPPORT PRIOR TO ADMISSION: Patient lives with son in a one level home with 3 stairs to enter.     Physical Therapy Goals  Initiated 2/18/2025  1.  Patient will move from supine to sit and sit to supine in bed with independence within 7 day(s).    2.  Patient will perform sit to stand with independence within 7 day(s).  3.  Patient will transfer from bed to chair and chair to bed with independence using the least restrictive device within 7 day(s).  4.  Patient will ambulate with independence for 100 feet with the least restrictive device within 7 day(s).   5.  Patient will ascend/descend 3 stairs with two handrail(s) with contact guard assist within 7 day(s).   Outcome: Progressing   PHYSICAL THERAPY TREATMENT    Patient: Candida Maza (64 y.o. female)  Date: 2/20/2025  Diagnosis: Generalized abdominal pain [R10.84]  Dilation of pancreatic duct [K86.89]  Dilation of common bile duct [K83.8] Generalized abdominal pain  Procedure(s) (LRB):  ESOPHAGOGASTRODUODENOSCOPY (N/A)  ENDOSCOPIC ULTRASOUND (N/A)  ESOPHAGOGASTRODUODENOSCOPY BIOPSY (N/A) Day of Surgery  Precautions: General Precautions     ASSESSMENT:  Patient continues to benefit from skilled PT services and is progressing towards goals. Pt tolerated session well. Pt mobilized at SBA-supervision level for bed mobility, functional transfers and gait training with use of SPC today. Pt demonstrated generalized instability, but no overt LOB. Pt reported ongoing LE weakness, and was encouraged to mobilize more frequently throughout the day/evening and perform LE therex. Pt will continue to benefit from ongoing PT efforts.       PLAN:  Patient continues to benefit from

## 2025-02-20 NOTE — DISCHARGE INSTRUCTIONS
that your provider wants you to take.    It is important that you take the medication exactly as they are prescribed.   Keep your medication in the bottles provided by the pharmacist and keep a list of the medication names, dosages, and times to be taken in your wallet.   Do not take other medications without consulting your doctor.   If you have any questions about your medications or other instructions, please talk to your nurse or care provider before you leave the hospital.     Information obtained by:     I understand that if any problems occur once I am at home I am to contact my physician.    These instructions were explained to me and I had the opportunity to ask questions.    I understand and acknowledge receipt of the instructions indicated above.                                                                                                                                               Physician's or R.N.'s Signature                                                                  Date/Time                                                                                                                                              Patient or Representative Signature                                                          Date/Time

## 2025-02-20 NOTE — ANESTHESIA PRE PROCEDURE
[facility] ENDOSCOPY   • COLONOSCOPY     • PANCREAS SURGERY PROC UNLISTED  ?    Distal pancreatectomy.   • PANCREATIC ELASTASE, FECAL, QUAL/SEMI-QUANT      growths in prancreatitis       Social History:    Social History     Tobacco Use   • Smoking status: Former     Current packs/day: 0.00     Average packs/day: 0.5 packs/day for 10.0 years (5.0 ttl pk-yrs)     Types: Cigarettes     Start date: 2016     Quit date: 2016     Years since quittin.9   • Smokeless tobacco: Never   Substance Use Topics   • Alcohol use: No     Alcohol/week: 0.0 standard drinks of alcohol                                Counseling given: Not Answered      Vital Signs (Current):   Vitals:    25 2326 25 0358 25 0834 25 1049   BP: 123/61 (!) 127/59 (!) 145/63 (!) 152/64   Pulse: 68 62 57 63   Resp: 16 18 18 12   Temp: 99.3 °F (37.4 °C) 98.8 °F (37.1 °C) 97.5 °F (36.4 °C) 98.2 °F (36.8 °C)   TempSrc: Oral Oral Oral Oral   SpO2: 94% 96% 97% 100%   Weight:    74.8 kg (165 lb)   Height:                                                  BP Readings from Last 3 Encounters:   25 (!) 152/64   25 (!) 140/74   25 124/80       NPO Status: Time of last liquid consumption: 0936 (sip of water w/ med)                        Time of last solid consumption:                         Date of last liquid consumption: 25                        Date of last solid food consumption: 25    BMI:   Wt Readings from Last 3 Encounters:   25 74.8 kg (165 lb)   25 74.8 kg (164 lb 14.5 oz)   25 74.8 kg (165 lb)     Body mass index is 30.18 kg/m².    CBC:   Lab Results   Component Value Date/Time    WBC 7.9 2025 01:32 AM    RBC 2.42 2025 01:32 AM    HGB 7.3 2025 01:32 AM    HCT 22.9 2025 01:32 AM    MCV 94.6 2025 01:32 AM    RDW 13.6 2025 01:32 AM     2025 01:32 AM       CMP:   Lab Results   Component Value Date/Time     2025

## 2025-02-20 NOTE — OP NOTE
[facility]  Artem Diaz M.D.  [phone]       2025                EUS Operative Report  Candida Maza  :  1960  [facility] Medical Record Number:  [id number]      Procedure Type: EGD with biopsies and linear EUS      Indications:  Dilated bile duct with abrupt cut off in the head of the pancreas    Pre-operative Diagnosis: see indication above    Post-operative Diagnosis:  See findings below    : Artem Diaz MD    Referring Provider: -Jarocho Tim MD -Keena Leyva MD    Procedure Details:    Exam:  Airway: clear, no airway problems anticipated  Heart: RRR, without gallops or rubs  Lungs: clear bilaterally without wheezes, crackles, or rhonchi  Abdomen: soft, nontender, nondistended, bowel sounds present  Mental Status: awake, alert and oriented to person, place and time     Anethesia/Sedation:  MAC anesthesia      Procedure Details   After infom consent was obtained for the procedure, with all risks and benefits of procedure explained the patient was taken to the endoscopy suite and placed in the left lateral decubitus position.  Following sequential administration of sedation as per above, the forward viewing scope, then linear echoendoscope were sequentially inserted into the mouth and advanced under direct vision to second portion of the duodenum.  A careful inspection was made as the gastroscope was withdrawn, including a retroflexed view of the proximal stomach; findings and interventions are described below.   Findings:     Endoscopic:   Esophagus:normal   Stomach: normal    Duodenum/jejunum:  Normal mucosa, some mild edematous folds were noted    Ultrasound:   Esophagus: normal findings   Stomach: normal findings   Pancreas:     Areas examined: the entire gland    Parenchyma: -Diffuse atrophy was noted with several hyperechoic spots noted scattered in the body and some clustered in the head with acoustic shadowing, consistent with calcifications. No

## 2025-02-21 ENCOUNTER — TELEPHONE (OUTPATIENT)
Age: 65
End: 2025-02-21

## 2025-02-21 VITALS
DIASTOLIC BLOOD PRESSURE: 67 MMHG | RESPIRATION RATE: 16 BRPM | SYSTOLIC BLOOD PRESSURE: 113 MMHG | HEART RATE: 68 BPM | OXYGEN SATURATION: 99 % | HEIGHT: 62 IN | TEMPERATURE: 98.6 F | BODY MASS INDEX: 30.36 KG/M2 | WEIGHT: 165 LBS

## 2025-02-21 PROBLEM — K83.8 DILATION OF COMMON BILE DUCT: Status: ACTIVE | Noted: 2025-02-21

## 2025-02-21 PROBLEM — K86.1 CHRONIC PANCREATITIS (HCC): Status: ACTIVE | Noted: 2025-02-21

## 2025-02-21 LAB
ANION GAP SERPL CALC-SCNC: 4 MMOL/L (ref 2–12)
BACTERIA SPEC CULT: ABNORMAL
BACTERIA SPEC CULT: ABNORMAL
BASOPHILS # BLD: 0.02 K/UL (ref 0–0.1)
BASOPHILS NFR BLD: 0.3 % (ref 0–1)
BUN SERPL-MCNC: 23 MG/DL (ref 6–20)
BUN/CREAT SERPL: 15 (ref 12–20)
CALCIUM SERPL-MCNC: 7.7 MG/DL (ref 8.5–10.1)
CC UR VC: ABNORMAL
CHLORIDE SERPL-SCNC: 116 MMOL/L (ref 97–108)
CO2 SERPL-SCNC: 22 MMOL/L (ref 21–32)
CREAT SERPL-MCNC: 1.49 MG/DL (ref 0.55–1.02)
DIFFERENTIAL METHOD BLD: ABNORMAL
EOSINOPHIL # BLD: 0.03 K/UL (ref 0–0.4)
EOSINOPHIL NFR BLD: 0.4 % (ref 0–7)
ERYTHROCYTE [DISTWIDTH] IN BLOOD BY AUTOMATED COUNT: 13.5 % (ref 11.5–14.5)
GLUCOSE BLD STRIP.AUTO-MCNC: 169 MG/DL (ref 65–117)
GLUCOSE SERPL-MCNC: 186 MG/DL (ref 65–100)
HCT VFR BLD AUTO: 23.5 % (ref 35–47)
HGB BLD-MCNC: 7.5 G/DL (ref 11.5–16)
IMM GRANULOCYTES # BLD AUTO: 0.04 K/UL (ref 0–0.04)
IMM GRANULOCYTES NFR BLD AUTO: 0.5 % (ref 0–0.5)
LYMPHOCYTES # BLD: 2.88 K/UL (ref 0.8–3.5)
LYMPHOCYTES NFR BLD: 36.9 % (ref 12–49)
MCH RBC QN AUTO: 30.4 PG (ref 26–34)
MCHC RBC AUTO-ENTMCNC: 31.9 G/DL (ref 30–36.5)
MCV RBC AUTO: 95.1 FL (ref 80–99)
MONOCYTES # BLD: 0.44 K/UL (ref 0–1)
MONOCYTES NFR BLD: 5.6 % (ref 5–13)
NEUTS SEG # BLD: 4.4 K/UL (ref 1.8–8)
NEUTS SEG NFR BLD: 56.3 % (ref 32–75)
NRBC # BLD: 0 K/UL (ref 0–0.01)
NRBC BLD-RTO: 0 PER 100 WBC
PLATELET # BLD AUTO: 172 K/UL (ref 150–400)
PMV BLD AUTO: 10.9 FL (ref 8.9–12.9)
POTASSIUM SERPL-SCNC: 4.7 MMOL/L (ref 3.5–5.1)
RBC # BLD AUTO: 2.47 M/UL (ref 3.8–5.2)
SERVICE CMNT-IMP: ABNORMAL
SERVICE CMNT-IMP: ABNORMAL
SODIUM SERPL-SCNC: 142 MMOL/L (ref 136–145)
WBC # BLD AUTO: 7.8 K/UL (ref 3.6–11)

## 2025-02-21 PROCEDURE — 80048 BASIC METABOLIC PNL TOTAL CA: CPT

## 2025-02-21 PROCEDURE — 82962 GLUCOSE BLOOD TEST: CPT

## 2025-02-21 PROCEDURE — 85025 COMPLETE CBC W/AUTO DIFF WBC: CPT

## 2025-02-21 PROCEDURE — 6370000000 HC RX 637 (ALT 250 FOR IP)

## 2025-02-21 PROCEDURE — 2580000003 HC RX 258

## 2025-02-21 PROCEDURE — 99238 HOSP IP/OBS DSCHRG MGMT 30/<: CPT | Performed by: FAMILY MEDICINE

## 2025-02-21 PROCEDURE — 2500000003 HC RX 250 WO HCPCS

## 2025-02-21 PROCEDURE — 6360000002 HC RX W HCPCS

## 2025-02-21 PROCEDURE — 36415 COLL VENOUS BLD VENIPUNCTURE: CPT

## 2025-02-21 RX ORDER — PANTOPRAZOLE SODIUM 40 MG/1
40 TABLET, DELAYED RELEASE ORAL
Status: CANCELLED | OUTPATIENT
Start: 2025-02-21

## 2025-02-21 RX ORDER — ENOXAPARIN SODIUM 100 MG/ML
40 INJECTION SUBCUTANEOUS DAILY
Status: CANCELLED | OUTPATIENT
Start: 2025-02-21

## 2025-02-21 RX ORDER — LEVOFLOXACIN 750 MG/1
750 TABLET, FILM COATED ORAL
Qty: 2 TABLET | Refills: 0 | Status: SHIPPED | OUTPATIENT
Start: 2025-02-22 | End: 2025-02-25

## 2025-02-21 RX ADMIN — SODIUM CHLORIDE 40 MG: 9 INJECTION INTRAMUSCULAR; INTRAVENOUS; SUBCUTANEOUS at 09:23

## 2025-02-21 RX ADMIN — AMLODIPINE BESYLATE 10 MG: 5 TABLET ORAL at 09:23

## 2025-02-21 RX ADMIN — PANCRELIPASE LIPASE, PANCRELIPASE PROTEASE, PANCRELIPASE AMYLASE 60000 UNITS: 20000; 63000; 84000 CAPSULE, DELAYED RELEASE ORAL at 09:33

## 2025-02-21 RX ADMIN — DICLOFENAC SODIUM 4 G: 10 GEL TOPICAL at 09:27

## 2025-02-21 RX ADMIN — DICYCLOMINE HYDROCHLORIDE 10 MG: 10 CAPSULE ORAL at 07:05

## 2025-02-21 RX ADMIN — GABAPENTIN 100 MG: 100 CAPSULE ORAL at 09:22

## 2025-02-21 RX ADMIN — SODIUM CHLORIDE, PRESERVATIVE FREE 10 ML: 5 INJECTION INTRAVENOUS at 09:24

## 2025-02-21 ASSESSMENT — PAIN DESCRIPTION - ORIENTATION: ORIENTATION: ANTERIOR

## 2025-02-21 ASSESSMENT — PAIN DESCRIPTION - ONSET: ONSET: ON-GOING

## 2025-02-21 ASSESSMENT — PAIN SCALES - GENERAL
PAINLEVEL_OUTOF10: 0
PAINLEVEL_OUTOF10: 7

## 2025-02-21 ASSESSMENT — PAIN DESCRIPTION - PAIN TYPE: TYPE: CHRONIC PAIN

## 2025-02-21 ASSESSMENT — PAIN DESCRIPTION - FREQUENCY: FREQUENCY: CONTINUOUS

## 2025-02-21 ASSESSMENT — PAIN DESCRIPTION - LOCATION: LOCATION: ABDOMEN

## 2025-02-21 ASSESSMENT — PAIN DESCRIPTION - DESCRIPTORS: DESCRIPTORS: ACHING

## 2025-02-21 NOTE — PROGRESS NOTES
[id number] [place] [address]   Office [phone]  Fax [phone]          Subjective / Objective     Subjective  Overnight Events: None  Patient seen and examined at bedside. Having her chronic abdominal pain. Frustrated that she didn't get MRI and couldn't eat yesterday.Denies CP, SOB, N/V, dysuria.    Respiratory:   RA  Vitals:    02/19/25 0337   BP: 139/64   Pulse: 69   Resp:    Temp: 98.2 °F (36.8 °C)   SpO2: 96%     Physical Examination:   General appearance - alert, well appearing and in no distress  Chest - clear to auscultation, no wheezes, rales or rhonchi, symmetric air entry  Heart - normal rate, regular rhythm, normal S1, S2, no murmurs, rubs, clicks or gallops  Abdomen - soft, slight TTP in epigastrium, nondistended, no masses or organomegaly  Neurological - alert, oriented, normal speech, no focal findings  Skin - warm, dry. No notable rashes  Extremities - peripheral pulses normal, trace pedal edema b/l, no clubbing or cyanosis; callus in L heel, no ulceration/drainage or erythema  Psychiatric - normal speech and thought processes    I/O:  No intake/output data recorded.  Inpatient Medications    Current Facility-Administered Medications   Medication Dose Route Frequency    insulin lispro (HUMALOG,ADMELOG) injection vial 0-4 Units  0-4 Units SubCUTAneous 4x Daily AC & HS    lidocaine 4 % external patch 1 patch  1 patch TransDERmal Daily    diclofenac sodium (VOLTAREN) 1 % gel 4 g  4 g Topical BID    sodium chloride flush 0.9 % injection 5-40 mL  5-40 mL IntraVENous 2 times per day    sodium chloride flush 0.9 % injection 5-40 mL  5-40 mL IntraVENous PRN    0.9 % sodium chloride infusion   IntraVENous PRN    enoxaparin (LOVENOX) injection 40 mg  40 mg SubCUTAneous Daily    ondansetron (ZOFRAN-ODT) disintegrating tablet 4 mg  4 mg Oral Q8H PRN    Or    ondansetron (ZOFRAN) injection 4 mg  4 mg IntraVENous Q6H PRN    polyethylene glycol (GLYCOLAX) packet 17 g  17 g Oral Daily PRN 
  Pharmacy Note - Renal Dosing    Levofloxacin 250 mg po daily has been adjusted to 750 mg po q48h due CrCl of 33 mL/min in the setting of ESBL-producing E.Coli UTI    Estimated Creatinine Clearance: Estimated Creatinine Clearance: 33 mL/min (A) (based on SCr of 1.62 mg/dL (H)).    BMI: Body mass index is 30.18 kg/m².    Please call with any questions.    Thank you,    DELBERT DRAKE, [facility]    
0700 : Bedside and Verbal shift change report given to Dorothy RN (oncoming nurse) by Harrison RN (offgoing nurse). Report included the following information Nurse Handoff Report, Recent Results, Cardiac Rhythm NSR , and Event Log.       0730 : Rapid called for BG of 33. Pt diaphoretic and reports feeling lightheaded. Pt drank juice and received D10 bolus. Follow up BG was 152.     1900 : Bedside and Verbal shift change report given to Harrison RN (oncoming nurse) by Dorothy RN (offgoing nurse). Report included the following information Nurse Handoff Report, Recent Results, Cardiac Rhythm NSR, and Event Log.     
0700 : Bedside and Verbal shift change report given to Dorothy RN (oncoming nurse) by Harrison RN (offgoing nurse). Report included the following information Nurse Handoff Report, Recent Results, Cardiac Rhythm NSR, and Event Log.       1900 : Bedside and Verbal shift change report given to Paul RN  (oncoming nurse) by Dorothy RN (offgoing nurse). Report included the following information Nurse Handoff Report, Recent Results, Cardiac Rhythm NSR, and Event Log.     
1900: Bedside and Verbal shift change report given to Kaia RN (oncoming nurse) by Dolly RN (offgoing nurse). Report included the following information Nurse Handoff Report.       2145: TRANSFER - OUT REPORT:    Verbal report given to 5th floor RN on Candida Maza  being transferred to [facility] for routine progression of patient care       Report consisted of patient's Situation, Background, Assessment and   Recommendations(SBAR).     Information from the following report(s) Nurse Handoff Report was reviewed with the receiving nurse.           Lines:   Peripheral IV 02/17/25 Left;Proximal;Anterior Forearm (Active)   Site Assessment Clean, dry & intact 02/20/25 1300   Line Status Flushed;Capped 02/20/25 1300   Line Care Cap changed 02/20/25 1300   Phlebitis Assessment No symptoms 02/20/25 1300   Infiltration Assessment 0 02/20/25 1300   Alcohol Cap Used Yes 02/20/25 1300   Dressing Status Clean, dry & intact 02/20/25 1300   Dressing Type Transparent 02/20/25 1300       Peripheral IV 02/18/25 Posterior;Proximal;Right Forearm (Active)   Site Assessment Clean, dry & intact 02/20/25 1300   Line Status Capped 02/20/25 1300   Line Care Connections checked and tightened 02/20/25 1300   Phlebitis Assessment No symptoms 02/20/25 1300   Infiltration Assessment 0 02/20/25 1300   Alcohol Cap Used Yes 02/20/25 1043   Dressing Status Clean, dry & intact 02/20/25 1300   Dressing Type Transparent 02/20/25 1300   Dressing Intervention New 02/18/25 0800       Peripheral IV 02/20/25 Right;Anterior Forearm (Active)        Opportunity for questions and clarification was provided.      Patient transported with:  Monitor and Tech        
Candida Maza  1960  [id number]    Situation:  Verbal report received from:  EMILIANO Troy   Procedure: Procedure(s):  ESOPHAGOGASTRODUODENOSCOPY  ENDOSCOPIC ULTRASOUND    Background:    Preoperative diagnosis: Abnormal MRI of abdomen [R93.5]  Postoperative diagnosis: * No post-op diagnosis entered *    :  Dr. Diaz   Assistant(s): Circulator: Ayanna Duckworth RN  Relief Scrub: Joe Odom  Endoscopy Technician: Angela Patel    Specimens:   ID Type Source Tests Collected by Time Destination   1 : Duodenum Biopsy, R/O-Celiac disease Tissue Duodenum SURGICAL PATHOLOGY Artem Diaz MD 2/20/2025 1134      H. Pylori     no    Assessment:  Intra-procedure medications     Anesthesia gave intra-procedure sedation and medications, see anesthesia flow sheet   yes    Intravenous fluids: NS@ KVO     Vital signs stable   yes    Abdominal assessment: round and soft    yes    Recommendation:  Discharge patient per MD order  inpatient.  Return to floor  room 326  Family or Friend   n/a  Permission to share finding with family or friend   n/a   
GI note    CC: diarrhea  HPI: Symptoms about the same, non bloody loose stool.  She reports this has been a problem since last small intestinal surgery (6.2024 Rita, small bowel resection for intussusception).  She has taken Creon 36k 3 PO with meals recently decreased to 1PO with meals - noted same symptoms at both dose levels.  Denies itching, bilirubinuria, or significant abdominal pain.  No bleeding.  She and I reviewed MR results summarized below.  Tolerating PO.    O/E  BP (!) 144/71   Pulse 76   Temp 98.6 °F (37 °C) (Oral)   Resp 16   Ht 1.575 m (5' 2\")   Wt 74.8 kg (165 lb)   SpO2 99%   BMI 30.18 kg/m²   Appears well, no distress.  Normal respiratory effort  No rash/jaundice  Abdomen flat.    Labs: Na/K normal, Cr 1.6, hgb 7.3, , WBC 7    Imaging: MR reconfirms mild dilation of PD/CBD and changes of chronic pancreatitis.  Radiologist indicates cannot definitively exclude mass lesion and recommends EUS.    A/P:  Chronic pancreatitis  Status post kellee-Y peseudocyst-jejunostomy  Status post small intestinal resection for intussusception.  Diarrhea  Abnormal MR without jaundice/hyperbilirubinemia.    Have reviewed with Dr. Diaz who has kindly agreed to proceed with EUS tomorrow.  NPO after midnight, hold lovenox.  Jarocho Tim MD     
Initial RN admission and assessment performed and documented in Endoscopy navigator.     Patient evaluated by anesthesia in pre-procedure holding.     All procedural vital signs, airway assessment, and level of consciousness information monitored and recorded by anesthesia staff on the anesthesia record.     Report received from CRNA post procedure.  Patient transported to recovery area by RN.    Endoscopy post procedure time out was performed and specimens were verified by physician.    Endoscope was pre-cleaned at bedside immediately following procedure by Ryan Kohli Tech.      
Patient inserted her upper denture post procedure.    
Physical Therapy Note:    PT order received and chart reviewed. Patient with rapid response this morning for hypoglycemia. Plan for MRCP today. RN requests patient have more time for rest prior to PT evaluation this morning.    Karen Aviles, PT, DPT   
Physical therapy:    Attempted PT session. Pt off the floor at [facility].     Pat Lindquist, PT, DPT  
Pt refused night time insulin. Glucose was 190. Provider notified.  
Rapid Called at 07:22 am    Responded to RRT at 07:23 am for hypoglycemia    Blood sugar 33. NAD, appears tired, states she feels dizzy. Denies headache, and blurry vision. Asking for OJ.      D5 bolus given, OJ given. MD at bedside.    Patient now alert and oriented.    Placed 20 g PIV     Provider at bedside: YES  Interventions ordered: Other (Comment)  Sepsis Suspected: No  Transfer to Higher Level of Care: No  Blood Glucose: 33     Repeat BG to be taken in 15 minutes.     07:58 am: BG now 152      Vitals:    02/18/25 0716   BP: (!) 116/56   Pulse: 78   Resp: 14   Temp: 97.9 °F (36.6 °C)   SpO2: 99%        Rapid Ended at 07:50  RRT RN assisted with transport to accepting unit N/A.    Saniya Aburto RN    
Report given to Ryan Neal RN. Reviewed history, reason for admission and npo status. Patient transferred to ENDo.  1203 Received report from Val Davis RN who reviewed Egd/Eus completion and plan to return to room.  EMILIANO Alonzo  1300 Returned post Endo.  
Responded to RR for symptomatic hypoglycemia:   Blood sugar 33. Symptoms includes diaphoresis and weakness. No seizures or AMS. No other symptoms.   Pt received 4u Lispro at 0035 for blood sugar of 252. Has been NPO overnight.   - D5 bolus STAT    Pt with recurrent episodes of hypoglycemia. Will hold all insulin. Pt is scheduled for MRCP today to exclude mass given dilation of main pancreatic duct.   
Spiritual Health History and Assessment/Progress Note  [facility]    Initial Encounter,  ,  ,      Name: Candida Maza MRN: [id number]    Age: 64 y.o.     Sex: female   Language: English   Zoroastrian: Zoroastrianism   Generalized abdominal pain     Date: 2/19/2025            Total Time Calculated: 19 min              Spiritual Assessment began in [facility] B3 INTERMEDIATE CARE UNIT            Encounter Overview/Reason: Initial Encounter  Service Provided For: Patient    Ana, Belief, Meaning:   Patient identifies as spiritual, is connected with a ana tradition or spiritual practice, and has beliefs or practices that help with coping during difficult times  Family/Friends No family/friends present      Importance and Influence:  Patient has spiritual/personal beliefs that influence decisions regarding their health  Family/Friends No family/friends present    Community:  Patient is connected with a spiritual community and feels well-supported. Support system includes: Children, Ana Community, Friends, and Extended family  Family/Friends No family/friends present    Assessment and Plan of Care:   Reviewed chart prior to bedside encounter. Ms. Maza just had her sugar checked as she welcomes visit. She expressed that she is doing well overall, feel emotionally fine; that she has good family support and Caodaism family support, as she holds a Shinto ana. She expressed no needs at this time but was thankful for knowing Spiritual Health/Chaplains are here for her in addition to her family and Caodaism family. To that she smiled expressing much gratitude.       Patient Interventions include: Facilitated expression of thoughts and feelings, Explored spiritual coping/struggle/distress, Engaged in theological reflection, Affirmed coping skills/support systems, and Facilitated life review and/ or legacy  Family/Friends Interventions include: No family/friends present    Patient Plan of Care: Spiritual Care 
TRANSFER - IN REPORT:    Verbal report received from EMILIANO Lua on Candida Maza  being received from 326 for ordered procedure      Report consisted of patient's Situation, Background, Assessment and   Recommendations(SBAR).     Information from the following report(s) Nurse Handoff Report was reviewed with the receiving nurse.    Opportunity for questions and clarification was provided.      Assessment completed upon patient's arrival to unit and care assumed.    
TRANSFER - OUT REPORT:    Verbal report given to EMILIANO Lua on Candida Maza  being transferred to [facility] for routine post-op       Report consisted of patient's Situation, Background, Assessment and   Recommendations(SBAR).     Information from the following report(s) Nurse Handoff Report, Adult Overview, Surgery Report, and Recent Results was reviewed with the receiving nurse.           Lines:   Peripheral IV 02/17/25 Left;Proximal;Anterior Forearm (Active)   Site Assessment Clean, dry & intact 02/20/25 1042   Line Status Flushed;Capped 02/20/25 1042   Line Care Cap changed 02/20/25 1042   Phlebitis Assessment No symptoms 02/20/25 1042   Infiltration Assessment 0 02/20/25 1042   Alcohol Cap Used Yes 02/20/25 1042   Dressing Status Clean, dry & intact 02/20/25 1042   Dressing Type Transparent 02/20/25 1042       Peripheral IV 02/18/25 Posterior;Proximal;Right Forearm (Active)   Site Assessment Clean, dry & intact 02/20/25 0730   Line Status Flushed 02/20/25 0945   Line Care Connections checked and tightened 02/20/25 0945   Phlebitis Assessment No symptoms 02/20/25 0730   Infiltration Assessment 0 02/20/25 0730   Alcohol Cap Used Yes 02/20/25 0730   Dressing Status Clean, dry & intact 02/20/25 0730   Dressing Type Transparent 02/20/25 0730   Dressing Intervention New 02/18/25 0800       Peripheral IV 02/20/25 Right;Anterior Forearm (Active)   Site Assessment Clean, dry & intact 02/20/25 1043   Line Status Capped;Flushed;Infusing 02/20/25 1043   Line Care Cap changed 02/20/25 1043   Phlebitis Assessment No symptoms 02/20/25 1043   Infiltration Assessment 0 02/20/25 1043   Alcohol Cap Used Yes 02/20/25 1043   Dressing Status Clean, dry & intact 02/20/25 1043   Dressing Type Transparent 02/20/25 1043        Opportunity for questions and clarification was provided.      Patient transported with:  Monitor and Registered Nurse       
Other reaction(s): Cough  Type: Ingredient; Reaction: cough.  Other reaction(s): Cough      Lisinopril Cough     Other reaction(s): Cough  Type: Ingredient; Reaction: cough.  Other reaction(s): Cough      Sulfamethoxazole-Trimethoprim Nausea And Vomiting and Nausea Only     Type: NonCodified; Reaction: nause.    Type: NonCodified; Reaction: nause.  Type: NonCodified; Reaction: nause.  Type: NonCodified; Reaction: nause.  Type: NonCodified; Reaction: nause.       CBC:  Recent Labs     02/17/25  1352 02/18/25  0048   WBC 8.4 11.0   HGB 9.8* 8.4*    194     Metabolic Panel:  Recent Labs     02/17/25  1352 02/18/25  0048    143   K 5.5* 5.2*   * 117*   CO2 27 22   BUN 26* 22*   MG  --  1.9   ALT 18  --      Imaging/procedures:   No new.         Assessment and Plan       Candida Maza is a 64 y.o. female with a PMHx of Chronic pancreatitis, DMT2 with neuropathy, CKD IV, asthma, HTN, HLD, GERD, s/p gastric bypass, migraine who is admitted for ductal dilation on imaging.     Biliary dilation:   Mild chronic abdominal pain on admission. CT A/P notable for biliary and main pancreatic ductal dilation iso s/p cholecystectomy and chronic pancreatitis with radiologist recommendation of  MRI/MRCP to exclude mass.   - MIVF: 125 cc/hr for 8 hrs  - Continue home Zenpep TID with meals  - Continue home bentyl 10 mg 4 times daily before meals and nightly  - Protonix 40 mg IV qd  - Daily CBC, BMP  - MRI abdomen with con MRCP Pending  - GI c/s for ERCP   - NPO at MN    Hypoglycemia  T2DM: pt presenting with BG 44 by EMS, now with RRT today with BG 33. Prior in evening BG had been 250 and was given insulin. Says she has lows at home intermittently. Discrepancies in home regimen; PCP reports 15u lantus but pt reports Humulin 3/3 daily. Ddx for low BG includes over-administration of insulin, labile BG iso poorly controlled DM, or insulinoma. Insulinoma is of concern given CT findings.   - hold all insulin  - continue 
abdominal pain as above. Ucx with possibly 2 GNRs.  - CTX (2/18-)  - Pending urine cx speciation     CKD Stg IV: Creatinine at baseline ~1.7     GERD  Chronic, controlled. Home ompeprazole 20 mg qd.  - Continue home medication as above     Asthma  Chronic, controlled. On RA.  - albuterol prn     Migraine: Controlled. Home sumatriptan 100 mg prn BID.  - Continue home med as above     Hypertension:   - Continuing home medications of amlodipine 10 mg.      Obesity BMI Body mass index is 30.18 kg/m².  - Encouraging lifestyle modifications and further follow up outpatient.         FEN/GI - Diabetic.  Activity - Ambulate as tolerated  DVT prophylaxis - Lovenox  GI prophylaxis - Protonix  Fall prophylaxis - Not indicated at this time.  Disposition - Plan to d/c to Home with HH.   Code Status - DNI, discussed with patient / caregivers.  Next of Kin Name and Contact     Corona Jama (Other)  [phone] (Mobile)       Patient discussed with Dr. Mei.  Dorothy Mckinnon MD  [facility] Resident       For Billing    Chief Complaint   Patient presents with    Extremity Weakness    Blood Sugar Problem

## 2025-02-21 NOTE — CARE COORDINATION
11:09 AM  02/21/25    Care Management Progress Note    Reason for Admission:   Generalized abdominal pain [R10.84]  Dilation of pancreatic duct [K86.89]  Dilation of common bile duct [K83.8]  Procedure(s) (LRB):  ESOPHAGOGASTRODUODENOSCOPY (N/A)  ENDOSCOPIC ULTRASOUND (N/A)  ESOPHAGOGASTRODUODENOSCOPY BIOPSY (N/A)  1 Day Post-Op    Patient Admission Status: Inpatient  Date Admitted to INP: 2/17/25   RUR: Readmission Risk Score: 23.1    Hospitalization in the last 30 days (Readmission):  No        Transition of care plan:  Pt discussed during IDR- discharge order placed.  Anticipated discharge plan: home with family assistance and Home Health- pt lives with her son and is current with [facility]. CM sent dc summary via [employer] and updated agency that pt is discharging home today. AVS updated.  Date IM given: [x]NA  Outpatient follow-up.  Discharge transport: Pt's brother plans to pick pt up today.    GRACIELA Osuna

## 2025-02-21 NOTE — TELEPHONE ENCOUNTER
Care Transitions Initial Follow Up Call    Outreach made within 2 business days of discharge: Yes    Patient: Candida Maza Patient : 1960   MRN: [id number]  Reason for Admission: abdominal pain  Discharge Date: 25       Spoke with: patient    Discharge department/facility: [facility]    TCM Interactive Patient Contact:  Was patient able to fill all prescriptions: Yes  Was patient instructed to bring all medications to the follow-up visit: Yes  Is patient taking all medications as directed in the discharge summary? Yes  Does patient understand their discharge instructions: Yes  Does patient have questions or concerns that need addressed prior to 7-14 day follow up office visit: no    Additional needs identified to be addressed with provider  No needs identified             Scheduled appointment with PCP within 7-14 days    Follow Up  Future Appointments   Date Time Provider Department Center   2025  9:00 AM [facility] 1 HAILEY Silveira MA

## 2025-02-21 NOTE — PLAN OF CARE
Problem: Chronic Conditions and Co-morbidities  Goal: Patient's chronic conditions and co-morbidity symptoms are monitored and maintained or improved  Outcome: Progressing     Problem: Discharge Planning  Goal: Discharge to home or other facility with appropriate resources  Outcome: Progressing     Problem: Pain  Goal: Verbalizes/displays adequate comfort level or baseline comfort level  Outcome: Progressing     Problem: Safety - Adult  Goal: Free from fall injury  Outcome: Progressing     Problem: Skin/Tissue Integrity  Goal: Skin integrity remains intact  Description: 1.  Monitor for areas of redness and/or skin breakdown  2.  Assess vascular access sites hourly  3.  Every 4-6 hours minimum:  Change oxygen saturation probe site  4.  Every 4-6 hours:  If on nasal continuous positive airway pressure, respiratory therapy assess nares and determine need for appliance change or resting period  Outcome: Progressing     Problem: Physical Therapy - Adult  Goal: By Discharge: Performs mobility at highest level of function for planned discharge setting.  See evaluation for individualized goals.  Description: FUNCTIONAL STATUS PRIOR TO ADMISSION: Patient was independent and sometimes using SPC to ambulate.    HOME SUPPORT PRIOR TO ADMISSION: Patient lives with son in a one level home with 3 stairs to enter.     Physical Therapy Goals  Initiated 2/18/2025  1.  Patient will move from supine to sit and sit to supine in bed with independence within 7 day(s).    2.  Patient will perform sit to stand with independence within 7 day(s).  3.  Patient will transfer from bed to chair and chair to bed with independence using the least restrictive device within 7 day(s).  4.  Patient will ambulate with independence for 100 feet with the least restrictive device within 7 day(s).   5.  Patient will ascend/descend 3 stairs with two handrail(s) with contact guard assist within 7 day(s).   2/20/2025 1454 by Pat Lindquist, PT  Outcome:

## 2025-02-24 ENCOUNTER — TELEMEDICINE (OUTPATIENT)
Age: 65
End: 2025-02-24
Payer: MEDICAID

## 2025-02-24 DIAGNOSIS — N30.00 ACUTE CYSTITIS WITHOUT HEMATURIA: ICD-10-CM

## 2025-02-24 DIAGNOSIS — R10.84 ABDOMINAL PAIN, GENERALIZED: Primary | ICD-10-CM

## 2025-02-24 DIAGNOSIS — M79.604 PAIN IN BOTH LOWER EXTREMITIES: ICD-10-CM

## 2025-02-24 DIAGNOSIS — E11.649 HYPOGLYCEMIA ASSOCIATED WITH DIABETES (HCC): ICD-10-CM

## 2025-02-24 DIAGNOSIS — M79.605 PAIN IN BOTH LOWER EXTREMITIES: ICD-10-CM

## 2025-02-24 DIAGNOSIS — K86.89 ATROPHY OF PANCREAS: ICD-10-CM

## 2025-02-24 DIAGNOSIS — K83.8 DILATION OF COMMON BILE DUCT: ICD-10-CM

## 2025-02-24 PROCEDURE — 99214 OFFICE O/P EST MOD 30 MIN: CPT | Performed by: FAMILY MEDICINE

## 2025-02-24 PROCEDURE — 3051F HG A1C>EQUAL 7.0%<8.0%: CPT | Performed by: FAMILY MEDICINE

## 2025-02-24 RX ORDER — FUROSEMIDE 20 MG/1
20 TABLET ORAL 3 TIMES DAILY
COMMUNITY
Start: 2025-01-25

## 2025-02-24 RX ORDER — TRAMADOL HYDROCHLORIDE 50 MG/1
TABLET ORAL
COMMUNITY
Start: 2025-01-31 | End: 2025-02-24 | Stop reason: SDUPTHER

## 2025-02-24 RX ORDER — TRAMADOL HYDROCHLORIDE 50 MG/1
50 TABLET ORAL
Qty: 14 TABLET | Refills: 0 | Status: SHIPPED | OUTPATIENT
Start: 2025-02-24 | End: 2025-03-10

## 2025-02-24 NOTE — PROGRESS NOTES
pain in her right leg and back, although she generally feels well. She also experienced immobility in her legs upon awakening one morning, which was attributed to her low blood sugar level. She did not experience any abdominal pain during her hospital stay but reports the onset of such pain this morning. Her diabetes has been well-managed, with the exception of the recent hypoglycemic episode.       Review of Systems       Objective   Patient-Reported Vitals  No data recorded     Physical Exam  [INSTRUCTIONS:  \"[x]\" Indicates a positive item  \"[]\" Indicates a negative item  -- DELETE ALL ITEMS NOT EXAMINED]    Constitutional: [x] Appears well-developed and well-nourished [x] No apparent distress      [] Abnormal -     Mental status: [x] Alert and awake  [x] Oriented to person/place/time [x] Able to follow commands    [] Abnormal -     Eyes:   EOM    [x]  Normal    [] Abnormal -   Sclera  [x]  Normal    [] Abnormal -          Discharge [x]  None visible   [] Abnormal -     HENT: [x] Normocephalic, atraumatic  [] Abnormal -   [x] Mouth/Throat: Mucous membranes are moist    External Ears [x] Normal  [] Abnormal -    Neck: [x] No visualized mass [] Abnormal -     Pulmonary/Chest: [x] Respiratory effort normal   [x] No visualized signs of difficulty breathing or respiratory distress        [] Abnormal -      Musculoskeletal:   [x] Normal gait with no signs of ataxia         [x] Normal range of motion of neck        [] Abnormal -     Neurological:        [x] No Facial Asymmetry (Cranial nerve 7 motor function) (limited exam due to video visit)          [x] No gaze palsy        [] Abnormal -          Skin:        [x] No significant exanthematous lesions or discoloration noted on facial skin         [] Abnormal -            Psychiatric:       [x] Normal Affect [] Abnormal -        [x] No Hallucinations    Other pertinent observable physical exam findings:-             --Liudmila Heard MD

## 2025-03-05 ENCOUNTER — TELEPHONE (OUTPATIENT)
Age: 65
End: 2025-03-05

## 2025-03-05 NOTE — TELEPHONE ENCOUNTER
Patient returned call and stated that she will call back to reschedule her appt when she is ready to come in again.

## 2025-03-05 NOTE — TELEPHONE ENCOUNTER
Called patient to reschedule a follow up apt, no answer left a voice mail to call to reschedule.   Thank you

## 2025-03-08 ENCOUNTER — APPOINTMENT (OUTPATIENT)
Facility: HOSPITAL | Age: 65
DRG: 720 | End: 2025-03-08
Payer: MEDICAID

## 2025-03-08 ENCOUNTER — HOSPITAL ENCOUNTER (INPATIENT)
Facility: HOSPITAL | Age: 65
LOS: 8 days | Discharge: NURSING, REHAB OR LONG-TERM CARE | DRG: 720 | End: 2025-03-17
Attending: EMERGENCY MEDICINE | Admitting: FAMILY MEDICINE
Payer: MEDICAID

## 2025-03-08 DIAGNOSIS — R06.02 SHORTNESS OF BREATH: ICD-10-CM

## 2025-03-08 DIAGNOSIS — J18.9 COMMUNITY ACQUIRED PNEUMONIA, UNSPECIFIED LATERALITY: ICD-10-CM

## 2025-03-08 DIAGNOSIS — N17.9 AKI (ACUTE KIDNEY INJURY): ICD-10-CM

## 2025-03-08 DIAGNOSIS — R41.82 ALTERED MENTAL STATUS, UNSPECIFIED ALTERED MENTAL STATUS TYPE: Primary | ICD-10-CM

## 2025-03-08 DIAGNOSIS — E16.2 HYPOGLYCEMIA: ICD-10-CM

## 2025-03-08 DIAGNOSIS — R65.20 SEVERE SEPSIS: ICD-10-CM

## 2025-03-08 DIAGNOSIS — E87.0 HYPERNATREMIA: ICD-10-CM

## 2025-03-08 DIAGNOSIS — K86.1 CHRONIC PANCREATITIS, UNSPECIFIED PANCREATITIS TYPE (HCC): ICD-10-CM

## 2025-03-08 DIAGNOSIS — A41.9 SEVERE SEPSIS: ICD-10-CM

## 2025-03-08 LAB
ALBUMIN SERPL-MCNC: 1.4 G/DL (ref 3.5–5)
ALBUMIN/GLOB SERPL: 0.5 (ref 1.1–2.2)
ALP SERPL-CCNC: 139 U/L (ref 45–117)
ALT SERPL-CCNC: 17 U/L (ref 12–78)
ANION GAP BLD CALC-SCNC: 9 (ref 10–20)
ANION GAP SERPL CALC-SCNC: 6 MMOL/L (ref 2–12)
APPEARANCE UR: ABNORMAL
ARTERIAL PATENCY WRIST A: YES
AST SERPL-CCNC: 14 U/L (ref 15–37)
BACTERIA URNS QL MICRO: ABNORMAL /HPF
BASE DEFICIT BLD-SCNC: 4.9 MMOL/L
BASE DEFICIT BLDA-SCNC: 4.1 MMOL/L
BASOPHILS # BLD: 0.03 K/UL (ref 0–0.1)
BASOPHILS NFR BLD: 0.3 % (ref 0–1)
BDY SITE: ABNORMAL
BILIRUB SERPL-MCNC: 0.1 MG/DL (ref 0.2–1)
BILIRUB UR QL: NEGATIVE
BUN SERPL-MCNC: 32 MG/DL (ref 6–20)
BUN/CREAT SERPL: 15 (ref 12–20)
CA-I BLD-MCNC: 1.19 MMOL/L (ref 1.15–1.33)
CALCIUM SERPL-MCNC: 7.4 MG/DL (ref 8.5–10.1)
CHLORIDE BLD-SCNC: 118 MMOL/L (ref 100–111)
CHLORIDE SERPL-SCNC: 120 MMOL/L (ref 97–108)
CO2 BLD-SCNC: 16 MMOL/L (ref 22–29)
CO2 SERPL-SCNC: 20 MMOL/L (ref 21–32)
COLOR UR: ABNORMAL
CREAT SERPL-MCNC: 2.09 MG/DL (ref 0.55–1.02)
CREAT UR-MCNC: 2.4 MG/DL (ref 0.6–1.3)
DIFFERENTIAL METHOD BLD: ABNORMAL
EOSINOPHIL # BLD: 0 K/UL (ref 0–0.4)
EOSINOPHIL NFR BLD: 0 % (ref 0–7)
EPITH CASTS URNS QL MICRO: ABNORMAL /LPF
ERYTHROCYTE [DISTWIDTH] IN BLOOD BY AUTOMATED COUNT: 15.1 % (ref 11.5–14.5)
FLUAV RNA SPEC QL NAA+PROBE: NOT DETECTED
FLUBV RNA SPEC QL NAA+PROBE: NOT DETECTED
GLOBULIN SER CALC-MCNC: 3 G/DL (ref 2–4)
GLUCOSE BLD STRIP.AUTO-MCNC: 132 MG/DL (ref 65–117)
GLUCOSE BLD STRIP.AUTO-MCNC: 138 MG/DL (ref 65–117)
GLUCOSE BLD STRIP.AUTO-MCNC: 53 MG/DL (ref 65–117)
GLUCOSE SERPL-MCNC: 89 MG/DL (ref 65–100)
GLUCOSE UR STRIP.AUTO-MCNC: NEGATIVE MG/DL
HCO3 BLDA-SCNC: 17 MMOL/L
HCO3 BLDA-SCNC: 19 MMOL/L (ref 22–26)
HCT VFR BLD AUTO: 24.5 % (ref 35–47)
HGB BLD-MCNC: 8 G/DL (ref 11.5–16)
HGB UR QL STRIP: ABNORMAL
IMM GRANULOCYTES # BLD AUTO: 0.08 K/UL (ref 0–0.04)
IMM GRANULOCYTES NFR BLD AUTO: 0.9 % (ref 0–0.5)
KETONES UR QL STRIP.AUTO: NEGATIVE MG/DL
LACTATE BLD-SCNC: 0.54 MMOL/L (ref 0.4–2)
LEUKOCYTE ESTERASE UR QL STRIP.AUTO: ABNORMAL
LYMPHOCYTES # BLD: 1.56 K/UL (ref 0.8–3.5)
LYMPHOCYTES NFR BLD: 17.4 % (ref 12–49)
MCH RBC QN AUTO: 30.4 PG (ref 26–34)
MCHC RBC AUTO-ENTMCNC: 32.7 G/DL (ref 30–36.5)
MCV RBC AUTO: 93.2 FL (ref 80–99)
MONOCYTES # BLD: 0.04 K/UL (ref 0–1)
MONOCYTES NFR BLD: 0.4 % (ref 5–13)
NEUTS SEG # BLD: 7.25 K/UL (ref 1.8–8)
NEUTS SEG NFR BLD: 81 % (ref 32–75)
NITRITE UR QL STRIP.AUTO: NEGATIVE
NRBC # BLD: 0 K/UL (ref 0–0.01)
NRBC BLD-RTO: 0 PER 100 WBC
PCO2 BLDA: 28 MMHG (ref 35–45)
PCO2 BLDV: 21.9 MMHG (ref 41–51)
PH BLDA: 7.44 (ref 7.35–7.45)
PH BLDV: 7.51 (ref 7.32–7.42)
PH UR STRIP: 6.5 (ref 5–8)
PLATELET # BLD AUTO: 291 K/UL (ref 150–400)
PMV BLD AUTO: 10.2 FL (ref 8.9–12.9)
PO2 BLDA: 68 MMHG (ref 80–100)
PO2 BLDV: 122 MMHG (ref 25–40)
POTASSIUM BLD-SCNC: 5.3 MMOL/L (ref 3.5–5.5)
POTASSIUM SERPL-SCNC: 5.2 MMOL/L (ref 3.5–5.1)
PROCALCITONIN SERPL-MCNC: <0.05 NG/ML
PROT SERPL-MCNC: 4.4 G/DL (ref 6.4–8.2)
PROT UR STRIP-MCNC: 300 MG/DL
RBC # BLD AUTO: 2.63 M/UL (ref 3.8–5.2)
RBC #/AREA URNS HPF: ABNORMAL /HPF (ref 0–5)
SAO2 % BLD: 95 % (ref 92–97)
SAO2 % BLD: 99 % (ref 94–98)
SAO2% DEVICE SAO2% SENSOR NAME: ABNORMAL
SARS-COV-2 RNA RESP QL NAA+PROBE: NOT DETECTED
SERVICE CMNT-IMP: ABNORMAL
SODIUM BLD-SCNC: 143 MMOL/L (ref 136–145)
SODIUM SERPL-SCNC: 146 MMOL/L (ref 136–145)
SOURCE: NORMAL
SP GR UR REFRACTOMETRY: 1.02 (ref 1–1.03)
SPECIMEN SITE: ABNORMAL
SPECIMEN SITE: ABNORMAL
TROPONIN I SERPL HS-MCNC: 62 NG/L (ref 0–51)
URINE CULTURE IF INDICATED: ABNORMAL
UROBILINOGEN UR QL STRIP.AUTO: 0.2 EU/DL (ref 0.2–1)
WBC # BLD AUTO: 9 K/UL (ref 3.6–11)
WBC URNS QL MICRO: ABNORMAL /HPF (ref 0–4)

## 2025-03-08 PROCEDURE — 93005 ELECTROCARDIOGRAM TRACING: CPT | Performed by: EMERGENCY MEDICINE

## 2025-03-08 PROCEDURE — 96375 TX/PRO/DX INJ NEW DRUG ADDON: CPT

## 2025-03-08 PROCEDURE — 87636 SARSCOV2 & INF A&B AMP PRB: CPT

## 2025-03-08 PROCEDURE — 99285 EMERGENCY DEPT VISIT HI MDM: CPT

## 2025-03-08 PROCEDURE — 87186 SC STD MICRODIL/AGAR DIL: CPT

## 2025-03-08 PROCEDURE — 87088 URINE BACTERIA CULTURE: CPT

## 2025-03-08 PROCEDURE — 96365 THER/PROPH/DIAG IV INF INIT: CPT

## 2025-03-08 PROCEDURE — 80053 COMPREHEN METABOLIC PANEL: CPT

## 2025-03-08 PROCEDURE — 82803 BLOOD GASES ANY COMBINATION: CPT

## 2025-03-08 PROCEDURE — 82962 GLUCOSE BLOOD TEST: CPT

## 2025-03-08 PROCEDURE — 81001 URINALYSIS AUTO W/SCOPE: CPT

## 2025-03-08 PROCEDURE — 85025 COMPLETE CBC W/AUTO DIFF WBC: CPT

## 2025-03-08 PROCEDURE — 70450 CT HEAD/BRAIN W/O DYE: CPT

## 2025-03-08 PROCEDURE — 71045 X-RAY EXAM CHEST 1 VIEW: CPT

## 2025-03-08 PROCEDURE — 82947 ASSAY GLUCOSE BLOOD QUANT: CPT

## 2025-03-08 PROCEDURE — 87086 URINE CULTURE/COLONY COUNT: CPT

## 2025-03-08 PROCEDURE — 96360 HYDRATION IV INFUSION INIT: CPT

## 2025-03-08 PROCEDURE — 36415 COLL VENOUS BLD VENIPUNCTURE: CPT

## 2025-03-08 PROCEDURE — 87040 BLOOD CULTURE FOR BACTERIA: CPT

## 2025-03-08 PROCEDURE — 84295 ASSAY OF SERUM SODIUM: CPT

## 2025-03-08 PROCEDURE — 6360000002 HC RX W HCPCS: Performed by: EMERGENCY MEDICINE

## 2025-03-08 PROCEDURE — 2500000003 HC RX 250 WO HCPCS: Performed by: EMERGENCY MEDICINE

## 2025-03-08 PROCEDURE — 6370000000 HC RX 637 (ALT 250 FOR IP): Performed by: EMERGENCY MEDICINE

## 2025-03-08 PROCEDURE — 36600 WITHDRAWAL OF ARTERIAL BLOOD: CPT

## 2025-03-08 PROCEDURE — 84484 ASSAY OF TROPONIN QUANT: CPT

## 2025-03-08 PROCEDURE — 84145 PROCALCITONIN (PCT): CPT

## 2025-03-08 PROCEDURE — 86140 C-REACTIVE PROTEIN: CPT

## 2025-03-08 PROCEDURE — 82330 ASSAY OF CALCIUM: CPT

## 2025-03-08 PROCEDURE — 84132 ASSAY OF SERUM POTASSIUM: CPT

## 2025-03-08 PROCEDURE — 93005 ELECTROCARDIOGRAM TRACING: CPT | Performed by: FAMILY MEDICINE

## 2025-03-08 PROCEDURE — 2580000003 HC RX 258: Performed by: EMERGENCY MEDICINE

## 2025-03-08 RX ORDER — 0.9 % SODIUM CHLORIDE 0.9 %
30 INTRAVENOUS SOLUTION INTRAVENOUS ONCE
Status: COMPLETED | OUTPATIENT
Start: 2025-03-08 | End: 2025-03-08

## 2025-03-08 RX ORDER — DEXTROSE MONOHYDRATE 100 MG/ML
INJECTION, SOLUTION INTRAVENOUS CONTINUOUS PRN
Status: DISCONTINUED | OUTPATIENT
Start: 2025-03-08 | End: 2025-03-17 | Stop reason: HOSPADM

## 2025-03-08 RX ORDER — ACETAMINOPHEN 500 MG
1000 TABLET ORAL
Status: COMPLETED | OUTPATIENT
Start: 2025-03-08 | End: 2025-03-08

## 2025-03-08 RX ADMIN — VANCOMYCIN HYDROCHLORIDE 2000 MG: 5 INJECTION, POWDER, LYOPHILIZED, FOR SOLUTION INTRAVENOUS at 23:08

## 2025-03-08 RX ADMIN — ACETAMINOPHEN 1000 MG: 500 TABLET ORAL at 23:08

## 2025-03-08 RX ADMIN — DEXTROSE 250 ML: 10 SOLUTION INTRAVENOUS at 22:15

## 2025-03-08 RX ADMIN — SODIUM CHLORIDE 1000 ML: 0.9 INJECTION, SOLUTION INTRAVENOUS at 22:53

## 2025-03-08 RX ADMIN — WATER 2000 MG: 1 INJECTION INTRAMUSCULAR; INTRAVENOUS; SUBCUTANEOUS at 22:45

## 2025-03-09 PROBLEM — A41.9 SEPSIS (HCC): Status: ACTIVE | Noted: 2025-03-09

## 2025-03-09 PROBLEM — R41.82 ALTERED MENTAL STATUS: Status: ACTIVE | Noted: 2025-03-09

## 2025-03-09 LAB
ALBUMIN SERPL-MCNC: 1.4 G/DL (ref 3.5–5)
ALBUMIN/GLOB SERPL: 0.5 (ref 1.1–2.2)
ALP SERPL-CCNC: 133 U/L (ref 45–117)
ALT SERPL-CCNC: 19 U/L (ref 12–78)
ANION GAP SERPL CALC-SCNC: 6 MMOL/L (ref 2–12)
AST SERPL-CCNC: 24 U/L (ref 15–37)
BASOPHILS # BLD: 0.04 K/UL (ref 0–0.1)
BASOPHILS NFR BLD: 0.3 % (ref 0–1)
BILIRUB SERPL-MCNC: 0.3 MG/DL (ref 0.2–1)
BUN SERPL-MCNC: 29 MG/DL (ref 6–20)
BUN/CREAT SERPL: 16 (ref 12–20)
CALCIUM SERPL-MCNC: 7.6 MG/DL (ref 8.5–10.1)
CHLORIDE SERPL-SCNC: 119 MMOL/L (ref 97–108)
CO2 SERPL-SCNC: 21 MMOL/L (ref 21–32)
CREAT SERPL-MCNC: 1.79 MG/DL (ref 0.55–1.02)
DIFFERENTIAL METHOD BLD: ABNORMAL
EKG DIAGNOSIS: NORMAL
EKG Q-T INTERVAL: 338 MS
EKG QRS DURATION: 64 MS
EKG QTC CALCULATION (BAZETT): 411 MS
EKG R AXIS: 14 DEGREES
EKG T AXIS: 66 DEGREES
EKG VENTRICULAR RATE: 89 BPM
EOSINOPHIL # BLD: 0.03 K/UL (ref 0–0.4)
EOSINOPHIL NFR BLD: 0.3 % (ref 0–7)
ERYTHROCYTE [DISTWIDTH] IN BLOOD BY AUTOMATED COUNT: 15.3 % (ref 11.5–14.5)
GLOBULIN SER CALC-MCNC: 2.7 G/DL (ref 2–4)
GLUCOSE BLD STRIP.AUTO-MCNC: 101 MG/DL (ref 65–117)
GLUCOSE BLD STRIP.AUTO-MCNC: 108 MG/DL (ref 65–117)
GLUCOSE BLD STRIP.AUTO-MCNC: 177 MG/DL (ref 65–117)
GLUCOSE BLD STRIP.AUTO-MCNC: 240 MG/DL (ref 65–117)
GLUCOSE BLD STRIP.AUTO-MCNC: 272 MG/DL (ref 65–117)
GLUCOSE SERPL-MCNC: 86 MG/DL (ref 65–100)
HCT VFR BLD AUTO: 23.8 % (ref 35–47)
HGB BLD-MCNC: 7.5 G/DL (ref 11.5–16)
IMM GRANULOCYTES # BLD AUTO: 0.06 K/UL (ref 0–0.04)
IMM GRANULOCYTES NFR BLD AUTO: 0.5 % (ref 0–0.5)
LYMPHOCYTES # BLD: 3.13 K/UL (ref 0.8–3.5)
LYMPHOCYTES NFR BLD: 26.4 % (ref 12–49)
MCH RBC QN AUTO: 30.1 PG (ref 26–34)
MCHC RBC AUTO-ENTMCNC: 31.5 G/DL (ref 30–36.5)
MCV RBC AUTO: 95.6 FL (ref 80–99)
MONOCYTES # BLD: 1.03 K/UL (ref 0–1)
MONOCYTES NFR BLD: 8.7 % (ref 5–13)
NEUTS SEG # BLD: 7.57 K/UL (ref 1.8–8)
NEUTS SEG NFR BLD: 63.8 % (ref 32–75)
NRBC # BLD: 0 K/UL (ref 0–0.01)
NRBC BLD-RTO: 0 PER 100 WBC
PLATELET # BLD AUTO: 271 K/UL (ref 150–400)
PMV BLD AUTO: 10.6 FL (ref 8.9–12.9)
POTASSIUM SERPL-SCNC: 4.8 MMOL/L (ref 3.5–5.1)
PROT SERPL-MCNC: 4.1 G/DL (ref 6.4–8.2)
RBC # BLD AUTO: 2.49 M/UL (ref 3.8–5.2)
SERVICE CMNT-IMP: ABNORMAL
SERVICE CMNT-IMP: NORMAL
SERVICE CMNT-IMP: NORMAL
SODIUM SERPL-SCNC: 146 MMOL/L (ref 136–145)
TROPONIN I SERPL HS-MCNC: 57 NG/L (ref 0–51)
VANCOMYCIN SERPL-MCNC: 12.8 UG/ML
WBC # BLD AUTO: 11.9 K/UL (ref 3.6–11)

## 2025-03-09 PROCEDURE — 6370000000 HC RX 637 (ALT 250 FOR IP)

## 2025-03-09 PROCEDURE — 2580000003 HC RX 258: Performed by: FAMILY MEDICINE

## 2025-03-09 PROCEDURE — 99223 1ST HOSP IP/OBS HIGH 75: CPT | Performed by: STUDENT IN AN ORGANIZED HEALTH CARE EDUCATION/TRAINING PROGRAM

## 2025-03-09 PROCEDURE — 36415 COLL VENOUS BLD VENIPUNCTURE: CPT

## 2025-03-09 PROCEDURE — 2500000003 HC RX 250 WO HCPCS

## 2025-03-09 PROCEDURE — 85025 COMPLETE CBC W/AUTO DIFF WBC: CPT

## 2025-03-09 PROCEDURE — 93010 ELECTROCARDIOGRAM REPORT: CPT | Performed by: STUDENT IN AN ORGANIZED HEALTH CARE EDUCATION/TRAINING PROGRAM

## 2025-03-09 PROCEDURE — 84484 ASSAY OF TROPONIN QUANT: CPT

## 2025-03-09 PROCEDURE — 80053 COMPREHEN METABOLIC PANEL: CPT

## 2025-03-09 PROCEDURE — 6360000002 HC RX W HCPCS

## 2025-03-09 PROCEDURE — 94761 N-INVAS EAR/PLS OXIMETRY MLT: CPT

## 2025-03-09 PROCEDURE — 2060000000 HC ICU INTERMEDIATE R&B

## 2025-03-09 PROCEDURE — 96366 THER/PROPH/DIAG IV INF ADDON: CPT

## 2025-03-09 PROCEDURE — 82962 GLUCOSE BLOOD TEST: CPT

## 2025-03-09 PROCEDURE — 6360000002 HC RX W HCPCS: Performed by: FAMILY MEDICINE

## 2025-03-09 PROCEDURE — 2580000003 HC RX 258

## 2025-03-09 PROCEDURE — 05HY33Z INSERTION OF INFUSION DEVICE INTO UPPER VEIN, PERCUTANEOUS APPROACH: ICD-10-PCS | Performed by: FAMILY MEDICINE

## 2025-03-09 PROCEDURE — 80202 ASSAY OF VANCOMYCIN: CPT

## 2025-03-09 RX ORDER — ACETAMINOPHEN 650 MG/1
650 SUPPOSITORY RECTAL EVERY 6 HOURS PRN
Status: DISCONTINUED | OUTPATIENT
Start: 2025-03-09 | End: 2025-03-17 | Stop reason: HOSPADM

## 2025-03-09 RX ORDER — LEVOFLOXACIN 5 MG/ML
750 INJECTION, SOLUTION INTRAVENOUS
Status: DISCONTINUED | OUTPATIENT
Start: 2025-03-09 | End: 2025-03-11

## 2025-03-09 RX ORDER — FUROSEMIDE 20 MG/1
20 TABLET ORAL 3 TIMES DAILY
COMMUNITY
Start: 2025-01-25

## 2025-03-09 RX ORDER — ALBUTEROL SULFATE 90 UG/1
2 INHALANT RESPIRATORY (INHALATION) EVERY 4 HOURS PRN
COMMUNITY
Start: 2025-02-27

## 2025-03-09 RX ORDER — FERROUS SULFATE 325(65) MG
1 TABLET ORAL DAILY
COMMUNITY
Start: 2025-01-08

## 2025-03-09 RX ORDER — SUMATRIPTAN SUCCINATE 100 MG/1
100 TABLET ORAL PRN
COMMUNITY
Start: 2025-01-20

## 2025-03-09 RX ORDER — AMLODIPINE BESYLATE 10 MG/1
10 TABLET ORAL DAILY
COMMUNITY
Start: 2025-01-25

## 2025-03-09 RX ORDER — LEVOFLOXACIN 750 MG/1
750 TABLET, FILM COATED ORAL DAILY
Status: ON HOLD | COMMUNITY
Start: 2025-02-21 | End: 2025-03-17 | Stop reason: HOSPADM

## 2025-03-09 RX ORDER — TRAMADOL HYDROCHLORIDE 50 MG/1
50 TABLET ORAL EVERY 8 HOURS PRN
Status: ON HOLD | COMMUNITY
Start: 2025-01-31 | End: 2025-03-17

## 2025-03-09 RX ORDER — GABAPENTIN 100 MG/1
100 CAPSULE ORAL NIGHTLY
Status: DISCONTINUED | OUTPATIENT
Start: 2025-03-09 | End: 2025-03-09

## 2025-03-09 RX ORDER — SODIUM BICARBONATE 650 MG/1
650 TABLET ORAL 3 TIMES DAILY
COMMUNITY
Start: 2025-01-30

## 2025-03-09 RX ORDER — SODIUM BICARBONATE 650 MG/1
650 TABLET ORAL 3 TIMES DAILY
Status: DISCONTINUED | OUTPATIENT
Start: 2025-03-09 | End: 2025-03-17 | Stop reason: HOSPADM

## 2025-03-09 RX ORDER — SODIUM CHLORIDE 0.9 % (FLUSH) 0.9 %
5-40 SYRINGE (ML) INJECTION PRN
Status: DISCONTINUED | OUTPATIENT
Start: 2025-03-09 | End: 2025-03-17 | Stop reason: HOSPADM

## 2025-03-09 RX ORDER — SODIUM CHLORIDE 0.9 % (FLUSH) 0.9 %
5-40 SYRINGE (ML) INJECTION EVERY 12 HOURS SCHEDULED
Status: DISCONTINUED | OUTPATIENT
Start: 2025-03-09 | End: 2025-03-17 | Stop reason: HOSPADM

## 2025-03-09 RX ORDER — LIDOCAINE 4 G/G
1 PATCH TOPICAL DAILY
Status: DISCONTINUED | OUTPATIENT
Start: 2025-03-09 | End: 2025-03-17 | Stop reason: HOSPADM

## 2025-03-09 RX ORDER — ACETAMINOPHEN 325 MG/1
650 TABLET ORAL EVERY 6 HOURS PRN
Status: DISCONTINUED | OUTPATIENT
Start: 2025-03-09 | End: 2025-03-17 | Stop reason: HOSPADM

## 2025-03-09 RX ORDER — TRAMADOL HYDROCHLORIDE 50 MG/1
50 TABLET ORAL 2 TIMES DAILY PRN
Status: DISCONTINUED | OUTPATIENT
Start: 2025-03-09 | End: 2025-03-17 | Stop reason: HOSPADM

## 2025-03-09 RX ORDER — INSULIN LISPRO 100 [IU]/ML
0-8 INJECTION, SOLUTION INTRAVENOUS; SUBCUTANEOUS
Status: DISCONTINUED | OUTPATIENT
Start: 2025-03-09 | End: 2025-03-17 | Stop reason: HOSPADM

## 2025-03-09 RX ORDER — SODIUM CHLORIDE 9 MG/ML
INJECTION, SOLUTION INTRAVENOUS PRN
Status: DISCONTINUED | OUTPATIENT
Start: 2025-03-09 | End: 2025-03-17 | Stop reason: HOSPADM

## 2025-03-09 RX ORDER — AMLODIPINE BESYLATE 5 MG/1
10 TABLET ORAL DAILY
Status: DISCONTINUED | OUTPATIENT
Start: 2025-03-09 | End: 2025-03-17 | Stop reason: HOSPADM

## 2025-03-09 RX ORDER — TIZANIDINE 2 MG/1
2 TABLET ORAL EVERY 8 HOURS PRN
COMMUNITY
Start: 2024-12-03

## 2025-03-09 RX ORDER — ENOXAPARIN SODIUM 100 MG/ML
40 INJECTION SUBCUTANEOUS DAILY
Status: DISCONTINUED | OUTPATIENT
Start: 2025-03-09 | End: 2025-03-17 | Stop reason: HOSPADM

## 2025-03-09 RX ORDER — DICYCLOMINE HYDROCHLORIDE 10 MG/1
10 CAPSULE ORAL
COMMUNITY
Start: 2025-02-22

## 2025-03-09 RX ORDER — GABAPENTIN 100 MG/1
100 CAPSULE ORAL 3 TIMES DAILY
Status: DISCONTINUED | OUTPATIENT
Start: 2025-03-09 | End: 2025-03-17 | Stop reason: HOSPADM

## 2025-03-09 RX ORDER — GABAPENTIN 100 MG/1
200 CAPSULE ORAL 3 TIMES DAILY
COMMUNITY
Start: 2025-01-08

## 2025-03-09 RX ORDER — DICYCLOMINE HYDROCHLORIDE 10 MG/1
10 CAPSULE ORAL 4 TIMES DAILY
Status: DISCONTINUED | OUTPATIENT
Start: 2025-03-09 | End: 2025-03-17 | Stop reason: HOSPADM

## 2025-03-09 RX ORDER — SODIUM CHLORIDE, SODIUM LACTATE, POTASSIUM CHLORIDE, CALCIUM CHLORIDE 600; 310; 30; 20 MG/100ML; MG/100ML; MG/100ML; MG/100ML
INJECTION, SOLUTION INTRAVENOUS CONTINUOUS
Status: DISCONTINUED | OUTPATIENT
Start: 2025-03-09 | End: 2025-03-10

## 2025-03-09 RX ORDER — ERGOCALCIFEROL 1.25 MG/1
50000 CAPSULE, LIQUID FILLED ORAL WEEKLY
COMMUNITY
Start: 2025-02-22

## 2025-03-09 RX ORDER — INSULIN GLARGINE 100 [IU]/ML
15 INJECTION, SOLUTION SUBCUTANEOUS NIGHTLY
Status: ON HOLD | COMMUNITY
Start: 2025-01-24 | End: 2025-03-17

## 2025-03-09 RX ORDER — PANTOPRAZOLE SODIUM 40 MG/1
40 TABLET, DELAYED RELEASE ORAL
Status: DISCONTINUED | OUTPATIENT
Start: 2025-03-09 | End: 2025-03-17 | Stop reason: HOSPADM

## 2025-03-09 RX ADMIN — DICYCLOMINE HYDROCHLORIDE 10 MG: 10 CAPSULE ORAL at 18:08

## 2025-03-09 RX ADMIN — DICYCLOMINE HYDROCHLORIDE 10 MG: 10 CAPSULE ORAL at 22:00

## 2025-03-09 RX ADMIN — TRAMADOL HYDROCHLORIDE 50 MG: 50 TABLET, COATED ORAL at 21:54

## 2025-03-09 RX ADMIN — SODIUM CHLORIDE, POTASSIUM CHLORIDE, SODIUM LACTATE AND CALCIUM CHLORIDE: 600; 310; 30; 20 INJECTION, SOLUTION INTRAVENOUS at 23:28

## 2025-03-09 RX ADMIN — TRAMADOL HYDROCHLORIDE 50 MG: 50 TABLET, COATED ORAL at 10:07

## 2025-03-09 RX ADMIN — AMLODIPINE BESYLATE 10 MG: 5 TABLET ORAL at 08:23

## 2025-03-09 RX ADMIN — SODIUM BICARBONATE 650 MG: 650 TABLET ORAL at 08:23

## 2025-03-09 RX ADMIN — DICYCLOMINE HYDROCHLORIDE 10 MG: 10 CAPSULE ORAL at 12:58

## 2025-03-09 RX ADMIN — INSULIN LISPRO 2 UNITS: 100 INJECTION, SOLUTION INTRAVENOUS; SUBCUTANEOUS at 18:09

## 2025-03-09 RX ADMIN — VANCOMYCIN HYDROCHLORIDE 1000 MG: 1 INJECTION, POWDER, LYOPHILIZED, FOR SOLUTION INTRAVENOUS at 22:06

## 2025-03-09 RX ADMIN — LEVOFLOXACIN 750 MG: 5 INJECTION, SOLUTION INTRAVENOUS at 06:25

## 2025-03-09 RX ADMIN — GABAPENTIN 100 MG: 100 CAPSULE ORAL at 21:49

## 2025-03-09 RX ADMIN — PANCRELIPASE LIPASE, PANCRELIPASE PROTEASE, PANCRELIPASE AMYLASE 20000 UNITS: 5000; 17000; 24000 CAPSULE, DELAYED RELEASE ORAL at 12:58

## 2025-03-09 RX ADMIN — ACETAMINOPHEN 650 MG: 325 TABLET ORAL at 08:22

## 2025-03-09 RX ADMIN — ENOXAPARIN SODIUM 40 MG: 100 INJECTION SUBCUTANEOUS at 08:23

## 2025-03-09 RX ADMIN — DICYCLOMINE HYDROCHLORIDE 10 MG: 10 CAPSULE ORAL at 08:23

## 2025-03-09 RX ADMIN — SODIUM BICARBONATE 650 MG: 650 TABLET ORAL at 15:39

## 2025-03-09 RX ADMIN — INSULIN LISPRO 4 UNITS: 100 INJECTION, SOLUTION INTRAVENOUS; SUBCUTANEOUS at 21:49

## 2025-03-09 RX ADMIN — SODIUM CHLORIDE, PRESERVATIVE FREE 10 ML: 5 INJECTION INTRAVENOUS at 08:23

## 2025-03-09 RX ADMIN — SODIUM CHLORIDE, POTASSIUM CHLORIDE, SODIUM LACTATE AND CALCIUM CHLORIDE: 600; 310; 30; 20 INJECTION, SOLUTION INTRAVENOUS at 08:22

## 2025-03-09 RX ADMIN — SODIUM CHLORIDE, PRESERVATIVE FREE 10 ML: 5 INJECTION INTRAVENOUS at 22:16

## 2025-03-09 RX ADMIN — GABAPENTIN 100 MG: 100 CAPSULE ORAL at 15:39

## 2025-03-09 RX ADMIN — PANCRELIPASE LIPASE, PANCRELIPASE PROTEASE, PANCRELIPASE AMYLASE 20000 UNITS: 5000; 17000; 24000 CAPSULE, DELAYED RELEASE ORAL at 08:22

## 2025-03-09 RX ADMIN — SODIUM BICARBONATE 650 MG: 650 TABLET ORAL at 21:49

## 2025-03-09 RX ADMIN — GABAPENTIN 100 MG: 100 CAPSULE ORAL at 08:23

## 2025-03-09 RX ADMIN — PANTOPRAZOLE SODIUM 40 MG: 40 TABLET, DELAYED RELEASE ORAL at 06:25

## 2025-03-09 RX ADMIN — PANCRELIPASE LIPASE, PANCRELIPASE PROTEASE, PANCRELIPASE AMYLASE 20000 UNITS: 5000; 17000; 24000 CAPSULE, DELAYED RELEASE ORAL at 18:09

## 2025-03-09 RX ADMIN — SODIUM CHLORIDE, POTASSIUM CHLORIDE, SODIUM LACTATE AND CALCIUM CHLORIDE: 600; 310; 30; 20 INJECTION, SOLUTION INTRAVENOUS at 06:25

## 2025-03-09 ASSESSMENT — PAIN SCALES - GENERAL
PAINLEVEL_OUTOF10: 9
PAINLEVEL_OUTOF10: 6

## 2025-03-09 ASSESSMENT — PAIN DESCRIPTION - ORIENTATION
ORIENTATION: POSTERIOR;LEFT;RIGHT;ANTERIOR
ORIENTATION: RIGHT;LEFT;POSTERIOR;ANTERIOR

## 2025-03-09 ASSESSMENT — PAIN DESCRIPTION - LOCATION
LOCATION: ABDOMEN;BACK;LEG
LOCATION: ABDOMEN;BACK;LEG

## 2025-03-09 ASSESSMENT — PAIN DESCRIPTION - DESCRIPTORS
DESCRIPTORS: ACHING;SHOOTING
DESCRIPTORS: ACHING;DISCOMFORT

## 2025-03-09 NOTE — ED NOTES
TRANSFER - OUT REPORT:    Verbal report given to EMILIANO Mason on Candida Maza  being transferred to [facility] for routine progression of patient care       Report consisted of patient's Situation, Background, Assessment and   Recommendations(SBAR).     Information from the following report(s) Nurse Handoff Report, ED Encounter Summary, ED SBAR, Adult Overview, MAR, Recent Results, Quality Measures, and Neuro Assessment was reviewed with the receiving nurse.    Kinder Fall Assessment:                           Lines:   Peripheral IV 03/08/25 Right Antecubital (Active)   Site Assessment Clean, dry & intact 03/08/25 2243   Line Status Blood return noted;Specimen collected;Capped;Flushed 03/08/25 2243   Line Care Cap changed;Connections checked and tightened 03/08/25 2243   Phlebitis Assessment No symptoms 03/08/25 2243   Infiltration Assessment 0 03/08/25 2243   Alcohol Cap Used Yes 03/08/25 2243   Dressing Status New dressing applied;Clean, dry & intact 03/08/25 2243   Dressing Type Transparent 03/08/25 2243   Dressing Intervention New 03/08/25 2243       Peripheral IV 03/08/25 Left Wrist (Active)        Opportunity for questions and clarification was provided.      Patient transported with:  Registered Nurse

## 2025-03-09 NOTE — ED TRIAGE NOTES
Patient arrives via EMS from private residence for complaints of \"diabetic problem'    EMS reports she is from out of town visiting and family member in home reports her BG are usually in the 200's, EMS reported BG of 60 with a GCS of 9 and LKW of about 2 hours before their arrival     EMS Temp was  102.1 with room air saturations of 90%

## 2025-03-09 NOTE — ED NOTES
Patient arrived via EMS patient had episode of stool and urine incontinence     Incontinence care performed     Patient had tablets of imodium in her pockets, patient now responding stating she has had some diarrhea

## 2025-03-09 NOTE — H&P
[id number] [id number]   Office [phone]  Fax [phone]       Admission H&P     Name: Candida Maza MRN: [id number]  Sex: Female   YOB: 1960  Age: 64 y.o.  PCP: No primary care provider on file.     Source of Information: patient, medical records    Chief complaint: low blood sugar    History of Present Illness  Candida Maza is a 64 y.o. female with known history of Chronic pancreatitis, DMT2 with neuropathy, CKD IV, asthma, HTN, HLD, GERD, s/p gastric bypass, migraine who presents to the ER complaining of low blood sugar reading at home.    Patient developed blurry vision and checked blood sugar at home and noted her BG to be 40, denies having symptoms of dizziness, malaise, or impaired cognition at that time. Patient denies experiencing fevers, SOB, chest pain, headache, n/v at home. Notes that she has not eaten for the last day, states she simply did not feel well enough to eat. Was treated for ESBL UTI in last month. Has history of chronically loose stools for months, largely unchanged per patient.     In the ER:      Vitals:  Patient Vitals for the past 8 hrs:   Temp Pulse Resp BP SpO2   03/09/25 0024 -- 84 12 (!) 145/71 98 %   03/08/25 2230 (!) 103 °F (39.4 °C) 93 21 (!) 141/71 94 %   03/08/25 2223 -- 94 18 (!) 147/78 95 %         Labs:   Recent Labs     03/08/25 2232   WBC 9.0   HGB 8.0*   HCT 24.5*        Recent Labs     03/08/25  2232   *   K 5.2*   *   CO2 20*   BUN 32*     Recent Labs     03/08/25  2232   GLOB 3.0     No results for input(s): \"INR\", \"APTT\" in the last 72 hours.    Invalid input(s): \"PTP\"   Invalid input(s): \"PHI\", \"PCO2I\", \"PO2I\", \"FIO2I\"  No results for input(s): \"CPK\", \"CKMB\" in the last 72 hours.    Invalid input(s): \"TROIQ\", \"BNPP\"    Imaging:   CXR:  Mild increased interstitial opacities pulmonary edema versus multifocal  pneumonia.    CT:   CTH NAP    Treatment: S/p Vanc / cefepime, 30cc/kg    EKG: sinus

## 2025-03-09 NOTE — ED PROVIDER NOTES
Height Weight - Scale         -- 79.3 kg (174 lb 13.2 oz)             There is no height or weight on file to calculate BMI.    Physical Exam  Vitals and nursing note reviewed. Exam conducted with a chaperone present.         CONSTITUTIONAL: Frail middle-aged female who is chronically ill-appearing; in mild distress  HEAD: Normocephalic; atraumatic  EYES: PERRL; EOM intact; conjunctiva and sclera are clear bilaterally.  ENT: No rhinorrhea; normal pharynx with no tonsillar hypertrophy; mucous membranes pink/moist, no erythema, no exudate.  NECK: Supple; non-tender; no cervical lymphadenopathy  CARD: Normal S1, S2; no murmurs, rubs, or gallops. Regular rate and rhythm.  RESP: Normal respiratory effort; breath sounds clear and equal bilaterally; no wheezes, rhonchi, or rales.  ABD: Normal bowel sounds; non-distended; non-tender; no palpable organomegaly, no masses, no bruits.  Back Exam: Normal inspection; no vertebral point tenderness, no CVA tenderness. Normal range of motion.  EXT: Normal ROM in all four extremities; non-tender to palpation; no swelling or deformity; distal pulses are normal, no edema.  SKIN: Warm; dry; no rash.  NEURO: Lethargic but arousable, coherent, MATTIE-XII grossly intact, sensory and motor are non-focal.        DIAGNOSTIC RESULTS     EKG: All EKG's are interpreted by the Emergency Department Physician who either signs or Co-signs this chart in the absence of a cardiologist.    ED EKG interpretation:  Rhythm: normal sinus rhythm; and regular . Rate (approx.): 88; Axis: normal; P wave: normal; QRS interval: normal ; ST/T wave: non-specific changes; in  Lead: multiple; Other findings: normal.   Encounter Date: 03/08/25   EKG 12 Lead   Result Value    Ventricular Rate 89    QRS Duration 64    Q-T Interval 338    QTc Calculation (Bazett) 411    R Axis 14    T Axis 66    Diagnosis      Accelerated Junctional rhythm with occasional premature ventricular complexes  Low voltage QRS  Abnormal ECG  No

## 2025-03-10 ENCOUNTER — APPOINTMENT (OUTPATIENT)
Facility: HOSPITAL | Age: 65
DRG: 720 | End: 2025-03-10
Payer: MEDICAID

## 2025-03-10 PROBLEM — J81.0 ACUTE PULMONARY EDEMA (HCC): Status: ACTIVE | Noted: 2025-03-10

## 2025-03-10 PROBLEM — Z79.4 TYPE 2 DIABETES MELLITUS WITH STAGE 3B CHRONIC KIDNEY DISEASE, WITH LONG-TERM CURRENT USE OF INSULIN (HCC): Status: ACTIVE | Noted: 2025-03-10

## 2025-03-10 PROBLEM — R65.20 SEVERE SEPSIS: Status: ACTIVE | Noted: 2025-03-10

## 2025-03-10 PROBLEM — R60.0 BILATERAL LEG EDEMA: Status: ACTIVE | Noted: 2025-03-10

## 2025-03-10 PROBLEM — E11.22 TYPE 2 DIABETES MELLITUS WITH STAGE 3B CHRONIC KIDNEY DISEASE, WITH LONG-TERM CURRENT USE OF INSULIN (HCC): Status: ACTIVE | Noted: 2025-03-10

## 2025-03-10 PROBLEM — N18.32 CKD STAGE 3B, GFR 30-44 ML/MIN (HCC): Status: ACTIVE | Noted: 2025-03-10

## 2025-03-10 PROBLEM — A41.9 SEVERE SEPSIS: Status: ACTIVE | Noted: 2025-03-10

## 2025-03-10 PROBLEM — N30.01 ACUTE CYSTITIS WITH HEMATURIA: Status: ACTIVE | Noted: 2025-03-10

## 2025-03-10 PROBLEM — N18.32 TYPE 2 DIABETES MELLITUS WITH STAGE 3B CHRONIC KIDNEY DISEASE, WITH LONG-TERM CURRENT USE OF INSULIN (HCC): Status: ACTIVE | Noted: 2025-03-10

## 2025-03-10 PROBLEM — K86.1 CHRONIC PANCREATITIS (HCC): Status: ACTIVE | Noted: 2025-03-10

## 2025-03-10 LAB
ALBUMIN SERPL-MCNC: 1.3 G/DL (ref 3.5–5)
ALBUMIN/GLOB SERPL: 0.5 (ref 1.1–2.2)
ALP SERPL-CCNC: 128 U/L (ref 45–117)
ALT SERPL-CCNC: 15 U/L (ref 12–78)
ANION GAP SERPL CALC-SCNC: 4 MMOL/L (ref 2–12)
AST SERPL-CCNC: 13 U/L (ref 15–37)
BACTERIA SPEC CULT: NORMAL
BACTERIA SPEC CULT: NORMAL
BASOPHILS # BLD: 0.08 K/UL (ref 0–0.1)
BASOPHILS NFR BLD: 0.8 % (ref 0–1)
BILIRUB SERPL-MCNC: 0.3 MG/DL (ref 0.2–1)
BUN SERPL-MCNC: 29 MG/DL (ref 6–20)
BUN/CREAT SERPL: 17 (ref 12–20)
CALCIUM SERPL-MCNC: 8 MG/DL (ref 8.5–10.1)
CHLORIDE SERPL-SCNC: 117 MMOL/L (ref 97–108)
CO2 SERPL-SCNC: 21 MMOL/L (ref 21–32)
CREAT SERPL-MCNC: 1.71 MG/DL (ref 0.55–1.02)
DIFFERENTIAL METHOD BLD: ABNORMAL
ECHO AO ARCH DIAM: 2.1 CM
ECHO AO ASC DIAM: 2.8 CM
ECHO AO ASCENDING AORTA INDEX: 1.52 CM/M2
ECHO AV AREA PEAK VELOCITY: 2.5 CM2
ECHO AV AREA VTI: 2.6 CM2
ECHO AV AREA/BSA PEAK VELOCITY: 1.4 CM2/M2
ECHO AV AREA/BSA VTI: 1.4 CM2/M2
ECHO AV MEAN GRADIENT: 4 MMHG
ECHO AV MEAN VELOCITY: 1 M/S
ECHO AV PEAK GRADIENT: 8 MMHG
ECHO AV PEAK VELOCITY: 1.4 M/S
ECHO AV VELOCITY RATIO: 0.93
ECHO AV VTI: 32.9 CM
ECHO BSA: 1.9 M2
ECHO EST RA PRESSURE: 8 MMHG
ECHO LA DIAMETER INDEX: 1.63 CM/M2
ECHO LA DIAMETER: 3 CM
ECHO LA VOL A-L A2C: 60 ML (ref 22–52)
ECHO LA VOL A-L A4C: 56 ML (ref 22–52)
ECHO LA VOL BP: 55 ML (ref 22–52)
ECHO LA VOL MOD A2C: 56 ML (ref 22–52)
ECHO LA VOL MOD A4C: 51 ML (ref 22–52)
ECHO LA VOL/BSA BIPLANE: 30 ML/M2 (ref 16–34)
ECHO LA VOLUME AREA LENGTH: 60 ML
ECHO LA VOLUME INDEX A-L A2C: 33 ML/M2 (ref 16–34)
ECHO LA VOLUME INDEX A-L A4C: 30 ML/M2 (ref 16–34)
ECHO LA VOLUME INDEX AREA LENGTH: 33 ML/M2 (ref 16–34)
ECHO LA VOLUME INDEX MOD A2C: 30 ML/M2 (ref 16–34)
ECHO LA VOLUME INDEX MOD A4C: 28 ML/M2 (ref 16–34)
ECHO LV E' LATERAL VELOCITY: 10.23 CM/S
ECHO LV E' SEPTAL VELOCITY: 7.28 CM/S
ECHO LV EDV A2C: 74 ML
ECHO LV EDV A4C: 79 ML
ECHO LV EDV BP: 78 ML (ref 56–104)
ECHO LV EDV INDEX A4C: 43 ML/M2
ECHO LV EDV INDEX BP: 42 ML/M2
ECHO LV EDV NDEX A2C: 40 ML/M2
ECHO LV EF PHYSICIAN: 65 %
ECHO LV EJECTION FRACTION A2C: 53 %
ECHO LV EJECTION FRACTION A4C: 60 %
ECHO LV ESV A2C: 35 ML
ECHO LV ESV A4C: 31 ML
ECHO LV ESV BP: 34 ML (ref 19–49)
ECHO LV ESV INDEX A2C: 19 ML/M2
ECHO LV ESV INDEX A4C: 17 ML/M2
ECHO LV ESV INDEX BP: 18 ML/M2
ECHO LV FRACTIONAL SHORTENING: 29 % (ref 28–44)
ECHO LV INTERNAL DIMENSION DIASTOLE INDEX: 2.77 CM/M2
ECHO LV INTERNAL DIMENSION DIASTOLIC: 5.1 CM (ref 3.9–5.3)
ECHO LV INTERNAL DIMENSION SYSTOLIC INDEX: 1.96 CM/M2
ECHO LV INTERNAL DIMENSION SYSTOLIC: 3.6 CM
ECHO LV IVSD: 0.8 CM (ref 0.6–0.9)
ECHO LV MASS 2D: 140.5 G (ref 67–162)
ECHO LV MASS INDEX 2D: 76.3 G/M2 (ref 43–95)
ECHO LV POSTERIOR WALL DIASTOLIC: 0.8 CM (ref 0.6–0.9)
ECHO LV RELATIVE WALL THICKNESS RATIO: 0.31
ECHO LVOT AREA: 2.8 CM2
ECHO LVOT AV VTI INDEX: 0.93
ECHO LVOT DIAM: 1.9 CM
ECHO LVOT MEAN GRADIENT: 3 MMHG
ECHO LVOT PEAK GRADIENT: 7 MMHG
ECHO LVOT PEAK VELOCITY: 1.3 M/S
ECHO LVOT STROKE VOLUME INDEX: 47.1 ML/M2
ECHO LVOT SV: 86.7 ML
ECHO LVOT VTI: 30.6 CM
ECHO MV A VELOCITY: 0.94 M/S
ECHO MV E DECELERATION TIME (DT): 240.8 MS
ECHO MV E VELOCITY: 0.92 M/S
ECHO MV E/A RATIO: 0.98
ECHO MV E/E' LATERAL: 8.99
ECHO MV E/E' RATIO (AVERAGED): 10.82
ECHO MV E/E' SEPTAL: 12.64
ECHO MV REGURGITANT PEAK GRADIENT: 25 MMHG
ECHO MV REGURGITANT PEAK VELOCITY: 2.5 M/S
ECHO PV MAX VELOCITY: 0.8 M/S
ECHO PV PEAK GRADIENT: 2 MMHG
ECHO RIGHT VENTRICULAR SYSTOLIC PRESSURE (RVSP): 41 MMHG
ECHO RV FREE WALL PEAK S': 13.6 CM/S
ECHO RV INTERNAL DIMENSION: 3.8 CM
ECHO RV TAPSE: 2 CM (ref 1.7–?)
ECHO RVOT MEAN GRADIENT: 1 MMHG
ECHO RVOT PEAK GRADIENT: 2 MMHG
ECHO RVOT PEAK VELOCITY: 0.6 M/S
ECHO RVOT VTI: 16.9 CM
ECHO TV REGURGITANT MAX VELOCITY: 2.87 M/S
ECHO TV REGURGITANT PEAK GRADIENT: 33 MMHG
EOSINOPHIL # BLD: 0.14 K/UL (ref 0–0.4)
EOSINOPHIL NFR BLD: 1.4 % (ref 0–7)
ERYTHROCYTE [DISTWIDTH] IN BLOOD BY AUTOMATED COUNT: 15.1 % (ref 11.5–14.5)
GLOBULIN SER CALC-MCNC: 2.6 G/DL (ref 2–4)
GLUCOSE BLD STRIP.AUTO-MCNC: 155 MG/DL (ref 65–117)
GLUCOSE BLD STRIP.AUTO-MCNC: 171 MG/DL (ref 65–117)
GLUCOSE BLD STRIP.AUTO-MCNC: 293 MG/DL (ref 65–117)
GLUCOSE BLD STRIP.AUTO-MCNC: 304 MG/DL (ref 65–117)
GLUCOSE SERPL-MCNC: 150 MG/DL (ref 65–100)
HCT VFR BLD AUTO: 22.2 % (ref 35–47)
HGB BLD-MCNC: 7 G/DL (ref 11.5–16)
IMM GRANULOCYTES # BLD AUTO: 0.04 K/UL (ref 0–0.04)
IMM GRANULOCYTES NFR BLD AUTO: 0.4 % (ref 0–0.5)
LYMPHOCYTES # BLD: 3.84 K/UL (ref 0.8–3.5)
LYMPHOCYTES NFR BLD: 38.7 % (ref 12–49)
MCH RBC QN AUTO: 30.4 PG (ref 26–34)
MCHC RBC AUTO-ENTMCNC: 31.5 G/DL (ref 30–36.5)
MCV RBC AUTO: 96.5 FL (ref 80–99)
MONOCYTES # BLD: 0.74 K/UL (ref 0–1)
MONOCYTES NFR BLD: 7.5 % (ref 5–13)
NEUTS SEG # BLD: 5.09 K/UL (ref 1.8–8)
NEUTS SEG NFR BLD: 51.2 % (ref 32–75)
NRBC # BLD: 0 K/UL (ref 0–0.01)
NRBC BLD-RTO: 0 PER 100 WBC
PLATELET # BLD AUTO: 239 K/UL (ref 150–400)
PMV BLD AUTO: 10.5 FL (ref 8.9–12.9)
POTASSIUM SERPL-SCNC: 5 MMOL/L (ref 3.5–5.1)
PROT SERPL-MCNC: 3.9 G/DL (ref 6.4–8.2)
RBC # BLD AUTO: 2.3 M/UL (ref 3.8–5.2)
SERVICE CMNT-IMP: ABNORMAL
SERVICE CMNT-IMP: NORMAL
SODIUM SERPL-SCNC: 142 MMOL/L (ref 136–145)
WBC # BLD AUTO: 9.9 K/UL (ref 3.6–11)

## 2025-03-10 PROCEDURE — 2500000003 HC RX 250 WO HCPCS

## 2025-03-10 PROCEDURE — 86923 COMPATIBILITY TEST ELECTRIC: CPT

## 2025-03-10 PROCEDURE — 6360000002 HC RX W HCPCS

## 2025-03-10 PROCEDURE — 6370000000 HC RX 637 (ALT 250 FOR IP)

## 2025-03-10 PROCEDURE — 93306 TTE W/DOPPLER COMPLETE: CPT | Performed by: SPECIALIST

## 2025-03-10 PROCEDURE — 82962 GLUCOSE BLOOD TEST: CPT

## 2025-03-10 PROCEDURE — 2060000000 HC ICU INTERMEDIATE R&B

## 2025-03-10 PROCEDURE — 86901 BLOOD TYPING SEROLOGIC RH(D): CPT

## 2025-03-10 PROCEDURE — 99233 SBSQ HOSP IP/OBS HIGH 50: CPT

## 2025-03-10 PROCEDURE — 93306 TTE W/DOPPLER COMPLETE: CPT

## 2025-03-10 PROCEDURE — 86900 BLOOD TYPING SEROLOGIC ABO: CPT

## 2025-03-10 PROCEDURE — 80053 COMPREHEN METABOLIC PANEL: CPT

## 2025-03-10 PROCEDURE — 36415 COLL VENOUS BLD VENIPUNCTURE: CPT

## 2025-03-10 PROCEDURE — 86850 RBC ANTIBODY SCREEN: CPT

## 2025-03-10 PROCEDURE — 85025 COMPLETE CBC W/AUTO DIFF WBC: CPT

## 2025-03-10 PROCEDURE — 94761 N-INVAS EAR/PLS OXIMETRY MLT: CPT

## 2025-03-10 RX ORDER — INSULIN GLARGINE 100 [IU]/ML
7 INJECTION, SOLUTION SUBCUTANEOUS NIGHTLY
Status: DISCONTINUED | OUTPATIENT
Start: 2025-03-10 | End: 2025-03-12

## 2025-03-10 RX ORDER — INSULIN GLARGINE 100 [IU]/ML
3 INJECTION, SOLUTION SUBCUTANEOUS NIGHTLY
Status: DISCONTINUED | OUTPATIENT
Start: 2025-03-10 | End: 2025-03-10

## 2025-03-10 RX ORDER — FUROSEMIDE 20 MG/1
20 TABLET ORAL 2 TIMES DAILY
Status: DISCONTINUED | OUTPATIENT
Start: 2025-03-10 | End: 2025-03-15

## 2025-03-10 RX ORDER — FUROSEMIDE 10 MG/ML
20 INJECTION INTRAMUSCULAR; INTRAVENOUS ONCE
Status: COMPLETED | OUTPATIENT
Start: 2025-03-10 | End: 2025-03-10

## 2025-03-10 RX ADMIN — SODIUM CHLORIDE, PRESERVATIVE FREE 10 ML: 5 INJECTION INTRAVENOUS at 20:51

## 2025-03-10 RX ADMIN — ENOXAPARIN SODIUM 40 MG: 100 INJECTION SUBCUTANEOUS at 10:09

## 2025-03-10 RX ADMIN — PANTOPRAZOLE SODIUM 40 MG: 40 TABLET, DELAYED RELEASE ORAL at 06:34

## 2025-03-10 RX ADMIN — DICYCLOMINE HYDROCHLORIDE 10 MG: 10 CAPSULE ORAL at 20:51

## 2025-03-10 RX ADMIN — INSULIN GLARGINE 7 UNITS: 100 INJECTION, SOLUTION SUBCUTANEOUS at 20:51

## 2025-03-10 RX ADMIN — SODIUM CHLORIDE, PRESERVATIVE FREE 10 ML: 5 INJECTION INTRAVENOUS at 10:08

## 2025-03-10 RX ADMIN — PANCRELIPASE LIPASE, PANCRELIPASE PROTEASE, PANCRELIPASE AMYLASE 20000 UNITS: 5000; 17000; 24000 CAPSULE, DELAYED RELEASE ORAL at 17:22

## 2025-03-10 RX ADMIN — AMLODIPINE BESYLATE 10 MG: 5 TABLET ORAL at 10:07

## 2025-03-10 RX ADMIN — INSULIN LISPRO 4 UNITS: 100 INJECTION, SOLUTION INTRAVENOUS; SUBCUTANEOUS at 13:14

## 2025-03-10 RX ADMIN — SODIUM BICARBONATE 650 MG: 650 TABLET ORAL at 13:15

## 2025-03-10 RX ADMIN — SODIUM BICARBONATE 650 MG: 650 TABLET ORAL at 10:07

## 2025-03-10 RX ADMIN — SODIUM BICARBONATE 650 MG: 650 TABLET ORAL at 20:51

## 2025-03-10 RX ADMIN — DICYCLOMINE HYDROCHLORIDE 10 MG: 10 CAPSULE ORAL at 13:15

## 2025-03-10 RX ADMIN — INSULIN LISPRO 6 UNITS: 100 INJECTION, SOLUTION INTRAVENOUS; SUBCUTANEOUS at 17:22

## 2025-03-10 RX ADMIN — FUROSEMIDE 20 MG: 20 TABLET ORAL at 17:22

## 2025-03-10 RX ADMIN — GABAPENTIN 100 MG: 100 CAPSULE ORAL at 20:51

## 2025-03-10 RX ADMIN — FUROSEMIDE 20 MG: 10 INJECTION, SOLUTION INTRAMUSCULAR; INTRAVENOUS at 06:34

## 2025-03-10 RX ADMIN — PANCRELIPASE LIPASE, PANCRELIPASE PROTEASE, PANCRELIPASE AMYLASE 20000 UNITS: 5000; 17000; 24000 CAPSULE, DELAYED RELEASE ORAL at 13:15

## 2025-03-10 RX ADMIN — TRAMADOL HYDROCHLORIDE 50 MG: 50 TABLET, COATED ORAL at 10:21

## 2025-03-10 RX ADMIN — GABAPENTIN 100 MG: 100 CAPSULE ORAL at 13:15

## 2025-03-10 RX ADMIN — DICYCLOMINE HYDROCHLORIDE 10 MG: 10 CAPSULE ORAL at 17:22

## 2025-03-10 RX ADMIN — GABAPENTIN 100 MG: 100 CAPSULE ORAL at 10:07

## 2025-03-10 RX ADMIN — PANCRELIPASE LIPASE, PANCRELIPASE PROTEASE, PANCRELIPASE AMYLASE 20000 UNITS: 5000; 17000; 24000 CAPSULE, DELAYED RELEASE ORAL at 10:07

## 2025-03-10 RX ADMIN — DICYCLOMINE HYDROCHLORIDE 10 MG: 10 CAPSULE ORAL at 10:07

## 2025-03-10 ASSESSMENT — PAIN DESCRIPTION - ORIENTATION
ORIENTATION: LOWER
ORIENTATION: LOWER;MID
ORIENTATION: LOWER
ORIENTATION: MID

## 2025-03-10 ASSESSMENT — PAIN SCALES - GENERAL
PAINLEVEL_OUTOF10: 1
PAINLEVEL_OUTOF10: 8
PAINLEVEL_OUTOF10: 6
PAINLEVEL_OUTOF10: 8
PAINLEVEL_OUTOF10: 6
PAINLEVEL_OUTOF10: 6

## 2025-03-10 ASSESSMENT — PAIN DESCRIPTION - DESCRIPTORS
DESCRIPTORS: ACHING
DESCRIPTORS: SHARP
DESCRIPTORS: ACHING
DESCRIPTORS: SHOOTING

## 2025-03-10 ASSESSMENT — PAIN DESCRIPTION - LOCATION
LOCATION: BACK
LOCATION: BACK;ABDOMEN
LOCATION: BACK;ABDOMEN
LOCATION: BACK;LEG;ABDOMEN

## 2025-03-10 NOTE — CONSENT
Informed Consent for Blood Component Transfusion Note    I have discussed with the patient the rationale for blood component transfusion; its benefits in treating or preventing fatigue, organ damage, or death; and its risk which includes mild transfusion reactions, rare risk of blood borne infection, or more serious but rare reactions. I have discussed the alternatives to transfusion, including the risk and consequences of not receiving transfusion. The patient had an opportunity to ask questions and had agreed to proceed with transfusion of blood components.    Electronically signed by Krystle Garibay MD on 3/10/25 at 8:14 AM EDT

## 2025-03-10 NOTE — CARE COORDINATION
3/10/2025  4:26 PM      Care Management Initial Assessment  3/10/2025 4:26 PM  If patient is discharged prior to next notation, then this note serves as note for discharge by case management.    Reason for Admission:   Hypernatremia [E87.0]  Hypoglycemia [E16.2]  HARPER (acute kidney injury) [N17.9]  Sepsis (HCC) [A41.9]  Severe sepsis (HCC) [A41.9, R65.20]  Altered mental status, unspecified altered mental status type [R41.82]  Community acquired pneumonia, unspecified laterality [J18.9]         Patient Admission Status: Inpatient  Date Admitted to INP: 3/9/25  RUR: Readmission Risk Score: 16.2    Hospitalization in the last 30 days (Readmission):  No        Advance Care Planning:  Code Status: Full Code  Primary Healthcare Decision Maker: (P) Legal Next of Kin   Advance Directive: has NO advanced directive - not interested in additional information     __________________________________________________________________________  Assessment:      03/10/25 1621   Service Assessment   Patient Orientation Alert and Oriented   Cognition Alert   History Provided By Patient   Primary Caregiver Self   Support Systems Family Members;Children   Patient's Healthcare Decision Maker is: Legal Next of Kin   PCP Verified by CM Yes  (Keena Leyva MD)   Last Visit to PCP Within last 3 months   Prior Functional Level Independent in ADLs/IADLs;Assistance with the following:;Mobility  (cane for mobility)   Current Functional Level Other (see comment)  (PT/OT evals pending)   Can patient return to prior living arrangement Yes   Ability to make needs known: Good   Family able to assist with home care needs: Yes   Financial Resources Medicaid  ([facility] Medicare)   Community Resources None   Social/Functional History   Lives With Son   Type of Home House   Home Layout One level   Home Access Stairs to enter with rails   Entrance Stairs - Number of Steps 3   Entrance Stairs - Rails Both   Bathroom Shower/Tub Tub/Shower unit   Bathroom Toilet

## 2025-03-11 LAB
ALBUMIN SERPL-MCNC: 1.2 G/DL (ref 3.5–5)
ALBUMIN/GLOB SERPL: 0.5 (ref 1.1–2.2)
ALP SERPL-CCNC: 116 U/L (ref 45–117)
ALT SERPL-CCNC: 12 U/L (ref 12–78)
ANION GAP SERPL CALC-SCNC: 3 MMOL/L (ref 2–12)
AST SERPL-CCNC: 11 U/L (ref 15–37)
BASOPHILS # BLD: 0.05 K/UL (ref 0–0.1)
BASOPHILS NFR BLD: 0.5 % (ref 0–1)
BILIRUB SERPL-MCNC: 0.3 MG/DL (ref 0.2–1)
BUN SERPL-MCNC: 30 MG/DL (ref 6–20)
BUN/CREAT SERPL: 16 (ref 12–20)
CALCIUM SERPL-MCNC: 7.7 MG/DL (ref 8.5–10.1)
CHLORIDE SERPL-SCNC: 114 MMOL/L (ref 97–108)
CO2 SERPL-SCNC: 21 MMOL/L (ref 21–32)
CREAT SERPL-MCNC: 1.88 MG/DL (ref 0.55–1.02)
DIFFERENTIAL METHOD BLD: ABNORMAL
EKG DIAGNOSIS: NORMAL
EKG Q-T INTERVAL: 336 MS
EKG QRS DURATION: 64 MS
EKG QTC CALCULATION (BAZETT): 406 MS
EKG R AXIS: 8 DEGREES
EKG T AXIS: 59 DEGREES
EKG VENTRICULAR RATE: 88 BPM
EOSINOPHIL # BLD: 0.1 K/UL (ref 0–0.4)
EOSINOPHIL NFR BLD: 1 % (ref 0–7)
ERYTHROCYTE [DISTWIDTH] IN BLOOD BY AUTOMATED COUNT: 14.7 % (ref 11.5–14.5)
GLOBULIN SER CALC-MCNC: 2.6 G/DL (ref 2–4)
GLUCOSE BLD STRIP.AUTO-MCNC: 158 MG/DL (ref 65–117)
GLUCOSE BLD STRIP.AUTO-MCNC: 216 MG/DL (ref 65–117)
GLUCOSE BLD STRIP.AUTO-MCNC: 221 MG/DL (ref 65–117)
GLUCOSE BLD STRIP.AUTO-MCNC: 229 MG/DL (ref 65–117)
GLUCOSE SERPL-MCNC: 189 MG/DL (ref 65–100)
HCT VFR BLD AUTO: 21.4 % (ref 35–47)
HCT VFR BLD AUTO: 22.5 % (ref 35–47)
HGB BLD-MCNC: 6.7 G/DL (ref 11.5–16)
HGB BLD-MCNC: 7.3 G/DL (ref 11.5–16)
HISTORY CHECK: NORMAL
IMM GRANULOCYTES # BLD AUTO: 0.03 K/UL (ref 0–0.04)
IMM GRANULOCYTES NFR BLD AUTO: 0.3 % (ref 0–0.5)
LYMPHOCYTES # BLD: 3.72 K/UL (ref 0.8–3.5)
LYMPHOCYTES NFR BLD: 38.8 % (ref 12–49)
MCH RBC QN AUTO: 30.2 PG (ref 26–34)
MCHC RBC AUTO-ENTMCNC: 31.3 G/DL (ref 30–36.5)
MCV RBC AUTO: 96.4 FL (ref 80–99)
MONOCYTES # BLD: 0.64 K/UL (ref 0–1)
MONOCYTES NFR BLD: 6.7 % (ref 5–13)
NEUTS SEG # BLD: 5.06 K/UL (ref 1.8–8)
NEUTS SEG NFR BLD: 52.7 % (ref 32–75)
NRBC # BLD: 0 K/UL (ref 0–0.01)
NRBC BLD-RTO: 0 PER 100 WBC
PLATELET # BLD AUTO: 238 K/UL (ref 150–400)
PMV BLD AUTO: 10.2 FL (ref 8.9–12.9)
POTASSIUM SERPL-SCNC: 4.6 MMOL/L (ref 3.5–5.1)
PROT SERPL-MCNC: 3.8 G/DL (ref 6.4–8.2)
RBC # BLD AUTO: 2.22 M/UL (ref 3.8–5.2)
RBC MORPH BLD: ABNORMAL
SERVICE CMNT-IMP: ABNORMAL
SODIUM SERPL-SCNC: 138 MMOL/L (ref 136–145)
WBC # BLD AUTO: 9.6 K/UL (ref 3.6–11)

## 2025-03-11 PROCEDURE — 6370000000 HC RX 637 (ALT 250 FOR IP)

## 2025-03-11 PROCEDURE — 85018 HEMOGLOBIN: CPT

## 2025-03-11 PROCEDURE — 82962 GLUCOSE BLOOD TEST: CPT

## 2025-03-11 PROCEDURE — 2500000003 HC RX 250 WO HCPCS

## 2025-03-11 PROCEDURE — 1100000000 HC RM PRIVATE

## 2025-03-11 PROCEDURE — 6360000002 HC RX W HCPCS

## 2025-03-11 PROCEDURE — 93010 ELECTROCARDIOGRAM REPORT: CPT | Performed by: SPECIALIST

## 2025-03-11 PROCEDURE — 97116 GAIT TRAINING THERAPY: CPT

## 2025-03-11 PROCEDURE — 80053 COMPREHEN METABOLIC PANEL: CPT

## 2025-03-11 PROCEDURE — 99232 SBSQ HOSP IP/OBS MODERATE 35: CPT

## 2025-03-11 PROCEDURE — 94761 N-INVAS EAR/PLS OXIMETRY MLT: CPT

## 2025-03-11 PROCEDURE — 97161 PT EVAL LOW COMPLEX 20 MIN: CPT

## 2025-03-11 PROCEDURE — 85014 HEMATOCRIT: CPT

## 2025-03-11 PROCEDURE — 36415 COLL VENOUS BLD VENIPUNCTURE: CPT

## 2025-03-11 PROCEDURE — 85025 COMPLETE CBC W/AUTO DIFF WBC: CPT

## 2025-03-11 RX ORDER — LEVOFLOXACIN 750 MG/1
750 TABLET, FILM COATED ORAL EVERY OTHER DAY
Status: COMPLETED | OUTPATIENT
Start: 2025-03-13 | End: 2025-03-15

## 2025-03-11 RX ADMIN — INSULIN GLARGINE 7 UNITS: 100 INJECTION, SOLUTION SUBCUTANEOUS at 23:12

## 2025-03-11 RX ADMIN — ACETAMINOPHEN 650 MG: 325 TABLET ORAL at 15:22

## 2025-03-11 RX ADMIN — GABAPENTIN 100 MG: 100 CAPSULE ORAL at 08:50

## 2025-03-11 RX ADMIN — PANCRELIPASE LIPASE, PANCRELIPASE PROTEASE, PANCRELIPASE AMYLASE 20000 UNITS: 5000; 17000; 24000 CAPSULE, DELAYED RELEASE ORAL at 12:33

## 2025-03-11 RX ADMIN — LEVOFLOXACIN 750 MG: 5 INJECTION, SOLUTION INTRAVENOUS at 05:11

## 2025-03-11 RX ADMIN — GABAPENTIN 100 MG: 100 CAPSULE ORAL at 13:01

## 2025-03-11 RX ADMIN — INSULIN LISPRO 2 UNITS: 100 INJECTION, SOLUTION INTRAVENOUS; SUBCUTANEOUS at 12:33

## 2025-03-11 RX ADMIN — PANCRELIPASE LIPASE, PANCRELIPASE PROTEASE, PANCRELIPASE AMYLASE 20000 UNITS: 5000; 17000; 24000 CAPSULE, DELAYED RELEASE ORAL at 17:33

## 2025-03-11 RX ADMIN — PANCRELIPASE LIPASE, PANCRELIPASE PROTEASE, PANCRELIPASE AMYLASE 20000 UNITS: 5000; 17000; 24000 CAPSULE, DELAYED RELEASE ORAL at 08:49

## 2025-03-11 RX ADMIN — DICYCLOMINE HYDROCHLORIDE 10 MG: 10 CAPSULE ORAL at 12:33

## 2025-03-11 RX ADMIN — DICYCLOMINE HYDROCHLORIDE 10 MG: 10 CAPSULE ORAL at 17:33

## 2025-03-11 RX ADMIN — TRAMADOL HYDROCHLORIDE 50 MG: 50 TABLET, COATED ORAL at 23:13

## 2025-03-11 RX ADMIN — DICYCLOMINE HYDROCHLORIDE 10 MG: 10 CAPSULE ORAL at 23:13

## 2025-03-11 RX ADMIN — SODIUM CHLORIDE, PRESERVATIVE FREE 10 ML: 5 INJECTION INTRAVENOUS at 23:15

## 2025-03-11 RX ADMIN — SODIUM BICARBONATE 650 MG: 650 TABLET ORAL at 08:50

## 2025-03-11 RX ADMIN — SODIUM CHLORIDE, PRESERVATIVE FREE 10 ML: 5 INJECTION INTRAVENOUS at 08:53

## 2025-03-11 RX ADMIN — TRAMADOL HYDROCHLORIDE 50 MG: 50 TABLET, COATED ORAL at 10:42

## 2025-03-11 RX ADMIN — INSULIN LISPRO 2 UNITS: 100 INJECTION, SOLUTION INTRAVENOUS; SUBCUTANEOUS at 23:13

## 2025-03-11 RX ADMIN — INSULIN LISPRO 2 UNITS: 100 INJECTION, SOLUTION INTRAVENOUS; SUBCUTANEOUS at 17:43

## 2025-03-11 RX ADMIN — GABAPENTIN 100 MG: 100 CAPSULE ORAL at 23:14

## 2025-03-11 RX ADMIN — FUROSEMIDE 20 MG: 20 TABLET ORAL at 17:33

## 2025-03-11 RX ADMIN — SODIUM BICARBONATE 650 MG: 650 TABLET ORAL at 12:33

## 2025-03-11 RX ADMIN — TRAMADOL HYDROCHLORIDE 50 MG: 50 TABLET, COATED ORAL at 00:25

## 2025-03-11 RX ADMIN — AMLODIPINE BESYLATE 10 MG: 5 TABLET ORAL at 08:49

## 2025-03-11 RX ADMIN — ENOXAPARIN SODIUM 40 MG: 100 INJECTION SUBCUTANEOUS at 08:54

## 2025-03-11 RX ADMIN — PANTOPRAZOLE SODIUM 40 MG: 40 TABLET, DELAYED RELEASE ORAL at 07:53

## 2025-03-11 RX ADMIN — SODIUM BICARBONATE 650 MG: 650 TABLET ORAL at 23:14

## 2025-03-11 RX ADMIN — DICYCLOMINE HYDROCHLORIDE 10 MG: 10 CAPSULE ORAL at 08:50

## 2025-03-11 RX ADMIN — FUROSEMIDE 20 MG: 20 TABLET ORAL at 08:49

## 2025-03-11 ASSESSMENT — PAIN DESCRIPTION - DESCRIPTORS
DESCRIPTORS: ACHING
DESCRIPTORS: SORE
DESCRIPTORS: DULL
DESCRIPTORS: DULL;POUNDING
DESCRIPTORS: DULL

## 2025-03-11 ASSESSMENT — PAIN SCALES - GENERAL
PAINLEVEL_OUTOF10: 7
PAINLEVEL_OUTOF10: 7
PAINLEVEL_OUTOF10: 3
PAINLEVEL_OUTOF10: 7
PAINLEVEL_OUTOF10: 8
PAINLEVEL_OUTOF10: 1
PAINLEVEL_OUTOF10: 8
PAINLEVEL_OUTOF10: 7
PAINLEVEL_OUTOF10: 3

## 2025-03-11 ASSESSMENT — PAIN DESCRIPTION - LOCATION
LOCATION: ABDOMEN;BACK
LOCATION: ABDOMEN;BACK
LOCATION: GENERALIZED
LOCATION: ABDOMEN;BACK
LOCATION: BACK;ABDOMEN

## 2025-03-11 ASSESSMENT — PAIN DESCRIPTION - ORIENTATION
ORIENTATION: LOWER;LEFT
ORIENTATION: LOWER;LEFT
ORIENTATION: ANTERIOR;POSTERIOR
ORIENTATION: LEFT
ORIENTATION: LEFT;LOWER

## 2025-03-11 ASSESSMENT — PAIN DESCRIPTION - DIRECTION: RADIATING_TOWARDS: NONRADIATING

## 2025-03-11 ASSESSMENT — PAIN DESCRIPTION - FREQUENCY: FREQUENCY: INTERMITTENT

## 2025-03-11 ASSESSMENT — PAIN - FUNCTIONAL ASSESSMENT: PAIN_FUNCTIONAL_ASSESSMENT: PREVENTS OR INTERFERES SOME ACTIVE ACTIVITIES AND ADLS

## 2025-03-11 ASSESSMENT — PAIN DESCRIPTION - ONSET: ONSET: ON-GOING

## 2025-03-11 ASSESSMENT — PAIN DESCRIPTION - PAIN TYPE: TYPE: CHRONIC PAIN

## 2025-03-11 NOTE — CARE COORDINATION
3/11/2025  1:51 PM     03/11/25 1351   Service Assessment   Patient Orientation Alert and Oriented   Discharge Planning   Patient expects to be discharged to: Acute rehab   Services At/After Discharge   Transition of Care Consult (CM Consult) Acute Rehab   Condition of Participation: Discharge Planning   The Plan for Transition of Care is related to the following treatment goals: Sepsis  -IPR   The Patient and/or Patient Representative was provided with a Choice of Provider? (S)  Patient   The Patient and/Or Patient Representative agree with the Discharge Plan? (S)  Yes   Freedom of Choice list was provided with basic dialogue that supports the patient's individualized plan of care/goals, treatment preferences, and shares the quality data associated with the providers?  (S)  Yes     Care Management Progress Note    Reason for Admission:   Hypernatremia [E87.0]  Hypoglycemia [E16.2]  HARPER (acute kidney injury) [N17.9]  Sepsis (HCC) [A41.9]  Severe sepsis (HCC) [A41.9, R65.20]  Altered mental status, unspecified altered mental status type [R41.82]  Community acquired pneumonia, unspecified laterality [J18.9]         Patient Admission Status: Inpatient  RUR: 16%   Hospitalization in the last 30 days (Readmission):  No        Transition of care plan:   Pt discussed in IDR is continuing to require medical management  PT/OT treating, recc IPR    Dispo IPR vs Home w/ [facility]   Date FOC offered: 3/11  Accepting facility: Pending  [facility]  Date authorization started with reference number: pending  Date authorization received and expires: pending   Discharge plan communicated with patient and/or discharge caregiver: Yes    Outpatient follow-up.  Transport at discharge: Family   RADHIKA Kruger

## 2025-03-12 LAB
ALBUMIN SERPL-MCNC: 1.2 G/DL (ref 3.5–5)
ALBUMIN/GLOB SERPL: 0.5 (ref 1.1–2.2)
ALP SERPL-CCNC: 107 U/L (ref 45–117)
ALT SERPL-CCNC: 12 U/L (ref 12–78)
ANION GAP SERPL CALC-SCNC: 6 MMOL/L (ref 2–12)
AST SERPL-CCNC: 9 U/L (ref 15–37)
BASOPHILS # BLD: 0.03 K/UL (ref 0–0.1)
BASOPHILS NFR BLD: 0.3 % (ref 0–1)
BILIRUB SERPL-MCNC: 0.6 MG/DL (ref 0.2–1)
BUN SERPL-MCNC: 27 MG/DL (ref 6–20)
BUN/CREAT SERPL: 16 (ref 12–20)
CALCIUM SERPL-MCNC: 7.7 MG/DL (ref 8.5–10.1)
CHLORIDE SERPL-SCNC: 112 MMOL/L (ref 97–108)
CO2 SERPL-SCNC: 22 MMOL/L (ref 21–32)
CREAT SERPL-MCNC: 1.65 MG/DL (ref 0.55–1.02)
DIFFERENTIAL METHOD BLD: ABNORMAL
EOSINOPHIL # BLD: 0.09 K/UL (ref 0–0.4)
EOSINOPHIL NFR BLD: 1 % (ref 0–7)
ERYTHROCYTE [DISTWIDTH] IN BLOOD BY AUTOMATED COUNT: 14.5 % (ref 11.5–14.5)
GLOBULIN SER CALC-MCNC: 2.5 G/DL (ref 2–4)
GLUCOSE BLD STRIP.AUTO-MCNC: 150 MG/DL (ref 65–117)
GLUCOSE BLD STRIP.AUTO-MCNC: 163 MG/DL (ref 65–117)
GLUCOSE BLD STRIP.AUTO-MCNC: 301 MG/DL (ref 65–117)
GLUCOSE BLD STRIP.AUTO-MCNC: 62 MG/DL (ref 65–117)
GLUCOSE BLD STRIP.AUTO-MCNC: 94 MG/DL (ref 65–117)
GLUCOSE SERPL-MCNC: 142 MG/DL (ref 65–100)
HCT VFR BLD AUTO: 21.4 % (ref 35–47)
HCT VFR BLD AUTO: 23.1 % (ref 35–47)
HGB BLD-MCNC: 6.9 G/DL (ref 11.5–16)
HGB BLD-MCNC: 7.5 G/DL (ref 11.5–16)
IMM GRANULOCYTES # BLD AUTO: 0.04 K/UL (ref 0–0.04)
IMM GRANULOCYTES NFR BLD AUTO: 0.5 % (ref 0–0.5)
LYMPHOCYTES # BLD: 3.4 K/UL (ref 0.8–3.5)
LYMPHOCYTES NFR BLD: 39.4 % (ref 12–49)
MCH RBC QN AUTO: 30.7 PG (ref 26–34)
MCHC RBC AUTO-ENTMCNC: 32.2 G/DL (ref 30–36.5)
MCV RBC AUTO: 95.1 FL (ref 80–99)
MONOCYTES # BLD: 0.65 K/UL (ref 0–1)
MONOCYTES NFR BLD: 7.5 % (ref 5–13)
NEUTS SEG # BLD: 4.41 K/UL (ref 1.8–8)
NEUTS SEG NFR BLD: 51.3 % (ref 32–75)
NRBC # BLD: 0 K/UL (ref 0–0.01)
NRBC BLD-RTO: 0 PER 100 WBC
PLATELET # BLD AUTO: 232 K/UL (ref 150–400)
PMV BLD AUTO: 10.2 FL (ref 8.9–12.9)
POTASSIUM SERPL-SCNC: 4.4 MMOL/L (ref 3.5–5.1)
PROT SERPL-MCNC: 3.7 G/DL (ref 6.4–8.2)
RBC # BLD AUTO: 2.25 M/UL (ref 3.8–5.2)
SERVICE CMNT-IMP: ABNORMAL
SERVICE CMNT-IMP: NORMAL
SODIUM SERPL-SCNC: 140 MMOL/L (ref 136–145)
WBC # BLD AUTO: 8.6 K/UL (ref 3.6–11)

## 2025-03-12 PROCEDURE — 94761 N-INVAS EAR/PLS OXIMETRY MLT: CPT

## 2025-03-12 PROCEDURE — 6370000000 HC RX 637 (ALT 250 FOR IP)

## 2025-03-12 PROCEDURE — 97116 GAIT TRAINING THERAPY: CPT

## 2025-03-12 PROCEDURE — 85025 COMPLETE CBC W/AUTO DIFF WBC: CPT

## 2025-03-12 PROCEDURE — 85018 HEMOGLOBIN: CPT

## 2025-03-12 PROCEDURE — 97530 THERAPEUTIC ACTIVITIES: CPT

## 2025-03-12 PROCEDURE — 6360000002 HC RX W HCPCS

## 2025-03-12 PROCEDURE — 36415 COLL VENOUS BLD VENIPUNCTURE: CPT

## 2025-03-12 PROCEDURE — 82962 GLUCOSE BLOOD TEST: CPT

## 2025-03-12 PROCEDURE — 85014 HEMATOCRIT: CPT

## 2025-03-12 PROCEDURE — 2500000003 HC RX 250 WO HCPCS

## 2025-03-12 PROCEDURE — 99232 SBSQ HOSP IP/OBS MODERATE 35: CPT

## 2025-03-12 PROCEDURE — 1100000000 HC RM PRIVATE

## 2025-03-12 PROCEDURE — 80053 COMPREHEN METABOLIC PANEL: CPT

## 2025-03-12 RX ORDER — INSULIN GLARGINE 100 [IU]/ML
5 INJECTION, SOLUTION SUBCUTANEOUS NIGHTLY
Status: DISCONTINUED | OUTPATIENT
Start: 2025-03-12 | End: 2025-03-16

## 2025-03-12 RX ADMIN — FUROSEMIDE 20 MG: 20 TABLET ORAL at 10:16

## 2025-03-12 RX ADMIN — AMLODIPINE BESYLATE 10 MG: 5 TABLET ORAL at 10:16

## 2025-03-12 RX ADMIN — PANTOPRAZOLE SODIUM 40 MG: 40 TABLET, DELAYED RELEASE ORAL at 08:46

## 2025-03-12 RX ADMIN — SODIUM BICARBONATE 650 MG: 650 TABLET ORAL at 10:15

## 2025-03-12 RX ADMIN — SODIUM CHLORIDE, PRESERVATIVE FREE 10 ML: 5 INJECTION INTRAVENOUS at 21:34

## 2025-03-12 RX ADMIN — PANCRELIPASE LIPASE, PANCRELIPASE PROTEASE, PANCRELIPASE AMYLASE 20000 UNITS: 5000; 17000; 24000 CAPSULE, DELAYED RELEASE ORAL at 18:22

## 2025-03-12 RX ADMIN — PANCRELIPASE LIPASE, PANCRELIPASE PROTEASE, PANCRELIPASE AMYLASE 20000 UNITS: 5000; 17000; 24000 CAPSULE, DELAYED RELEASE ORAL at 10:16

## 2025-03-12 RX ADMIN — PANCRELIPASE LIPASE, PANCRELIPASE PROTEASE, PANCRELIPASE AMYLASE 20000 UNITS: 5000; 17000; 24000 CAPSULE, DELAYED RELEASE ORAL at 15:55

## 2025-03-12 RX ADMIN — ACETAMINOPHEN 650 MG: 325 TABLET ORAL at 16:00

## 2025-03-12 RX ADMIN — GABAPENTIN 100 MG: 100 CAPSULE ORAL at 21:35

## 2025-03-12 RX ADMIN — ENOXAPARIN SODIUM 40 MG: 100 INJECTION SUBCUTANEOUS at 11:03

## 2025-03-12 RX ADMIN — SODIUM BICARBONATE 650 MG: 650 TABLET ORAL at 21:35

## 2025-03-12 RX ADMIN — DICYCLOMINE HYDROCHLORIDE 10 MG: 10 CAPSULE ORAL at 21:35

## 2025-03-12 RX ADMIN — TRAMADOL HYDROCHLORIDE 50 MG: 50 TABLET, COATED ORAL at 10:22

## 2025-03-12 RX ADMIN — DICYCLOMINE HYDROCHLORIDE 10 MG: 10 CAPSULE ORAL at 15:55

## 2025-03-12 RX ADMIN — DICYCLOMINE HYDROCHLORIDE 10 MG: 10 CAPSULE ORAL at 18:22

## 2025-03-12 RX ADMIN — SODIUM BICARBONATE 650 MG: 650 TABLET ORAL at 15:55

## 2025-03-12 RX ADMIN — SODIUM CHLORIDE, PRESERVATIVE FREE 10 ML: 5 INJECTION INTRAVENOUS at 10:17

## 2025-03-12 RX ADMIN — FUROSEMIDE 20 MG: 20 TABLET ORAL at 18:22

## 2025-03-12 RX ADMIN — GABAPENTIN 100 MG: 100 CAPSULE ORAL at 15:55

## 2025-03-12 RX ADMIN — GABAPENTIN 100 MG: 100 CAPSULE ORAL at 10:16

## 2025-03-12 RX ADMIN — DICYCLOMINE HYDROCHLORIDE 10 MG: 10 CAPSULE ORAL at 10:15

## 2025-03-12 RX ADMIN — INSULIN LISPRO 6 UNITS: 100 INJECTION, SOLUTION INTRAVENOUS; SUBCUTANEOUS at 22:13

## 2025-03-12 RX ADMIN — INSULIN GLARGINE 5 UNITS: 100 INJECTION, SOLUTION SUBCUTANEOUS at 22:13

## 2025-03-12 ASSESSMENT — PAIN SCALES - GENERAL: PAINLEVEL_OUTOF10: 7

## 2025-03-12 ASSESSMENT — PAIN DESCRIPTION - DESCRIPTORS
DESCRIPTORS: SORE
DESCRIPTORS: SORE

## 2025-03-12 ASSESSMENT — PAIN DESCRIPTION - LOCATION
LOCATION: ABDOMEN;BACK
LOCATION: BACK;ABDOMEN

## 2025-03-12 ASSESSMENT — PAIN DESCRIPTION - ORIENTATION: ORIENTATION: MID;LOWER;LEFT

## 2025-03-12 NOTE — FLOWSHEET NOTE
8965 Dr. Krystle yanez called to floor to inquire about repeat cbc. It had not been done yet so I placed stat repeat and gustabo sample and sent it to lab for analysis    Pain Refusal Text: I offered to prescribe pain medication but the patient refused to take this medication.

## 2025-03-12 NOTE — CARE COORDINATION
Care Management Progress Note      Reason for Admission:   Hypernatremia [E87.0]  Hypoglycemia [E16.2]  HARPER (acute kidney injury) [N17.9]  Sepsis (HCC) [A41.9]  Severe sepsis (HCC) [A41.9, R65.20]  Altered mental status, unspecified altered mental status type [R41.82]  Community acquired pneumonia, unspecified laterality [J18.9]         Patient Admission Status: Inpatient  RUR: 16%  Hospitalization in the last 30 days (Readmission):  No        Transition of care plan:  Patient was discussed in IDR and continues to be medically managed. Discussed in IDR the need for an OT consult for IPR MD to review/approve.     Dispo: TBD. Preference is for IPR at [facility]. MD approval remains pending, waiting on an OT eval (ordered today 3/12).  If  IPR:  Date FOC offered: 3/11.  Accepting facility:   Date authorization started with reference number: Patient will require insurance auth under medicaid.  Date authorization received and expires:     Discharge plan communicated with patient and/or discharge caregiver: Yes.      Date 1st IMM letter given: N/a. Patient has a managed medicaid plan.     Outpatient follow-up.    Transport at discharge: family.         ___________________________________________   Dania Jeff RN Case Manager  3/12/2025   11:56 AM

## 2025-03-13 LAB
ALBUMIN SERPL-MCNC: 1.2 G/DL (ref 3.5–5)
ALBUMIN/GLOB SERPL: 0.4 (ref 1.1–2.2)
ALP SERPL-CCNC: 130 U/L (ref 45–117)
ALT SERPL-CCNC: 16 U/L (ref 12–78)
ANION GAP SERPL CALC-SCNC: 7 MMOL/L (ref 2–12)
AST SERPL-CCNC: 20 U/L (ref 15–37)
BACTERIA SPEC CULT: ABNORMAL
BASOPHILS # BLD: 0.03 K/UL (ref 0–0.1)
BASOPHILS NFR BLD: 0.4 % (ref 0–1)
BILIRUB SERPL-MCNC: 0.5 MG/DL (ref 0.2–1)
BUN SERPL-MCNC: 27 MG/DL (ref 6–20)
BUN/CREAT SERPL: 17 (ref 12–20)
CALCIUM SERPL-MCNC: 7.7 MG/DL (ref 8.5–10.1)
CC UR VC: ABNORMAL
CHLORIDE SERPL-SCNC: 110 MMOL/L (ref 97–108)
CO2 SERPL-SCNC: 22 MMOL/L (ref 21–32)
CREAT SERPL-MCNC: 1.59 MG/DL (ref 0.55–1.02)
CRP SERPL-MCNC: <0.29 MG/DL (ref 0–0.3)
DIFFERENTIAL METHOD BLD: ABNORMAL
EOSINOPHIL # BLD: 0.05 K/UL (ref 0–0.4)
EOSINOPHIL NFR BLD: 0.7 % (ref 0–7)
ERYTHROCYTE [DISTWIDTH] IN BLOOD BY AUTOMATED COUNT: 14.6 % (ref 11.5–14.5)
GLOBULIN SER CALC-MCNC: 2.8 G/DL (ref 2–4)
GLUCOSE BLD STRIP.AUTO-MCNC: 199 MG/DL (ref 65–117)
GLUCOSE BLD STRIP.AUTO-MCNC: 211 MG/DL (ref 65–117)
GLUCOSE BLD STRIP.AUTO-MCNC: 240 MG/DL (ref 65–117)
GLUCOSE BLD STRIP.AUTO-MCNC: 248 MG/DL (ref 65–117)
GLUCOSE SERPL-MCNC: 209 MG/DL (ref 65–100)
HCT VFR BLD AUTO: 21.3 % (ref 35–47)
HGB BLD-MCNC: 7 G/DL (ref 11.5–16)
IMM GRANULOCYTES # BLD AUTO: 0.05 K/UL (ref 0–0.04)
IMM GRANULOCYTES NFR BLD AUTO: 0.7 % (ref 0–0.5)
LYMPHOCYTES # BLD: 2.45 K/UL (ref 0.8–3.5)
LYMPHOCYTES NFR BLD: 33.1 % (ref 12–49)
MCH RBC QN AUTO: 31.5 PG (ref 26–34)
MCHC RBC AUTO-ENTMCNC: 32.9 G/DL (ref 30–36.5)
MCV RBC AUTO: 95.9 FL (ref 80–99)
MONOCYTES # BLD: 0.54 K/UL (ref 0–1)
MONOCYTES NFR BLD: 7.3 % (ref 5–13)
NEUTS SEG # BLD: 4.29 K/UL (ref 1.8–8)
NEUTS SEG NFR BLD: 57.8 % (ref 32–75)
NRBC # BLD: 0 K/UL (ref 0–0.01)
NRBC BLD-RTO: 0 PER 100 WBC
PLATELET # BLD AUTO: 232 K/UL (ref 150–400)
PMV BLD AUTO: 10.5 FL (ref 8.9–12.9)
POTASSIUM SERPL-SCNC: 4.5 MMOL/L (ref 3.5–5.1)
PROT SERPL-MCNC: 4 G/DL (ref 6.4–8.2)
RBC # BLD AUTO: 2.22 M/UL (ref 3.8–5.2)
SERVICE CMNT-IMP: ABNORMAL
SODIUM SERPL-SCNC: 139 MMOL/L (ref 136–145)
WBC # BLD AUTO: 7.4 K/UL (ref 3.6–11)

## 2025-03-13 PROCEDURE — 2500000003 HC RX 250 WO HCPCS

## 2025-03-13 PROCEDURE — 6370000000 HC RX 637 (ALT 250 FOR IP)

## 2025-03-13 PROCEDURE — 1100000000 HC RM PRIVATE

## 2025-03-13 PROCEDURE — 99232 SBSQ HOSP IP/OBS MODERATE 35: CPT | Performed by: FAMILY MEDICINE

## 2025-03-13 PROCEDURE — 80053 COMPREHEN METABOLIC PANEL: CPT

## 2025-03-13 PROCEDURE — 97530 THERAPEUTIC ACTIVITIES: CPT

## 2025-03-13 PROCEDURE — 97116 GAIT TRAINING THERAPY: CPT

## 2025-03-13 PROCEDURE — 97165 OT EVAL LOW COMPLEX 30 MIN: CPT

## 2025-03-13 PROCEDURE — 94761 N-INVAS EAR/PLS OXIMETRY MLT: CPT

## 2025-03-13 PROCEDURE — 6370000000 HC RX 637 (ALT 250 FOR IP): Performed by: FAMILY MEDICINE

## 2025-03-13 PROCEDURE — 97535 SELF CARE MNGMENT TRAINING: CPT

## 2025-03-13 PROCEDURE — 6360000002 HC RX W HCPCS

## 2025-03-13 PROCEDURE — 82962 GLUCOSE BLOOD TEST: CPT

## 2025-03-13 PROCEDURE — 85025 COMPLETE CBC W/AUTO DIFF WBC: CPT

## 2025-03-13 RX ORDER — FUROSEMIDE 20 MG/1
20 TABLET ORAL ONCE
Status: COMPLETED | OUTPATIENT
Start: 2025-03-13 | End: 2025-03-13

## 2025-03-13 RX ORDER — INSULIN LISPRO 100 [IU]/ML
2 INJECTION, SOLUTION INTRAVENOUS; SUBCUTANEOUS
Status: DISCONTINUED | OUTPATIENT
Start: 2025-03-13 | End: 2025-03-17 | Stop reason: HOSPADM

## 2025-03-13 RX ADMIN — AMLODIPINE BESYLATE 10 MG: 5 TABLET ORAL at 08:10

## 2025-03-13 RX ADMIN — SODIUM BICARBONATE 650 MG: 650 TABLET ORAL at 12:54

## 2025-03-13 RX ADMIN — DICYCLOMINE HYDROCHLORIDE 10 MG: 10 CAPSULE ORAL at 12:54

## 2025-03-13 RX ADMIN — INSULIN LISPRO 2 UNITS: 100 INJECTION, SOLUTION INTRAVENOUS; SUBCUTANEOUS at 12:54

## 2025-03-13 RX ADMIN — TRAMADOL HYDROCHLORIDE 50 MG: 50 TABLET, COATED ORAL at 12:59

## 2025-03-13 RX ADMIN — INSULIN LISPRO 2 UNITS: 100 INJECTION, SOLUTION INTRAVENOUS; SUBCUTANEOUS at 09:04

## 2025-03-13 RX ADMIN — FUROSEMIDE 20 MG: 20 TABLET ORAL at 09:00

## 2025-03-13 RX ADMIN — INSULIN LISPRO 2 UNITS: 100 INJECTION, SOLUTION INTRAVENOUS; SUBCUTANEOUS at 12:53

## 2025-03-13 RX ADMIN — INSULIN GLARGINE 5 UNITS: 100 INJECTION, SOLUTION SUBCUTANEOUS at 22:11

## 2025-03-13 RX ADMIN — INSULIN LISPRO 2 UNITS: 100 INJECTION, SOLUTION INTRAVENOUS; SUBCUTANEOUS at 17:55

## 2025-03-13 RX ADMIN — SODIUM CHLORIDE, PRESERVATIVE FREE 10 ML: 5 INJECTION INTRAVENOUS at 08:10

## 2025-03-13 RX ADMIN — DICYCLOMINE HYDROCHLORIDE 10 MG: 10 CAPSULE ORAL at 22:11

## 2025-03-13 RX ADMIN — FUROSEMIDE 20 MG: 20 TABLET ORAL at 17:55

## 2025-03-13 RX ADMIN — FUROSEMIDE 20 MG: 20 TABLET ORAL at 08:10

## 2025-03-13 RX ADMIN — LEVOFLOXACIN 750 MG: 750 TABLET, FILM COATED ORAL at 08:10

## 2025-03-13 RX ADMIN — PANCRELIPASE LIPASE, PANCRELIPASE PROTEASE, PANCRELIPASE AMYLASE 20000 UNITS: 5000; 17000; 24000 CAPSULE, DELAYED RELEASE ORAL at 08:10

## 2025-03-13 RX ADMIN — PANCRELIPASE LIPASE, PANCRELIPASE PROTEASE, PANCRELIPASE AMYLASE 20000 UNITS: 5000; 17000; 24000 CAPSULE, DELAYED RELEASE ORAL at 17:54

## 2025-03-13 RX ADMIN — GABAPENTIN 100 MG: 100 CAPSULE ORAL at 22:11

## 2025-03-13 RX ADMIN — INSULIN LISPRO 2 UNITS: 100 INJECTION, SOLUTION INTRAVENOUS; SUBCUTANEOUS at 22:11

## 2025-03-13 RX ADMIN — DICYCLOMINE HYDROCHLORIDE 10 MG: 10 CAPSULE ORAL at 08:10

## 2025-03-13 RX ADMIN — GABAPENTIN 100 MG: 100 CAPSULE ORAL at 12:54

## 2025-03-13 RX ADMIN — ENOXAPARIN SODIUM 40 MG: 100 INJECTION SUBCUTANEOUS at 08:13

## 2025-03-13 RX ADMIN — SODIUM BICARBONATE 650 MG: 650 TABLET ORAL at 08:10

## 2025-03-13 RX ADMIN — PANCRELIPASE LIPASE, PANCRELIPASE PROTEASE, PANCRELIPASE AMYLASE 20000 UNITS: 5000; 17000; 24000 CAPSULE, DELAYED RELEASE ORAL at 12:54

## 2025-03-13 RX ADMIN — SODIUM BICARBONATE 650 MG: 650 TABLET ORAL at 22:11

## 2025-03-13 RX ADMIN — INSULIN LISPRO 2 UNITS: 100 INJECTION, SOLUTION INTRAVENOUS; SUBCUTANEOUS at 08:22

## 2025-03-13 RX ADMIN — SODIUM CHLORIDE, PRESERVATIVE FREE 10 ML: 5 INJECTION INTRAVENOUS at 22:12

## 2025-03-13 RX ADMIN — PANTOPRAZOLE SODIUM 40 MG: 40 TABLET, DELAYED RELEASE ORAL at 06:29

## 2025-03-13 RX ADMIN — DICYCLOMINE HYDROCHLORIDE 10 MG: 10 CAPSULE ORAL at 17:55

## 2025-03-13 RX ADMIN — GABAPENTIN 100 MG: 100 CAPSULE ORAL at 08:10

## 2025-03-13 ASSESSMENT — PAIN DESCRIPTION - LOCATION: LOCATION: LEG;HIP

## 2025-03-13 ASSESSMENT — PAIN SCALES - GENERAL: PAINLEVEL_OUTOF10: 8

## 2025-03-13 ASSESSMENT — PAIN DESCRIPTION - DESCRIPTORS: DESCRIPTORS: SORE

## 2025-03-13 NOTE — CARE COORDINATION
03/13/25  1:57 PM  [facility] and started the insurance authorization. Reference # is not available at this time. CM has updated the patient.            Care Management Progress Note      Reason for Admission:   Hypernatremia [E87.0]  Hypoglycemia [E16.2]  HARPER (acute kidney injury) [N17.9]  Sepsis (HCC) [A41.9]  Severe sepsis (HCC) [A41.9, R65.20]  Altered mental status, unspecified altered mental status type [R41.82]  Community acquired pneumonia, unspecified laterality [J18.9]         Patient Admission Status: Inpatient  RUR: 16%  Hospitalization in the last 30 days (Readmission):  No        Transition of care plan:  Patient was discussed in IDR and continues to be medically managed.    Dispo: IPR. [facility]. MD is reviewing clinicals and new OT note.   If IPR:  Date FOC offered: 3/11.  Accepting facility:   Date authorization started with reference number: Patient will require insurance authorization under medicaid once approved by facility.   Date authorization received and expires:     Discharge plan communicated with patient and/or discharge caregiver: Yes.  CM has updated patient today 3/13 at bedside to the acceptance and insurance authorization process. Patient expressed understanding.     Date 1st IMM letter given: N/a. Patient has a managed medicaid plan.     Outpatient follow-up.    Transport at discharge: family.         ___________________________________________   Dania Jeff RN Case Manager  3/13/2025   12:40 PM

## 2025-03-14 LAB
ABO + RH BLD: NORMAL
ALBUMIN SERPL-MCNC: 1.4 G/DL (ref 3.5–5)
ALBUMIN/GLOB SERPL: 0.5 (ref 1.1–2.2)
ALP SERPL-CCNC: 99 U/L (ref 45–117)
ALT SERPL-CCNC: 14 U/L (ref 12–78)
ANION GAP SERPL CALC-SCNC: 4 MMOL/L (ref 2–12)
AST SERPL-CCNC: 8 U/L (ref 15–37)
BACTERIA SPEC CULT: NORMAL
BACTERIA SPEC CULT: NORMAL
BASOPHILS # BLD: 0.04 K/UL (ref 0–0.1)
BASOPHILS NFR BLD: 0.5 % (ref 0–1)
BILIRUB SERPL-MCNC: 0.3 MG/DL (ref 0.2–1)
BLD PROD TYP BPU: NORMAL
BLOOD BANK DISPENSE STATUS: NORMAL
BLOOD GROUP ANTIBODIES SERPL: NORMAL
BPU ID: NORMAL
BUN SERPL-MCNC: 31 MG/DL (ref 6–20)
BUN/CREAT SERPL: 20 (ref 12–20)
CALCIUM SERPL-MCNC: 8.2 MG/DL (ref 8.5–10.1)
CHLORIDE SERPL-SCNC: 112 MMOL/L (ref 97–108)
CO2 SERPL-SCNC: 24 MMOL/L (ref 21–32)
CREAT SERPL-MCNC: 1.56 MG/DL (ref 0.55–1.02)
CROSSMATCH RESULT: NORMAL
DIFFERENTIAL METHOD BLD: ABNORMAL
EOSINOPHIL # BLD: 0.04 K/UL (ref 0–0.4)
EOSINOPHIL NFR BLD: 0.5 % (ref 0–7)
ERYTHROCYTE [DISTWIDTH] IN BLOOD BY AUTOMATED COUNT: 15.1 % (ref 11.5–14.5)
GLOBULIN SER CALC-MCNC: 2.8 G/DL (ref 2–4)
GLUCOSE BLD STRIP.AUTO-MCNC: 107 MG/DL (ref 65–117)
GLUCOSE BLD STRIP.AUTO-MCNC: 149 MG/DL (ref 65–117)
GLUCOSE BLD STRIP.AUTO-MCNC: 160 MG/DL (ref 65–117)
GLUCOSE BLD STRIP.AUTO-MCNC: 198 MG/DL (ref 65–117)
GLUCOSE SERPL-MCNC: 152 MG/DL (ref 65–100)
HCT VFR BLD AUTO: 21.6 % (ref 35–47)
HCT VFR BLD AUTO: 25.6 % (ref 35–47)
HGB BLD-MCNC: 6.9 G/DL (ref 11.5–16)
HGB BLD-MCNC: 8.3 G/DL (ref 11.5–16)
IMM GRANULOCYTES # BLD AUTO: 0.05 K/UL (ref 0–0.04)
IMM GRANULOCYTES NFR BLD AUTO: 0.6 % (ref 0–0.5)
LYMPHOCYTES # BLD: 2.75 K/UL (ref 0.8–3.5)
LYMPHOCYTES NFR BLD: 34.4 % (ref 12–49)
MCH RBC QN AUTO: 30.3 PG (ref 26–34)
MCHC RBC AUTO-ENTMCNC: 31.9 G/DL (ref 30–36.5)
MCV RBC AUTO: 94.7 FL (ref 80–99)
MONOCYTES # BLD: 0.61 K/UL (ref 0–1)
MONOCYTES NFR BLD: 7.6 % (ref 5–13)
NEUTS SEG # BLD: 4.51 K/UL (ref 1.8–8)
NEUTS SEG NFR BLD: 56.4 % (ref 32–75)
NRBC # BLD: 0 K/UL (ref 0–0.01)
NRBC BLD-RTO: 0 PER 100 WBC
PLATELET # BLD AUTO: 266 K/UL (ref 150–400)
PMV BLD AUTO: 10.5 FL (ref 8.9–12.9)
POTASSIUM SERPL-SCNC: 4.6 MMOL/L (ref 3.5–5.1)
PROT SERPL-MCNC: 4.2 G/DL (ref 6.4–8.2)
RBC # BLD AUTO: 2.28 M/UL (ref 3.8–5.2)
SERVICE CMNT-IMP: ABNORMAL
SERVICE CMNT-IMP: NORMAL
SODIUM SERPL-SCNC: 140 MMOL/L (ref 136–145)
SPECIMEN EXP DATE BLD: NORMAL
UNIT DIVISION: 0
WBC # BLD AUTO: 8 K/UL (ref 3.6–11)

## 2025-03-14 PROCEDURE — 94761 N-INVAS EAR/PLS OXIMETRY MLT: CPT

## 2025-03-14 PROCEDURE — 80053 COMPREHEN METABOLIC PANEL: CPT

## 2025-03-14 PROCEDURE — 85025 COMPLETE CBC W/AUTO DIFF WBC: CPT

## 2025-03-14 PROCEDURE — 97116 GAIT TRAINING THERAPY: CPT

## 2025-03-14 PROCEDURE — 6370000000 HC RX 637 (ALT 250 FOR IP)

## 2025-03-14 PROCEDURE — 1100000000 HC RM PRIVATE

## 2025-03-14 PROCEDURE — 6360000002 HC RX W HCPCS

## 2025-03-14 PROCEDURE — 97535 SELF CARE MNGMENT TRAINING: CPT

## 2025-03-14 PROCEDURE — 85014 HEMATOCRIT: CPT

## 2025-03-14 PROCEDURE — 97530 THERAPEUTIC ACTIVITIES: CPT

## 2025-03-14 PROCEDURE — 36410 VNPNXR 3YR/> PHY/QHP DX/THER: CPT

## 2025-03-14 PROCEDURE — 82962 GLUCOSE BLOOD TEST: CPT

## 2025-03-14 PROCEDURE — 99232 SBSQ HOSP IP/OBS MODERATE 35: CPT | Performed by: FAMILY MEDICINE

## 2025-03-14 PROCEDURE — 36415 COLL VENOUS BLD VENIPUNCTURE: CPT

## 2025-03-14 PROCEDURE — 2500000003 HC RX 250 WO HCPCS

## 2025-03-14 PROCEDURE — 85018 HEMOGLOBIN: CPT

## 2025-03-14 RX ADMIN — TRAMADOL HYDROCHLORIDE 50 MG: 50 TABLET, COATED ORAL at 21:59

## 2025-03-14 RX ADMIN — SODIUM BICARBONATE 650 MG: 650 TABLET ORAL at 22:00

## 2025-03-14 RX ADMIN — FUROSEMIDE 20 MG: 20 TABLET ORAL at 08:57

## 2025-03-14 RX ADMIN — SODIUM BICARBONATE 650 MG: 650 TABLET ORAL at 08:57

## 2025-03-14 RX ADMIN — INSULIN GLARGINE 5 UNITS: 100 INJECTION, SOLUTION SUBCUTANEOUS at 22:01

## 2025-03-14 RX ADMIN — TRAMADOL HYDROCHLORIDE 50 MG: 50 TABLET, COATED ORAL at 09:11

## 2025-03-14 RX ADMIN — GABAPENTIN 100 MG: 100 CAPSULE ORAL at 21:59

## 2025-03-14 RX ADMIN — DICYCLOMINE HYDROCHLORIDE 10 MG: 10 CAPSULE ORAL at 22:00

## 2025-03-14 RX ADMIN — DICYCLOMINE HYDROCHLORIDE 10 MG: 10 CAPSULE ORAL at 08:57

## 2025-03-14 RX ADMIN — PANCRELIPASE LIPASE, PANCRELIPASE PROTEASE, PANCRELIPASE AMYLASE 20000 UNITS: 5000; 17000; 24000 CAPSULE, DELAYED RELEASE ORAL at 08:56

## 2025-03-14 RX ADMIN — INSULIN LISPRO 2 UNITS: 100 INJECTION, SOLUTION INTRAVENOUS; SUBCUTANEOUS at 12:47

## 2025-03-14 RX ADMIN — PANTOPRAZOLE SODIUM 40 MG: 40 TABLET, DELAYED RELEASE ORAL at 05:27

## 2025-03-14 RX ADMIN — SODIUM CHLORIDE, PRESERVATIVE FREE 10 ML: 5 INJECTION INTRAVENOUS at 09:12

## 2025-03-14 RX ADMIN — PANCRELIPASE LIPASE, PANCRELIPASE PROTEASE, PANCRELIPASE AMYLASE 20000 UNITS: 5000; 17000; 24000 CAPSULE, DELAYED RELEASE ORAL at 17:03

## 2025-03-14 RX ADMIN — INSULIN LISPRO 2 UNITS: 100 INJECTION, SOLUTION INTRAVENOUS; SUBCUTANEOUS at 17:04

## 2025-03-14 RX ADMIN — INSULIN LISPRO 2 UNITS: 100 INJECTION, SOLUTION INTRAVENOUS; SUBCUTANEOUS at 22:00

## 2025-03-14 RX ADMIN — DICYCLOMINE HYDROCHLORIDE 10 MG: 10 CAPSULE ORAL at 16:11

## 2025-03-14 RX ADMIN — PANCRELIPASE LIPASE, PANCRELIPASE PROTEASE, PANCRELIPASE AMYLASE 20000 UNITS: 5000; 17000; 24000 CAPSULE, DELAYED RELEASE ORAL at 12:47

## 2025-03-14 RX ADMIN — INSULIN LISPRO 2 UNITS: 100 INJECTION, SOLUTION INTRAVENOUS; SUBCUTANEOUS at 08:58

## 2025-03-14 RX ADMIN — AMLODIPINE BESYLATE 10 MG: 5 TABLET ORAL at 08:57

## 2025-03-14 RX ADMIN — ENOXAPARIN SODIUM 40 MG: 100 INJECTION SUBCUTANEOUS at 08:58

## 2025-03-14 RX ADMIN — SODIUM BICARBONATE 650 MG: 650 TABLET ORAL at 12:48

## 2025-03-14 RX ADMIN — ACETAMINOPHEN 650 MG: 325 TABLET ORAL at 16:10

## 2025-03-14 RX ADMIN — FUROSEMIDE 20 MG: 20 TABLET ORAL at 17:03

## 2025-03-14 RX ADMIN — GABAPENTIN 100 MG: 100 CAPSULE ORAL at 08:57

## 2025-03-14 RX ADMIN — DICYCLOMINE HYDROCHLORIDE 10 MG: 10 CAPSULE ORAL at 12:48

## 2025-03-14 RX ADMIN — SODIUM CHLORIDE, PRESERVATIVE FREE 10 ML: 5 INJECTION INTRAVENOUS at 22:00

## 2025-03-14 RX ADMIN — GABAPENTIN 100 MG: 100 CAPSULE ORAL at 16:10

## 2025-03-14 ASSESSMENT — PAIN DESCRIPTION - DESCRIPTORS
DESCRIPTORS: ACHING

## 2025-03-14 ASSESSMENT — PAIN DESCRIPTION - LOCATION
LOCATION: BACK

## 2025-03-14 ASSESSMENT — PAIN - FUNCTIONAL ASSESSMENT
PAIN_FUNCTIONAL_ASSESSMENT: ACTIVITIES ARE NOT PREVENTED
PAIN_FUNCTIONAL_ASSESSMENT: ACTIVITIES ARE NOT PREVENTED

## 2025-03-14 ASSESSMENT — PAIN DESCRIPTION - ORIENTATION
ORIENTATION: LEFT
ORIENTATION: LEFT;LOWER

## 2025-03-14 ASSESSMENT — PAIN SCALES - GENERAL
PAINLEVEL_OUTOF10: 3
PAINLEVEL_OUTOF10: 7
PAINLEVEL_OUTOF10: 7

## 2025-03-14 NOTE — CARE COORDINATION
Care Management Progress Note        Reason for Admission:   Hypernatremia [E87.0]  Hypoglycemia [E16.2]  HARPER (acute kidney injury) [N17.9]  Sepsis (HCC) [A41.9]  Severe sepsis (HCC) [A41.9, R65.20]  Altered mental status, unspecified altered mental status type [R41.82]  Community acquired pneumonia, unspecified laterality [J18.9]         Patient Admission Status: Inpatient  RUR: 16%  Hospitalization in the last 30 days (Readmission):  No        Transition of care plan:  Patient was discussed in IDR and is medically ready for discharge.     Dispo: IPR.   If IPR:  Date FOC offered: 3/11.  Accepting facility: [facility].   Date authorization started with reference number: Facility has started insurance authorization on Thursday 3/13. Reference # is not available.   Date authorization received and expires:     Discharge plan communicated with patient and/or discharge caregiver: Yes. CM updated patient Thursday 3/13 at bedside to the acceptance and insurance authorization process. Patient expressed understanding.       Date 1st IMM letter given: N/a. Patient has a managed medicaid plan.     Outpatient follow-up.    Transport at discharge: family.       ___________________________________________   Dania Jeff RN Case Manager  3/14/2025   11:20 AM

## 2025-03-15 LAB
GLUCOSE BLD STRIP.AUTO-MCNC: 178 MG/DL (ref 65–117)
GLUCOSE BLD STRIP.AUTO-MCNC: 190 MG/DL (ref 65–117)
GLUCOSE BLD STRIP.AUTO-MCNC: 201 MG/DL (ref 65–117)
GLUCOSE BLD STRIP.AUTO-MCNC: 205 MG/DL (ref 65–117)
SERVICE CMNT-IMP: ABNORMAL

## 2025-03-15 PROCEDURE — 6370000000 HC RX 637 (ALT 250 FOR IP)

## 2025-03-15 PROCEDURE — 6360000002 HC RX W HCPCS

## 2025-03-15 PROCEDURE — 2500000003 HC RX 250 WO HCPCS

## 2025-03-15 PROCEDURE — 6370000000 HC RX 637 (ALT 250 FOR IP): Performed by: FAMILY MEDICINE

## 2025-03-15 PROCEDURE — 94761 N-INVAS EAR/PLS OXIMETRY MLT: CPT

## 2025-03-15 PROCEDURE — 82962 GLUCOSE BLOOD TEST: CPT

## 2025-03-15 PROCEDURE — 1100000000 HC RM PRIVATE

## 2025-03-15 PROCEDURE — 99232 SBSQ HOSP IP/OBS MODERATE 35: CPT | Performed by: FAMILY MEDICINE

## 2025-03-15 RX ORDER — FUROSEMIDE 20 MG/1
20 TABLET ORAL 3 TIMES DAILY
Status: DISCONTINUED | OUTPATIENT
Start: 2025-03-15 | End: 2025-03-17 | Stop reason: HOSPADM

## 2025-03-15 RX ADMIN — INSULIN LISPRO 2 UNITS: 100 INJECTION, SOLUTION INTRAVENOUS; SUBCUTANEOUS at 18:17

## 2025-03-15 RX ADMIN — PANCRELIPASE LIPASE, PANCRELIPASE PROTEASE, PANCRELIPASE AMYLASE 20000 UNITS: 5000; 17000; 24000 CAPSULE, DELAYED RELEASE ORAL at 13:49

## 2025-03-15 RX ADMIN — SODIUM CHLORIDE, PRESERVATIVE FREE 10 ML: 5 INJECTION INTRAVENOUS at 20:12

## 2025-03-15 RX ADMIN — FUROSEMIDE 20 MG: 20 TABLET ORAL at 20:10

## 2025-03-15 RX ADMIN — TRAMADOL HYDROCHLORIDE 50 MG: 50 TABLET, COATED ORAL at 20:11

## 2025-03-15 RX ADMIN — LEVOFLOXACIN 750 MG: 750 TABLET, FILM COATED ORAL at 09:41

## 2025-03-15 RX ADMIN — INSULIN LISPRO 2 UNITS: 100 INJECTION, SOLUTION INTRAVENOUS; SUBCUTANEOUS at 20:08

## 2025-03-15 RX ADMIN — DICYCLOMINE HYDROCHLORIDE 10 MG: 10 CAPSULE ORAL at 18:17

## 2025-03-15 RX ADMIN — SODIUM CHLORIDE, PRESERVATIVE FREE 10 ML: 5 INJECTION INTRAVENOUS at 09:44

## 2025-03-15 RX ADMIN — INSULIN LISPRO 2 UNITS: 100 INJECTION, SOLUTION INTRAVENOUS; SUBCUTANEOUS at 13:50

## 2025-03-15 RX ADMIN — SODIUM BICARBONATE 650 MG: 650 TABLET ORAL at 09:42

## 2025-03-15 RX ADMIN — GABAPENTIN 100 MG: 100 CAPSULE ORAL at 09:41

## 2025-03-15 RX ADMIN — AMLODIPINE BESYLATE 10 MG: 5 TABLET ORAL at 09:39

## 2025-03-15 RX ADMIN — ENOXAPARIN SODIUM 40 MG: 100 INJECTION SUBCUTANEOUS at 09:42

## 2025-03-15 RX ADMIN — TRAMADOL HYDROCHLORIDE 50 MG: 50 TABLET, COATED ORAL at 09:52

## 2025-03-15 RX ADMIN — DICYCLOMINE HYDROCHLORIDE 10 MG: 10 CAPSULE ORAL at 13:49

## 2025-03-15 RX ADMIN — INSULIN LISPRO 2 UNITS: 100 INJECTION, SOLUTION INTRAVENOUS; SUBCUTANEOUS at 09:55

## 2025-03-15 RX ADMIN — SODIUM BICARBONATE 650 MG: 650 TABLET ORAL at 20:11

## 2025-03-15 RX ADMIN — GABAPENTIN 100 MG: 100 CAPSULE ORAL at 13:51

## 2025-03-15 RX ADMIN — FUROSEMIDE 20 MG: 20 TABLET ORAL at 13:50

## 2025-03-15 RX ADMIN — INSULIN GLARGINE 5 UNITS: 100 INJECTION, SOLUTION SUBCUTANEOUS at 20:07

## 2025-03-15 RX ADMIN — DICYCLOMINE HYDROCHLORIDE 10 MG: 10 CAPSULE ORAL at 20:10

## 2025-03-15 RX ADMIN — SODIUM BICARBONATE 650 MG: 650 TABLET ORAL at 13:50

## 2025-03-15 RX ADMIN — PANCRELIPASE LIPASE, PANCRELIPASE PROTEASE, PANCRELIPASE AMYLASE 20000 UNITS: 5000; 17000; 24000 CAPSULE, DELAYED RELEASE ORAL at 18:17

## 2025-03-15 RX ADMIN — INSULIN LISPRO 2 UNITS: 100 INJECTION, SOLUTION INTRAVENOUS; SUBCUTANEOUS at 18:16

## 2025-03-15 RX ADMIN — DICLOFENAC SODIUM 2 G: 10 GEL TOPICAL at 20:12

## 2025-03-15 RX ADMIN — GABAPENTIN 100 MG: 100 CAPSULE ORAL at 20:10

## 2025-03-15 RX ADMIN — PANCRELIPASE LIPASE, PANCRELIPASE PROTEASE, PANCRELIPASE AMYLASE 20000 UNITS: 5000; 17000; 24000 CAPSULE, DELAYED RELEASE ORAL at 09:39

## 2025-03-15 RX ADMIN — PANTOPRAZOLE SODIUM 40 MG: 40 TABLET, DELAYED RELEASE ORAL at 06:34

## 2025-03-15 RX ADMIN — ACETAMINOPHEN 650 MG: 325 TABLET ORAL at 18:26

## 2025-03-15 RX ADMIN — FUROSEMIDE 20 MG: 20 TABLET ORAL at 09:42

## 2025-03-15 RX ADMIN — DICYCLOMINE HYDROCHLORIDE 10 MG: 10 CAPSULE ORAL at 09:42

## 2025-03-15 ASSESSMENT — PAIN SCALES - GENERAL
PAINLEVEL_OUTOF10: 7
PAINLEVEL_OUTOF10: 4
PAINLEVEL_OUTOF10: 7

## 2025-03-15 ASSESSMENT — PAIN DESCRIPTION - LOCATION
LOCATION: BACK;LEG
LOCATION: BACK
LOCATION: KNEE

## 2025-03-15 ASSESSMENT — PAIN DESCRIPTION - ORIENTATION
ORIENTATION: RIGHT;LEFT
ORIENTATION: LOWER
ORIENTATION: RIGHT;LOWER

## 2025-03-15 ASSESSMENT — PAIN DESCRIPTION - DESCRIPTORS
DESCRIPTORS: ACHING

## 2025-03-15 NOTE — NURSE NAVIGATOR
Mid Line Insertion Procedure Note    Procedure: Insertion of #4 FR/18G Midline    Indications:  Poor Access    Procedure Details   Informed consent was obtained for the procedure, including sedation.  Risks of lung perforation, hemorrhage, and adverse drug reaction were discussed.     #4 FR/18G Midline inserted to the L Basilic vein per hospital protocol.   Blood return:  yes    Findings:  Catheter inserted to 12 cm, with 0 cm. Exposed. Mid upper arm circumference is 28 cm.  There were no changes to vital signs. Catheter was flushed with 10 cc NS. Patient did tolerate procedure well.    Recommendations:

## 2025-03-15 NOTE — DISCHARGE INSTRUCTIONS
Active Problem List:     Sepsis (HCC)     Altered mental status     CKD stage 3b, GFR 30-44 ml/min (HCC)     Bilateral leg edema     Acute pulmonary edema (HCC)     Acute cystitis with hematuria     Type 2 diabetes mellitus with stage 3b chronic kidney disease, with long-term current use of insulin (HCC)     Chronic pancreatitis (HCC)     Severe sepsis (HCC)       Information obtained by :   I understand that if any problems occur once I am at home I am to contact my physician.    I understand and acknowledge receipt of the instructions indicated above.                                                                                                                                             Physician's or R.N.'s Signature                                                                  Date/Time                                                                                                                                              Patient or Representative Signature                                                          Date/Time

## 2025-03-16 PROBLEM — R06.02 SHORTNESS OF BREATH: Status: ACTIVE | Noted: 2025-03-16

## 2025-03-16 PROBLEM — N17.9 AKI (ACUTE KIDNEY INJURY): Status: ACTIVE | Noted: 2025-03-16

## 2025-03-16 PROBLEM — E87.0 HYPERNATREMIA: Status: ACTIVE | Noted: 2025-03-16

## 2025-03-16 PROBLEM — E16.2 HYPOGLYCEMIA: Status: ACTIVE | Noted: 2025-03-16

## 2025-03-16 PROBLEM — J18.9 COMMUNITY ACQUIRED PNEUMONIA: Status: ACTIVE | Noted: 2025-03-16

## 2025-03-16 LAB
GLUCOSE BLD STRIP.AUTO-MCNC: 151 MG/DL (ref 65–117)
GLUCOSE BLD STRIP.AUTO-MCNC: 160 MG/DL (ref 65–117)
GLUCOSE BLD STRIP.AUTO-MCNC: 199 MG/DL (ref 65–117)
GLUCOSE BLD STRIP.AUTO-MCNC: 218 MG/DL (ref 65–117)
SERVICE CMNT-IMP: ABNORMAL

## 2025-03-16 PROCEDURE — 6370000000 HC RX 637 (ALT 250 FOR IP)

## 2025-03-16 PROCEDURE — 94761 N-INVAS EAR/PLS OXIMETRY MLT: CPT

## 2025-03-16 PROCEDURE — 82962 GLUCOSE BLOOD TEST: CPT

## 2025-03-16 PROCEDURE — 99232 SBSQ HOSP IP/OBS MODERATE 35: CPT | Performed by: FAMILY MEDICINE

## 2025-03-16 PROCEDURE — 6360000002 HC RX W HCPCS

## 2025-03-16 PROCEDURE — 2500000003 HC RX 250 WO HCPCS

## 2025-03-16 PROCEDURE — 1100000000 HC RM PRIVATE

## 2025-03-16 RX ORDER — INSULIN GLARGINE 100 [IU]/ML
7 INJECTION, SOLUTION SUBCUTANEOUS NIGHTLY
Status: DISCONTINUED | OUTPATIENT
Start: 2025-03-16 | End: 2025-03-17 | Stop reason: HOSPADM

## 2025-03-16 RX ORDER — ONDANSETRON 4 MG/1
4 TABLET, ORALLY DISINTEGRATING ORAL EVERY 8 HOURS PRN
Status: DISCONTINUED | OUTPATIENT
Start: 2025-03-16 | End: 2025-03-17 | Stop reason: HOSPADM

## 2025-03-16 RX ADMIN — GABAPENTIN 100 MG: 100 CAPSULE ORAL at 15:50

## 2025-03-16 RX ADMIN — ONDANSETRON 4 MG: 4 TABLET, ORALLY DISINTEGRATING ORAL at 10:47

## 2025-03-16 RX ADMIN — INSULIN LISPRO 2 UNITS: 100 INJECTION, SOLUTION INTRAVENOUS; SUBCUTANEOUS at 21:21

## 2025-03-16 RX ADMIN — DICYCLOMINE HYDROCHLORIDE 10 MG: 10 CAPSULE ORAL at 09:48

## 2025-03-16 RX ADMIN — DICLOFENAC SODIUM 2 G: 10 GEL TOPICAL at 21:25

## 2025-03-16 RX ADMIN — PANCRELIPASE LIPASE, PANCRELIPASE PROTEASE, PANCRELIPASE AMYLASE 20000 UNITS: 5000; 17000; 24000 CAPSULE, DELAYED RELEASE ORAL at 12:13

## 2025-03-16 RX ADMIN — PANCRELIPASE LIPASE, PANCRELIPASE PROTEASE, PANCRELIPASE AMYLASE 20000 UNITS: 5000; 17000; 24000 CAPSULE, DELAYED RELEASE ORAL at 18:07

## 2025-03-16 RX ADMIN — ENOXAPARIN SODIUM 40 MG: 100 INJECTION SUBCUTANEOUS at 09:47

## 2025-03-16 RX ADMIN — PANCRELIPASE LIPASE, PANCRELIPASE PROTEASE, PANCRELIPASE AMYLASE 20000 UNITS: 5000; 17000; 24000 CAPSULE, DELAYED RELEASE ORAL at 09:47

## 2025-03-16 RX ADMIN — INSULIN LISPRO 2 UNITS: 100 INJECTION, SOLUTION INTRAVENOUS; SUBCUTANEOUS at 18:06

## 2025-03-16 RX ADMIN — PANTOPRAZOLE SODIUM 40 MG: 40 TABLET, DELAYED RELEASE ORAL at 06:40

## 2025-03-16 RX ADMIN — FUROSEMIDE 20 MG: 20 TABLET ORAL at 21:20

## 2025-03-16 RX ADMIN — DICYCLOMINE HYDROCHLORIDE 10 MG: 10 CAPSULE ORAL at 21:21

## 2025-03-16 RX ADMIN — GABAPENTIN 100 MG: 100 CAPSULE ORAL at 21:21

## 2025-03-16 RX ADMIN — GABAPENTIN 100 MG: 100 CAPSULE ORAL at 09:49

## 2025-03-16 RX ADMIN — SODIUM CHLORIDE, PRESERVATIVE FREE 10 ML: 5 INJECTION INTRAVENOUS at 21:21

## 2025-03-16 RX ADMIN — DICYCLOMINE HYDROCHLORIDE 10 MG: 10 CAPSULE ORAL at 18:07

## 2025-03-16 RX ADMIN — SODIUM BICARBONATE 650 MG: 650 TABLET ORAL at 15:50

## 2025-03-16 RX ADMIN — SODIUM BICARBONATE 650 MG: 650 TABLET ORAL at 09:49

## 2025-03-16 RX ADMIN — DICYCLOMINE HYDROCHLORIDE 10 MG: 10 CAPSULE ORAL at 12:13

## 2025-03-16 RX ADMIN — INSULIN GLARGINE 7 UNITS: 100 INJECTION, SOLUTION SUBCUTANEOUS at 21:21

## 2025-03-16 RX ADMIN — AMLODIPINE BESYLATE 10 MG: 5 TABLET ORAL at 09:47

## 2025-03-16 RX ADMIN — TRAMADOL HYDROCHLORIDE 50 MG: 50 TABLET, COATED ORAL at 21:20

## 2025-03-16 RX ADMIN — FUROSEMIDE 20 MG: 20 TABLET ORAL at 09:47

## 2025-03-16 RX ADMIN — FUROSEMIDE 20 MG: 20 TABLET ORAL at 15:50

## 2025-03-16 RX ADMIN — SODIUM BICARBONATE 650 MG: 650 TABLET ORAL at 21:21

## 2025-03-16 ASSESSMENT — PAIN DESCRIPTION - DESCRIPTORS: DESCRIPTORS: ACHING

## 2025-03-16 ASSESSMENT — PAIN DESCRIPTION - LOCATION: LOCATION: BACK

## 2025-03-16 ASSESSMENT — PAIN SCALES - GENERAL: PAINLEVEL_OUTOF10: 7

## 2025-03-16 NOTE — CARE COORDINATION
Care Management Progress Note    Reason for Admission:   Hypernatremia [E87.0]  Hypoglycemia [E16.2]  HARPER (acute kidney injury) [N17.9]  Sepsis (HCC) [A41.9]  Severe sepsis (HCC) [A41.9, R65.20]  Altered mental status, unspecified altered mental status type [R41.82]  Community acquired pneumonia, unspecified laterality [J18.9]       Patient Admission Status: Inpatient  RUR: 15%  Hospitalization in the last 30 days (Readmission):  No        Transition of care plan:  Discharge pending dispo. Therapy following.    Dispo: IPR.   If IPR:  [facility] Date FOC offered: 3/11.  Accepting facility: [facility].   Date authorization started with reference number: Auth pending (initiated 3/13/25). Reference # is not available.   Date authorization received and expires: TBD  If auth is denied, [facility] accepted for  services.     Discharge plan communicated with patient and/or discharge caregiver: Yes       Date 1st IMM letter given: N/a. Patient has a managed medicaid plan.     Outpatient follow-up.    Transport at discharge: family.

## 2025-03-17 VITALS
OXYGEN SATURATION: 96 % | WEIGHT: 182 LBS | RESPIRATION RATE: 17 BRPM | HEIGHT: 62 IN | BODY MASS INDEX: 33.49 KG/M2 | SYSTOLIC BLOOD PRESSURE: 130 MMHG | HEART RATE: 72 BPM | DIASTOLIC BLOOD PRESSURE: 56 MMHG | TEMPERATURE: 99.1 F

## 2025-03-17 DIAGNOSIS — K86.89 PANCREATIC INSUFFICIENCY: ICD-10-CM

## 2025-03-17 DIAGNOSIS — R10.9 ABDOMINAL BLOATING WITH CRAMPS: ICD-10-CM

## 2025-03-17 DIAGNOSIS — R14.0 ABDOMINAL BLOATING WITH CRAMPS: ICD-10-CM

## 2025-03-17 DIAGNOSIS — K90.9 DIARRHEA DUE TO MALABSORPTION: ICD-10-CM

## 2025-03-17 DIAGNOSIS — R19.7 DIARRHEA DUE TO MALABSORPTION: ICD-10-CM

## 2025-03-17 DIAGNOSIS — K59.01 SLOW TRANSIT CONSTIPATION: ICD-10-CM

## 2025-03-17 DIAGNOSIS — R10.84 GENERALIZED ABDOMINAL PAIN: ICD-10-CM

## 2025-03-17 LAB
GLUCOSE BLD STRIP.AUTO-MCNC: 141 MG/DL (ref 65–117)
GLUCOSE BLD STRIP.AUTO-MCNC: 167 MG/DL (ref 65–117)
GLUCOSE BLD STRIP.AUTO-MCNC: 183 MG/DL (ref 65–117)
SERVICE CMNT-IMP: ABNORMAL

## 2025-03-17 PROCEDURE — 94761 N-INVAS EAR/PLS OXIMETRY MLT: CPT

## 2025-03-17 PROCEDURE — 99238 HOSP IP/OBS DSCHRG MGMT 30/<: CPT | Performed by: FAMILY MEDICINE

## 2025-03-17 PROCEDURE — 6360000002 HC RX W HCPCS

## 2025-03-17 PROCEDURE — 97535 SELF CARE MNGMENT TRAINING: CPT

## 2025-03-17 PROCEDURE — 97116 GAIT TRAINING THERAPY: CPT

## 2025-03-17 PROCEDURE — 97530 THERAPEUTIC ACTIVITIES: CPT

## 2025-03-17 PROCEDURE — 6370000000 HC RX 637 (ALT 250 FOR IP)

## 2025-03-17 PROCEDURE — 82962 GLUCOSE BLOOD TEST: CPT

## 2025-03-17 RX ORDER — TRAMADOL HYDROCHLORIDE 50 MG/1
50 TABLET ORAL 2 TIMES DAILY PRN
Qty: 6 TABLET | Refills: 0 | Status: SHIPPED | OUTPATIENT
Start: 2025-03-17 | End: 2025-03-20

## 2025-03-17 RX ORDER — INSULIN GLARGINE 100 [IU]/ML
7 INJECTION, SOLUTION SUBCUTANEOUS NIGHTLY
Qty: 5 ADJUSTABLE DOSE PRE-FILLED PEN SYRINGE | Refills: 0
Start: 2025-03-17

## 2025-03-17 RX ORDER — DICYCLOMINE HYDROCHLORIDE 10 MG/1
10 CAPSULE ORAL
Qty: 360 CAPSULE | Refills: 1 | OUTPATIENT
Start: 2025-03-17

## 2025-03-17 RX ORDER — INSULIN GLARGINE 100 [IU]/ML
7 INJECTION, SOLUTION SUBCUTANEOUS NIGHTLY
Qty: 5 ADJUSTABLE DOSE PRE-FILLED PEN SYRINGE | Refills: 0 | Status: SHIPPED
Start: 2025-03-17

## 2025-03-17 RX ADMIN — PANCRELIPASE LIPASE, PANCRELIPASE PROTEASE, PANCRELIPASE AMYLASE 20000 UNITS: 5000; 17000; 24000 CAPSULE, DELAYED RELEASE ORAL at 09:32

## 2025-03-17 RX ADMIN — ENOXAPARIN SODIUM 40 MG: 100 INJECTION SUBCUTANEOUS at 09:21

## 2025-03-17 RX ADMIN — DICYCLOMINE HYDROCHLORIDE 10 MG: 10 CAPSULE ORAL at 13:08

## 2025-03-17 RX ADMIN — INSULIN LISPRO 2 UNITS: 100 INJECTION, SOLUTION INTRAVENOUS; SUBCUTANEOUS at 09:21

## 2025-03-17 RX ADMIN — INSULIN LISPRO 2 UNITS: 100 INJECTION, SOLUTION INTRAVENOUS; SUBCUTANEOUS at 16:50

## 2025-03-17 RX ADMIN — SODIUM BICARBONATE 650 MG: 650 TABLET ORAL at 09:21

## 2025-03-17 RX ADMIN — TRAMADOL HYDROCHLORIDE 50 MG: 50 TABLET, COATED ORAL at 09:33

## 2025-03-17 RX ADMIN — FUROSEMIDE 20 MG: 20 TABLET ORAL at 09:21

## 2025-03-17 RX ADMIN — PANCRELIPASE LIPASE, PANCRELIPASE PROTEASE, PANCRELIPASE AMYLASE 20000 UNITS: 5000; 17000; 24000 CAPSULE, DELAYED RELEASE ORAL at 13:08

## 2025-03-17 RX ADMIN — PANTOPRAZOLE SODIUM 40 MG: 40 TABLET, DELAYED RELEASE ORAL at 06:34

## 2025-03-17 RX ADMIN — GABAPENTIN 100 MG: 100 CAPSULE ORAL at 13:08

## 2025-03-17 RX ADMIN — PANCRELIPASE LIPASE, PANCRELIPASE PROTEASE, PANCRELIPASE AMYLASE 20000 UNITS: 5000; 17000; 24000 CAPSULE, DELAYED RELEASE ORAL at 16:50

## 2025-03-17 RX ADMIN — GABAPENTIN 100 MG: 100 CAPSULE ORAL at 09:21

## 2025-03-17 RX ADMIN — AMLODIPINE BESYLATE 10 MG: 5 TABLET ORAL at 09:21

## 2025-03-17 RX ADMIN — DICYCLOMINE HYDROCHLORIDE 10 MG: 10 CAPSULE ORAL at 16:50

## 2025-03-17 RX ADMIN — INSULIN LISPRO 2 UNITS: 100 INJECTION, SOLUTION INTRAVENOUS; SUBCUTANEOUS at 13:08

## 2025-03-17 RX ADMIN — ACETAMINOPHEN 650 MG: 325 TABLET ORAL at 16:49

## 2025-03-17 RX ADMIN — FUROSEMIDE 20 MG: 20 TABLET ORAL at 16:50

## 2025-03-17 RX ADMIN — DICYCLOMINE HYDROCHLORIDE 10 MG: 10 CAPSULE ORAL at 09:33

## 2025-03-17 RX ADMIN — SODIUM BICARBONATE 650 MG: 650 TABLET ORAL at 13:08

## 2025-03-17 ASSESSMENT — PAIN DESCRIPTION - LOCATION: LOCATION: BACK;FLANK

## 2025-03-17 ASSESSMENT — PAIN SCALES - GENERAL: PAINLEVEL_OUTOF10: 8

## 2025-03-17 NOTE — CARE COORDINATION
Transition of Care Plan to SNF/Rehab    Communication to Patient/Family:   Met with patient and family and they are agreeable to the transition plan. The Plan for Transition of Care is related to the following treatment goals: Hypernatremia [E87.0]  Hypoglycemia [E16.2]  HARPER (acute kidney injury) [N17.9]  Sepsis (HCC) [A41.9]  Severe sepsis (HCC) [A41.9, R65.20]  Altered mental status, unspecified altered mental status type [R41.82]  Community acquired pneumonia, unspecified laterality [J18.9]      The Patient and/or patient representative was provided with a choice of provider and agrees  with the discharge plan.      Yes [x] No []      A Freedom of choice list was provided with basic dialogue that supports the patient's individualized plan of care/goals and shares the quality data associated with the providers.       Yes [x] No []      SNF/Rehab Transition:  Patient has been accepted to [facility] SNF/Rehab and meets criteria for admission.       SNF reports auth has been received? [x]  Medicare 3 night stay satisfied? []   Inpatient Admission Dates: 3/9/25 - 3/17/25      Patient will be transported by Medicaid transport and expected to leave at 3:50pm. CM has booked trip # [id number] with Dacia at [facility] [phone]. Transport is hand to hand services not wheelchair or stretcher. Nursing and facility have been updated to transport type to assist patient with wc assistance door to door if needed.    [x] Packet on chart (if needed)  [] PCS completed (if applicable)       Communication to SNF/Rehab:  Bedside RN, Rey, has been notified to update the transition plan to the facility and call report ( [phone], room assigned on arrival, accepting physician  ).  Discharge information has been updated on the AVS. And communicated to facility via [employer], or CC link.     Discharge instructions to be fax'd to facility via [x] AllScripts [] CCLink      Nursing Please include all hard

## 2025-03-17 NOTE — PLAN OF CARE
Problem: Discharge Planning  Goal: Discharge to home or other facility with appropriate resources  3/11/2025 0323 by Harrison Griffith RN  Outcome: Progressing  3/10/2025 1432 by Rebeca Yoo RN  Outcome: Progressing  Flowsheets (Taken 3/10/2025 0800)  Discharge to home or other facility with appropriate resources: Identify barriers to discharge with patient and caregiver     Problem: Safety - Adult  Goal: Free from fall injury  3/11/2025 0323 by Harrison Griffith RN  Outcome: Progressing  3/10/2025 1432 by Rebeca Yoo RN  Outcome: Progressing     Problem: ABCDS Injury Assessment  Goal: Absence of physical injury  3/11/2025 0323 by Harrison Griffith RN  Outcome: Progressing  3/10/2025 1432 by Rebeca Yoo RN  Outcome: Progressing     Problem: Pain  Goal: Verbalizes/displays adequate comfort level or baseline comfort level  3/11/2025 0323 by Harrison Griffith RN  Outcome: Progressing  3/10/2025 1432 by Rebeca Yoo RN  Outcome: Progressing     Problem: Skin/Tissue Integrity  Goal: Skin integrity remains intact  Description: 1.  Monitor for areas of redness and/or skin breakdown  2.  Assess vascular access sites hourly  3.  Every 4-6 hours minimum:  Change oxygen saturation probe site  4.  Every 4-6 hours:  If on nasal continuous positive airway pressure, respiratory therapy assess nares and determine need for appliance change or resting period  Outcome: Progressing     Problem: Chronic Conditions and Co-morbidities  Goal: Patient's chronic conditions and co-morbidity symptoms are monitored and maintained or improved  3/11/2025 0323 by Harrison Griffith RN  Outcome: Progressing  3/10/2025 1432 by Rebeca Yoo RN  Outcome: Progressing  Flowsheets (Taken 3/10/2025 0800)  Care Plan - Patient's Chronic Conditions and Co-Morbidity Symptoms are Monitored and Maintained or Improved: Monitor and assess patient's chronic conditions and comorbid symptoms for stability, deterioration, or improvement     
  Problem: Discharge Planning  Goal: Discharge to home or other facility with appropriate resources  Outcome: /[facility] Progressing  Flowsheets (Taken 3/12/2025 2020)  Discharge to home or other facility with appropriate resources: Identify barriers to discharge with patient and caregiver     Problem: Safety - Adult  Goal: Free from fall injury  Outcome: HH/[facility] Progressing     Problem: ABCDS Injury Assessment  Goal: Absence of physical injury  Outcome: HH/[facility] Progressing     Problem: Pain  Goal: Verbalizes/displays adequate comfort level or baseline comfort level  Outcome: HH/[facility] Progressing     Problem: Skin/Tissue Integrity  Goal: Skin integrity remains intact  Description: 1.  Monitor for areas of redness and/or skin breakdown  2.  Assess vascular access sites hourly  3.  Every 4-6 hours minimum:  Change oxygen saturation probe site  4.  Every 4-6 hours:  If on nasal continuous positive airway pressure, respiratory therapy assess nares and determine need for appliance change or resting period  Outcome: /[facility] Progressing  Flowsheets (Taken 3/12/2025 2020)  Skin Integrity Remains Intact: Monitor for areas of redness and/or skin breakdown     Problem: Chronic Conditions and Co-morbidities  Goal: Patient's chronic conditions and co-morbidity symptoms are monitored and maintained or improved  Outcome: /[facility] Progressing  Flowsheets (Taken 3/12/2025 2020)  Care Plan - Patient's Chronic Conditions and Co-Morbidity Symptoms are Monitored and Maintained or Improved: Monitor and assess patient's chronic conditions and comorbid symptoms for stability, deterioration, or improvement     
  Problem: Discharge Planning  Goal: Discharge to home or other facility with appropriate resources  Outcome: HH/[facility] Progressing  Flowsheets (Taken 3/9/2025 0400 by Isabella Bernal, RN)  Discharge to home or other facility with appropriate resources: Identify barriers to discharge with patient and caregiver     Problem: Safety - Adult  Goal: Free from fall injury  Outcome: HH/[facility] Progressing     Problem: ABCDS Injury Assessment  Goal: Absence of physical injury  Outcome: HH/[facility] Progressing     Problem: Pain  Goal: Verbalizes/displays adequate comfort level or baseline comfort level  Outcome: HH/[facility] Progressing     Problem: Skin/Tissue Integrity  Goal: Skin integrity remains intact  Description: 1.  Monitor for areas of redness and/or skin breakdown  2.  Assess vascular access sites hourly  3.  Every 4-6 hours minimum:  Change oxygen saturation probe site  4.  Every 4-6 hours:  If on nasal continuous positive airway pressure, respiratory therapy assess nares and determine need for appliance change or resting period  Outcome: HH/[facility] Progressing     
  Problem: Discharge Planning  Goal: Discharge to home or other facility with appropriate resources  Outcome: Progressing     Problem: Safety - Adult  Goal: Free from fall injury  Outcome: Progressing     Problem: ABCDS Injury Assessment  Goal: Absence of physical injury  Outcome: Progressing     Problem: Pain  Goal: Verbalizes/displays adequate comfort level or baseline comfort level  Outcome: Progressing     Problem: Chronic Conditions and Co-morbidities  Goal: Patient's chronic conditions and co-morbidity symptoms are monitored and maintained or improved  Outcome: Progressing     
  Problem: Discharge Planning  Goal: Discharge to home or other facility with appropriate resources  Outcome: Progressing     Problem: Safety - Adult  Goal: Free from fall injury  Outcome: Progressing     Problem: ABCDS Injury Assessment  Goal: Absence of physical injury  Outcome: Progressing     Problem: Pain  Goal: Verbalizes/displays adequate comfort level or baseline comfort level  Outcome: Progressing     Problem: Skin/Tissue Integrity  Goal: Skin integrity remains intact  Description: 1.  Monitor for areas of redness and/or skin breakdown  2.  Assess vascular access sites hourly  3.  Every 4-6 hours minimum:  Change oxygen saturation probe site  4.  Every 4-6 hours:  If on nasal continuous positive airway pressure, respiratory therapy assess nares and determine need for appliance change or resting period  Outcome: Progressing     
  Problem: Discharge Planning  Goal: Discharge to home or other facility with appropriate resources  Outcome: Progressing     Problem: Safety - Adult  Goal: Free from fall injury  Outcome: Progressing     Problem: ABCDS Injury Assessment  Goal: Absence of physical injury  Outcome: Progressing     Problem: Pain  Goal: Verbalizes/displays adequate comfort level or baseline comfort level  Outcome: Progressing     Problem: Skin/Tissue Integrity  Goal: Skin integrity remains intact  Description: 1.  Monitor for areas of redness and/or skin breakdown  2.  Assess vascular access sites hourly  3.  Every 4-6 hours minimum:  Change oxygen saturation probe site  4.  Every 4-6 hours:  If on nasal continuous positive airway pressure, respiratory therapy assess nares and determine need for appliance change or resting period  Outcome: Progressing     Problem: Chronic Conditions and Co-morbidities  Goal: Patient's chronic conditions and co-morbidity symptoms are monitored and maintained or improved  Outcome: Progressing     Problem: Physical Therapy - Adult  Goal: By Discharge: Performs mobility at highest level of function for planned discharge setting.  See evaluation for individualized goals.  Description: FUNCTIONAL STATUS PRIOR TO ADMISSION: Patient was modified independent using a SPC for functional mobility in home and community.      HOME SUPPORT PRIOR TO ADMISSION: The patient lived with her son who works outside the home.  She did not require assistance for ADLs or iADLs.    Physical Therapy Goals  Initiated 3/11/2025  1.  Patient will move from supine to sit and sit to supine , scoot up and down, and roll side to side in bed with modified independence within 7 day(s).    2.  Patient will transfer from bed to chair and chair to bed with modified independence using the least restrictive device within 7 day(s).  3.  Patient will perform sit to stand with modified independence within 7 day(s).  4.  Patient will ambulate with 
  Problem: Discharge Planning  Goal: Discharge to home or other facility with appropriate resources  Outcome: Progressing     Problem: Safety - Adult  Goal: Free from fall injury  Outcome: Progressing     Problem: ABCDS Injury Assessment  Goal: Absence of physical injury  Outcome: Progressing     Problem: Pain  Goal: Verbalizes/displays adequate comfort level or baseline comfort level  Outcome: Progressing     Problem: Skin/Tissue Integrity  Goal: Skin integrity remains intact  Description: 1.  Monitor for areas of redness and/or skin breakdown  2.  Assess vascular access sites hourly  3.  Every 4-6 hours minimum:  Change oxygen saturation probe site  4.  Every 4-6 hours:  If on nasal continuous positive airway pressure, respiratory therapy assess nares and determine need for appliance change or resting period  Outcome: Progressing  Flowsheets  Taken 3/15/2025 1644  Skin Integrity Remains Intact: Monitor for areas of redness and/or skin breakdown  Taken 3/15/2025 0830  Skin Integrity Remains Intact: Monitor for areas of redness and/or skin breakdown     Problem: Chronic Conditions and Co-morbidities  Goal: Patient's chronic conditions and co-morbidity symptoms are monitored and maintained or improved  Outcome: Progressing     Problem: Nutrition Deficit:  Goal: Optimize nutritional status  Outcome: Progressing     
  Problem: Discharge Planning  Goal: Discharge to home or other facility with appropriate resources  Outcome: Progressing     Problem: Safety - Adult  Goal: Free from fall injury  Outcome: Progressing     Problem: ABCDS Injury Assessment  Goal: Absence of physical injury  Outcome: Progressing     Problem: Pain  Goal: Verbalizes/displays adequate comfort level or baseline comfort level  Outcome: Progressing     Problem: Skin/Tissue Integrity  Goal: Skin integrity remains intact  Description: 1.  Monitor for areas of redness and/or skin breakdown  2.  Assess vascular access sites hourly  3.  Every 4-6 hours minimum:  Change oxygen saturation probe site  4.  Every 4-6 hours:  If on nasal continuous positive airway pressure, respiratory therapy assess nares and determine need for appliance change or resting period  Outcome: Progressing  Flowsheets (Taken 3/14/2025 0749)  Skin Integrity Remains Intact: Monitor for areas of redness and/or skin breakdown     Problem: Chronic Conditions and Co-morbidities  Goal: Patient's chronic conditions and co-morbidity symptoms are monitored and maintained or improved  Outcome: Progressing     Problem: Physical Therapy - Adult  Goal: By Discharge: Performs mobility at highest level of function for planned discharge setting.  See evaluation for individualized goals.  Description: FUNCTIONAL STATUS PRIOR TO ADMISSION: Patient was modified independent using a SPC for functional mobility in home and community.      HOME SUPPORT PRIOR TO ADMISSION: The patient lived with her son who works outside the home.  She did not require assistance for ADLs or iADLs.    Physical Therapy Goals  Initiated 3/11/2025  1.  Patient will move from supine to sit and sit to supine , scoot up and down, and roll side to side in bed with modified independence within 7 day(s).    2.  Patient will transfer from bed to chair and chair to bed with modified independence using the least restrictive device within 7 
  Problem: Discharge Planning  Goal: Discharge to home or other facility with appropriate resources  Outcome: Progressing     Problem: Safety - Adult  Goal: Free from fall injury  Outcome: Progressing     Problem: ABCDS Injury Assessment  Goal: Absence of physical injury  Outcome: Progressing  Flowsheets (Taken 3/13/2025 2000)  Absence of Physical Injury: Implement safety measures based on patient assessment     Problem: Pain  Goal: Verbalizes/displays adequate comfort level or baseline comfort level  Outcome: Progressing     Problem: Skin/Tissue Integrity  Goal: Skin integrity remains intact  Description: 1.  Monitor for areas of redness and/or skin breakdown  2.  Assess vascular access sites hourly  3.  Every 4-6 hours minimum:  Change oxygen saturation probe site  4.  Every 4-6 hours:  If on nasal continuous positive airway pressure, respiratory therapy assess nares and determine need for appliance change or resting period  Outcome: Progressing  Flowsheets (Taken 3/13/2025 2000)  Skin Integrity Remains Intact: Monitor for areas of redness and/or skin breakdown     Problem: Chronic Conditions and Co-morbidities  Goal: Patient's chronic conditions and co-morbidity symptoms are monitored and maintained or improved  Outcome: Progressing     Problem: Physical Therapy - Adult  Goal: By Discharge: Performs mobility at highest level of function for planned discharge setting.  See evaluation for individualized goals.  Description: FUNCTIONAL STATUS PRIOR TO ADMISSION: Patient was modified independent using a SPC for functional mobility in home and community.      HOME SUPPORT PRIOR TO ADMISSION: The patient lived with her son who works outside the home.  She did not require assistance for ADLs or iADLs.    Physical Therapy Goals  Initiated 3/11/2025  1.  Patient will move from supine to sit and sit to supine , scoot up and down, and roll side to side in bed with modified independence within 7 day(s).    2.  Patient will 
  Problem: Occupational Therapy - Adult  Goal: By Discharge: Performs self-care activities at highest level of function for planned discharge setting.  See evaluation for individualized goals.  Description: Description: FUNCTIONAL STATUS PRIOR TO ADMISSION: Patient was modified independent using a SPC for functional mobility in home and community.      HOME SUPPORT PRIOR TO ADMISSION: The patient lived with her son who works outside the home.  She did not require assistance for ADLs or iADLs.       Occupational Therapy Goals:  Initiated 3/13/2025  1.  Patient will perform bathing with Modified [place] within 7 day(s).  2.  Patient will perform lower body dressing with Modified [place] within 7 day(s).  3.  Patient will perform toilet transfers with Modified [place]  within 7 day(s).  4.  Patient will perform all aspects of toileting with Modified [place] within 7 day(s).  5.  Patient will participate in upper extremity therapeutic exercise/activities with Modified [place] for 10 minutes within 7 day(s).    6.  Patient will utilize energy conservation techniques during functional activities with verbal cues within 7 day(s).   Outcome: Progressing   OCCUPATIONAL THERAPY TREATMENT  Patient: Candida Maza (64 y.o. female)  Date: 3/17/2025  Primary Diagnosis: Hypernatremia [E87.0]  Hypoglycemia [E16.2]  HARPER (acute kidney injury) [N17.9]  Sepsis (HCC) [A41.9]  Severe sepsis (HCC) [A41.9, R65.20]  Altered mental status, unspecified altered mental status type [R41.82]  Community acquired pneumonia, unspecified laterality [J18.9]       Precautions: Fall Risk                Chart, occupational therapy assessment, plan of care, and goals were reviewed.    ASSESSMENT  Patient continues to benefit from skilled OT services and is progressing towards goals. Pt needing min assist to sit edge of bed, ambulated to restroom to stand for washing her face and brushing her teeth. Pt stated she has had swelling 
  Problem: Occupational Therapy - Adult  Goal: By Discharge: Performs self-care activities at highest level of function for planned discharge setting.  See evaluation for individualized goals.  Description: Description: FUNCTIONAL STATUS PRIOR TO ADMISSION: Patient was modified independent using a SPC for functional mobility in home and community.      HOME SUPPORT PRIOR TO ADMISSION: The patient lived with her son who works outside the home.  She did not require assistance for ADLs or iADLs.       Occupational Therapy Goals:  Initiated 3/13/2025  1.  Patient will perform bathing with Modified [place] within 7 day(s).  2.  Patient will perform lower body dressing with Modified [place] within 7 day(s).  3.  Patient will perform toilet transfers with Modified [place]  within 7 day(s).  4.  Patient will perform all aspects of toileting with Modified [place] within 7 day(s).  5.  Patient will participate in upper extremity therapeutic exercise/activities with Modified [place] for 10 minutes within 7 day(s).    6.  Patient will utilize energy conservation techniques during functional activities with verbal cues within 7 day(s).   Outcome: Progressing   OCCUPATIONAL THERAPY EVALUATION    Patient: Candida Maza (64 y.o. female)  Date: 3/13/2025  Primary Diagnosis: Hypernatremia [E87.0]  Hypoglycemia [E16.2]  HARPER (acute kidney injury) [N17.9]  Sepsis (HCC) [A41.9]  Severe sepsis (HCC) [A41.9, R65.20]  Altered mental status, unspecified altered mental status type [R41.82]  Community acquired pneumonia, unspecified laterality [J18.9]         Precautions: Fall Risk                  ASSESSMENT :  The patient is limited by decreased functional mobility, independence in ADLs, high-level IADLs, strength, activity tolerance, endurance, increased pain levels.    Based on the impairments listed above pt presents at min assist with sit to stand using RW, CGA ambulating short distances (with RW) and min to 
  Problem: Occupational Therapy - Adult  Goal: By Discharge: Performs self-care activities at highest level of function for planned discharge setting.  See evaluation for individualized goals.  Description: Description: FUNCTIONAL STATUS PRIOR TO ADMISSION: Patient was modified independent using a SPC for functional mobility in home and community.      HOME SUPPORT PRIOR TO ADMISSION: The patient lived with her son who works outside the home.  She did not require assistance for ADLs or iADLs.       Occupational Therapy Goals:  Initiated 3/13/2025  1.  Patient will perform bathing with Modified [place] within 7 day(s).  2.  Patient will perform lower body dressing with Modified [place] within 7 day(s).  3.  Patient will perform toilet transfers with Modified [place]  within 7 day(s).  4.  Patient will perform all aspects of toileting with Modified [place] within 7 day(s).  5.  Patient will participate in upper extremity therapeutic exercise/activities with Modified [place] for 10 minutes within 7 day(s).    6.  Patient will utilize energy conservation techniques during functional activities with verbal cues within 7 day(s).   Outcome: Progressing   OCCUPATIONAL THERAPY TREATMENT  Patient: Candida Maza (64 y.o. female)  Date: 3/14/2025  Primary Diagnosis: Hypernatremia [E87.0]  Hypoglycemia [E16.2]  HARPER (acute kidney injury) [N17.9]  Sepsis (HCC) [A41.9]  Severe sepsis (HCC) [A41.9, R65.20]  Altered mental status, unspecified altered mental status type [R41.82]  Community acquired pneumonia, unspecified laterality [J18.9]       Precautions: Fall Risk                Chart, occupational therapy assessment, plan of care, and goals were reviewed.    ASSESSMENT  Patient continues to benefit from skilled OT services and is progressing towards goals. Pt seated in chair, Hgb 6.9 however RN verbalizing pt asymptomatic. Pt ambulated to sink in her room and washed her hands supervision, no LOB with 
  Problem: Physical Therapy - Adult  Goal: By Discharge: Performs mobility at highest level of function for planned discharge setting.  See evaluation for individualized goals.  Description: FUNCTIONAL STATUS PRIOR TO ADMISSION: Patient was modified independent using a SPC for functional mobility in home and community.      HOME SUPPORT PRIOR TO ADMISSION: The patient lived with her son who works outside the home.  She did not require assistance for ADLs or iADLs.    Physical Therapy Goals  Initiated 3/11/2025  1.  Patient will move from supine to sit and sit to supine , scoot up and down, and roll side to side in bed with modified independence within 7 day(s).    2.  Patient will transfer from bed to chair and chair to bed with modified independence using the least restrictive device within 7 day(s).  3.  Patient will perform sit to stand with modified independence within 7 day(s).  4.  Patient will ambulate with independence for 120 feet with the least restrictive device within 7 day(s).   5.  Patient will ascend/descend 4 stairs with 1 handrail(s) with modified independence within 7 day(s).    Outcome: Progressing   PHYSICAL THERAPY TREATMENT    Patient: Candida Maza (64 y.o. female)  Date: 3/12/2025  Diagnosis: Hypernatremia [E87.0]  Hypoglycemia [E16.2]  HARPER (acute kidney injury) [N17.9]  Sepsis (HCC) [A41.9]  Severe sepsis (HCC) [A41.9, R65.20]  Altered mental status, unspecified altered mental status type [R41.82]  Community acquired pneumonia, unspecified laterality [J18.9] Sepsis (HCC)      Precautions: Restrictions/Precautions  Restrictions/Precautions: Fall Risk            ASSESSMENT:  Patient continues to benefit from skilled PT services.Pt supine to sit with CGA.Pt CGA to min assist sit to stand.Pt ambulated 30ft min assist hand held.Pt has used a cane in the past but will be steadier with RW.Pt left sitting in chair.Pt progressing with mobility.Continue goals.         PLAN:  Patient continues to 
  Problem: Physical Therapy - Adult  Goal: By Discharge: Performs mobility at highest level of function for planned discharge setting.  See evaluation for individualized goals.  Description: FUNCTIONAL STATUS PRIOR TO ADMISSION: Patient was modified independent using a SPC for functional mobility in home and community.      HOME SUPPORT PRIOR TO ADMISSION: The patient lived with her son who works outside the home.  She did not require assistance for ADLs or iADLs.    Physical Therapy Goals  Initiated 3/11/2025  1.  Patient will move from supine to sit and sit to supine , scoot up and down, and roll side to side in bed with modified independence within 7 day(s).    2.  Patient will transfer from bed to chair and chair to bed with modified independence using the least restrictive device within 7 day(s).  3.  Patient will perform sit to stand with modified independence within 7 day(s).  4.  Patient will ambulate with independence for 120 feet with the least restrictive device within 7 day(s).   5.  Patient will ascend/descend 4 stairs with 1 handrail(s) with modified independence within 7 day(s).    Outcome: Progressing   PHYSICAL THERAPY TREATMENT    Patient: Candida Maza (64 y.o. female)  Date: 3/14/2025  Diagnosis: Hypernatremia [E87.0]  Hypoglycemia [E16.2]  HARPER (acute kidney injury) [N17.9]  Sepsis (HCC) [A41.9]  Severe sepsis (HCC) [A41.9, R65.20]  Altered mental status, unspecified altered mental status type [R41.82]  Community acquired pneumonia, unspecified laterality [J18.9] Sepsis (HCC)      Precautions: Restrictions/Precautions  Restrictions/Precautions: Fall Risk            ASSESSMENT:  Patient continues to benefit from skilled PT services and is progressing towards goals. Communicated with nurse cleared for therapy. Patient already up on the recliner when received. Mobilized patient today with rolling walker min assist. Sit to stand multiple times, ambulate with rolling walker in the room around the 
  Problem: Physical Therapy - Adult  Goal: By Discharge: Performs mobility at highest level of function for planned discharge setting.  See evaluation for individualized goals.  Description: FUNCTIONAL STATUS PRIOR TO ADMISSION: Patient was modified independent using a SPC for functional mobility in home and community.      HOME SUPPORT PRIOR TO ADMISSION: The patient lived with her son who works outside the home.  She did not require assistance for ADLs or iADLs.    Physical Therapy Goals  Initiated 3/11/2025  1.  Patient will move from supine to sit and sit to supine , scoot up and down, and roll side to side in bed with modified independence within 7 day(s).    2.  Patient will transfer from bed to chair and chair to bed with modified independence using the least restrictive device within 7 day(s).  3.  Patient will perform sit to stand with modified independence within 7 day(s).  4.  Patient will ambulate with independence for 120 feet with the least restrictive device within 7 day(s).   5.  Patient will ascend/descend 4 stairs with 1 handrail(s) with modified independence within 7 day(s).    Outcome: Progressing   PHYSICAL THERAPY TREATMENT    Patient: Candida Maza (64 y.o. female)  Date: 3/17/2025  Diagnosis: Hypernatremia [E87.0]  Hypoglycemia [E16.2]  HARPER (acute kidney injury) [N17.9]  Sepsis (HCC) [A41.9]  Severe sepsis (HCC) [A41.9, R65.20]  Altered mental status, unspecified altered mental status type [R41.82]  Community acquired pneumonia, unspecified laterality [J18.9] Sepsis (HCC)      Precautions: Restrictions/Precautions  Restrictions/Precautions: Fall Risk            ASSESSMENT:  Patient continues to benefit from skilled PT services and is progressing towards goals. Pt limited by decreased activity tolerance increased need for assistance with managing BLE. Denies SOB with increased activity tho fatigued at requested to return to bed.  O2 sat 90% on room air.          PLAN:  Patient continues to 
  Problem: Physical Therapy - Adult  Goal: By Discharge: Performs mobility at highest level of function for planned discharge setting.  See evaluation for individualized goals.  Description: FUNCTIONAL STATUS PRIOR TO ADMISSION: Patient was modified independent using a SPC for functional mobility in home and community.      HOME SUPPORT PRIOR TO ADMISSION: The patient lived with her son who works outside the home.  She did not require assistance for ADLs or iADLs.    Physical Therapy Goals  Initiated 3/11/2025  1.  Patient will move from supine to sit and sit to supine , scoot up and down, and roll side to side in bed with modified independence within 7 day(s).    2.  Patient will transfer from bed to chair and chair to bed with modified independence using the least restrictive device within 7 day(s).  3.  Patient will perform sit to stand with modified independence within 7 day(s).  4.  Patient will ambulate with independence for 120 feet with the least restrictive device within 7 day(s).   5.  Patient will ascend/descend 4 stairs with 1 handrail(s) with modified independence within 7 day(s).    Outcome: Progressing  PHYSICAL THERAPY TREATMENT    Patient: Candida Maza (64 y.o. female)  Date: 3/13/2025  Diagnosis: Hypernatremia [E87.0]  Hypoglycemia [E16.2]  HARPER (acute kidney injury) [N17.9]  Sepsis (HCC) [A41.9]  Severe sepsis (HCC) [A41.9, R65.20]  Altered mental status, unspecified altered mental status type [R41.82]  Community acquired pneumonia, unspecified laterality [J18.9] Sepsis (HCC)      Precautions: Restrictions/Precautions  Restrictions/Precautions: Fall Risk            ASSESSMENT:  Patient continues to benefit from skilled PT services and is progressing towards goals. Communicated with nurse cleared for therapy. Patient already up on the recliner after OT when received. Mobilized patient today with rolling walker min assist. Sit to stand, ambulate with rolling walker in the room, around the bed and 
RN  Outcome: Progressing  3/11/2025 0323 by Harrison Griffith, RN  Outcome: Progressing     
nurse         Thank you for this referral.  Kylie Rossi, PT  Minutes: 25

## 2025-03-17 NOTE — PROGRESS NOTES
Subjective / Objective     Subjective  Overnight Events: NAEO.     Patient seen and examined at bedside. No acute complaints, endorses baseline abdominal pain. Denies CP, SOB, dysuria.    Vital Signs:     Vitals:    03/12/25 0701   BP: 138/67   Pulse: 61   Resp: 15   Temp: 97.7 °F (36.5 °C)   SpO2: 97%     Physical Examination:   General appearance - alert, well appearing, and in no distress  Chest - On RA. Stable soft crackles in bibasilar lung fields. No increased respiratory effort.   Heart - normal rate, regular rhythm, normal S1, S2, no murmurs, rubs, clicks or gallops,   Abdomen - soft, minimal TTP LUQ/LLQ, nondistended, no masses or organomegaly  Neurological - alert, oriented, normal speech, no focal findings  Skin - warm, dry. No notable rashes  Extremities - no pedal edema, no clubbing or cyanosis  Psychiatric - normal speech and thought processes    I/O:  03/11 0701 - 03/12 0700  In: 250 [P.O.:240; I.V.:10]  Out: 400 [Urine:400]    Inpatient Medications   Current Facility-Administered Medications   Medication Dose Route Frequency Provider Last Rate Last Admin    [START ON 3/13/2025] levoFLOXacin (LEVAQUIN) tablet 750 mg  750 mg Oral Every Other Day Yayo Santacruz MD        furosemide (LASIX) tablet 20 mg  20 mg Oral BID Krystle Garibay MD   20 mg at 03/11/25 1733    insulin glargine (LANTUS) injection vial 7 Units  7 Units SubCUTAneous Nightly Krystle Garibay MD   7 Units at 03/11/25 2312    sodium chloride flush 0.9 % injection 5-40 mL  5-40 mL IntraVENous 2 times per day Gracious Celina Winter MD   10 mL at 03/11/25 2315    sodium chloride flush 0.9 % injection 5-40 mL  5-40 mL IntraVENous PRN Gracious Celina Winter MD        0.9 % sodium chloride infusion   IntraVENous PRN Gracious Celina Winter MD        enoxaparin (LOVENOX) injection 40 mg  40 mg SubCUTAneous Daily Gracious Celina Winter MD   40 mg at 03/11/25 0854    acetaminophen (TYLENOL) tablet 650 mg  650 mg Oral Q6H PRN 
               Subjective / Objective     Subjective  Overnight Events: NAEO.     Patient seen and examined at bedside. Reports feeling about the same as yesterday, does have some epigastric and left sided abdominal pain that is unchanged. Denies CP, SOB, dysuria.    Vital Signs:     Vitals:    03/11/25 0731   BP: 126/62   Pulse: 66   Resp: 16   Temp: 98.4 °F (36.9 °C)   SpO2: 94%     Physical Examination:   General appearance - alert, well appearing, and in no distress  Chest - Inspiratory crackles in bibasilar lung fields. No increased respiratory effort.   Heart - normal rate, regular rhythm, normal S1, S2, no murmurs, rubs, clicks or gallops,   Abdomen - soft, nontender, nondistended, no masses or organomegaly  Neurological - alert, oriented, normal speech, no focal findings  Skin - warm, dry. No notable rashes  Extremities - no pedal edema, no clubbing or cyanosis  Psychiatric - normal speech and thought processes    I/O:  03/10 0701 - 03/11 0700  In: 800 [P.O.:800]  Out: 1450 [Urine:1450]    Inpatient Medications   Current Facility-Administered Medications   Medication Dose Route Frequency Provider Last Rate Last Admin    furosemide (LASIX) tablet 20 mg  20 mg Oral BID Krystle Garibay MD   20 mg at 03/11/25 0849    insulin glargine (LANTUS) injection vial 7 Units  7 Units SubCUTAneous Nightly Krystle Garibay MD   7 Units at 03/10/25 2051    sodium chloride flush 0.9 % injection 5-40 mL  5-40 mL IntraVENous 2 times per day Gracious Celina Winter MD   10 mL at 03/11/25 0853    sodium chloride flush 0.9 % injection 5-40 mL  5-40 mL IntraVENous PRN Gracious Celina Winter MD        0.9 % sodium chloride infusion   IntraVENous PRN Gracious Celina Winter MD        enoxaparin (LOVENOX) injection 40 mg  40 mg SubCUTAneous Daily Gracious Celina Winter MD   40 mg at 03/11/25 0854    acetaminophen (TYLENOL) tablet 650 mg  650 mg Oral Q6H PRN Gracious Celina Winter MD   650 mg at 03/09/25 0822    Or    acetaminophen 
  Physician Progress Note      PATIENT:               ZEE HERZOG  CSN #:                  [id number]  :                       1960  ADMIT DATE:       3/8/2025 10:14 PM  DISCH DATE:  RESPONDING  PROVIDER #:        ALBINA MCFARLAND          QUERY TEXT:    Pt admitted with sepsis. Pt noted to have AMS. If possible, please document in   the progress notes and discharge summary if you are evaluating and / or   treating any of the following:    The medical record reflects the following:  Risk Factors: 64 year old female, type 2 DM, UTI, PNA  Clinical Indicators: 3/8 ER provider note - female with a past medical history   significant for insulin-dependent diabetes, who presents to the ER by EMS for   evaluation for altered mental status with lethargy and decreased   responsiveness.  EMS state that the patient had a blood sugar was 62 at home   and unable to insert an IV to give D50.  The patient is usually awake, alert   and conversant at baseline.  The patient has had a temperature 102 ?F.  3/9 HP - Sepsis 2/2 UTI v. GI illness  Altered mental status, resolved  Had negative CTH in ED, unclear what symptoms of altered mental status were,   patient does not recall.  Treatment: IV Cefepime, D10 3/8, IVF LR, IV Levaquin, IVF NS bolus x 1, IV   Vancomycin      Please email [email or web address] with any questions  Options provided:  -- Metabolic encephalopathy  -- Septic encephalopathy  -- Other - I will add my own diagnosis  -- Disagree - Not applicable / Not valid  -- Disagree - Clinically unable to determine / Unknown  -- Refer to Clinical Documentation Reviewer    PROVIDER RESPONSE TEXT:    This patient has septic encephalopathy.    Query created by: Lou Lott on 3/11/2025 6:26 AM      Electronically signed by:  ALBINA MCFARLAND 3/12/2025 9:03 AM          
Chart access to assist primary nurse.  
[facility] Dosing Services: Antimicrobial Stewardship Daily Doc  Consult for antibiotic dosing of vanc by Dr. Alvina Winter  Indication: sepsis  Day of Therapy: 2    Ht Readings from Last 1 Encounters:   No data found for Ht        Wt Readings from Last 1 Encounters:   03/09/25 84.2 kg (185 lb 11.2 oz)      Vancomycin therapy:  Loading dose: Vancomycin 2000 mg x1 dose given 3/8 2308  Maintenance dose: Vancomycin dosing per random levels  Dose calculated to approximate a                   b. Trough of 15-20 mcg/mL     Last level: 12.8 mcg/mL (drawn 3/9 @ 1747) ~ 18 hour level post loading dose    A/Plan:   Will order vancomycin 1000 mg x 1 dose now  WBC 11.9, Scr 1.79 improving (UNK BL), febrile  Ordered height and weight for patient, no height noted in chart  Can consider dosing regimen, will trend labs tmrw and evaluate    Dose administration notes:     Non-Kinetic Antimicrobial Dosing Regimen:   Current Regimen:  Levaquin 750 mg q 48h  Recommendation: continue  Dose administration notes:     Other Antimicrobial   (not dosed by pharmacist)    Cultures 3/8: blood x2: ngtd - prelim  3/8: Urine: in process  3/9: MRSA: in process     Serum Creatinine Lab Results   Component Value Date/Time    CREATININE 1.79 03/09/2025 10:31 AM          Creatinine Clearance CrCl cannot be calculated (Unknown ideal weight.).     Temp Temp: 98.1 °F (36.7 °C) (Oral)       WBC Lab Results   Component Value Date/Time    WBC 11.9 03/09/2025 10:31 AM          Procalcitonin Lab Results   Component Value Date/Time    PROCAL <0.05 03/08/2025 10:32 PM      For Antifungals, Metronidazole and Nafcillin: Lab Results   Component Value Date/Time    ALT 19 03/09/2025 10:31 AM    AST 24 03/09/2025 10:31 AM        Pharmacist: Ja Brown, [facility]  [phone]      
[facility] RESIDENCY PROGRAM   Senior Resident Admission Note    Chart reviewed. Patient seen, examined, and discussed with Dr. Sharma (PGY-1). See H&P note for more details.    CC: Hypoglycemia    HPI:  Candida Maza is a 64 y.o. female w/ a known history of T2DM, CKD4, chronic pancreatitis with exocrine dysfunction, AOCD, GERD, HTN, and migraine who presents for hypoglycemia. She states her eyes became blurry and she checked blood sugar which was low which prompted calling EMS. Reports having dry mouth and recent decreased PO intake. Denies all other symptoms. Denies recent medication changes or antibiotics, other than recent UTI treatment. Pt is a poor historian and does not remember if she has taken any of her medications on the day of presentation. No syncope/ trauma. Denies fever/chills, headache, lightheadedness, dizziness, chest pain, palpitations, shortness of breath, nausea, vomiting, dysuria, increased frequency, change in urine color, weakness/numbness. Reports mild abdominal pain that has been ongoing for months. Does endorse diarrhea, but states this has been chronic, going on for months with no recent changes. No sick contacts.    Pertinent ED Findings:  VS: Temp 103, HR 97, /78, RR 16, SpO2 93% RA  Labs: Na 146, K 5.2, Cl 120, Cr 2.04 (BL 1.7), lactic 0.54. Procal <0.05. Flu/covid neg. U/a 4+ bacteria, mod LE,  WBC, proteinuria. Trop 62.   Imaging: CT Head NAP. CXR: Mild interstitial opacities bilaterally (pneumonia vs pulmonary edema).  EKG: NSR with PVC. Rate 89. Qtc 411.   Treatment: 30cc/kg NS, cefepime/vancomycin, tylenol 1000mg    PE:  General: No acute distress. Alert. Cooperative.  Head: Normocephalic. Atraumatic.  Mouth: Mucosa pink. Dry MM. Lips xerotic.  Respiratory: CTAB. No w/r/r/c.  Cardiovascular: RRR. No m/r/g.   GI: Tender in suprapubic region. +BS. No rebound, no guarding. No CVA tenderness.  Extremities: 2+ LE edema, dependent, limited to foot, below 
[facility] Dosing Services: Antimicrobial Stewardship Daily Doc  Consult for antibiotic dosing of vanc by Dr. Alvina Winter  Indication: sepsis  Day of Therapy: 2    Ht Readings from Last 1 Encounters:   No data found for Ht        Wt Readings from Last 1 Encounters:   03/09/25 84.2 kg (185 lb 11.2 oz)      Vancomycin therapy:  Loading dose: Vancomycin 2000 mg x1 dose now/given 3/8 2308  Maintenance dose: V  Dose calculated to approximate a                     b. Trough of 15-20 mcg/mL   Last level:  mcg/mL  Plan: WBC 11.9, Scr 1.79 improving (UNK BL), febrile. Vanc random ordered for this evening to assess q18 vs 24h regimen.   Dose administration notes:     Non-Kinetic Antimicrobial Dosing Regimen:   Current Regimen:  Levaquin  Recommendation: continue  Dose administration notes:     Other Antimicrobial   (not dosed by pharmacist)    Cultures 3/8: blood x2:  3/8: Urine:   3/9: MRSA:     Serum Creatinine Lab Results   Component Value Date/Time    CREATININE 1.79 03/09/2025 10:31 AM          Creatinine Clearance CrCl cannot be calculated (Unknown ideal weight.).     Temp Temp: 97.9 °F (36.6 °C) (Oral)       WBC Lab Results   Component Value Date/Time    WBC 11.9 03/09/2025 10:31 AM          Procalcitonin Lab Results   Component Value Date/Time    PROCAL <0.05 03/08/2025 10:32 PM      For Antifungals, Metronidazole and Nafcillin: Lab Results   Component Value Date/Time    ALT 19 03/09/2025 10:31 AM    AST 24 03/09/2025 10:31 AM        Pharmacist: Jannie RamosD, BCPS  [phone]    
Patient packet was left at bedside, faxed to the facility  
Patient was handed a copy of discharge instructions which were read and explained to patient. New medications read and explained, patient verbalized understanding. Patient aware that medications have been sent electronically to pharmacy of choice. All questions and concerns were addressed, IVs were removed, VS stable and patient was sent with all belongings. Report called to facility and opportunity for questions was given.  
Pharmacy Dosing Services: 03/09/25    Levaquin dose change per renal protocol    Indication: CAP    Serum Creatinine Lab Results   Component Value Date/Time    CREATININE 2.09 03/08/2025 10:32 PM    CREATININE 2.4 03/08/2025 10:25 PM      Creatinine Clearance CrCl cannot be calculated (Unknown ideal weight.).   BUN Lab Results   Component Value Date/Time    BUN 32 03/08/2025 10:32 PM         Current regimen: 750 mg daily  New regimen: 750 mg q48h                Thank you  Estefani Arias, [facility]  [phone]   
Pharmacy Dosing Services: 3/11/25    The pharmacist has determined that this patient meets P & T approved criteria for conversion from IV to oral therapy for the following medication:Levofloxacin      The pharmacist has written the following order for the patient: 750mg po q48h  The pharmacist will continue to monitor the patient's status and advise the physician if conversion back to IV therapy is recommended.    Signed Hari Turner RPH Contact information: [phone]    
[facility] Dosing Services: Antimicrobial Stewardship Daily Doc 3/10/2025  Consult for antibiotic dosing of vanc by Dr. Alvina Winter  Indication: Sepsis. Empiric for HAP, recurrent UTI.  Day of Therapy: 3  - Hx ESBL in urine    Ht Readings from Last 1 Encounters:   03/10/25 1.575 m (5' 2\")        Wt Readings from Last 1 Encounters:   03/10/25 82.6 kg (182 lb)      Vancomycin therapy:  Loading dose: Vancomycin 2000 mg x1 dose given 3/8 2308  Maintenance dose: Vancomycin dosing per random levels  Dose calculated to approximate a                   b. -600    Last level: 12.8 mcg/mL (drawn 3/9 @ 1747) ~ 18 hour level post loading dose    A/Plan:   Recommend consider dc vanc next 24 hrs if MRSA screen negative  Scr stable, start 1000 mg Q24 hrs. Level tomorrow evening if vancomycin continues.     Dose administration notes:     Non-Kinetic Antimicrobial Dosing Regimen:   Current Regimen:  Levaquin 750 mg q 48h  Recommendation: Dose appropriate, CrCl <50 ml/min    Other Antimicrobial   (not dosed by pharmacist)    Cultures 3/8: blood x2: ngtd - prelim  3/8: Urine: GNR, Prelim  3/9: MRSA: in process     Serum Creatinine Lab Results   Component Value Date/Time    CREATININE 1.71 03/10/2025 05:33 AM          Creatinine Clearance Estimated Creatinine Clearance: 33 mL/min (A) (based on SCr of 1.71 mg/dL (H)).     Temp Temp: 97.7 °F (36.5 °C) (Oral)       WBC Lab Results   Component Value Date/Time    WBC 9.9 03/10/2025 05:33 AM          Procalcitonin Lab Results   Component Value Date/Time    PROCAL <0.05 03/08/2025 10:32 PM      For Antifungals, Metronidazole and Nafcillin: Lab Results   Component Value Date/Time    ALT 15 03/10/2025 05:33 AM    AST 13 03/10/2025 05:33 AM        ThanksAntwon, JannieD          
Nutrition Supplement  Nutrition Education/Counseling: No recommendation at this time  Coordination of Nutrition Care: Continue to monitor while inpatient, Interdisciplinary Rounds       Goals:     Goals: Meet at least 75% of estimated needs, by next RD assessment       Nutrition Monitoring and Evaluation:   Behavioral-Environmental Outcomes: None Identified  Food/Nutrient Intake Outcomes: Food and Nutrient Intake, Supplement Intake  Physical Signs/Symptoms Outcomes: Biochemical Data, Weight, Skin    Discharge Planning:    Continue current diet, Continue Oral Nutrition Supplement     Miguel Angel Li RD  Available via [employer] # [phone]      
injection vial 3 Units  3 Units SubCUTAneous Nightly    sodium chloride flush 0.9 % injection 5-40 mL  5-40 mL IntraVENous 2 times per day    sodium chloride flush 0.9 % injection 5-40 mL  5-40 mL IntraVENous PRN    0.9 % sodium chloride infusion   IntraVENous PRN    enoxaparin (LOVENOX) injection 40 mg  40 mg SubCUTAneous Daily    acetaminophen (TYLENOL) tablet 650 mg  650 mg Oral Q6H PRN    Or    acetaminophen (TYLENOL) suppository 650 mg  650 mg Rectal Q6H PRN    levoFLOXacin (LEVAQUIN) 750 MG/150ML infusion 750 mg  750 mg IntraVENous Q48H    insulin lispro (HUMALOG,ADMELOG) injection vial 0-8 Units  0-8 Units SubCUTAneous 4x Daily AC & HS    amLODIPine (NORVASC) tablet 10 mg  10 mg Oral Daily    lipase-protease-amylase (ZENPEP) 83411-98503 units delayed release capsule 20,000 Units  20,000 Units Oral TID WC    diclofenac sodium (VOLTAREN) 1 % gel 2 g  2 g Topical 4x Daily PRN    pantoprazole (PROTONIX) tablet 40 mg  40 mg Oral QAM AC    sodium bicarbonate tablet 650 mg  650 mg Oral TID    gabapentin (NEURONTIN) capsule 100 mg  100 mg Oral TID    dicyclomine (BENTYL) capsule 10 mg  10 mg Oral 4x Daily    lidocaine 4 % external patch 1 patch  1 patch TransDERmal Daily    traMADol (ULTRAM) tablet 50 mg  50 mg Oral BID PRN    Vancomycin - Pharmacy to dose   Other RX Placeholder    glucose chewable tablet 16 g  4 tablet Oral PRN    dextrose bolus 10% 125 mL  125 mL IntraVENous PRN    Or    dextrose bolus 10% 250 mL  250 mL IntraVENous PRN    glucagon injection 1 mg  1 mg SubCUTAneous PRN    dextrose 10 % infusion   IntraVENous Continuous PRN     Allergies  No Known Allergies  CBC:  Recent Labs     03/08/25 2232 03/09/25 0931 03/10/25  0533   WBC 9.0 11.9* 9.9   HGB 8.0* 7.5* 7.0*    271 239     Metabolic Panel:  Recent Labs     03/08/25 2232 03/09/25 0931 03/10/25  0533   * 146* 142   K 5.2* 4.8 5.0   * 119* 117*   CO2 20* 21 21   BUN 32* 29* 29*   ALT 17 19 15     Imaging/procedures:   CT HEAD 
 Diabetic Neuropathy:   Patient endorsing persisting LE pain, R>L. Taking gabapentin 100 mg TID. Etiology of weakness/pain likely 2/2 hypoglycemic episode vs worsening neuropathy vs undiagnosed MSK such as spinal stenosis. CRP normal.  - continue home gabapentin  - PT/OT eval      Diabetes Mellitus T2: Home lantus 7U qhs, no longer has mealtime insulin. A1c 6.6 in 1/2025.    - Lantus 5u qhs  - Lispro 2u with meals  - Insulin Sliding Scale normal sensitivity with AC&HS glucose checks.  - Hypoglycemia protocols ordered.     At risk HARPER on CKD Stg IV, resolved  Cr 2.0 up from baseline 1.7. Likely 2/2 IVVD I/s/o poor PO intake. Resolved.  - Continue home sodium bicarb tabs.    GERD  Chronic, controlled. Home omeprazole 20 mg qd.  - Continue home medication as above, sub pantoprazole     Asthma: Chronic, controlled. On RA.  - albuterol prn    Migraine: Controlled. Home sumatriptan 100 mg prn BID.  - Continue home med as above     Hypertension: POA /78  - Continuing home medications of amlodipine 10 mg.   - Will continue to monitor at this time and readjust as BP's trend.     Obesity BMI Body mass index is 30.18 kg/m².  - Encouraging lifestyle modifications and further follow up outpatient.      FEN/GI - Diabetic diet.   Activity - Ambulate as tolerated  DVT prophylaxis - Lovenox  GI prophylaxis - Protonix  Fall prophylaxis - Not indicated at this time.  Disposition - Plan to d/c to SNF. Consulting PT/OT/CM  Code Status - Full code, discussed with patient / caregivers.  Next of Kin Name and Contact     Corona Jama (Other)  [phone] (Mobile)     Patient discussed with Dr. Meredith (Attending Physician)     Krystle Garibay MD    
1/2025.    - Lantus 7u qhs  - Lispro 2u with meals  - Insulin Sliding Scale normal sensitivity with AC&HS glucose checks.  - Hypoglycemia protocols ordered.     At risk HARPER on CKD Stg IV, resolved  Cr 2.0 up from baseline 1.7. Likely 2/2 IVVD I/s/o poor PO intake. Resolved.  - Continue home sodium bicarb tabs.    GERD  Chronic, controlled. Home omeprazole 20 mg qd.  - Continue home medication as above, sub pantoprazole     Asthma: Chronic, controlled. On RA.  - albuterol prn    Migraine: Controlled. Home sumatriptan 100 mg prn BID.  - Continue home med as above     Hypertension: POA /78  - Continuing home medications of amlodipine 10 mg.   - Will continue to monitor at this time and readjust as BP's trend.     Obesity BMI Body mass index is 30.18 kg/m².  - Encouraging lifestyle modifications and further follow up outpatient.      FEN/GI - Diabetic diet.   Activity - Ambulate as tolerated  DVT prophylaxis - Lovenox  GI prophylaxis - Protonix  Fall prophylaxis - Not indicated at this time.  Disposition - Plan to d/c to SNF. Consulting PT/OT/CM  Code Status - Full code, discussed with patient / caregivers.  Next of Kin Name and Contact     Corona Jama (Other)  [phone] (Mobile)     Patient discussed with Dr. Meredith (Attending Physician)     Krystle Garibay MD    
hypoglycemic episode vs worsening neuropathy vs undiagnosed MSK such as spinal stenosis. CRP normal.  - Continue home gabapentin  - PT/OT eval      Diabetes Mellitus T2: Home lantus 7U qhs, no longer has mealtime insulin. A1c 6.6 in 1/2025.    - Lantus 7u qhs  - Lispro 2u with meals  - Insulin Sliding Scale normal sensitivity with AC&HS glucose checks.  - Hypoglycemia protocols ordered.     At risk HARPER on CKD Stg IV, resolved  Cr 2.0 up from baseline 1.7. Likely 2/2 IVVD I/s/o poor PO intake. Resolved.  - Continue home sodium bicarb tabs.    GERD  Chronic, controlled. Home omeprazole 20 mg qd.  - Continue home medication as above, sub pantoprazole     Asthma: Chronic, controlled. On RA.  - albuterol prn    Migraine: Controlled. Home sumatriptan 100 mg prn BID.  - Continue home med as above     Hypertension: POA /78  - Continuing home medications of amlodipine 10 mg.   - Will continue to monitor at this time and readjust as BP's trend.     Obesity BMI Body mass index is 30.18 kg/m².  - Encouraging lifestyle modifications and further follow up outpatient.      FEN/GI - Diabetic diet.   Activity - Ambulate as tolerated  DVT prophylaxis - Lovenox  GI prophylaxis - Protonix  Fall prophylaxis - Not indicated at this time.  Disposition - Plan to d/c to [facility]. Consulting PT/OT/CM  Code Status - Full code, discussed with patient / caregivers.  Next of Kin Name and Contact     Corona Jama (Other)  [phone] (Mobile)     Patient discussed with Dr. Rebolledo (Attending Physician)     Krystle Garibay MD    
pancreatitis  Pancreatic exocrine insufficiency  Mild abdominal pain at chronic baseline.   - Continue home Zenpep (sub for creon) TID with meals  - Continue home bentyl 10 mg 4 times daily before meals and nightly     R leg wound  Diabetic Neuropathy:   Patient endorsing persisting LE pain, R>L. Taking gabapentin 100 mg TID. Etiology of weakness/pain likely 2/2 hypoglycemic episode vs worsening neuropathy vs undiagnosed MSK such as spinal stenosis. CRP normal.  - continue home gabapentin  - PT/OT eval      Diabetes Mellitus T2: Home lantus 7U qhs, no longer has mealtime insulin. A1c 6.6 in 1/2025.    - Lantus 5u qhs  - Add lispro 2u with meals  - Insulin Sliding Scale normal sensitivity with AC&HS glucose checks.  - Hypoglycemia protocols ordered.     At risk HARPER on CKD Stg IV, resolved  Cr 2.0 up from baseline 1.7. Likely 2/2 IVVD I/s/o poor PO intake. Resolved.  - CTM on daily labs  - Continue home sodium bicarb tabs.    GERD  Chronic, controlled. Home omeprazole 20 mg qd.  - Continue home medication as above, sub pantoprazole     Asthma: Chronic, controlled. On RA.  - albuterol prn    Migraine: Controlled. Home sumatriptan 100 mg prn BID.  - Continue home med as above     Hypertension: POA /78  - Continuing home medications of amlodipine 10 mg.   - Will continue to monitor at this time and readjust as BP's trend.     Obesity BMI Body mass index is 30.18 kg/m².  - Encouraging lifestyle modifications and further follow up outpatient.      FEN/GI - Diabetic diet.   Activity - Ambulate as tolerated  DVT prophylaxis - Lovenox  GI prophylaxis - Protonix  Fall prophylaxis - Not indicated at this time.  Disposition - Admit to Medical. Plan to d/c to [facility]BD. Consulting PT/OT/CM  Code Status - Full code, discussed with patient / caregivers.  Next of Kin Name and Contact     Corona Jama (Other)  [phone] (Mobile)       Patient discussed with Dr. Meredith (Attending Physician)     Gerry Randolph MD    
MD Lani

## 2025-03-17 NOTE — DISCHARGE SUMMARY
Link was admitted into the Family Medicine Service from 3/8/2025 to 3/17/2025 for Hypernatremia [E87.0]  Hypoglycemia [E16.2]  HARPER (acute kidney injury) [N17.9]  Sepsis (HCC) [A41.9]  Severe sepsis (HCC) [A41.9, R65.20]  Altered mental status, unspecified altered mental status type [R41.82]  Community acquired pneumonia, unspecified laterality [J18.9]. During the course of this admission, the following conditions were addressed/managed:    Severe Sepsis 2/2 UTI v. GI illness v. HAP  2/4 SIRS. AMS has since resolved. History of recurrent UTI, most recently ESBL sensitive to levaquin. CXR with possible multifocal pneumonia vs pulm edema and given recent admission; covered for HAP as well. MRSA nares negative. Urine cx reveals klebsiella pneumonia sensitive to levofloxacin. SP cefepime in ED. Sp Vanc (3/8 - 3/9). Sp levaquin (3/9 - 3/15). Bcx NGTD.  - Regular diet  - PT/OT/CM consulted: accepted to [facility], awaiting auth and bed    Hypervolemia: Trace BLE and flank edema and bibasilar crackles, respiratory status normal on RA. No increased respiratory effort. CXR on admission with pulm edema vs multifocal PNA. Improving  - Home lasix 20 mg po TID    Altered mental status, resolved  Had negative CTH in ED, unclear what symptoms of altered mental status were.  - Continue to monitor    Chronic pancreatitis  Pancreatic exocrine insufficiency  Mild abdominal pain at chronic baseline. Prior admission imaging showed pancreatic ductal dilation, unable to exclude mass, recommended repeat imaging.   - Continue home Zenpep (sub for creon) TID with meals  - Continue home bentyl 10 mg 4 times daily before meals and nightly  - Follow up with PCP/GI to consider EUS     R leg wound  Diabetic Neuropathy:   Patient endorsing persisting LE pain, R>L. Taking gabapentin 100 mg TID. Etiology of weakness/pain likely 2/2 hypoglycemic episode vs worsening neuropathy vs undiagnosed MSK such as spinal stenosis. CRP normal.  -

## 2025-03-19 NOTE — TELEPHONE ENCOUNTER
Spoke to pt and reminded her of our conversation from 5/21/21 about PA for ozempic being denied and script was changed to [address] [place] [address]. She now remembers this. I also let her know again that the creon PA was approved on 2/12/21. I let her know that I have once again let her pharmacy know this and faxed a copy of the approved PA to them. no

## 2025-03-20 ENCOUNTER — HOSPITAL ENCOUNTER (OUTPATIENT)
Facility: HOSPITAL | Age: 65
Setting detail: SPECIMEN
Discharge: HOME | End: 2025-03-23

## 2025-03-20 LAB
ABO + RH BLD: NORMAL
BLOOD GROUP ANTIBODIES SERPL: NORMAL
SPECIMEN EXP DATE BLD: NORMAL

## 2025-03-20 PROCEDURE — 86901 BLOOD TYPING SEROLOGIC RH(D): CPT

## 2025-03-20 PROCEDURE — 86850 RBC ANTIBODY SCREEN: CPT

## 2025-03-20 PROCEDURE — 86900 BLOOD TYPING SEROLOGIC ABO: CPT

## 2025-03-24 ENCOUNTER — HOSPITAL ENCOUNTER (OUTPATIENT)
Facility: HOSPITAL | Age: 65
Setting detail: SPECIMEN
Discharge: HOME | End: 2025-03-27

## 2025-03-24 LAB
ABO + RH BLD: NORMAL
BLOOD GROUP ANTIBODIES SERPL: NORMAL
SPECIMEN EXP DATE BLD: NORMAL

## 2025-03-24 PROCEDURE — 36415 COLL VENOUS BLD VENIPUNCTURE: CPT

## 2025-03-24 PROCEDURE — 86900 BLOOD TYPING SEROLOGIC ABO: CPT

## 2025-03-24 PROCEDURE — 86901 BLOOD TYPING SEROLOGIC RH(D): CPT

## 2025-03-24 PROCEDURE — 86850 RBC ANTIBODY SCREEN: CPT

## 2025-03-31 ENCOUNTER — TELEPHONE (OUTPATIENT)
Age: 65
End: 2025-03-31

## 2025-04-28 ENCOUNTER — HOSPITAL ENCOUNTER (OUTPATIENT)
Facility: HOSPITAL | Age: 65
Discharge: HOME | End: 2025-05-01
Attending: INTERNAL MEDICINE
Payer: MEDICAID

## 2025-04-28 VITALS — BODY MASS INDEX: 31.09 KG/M2 | WEIGHT: 170 LBS

## 2025-04-28 DIAGNOSIS — Z12.31 OTHER SCREENING MAMMOGRAM: ICD-10-CM

## 2025-04-28 PROCEDURE — 77063 BREAST TOMOSYNTHESIS BI: CPT

## 2025-04-30 NOTE — TELEPHONE ENCOUNTER
LVM to return call. Prior Authorization Retail Medication Request    Medication/Dose: PARoxetine (PAXIL) 10 MG tablet  Diagnosis and ICD code (if different than what is on RX):    New/renewal/insurance change PA/secondary ins. PA:  Previously Tried and Failed:    Rationale:      Insurance   Primary:   Insurance ID:      Secondary (if applicable):  Insurance ID:  [id number]    Pharmacy Information (if different than what is on RX)  Name:    Phone:  [phone]  Fax:    Clinic Information  Preferred routing pool for dept communication:

## 2025-05-05 RX ORDER — FUROSEMIDE 20 MG/1
20 TABLET ORAL 3 TIMES DAILY
Qty: 270 TABLET | Refills: 0 | Status: SHIPPED | OUTPATIENT
Start: 2025-05-05

## 2025-05-23 ENCOUNTER — OFFICE VISIT (OUTPATIENT)
Age: 65
End: 2025-05-23
Payer: MEDICARE

## 2025-05-23 VITALS
OXYGEN SATURATION: 99 % | WEIGHT: 157.4 LBS | HEART RATE: 74 BPM | DIASTOLIC BLOOD PRESSURE: 80 MMHG | TEMPERATURE: 97.7 F | HEIGHT: 62 IN | RESPIRATION RATE: 17 BRPM | BODY MASS INDEX: 28.97 KG/M2 | SYSTOLIC BLOOD PRESSURE: 158 MMHG

## 2025-05-23 DIAGNOSIS — I73.9 CLAUDICATION IN PERIPHERAL VASCULAR DISEASE: Primary | ICD-10-CM

## 2025-05-23 DIAGNOSIS — Z79.4 TYPE 2 DIABETES MELLITUS WITHOUT COMPLICATION, WITH LONG-TERM CURRENT USE OF INSULIN (HCC): ICD-10-CM

## 2025-05-23 DIAGNOSIS — E11.9 TYPE 2 DIABETES MELLITUS WITHOUT COMPLICATION, WITH LONG-TERM CURRENT USE OF INSULIN (HCC): ICD-10-CM

## 2025-05-23 LAB — HBA1C MFR BLD: 6.5 %

## 2025-05-23 PROCEDURE — 99214 OFFICE O/P EST MOD 30 MIN: CPT | Performed by: FAMILY MEDICINE

## 2025-05-23 PROCEDURE — G8417 CALC BMI ABV UP PARAM F/U: HCPCS | Performed by: FAMILY MEDICINE

## 2025-05-23 PROCEDURE — G8399 PT W/DXA RESULTS DOCUMENT: HCPCS | Performed by: FAMILY MEDICINE

## 2025-05-23 PROCEDURE — 3079F DIAST BP 80-89 MM HG: CPT | Performed by: FAMILY MEDICINE

## 2025-05-23 PROCEDURE — G8427 DOCREV CUR MEDS BY ELIG CLIN: HCPCS | Performed by: FAMILY MEDICINE

## 2025-05-23 PROCEDURE — 2022F DILAT RTA XM EVC RTNOPTHY: CPT | Performed by: FAMILY MEDICINE

## 2025-05-23 PROCEDURE — 83036 HEMOGLOBIN GLYCOSYLATED A1C: CPT | Performed by: FAMILY MEDICINE

## 2025-05-23 PROCEDURE — 1036F TOBACCO NON-USER: CPT | Performed by: FAMILY MEDICINE

## 2025-05-23 PROCEDURE — 3044F HG A1C LEVEL LT 7.0%: CPT | Performed by: FAMILY MEDICINE

## 2025-05-23 PROCEDURE — PBSHW AMB POC HEMOGLOBIN A1C: Performed by: FAMILY MEDICINE

## 2025-05-23 PROCEDURE — 3017F COLORECTAL CA SCREEN DOC REV: CPT | Performed by: FAMILY MEDICINE

## 2025-05-23 PROCEDURE — 3077F SYST BP >= 140 MM HG: CPT | Performed by: FAMILY MEDICINE

## 2025-05-23 PROCEDURE — 1090F PRES/ABSN URINE INCON ASSESS: CPT | Performed by: FAMILY MEDICINE

## 2025-05-23 PROCEDURE — 1123F ACP DISCUSS/DSCN MKR DOCD: CPT | Performed by: FAMILY MEDICINE

## 2025-05-23 RX ORDER — OMEGA-3/DHA/EPA/FISH OIL 35-113.5MG
1000 TABLET,CHEWABLE ORAL DAILY
Status: ON HOLD | COMMUNITY
Start: 2025-03-25

## 2025-05-23 RX ORDER — TRAMADOL HYDROCHLORIDE 50 MG/1
TABLET ORAL
Status: ON HOLD | COMMUNITY
Start: 2025-04-28

## 2025-05-23 NOTE — PROGRESS NOTES
Identified pt with two pt identifiers(name and )    Chief Complaint   Patient presents with    Foot Pain     Patient is having pain in her left foot the past few weeks     Leg Pain     Patient still having pain in her right leg and would like to have it looked at     Referral - General     Referral for urology for frequent UTI         Health Maintenance Due   Topic    HIV screen     Diabetic retinal exam     Respiratory Syncytial Virus (RSV) Pregnant or age 60 yrs+ (1 - Risk 60-74 years 1-dose series)    Pneumococcal 50+ years Vaccine (2 of 2 - PCV)    Lipids     COVID-19 Vaccine ( season)       Wt Readings from Last 3 Encounters:   25 77.1 kg (170 lb)   03/10/25 82.6 kg (182 lb)   25 74.8 kg (165 lb)     Temp Readings from Last 3 Encounters:   25 99.1 °F (37.3 °C) (Oral)   25 98.6 °F (37 °C) (Oral)   25 97 °F (36.1 °C)     BP Readings from Last 3 Encounters:   25 (!) 130/56   25 113/67   25 (!) 140/74     Pulse Readings from Last 3 Encounters:   25 72   25 68   25 74           Depression Screening:  :         2025     3:01 PM 2024    12:59 PM 2024     9:33 AM 2/15/2024     2:21 PM 2023     1:24 PM 2023     3:00 PM 2022     1:50 PM   PHQ-9 Questionaire   Little interest or pleasure in doing things 0 0 0 0 0 0 0   Feeling down, depressed, or hopeless 0 0 0 0 0 0 0   PHQ-9 Total Score 0 0 0 0 0 0 0        Fall Risk Assessment:  :         2025     2:42 PM   Fall Risk   Do you feel unsteady or are you worried about falling?  no   2 or more falls in past year? no   Fall with injury in past year? no        Abuse Screening:  :          No data to display                 Coordination of Care Questionnaire:  :     \"Have you been to the ER, urgent care clinic since your last visit?  Hospitalized since your last visit?\"    NO    “Have you seen or consulted any other health care providers outside our system since

## 2025-05-23 NOTE — PROGRESS NOTES
Candida Maza (:  1960) is a 65 y.o. female, Established patient, here for evaluation of the following chief complaint(s):  Foot Pain (Patient is having pain in her left foot the past few weeks ), Leg Pain (Patient still having pain in her right leg and would like to have it looked at ), and Referral - General (Referral for urology for frequent UTI )         Assessment & Plan  1. Potential gangrene.  - Symptoms include left foot and leg pain, skin peeling, and a suspected ulcer.  - Physical examination reveals a healing wound with a specific odor, raising concerns about a potential bone infection.  - Discussed the need for emergency evaluation due to the circulation problem affecting the great toe and the possibility of infection extending to the bone.  - Advised to seek immediate medical attention at an emergency room for imaging studies (x-ray, CT scan) and potential antibiotic treatment. Referral to wound care for ongoing management will be made.    2. Cellulitis.  - Noted presence of cellulitis with associated symptoms.  - Physical examination confirms cellulitis without opening the affected area.  - Advised to go to the hospital for fluid retention and infection rule out to ensure comprehensive evaluation and treatment.  - Hospitalization will allow for appropriate management of cellulitis and related complications.    Results  Labs   - Blood glucose test: 6.5  1. Claudication in peripheral vascular disease  -     Vascular duplex lower extremity arteries left with PEE; Future  -     External Referral To Vascular Surgery  -     [facility]  2. Type 2 diabetes mellitus without complication, with long-term current use of insulin (HCC)  -     AMB POC HEMOGLOBIN A1C  -     [facility]    No follow-ups on file.       Subjective   History of Present Illness  The patient is a 65-year-old female who presents today with complaints of

## 2025-05-24 ENCOUNTER — APPOINTMENT (OUTPATIENT)
Dept: VASCULAR SURGERY | Facility: HOSPITAL | Age: 65
DRG: 253 | End: 2025-05-24
Payer: MEDICARE

## 2025-05-24 ENCOUNTER — HOSPITAL ENCOUNTER (INPATIENT)
Facility: HOSPITAL | Age: 65
LOS: 15 days | Discharge: NURSING, REHAB OR LONG-TERM CARE | DRG: 253 | End: 2025-06-08
Attending: EMERGENCY MEDICINE | Admitting: FAMILY MEDICINE
Payer: MEDICARE

## 2025-05-24 ENCOUNTER — APPOINTMENT (OUTPATIENT)
Facility: HOSPITAL | Age: 65
DRG: 253 | End: 2025-05-24
Payer: MEDICARE

## 2025-05-24 DIAGNOSIS — M86.9 OSTEOMYELITIS OF GREAT TOE OF LEFT FOOT (HCC): Primary | ICD-10-CM

## 2025-05-24 DIAGNOSIS — G89.29 CHRONIC MIDLINE LOW BACK PAIN WITHOUT SCIATICA: ICD-10-CM

## 2025-05-24 DIAGNOSIS — R60.0 EDEMA OF LEFT UPPER ARM: ICD-10-CM

## 2025-05-24 DIAGNOSIS — I73.9 PAD (PERIPHERAL ARTERY DISEASE): ICD-10-CM

## 2025-05-24 DIAGNOSIS — M54.50 CHRONIC MIDLINE LOW BACK PAIN WITHOUT SCIATICA: ICD-10-CM

## 2025-05-24 DIAGNOSIS — E11.9 TYPE 2 DIABETES MELLITUS WITHOUT COMPLICATION, WITHOUT LONG-TERM CURRENT USE OF INSULIN (HCC): ICD-10-CM

## 2025-05-24 DIAGNOSIS — L08.9 DIABETIC FOOT INFECTION (HCC): ICD-10-CM

## 2025-05-24 DIAGNOSIS — E11.628 DIABETIC FOOT INFECTION (HCC): ICD-10-CM

## 2025-05-24 LAB
ALBUMIN SERPL-MCNC: 1.3 G/DL (ref 3.5–5)
ALBUMIN/GLOB SERPL: 0.3 (ref 1.1–2.2)
ALP SERPL-CCNC: 515 U/L (ref 45–117)
ALT SERPL-CCNC: 48 U/L (ref 12–78)
ANION GAP SERPL CALC-SCNC: 4 MMOL/L (ref 2–12)
APPEARANCE UR: CLEAR
AST SERPL-CCNC: 29 U/L (ref 15–37)
BACTERIA URNS QL MICRO: NEGATIVE /HPF
BASOPHILS # BLD: 0.06 K/UL (ref 0–0.1)
BASOPHILS NFR BLD: 0.5 % (ref 0–1)
BILIRUB SERPL-MCNC: 0.1 MG/DL (ref 0.2–1)
BILIRUB UR QL: NEGATIVE
BUN SERPL-MCNC: 27 MG/DL (ref 6–20)
BUN/CREAT SERPL: 16 (ref 12–20)
CALCIUM SERPL-MCNC: 7.8 MG/DL (ref 8.5–10.1)
CHLORIDE SERPL-SCNC: 111 MMOL/L (ref 97–108)
CO2 SERPL-SCNC: 25 MMOL/L (ref 21–32)
COLOR UR: ABNORMAL
CREAT SERPL-MCNC: 1.72 MG/DL (ref 0.55–1.02)
DIFFERENTIAL METHOD BLD: ABNORMAL
EOSINOPHIL # BLD: 0.01 K/UL (ref 0–0.4)
EOSINOPHIL NFR BLD: 0.1 % (ref 0–7)
EPITH CASTS URNS QL MICRO: ABNORMAL /LPF
ERYTHROCYTE [DISTWIDTH] IN BLOOD BY AUTOMATED COUNT: 15 % (ref 11.5–14.5)
ERYTHROCYTE [SEDIMENTATION RATE] IN BLOOD: 53 MM/HR (ref 0–30)
GLOBULIN SER CALC-MCNC: 4.2 G/DL (ref 2–4)
GLUCOSE BLD STRIP.AUTO-MCNC: 113 MG/DL (ref 65–117)
GLUCOSE BLD STRIP.AUTO-MCNC: 58 MG/DL (ref 65–117)
GLUCOSE BLD STRIP.AUTO-MCNC: 59 MG/DL (ref 65–117)
GLUCOSE BLD STRIP.AUTO-MCNC: 63 MG/DL (ref 65–117)
GLUCOSE BLD STRIP.AUTO-MCNC: 78 MG/DL (ref 65–117)
GLUCOSE BLD STRIP.AUTO-MCNC: 91 MG/DL (ref 65–117)
GLUCOSE BLD STRIP.AUTO-MCNC: 93 MG/DL (ref 65–117)
GLUCOSE BLD STRIP.AUTO-MCNC: 96 MG/DL (ref 65–117)
GLUCOSE SERPL-MCNC: 56 MG/DL (ref 65–100)
GLUCOSE UR STRIP.AUTO-MCNC: NEGATIVE MG/DL
HCT VFR BLD AUTO: 27 % (ref 35–47)
HGB BLD-MCNC: 8.6 G/DL (ref 11.5–16)
HGB UR QL STRIP: ABNORMAL
IMM GRANULOCYTES # BLD AUTO: 0.04 K/UL (ref 0–0.04)
IMM GRANULOCYTES NFR BLD AUTO: 0.3 % (ref 0–0.5)
KETONES UR QL STRIP.AUTO: NEGATIVE MG/DL
LEUKOCYTE ESTERASE UR QL STRIP.AUTO: NEGATIVE
LYMPHOCYTES # BLD: 2.38 K/UL (ref 0.8–3.5)
LYMPHOCYTES NFR BLD: 18.9 % (ref 12–49)
MCH RBC QN AUTO: 29 PG (ref 26–34)
MCHC RBC AUTO-ENTMCNC: 31.9 G/DL (ref 30–36.5)
MCV RBC AUTO: 90.9 FL (ref 80–99)
MONOCYTES # BLD: 0.55 K/UL (ref 0–1)
MONOCYTES NFR BLD: 4.4 % (ref 5–13)
NEUTS SEG # BLD: 9.52 K/UL (ref 1.8–8)
NEUTS SEG NFR BLD: 75.8 % (ref 32–75)
NITRITE UR QL STRIP.AUTO: NEGATIVE
NRBC # BLD: 0 K/UL (ref 0–0.01)
NRBC BLD-RTO: 0 PER 100 WBC
PH UR STRIP: 6 (ref 5–8)
PLATELET # BLD AUTO: 343 K/UL (ref 150–400)
PMV BLD AUTO: 9.4 FL (ref 8.9–12.9)
POTASSIUM SERPL-SCNC: 5.3 MMOL/L (ref 3.5–5.1)
PROT SERPL-MCNC: 5.5 G/DL (ref 6.4–8.2)
PROT UR STRIP-MCNC: 100 MG/DL
RBC # BLD AUTO: 2.97 M/UL (ref 3.8–5.2)
RBC #/AREA URNS HPF: ABNORMAL /HPF (ref 0–5)
SERVICE CMNT-IMP: ABNORMAL
SERVICE CMNT-IMP: NORMAL
SODIUM SERPL-SCNC: 140 MMOL/L (ref 136–145)
SP GR UR REFRACTOMETRY: 1.02 (ref 1–1.03)
UROBILINOGEN UR QL STRIP.AUTO: 0.2 EU/DL (ref 0.2–1)
WBC # BLD AUTO: 12.6 K/UL (ref 3.6–11)
WBC URNS QL MICRO: ABNORMAL /HPF (ref 0–4)

## 2025-05-24 PROCEDURE — 99223 1ST HOSP IP/OBS HIGH 75: CPT | Performed by: FAMILY MEDICINE

## 2025-05-24 PROCEDURE — 2500000003 HC RX 250 WO HCPCS: Performed by: EMERGENCY MEDICINE

## 2025-05-24 PROCEDURE — 2580000003 HC RX 258

## 2025-05-24 PROCEDURE — 2580000003 HC RX 258: Performed by: EMERGENCY MEDICINE

## 2025-05-24 PROCEDURE — 6370000000 HC RX 637 (ALT 250 FOR IP)

## 2025-05-24 PROCEDURE — 36415 COLL VENOUS BLD VENIPUNCTURE: CPT

## 2025-05-24 PROCEDURE — 2500000003 HC RX 250 WO HCPCS

## 2025-05-24 PROCEDURE — 85652 RBC SED RATE AUTOMATED: CPT

## 2025-05-24 PROCEDURE — 96375 TX/PRO/DX INJ NEW DRUG ADDON: CPT

## 2025-05-24 PROCEDURE — 80053 COMPREHEN METABOLIC PANEL: CPT

## 2025-05-24 PROCEDURE — 96365 THER/PROPH/DIAG IV INF INIT: CPT

## 2025-05-24 PROCEDURE — 6360000002 HC RX W HCPCS

## 2025-05-24 PROCEDURE — 85025 COMPLETE CBC W/AUTO DIFF WBC: CPT

## 2025-05-24 PROCEDURE — 6370000000 HC RX 637 (ALT 250 FOR IP): Performed by: EMERGENCY MEDICINE

## 2025-05-24 PROCEDURE — 1100000000 HC RM PRIVATE

## 2025-05-24 PROCEDURE — 6360000002 HC RX W HCPCS: Performed by: EMERGENCY MEDICINE

## 2025-05-24 PROCEDURE — 81001 URINALYSIS AUTO W/SCOPE: CPT

## 2025-05-24 PROCEDURE — 73630 X-RAY EXAM OF FOOT: CPT

## 2025-05-24 PROCEDURE — 82962 GLUCOSE BLOOD TEST: CPT

## 2025-05-24 PROCEDURE — 99285 EMERGENCY DEPT VISIT HI MDM: CPT

## 2025-05-24 RX ORDER — ACETAMINOPHEN 650 MG/1
650 SUPPOSITORY RECTAL EVERY 6 HOURS PRN
Status: DISCONTINUED | OUTPATIENT
Start: 2025-05-24 | End: 2025-05-24

## 2025-05-24 RX ORDER — SUMATRIPTAN SUCCINATE 25 MG/1
100 TABLET ORAL PRN
Status: DISCONTINUED | OUTPATIENT
Start: 2025-05-24 | End: 2025-06-08 | Stop reason: HOSPADM

## 2025-05-24 RX ORDER — ENOXAPARIN SODIUM 100 MG/ML
40 INJECTION SUBCUTANEOUS EVERY 24 HOURS
Status: DISCONTINUED | OUTPATIENT
Start: 2025-05-24 | End: 2025-06-03

## 2025-05-24 RX ORDER — POTASSIUM CHLORIDE 7.45 MG/ML
10 INJECTION INTRAVENOUS PRN
Status: DISCONTINUED | OUTPATIENT
Start: 2025-05-24 | End: 2025-05-26

## 2025-05-24 RX ORDER — ACETAMINOPHEN 500 MG
1000 TABLET ORAL EVERY 8 HOURS SCHEDULED
Status: DISCONTINUED | OUTPATIENT
Start: 2025-05-24 | End: 2025-06-08 | Stop reason: HOSPADM

## 2025-05-24 RX ORDER — FUROSEMIDE 20 MG/1
20 TABLET ORAL 3 TIMES DAILY
Status: DISCONTINUED | OUTPATIENT
Start: 2025-05-24 | End: 2025-06-02

## 2025-05-24 RX ORDER — POTASSIUM CHLORIDE 750 MG/1
40 TABLET, EXTENDED RELEASE ORAL PRN
Status: DISCONTINUED | OUTPATIENT
Start: 2025-05-24 | End: 2025-05-26

## 2025-05-24 RX ORDER — DEXTROSE MONOHYDRATE 100 MG/ML
INJECTION, SOLUTION INTRAVENOUS CONTINUOUS PRN
Status: DISCONTINUED | OUTPATIENT
Start: 2025-05-24 | End: 2025-06-06 | Stop reason: SDUPTHER

## 2025-05-24 RX ORDER — PANTOPRAZOLE SODIUM 40 MG/1
40 TABLET, DELAYED RELEASE ORAL 2 TIMES DAILY
Status: DISCONTINUED | OUTPATIENT
Start: 2025-05-24 | End: 2025-05-29

## 2025-05-24 RX ORDER — DICYCLOMINE HYDROCHLORIDE 10 MG/1
10 CAPSULE ORAL
Status: DISCONTINUED | OUTPATIENT
Start: 2025-05-24 | End: 2025-06-08 | Stop reason: HOSPADM

## 2025-05-24 RX ORDER — OXYCODONE HYDROCHLORIDE 5 MG/1
2.5 TABLET ORAL EVERY 8 HOURS PRN
Refills: 0 | Status: DISCONTINUED | OUTPATIENT
Start: 2025-05-24 | End: 2025-05-25

## 2025-05-24 RX ORDER — SODIUM CHLORIDE 0.9 % (FLUSH) 0.9 %
5-40 SYRINGE (ML) INJECTION PRN
Status: DISCONTINUED | OUTPATIENT
Start: 2025-05-24 | End: 2025-06-03

## 2025-05-24 RX ORDER — KETOROLAC TROMETHAMINE 30 MG/ML
15 INJECTION, SOLUTION INTRAMUSCULAR; INTRAVENOUS
Status: COMPLETED | OUTPATIENT
Start: 2025-05-24 | End: 2025-05-24

## 2025-05-24 RX ORDER — METRONIDAZOLE 500 MG/100ML
500 INJECTION, SOLUTION INTRAVENOUS EVERY 8 HOURS
Status: DISCONTINUED | OUTPATIENT
Start: 2025-05-24 | End: 2025-05-30

## 2025-05-24 RX ORDER — SODIUM CHLORIDE 0.9 % (FLUSH) 0.9 %
5-40 SYRINGE (ML) INJECTION EVERY 12 HOURS SCHEDULED
Status: DISCONTINUED | OUTPATIENT
Start: 2025-05-24 | End: 2025-06-03

## 2025-05-24 RX ORDER — SODIUM CHLORIDE 9 MG/ML
INJECTION, SOLUTION INTRAVENOUS PRN
Status: DISCONTINUED | OUTPATIENT
Start: 2025-05-24 | End: 2025-06-08 | Stop reason: HOSPADM

## 2025-05-24 RX ORDER — AMLODIPINE BESYLATE 5 MG/1
10 TABLET ORAL DAILY
Status: DISCONTINUED | OUTPATIENT
Start: 2025-05-25 | End: 2025-06-05

## 2025-05-24 RX ORDER — MAGNESIUM SULFATE IN WATER 40 MG/ML
2000 INJECTION, SOLUTION INTRAVENOUS PRN
Status: DISCONTINUED | OUTPATIENT
Start: 2025-05-24 | End: 2025-05-26

## 2025-05-24 RX ORDER — GABAPENTIN 100 MG/1
200 CAPSULE ORAL 3 TIMES DAILY
Status: DISCONTINUED | OUTPATIENT
Start: 2025-05-24 | End: 2025-06-08 | Stop reason: HOSPADM

## 2025-05-24 RX ORDER — INSULIN GLARGINE 100 [IU]/ML
10 INJECTION, SOLUTION SUBCUTANEOUS EVERY MORNING
Status: DISCONTINUED | OUTPATIENT
Start: 2025-05-25 | End: 2025-05-26

## 2025-05-24 RX ORDER — ACETAMINOPHEN 325 MG/1
650 TABLET ORAL EVERY 6 HOURS PRN
Status: DISCONTINUED | OUTPATIENT
Start: 2025-05-24 | End: 2025-05-24

## 2025-05-24 RX ORDER — POLYETHYLENE GLYCOL 3350 17 G/17G
17 POWDER, FOR SOLUTION ORAL DAILY PRN
Status: DISCONTINUED | OUTPATIENT
Start: 2025-05-24 | End: 2025-06-06

## 2025-05-24 RX ORDER — ALBUTEROL SULFATE 90 UG/1
2 INHALANT RESPIRATORY (INHALATION) 4 TIMES DAILY PRN
Status: DISCONTINUED | OUTPATIENT
Start: 2025-05-24 | End: 2025-05-24

## 2025-05-24 RX ORDER — SODIUM BICARBONATE 650 MG/1
650 TABLET ORAL 3 TIMES DAILY
Status: DISCONTINUED | OUTPATIENT
Start: 2025-05-24 | End: 2025-06-08 | Stop reason: HOSPADM

## 2025-05-24 RX ORDER — OXYCODONE AND ACETAMINOPHEN 5; 325 MG/1; MG/1
1 TABLET ORAL
Refills: 0 | Status: COMPLETED | OUTPATIENT
Start: 2025-05-24 | End: 2025-05-24

## 2025-05-24 RX ORDER — ALBUTEROL SULFATE 0.83 MG/ML
2.5 SOLUTION RESPIRATORY (INHALATION) EVERY 4 HOURS PRN
Status: DISCONTINUED | OUTPATIENT
Start: 2025-05-24 | End: 2025-06-08 | Stop reason: HOSPADM

## 2025-05-24 RX ORDER — INSULIN LISPRO 100 [IU]/ML
0-8 INJECTION, SOLUTION INTRAVENOUS; SUBCUTANEOUS
Status: DISCONTINUED | OUTPATIENT
Start: 2025-05-24 | End: 2025-06-08 | Stop reason: HOSPADM

## 2025-05-24 RX ADMIN — PANCRELIPASE LIPASE, PANCRELIPASE PROTEASE, PANCRELIPASE AMYLASE 15000 UNITS: 15000; 47000; 63000 CAPSULE, DELAYED RELEASE ORAL at 21:11

## 2025-05-24 RX ADMIN — VANCOMYCIN HYDROCHLORIDE 1000 MG: 1 INJECTION, POWDER, LYOPHILIZED, FOR SOLUTION INTRAVENOUS at 11:21

## 2025-05-24 RX ADMIN — ACETAMINOPHEN 1000 MG: 500 TABLET ORAL at 21:13

## 2025-05-24 RX ADMIN — GABAPENTIN 200 MG: 100 CAPSULE ORAL at 21:11

## 2025-05-24 RX ADMIN — PANCRELIPASE LIPASE, PANCRELIPASE PROTEASE, PANCRELIPASE AMYLASE 20000 UNITS: 20000; 63000; 84000 CAPSULE, DELAYED RELEASE ORAL at 21:11

## 2025-05-24 RX ADMIN — OXYCODONE 2.5 MG: 5 TABLET ORAL at 17:31

## 2025-05-24 RX ADMIN — CEFEPIME 2000 MG: 2 INJECTION, POWDER, FOR SOLUTION INTRAVENOUS at 11:09

## 2025-05-24 RX ADMIN — SODIUM BICARBONATE 650 MG: 650 TABLET ORAL at 21:11

## 2025-05-24 RX ADMIN — FUROSEMIDE 20 MG: 20 TABLET ORAL at 21:12

## 2025-05-24 RX ADMIN — VANCOMYCIN HYDROCHLORIDE 750 MG: 750 INJECTION, POWDER, LYOPHILIZED, FOR SOLUTION INTRAVENOUS at 11:25

## 2025-05-24 RX ADMIN — METRONIDAZOLE 500 MG: 500 INJECTION, SOLUTION INTRAVENOUS at 23:35

## 2025-05-24 RX ADMIN — PANTOPRAZOLE SODIUM 40 MG: 40 TABLET, DELAYED RELEASE ORAL at 21:12

## 2025-05-24 RX ADMIN — DICYCLOMINE HYDROCHLORIDE 10 MG: 10 CAPSULE ORAL at 21:11

## 2025-05-24 RX ADMIN — SODIUM CHLORIDE, PRESERVATIVE FREE 10 ML: 5 INJECTION INTRAVENOUS at 21:11

## 2025-05-24 RX ADMIN — KETOROLAC TROMETHAMINE 15 MG: 30 INJECTION, SOLUTION INTRAMUSCULAR at 11:10

## 2025-05-24 RX ADMIN — OXYCODONE HYDROCHLORIDE AND ACETAMINOPHEN 1 TABLET: 5; 325 TABLET ORAL at 13:26

## 2025-05-24 RX ADMIN — CEFEPIME 2000 MG: 2 INJECTION, POWDER, FOR SOLUTION INTRAVENOUS at 23:35

## 2025-05-24 RX ADMIN — DICYCLOMINE HYDROCHLORIDE 10 MG: 10 CAPSULE ORAL at 17:16

## 2025-05-24 RX ADMIN — ENOXAPARIN SODIUM 40 MG: 100 INJECTION SUBCUTANEOUS at 17:16

## 2025-05-24 RX ADMIN — METRONIDAZOLE 500 MG: 500 INJECTION, SOLUTION INTRAVENOUS at 17:16

## 2025-05-24 ASSESSMENT — PAIN DESCRIPTION - ORIENTATION
ORIENTATION: RIGHT;LEFT
ORIENTATION: LEFT
ORIENTATION: LEFT
ORIENTATION: RIGHT

## 2025-05-24 ASSESSMENT — PAIN SCALES - GENERAL
PAINLEVEL_OUTOF10: 8
PAINLEVEL_OUTOF10: 7
PAINLEVEL_OUTOF10: 9
PAINLEVEL_OUTOF10: 7
PAINLEVEL_OUTOF10: 4
PAINLEVEL_OUTOF10: 7
PAINLEVEL_OUTOF10: 7
PAINLEVEL_OUTOF10: 3

## 2025-05-24 ASSESSMENT — PAIN DESCRIPTION - FREQUENCY: FREQUENCY: CONTINUOUS

## 2025-05-24 ASSESSMENT — PAIN - FUNCTIONAL ASSESSMENT
PAIN_FUNCTIONAL_ASSESSMENT: ACTIVITIES ARE NOT PREVENTED
PAIN_FUNCTIONAL_ASSESSMENT: 0-10

## 2025-05-24 ASSESSMENT — PAIN DESCRIPTION - LOCATION
LOCATION: FOOT
LOCATION: LEG
LOCATION: FOOT

## 2025-05-24 ASSESSMENT — PAIN DESCRIPTION - DESCRIPTORS
DESCRIPTORS: THROBBING
DESCRIPTORS: SHARP;STABBING;ACHING;THROBBING

## 2025-05-24 NOTE — ED PROVIDER NOTES
Medical History:   Diagnosis Date    Arthritis     Asthma     Cataracts, bilateral     Diabetes (HCC)     seen by [place]ocr[place]ology [place] [facility]    GERD (gastroesophageal reflux disease)     Hypertension     Obesity, Class II, BMI 35-39.9     Pancreatic insufficiency          SURGICAL HISTORY       Past Surgical History:   Procedure Laterality Date    CATARACT REMOVAL Right 10/2016    CATARACT REMOVAL Left 10/2015    CHOLECYSTECTOMY  2005?    COLONOSCOPY N/A 11/30/2016    COLONOSCOPY,EGD performed by Jarocho Tim MD at [facility] ENDOSCOPY    COLONOSCOPY      PANCREAS SURGERY PROC UNLISTED  2001?    Distal pancreatectomy.    PANCREATIC ELASTASE, FECAL, QUAL/SEMI-QUANT  1999    growths in prancreatitis    UPPER GASTROINTESTINAL ENDOSCOPY N/A 2/20/2025    ESOPHAGOGASTRODUODENOSCOPY performed by Artem Diaz MD at [facility] ENDOSCOPY    UPPER GASTROINTESTINAL ENDOSCOPY N/A 2/20/2025    ENDOSCOPIC ULTRASOUND performed by Artem Diaz MD at [facility] ENDOSCOPY    UPPER GASTROINTESTINAL ENDOSCOPY N/A 2/20/2025    ESOPHAGOGASTRODUODENOSCOPY BIOPSY performed by Artem Diaz MD at [facility] ENDOSCOPY         CURRENT MEDICATIONS       Previous Medications    ALBUTEROL SULFATE HFA (PROVENTIL;VENTOLIN;PROAIR) 108 (90 BASE) MCG/ACT INHALER    INHALE 2 PUFFS INTO THE LUNGS 4 TIMES DAILY AS NEEDED FOR WHEEZING.    ALBUTEROL SULFATE HFA (PROVENTIL;VENTOLIN;PROAIR) 108 (90 BASE) MCG/ACT INHALER    Inhale 2 puffs into the lungs every 4 hours as needed    AMLODIPINE (NORVASC) 10 MG TABLET    TAKE 1 TABLET BY MOUTH EVERY DAY    AMLODIPINE (NORVASC) 10 MG TABLET    Take 1 tablet by mouth daily    BLOOD GLUCOSE MONITOR STRIPS    1 strip by Other route in the morning, at noon, in the evening, and at bedtime Test 4 times a day & as needed for symptoms of irregular blood glucose. Dispense sufficient amount for indicated testing frequency plus additional to accommodate PRN testing needs.    BLOOD GLUCOSE MONITORING SUPPL (ONE TOUCH ULTRA 2) W/DEVICE KIT    USE AS DIRECTED

## 2025-05-24 NOTE — H&P
Medications   Prior to Admission medications    Medication Sig Start Date End Date Taking? Authorizing Provider   furosemide (LASIX) 20 MG tablet TAKE 1 TABLET BY MOUTH IN THE MORNING , AT NOON, AND AT BEDTIME 5/5/25  Yes Keena Leyva MD   insulin glargine (LANTUS SOLOSTAR) 100 UNIT/ML injection pen Inject 7 Units into the skin nightly  Patient taking differently: Inject 10 Units into the skin every morning 2/20/25  Yes Judson Medina, APRN - CNS   CREON [id number] units CPEP delayed release capsule TAKE 4 CAPSULES BY MOUTH 3 TIMES DAILY (WITH MEALS). MAY ALSO TAKE 3 CAPSULES DAILY AS NEEDED (SNACKS). 1/30/25  Yes Keena Leyva MD   amLODIPine (NORVASC) 10 MG tablet TAKE 1 TABLET BY MOUTH EVERY DAY 1/30/25  Yes Keena Leyva MD   sodium bicarbonate 650 MG tablet TAKE 1 TABLET BY MOUTH THREE TIMES A DAY 1/30/25  Yes Keena Leyva MD   dicyclomine (BENTYL) 10 MG capsule Take 1 capsule by mouth 4 times daily (before meals and nightly) 12/3/24  Yes Liudmila Heard MD   diclofenac sodium (VOLTAREN) 1 % GEL Apply 2 g topically 4 times daily as needed for Pain 11/7/24  Yes Liudmila Heard MD   albuterol sulfate HFA (PROVENTIL;VENTOLIN;PROAIR) 108 (90 Base) MCG/ACT inhaler INHALE 2 PUFFS INTO THE LUNGS 4 TIMES DAILY AS NEEDED FOR WHEEZING. 9/5/24  Yes Keena Leyva MD   cetirizine (ZYRTEC) 10 MG tablet Take 1 tablet by mouth nightly 8/14/24  Yes Liudmila Heard MD   traMADol (ULTRAM) 50 MG tablet TAKE 1 TABLET BY MOUTH TWICE A DAY AS NEEDED FOR PAIN SCALE 7-10 4/28/25   Zena Panchal MD   [employer] VITAMIN B12 1000 MCG tablet Take 1 tablet by mouth daily  Patient not taking: Reported on 5/24/2025 3/25/25   Zena Panchal MD   metFORMIN (GLUCOPHAGE) 1000 MG tablet Take 1 tablet by mouth 2 times daily  Patient not taking: Reported on 5/24/2025 5/5/25   Keena Leyva MD   LANTUS SOLOSTAR 100 UNIT/ML injection pen Inject 7 Units into the skin nightly 3/17/25   rBitta Quezada MD

## 2025-05-24 NOTE — ED NOTES
TRANSFER - OUT REPORT:    Verbal report given to Ruba CUMMINGS and LAMBERT Espinosa on Candida Maza  being transferred to [facility] for routine progression of patient care       Report consisted of patient's Situation, Background, Assessment and   Recommendations(SBAR).     Information from the following report(s) ED SBAR was reviewed with the receiving nurse.    [place] Fall Assessment:    Presents to emergency department  because of falls (Syncope, seizure, or loss of consciousness): No  Age > 70: No  Altered Mental Status, Intoxication with alcohol or substance confusion (Disorientation, impaired judgment, poor safety awaremess, or inability to follow instructions): No  Impaired Mobility: Ambulates or transfers with assistive devices or assistance; Unable to ambulate or transer.: Yes  Nursing Judgement: Yes          Lines:       Opportunity for questions and clarification was provided.

## 2025-05-24 NOTE — ED TRIAGE NOTES
Pt ambulated to treatment area with a steady gait. Pt here with bilateral leg and foot pain associated with swelling and cracking to skin BLE for a couple of weeks.

## 2025-05-24 NOTE — ED TRIAGE NOTES
Repeat sugar 59. Provider informed. Pt IV has infiltrated. Provider wants patient to have more juice. Pt awake and alert. States she did not eat breakfast after her insulin this morning.

## 2025-05-25 ENCOUNTER — APPOINTMENT (OUTPATIENT)
Dept: VASCULAR SURGERY | Facility: HOSPITAL | Age: 65
DRG: 253 | End: 2025-05-25
Payer: MEDICARE

## 2025-05-25 ENCOUNTER — APPOINTMENT (OUTPATIENT)
Facility: HOSPITAL | Age: 65
DRG: 253 | End: 2025-05-25
Payer: MEDICARE

## 2025-05-25 PROBLEM — I10 PRIMARY HYPERTENSION: Status: ACTIVE | Noted: 2025-05-25

## 2025-05-25 PROBLEM — L08.9 DIABETIC FOOT INFECTION (HCC): Status: ACTIVE | Noted: 2025-05-25

## 2025-05-25 PROBLEM — E11.628 DIABETIC FOOT INFECTION (HCC): Status: ACTIVE | Noted: 2025-05-25

## 2025-05-25 LAB
ALBUMIN SERPL-MCNC: 1.2 G/DL (ref 3.5–5)
ALBUMIN/GLOB SERPL: 0.4 (ref 1.1–2.2)
ALP SERPL-CCNC: 481 U/L (ref 45–117)
ALT SERPL-CCNC: 53 U/L (ref 12–78)
ANION GAP SERPL CALC-SCNC: 4 MMOL/L (ref 2–12)
AST SERPL-CCNC: 50 U/L (ref 15–37)
BASOPHILS # BLD: 0.04 K/UL (ref 0–0.1)
BASOPHILS NFR BLD: 0.4 % (ref 0–1)
BILIRUB SERPL-MCNC: 0.2 MG/DL (ref 0.2–1)
BUN SERPL-MCNC: 30 MG/DL (ref 6–20)
BUN/CREAT SERPL: 19 (ref 12–20)
CALCIUM SERPL-MCNC: 7.6 MG/DL (ref 8.5–10.1)
CHLORIDE SERPL-SCNC: 115 MMOL/L (ref 97–108)
CO2 SERPL-SCNC: 21 MMOL/L (ref 21–32)
CREAT SERPL-MCNC: 1.62 MG/DL (ref 0.55–1.02)
DIFFERENTIAL METHOD BLD: ABNORMAL
EOSINOPHIL # BLD: 0.02 K/UL (ref 0–0.4)
EOSINOPHIL NFR BLD: 0.2 % (ref 0–7)
ERYTHROCYTE [DISTWIDTH] IN BLOOD BY AUTOMATED COUNT: 14.8 % (ref 11.5–14.5)
GLOBULIN SER CALC-MCNC: 3.2 G/DL (ref 2–4)
GLUCOSE BLD STRIP.AUTO-MCNC: 152 MG/DL (ref 65–117)
GLUCOSE BLD STRIP.AUTO-MCNC: 197 MG/DL (ref 65–117)
GLUCOSE BLD STRIP.AUTO-MCNC: 278 MG/DL (ref 65–117)
GLUCOSE BLD STRIP.AUTO-MCNC: 55 MG/DL (ref 65–117)
GLUCOSE BLD STRIP.AUTO-MCNC: 81 MG/DL (ref 65–117)
GLUCOSE BLD STRIP.AUTO-MCNC: 87 MG/DL (ref 65–117)
GLUCOSE SERPL-MCNC: 75 MG/DL (ref 65–100)
HCT VFR BLD AUTO: 26.1 % (ref 35–47)
HGB BLD-MCNC: 8.2 G/DL (ref 11.5–16)
IMM GRANULOCYTES # BLD AUTO: 0.04 K/UL (ref 0–0.04)
IMM GRANULOCYTES NFR BLD AUTO: 0.4 % (ref 0–0.5)
LYMPHOCYTES # BLD: 0.85 K/UL (ref 0.8–3.5)
LYMPHOCYTES NFR BLD: 8.1 % (ref 12–49)
MCH RBC QN AUTO: 29.5 PG (ref 26–34)
MCHC RBC AUTO-ENTMCNC: 31.4 G/DL (ref 30–36.5)
MCV RBC AUTO: 93.9 FL (ref 80–99)
MONOCYTES # BLD: 0.25 K/UL (ref 0–1)
MONOCYTES NFR BLD: 2.4 % (ref 5–13)
NEUTS SEG # BLD: 9.27 K/UL (ref 1.8–8)
NEUTS SEG NFR BLD: 88.5 % (ref 32–75)
NRBC # BLD: 0 K/UL (ref 0–0.01)
NRBC BLD-RTO: 0 PER 100 WBC
PLATELET # BLD AUTO: 330 K/UL (ref 150–400)
PMV BLD AUTO: 9.9 FL (ref 8.9–12.9)
POTASSIUM SERPL-SCNC: 5.8 MMOL/L (ref 3.5–5.1)
PROT SERPL-MCNC: 4.4 G/DL (ref 6.4–8.2)
RBC # BLD AUTO: 2.78 M/UL (ref 3.8–5.2)
SERVICE CMNT-IMP: ABNORMAL
SERVICE CMNT-IMP: NORMAL
SERVICE CMNT-IMP: NORMAL
SODIUM SERPL-SCNC: 140 MMOL/L (ref 136–145)
WBC # BLD AUTO: 10.5 K/UL (ref 3.6–11)

## 2025-05-25 PROCEDURE — 6370000000 HC RX 637 (ALT 250 FOR IP)

## 2025-05-25 PROCEDURE — 1100000000 HC RM PRIVATE

## 2025-05-25 PROCEDURE — 6360000002 HC RX W HCPCS

## 2025-05-25 PROCEDURE — 85025 COMPLETE CBC W/AUTO DIFF WBC: CPT

## 2025-05-25 PROCEDURE — 80053 COMPREHEN METABOLIC PANEL: CPT

## 2025-05-25 PROCEDURE — 2500000003 HC RX 250 WO HCPCS

## 2025-05-25 PROCEDURE — 36415 COLL VENOUS BLD VENIPUNCTURE: CPT

## 2025-05-25 PROCEDURE — 99233 SBSQ HOSP IP/OBS HIGH 50: CPT | Performed by: FAMILY MEDICINE

## 2025-05-25 PROCEDURE — 97162 PT EVAL MOD COMPLEX 30 MIN: CPT

## 2025-05-25 PROCEDURE — 2580000003 HC RX 258

## 2025-05-25 PROCEDURE — 73718 MRI LOWER EXTREMITY W/O DYE: CPT

## 2025-05-25 PROCEDURE — 82962 GLUCOSE BLOOD TEST: CPT

## 2025-05-25 PROCEDURE — 97116 GAIT TRAINING THERAPY: CPT

## 2025-05-25 RX ORDER — OXYCODONE HYDROCHLORIDE 5 MG/1
5 TABLET ORAL EVERY 6 HOURS PRN
Refills: 0 | Status: DISCONTINUED | OUTPATIENT
Start: 2025-05-25 | End: 2025-06-06

## 2025-05-25 RX ORDER — OXYCODONE HYDROCHLORIDE 5 MG/1
2.5 TABLET ORAL EVERY 6 HOURS PRN
Refills: 0 | Status: DISCONTINUED | OUTPATIENT
Start: 2025-05-25 | End: 2025-06-06

## 2025-05-25 RX ORDER — ONDANSETRON 2 MG/ML
4 INJECTION INTRAMUSCULAR; INTRAVENOUS EVERY 6 HOURS PRN
Status: DISCONTINUED | OUTPATIENT
Start: 2025-05-25 | End: 2025-05-29

## 2025-05-25 RX ADMIN — PANCRELIPASE LIPASE, PANCRELIPASE PROTEASE, PANCRELIPASE AMYLASE 15000 UNITS: 15000; 47000; 63000 CAPSULE, DELAYED RELEASE ORAL at 11:55

## 2025-05-25 RX ADMIN — DICYCLOMINE HYDROCHLORIDE 10 MG: 10 CAPSULE ORAL at 17:42

## 2025-05-25 RX ADMIN — PANCRELIPASE LIPASE, PANCRELIPASE PROTEASE, PANCRELIPASE AMYLASE 15000 UNITS: 15000; 47000; 63000 CAPSULE, DELAYED RELEASE ORAL at 09:24

## 2025-05-25 RX ADMIN — GABAPENTIN 200 MG: 100 CAPSULE ORAL at 09:24

## 2025-05-25 RX ADMIN — PANCRELIPASE LIPASE, PANCRELIPASE PROTEASE, PANCRELIPASE AMYLASE 15000 UNITS: 15000; 47000; 63000 CAPSULE, DELAYED RELEASE ORAL at 17:42

## 2025-05-25 RX ADMIN — PANTOPRAZOLE SODIUM 40 MG: 40 TABLET, DELAYED RELEASE ORAL at 20:28

## 2025-05-25 RX ADMIN — VANCOMYCIN HYDROCHLORIDE 750 MG: 750 INJECTION, POWDER, LYOPHILIZED, FOR SOLUTION INTRAVENOUS at 12:02

## 2025-05-25 RX ADMIN — OXYCODONE 5 MG: 5 TABLET ORAL at 11:55

## 2025-05-25 RX ADMIN — SODIUM CHLORIDE, PRESERVATIVE FREE 10 ML: 5 INJECTION INTRAVENOUS at 20:31

## 2025-05-25 RX ADMIN — FUROSEMIDE 20 MG: 20 TABLET ORAL at 20:28

## 2025-05-25 RX ADMIN — METRONIDAZOLE 500 MG: 500 INJECTION, SOLUTION INTRAVENOUS at 18:14

## 2025-05-25 RX ADMIN — PANCRELIPASE LIPASE, PANCRELIPASE PROTEASE, PANCRELIPASE AMYLASE 20000 UNITS: 20000; 63000; 84000 CAPSULE, DELAYED RELEASE ORAL at 17:42

## 2025-05-25 RX ADMIN — PANCRELIPASE LIPASE, PANCRELIPASE PROTEASE, PANCRELIPASE AMYLASE 20000 UNITS: 20000; 63000; 84000 CAPSULE, DELAYED RELEASE ORAL at 11:55

## 2025-05-25 RX ADMIN — SODIUM BICARBONATE 650 MG: 650 TABLET ORAL at 14:57

## 2025-05-25 RX ADMIN — SODIUM BICARBONATE 650 MG: 650 TABLET ORAL at 09:24

## 2025-05-25 RX ADMIN — GABAPENTIN 200 MG: 100 CAPSULE ORAL at 14:57

## 2025-05-25 RX ADMIN — ACETAMINOPHEN 1000 MG: 500 TABLET ORAL at 05:58

## 2025-05-25 RX ADMIN — AMLODIPINE BESYLATE 10 MG: 5 TABLET ORAL at 09:24

## 2025-05-25 RX ADMIN — SODIUM CHLORIDE, PRESERVATIVE FREE 10 ML: 5 INJECTION INTRAVENOUS at 09:25

## 2025-05-25 RX ADMIN — FUROSEMIDE 20 MG: 20 TABLET ORAL at 14:57

## 2025-05-25 RX ADMIN — DICYCLOMINE HYDROCHLORIDE 10 MG: 10 CAPSULE ORAL at 11:55

## 2025-05-25 RX ADMIN — OXYCODONE 2.5 MG: 5 TABLET ORAL at 04:36

## 2025-05-25 RX ADMIN — METRONIDAZOLE 500 MG: 500 INJECTION, SOLUTION INTRAVENOUS at 09:25

## 2025-05-25 RX ADMIN — DICYCLOMINE HYDROCHLORIDE 10 MG: 10 CAPSULE ORAL at 05:58

## 2025-05-25 RX ADMIN — INSULIN LISPRO 4 UNITS: 100 INJECTION, SOLUTION INTRAVENOUS; SUBCUTANEOUS at 20:26

## 2025-05-25 RX ADMIN — GABAPENTIN 200 MG: 100 CAPSULE ORAL at 20:28

## 2025-05-25 RX ADMIN — FUROSEMIDE 20 MG: 20 TABLET ORAL at 09:24

## 2025-05-25 RX ADMIN — OXYCODONE 5 MG: 5 TABLET ORAL at 20:28

## 2025-05-25 RX ADMIN — PANCRELIPASE LIPASE, PANCRELIPASE PROTEASE, PANCRELIPASE AMYLASE 20000 UNITS: 20000; 63000; 84000 CAPSULE, DELAYED RELEASE ORAL at 09:25

## 2025-05-25 RX ADMIN — DICYCLOMINE HYDROCHLORIDE 10 MG: 10 CAPSULE ORAL at 20:28

## 2025-05-25 RX ADMIN — PANTOPRAZOLE SODIUM 40 MG: 40 TABLET, DELAYED RELEASE ORAL at 09:24

## 2025-05-25 RX ADMIN — SODIUM BICARBONATE 650 MG: 650 TABLET ORAL at 20:28

## 2025-05-25 RX ADMIN — CEFEPIME 2000 MG: 2 INJECTION, POWDER, FOR SOLUTION INTRAVENOUS at 13:36

## 2025-05-25 RX ADMIN — ACETAMINOPHEN 1000 MG: 500 TABLET ORAL at 20:28

## 2025-05-25 RX ADMIN — ENOXAPARIN SODIUM 40 MG: 100 INJECTION SUBCUTANEOUS at 17:41

## 2025-05-25 RX ADMIN — ACETAMINOPHEN 1000 MG: 500 TABLET ORAL at 14:57

## 2025-05-25 ASSESSMENT — PAIN DESCRIPTION - DESCRIPTORS
DESCRIPTORS: ACHING;DISCOMFORT
DESCRIPTORS: CRAMPING

## 2025-05-25 ASSESSMENT — PAIN DESCRIPTION - LOCATION
LOCATION: ABDOMEN
LOCATION: FOOT

## 2025-05-25 ASSESSMENT — PAIN - FUNCTIONAL ASSESSMENT: PAIN_FUNCTIONAL_ASSESSMENT: ACTIVITIES ARE NOT PREVENTED

## 2025-05-25 ASSESSMENT — PAIN SCALES - GENERAL
PAINLEVEL_OUTOF10: 4
PAINLEVEL_OUTOF10: 3
PAINLEVEL_OUTOF10: 7
PAINLEVEL_OUTOF10: 3
PAINLEVEL_OUTOF10: 4
PAINLEVEL_OUTOF10: 7

## 2025-05-25 ASSESSMENT — PAIN DESCRIPTION - ORIENTATION
ORIENTATION: MID
ORIENTATION: LEFT

## 2025-05-26 PROBLEM — D64.9 ACUTE ON CHRONIC ANEMIA: Status: ACTIVE | Noted: 2025-05-26

## 2025-05-26 LAB
ALBUMIN SERPL-MCNC: 1 G/DL (ref 3.5–5)
ALBUMIN/GLOB SERPL: 0.3 (ref 1.1–2.2)
ALP SERPL-CCNC: 423 U/L (ref 45–117)
ALT SERPL-CCNC: 34 U/L (ref 12–78)
ANION GAP SERPL CALC-SCNC: 4 MMOL/L (ref 2–12)
AST SERPL-CCNC: 20 U/L (ref 15–37)
BASOPHILS # BLD: 0.03 K/UL (ref 0–0.1)
BASOPHILS NFR BLD: 0.4 % (ref 0–1)
BILIRUB SERPL-MCNC: 0.2 MG/DL (ref 0.2–1)
BUN SERPL-MCNC: 34 MG/DL (ref 6–20)
BUN/CREAT SERPL: 20 (ref 12–20)
CALCIUM SERPL-MCNC: 7.6 MG/DL (ref 8.5–10.1)
CHLORIDE SERPL-SCNC: 116 MMOL/L (ref 97–108)
CO2 SERPL-SCNC: 21 MMOL/L (ref 21–32)
CREAT SERPL-MCNC: 1.72 MG/DL (ref 0.55–1.02)
DIFFERENTIAL METHOD BLD: ABNORMAL
EOSINOPHIL # BLD: 0.05 K/UL (ref 0–0.4)
EOSINOPHIL NFR BLD: 0.7 % (ref 0–7)
ERYTHROCYTE [DISTWIDTH] IN BLOOD BY AUTOMATED COUNT: 15.1 % (ref 11.5–14.5)
FERRITIN SERPL-MCNC: 191 NG/ML (ref 8–252)
GLOBULIN SER CALC-MCNC: 3 G/DL (ref 2–4)
GLUCOSE BLD STRIP.AUTO-MCNC: 169 MG/DL (ref 65–117)
GLUCOSE BLD STRIP.AUTO-MCNC: 203 MG/DL (ref 65–117)
GLUCOSE BLD STRIP.AUTO-MCNC: 234 MG/DL (ref 65–117)
GLUCOSE BLD STRIP.AUTO-MCNC: 277 MG/DL (ref 65–117)
GLUCOSE SERPL-MCNC: 125 MG/DL (ref 65–100)
HAPTOGLOB SERPL-MCNC: 129 MG/DL (ref 30–200)
HCT VFR BLD AUTO: 22.8 % (ref 35–47)
HGB BLD-MCNC: 7 G/DL (ref 11.5–16)
IMM GRANULOCYTES # BLD AUTO: 0.03 K/UL (ref 0–0.04)
IMM GRANULOCYTES NFR BLD AUTO: 0.4 % (ref 0–0.5)
IRON SATN MFR SERPL: 44 % (ref 20–50)
IRON SERPL-MCNC: 39 UG/DL (ref 35–150)
LDH SERPL L TO P-CCNC: 259 U/L (ref 81–246)
LYMPHOCYTES # BLD: 1.95 K/UL (ref 0.8–3.5)
LYMPHOCYTES NFR BLD: 26.1 % (ref 12–49)
MCH RBC QN AUTO: 29.3 PG (ref 26–34)
MCHC RBC AUTO-ENTMCNC: 30.7 G/DL (ref 30–36.5)
MCV RBC AUTO: 95.4 FL (ref 80–99)
MONOCYTES # BLD: 0.39 K/UL (ref 0–1)
MONOCYTES NFR BLD: 5.2 % (ref 5–13)
NEUTS SEG # BLD: 5.01 K/UL (ref 1.8–8)
NEUTS SEG NFR BLD: 67.2 % (ref 32–75)
NRBC # BLD: 0 K/UL (ref 0–0.01)
NRBC BLD-RTO: 0 PER 100 WBC
PLATELET # BLD AUTO: 294 K/UL (ref 150–400)
PMV BLD AUTO: 10.2 FL (ref 8.9–12.9)
POTASSIUM SERPL-SCNC: 5.3 MMOL/L (ref 3.5–5.1)
PROT SERPL-MCNC: 4 G/DL (ref 6.4–8.2)
RBC # BLD AUTO: 2.39 M/UL (ref 3.8–5.2)
RETICS # AUTO: 0.04 M/UL (ref 0.02–0.08)
RETICS/RBC NFR AUTO: 1.9 % (ref 0.7–2.1)
SERVICE CMNT-IMP: ABNORMAL
SODIUM SERPL-SCNC: 141 MMOL/L (ref 136–145)
TIBC SERPL-MCNC: 88 UG/DL (ref 250–450)
VANCOMYCIN SERPL-MCNC: 18.5 UG/ML
WBC # BLD AUTO: 7.5 K/UL (ref 3.6–11)

## 2025-05-26 PROCEDURE — 6370000000 HC RX 637 (ALT 250 FOR IP)

## 2025-05-26 PROCEDURE — 85025 COMPLETE CBC W/AUTO DIFF WBC: CPT

## 2025-05-26 PROCEDURE — 36415 COLL VENOUS BLD VENIPUNCTURE: CPT

## 2025-05-26 PROCEDURE — 83615 LACTATE (LD) (LDH) ENZYME: CPT

## 2025-05-26 PROCEDURE — 83540 ASSAY OF IRON: CPT

## 2025-05-26 PROCEDURE — 2500000003 HC RX 250 WO HCPCS

## 2025-05-26 PROCEDURE — 80202 ASSAY OF VANCOMYCIN: CPT

## 2025-05-26 PROCEDURE — 6360000002 HC RX W HCPCS

## 2025-05-26 PROCEDURE — 2580000003 HC RX 258

## 2025-05-26 PROCEDURE — 99233 SBSQ HOSP IP/OBS HIGH 50: CPT | Performed by: FAMILY MEDICINE

## 2025-05-26 PROCEDURE — 94761 N-INVAS EAR/PLS OXIMETRY MLT: CPT

## 2025-05-26 PROCEDURE — 85045 AUTOMATED RETICULOCYTE COUNT: CPT

## 2025-05-26 PROCEDURE — 80053 COMPREHEN METABOLIC PANEL: CPT

## 2025-05-26 PROCEDURE — 1100000000 HC RM PRIVATE

## 2025-05-26 PROCEDURE — 82962 GLUCOSE BLOOD TEST: CPT

## 2025-05-26 PROCEDURE — 83010 ASSAY OF HAPTOGLOBIN QUANT: CPT

## 2025-05-26 PROCEDURE — 83550 IRON BINDING TEST: CPT

## 2025-05-26 PROCEDURE — 82728 ASSAY OF FERRITIN: CPT

## 2025-05-26 RX ORDER — INSULIN GLARGINE 100 [IU]/ML
8 INJECTION, SOLUTION SUBCUTANEOUS EVERY MORNING
Status: DISCONTINUED | OUTPATIENT
Start: 2025-05-26 | End: 2025-06-01

## 2025-05-26 RX ORDER — ERGOCALCIFEROL 1.25 MG/1
50000 CAPSULE, LIQUID FILLED ORAL WEEKLY
Status: DISCONTINUED | OUTPATIENT
Start: 2025-05-26 | End: 2025-06-08 | Stop reason: HOSPADM

## 2025-05-26 RX ADMIN — SODIUM BICARBONATE 650 MG: 650 TABLET ORAL at 14:06

## 2025-05-26 RX ADMIN — ACETAMINOPHEN 1000 MG: 500 TABLET ORAL at 06:12

## 2025-05-26 RX ADMIN — FUROSEMIDE 20 MG: 20 TABLET ORAL at 08:16

## 2025-05-26 RX ADMIN — CEFEPIME 2000 MG: 2 INJECTION, POWDER, FOR SOLUTION INTRAVENOUS at 14:12

## 2025-05-26 RX ADMIN — FUROSEMIDE 20 MG: 20 TABLET ORAL at 16:33

## 2025-05-26 RX ADMIN — OXYCODONE 5 MG: 5 TABLET ORAL at 20:40

## 2025-05-26 RX ADMIN — ACETAMINOPHEN 1000 MG: 500 TABLET ORAL at 20:33

## 2025-05-26 RX ADMIN — GABAPENTIN 200 MG: 100 CAPSULE ORAL at 20:34

## 2025-05-26 RX ADMIN — PANCRELIPASE LIPASE, PANCRELIPASE PROTEASE, PANCRELIPASE AMYLASE 20000 UNITS: 20000; 63000; 84000 CAPSULE, DELAYED RELEASE ORAL at 16:54

## 2025-05-26 RX ADMIN — ERGOCALCIFEROL 50000 UNITS: 1.25 CAPSULE ORAL at 10:25

## 2025-05-26 RX ADMIN — METRONIDAZOLE 500 MG: 500 INJECTION, SOLUTION INTRAVENOUS at 16:57

## 2025-05-26 RX ADMIN — VANCOMYCIN HYDROCHLORIDE 750 MG: 750 INJECTION, POWDER, LYOPHILIZED, FOR SOLUTION INTRAVENOUS at 12:29

## 2025-05-26 RX ADMIN — INSULIN GLARGINE 8 UNITS: 100 INJECTION, SOLUTION SUBCUTANEOUS at 10:25

## 2025-05-26 RX ADMIN — DICYCLOMINE HYDROCHLORIDE 10 MG: 10 CAPSULE ORAL at 20:34

## 2025-05-26 RX ADMIN — SODIUM BICARBONATE 650 MG: 650 TABLET ORAL at 20:34

## 2025-05-26 RX ADMIN — GABAPENTIN 200 MG: 100 CAPSULE ORAL at 08:14

## 2025-05-26 RX ADMIN — CEFEPIME 2000 MG: 2 INJECTION, POWDER, FOR SOLUTION INTRAVENOUS at 00:39

## 2025-05-26 RX ADMIN — ACETAMINOPHEN 1000 MG: 500 TABLET ORAL at 14:05

## 2025-05-26 RX ADMIN — FUROSEMIDE 20 MG: 20 TABLET ORAL at 20:34

## 2025-05-26 RX ADMIN — PANTOPRAZOLE SODIUM 40 MG: 40 TABLET, DELAYED RELEASE ORAL at 20:34

## 2025-05-26 RX ADMIN — METRONIDAZOLE 500 MG: 500 INJECTION, SOLUTION INTRAVENOUS at 00:41

## 2025-05-26 RX ADMIN — SODIUM CHLORIDE, PRESERVATIVE FREE 10 ML: 5 INJECTION INTRAVENOUS at 20:42

## 2025-05-26 RX ADMIN — PANCRELIPASE LIPASE, PANCRELIPASE PROTEASE, PANCRELIPASE AMYLASE 20000 UNITS: 20000; 63000; 84000 CAPSULE, DELAYED RELEASE ORAL at 08:14

## 2025-05-26 RX ADMIN — INSULIN LISPRO 2 UNITS: 100 INJECTION, SOLUTION INTRAVENOUS; SUBCUTANEOUS at 12:23

## 2025-05-26 RX ADMIN — PANCRELIPASE LIPASE, PANCRELIPASE PROTEASE, PANCRELIPASE AMYLASE 15000 UNITS: 15000; 47000; 63000 CAPSULE, DELAYED RELEASE ORAL at 12:22

## 2025-05-26 RX ADMIN — DICYCLOMINE HYDROCHLORIDE 10 MG: 10 CAPSULE ORAL at 12:26

## 2025-05-26 RX ADMIN — METRONIDAZOLE 500 MG: 500 INJECTION, SOLUTION INTRAVENOUS at 08:25

## 2025-05-26 RX ADMIN — DICYCLOMINE HYDROCHLORIDE 10 MG: 10 CAPSULE ORAL at 16:54

## 2025-05-26 RX ADMIN — ENOXAPARIN SODIUM 40 MG: 100 INJECTION SUBCUTANEOUS at 16:33

## 2025-05-26 RX ADMIN — SODIUM CHLORIDE, PRESERVATIVE FREE 10 ML: 5 INJECTION INTRAVENOUS at 10:26

## 2025-05-26 RX ADMIN — SODIUM BICARBONATE 650 MG: 650 TABLET ORAL at 08:16

## 2025-05-26 RX ADMIN — MICONAZOLE NITRATE: 2 POWDER TOPICAL at 14:06

## 2025-05-26 RX ADMIN — INSULIN LISPRO 2 UNITS: 100 INJECTION, SOLUTION INTRAVENOUS; SUBCUTANEOUS at 21:42

## 2025-05-26 RX ADMIN — OXYCODONE 5 MG: 5 TABLET ORAL at 14:27

## 2025-05-26 RX ADMIN — PANCRELIPASE LIPASE, PANCRELIPASE PROTEASE, PANCRELIPASE AMYLASE 20000 UNITS: 20000; 63000; 84000 CAPSULE, DELAYED RELEASE ORAL at 12:23

## 2025-05-26 RX ADMIN — DICYCLOMINE HYDROCHLORIDE 10 MG: 10 CAPSULE ORAL at 06:12

## 2025-05-26 RX ADMIN — PANTOPRAZOLE SODIUM 40 MG: 40 TABLET, DELAYED RELEASE ORAL at 08:16

## 2025-05-26 RX ADMIN — PANCRELIPASE LIPASE, PANCRELIPASE PROTEASE, PANCRELIPASE AMYLASE 15000 UNITS: 15000; 47000; 63000 CAPSULE, DELAYED RELEASE ORAL at 16:54

## 2025-05-26 RX ADMIN — PANCRELIPASE LIPASE, PANCRELIPASE PROTEASE, PANCRELIPASE AMYLASE 15000 UNITS: 15000; 47000; 63000 CAPSULE, DELAYED RELEASE ORAL at 08:14

## 2025-05-26 RX ADMIN — GABAPENTIN 200 MG: 100 CAPSULE ORAL at 14:06

## 2025-05-26 RX ADMIN — MICONAZOLE NITRATE: 2 POWDER TOPICAL at 20:45

## 2025-05-26 RX ADMIN — OXYCODONE 5 MG: 5 TABLET ORAL at 06:12

## 2025-05-26 RX ADMIN — INSULIN LISPRO 4 UNITS: 100 INJECTION, SOLUTION INTRAVENOUS; SUBCUTANEOUS at 08:13

## 2025-05-26 ASSESSMENT — PAIN SCALES - GENERAL
PAINLEVEL_OUTOF10: 7
PAINLEVEL_OUTOF10: 6
PAINLEVEL_OUTOF10: 2

## 2025-05-26 ASSESSMENT — PAIN DESCRIPTION - ORIENTATION
ORIENTATION: LEFT
ORIENTATION: RIGHT;LEFT

## 2025-05-26 ASSESSMENT — PAIN DESCRIPTION - LOCATION
LOCATION: FOOT
LOCATION: LEG

## 2025-05-26 ASSESSMENT — PAIN DESCRIPTION - DESCRIPTORS: DESCRIPTORS: ACHING

## 2025-05-26 NOTE — CONSENT
Informed Consent for Blood Component Transfusion Note    I have discussed with the patient the rationale for blood component transfusion; its benefits in treating or preventing fatigue, organ damage, or death; and its risk which includes mild transfusion reactions, rare risk of blood borne infection, or more serious but rare reactions. I have discussed the alternatives to transfusion, including the risk and consequences of not receiving transfusion. The patient had an opportunity to ask questions and had agreed to proceed with transfusion of blood components.    Electronically signed by Minda Yates DO on 5/26/25 at 7:42 AM EDT

## 2025-05-27 ENCOUNTER — APPOINTMENT (OUTPATIENT)
Dept: VASCULAR SURGERY | Facility: HOSPITAL | Age: 65
DRG: 253 | End: 2025-05-27
Payer: MEDICARE

## 2025-05-27 PROBLEM — Z88.1 ALLERGY TO MULTIPLE ANTIBIOTICS: Status: ACTIVE | Noted: 2025-05-27

## 2025-05-27 LAB
ALBUMIN SERPL-MCNC: 1 G/DL (ref 3.5–5)
ALBUMIN/GLOB SERPL: 0.3 (ref 1.1–2.2)
ALP SERPL-CCNC: 337 U/L (ref 45–117)
ALT SERPL-CCNC: 24 U/L (ref 12–78)
ANION GAP SERPL CALC-SCNC: 4 MMOL/L (ref 2–12)
ANION GAP SERPL CALC-SCNC: 5 MMOL/L (ref 2–12)
AST SERPL-CCNC: 12 U/L (ref 15–37)
BASOPHILS # BLD: 0.04 K/UL (ref 0–0.1)
BASOPHILS NFR BLD: 0.5 % (ref 0–1)
BILIRUB SERPL-MCNC: 0.3 MG/DL (ref 0.2–1)
BUN SERPL-MCNC: 36 MG/DL (ref 6–20)
BUN SERPL-MCNC: 37 MG/DL (ref 6–20)
BUN/CREAT SERPL: 17 (ref 12–20)
BUN/CREAT SERPL: 17 (ref 12–20)
CALCIUM SERPL-MCNC: 7.5 MG/DL (ref 8.5–10.1)
CALCIUM SERPL-MCNC: 7.8 MG/DL (ref 8.5–10.1)
CHLORIDE SERPL-SCNC: 115 MMOL/L (ref 97–108)
CHLORIDE SERPL-SCNC: 117 MMOL/L (ref 97–108)
CK SERPL-CCNC: 99 U/L (ref 26–192)
CO2 SERPL-SCNC: 20 MMOL/L (ref 21–32)
CO2 SERPL-SCNC: 22 MMOL/L (ref 21–32)
CREAT SERPL-MCNC: 2.15 MG/DL (ref 0.55–1.02)
CREAT SERPL-MCNC: 2.16 MG/DL (ref 0.55–1.02)
DIFFERENTIAL METHOD BLD: ABNORMAL
EOSINOPHIL # BLD: 0.03 K/UL (ref 0–0.4)
EOSINOPHIL NFR BLD: 0.3 % (ref 0–7)
ERYTHROCYTE [DISTWIDTH] IN BLOOD BY AUTOMATED COUNT: 15.4 % (ref 11.5–14.5)
GLOBULIN SER CALC-MCNC: 2.9 G/DL (ref 2–4)
GLUCOSE BLD STRIP.AUTO-MCNC: 149 MG/DL (ref 65–117)
GLUCOSE BLD STRIP.AUTO-MCNC: 170 MG/DL (ref 65–117)
GLUCOSE BLD STRIP.AUTO-MCNC: 207 MG/DL (ref 65–117)
GLUCOSE BLD STRIP.AUTO-MCNC: 208 MG/DL (ref 65–117)
GLUCOSE SERPL-MCNC: 138 MG/DL (ref 65–100)
GLUCOSE SERPL-MCNC: 160 MG/DL (ref 65–100)
HCT VFR BLD AUTO: 22.4 % (ref 35–47)
HCT VFR BLD AUTO: 30.4 % (ref 35–47)
HCT VFR BLD AUTO: 31.6 % (ref 35–47)
HGB BLD-MCNC: 10.1 G/DL (ref 11.5–16)
HGB BLD-MCNC: 6.9 G/DL (ref 11.5–16)
HGB BLD-MCNC: 9.8 G/DL (ref 11.5–16)
HISTORY CHECK: NORMAL
IMM GRANULOCYTES # BLD AUTO: 0.06 K/UL (ref 0–0.04)
IMM GRANULOCYTES NFR BLD AUTO: 0.7 % (ref 0–0.5)
LYMPHOCYTES # BLD: 1.81 K/UL (ref 0.8–3.5)
LYMPHOCYTES NFR BLD: 20.8 % (ref 12–49)
MCH RBC QN AUTO: 29.4 PG (ref 26–34)
MCHC RBC AUTO-ENTMCNC: 30.8 G/DL (ref 30–36.5)
MCV RBC AUTO: 95.3 FL (ref 80–99)
MONOCYTES # BLD: 0.73 K/UL (ref 0–1)
MONOCYTES NFR BLD: 8.4 % (ref 5–13)
NEUTS SEG # BLD: 6.03 K/UL (ref 1.8–8)
NEUTS SEG NFR BLD: 69.3 % (ref 32–75)
NRBC # BLD: 0 K/UL (ref 0–0.01)
NRBC BLD-RTO: 0 PER 100 WBC
PLATELET # BLD AUTO: 278 K/UL (ref 150–400)
PMV BLD AUTO: 9.9 FL (ref 8.9–12.9)
POTASSIUM SERPL-SCNC: 5.1 MMOL/L (ref 3.5–5.1)
POTASSIUM SERPL-SCNC: 5.1 MMOL/L (ref 3.5–5.1)
PROT SERPL-MCNC: 3.9 G/DL (ref 6.4–8.2)
RBC # BLD AUTO: 2.35 M/UL (ref 3.8–5.2)
RBC MORPH BLD: ABNORMAL
SERVICE CMNT-IMP: ABNORMAL
SODIUM SERPL-SCNC: 141 MMOL/L (ref 136–145)
SODIUM SERPL-SCNC: 142 MMOL/L (ref 136–145)
WBC # BLD AUTO: 8.7 K/UL (ref 3.6–11)

## 2025-05-27 PROCEDURE — 85014 HEMATOCRIT: CPT

## 2025-05-27 PROCEDURE — 30233N1 TRANSFUSION OF NONAUTOLOGOUS RED BLOOD CELLS INTO PERIPHERAL VEIN, PERCUTANEOUS APPROACH: ICD-10-PCS | Performed by: FAMILY MEDICINE

## 2025-05-27 PROCEDURE — 6360000002 HC RX W HCPCS

## 2025-05-27 PROCEDURE — 87070 CULTURE OTHR SPECIMN AEROBIC: CPT

## 2025-05-27 PROCEDURE — 6370000000 HC RX 637 (ALT 250 FOR IP)

## 2025-05-27 PROCEDURE — 94761 N-INVAS EAR/PLS OXIMETRY MLT: CPT

## 2025-05-27 PROCEDURE — 36415 COLL VENOUS BLD VENIPUNCTURE: CPT

## 2025-05-27 PROCEDURE — 85018 HEMOGLOBIN: CPT

## 2025-05-27 PROCEDURE — 86900 BLOOD TYPING SEROLOGIC ABO: CPT

## 2025-05-27 PROCEDURE — 86850 RBC ANTIBODY SCREEN: CPT

## 2025-05-27 PROCEDURE — P9016 RBC LEUKOCYTES REDUCED: HCPCS

## 2025-05-27 PROCEDURE — 1100000000 HC RM PRIVATE

## 2025-05-27 PROCEDURE — 82550 ASSAY OF CK (CPK): CPT

## 2025-05-27 PROCEDURE — 85025 COMPLETE CBC W/AUTO DIFF WBC: CPT

## 2025-05-27 PROCEDURE — 99222 1ST HOSP IP/OBS MODERATE 55: CPT

## 2025-05-27 PROCEDURE — 87147 CULTURE TYPE IMMUNOLOGIC: CPT

## 2025-05-27 PROCEDURE — 80053 COMPREHEN METABOLIC PANEL: CPT

## 2025-05-27 PROCEDURE — 2580000003 HC RX 258

## 2025-05-27 PROCEDURE — 86901 BLOOD TYPING SEROLOGIC RH(D): CPT

## 2025-05-27 PROCEDURE — 36430 TRANSFUSION BLD/BLD COMPNT: CPT

## 2025-05-27 PROCEDURE — 99291 CRITICAL CARE FIRST HOUR: CPT | Performed by: FAMILY MEDICINE

## 2025-05-27 PROCEDURE — 86923 COMPATIBILITY TEST ELECTRIC: CPT

## 2025-05-27 PROCEDURE — 87205 SMEAR GRAM STAIN: CPT

## 2025-05-27 PROCEDURE — 82962 GLUCOSE BLOOD TEST: CPT

## 2025-05-27 PROCEDURE — 87077 CULTURE AEROBIC IDENTIFY: CPT

## 2025-05-27 PROCEDURE — 87186 SC STD MICRODIL/AGAR DIL: CPT

## 2025-05-27 PROCEDURE — 93922 UPR/L XTREMITY ART 2 LEVELS: CPT

## 2025-05-27 PROCEDURE — 2500000003 HC RX 250 WO HCPCS

## 2025-05-27 RX ORDER — 0.9 % SODIUM CHLORIDE 0.9 %
500 INTRAVENOUS SOLUTION INTRAVENOUS ONCE
Status: DISCONTINUED | OUTPATIENT
Start: 2025-05-27 | End: 2025-05-27

## 2025-05-27 RX ORDER — SODIUM CHLORIDE, SODIUM LACTATE, POTASSIUM CHLORIDE, AND CALCIUM CHLORIDE .6; .31; .03; .02 G/100ML; G/100ML; G/100ML; G/100ML
500 INJECTION, SOLUTION INTRAVENOUS ONCE
Status: COMPLETED | OUTPATIENT
Start: 2025-05-27 | End: 2025-05-27

## 2025-05-27 RX ORDER — SODIUM CHLORIDE 9 MG/ML
INJECTION, SOLUTION INTRAVENOUS PRN
Status: DISCONTINUED | OUTPATIENT
Start: 2025-05-27 | End: 2025-06-08 | Stop reason: HOSPADM

## 2025-05-27 RX ADMIN — METRONIDAZOLE 500 MG: 500 INJECTION, SOLUTION INTRAVENOUS at 17:49

## 2025-05-27 RX ADMIN — METRONIDAZOLE 500 MG: 500 INJECTION, SOLUTION INTRAVENOUS at 00:17

## 2025-05-27 RX ADMIN — DICYCLOMINE HYDROCHLORIDE 10 MG: 10 CAPSULE ORAL at 17:39

## 2025-05-27 RX ADMIN — PANTOPRAZOLE SODIUM 40 MG: 40 TABLET, DELAYED RELEASE ORAL at 20:47

## 2025-05-27 RX ADMIN — PANCRELIPASE LIPASE, PANCRELIPASE PROTEASE, PANCRELIPASE AMYLASE 15000 UNITS: 15000; 47000; 63000 CAPSULE, DELAYED RELEASE ORAL at 12:12

## 2025-05-27 RX ADMIN — PANCRELIPASE LIPASE, PANCRELIPASE PROTEASE, PANCRELIPASE AMYLASE 20000 UNITS: 20000; 63000; 84000 CAPSULE, DELAYED RELEASE ORAL at 12:12

## 2025-05-27 RX ADMIN — INSULIN LISPRO 2 UNITS: 100 INJECTION, SOLUTION INTRAVENOUS; SUBCUTANEOUS at 17:37

## 2025-05-27 RX ADMIN — GABAPENTIN 200 MG: 100 CAPSULE ORAL at 10:13

## 2025-05-27 RX ADMIN — INSULIN GLARGINE 8 UNITS: 100 INJECTION, SOLUTION SUBCUTANEOUS at 10:11

## 2025-05-27 RX ADMIN — AMLODIPINE BESYLATE 10 MG: 5 TABLET ORAL at 10:12

## 2025-05-27 RX ADMIN — SODIUM BICARBONATE 650 MG: 650 TABLET ORAL at 10:13

## 2025-05-27 RX ADMIN — SODIUM CHLORIDE, PRESERVATIVE FREE 10 ML: 5 INJECTION INTRAVENOUS at 10:18

## 2025-05-27 RX ADMIN — ACETAMINOPHEN 1000 MG: 500 TABLET ORAL at 17:39

## 2025-05-27 RX ADMIN — MICONAZOLE NITRATE: 2 POWDER TOPICAL at 20:48

## 2025-05-27 RX ADMIN — SODIUM BICARBONATE 650 MG: 650 TABLET ORAL at 14:52

## 2025-05-27 RX ADMIN — OXYCODONE 5 MG: 5 TABLET ORAL at 14:51

## 2025-05-27 RX ADMIN — SODIUM CHLORIDE, SODIUM LACTATE, POTASSIUM CHLORIDE, AND CALCIUM CHLORIDE 500 ML: .6; .31; .03; .02 INJECTION, SOLUTION INTRAVENOUS at 07:57

## 2025-05-27 RX ADMIN — GABAPENTIN 200 MG: 100 CAPSULE ORAL at 14:51

## 2025-05-27 RX ADMIN — CEFEPIME 2000 MG: 2 INJECTION, POWDER, FOR SOLUTION INTRAVENOUS at 01:02

## 2025-05-27 RX ADMIN — SODIUM CHLORIDE 550 MG: 9 INJECTION INTRAMUSCULAR; INTRAVENOUS; SUBCUTANEOUS at 16:36

## 2025-05-27 RX ADMIN — PANCRELIPASE LIPASE, PANCRELIPASE PROTEASE, PANCRELIPASE AMYLASE 20000 UNITS: 20000; 63000; 84000 CAPSULE, DELAYED RELEASE ORAL at 17:37

## 2025-05-27 RX ADMIN — OXYCODONE 5 MG: 5 TABLET ORAL at 07:31

## 2025-05-27 RX ADMIN — PANCRELIPASE LIPASE, PANCRELIPASE PROTEASE, PANCRELIPASE AMYLASE 15000 UNITS: 15000; 47000; 63000 CAPSULE, DELAYED RELEASE ORAL at 10:12

## 2025-05-27 RX ADMIN — INSULIN LISPRO 2 UNITS: 100 INJECTION, SOLUTION INTRAVENOUS; SUBCUTANEOUS at 12:12

## 2025-05-27 RX ADMIN — OXYCODONE 5 MG: 5 TABLET ORAL at 20:56

## 2025-05-27 RX ADMIN — ACETAMINOPHEN 1000 MG: 500 TABLET ORAL at 20:47

## 2025-05-27 RX ADMIN — PANCRELIPASE LIPASE, PANCRELIPASE PROTEASE, PANCRELIPASE AMYLASE 20000 UNITS: 20000; 63000; 84000 CAPSULE, DELAYED RELEASE ORAL at 10:11

## 2025-05-27 RX ADMIN — GABAPENTIN 200 MG: 100 CAPSULE ORAL at 20:47

## 2025-05-27 RX ADMIN — METRONIDAZOLE 500 MG: 500 INJECTION, SOLUTION INTRAVENOUS at 09:15

## 2025-05-27 RX ADMIN — SODIUM BICARBONATE 650 MG: 650 TABLET ORAL at 20:47

## 2025-05-27 RX ADMIN — ONDANSETRON 4 MG: 2 INJECTION, SOLUTION INTRAMUSCULAR; INTRAVENOUS at 07:32

## 2025-05-27 RX ADMIN — PANTOPRAZOLE SODIUM 40 MG: 40 TABLET, DELAYED RELEASE ORAL at 10:13

## 2025-05-27 RX ADMIN — DICYCLOMINE HYDROCHLORIDE 10 MG: 10 CAPSULE ORAL at 20:56

## 2025-05-27 RX ADMIN — PANCRELIPASE LIPASE, PANCRELIPASE PROTEASE, PANCRELIPASE AMYLASE 15000 UNITS: 15000; 47000; 63000 CAPSULE, DELAYED RELEASE ORAL at 17:37

## 2025-05-27 RX ADMIN — SODIUM CHLORIDE, PRESERVATIVE FREE 10 ML: 5 INJECTION INTRAVENOUS at 20:47

## 2025-05-27 RX ADMIN — CEFEPIME 2000 MG: 2 INJECTION, POWDER, FOR SOLUTION INTRAVENOUS at 15:00

## 2025-05-27 RX ADMIN — DICYCLOMINE HYDROCHLORIDE 10 MG: 10 CAPSULE ORAL at 10:13

## 2025-05-27 RX ADMIN — MICONAZOLE NITRATE: 2 POWDER TOPICAL at 10:19

## 2025-05-27 RX ADMIN — ACETAMINOPHEN 1000 MG: 500 TABLET ORAL at 10:12

## 2025-05-27 ASSESSMENT — PAIN DESCRIPTION - LOCATION
LOCATION: FOOT

## 2025-05-27 ASSESSMENT — PAIN DESCRIPTION - DESCRIPTORS
DESCRIPTORS: ACHING

## 2025-05-27 ASSESSMENT — PAIN DESCRIPTION - ORIENTATION
ORIENTATION: LEFT

## 2025-05-27 ASSESSMENT — PAIN SCALES - GENERAL
PAINLEVEL_OUTOF10: 5
PAINLEVEL_OUTOF10: 7
PAINLEVEL_OUTOF10: 5
PAINLEVEL_OUTOF10: 6
PAINLEVEL_OUTOF10: 6

## 2025-05-28 ENCOUNTER — APPOINTMENT (OUTPATIENT)
Facility: HOSPITAL | Age: 65
DRG: 253 | End: 2025-05-28
Payer: MEDICARE

## 2025-05-28 ENCOUNTER — APPOINTMENT (OUTPATIENT)
Dept: VASCULAR SURGERY | Facility: HOSPITAL | Age: 65
DRG: 253 | End: 2025-05-28
Payer: MEDICARE

## 2025-05-28 PROBLEM — I73.9 PAD (PERIPHERAL ARTERY DISEASE): Status: ACTIVE | Noted: 2025-05-28

## 2025-05-28 LAB
ABO + RH BLD: NORMAL
ALBUMIN SERPL-MCNC: 1 G/DL (ref 3.5–5)
ALBUMIN/GLOB SERPL: 0.3 (ref 1.1–2.2)
ALP SERPL-CCNC: 306 U/L (ref 45–117)
ALT SERPL-CCNC: 20 U/L (ref 12–78)
ANION GAP SERPL CALC-SCNC: 4 MMOL/L (ref 2–12)
ANION GAP SERPL CALC-SCNC: 4 MMOL/L (ref 2–12)
AST SERPL-CCNC: 14 U/L (ref 15–37)
BACTERIA SPEC CULT: NORMAL
BACTERIA SPEC CULT: NORMAL
BASOPHILS # BLD: 0.05 K/UL (ref 0–0.1)
BASOPHILS # BLD: 0.07 K/UL (ref 0–0.1)
BASOPHILS NFR BLD: 0.5 % (ref 0–1)
BASOPHILS NFR BLD: 0.6 % (ref 0–1)
BILIRUB SERPL-MCNC: 0.2 MG/DL (ref 0.2–1)
BLD PROD TYP BPU: NORMAL
BLOOD BANK BLOOD PRODUCT EXPIRATION DATE: NORMAL
BLOOD BANK DISPENSE STATUS: NORMAL
BLOOD BANK ISBT PRODUCT BLOOD TYPE: 5100
BLOOD BANK PRODUCT CODE: NORMAL
BLOOD BANK UNIT TYPE AND RH: NORMAL
BLOOD GROUP ANTIBODIES SERPL: NORMAL
BPU ID: NORMAL
BUN SERPL-MCNC: 37 MG/DL (ref 6–20)
BUN SERPL-MCNC: 39 MG/DL (ref 6–20)
BUN/CREAT SERPL: 18 (ref 12–20)
BUN/CREAT SERPL: 20 (ref 12–20)
CALCIUM SERPL-MCNC: 7.8 MG/DL (ref 8.5–10.1)
CALCIUM SERPL-MCNC: 8 MG/DL (ref 8.5–10.1)
CHLORIDE SERPL-SCNC: 116 MMOL/L (ref 97–108)
CHLORIDE SERPL-SCNC: 117 MMOL/L (ref 97–108)
CO2 SERPL-SCNC: 20 MMOL/L (ref 21–32)
CO2 SERPL-SCNC: 21 MMOL/L (ref 21–32)
CREAT SERPL-MCNC: 1.93 MG/DL (ref 0.55–1.02)
CREAT SERPL-MCNC: 2.08 MG/DL (ref 0.55–1.02)
CROSSMATCH RESULT: NORMAL
DIFFERENTIAL METHOD BLD: ABNORMAL
DIFFERENTIAL METHOD BLD: ABNORMAL
ECHO BSA: 1.76 M2
EOSINOPHIL # BLD: 0.03 K/UL (ref 0–0.4)
EOSINOPHIL # BLD: 0.04 K/UL (ref 0–0.4)
EOSINOPHIL NFR BLD: 0.3 % (ref 0–7)
EOSINOPHIL NFR BLD: 0.4 % (ref 0–7)
ERYTHROCYTE [DISTWIDTH] IN BLOOD BY AUTOMATED COUNT: 15.3 % (ref 11.5–14.5)
ERYTHROCYTE [DISTWIDTH] IN BLOOD BY AUTOMATED COUNT: 15.5 % (ref 11.5–14.5)
GLOBULIN SER CALC-MCNC: 3.5 G/DL (ref 2–4)
GLUCOSE BLD STRIP.AUTO-MCNC: 116 MG/DL (ref 65–117)
GLUCOSE BLD STRIP.AUTO-MCNC: 149 MG/DL (ref 65–117)
GLUCOSE BLD STRIP.AUTO-MCNC: 153 MG/DL (ref 65–117)
GLUCOSE BLD STRIP.AUTO-MCNC: 157 MG/DL (ref 65–117)
GLUCOSE BLD STRIP.AUTO-MCNC: 95 MG/DL (ref 65–117)
GLUCOSE SERPL-MCNC: 103 MG/DL (ref 65–100)
GLUCOSE SERPL-MCNC: 120 MG/DL (ref 65–100)
HCT VFR BLD AUTO: 28.1 % (ref 35–47)
HCT VFR BLD AUTO: 30.9 % (ref 35–47)
HGB BLD-MCNC: 10 G/DL (ref 11.5–16)
HGB BLD-MCNC: 8.9 G/DL (ref 11.5–16)
IMM GRANULOCYTES # BLD AUTO: 0.07 K/UL (ref 0–0.04)
IMM GRANULOCYTES # BLD AUTO: 0.07 K/UL (ref 0–0.04)
IMM GRANULOCYTES NFR BLD AUTO: 0.6 % (ref 0–0.5)
IMM GRANULOCYTES NFR BLD AUTO: 0.7 % (ref 0–0.5)
LYMPHOCYTES # BLD: 1.7 K/UL (ref 0.8–3.5)
LYMPHOCYTES # BLD: 1.79 K/UL (ref 0.8–3.5)
LYMPHOCYTES NFR BLD: 15.2 % (ref 12–49)
LYMPHOCYTES NFR BLD: 17 % (ref 12–49)
MCH RBC QN AUTO: 29.6 PG (ref 26–34)
MCH RBC QN AUTO: 29.7 PG (ref 26–34)
MCHC RBC AUTO-ENTMCNC: 31.7 G/DL (ref 30–36.5)
MCHC RBC AUTO-ENTMCNC: 32.4 G/DL (ref 30–36.5)
MCV RBC AUTO: 91.7 FL (ref 80–99)
MCV RBC AUTO: 93.4 FL (ref 80–99)
MONOCYTES # BLD: 0.78 K/UL (ref 0–1)
MONOCYTES # BLD: 0.9 K/UL (ref 0–1)
MONOCYTES NFR BLD: 7 % (ref 5–13)
MONOCYTES NFR BLD: 8.6 % (ref 5–13)
NEUTS SEG # BLD: 7.68 K/UL (ref 1.8–8)
NEUTS SEG # BLD: 8.49 K/UL (ref 1.8–8)
NEUTS SEG NFR BLD: 72.9 % (ref 32–75)
NEUTS SEG NFR BLD: 76.2 % (ref 32–75)
NRBC # BLD: 0 K/UL (ref 0–0.01)
NRBC # BLD: 0 K/UL (ref 0–0.01)
NRBC BLD-RTO: 0 PER 100 WBC
NRBC BLD-RTO: 0 PER 100 WBC
PLATELET # BLD AUTO: 304 K/UL (ref 150–400)
PLATELET # BLD AUTO: 306 K/UL (ref 150–400)
PMV BLD AUTO: 10 FL (ref 8.9–12.9)
PMV BLD AUTO: 9.5 FL (ref 8.9–12.9)
POTASSIUM SERPL-SCNC: 4.8 MMOL/L (ref 3.5–5.1)
POTASSIUM SERPL-SCNC: 5.1 MMOL/L (ref 3.5–5.1)
PROT SERPL-MCNC: 4.5 G/DL (ref 6.4–8.2)
RBC # BLD AUTO: 3.01 M/UL (ref 3.8–5.2)
RBC # BLD AUTO: 3.37 M/UL (ref 3.8–5.2)
SERVICE CMNT-IMP: ABNORMAL
SERVICE CMNT-IMP: NORMAL
SODIUM SERPL-SCNC: 141 MMOL/L (ref 136–145)
SODIUM SERPL-SCNC: 141 MMOL/L (ref 136–145)
SPECIMEN EXP DATE BLD: NORMAL
TROPONIN I SERPL HS-MCNC: 8 NG/L (ref 0–51)
TROPONIN I SERPL HS-MCNC: 9 NG/L (ref 0–51)
UNIT DIVISION: 0
UNIT ISSUE DATE/TIME: NORMAL
VAS LEFT ABI: 0.52
VAS LEFT ANKLE BP: 84 MMHG
VAS LEFT ARM BP: 161 MMHG
VAS LEFT CALF PRESSURE: 84 MMHG
VAS LEFT DORSALIS PEDIS BP: 45 MMHG
VAS LEFT PTA BP: 84 MMHG
VAS LEFT TBI: 0
VAS LEFT TOE PRESSURE: 0 MMHG
VAS RIGHT ABI: 1.04
VAS RIGHT ANKLE BP: 159 MMHG
VAS RIGHT ARM BP: 163 MMHG
VAS RIGHT CALF PRESSURE: 211 MMHG
VAS RIGHT DORSALIS PEDIS BP: 169 MMHG
VAS RIGHT PTA BP: 159 MMHG
VAS RIGHT TBI: 0.76
VAS RIGHT TOE PRESSURE: 124 MMHG
WBC # BLD AUTO: 10.5 K/UL (ref 3.6–11)
WBC # BLD AUTO: 11.2 K/UL (ref 3.6–11)

## 2025-05-28 PROCEDURE — 71045 X-RAY EXAM CHEST 1 VIEW: CPT

## 2025-05-28 PROCEDURE — 6370000000 HC RX 637 (ALT 250 FOR IP)

## 2025-05-28 PROCEDURE — 2580000003 HC RX 258

## 2025-05-28 PROCEDURE — 82962 GLUCOSE BLOOD TEST: CPT

## 2025-05-28 PROCEDURE — 1100000000 HC RM PRIVATE

## 2025-05-28 PROCEDURE — 93005 ELECTROCARDIOGRAM TRACING: CPT | Performed by: INTERNAL MEDICINE

## 2025-05-28 PROCEDURE — 99233 SBSQ HOSP IP/OBS HIGH 50: CPT | Performed by: FAMILY MEDICINE

## 2025-05-28 PROCEDURE — 6360000002 HC RX W HCPCS

## 2025-05-28 PROCEDURE — 85025 COMPLETE CBC W/AUTO DIFF WBC: CPT

## 2025-05-28 PROCEDURE — 2500000003 HC RX 250 WO HCPCS

## 2025-05-28 PROCEDURE — 84484 ASSAY OF TROPONIN QUANT: CPT

## 2025-05-28 PROCEDURE — 36415 COLL VENOUS BLD VENIPUNCTURE: CPT

## 2025-05-28 PROCEDURE — 99232 SBSQ HOSP IP/OBS MODERATE 35: CPT

## 2025-05-28 PROCEDURE — 80053 COMPREHEN METABOLIC PANEL: CPT

## 2025-05-28 PROCEDURE — 93971 EXTREMITY STUDY: CPT

## 2025-05-28 PROCEDURE — 94761 N-INVAS EAR/PLS OXIMETRY MLT: CPT

## 2025-05-28 RX ORDER — FUROSEMIDE 20 MG/1
20 TABLET ORAL DAILY
Status: DISCONTINUED | OUTPATIENT
Start: 2025-05-28 | End: 2025-05-29

## 2025-05-28 RX ORDER — MAGNESIUM HYDROXIDE/ALUMINUM HYDROXICE/SIMETHICONE 120; 1200; 1200 MG/30ML; MG/30ML; MG/30ML
30 SUSPENSION ORAL ONCE
Status: COMPLETED | OUTPATIENT
Start: 2025-05-28 | End: 2025-05-28

## 2025-05-28 RX ADMIN — METRONIDAZOLE 500 MG: 500 INJECTION, SOLUTION INTRAVENOUS at 09:56

## 2025-05-28 RX ADMIN — PANCRELIPASE LIPASE, PANCRELIPASE PROTEASE, PANCRELIPASE AMYLASE 20000 UNITS: 20000; 63000; 84000 CAPSULE, DELAYED RELEASE ORAL at 12:32

## 2025-05-28 RX ADMIN — SODIUM BICARBONATE 650 MG: 650 TABLET ORAL at 09:28

## 2025-05-28 RX ADMIN — ACETAMINOPHEN 1000 MG: 500 TABLET ORAL at 13:40

## 2025-05-28 RX ADMIN — ACETAMINOPHEN 1000 MG: 500 TABLET ORAL at 20:40

## 2025-05-28 RX ADMIN — GABAPENTIN 200 MG: 100 CAPSULE ORAL at 09:28

## 2025-05-28 RX ADMIN — MICONAZOLE NITRATE: 2 POWDER TOPICAL at 09:57

## 2025-05-28 RX ADMIN — FUROSEMIDE 20 MG: 20 TABLET ORAL at 09:29

## 2025-05-28 RX ADMIN — PANCRELIPASE LIPASE, PANCRELIPASE PROTEASE, PANCRELIPASE AMYLASE 15000 UNITS: 15000; 47000; 63000 CAPSULE, DELAYED RELEASE ORAL at 12:32

## 2025-05-28 RX ADMIN — SODIUM CHLORIDE, PRESERVATIVE FREE 10 ML: 5 INJECTION INTRAVENOUS at 09:56

## 2025-05-28 RX ADMIN — PANTOPRAZOLE SODIUM 40 MG: 40 TABLET, DELAYED RELEASE ORAL at 09:29

## 2025-05-28 RX ADMIN — INSULIN GLARGINE 8 UNITS: 100 INJECTION, SOLUTION SUBCUTANEOUS at 09:53

## 2025-05-28 RX ADMIN — METRONIDAZOLE 500 MG: 500 INJECTION, SOLUTION INTRAVENOUS at 16:37

## 2025-05-28 RX ADMIN — ONDANSETRON 4 MG: 2 INJECTION, SOLUTION INTRAMUSCULAR; INTRAVENOUS at 03:26

## 2025-05-28 RX ADMIN — PANCRELIPASE LIPASE, PANCRELIPASE PROTEASE, PANCRELIPASE AMYLASE 20000 UNITS: 20000; 63000; 84000 CAPSULE, DELAYED RELEASE ORAL at 09:29

## 2025-05-28 RX ADMIN — SODIUM BICARBONATE 650 MG: 650 TABLET ORAL at 13:40

## 2025-05-28 RX ADMIN — SODIUM BICARBONATE 650 MG: 650 TABLET ORAL at 20:40

## 2025-05-28 RX ADMIN — MICONAZOLE NITRATE: 2 POWDER TOPICAL at 20:40

## 2025-05-28 RX ADMIN — METRONIDAZOLE 500 MG: 500 INJECTION, SOLUTION INTRAVENOUS at 01:34

## 2025-05-28 RX ADMIN — PANCRELIPASE LIPASE, PANCRELIPASE PROTEASE, PANCRELIPASE AMYLASE 15000 UNITS: 15000; 47000; 63000 CAPSULE, DELAYED RELEASE ORAL at 09:29

## 2025-05-28 RX ADMIN — DICYCLOMINE HYDROCHLORIDE 10 MG: 10 CAPSULE ORAL at 09:28

## 2025-05-28 RX ADMIN — PANCRELIPASE LIPASE, PANCRELIPASE PROTEASE, PANCRELIPASE AMYLASE 15000 UNITS: 15000; 47000; 63000 CAPSULE, DELAYED RELEASE ORAL at 16:34

## 2025-05-28 RX ADMIN — DICYCLOMINE HYDROCHLORIDE 10 MG: 10 CAPSULE ORAL at 06:00

## 2025-05-28 RX ADMIN — CEFEPIME 2000 MG: 2 INJECTION, POWDER, FOR SOLUTION INTRAVENOUS at 12:33

## 2025-05-28 RX ADMIN — ONDANSETRON 4 MG: 2 INJECTION, SOLUTION INTRAMUSCULAR; INTRAVENOUS at 16:34

## 2025-05-28 RX ADMIN — GABAPENTIN 200 MG: 100 CAPSULE ORAL at 20:39

## 2025-05-28 RX ADMIN — DICYCLOMINE HYDROCHLORIDE 10 MG: 10 CAPSULE ORAL at 20:40

## 2025-05-28 RX ADMIN — ALUMINUM HYDROXIDE, MAGNESIUM HYDROXIDE, AND SIMETHICONE 30 ML: 200; 200; 20 SUSPENSION ORAL at 10:53

## 2025-05-28 RX ADMIN — CEFEPIME 2000 MG: 2 INJECTION, POWDER, FOR SOLUTION INTRAVENOUS at 01:33

## 2025-05-28 RX ADMIN — SODIUM CHLORIDE, PRESERVATIVE FREE 10 ML: 5 INJECTION INTRAVENOUS at 20:40

## 2025-05-28 RX ADMIN — DICYCLOMINE HYDROCHLORIDE 10 MG: 10 CAPSULE ORAL at 16:34

## 2025-05-28 RX ADMIN — PANCRELIPASE LIPASE, PANCRELIPASE PROTEASE, PANCRELIPASE AMYLASE 20000 UNITS: 20000; 63000; 84000 CAPSULE, DELAYED RELEASE ORAL at 16:34

## 2025-05-28 RX ADMIN — AMLODIPINE BESYLATE 10 MG: 5 TABLET ORAL at 09:28

## 2025-05-28 RX ADMIN — GABAPENTIN 200 MG: 100 CAPSULE ORAL at 13:40

## 2025-05-28 RX ADMIN — ACETAMINOPHEN 1000 MG: 500 TABLET ORAL at 06:00

## 2025-05-28 RX ADMIN — PANTOPRAZOLE SODIUM 40 MG: 40 TABLET, DELAYED RELEASE ORAL at 20:40

## 2025-05-28 NOTE — ACP (ADVANCE CARE PLANNING)
and she expressed her appreciation.       Interventions:  Provided education on documents for clarity and greater understanding  Discussed and provided education on state decision maker hierarchy  Encouraged ongoing ACP conversation with future decision makers and loved ones  Reviewed but did not complete ACP document    Care Preferences Communicated:   No    Outcomes/Plan:  ACP Discussion: Completed  Teach Back Method used to verify the patient's and/or Healthcare Decision Maker's understanding of key information in the advance directive documents    Electronically signed by JANAE Jain on 5/28/2025 at 4:19 PM  For  support, please call Spiritual Health Team @ [phone]

## 2025-05-29 LAB
ALBUMIN SERPL-MCNC: 1.1 G/DL (ref 3.5–5)
ALBUMIN/GLOB SERPL: 0.3 (ref 1.1–2.2)
ALP SERPL-CCNC: 300 U/L (ref 45–117)
ALT SERPL-CCNC: 20 U/L (ref 12–78)
ANION GAP SERPL CALC-SCNC: 4 MMOL/L (ref 2–12)
AST SERPL-CCNC: 25 U/L (ref 15–37)
BASOPHILS # BLD: 0.05 K/UL (ref 0–0.1)
BASOPHILS NFR BLD: 0.5 % (ref 0–1)
BILIRUB SERPL-MCNC: 0.2 MG/DL (ref 0.2–1)
BUN SERPL-MCNC: 38 MG/DL (ref 6–20)
BUN/CREAT SERPL: 20 (ref 12–20)
CALCIUM SERPL-MCNC: 8.2 MG/DL (ref 8.5–10.1)
CHLORIDE SERPL-SCNC: 116 MMOL/L (ref 97–108)
CO2 SERPL-SCNC: 20 MMOL/L (ref 21–32)
CREAT SERPL-MCNC: 1.88 MG/DL (ref 0.55–1.02)
DIFFERENTIAL METHOD BLD: ABNORMAL
ECHO BSA: 1.76 M2
EKG ATRIAL RATE: 68 BPM
EKG ATRIAL RATE: 69 BPM
EKG DIAGNOSIS: NORMAL
EKG DIAGNOSIS: NORMAL
EKG P AXIS: 17 DEGREES
EKG P AXIS: 22 DEGREES
EKG P-R INTERVAL: 106 MS
EKG P-R INTERVAL: 114 MS
EKG Q-T INTERVAL: 382 MS
EKG Q-T INTERVAL: 384 MS
EKG QRS DURATION: 76 MS
EKG QRS DURATION: 76 MS
EKG QTC CALCULATION (BAZETT): 406 MS
EKG QTC CALCULATION (BAZETT): 411 MS
EKG R AXIS: -2 DEGREES
EKG R AXIS: -8 DEGREES
EKG T AXIS: 71 DEGREES
EKG T AXIS: 73 DEGREES
EKG VENTRICULAR RATE: 68 BPM
EKG VENTRICULAR RATE: 69 BPM
EOSINOPHIL # BLD: 0.02 K/UL (ref 0–0.4)
EOSINOPHIL NFR BLD: 0.2 % (ref 0–7)
ERYTHROCYTE [DISTWIDTH] IN BLOOD BY AUTOMATED COUNT: 15.5 % (ref 11.5–14.5)
GLOBULIN SER CALC-MCNC: 3.7 G/DL (ref 2–4)
GLUCOSE BLD STRIP.AUTO-MCNC: 100 MG/DL (ref 65–117)
GLUCOSE BLD STRIP.AUTO-MCNC: 102 MG/DL (ref 65–117)
GLUCOSE BLD STRIP.AUTO-MCNC: 106 MG/DL (ref 65–117)
GLUCOSE BLD STRIP.AUTO-MCNC: 125 MG/DL (ref 65–117)
GLUCOSE SERPL-MCNC: 125 MG/DL (ref 65–100)
HCT VFR BLD AUTO: 30.7 % (ref 35–47)
HGB BLD-MCNC: 9.8 G/DL (ref 11.5–16)
IMM GRANULOCYTES # BLD AUTO: 0.04 K/UL (ref 0–0.04)
IMM GRANULOCYTES NFR BLD AUTO: 0.4 % (ref 0–0.5)
LYMPHOCYTES # BLD: 1.56 K/UL (ref 0.8–3.5)
LYMPHOCYTES NFR BLD: 16.7 % (ref 12–49)
MCH RBC QN AUTO: 29.5 PG (ref 26–34)
MCHC RBC AUTO-ENTMCNC: 31.9 G/DL (ref 30–36.5)
MCV RBC AUTO: 92.5 FL (ref 80–99)
MONOCYTES # BLD: 0.63 K/UL (ref 0–1)
MONOCYTES NFR BLD: 6.7 % (ref 5–13)
NEUTS SEG # BLD: 7.04 K/UL (ref 1.8–8)
NEUTS SEG NFR BLD: 75.5 % (ref 32–75)
NRBC # BLD: 0 K/UL (ref 0–0.01)
NRBC BLD-RTO: 0 PER 100 WBC
PLATELET # BLD AUTO: 314 K/UL (ref 150–400)
PMV BLD AUTO: 9.7 FL (ref 8.9–12.9)
POTASSIUM SERPL-SCNC: 4.8 MMOL/L (ref 3.5–5.1)
PROT SERPL-MCNC: 4.8 G/DL (ref 6.4–8.2)
RBC # BLD AUTO: 3.32 M/UL (ref 3.8–5.2)
SERVICE CMNT-IMP: ABNORMAL
SERVICE CMNT-IMP: NORMAL
SODIUM SERPL-SCNC: 140 MMOL/L (ref 136–145)
WBC # BLD AUTO: 9.3 K/UL (ref 3.6–11)

## 2025-05-29 PROCEDURE — 2580000003 HC RX 258

## 2025-05-29 PROCEDURE — 1100000000 HC RM PRIVATE

## 2025-05-29 PROCEDURE — 6370000000 HC RX 637 (ALT 250 FOR IP)

## 2025-05-29 PROCEDURE — 2500000003 HC RX 250 WO HCPCS

## 2025-05-29 PROCEDURE — 6360000002 HC RX W HCPCS

## 2025-05-29 PROCEDURE — 85025 COMPLETE CBC W/AUTO DIFF WBC: CPT

## 2025-05-29 PROCEDURE — 94761 N-INVAS EAR/PLS OXIMETRY MLT: CPT

## 2025-05-29 PROCEDURE — 99233 SBSQ HOSP IP/OBS HIGH 50: CPT | Performed by: FAMILY MEDICINE

## 2025-05-29 PROCEDURE — 99232 SBSQ HOSP IP/OBS MODERATE 35: CPT

## 2025-05-29 PROCEDURE — 93010 ELECTROCARDIOGRAM REPORT: CPT | Performed by: INTERNAL MEDICINE

## 2025-05-29 PROCEDURE — 80053 COMPREHEN METABOLIC PANEL: CPT

## 2025-05-29 PROCEDURE — 6370000000 HC RX 637 (ALT 250 FOR IP): Performed by: FAMILY MEDICINE

## 2025-05-29 PROCEDURE — 36415 COLL VENOUS BLD VENIPUNCTURE: CPT

## 2025-05-29 PROCEDURE — 82962 GLUCOSE BLOOD TEST: CPT

## 2025-05-29 RX ORDER — FUROSEMIDE 20 MG/1
20 TABLET ORAL 2 TIMES DAILY
Status: DISCONTINUED | OUTPATIENT
Start: 2025-05-29 | End: 2025-06-02

## 2025-05-29 RX ORDER — PANTOPRAZOLE SODIUM 40 MG/1
40 TABLET, DELAYED RELEASE ORAL
Status: DISCONTINUED | OUTPATIENT
Start: 2025-05-29 | End: 2025-06-08 | Stop reason: HOSPADM

## 2025-05-29 RX ORDER — ONDANSETRON 2 MG/ML
4 INJECTION INTRAMUSCULAR; INTRAVENOUS EVERY 6 HOURS PRN
Status: DISCONTINUED | OUTPATIENT
Start: 2025-05-29 | End: 2025-06-08 | Stop reason: HOSPADM

## 2025-05-29 RX ORDER — ONDANSETRON 4 MG/1
4 TABLET, ORALLY DISINTEGRATING ORAL EVERY 8 HOURS PRN
Status: DISCONTINUED | OUTPATIENT
Start: 2025-05-29 | End: 2025-06-08 | Stop reason: HOSPADM

## 2025-05-29 RX ADMIN — OXYCODONE 5 MG: 5 TABLET ORAL at 01:07

## 2025-05-29 RX ADMIN — ACETAMINOPHEN 1000 MG: 500 TABLET ORAL at 13:59

## 2025-05-29 RX ADMIN — MICONAZOLE NITRATE: 2 POWDER TOPICAL at 20:15

## 2025-05-29 RX ADMIN — OXYCODONE 5 MG: 5 TABLET ORAL at 20:21

## 2025-05-29 RX ADMIN — METRONIDAZOLE 500 MG: 500 INJECTION, SOLUTION INTRAVENOUS at 09:19

## 2025-05-29 RX ADMIN — PANCRELIPASE LIPASE, PANCRELIPASE PROTEASE, PANCRELIPASE AMYLASE 15000 UNITS: 15000; 47000; 63000 CAPSULE, DELAYED RELEASE ORAL at 17:34

## 2025-05-29 RX ADMIN — DICYCLOMINE HYDROCHLORIDE 10 MG: 10 CAPSULE ORAL at 17:34

## 2025-05-29 RX ADMIN — ACETAMINOPHEN 1000 MG: 500 TABLET ORAL at 23:36

## 2025-05-29 RX ADMIN — OXYCODONE 5 MG: 5 TABLET ORAL at 09:03

## 2025-05-29 RX ADMIN — PANCRELIPASE LIPASE, PANCRELIPASE PROTEASE, PANCRELIPASE AMYLASE 20000 UNITS: 20000; 63000; 84000 CAPSULE, DELAYED RELEASE ORAL at 17:34

## 2025-05-29 RX ADMIN — FUROSEMIDE 20 MG: 20 TABLET ORAL at 17:34

## 2025-05-29 RX ADMIN — SODIUM CHLORIDE, PRESERVATIVE FREE 10 ML: 5 INJECTION INTRAVENOUS at 09:02

## 2025-05-29 RX ADMIN — ACETAMINOPHEN 1000 MG: 500 TABLET ORAL at 06:19

## 2025-05-29 RX ADMIN — METRONIDAZOLE 500 MG: 500 INJECTION, SOLUTION INTRAVENOUS at 01:02

## 2025-05-29 RX ADMIN — GABAPENTIN 200 MG: 100 CAPSULE ORAL at 20:14

## 2025-05-29 RX ADMIN — SODIUM BICARBONATE 650 MG: 650 TABLET ORAL at 09:03

## 2025-05-29 RX ADMIN — ONDANSETRON 4 MG: 2 INJECTION, SOLUTION INTRAMUSCULAR; INTRAVENOUS at 09:13

## 2025-05-29 RX ADMIN — ONDANSETRON 4 MG: 2 INJECTION, SOLUTION INTRAMUSCULAR; INTRAVENOUS at 01:07

## 2025-05-29 RX ADMIN — GABAPENTIN 200 MG: 100 CAPSULE ORAL at 09:03

## 2025-05-29 RX ADMIN — PANCRELIPASE LIPASE, PANCRELIPASE PROTEASE, PANCRELIPASE AMYLASE 15000 UNITS: 15000; 47000; 63000 CAPSULE, DELAYED RELEASE ORAL at 12:48

## 2025-05-29 RX ADMIN — SODIUM BICARBONATE 650 MG: 650 TABLET ORAL at 20:14

## 2025-05-29 RX ADMIN — FUROSEMIDE 20 MG: 20 TABLET ORAL at 09:03

## 2025-05-29 RX ADMIN — PANTOPRAZOLE SODIUM 40 MG: 40 TABLET, DELAYED RELEASE ORAL at 17:34

## 2025-05-29 RX ADMIN — SODIUM BICARBONATE 650 MG: 650 TABLET ORAL at 13:59

## 2025-05-29 RX ADMIN — METRONIDAZOLE 500 MG: 500 INJECTION, SOLUTION INTRAVENOUS at 17:37

## 2025-05-29 RX ADMIN — AMLODIPINE BESYLATE 10 MG: 5 TABLET ORAL at 09:03

## 2025-05-29 RX ADMIN — PANCRELIPASE LIPASE, PANCRELIPASE PROTEASE, PANCRELIPASE AMYLASE 20000 UNITS: 20000; 63000; 84000 CAPSULE, DELAYED RELEASE ORAL at 12:48

## 2025-05-29 RX ADMIN — DICYCLOMINE HYDROCHLORIDE 10 MG: 10 CAPSULE ORAL at 06:19

## 2025-05-29 RX ADMIN — CEFEPIME 2000 MG: 2 INJECTION, POWDER, FOR SOLUTION INTRAVENOUS at 12:53

## 2025-05-29 RX ADMIN — CEFEPIME 2000 MG: 2 INJECTION, POWDER, FOR SOLUTION INTRAVENOUS at 01:01

## 2025-05-29 RX ADMIN — DICYCLOMINE HYDROCHLORIDE 10 MG: 10 CAPSULE ORAL at 12:48

## 2025-05-29 RX ADMIN — PANTOPRAZOLE SODIUM 40 MG: 40 TABLET, DELAYED RELEASE ORAL at 09:03

## 2025-05-29 RX ADMIN — SODIUM CHLORIDE 550 MG: 9 INJECTION INTRAMUSCULAR; INTRAVENOUS; SUBCUTANEOUS at 15:58

## 2025-05-29 RX ADMIN — GABAPENTIN 200 MG: 100 CAPSULE ORAL at 13:59

## 2025-05-29 RX ADMIN — DICYCLOMINE HYDROCHLORIDE 10 MG: 10 CAPSULE ORAL at 20:14

## 2025-05-29 ASSESSMENT — PAIN SCALES - GENERAL
PAINLEVEL_OUTOF10: 7
PAINLEVEL_OUTOF10: 5
PAINLEVEL_OUTOF10: 8
PAINLEVEL_OUTOF10: 7
PAINLEVEL_OUTOF10: 7

## 2025-05-29 ASSESSMENT — PAIN DESCRIPTION - LOCATION
LOCATION: FOOT
LOCATION: HEAD
LOCATION: FOOT

## 2025-05-29 ASSESSMENT — PAIN DESCRIPTION - DESCRIPTORS
DESCRIPTORS: ACHING;SORE
DESCRIPTORS: ACHING

## 2025-05-29 ASSESSMENT — PAIN DESCRIPTION - ORIENTATION
ORIENTATION: LEFT
ORIENTATION: LEFT

## 2025-05-29 ASSESSMENT — PAIN DESCRIPTION - FREQUENCY: FREQUENCY: CONTINUOUS

## 2025-05-29 ASSESSMENT — PAIN - FUNCTIONAL ASSESSMENT: PAIN_FUNCTIONAL_ASSESSMENT: ACTIVITIES ARE NOT PREVENTED

## 2025-05-30 ENCOUNTER — ANESTHESIA EVENT (OUTPATIENT)
Facility: HOSPITAL | Age: 65
End: 2025-05-30
Payer: MEDICARE

## 2025-05-30 LAB
ALBUMIN SERPL-MCNC: 1.1 G/DL (ref 3.5–5)
ALBUMIN/GLOB SERPL: 0.4 (ref 1.1–2.2)
ALP SERPL-CCNC: 263 U/L (ref 45–117)
ALT SERPL-CCNC: 18 U/L (ref 12–78)
ANION GAP SERPL CALC-SCNC: 3 MMOL/L (ref 2–12)
AST SERPL-CCNC: 17 U/L (ref 15–37)
BACTERIA SPEC CULT: ABNORMAL
BASOPHILS # BLD: 0.05 K/UL (ref 0–0.1)
BASOPHILS NFR BLD: 0.6 % (ref 0–1)
BILIRUB SERPL-MCNC: 0.2 MG/DL (ref 0.2–1)
BUN SERPL-MCNC: 40 MG/DL (ref 6–20)
BUN/CREAT SERPL: 20 (ref 12–20)
CALCIUM SERPL-MCNC: 8.1 MG/DL (ref 8.5–10.1)
CHLORIDE SERPL-SCNC: 115 MMOL/L (ref 97–108)
CO2 SERPL-SCNC: 21 MMOL/L (ref 21–32)
CREAT SERPL-MCNC: 1.99 MG/DL (ref 0.55–1.02)
DIFFERENTIAL METHOD BLD: ABNORMAL
EOSINOPHIL # BLD: 0.02 K/UL (ref 0–0.4)
EOSINOPHIL NFR BLD: 0.2 % (ref 0–7)
ERYTHROCYTE [DISTWIDTH] IN BLOOD BY AUTOMATED COUNT: 15.6 % (ref 11.5–14.5)
GLOBULIN SER CALC-MCNC: 3.1 G/DL (ref 2–4)
GLUCOSE BLD STRIP.AUTO-MCNC: 164 MG/DL (ref 65–117)
GLUCOSE BLD STRIP.AUTO-MCNC: 175 MG/DL (ref 65–117)
GLUCOSE BLD STRIP.AUTO-MCNC: 197 MG/DL (ref 65–117)
GLUCOSE BLD STRIP.AUTO-MCNC: 204 MG/DL (ref 65–117)
GLUCOSE BLD STRIP.AUTO-MCNC: 232 MG/DL (ref 65–117)
GLUCOSE BLD STRIP.AUTO-MCNC: 243 MG/DL (ref 65–117)
GLUCOSE SERPL-MCNC: 200 MG/DL (ref 65–100)
GRAM STN SPEC: ABNORMAL
GRAM STN SPEC: ABNORMAL
HCT VFR BLD AUTO: 29.1 % (ref 35–47)
HGB BLD-MCNC: 9.3 G/DL (ref 11.5–16)
IMM GRANULOCYTES # BLD AUTO: 0.04 K/UL (ref 0–0.04)
IMM GRANULOCYTES NFR BLD AUTO: 0.4 % (ref 0–0.5)
LYMPHOCYTES # BLD: 1.63 K/UL (ref 0.8–3.5)
LYMPHOCYTES NFR BLD: 18.2 % (ref 12–49)
MCH RBC QN AUTO: 29.8 PG (ref 26–34)
MCHC RBC AUTO-ENTMCNC: 32 G/DL (ref 30–36.5)
MCV RBC AUTO: 93.3 FL (ref 80–99)
MONOCYTES # BLD: 0.42 K/UL (ref 0–1)
MONOCYTES NFR BLD: 4.7 % (ref 5–13)
NEUTS SEG # BLD: 6.81 K/UL (ref 1.8–8)
NEUTS SEG NFR BLD: 75.9 % (ref 32–75)
NRBC # BLD: 0 K/UL (ref 0–0.01)
NRBC BLD-RTO: 0 PER 100 WBC
PERIPHERAL SMEAR, MD REVIEW: NORMAL
PLATELET # BLD AUTO: 318 K/UL (ref 150–400)
PMV BLD AUTO: 9.8 FL (ref 8.9–12.9)
POTASSIUM SERPL-SCNC: 5.1 MMOL/L (ref 3.5–5.1)
PROT SERPL-MCNC: 4.2 G/DL (ref 6.4–8.2)
RBC # BLD AUTO: 3.12 M/UL (ref 3.8–5.2)
SERVICE CMNT-IMP: ABNORMAL
SODIUM SERPL-SCNC: 139 MMOL/L (ref 136–145)
WBC # BLD AUTO: 9 K/UL (ref 3.6–11)

## 2025-05-30 PROCEDURE — 2580000003 HC RX 258

## 2025-05-30 PROCEDURE — C1769 GUIDE WIRE: HCPCS

## 2025-05-30 PROCEDURE — 6360000002 HC RX W HCPCS

## 2025-05-30 PROCEDURE — C1760 CLOSURE DEV, VASC: HCPCS

## 2025-05-30 PROCEDURE — 76937 US GUIDE VASCULAR ACCESS: CPT

## 2025-05-30 PROCEDURE — 1100000000 HC RM PRIVATE

## 2025-05-30 PROCEDURE — 6360000002 HC RX W HCPCS: Performed by: FAMILY MEDICINE

## 2025-05-30 PROCEDURE — 75630 X-RAY AORTA LEG ARTERIES: CPT

## 2025-05-30 PROCEDURE — B4101ZZ FLUOROSCOPY OF ABDOMINAL AORTA USING LOW OSMOLAR CONTRAST: ICD-10-PCS

## 2025-05-30 PROCEDURE — 6370000000 HC RX 637 (ALT 250 FOR IP)

## 2025-05-30 PROCEDURE — 36415 COLL VENOUS BLD VENIPUNCTURE: CPT

## 2025-05-30 PROCEDURE — 2500000003 HC RX 250 WO HCPCS

## 2025-05-30 PROCEDURE — G0269 OCCLUSIVE DEVICE IN VEIN ART: HCPCS

## 2025-05-30 PROCEDURE — 99152 MOD SED SAME PHYS/QHP 5/>YRS: CPT

## 2025-05-30 PROCEDURE — 99232 SBSQ HOSP IP/OBS MODERATE 35: CPT | Performed by: FAMILY MEDICINE

## 2025-05-30 PROCEDURE — 82962 GLUCOSE BLOOD TEST: CPT

## 2025-05-30 PROCEDURE — 80053 COMPREHEN METABOLIC PANEL: CPT

## 2025-05-30 PROCEDURE — C1887 CATHETER, GUIDING: HCPCS

## 2025-05-30 PROCEDURE — 99232 SBSQ HOSP IP/OBS MODERATE 35: CPT

## 2025-05-30 PROCEDURE — 6370000000 HC RX 637 (ALT 250 FOR IP): Performed by: FAMILY MEDICINE

## 2025-05-30 PROCEDURE — C1894 INTRO/SHEATH, NON-LASER: HCPCS

## 2025-05-30 PROCEDURE — 99153 MOD SED SAME PHYS/QHP EA: CPT

## 2025-05-30 PROCEDURE — 94761 N-INVAS EAR/PLS OXIMETRY MLT: CPT

## 2025-05-30 PROCEDURE — 6360000004 HC RX CONTRAST MEDICATION

## 2025-05-30 PROCEDURE — 85025 COMPLETE CBC W/AUTO DIFF WBC: CPT

## 2025-05-30 PROCEDURE — 2709999900 HC NON-CHARGEABLE SUPPLY

## 2025-05-30 DEVICE — ANGIO-SEAL VIP VASCULAR CLOSURE DEVICE
Type: IMPLANTABLE DEVICE | Status: FUNCTIONAL
Brand: ANGIO-SEAL

## 2025-05-30 RX ORDER — METRONIDAZOLE 500 MG/100ML
500 INJECTION, SOLUTION INTRAVENOUS EVERY 8 HOURS
Status: DISCONTINUED | OUTPATIENT
Start: 2025-05-30 | End: 2025-06-02

## 2025-05-30 RX ORDER — 0.9 % SODIUM CHLORIDE 0.9 %
250 INTRAVENOUS SOLUTION INTRAVENOUS ONCE
Status: COMPLETED | OUTPATIENT
Start: 2025-05-30 | End: 2025-05-30

## 2025-05-30 RX ORDER — CALCIUM CARBONATE 500 MG/1
500 TABLET, CHEWABLE ORAL 3 TIMES DAILY PRN
Status: DISCONTINUED | OUTPATIENT
Start: 2025-05-30 | End: 2025-06-08 | Stop reason: HOSPADM

## 2025-05-30 RX ORDER — HEPARIN SODIUM 1000 [USP'U]/ML
INJECTION, SOLUTION INTRAVENOUS; SUBCUTANEOUS PRN
Status: DISCONTINUED | OUTPATIENT
Start: 2025-05-30 | End: 2025-05-30 | Stop reason: HOSPADM

## 2025-05-30 RX ORDER — MIDAZOLAM HYDROCHLORIDE 1 MG/ML
INJECTION, SOLUTION INTRAMUSCULAR; INTRAVENOUS PRN
Status: DISCONTINUED | OUTPATIENT
Start: 2025-05-30 | End: 2025-05-30 | Stop reason: HOSPADM

## 2025-05-30 RX ORDER — FENTANYL CITRATE 50 UG/ML
INJECTION, SOLUTION INTRAMUSCULAR; INTRAVENOUS PRN
Status: DISCONTINUED | OUTPATIENT
Start: 2025-05-30 | End: 2025-05-30 | Stop reason: HOSPADM

## 2025-05-30 RX ORDER — IOPAMIDOL 612 MG/ML
INJECTION, SOLUTION INTRAVASCULAR PRN
Status: DISCONTINUED | OUTPATIENT
Start: 2025-05-30 | End: 2025-05-30 | Stop reason: HOSPADM

## 2025-05-30 RX ADMIN — DICYCLOMINE HYDROCHLORIDE 10 MG: 10 CAPSULE ORAL at 05:35

## 2025-05-30 RX ADMIN — GABAPENTIN 200 MG: 100 CAPSULE ORAL at 14:06

## 2025-05-30 RX ADMIN — PANCRELIPASE LIPASE, PANCRELIPASE PROTEASE, PANCRELIPASE AMYLASE 20000 UNITS: 20000; 63000; 84000 CAPSULE, DELAYED RELEASE ORAL at 17:55

## 2025-05-30 RX ADMIN — SODIUM BICARBONATE 650 MG: 650 TABLET ORAL at 14:06

## 2025-05-30 RX ADMIN — SODIUM BICARBONATE 650 MG: 650 TABLET ORAL at 20:43

## 2025-05-30 RX ADMIN — Medication 3 MG: at 00:44

## 2025-05-30 RX ADMIN — AMLODIPINE BESYLATE 10 MG: 5 TABLET ORAL at 09:41

## 2025-05-30 RX ADMIN — CEFEPIME 1000 MG: 1 INJECTION, POWDER, FOR SOLUTION INTRAMUSCULAR; INTRAVENOUS at 15:20

## 2025-05-30 RX ADMIN — PANCRELIPASE LIPASE, PANCRELIPASE PROTEASE, PANCRELIPASE AMYLASE 15000 UNITS: 15000; 47000; 63000 CAPSULE, DELAYED RELEASE ORAL at 17:55

## 2025-05-30 RX ADMIN — GABAPENTIN 200 MG: 100 CAPSULE ORAL at 09:41

## 2025-05-30 RX ADMIN — OXYCODONE 5 MG: 5 TABLET ORAL at 14:24

## 2025-05-30 RX ADMIN — ONDANSETRON 4 MG: 4 TABLET, ORALLY DISINTEGRATING ORAL at 09:50

## 2025-05-30 RX ADMIN — SODIUM BICARBONATE 650 MG: 650 TABLET ORAL at 09:41

## 2025-05-30 RX ADMIN — GABAPENTIN 200 MG: 100 CAPSULE ORAL at 20:43

## 2025-05-30 RX ADMIN — MICONAZOLE NITRATE: 2 POWDER TOPICAL at 20:45

## 2025-05-30 RX ADMIN — FUROSEMIDE 20 MG: 20 TABLET ORAL at 17:55

## 2025-05-30 RX ADMIN — METRONIDAZOLE 500 MG: 500 INJECTION, SOLUTION INTRAVENOUS at 00:46

## 2025-05-30 RX ADMIN — PANTOPRAZOLE SODIUM 40 MG: 40 TABLET, DELAYED RELEASE ORAL at 17:55

## 2025-05-30 RX ADMIN — ACETAMINOPHEN 1000 MG: 500 TABLET ORAL at 05:35

## 2025-05-30 RX ADMIN — PANTOPRAZOLE SODIUM 40 MG: 40 TABLET, DELAYED RELEASE ORAL at 05:35

## 2025-05-30 RX ADMIN — DICYCLOMINE HYDROCHLORIDE 10 MG: 10 CAPSULE ORAL at 20:43

## 2025-05-30 RX ADMIN — ACETAMINOPHEN 1000 MG: 500 TABLET ORAL at 22:19

## 2025-05-30 RX ADMIN — OXYCODONE 5 MG: 5 TABLET ORAL at 22:23

## 2025-05-30 RX ADMIN — ACETAMINOPHEN 1000 MG: 500 TABLET ORAL at 14:06

## 2025-05-30 RX ADMIN — DICYCLOMINE HYDROCHLORIDE 10 MG: 10 CAPSULE ORAL at 18:00

## 2025-05-30 RX ADMIN — SODIUM CHLORIDE, PRESERVATIVE FREE 10 ML: 5 INJECTION INTRAVENOUS at 20:43

## 2025-05-30 RX ADMIN — FUROSEMIDE 20 MG: 20 TABLET ORAL at 09:41

## 2025-05-30 RX ADMIN — CEFEPIME 2000 MG: 2 INJECTION, POWDER, FOR SOLUTION INTRAVENOUS at 00:47

## 2025-05-30 RX ADMIN — METRONIDAZOLE 500 MG: 500 INJECTION, SOLUTION INTRAVENOUS at 09:43

## 2025-05-30 RX ADMIN — SODIUM CHLORIDE 250 ML: 0.9 INJECTION, SOLUTION INTRAVENOUS at 14:46

## 2025-05-30 RX ADMIN — SODIUM CHLORIDE, PRESERVATIVE FREE 10 ML: 5 INJECTION INTRAVENOUS at 09:42

## 2025-05-30 RX ADMIN — METRONIDAZOLE 500 MG: 500 INJECTION, SOLUTION INTRAVENOUS at 20:51

## 2025-05-30 RX ADMIN — ONDANSETRON 4 MG: 4 TABLET, ORALLY DISINTEGRATING ORAL at 17:57

## 2025-05-30 RX ADMIN — ANTACID TABLETS 500 MG: 500 TABLET, CHEWABLE ORAL at 00:44

## 2025-05-30 RX ADMIN — INSULIN LISPRO 2 UNITS: 100 INJECTION, SOLUTION INTRAVENOUS; SUBCUTANEOUS at 22:19

## 2025-05-30 ASSESSMENT — PAIN SCALES - GENERAL
PAINLEVEL_OUTOF10: 3
PAINLEVEL_OUTOF10: 2
PAINLEVEL_OUTOF10: 3
PAINLEVEL_OUTOF10: 2
PAINLEVEL_OUTOF10: 5
PAINLEVEL_OUTOF10: 7
PAINLEVEL_OUTOF10: 6
PAINLEVEL_OUTOF10: 7

## 2025-05-30 ASSESSMENT — PAIN DESCRIPTION - DESCRIPTORS
DESCRIPTORS: ACHING;DISCOMFORT;NAGGING
DESCRIPTORS: ACHING
DESCRIPTORS: ACHING

## 2025-05-30 ASSESSMENT — PAIN DESCRIPTION - ORIENTATION: ORIENTATION: RIGHT;LEFT

## 2025-05-30 ASSESSMENT — PAIN DESCRIPTION - LOCATION
LOCATION: FOOT
LOCATION: ABDOMEN;FOOT
LOCATION: FOOT;BACK

## 2025-05-30 ASSESSMENT — PAIN - FUNCTIONAL ASSESSMENT: PAIN_FUNCTIONAL_ASSESSMENT: ACTIVITIES ARE NOT PREVENTED

## 2025-05-31 ENCOUNTER — APPOINTMENT (OUTPATIENT)
Dept: VASCULAR SURGERY | Facility: HOSPITAL | Age: 65
DRG: 253 | End: 2025-05-31
Payer: MEDICARE

## 2025-05-31 LAB
ALBUMIN SERPL-MCNC: 1.1 G/DL (ref 3.5–5)
ALBUMIN/GLOB SERPL: 0.3 (ref 1.1–2.2)
ALP SERPL-CCNC: 245 U/L (ref 45–117)
ALT SERPL-CCNC: 16 U/L (ref 12–78)
ANION GAP SERPL CALC-SCNC: 5 MMOL/L (ref 2–12)
AST SERPL-CCNC: 12 U/L (ref 15–37)
BASOPHILS # BLD: 0.05 K/UL (ref 0–0.1)
BASOPHILS NFR BLD: 0.5 % (ref 0–1)
BILIRUB SERPL-MCNC: 0.3 MG/DL (ref 0.2–1)
BUN SERPL-MCNC: 44 MG/DL (ref 6–20)
BUN/CREAT SERPL: 20 (ref 12–20)
CALCIUM SERPL-MCNC: 8 MG/DL (ref 8.5–10.1)
CHLORIDE SERPL-SCNC: 114 MMOL/L (ref 97–108)
CO2 SERPL-SCNC: 20 MMOL/L (ref 21–32)
CREAT SERPL-MCNC: 2.16 MG/DL (ref 0.55–1.02)
DIFFERENTIAL METHOD BLD: ABNORMAL
ECHO BSA: 1.76 M2
EOSINOPHIL # BLD: 0.02 K/UL (ref 0–0.4)
EOSINOPHIL NFR BLD: 0.2 % (ref 0–7)
ERYTHROCYTE [DISTWIDTH] IN BLOOD BY AUTOMATED COUNT: 15.9 % (ref 11.5–14.5)
GLOBULIN SER CALC-MCNC: 3.4 G/DL (ref 2–4)
GLUCOSE BLD STRIP.AUTO-MCNC: 225 MG/DL (ref 65–117)
GLUCOSE BLD STRIP.AUTO-MCNC: 242 MG/DL (ref 65–117)
GLUCOSE BLD STRIP.AUTO-MCNC: 244 MG/DL (ref 65–117)
GLUCOSE BLD STRIP.AUTO-MCNC: 251 MG/DL (ref 65–117)
GLUCOSE SERPL-MCNC: 255 MG/DL (ref 65–100)
HCT VFR BLD AUTO: 29.1 % (ref 35–47)
HGB BLD-MCNC: 9.3 G/DL (ref 11.5–16)
IMM GRANULOCYTES # BLD AUTO: 0.04 K/UL (ref 0–0.04)
IMM GRANULOCYTES NFR BLD AUTO: 0.4 % (ref 0–0.5)
LYMPHOCYTES # BLD: 2.71 K/UL (ref 0.8–3.5)
LYMPHOCYTES NFR BLD: 28.6 % (ref 12–49)
MCH RBC QN AUTO: 29.7 PG (ref 26–34)
MCHC RBC AUTO-ENTMCNC: 32 G/DL (ref 30–36.5)
MCV RBC AUTO: 93 FL (ref 80–99)
MONOCYTES # BLD: 0.64 K/UL (ref 0–1)
MONOCYTES NFR BLD: 6.8 % (ref 5–13)
NEUTS SEG # BLD: 6.02 K/UL (ref 1.8–8)
NEUTS SEG NFR BLD: 63.5 % (ref 32–75)
NRBC # BLD: 0 K/UL (ref 0–0.01)
NRBC BLD-RTO: 0 PER 100 WBC
PLATELET # BLD AUTO: 310 K/UL (ref 150–400)
PMV BLD AUTO: 9.6 FL (ref 8.9–12.9)
POTASSIUM SERPL-SCNC: 4.6 MMOL/L (ref 3.5–5.1)
PROT SERPL-MCNC: 4.5 G/DL (ref 6.4–8.2)
RBC # BLD AUTO: 3.13 M/UL (ref 3.8–5.2)
SERVICE CMNT-IMP: ABNORMAL
SODIUM SERPL-SCNC: 139 MMOL/L (ref 136–145)
VAS LEFT GSV AT KNEE DIAM: 2.8 MM
VAS LEFT GSV BK DIST DIAM: 1.1 MM
VAS LEFT GSV BK MID DIAM: 1.5 MM
VAS LEFT GSV BK PROX DIAM: 2.3 MM
VAS LEFT GSV JUNC DIAM: 5.6 MM
VAS LEFT GSV THIGH DIST DIAM: 2.4 MM
VAS LEFT GSV THIGH MID DIAM: 2.6 MM
VAS LEFT GSV THIGH PROX DIAM: 4 MM
VAS LEFT SSV DIST DIAM: 2.8 MM
VAS LEFT SSV MID DIAM: 2.7 MM
VAS LEFT SSV PROX DIAM: 2.4 MM
VAS RIGHT AASV MID DIAM: 1.8 MM
VAS RIGHT AASV PROX DIAM: 2.1 MM
VAS RIGHT GSV AT KNEE DIAM: 2.1 MM
VAS RIGHT GSV BK MID DIAM: 2.1 MM
VAS RIGHT GSV BK PROX DIAM: 1.9 MM
VAS RIGHT GSV JUNC DIAM: 5.8 MM
VAS RIGHT GSV THIGH DIST DIAM: 2.2 MM
VAS RIGHT GSV THIGH MID DIAM: 2.4 MM
VAS RIGHT GSV THIGH PROX DIAM: 3.8 MM
VAS RIGHT SSV DIST DIAM: 3.2 MM
VAS RIGHT SSV MID DIAM: 2.3 MM
VAS RIGHT SSV PROX DIAM: 2.6 MM
WBC # BLD AUTO: 9.5 K/UL (ref 3.6–11)

## 2025-05-31 PROCEDURE — 2500000003 HC RX 250 WO HCPCS: Performed by: FAMILY MEDICINE

## 2025-05-31 PROCEDURE — 6370000000 HC RX 637 (ALT 250 FOR IP)

## 2025-05-31 PROCEDURE — 82962 GLUCOSE BLOOD TEST: CPT

## 2025-05-31 PROCEDURE — 6360000002 HC RX W HCPCS: Performed by: FAMILY MEDICINE

## 2025-05-31 PROCEDURE — 94761 N-INVAS EAR/PLS OXIMETRY MLT: CPT

## 2025-05-31 PROCEDURE — 36410 VNPNXR 3YR/> PHY/QHP DX/THER: CPT

## 2025-05-31 PROCEDURE — 85025 COMPLETE CBC W/AUTO DIFF WBC: CPT

## 2025-05-31 PROCEDURE — 1100000000 HC RM PRIVATE

## 2025-05-31 PROCEDURE — 6370000000 HC RX 637 (ALT 250 FOR IP): Performed by: FAMILY MEDICINE

## 2025-05-31 PROCEDURE — 80053 COMPREHEN METABOLIC PANEL: CPT

## 2025-05-31 PROCEDURE — 36415 COLL VENOUS BLD VENIPUNCTURE: CPT

## 2025-05-31 PROCEDURE — 2580000003 HC RX 258

## 2025-05-31 PROCEDURE — 05HC33Z INSERTION OF INFUSION DEVICE INTO LEFT BASILIC VEIN, PERCUTANEOUS APPROACH: ICD-10-PCS | Performed by: FAMILY MEDICINE

## 2025-05-31 PROCEDURE — 6360000002 HC RX W HCPCS

## 2025-05-31 PROCEDURE — 99232 SBSQ HOSP IP/OBS MODERATE 35: CPT | Performed by: FAMILY MEDICINE

## 2025-05-31 PROCEDURE — 2500000003 HC RX 250 WO HCPCS

## 2025-05-31 PROCEDURE — 93970 EXTREMITY STUDY: CPT

## 2025-05-31 RX ORDER — SODIUM CHLORIDE 9 MG/ML
INJECTION, SOLUTION INTRAVENOUS PRN
Status: CANCELLED | OUTPATIENT
Start: 2025-05-31

## 2025-05-31 RX ORDER — LIDOCAINE HYDROCHLORIDE 10 MG/ML
50 INJECTION, SOLUTION EPIDURAL; INFILTRATION; INTRACAUDAL; PERINEURAL ONCE
Status: DISCONTINUED | OUTPATIENT
Start: 2025-05-31 | End: 2025-06-08 | Stop reason: HOSPADM

## 2025-05-31 RX ORDER — SODIUM CHLORIDE 0.9 % (FLUSH) 0.9 %
5-40 SYRINGE (ML) INJECTION EVERY 12 HOURS SCHEDULED
Status: CANCELLED | OUTPATIENT
Start: 2025-05-31

## 2025-05-31 RX ORDER — SODIUM CHLORIDE 0.9 % (FLUSH) 0.9 %
5-40 SYRINGE (ML) INJECTION PRN
Status: CANCELLED | OUTPATIENT
Start: 2025-05-31

## 2025-05-31 RX ORDER — SODIUM CHLORIDE 0.9 % (FLUSH) 0.9 %
5-40 SYRINGE (ML) INJECTION PRN
Status: DISCONTINUED | OUTPATIENT
Start: 2025-05-31 | End: 2025-06-08 | Stop reason: HOSPADM

## 2025-05-31 RX ORDER — SODIUM CHLORIDE 0.9 % (FLUSH) 0.9 %
5-40 SYRINGE (ML) INJECTION EVERY 12 HOURS SCHEDULED
Status: DISCONTINUED | OUTPATIENT
Start: 2025-05-31 | End: 2025-06-08 | Stop reason: HOSPADM

## 2025-05-31 RX ORDER — SODIUM CHLORIDE 9 MG/ML
INJECTION, SOLUTION INTRAVENOUS PRN
Status: DISCONTINUED | OUTPATIENT
Start: 2025-05-31 | End: 2025-06-08 | Stop reason: HOSPADM

## 2025-05-31 RX ADMIN — SODIUM BICARBONATE 650 MG: 650 TABLET ORAL at 14:44

## 2025-05-31 RX ADMIN — PANCRELIPASE LIPASE, PANCRELIPASE PROTEASE, PANCRELIPASE AMYLASE 20000 UNITS: 20000; 63000; 84000 CAPSULE, DELAYED RELEASE ORAL at 17:07

## 2025-05-31 RX ADMIN — PANTOPRAZOLE SODIUM 40 MG: 40 TABLET, DELAYED RELEASE ORAL at 06:39

## 2025-05-31 RX ADMIN — MICONAZOLE NITRATE: 2 POWDER TOPICAL at 21:59

## 2025-05-31 RX ADMIN — PANCRELIPASE LIPASE, PANCRELIPASE PROTEASE, PANCRELIPASE AMYLASE 15000 UNITS: 15000; 47000; 63000 CAPSULE, DELAYED RELEASE ORAL at 09:29

## 2025-05-31 RX ADMIN — CEFEPIME 1000 MG: 1 INJECTION, POWDER, FOR SOLUTION INTRAMUSCULAR; INTRAVENOUS at 03:57

## 2025-05-31 RX ADMIN — GABAPENTIN 200 MG: 100 CAPSULE ORAL at 14:43

## 2025-05-31 RX ADMIN — SODIUM BICARBONATE 650 MG: 650 TABLET ORAL at 09:30

## 2025-05-31 RX ADMIN — OXYCODONE 5 MG: 5 TABLET ORAL at 22:06

## 2025-05-31 RX ADMIN — GABAPENTIN 200 MG: 100 CAPSULE ORAL at 09:30

## 2025-05-31 RX ADMIN — DICYCLOMINE HYDROCHLORIDE 10 MG: 10 CAPSULE ORAL at 12:09

## 2025-05-31 RX ADMIN — METRONIDAZOLE 500 MG: 500 INJECTION, SOLUTION INTRAVENOUS at 17:15

## 2025-05-31 RX ADMIN — INSULIN LISPRO 2 UNITS: 100 INJECTION, SOLUTION INTRAVENOUS; SUBCUTANEOUS at 21:57

## 2025-05-31 RX ADMIN — SODIUM CHLORIDE, PRESERVATIVE FREE 10 ML: 5 INJECTION INTRAVENOUS at 21:59

## 2025-05-31 RX ADMIN — SODIUM CHLORIDE, PRESERVATIVE FREE 10 ML: 5 INJECTION INTRAVENOUS at 09:47

## 2025-05-31 RX ADMIN — GABAPENTIN 200 MG: 100 CAPSULE ORAL at 21:56

## 2025-05-31 RX ADMIN — AMLODIPINE BESYLATE 10 MG: 5 TABLET ORAL at 09:31

## 2025-05-31 RX ADMIN — DICYCLOMINE HYDROCHLORIDE 10 MG: 10 CAPSULE ORAL at 17:06

## 2025-05-31 RX ADMIN — SODIUM CHLORIDE 550 MG: 9 INJECTION INTRAMUSCULAR; INTRAVENOUS; SUBCUTANEOUS at 17:16

## 2025-05-31 RX ADMIN — PANCRELIPASE LIPASE, PANCRELIPASE PROTEASE, PANCRELIPASE AMYLASE 15000 UNITS: 15000; 47000; 63000 CAPSULE, DELAYED RELEASE ORAL at 17:07

## 2025-05-31 RX ADMIN — ACETAMINOPHEN 1000 MG: 500 TABLET ORAL at 14:44

## 2025-05-31 RX ADMIN — DICYCLOMINE HYDROCHLORIDE 10 MG: 10 CAPSULE ORAL at 06:40

## 2025-05-31 RX ADMIN — SODIUM CHLORIDE, PRESERVATIVE FREE 10 ML: 5 INJECTION INTRAVENOUS at 22:01

## 2025-05-31 RX ADMIN — INSULIN LISPRO 2 UNITS: 100 INJECTION, SOLUTION INTRAVENOUS; SUBCUTANEOUS at 12:03

## 2025-05-31 RX ADMIN — PANCRELIPASE LIPASE, PANCRELIPASE PROTEASE, PANCRELIPASE AMYLASE 20000 UNITS: 20000; 63000; 84000 CAPSULE, DELAYED RELEASE ORAL at 09:29

## 2025-05-31 RX ADMIN — CEFEPIME 1000 MG: 1 INJECTION, POWDER, FOR SOLUTION INTRAMUSCULAR; INTRAVENOUS at 17:14

## 2025-05-31 RX ADMIN — SODIUM BICARBONATE 650 MG: 650 TABLET ORAL at 21:57

## 2025-05-31 RX ADMIN — DICYCLOMINE HYDROCHLORIDE 10 MG: 10 CAPSULE ORAL at 21:57

## 2025-05-31 RX ADMIN — METRONIDAZOLE 500 MG: 500 INJECTION, SOLUTION INTRAVENOUS at 03:58

## 2025-05-31 RX ADMIN — INSULIN LISPRO 2 UNITS: 100 INJECTION, SOLUTION INTRAVENOUS; SUBCUTANEOUS at 17:06

## 2025-05-31 RX ADMIN — PANTOPRAZOLE SODIUM 40 MG: 40 TABLET, DELAYED RELEASE ORAL at 14:44

## 2025-05-31 RX ADMIN — ACETAMINOPHEN 1000 MG: 500 TABLET ORAL at 21:57

## 2025-05-31 RX ADMIN — INSULIN GLARGINE 8 UNITS: 100 INJECTION, SOLUTION SUBCUTANEOUS at 09:45

## 2025-05-31 RX ADMIN — ACETAMINOPHEN 1000 MG: 500 TABLET ORAL at 06:39

## 2025-05-31 RX ADMIN — PANCRELIPASE LIPASE, PANCRELIPASE PROTEASE, PANCRELIPASE AMYLASE 15000 UNITS: 15000; 47000; 63000 CAPSULE, DELAYED RELEASE ORAL at 12:04

## 2025-05-31 RX ADMIN — OXYCODONE 5 MG: 5 TABLET ORAL at 09:45

## 2025-05-31 RX ADMIN — PANCRELIPASE LIPASE, PANCRELIPASE PROTEASE, PANCRELIPASE AMYLASE 20000 UNITS: 20000; 63000; 84000 CAPSULE, DELAYED RELEASE ORAL at 12:04

## 2025-05-31 RX ADMIN — INSULIN LISPRO 4 UNITS: 100 INJECTION, SOLUTION INTRAVENOUS; SUBCUTANEOUS at 09:31

## 2025-05-31 ASSESSMENT — PAIN SCALES - GENERAL
PAINLEVEL_OUTOF10: 3
PAINLEVEL_OUTOF10: 7
PAINLEVEL_OUTOF10: 7

## 2025-05-31 ASSESSMENT — PAIN DESCRIPTION - ORIENTATION: ORIENTATION: LEFT

## 2025-05-31 ASSESSMENT — PAIN DESCRIPTION - DESCRIPTORS: DESCRIPTORS: ACHING

## 2025-05-31 ASSESSMENT — PAIN DESCRIPTION - LOCATION: LOCATION: LEG;FOOT

## 2025-05-31 NOTE — PROCEDURES
Midline Insertion Procedure Note    Procedure: Insertion of #4 FR/18G Midline    Indications:  Poor Access, Pt./Dr. Preference    Procedure Details    Risks of hemorrhage, and adverse drug reaction were discussed.  Vessel assessment revealed compressible well defined vessels.  Multiple sticks noted to BUE prior to PICC RN arrival. UE Midline placed without complication for patient.  2ml of Lidocaine 1% administered via infiltration prior to Midline insertion.  Time-Out performed at bedside with EMILIANO cisse.      #4 FR/18G Midline inserted to the L Basilic vein per hospital protocol.   Blood return:  yes    Catheter length 12 cm, with 0 cm exposed. Mid upper arm circumference is 26 cm.   Catheter was flushed with 20 cc NS. Patient did tolerate procedure well. Upon completion of procedure, all MIDLINE kit components accounted for and intact.  Post Procedure hand-off given to EMILIANO cisse.      Midline Brochure given to patient with teaching instruction. Bed returned to locked position with call bell in reach. PROCEDURE NOTE  Date: 5/31/2025   Name: Candida Maza  YOB: 1960    Procedures

## 2025-06-01 ENCOUNTER — APPOINTMENT (OUTPATIENT)
Dept: VASCULAR SURGERY | Facility: HOSPITAL | Age: 65
DRG: 253 | End: 2025-06-01
Payer: MEDICARE

## 2025-06-01 LAB
ALBUMIN SERPL-MCNC: 1.1 G/DL (ref 3.5–5)
ALBUMIN/GLOB SERPL: 0.3 (ref 1.1–2.2)
ALP SERPL-CCNC: 253 U/L (ref 45–117)
ALT SERPL-CCNC: 16 U/L (ref 12–78)
ANION GAP SERPL CALC-SCNC: 2 MMOL/L (ref 2–12)
ANION GAP SERPL CALC-SCNC: 5 MMOL/L (ref 2–12)
AST SERPL-CCNC: 12 U/L (ref 15–37)
BASOPHILS # BLD: 0.04 K/UL (ref 0–0.1)
BASOPHILS NFR BLD: 0.4 % (ref 0–1)
BILIRUB SERPL-MCNC: 0.2 MG/DL (ref 0.2–1)
BUN SERPL-MCNC: 38 MG/DL (ref 6–20)
BUN SERPL-MCNC: 46 MG/DL (ref 6–20)
BUN/CREAT SERPL: 21 (ref 12–20)
BUN/CREAT SERPL: 23 (ref 12–20)
CALCIUM SERPL-MCNC: 6.8 MG/DL (ref 8.5–10.1)
CALCIUM SERPL-MCNC: 7.9 MG/DL (ref 8.5–10.1)
CHLORIDE SERPL-SCNC: 115 MMOL/L (ref 97–108)
CHLORIDE SERPL-SCNC: 120 MMOL/L (ref 97–108)
CO2 SERPL-SCNC: 18 MMOL/L (ref 21–32)
CO2 SERPL-SCNC: 22 MMOL/L (ref 21–32)
CREAT SERPL-MCNC: 1.64 MG/DL (ref 0.55–1.02)
CREAT SERPL-MCNC: 2.15 MG/DL (ref 0.55–1.02)
DIFFERENTIAL METHOD BLD: ABNORMAL
ECHO BSA: 1.76 M2
EOSINOPHIL # BLD: 0.03 K/UL (ref 0–0.4)
EOSINOPHIL NFR BLD: 0.3 % (ref 0–7)
ERYTHROCYTE [DISTWIDTH] IN BLOOD BY AUTOMATED COUNT: 15.7 % (ref 11.5–14.5)
GLOBULIN SER CALC-MCNC: 3.5 G/DL (ref 2–4)
GLUCOSE BLD STRIP.AUTO-MCNC: 119 MG/DL (ref 65–117)
GLUCOSE BLD STRIP.AUTO-MCNC: 122 MG/DL (ref 65–117)
GLUCOSE BLD STRIP.AUTO-MCNC: 190 MG/DL (ref 65–117)
GLUCOSE BLD STRIP.AUTO-MCNC: 93 MG/DL (ref 65–117)
GLUCOSE SERPL-MCNC: 201 MG/DL (ref 65–100)
GLUCOSE SERPL-MCNC: 76 MG/DL (ref 65–100)
HCT VFR BLD AUTO: 27.4 % (ref 35–47)
HGB BLD-MCNC: 8.8 G/DL (ref 11.5–16)
IMM GRANULOCYTES # BLD AUTO: 0.07 K/UL (ref 0–0.04)
IMM GRANULOCYTES NFR BLD AUTO: 0.7 % (ref 0–0.5)
LYMPHOCYTES # BLD: 2.64 K/UL (ref 0.8–3.5)
LYMPHOCYTES NFR BLD: 25.6 % (ref 12–49)
MCH RBC QN AUTO: 29.7 PG (ref 26–34)
MCHC RBC AUTO-ENTMCNC: 32.1 G/DL (ref 30–36.5)
MCV RBC AUTO: 92.6 FL (ref 80–99)
MONOCYTES # BLD: 0.75 K/UL (ref 0–1)
MONOCYTES NFR BLD: 7.3 % (ref 5–13)
NEUTS SEG # BLD: 6.8 K/UL (ref 1.8–8)
NEUTS SEG NFR BLD: 65.7 % (ref 32–75)
NRBC # BLD: 0 K/UL (ref 0–0.01)
NRBC BLD-RTO: 0 PER 100 WBC
PLATELET # BLD AUTO: 300 K/UL (ref 150–400)
PMV BLD AUTO: 10 FL (ref 8.9–12.9)
POTASSIUM SERPL-SCNC: 3.9 MMOL/L (ref 3.5–5.1)
POTASSIUM SERPL-SCNC: 4.5 MMOL/L (ref 3.5–5.1)
PROT SERPL-MCNC: 4.6 G/DL (ref 6.4–8.2)
RBC # BLD AUTO: 2.96 M/UL (ref 3.8–5.2)
SERVICE CMNT-IMP: ABNORMAL
SERVICE CMNT-IMP: NORMAL
SODIUM SERPL-SCNC: 139 MMOL/L (ref 136–145)
SODIUM SERPL-SCNC: 143 MMOL/L (ref 136–145)
WBC # BLD AUTO: 10.3 K/UL (ref 3.6–11)

## 2025-06-01 PROCEDURE — 99232 SBSQ HOSP IP/OBS MODERATE 35: CPT | Performed by: FAMILY MEDICINE

## 2025-06-01 PROCEDURE — 85025 COMPLETE CBC W/AUTO DIFF WBC: CPT

## 2025-06-01 PROCEDURE — 6370000000 HC RX 637 (ALT 250 FOR IP)

## 2025-06-01 PROCEDURE — 1100000000 HC RM PRIVATE

## 2025-06-01 PROCEDURE — 94761 N-INVAS EAR/PLS OXIMETRY MLT: CPT

## 2025-06-01 PROCEDURE — 2500000003 HC RX 250 WO HCPCS: Performed by: FAMILY MEDICINE

## 2025-06-01 PROCEDURE — 2580000003 HC RX 258

## 2025-06-01 PROCEDURE — 80053 COMPREHEN METABOLIC PANEL: CPT

## 2025-06-01 PROCEDURE — 6370000000 HC RX 637 (ALT 250 FOR IP): Performed by: FAMILY MEDICINE

## 2025-06-01 PROCEDURE — 6360000002 HC RX W HCPCS: Performed by: FAMILY MEDICINE

## 2025-06-01 PROCEDURE — 6360000002 HC RX W HCPCS

## 2025-06-01 PROCEDURE — 36415 COLL VENOUS BLD VENIPUNCTURE: CPT

## 2025-06-01 PROCEDURE — 2500000003 HC RX 250 WO HCPCS

## 2025-06-01 PROCEDURE — 93971 EXTREMITY STUDY: CPT

## 2025-06-01 PROCEDURE — 82962 GLUCOSE BLOOD TEST: CPT

## 2025-06-01 RX ORDER — INSULIN GLARGINE 100 [IU]/ML
12 INJECTION, SOLUTION SUBCUTANEOUS EVERY MORNING
Status: DISCONTINUED | OUTPATIENT
Start: 2025-06-01 | End: 2025-06-02

## 2025-06-01 RX ADMIN — SODIUM BICARBONATE 650 MG: 650 TABLET ORAL at 21:09

## 2025-06-01 RX ADMIN — SODIUM BICARBONATE 650 MG: 650 TABLET ORAL at 13:34

## 2025-06-01 RX ADMIN — CEFEPIME 1000 MG: 1 INJECTION, POWDER, FOR SOLUTION INTRAMUSCULAR; INTRAVENOUS at 04:24

## 2025-06-01 RX ADMIN — DICYCLOMINE HYDROCHLORIDE 10 MG: 10 CAPSULE ORAL at 05:55

## 2025-06-01 RX ADMIN — GABAPENTIN 200 MG: 100 CAPSULE ORAL at 08:45

## 2025-06-01 RX ADMIN — PANCRELIPASE LIPASE, PANCRELIPASE PROTEASE, PANCRELIPASE AMYLASE 15000 UNITS: 15000; 47000; 63000 CAPSULE, DELAYED RELEASE ORAL at 16:26

## 2025-06-01 RX ADMIN — ACETAMINOPHEN 1000 MG: 500 TABLET ORAL at 21:09

## 2025-06-01 RX ADMIN — DICYCLOMINE HYDROCHLORIDE 10 MG: 10 CAPSULE ORAL at 18:06

## 2025-06-01 RX ADMIN — GABAPENTIN 200 MG: 100 CAPSULE ORAL at 13:35

## 2025-06-01 RX ADMIN — OXYCODONE 5 MG: 5 TABLET ORAL at 21:11

## 2025-06-01 RX ADMIN — ACETAMINOPHEN 1000 MG: 500 TABLET ORAL at 05:55

## 2025-06-01 RX ADMIN — METRONIDAZOLE 500 MG: 500 INJECTION, SOLUTION INTRAVENOUS at 08:49

## 2025-06-01 RX ADMIN — PANTOPRAZOLE SODIUM 40 MG: 40 TABLET, DELAYED RELEASE ORAL at 16:26

## 2025-06-01 RX ADMIN — PANTOPRAZOLE SODIUM 40 MG: 40 TABLET, DELAYED RELEASE ORAL at 05:55

## 2025-06-01 RX ADMIN — AMLODIPINE BESYLATE 10 MG: 5 TABLET ORAL at 08:45

## 2025-06-01 RX ADMIN — PANCRELIPASE LIPASE, PANCRELIPASE PROTEASE, PANCRELIPASE AMYLASE 15000 UNITS: 15000; 47000; 63000 CAPSULE, DELAYED RELEASE ORAL at 13:34

## 2025-06-01 RX ADMIN — CEFEPIME 1000 MG: 1 INJECTION, POWDER, FOR SOLUTION INTRAMUSCULAR; INTRAVENOUS at 18:11

## 2025-06-01 RX ADMIN — MICONAZOLE NITRATE: 2 POWDER TOPICAL at 08:45

## 2025-06-01 RX ADMIN — PANCRELIPASE LIPASE, PANCRELIPASE PROTEASE, PANCRELIPASE AMYLASE 20000 UNITS: 20000; 63000; 84000 CAPSULE, DELAYED RELEASE ORAL at 16:26

## 2025-06-01 RX ADMIN — PANCRELIPASE LIPASE, PANCRELIPASE PROTEASE, PANCRELIPASE AMYLASE 15000 UNITS: 15000; 47000; 63000 CAPSULE, DELAYED RELEASE ORAL at 08:44

## 2025-06-01 RX ADMIN — METRONIDAZOLE 500 MG: 500 INJECTION, SOLUTION INTRAVENOUS at 01:00

## 2025-06-01 RX ADMIN — MICONAZOLE NITRATE: 2 POWDER TOPICAL at 21:09

## 2025-06-01 RX ADMIN — SODIUM CHLORIDE, PRESERVATIVE FREE 10 ML: 5 INJECTION INTRAVENOUS at 21:10

## 2025-06-01 RX ADMIN — PANCRELIPASE LIPASE, PANCRELIPASE PROTEASE, PANCRELIPASE AMYLASE 20000 UNITS: 20000; 63000; 84000 CAPSULE, DELAYED RELEASE ORAL at 08:44

## 2025-06-01 RX ADMIN — PANCRELIPASE LIPASE, PANCRELIPASE PROTEASE, PANCRELIPASE AMYLASE 20000 UNITS: 20000; 63000; 84000 CAPSULE, DELAYED RELEASE ORAL at 13:34

## 2025-06-01 RX ADMIN — DICYCLOMINE HYDROCHLORIDE 10 MG: 10 CAPSULE ORAL at 21:09

## 2025-06-01 RX ADMIN — SODIUM BICARBONATE 650 MG: 650 TABLET ORAL at 08:44

## 2025-06-01 RX ADMIN — METRONIDAZOLE 500 MG: 500 INJECTION, SOLUTION INTRAVENOUS at 16:30

## 2025-06-01 RX ADMIN — OXYCODONE 5 MG: 5 TABLET ORAL at 08:49

## 2025-06-01 RX ADMIN — GABAPENTIN 200 MG: 100 CAPSULE ORAL at 21:08

## 2025-06-01 RX ADMIN — ACETAMINOPHEN 1000 MG: 500 TABLET ORAL at 13:34

## 2025-06-01 RX ADMIN — SODIUM CHLORIDE, PRESERVATIVE FREE 10 ML: 5 INJECTION INTRAVENOUS at 21:12

## 2025-06-01 RX ADMIN — INSULIN GLARGINE 12 UNITS: 100 INJECTION, SOLUTION SUBCUTANEOUS at 08:43

## 2025-06-01 RX ADMIN — DICYCLOMINE HYDROCHLORIDE 10 MG: 10 CAPSULE ORAL at 13:42

## 2025-06-01 RX ADMIN — INSULIN LISPRO 2 UNITS: 100 INJECTION, SOLUTION INTRAVENOUS; SUBCUTANEOUS at 13:33

## 2025-06-01 ASSESSMENT — PAIN SCALES - GENERAL
PAINLEVEL_OUTOF10: 2
PAINLEVEL_OUTOF10: 7
PAINLEVEL_OUTOF10: 7

## 2025-06-01 ASSESSMENT — PAIN DESCRIPTION - DESCRIPTORS: DESCRIPTORS: THROBBING;STABBING

## 2025-06-01 ASSESSMENT — PAIN DESCRIPTION - LOCATION: LOCATION: LEG;ARM

## 2025-06-01 ASSESSMENT — PAIN DESCRIPTION - ORIENTATION: ORIENTATION: LEFT

## 2025-06-02 ENCOUNTER — ANESTHESIA (OUTPATIENT)
Facility: HOSPITAL | Age: 65
End: 2025-06-02
Payer: MEDICARE

## 2025-06-02 LAB
ANION GAP SERPL CALC-SCNC: 4 MMOL/L (ref 2–12)
BASOPHILS # BLD: 0.06 K/UL (ref 0–0.1)
BASOPHILS NFR BLD: 0.6 % (ref 0–1)
BUN SERPL-MCNC: 50 MG/DL (ref 6–20)
BUN/CREAT SERPL: 26 (ref 12–20)
CALCIUM SERPL-MCNC: 8.2 MG/DL (ref 8.5–10.1)
CHLORIDE SERPL-SCNC: 117 MMOL/L (ref 97–108)
CO2 SERPL-SCNC: 20 MMOL/L (ref 21–32)
CREAT SERPL-MCNC: 1.96 MG/DL (ref 0.55–1.02)
DIFFERENTIAL METHOD BLD: ABNORMAL
ECHO BSA: 1.76 M2
EOSINOPHIL # BLD: 0.05 K/UL (ref 0–0.4)
EOSINOPHIL NFR BLD: 0.5 % (ref 0–7)
ERYTHROCYTE [DISTWIDTH] IN BLOOD BY AUTOMATED COUNT: 16 % (ref 11.5–14.5)
GLUCOSE BLD STRIP.AUTO-MCNC: 108 MG/DL (ref 65–117)
GLUCOSE BLD STRIP.AUTO-MCNC: 170 MG/DL (ref 65–117)
GLUCOSE BLD STRIP.AUTO-MCNC: 91 MG/DL (ref 65–117)
GLUCOSE BLD STRIP.AUTO-MCNC: 92 MG/DL (ref 65–117)
GLUCOSE SERPL-MCNC: 93 MG/DL (ref 65–100)
HCT VFR BLD AUTO: 27.9 % (ref 35–47)
HGB BLD-MCNC: 8.8 G/DL (ref 11.5–16)
IMM GRANULOCYTES # BLD AUTO: 0.07 K/UL (ref 0–0.04)
IMM GRANULOCYTES NFR BLD AUTO: 0.7 % (ref 0–0.5)
LYMPHOCYTES # BLD: 3.32 K/UL (ref 0.8–3.5)
LYMPHOCYTES NFR BLD: 31.4 % (ref 12–49)
MCH RBC QN AUTO: 29.3 PG (ref 26–34)
MCHC RBC AUTO-ENTMCNC: 31.5 G/DL (ref 30–36.5)
MCV RBC AUTO: 93 FL (ref 80–99)
MONOCYTES # BLD: 0.76 K/UL (ref 0–1)
MONOCYTES NFR BLD: 7.2 % (ref 5–13)
NEUTS SEG # BLD: 6.31 K/UL (ref 1.8–8)
NEUTS SEG NFR BLD: 59.6 % (ref 32–75)
NRBC # BLD: 0 K/UL (ref 0–0.01)
NRBC BLD-RTO: 0 PER 100 WBC
PLATELET # BLD AUTO: 295 K/UL (ref 150–400)
PMV BLD AUTO: 10.2 FL (ref 8.9–12.9)
POTASSIUM SERPL-SCNC: 5 MMOL/L (ref 3.5–5.1)
RBC # BLD AUTO: 3 M/UL (ref 3.8–5.2)
SERVICE CMNT-IMP: ABNORMAL
SERVICE CMNT-IMP: NORMAL
SODIUM SERPL-SCNC: 141 MMOL/L (ref 136–145)
WBC # BLD AUTO: 10.6 K/UL (ref 3.6–11)

## 2025-06-02 PROCEDURE — 6370000000 HC RX 637 (ALT 250 FOR IP)

## 2025-06-02 PROCEDURE — 2580000003 HC RX 258

## 2025-06-02 PROCEDURE — 2720000010 HC SURG SUPPLY STERILE

## 2025-06-02 PROCEDURE — 3600000013 HC SURGERY LEVEL 3 ADDTL 15MIN

## 2025-06-02 PROCEDURE — 2500000003 HC RX 250 WO HCPCS

## 2025-06-02 PROCEDURE — 2709999900 HC NON-CHARGEABLE SUPPLY

## 2025-06-02 PROCEDURE — 6360000002 HC RX W HCPCS: Performed by: FAMILY MEDICINE

## 2025-06-02 PROCEDURE — 7100000000 HC PACU RECOVERY - FIRST 15 MIN

## 2025-06-02 PROCEDURE — 36415 COLL VENOUS BLD VENIPUNCTURE: CPT

## 2025-06-02 PROCEDURE — 7100000001 HC PACU RECOVERY - ADDTL 15 MIN

## 2025-06-02 PROCEDURE — 88305 TISSUE EXAM BY PATHOLOGIST: CPT

## 2025-06-02 PROCEDURE — 041L0ZL BYPASS LEFT FEMORAL ARTERY TO POPLITEAL ARTERY, OPEN APPROACH: ICD-10-PCS

## 2025-06-02 PROCEDURE — 3700000000 HC ANESTHESIA ATTENDED CARE

## 2025-06-02 PROCEDURE — 6360000002 HC RX W HCPCS: Performed by: NURSE ANESTHETIST, CERTIFIED REGISTERED

## 2025-06-02 PROCEDURE — 2580000003 HC RX 258: Performed by: NURSE ANESTHETIST, CERTIFIED REGISTERED

## 2025-06-02 PROCEDURE — 82962 GLUCOSE BLOOD TEST: CPT

## 2025-06-02 PROCEDURE — 85025 COMPLETE CBC W/AUTO DIFF WBC: CPT

## 2025-06-02 PROCEDURE — 3600000003 HC SURGERY LEVEL 3 BASE

## 2025-06-02 PROCEDURE — 3700000001 HC ADD 15 MINUTES (ANESTHESIA)

## 2025-06-02 PROCEDURE — 99232 SBSQ HOSP IP/OBS MODERATE 35: CPT | Performed by: FAMILY MEDICINE

## 2025-06-02 PROCEDURE — 0Y6Q0Z0 DETACHMENT AT LEFT 1ST TOE, COMPLETE, OPEN APPROACH: ICD-10-PCS | Performed by: PODIATRIST

## 2025-06-02 PROCEDURE — 6360000002 HC RX W HCPCS

## 2025-06-02 PROCEDURE — 88311 DECALCIFY TISSUE: CPT

## 2025-06-02 PROCEDURE — 6370000000 HC RX 637 (ALT 250 FOR IP): Performed by: FAMILY MEDICINE

## 2025-06-02 PROCEDURE — 1100000000 HC RM PRIVATE

## 2025-06-02 PROCEDURE — 80048 BASIC METABOLIC PNL TOTAL CA: CPT

## 2025-06-02 PROCEDURE — 2500000003 HC RX 250 WO HCPCS: Performed by: NURSE ANESTHETIST, CERTIFIED REGISTERED

## 2025-06-02 RX ORDER — LIDOCAINE HYDROCHLORIDE 10 MG/ML
1 INJECTION, SOLUTION EPIDURAL; INFILTRATION; INTRACAUDAL; PERINEURAL
Status: DISCONTINUED | OUTPATIENT
Start: 2025-06-02 | End: 2025-06-02 | Stop reason: HOSPADM

## 2025-06-02 RX ORDER — FUROSEMIDE 20 MG/1
20 TABLET ORAL DAILY
Status: DISCONTINUED | OUTPATIENT
Start: 2025-06-02 | End: 2025-06-05

## 2025-06-02 RX ORDER — MIDAZOLAM HYDROCHLORIDE 1 MG/ML
INJECTION, SOLUTION INTRAMUSCULAR; INTRAVENOUS
Status: DISCONTINUED | OUTPATIENT
Start: 2025-06-02 | End: 2025-06-02 | Stop reason: SDUPTHER

## 2025-06-02 RX ORDER — ONDANSETRON 2 MG/ML
4 INJECTION INTRAMUSCULAR; INTRAVENOUS
Status: DISCONTINUED | OUTPATIENT
Start: 2025-06-02 | End: 2025-06-02 | Stop reason: HOSPADM

## 2025-06-02 RX ORDER — SODIUM CHLORIDE, SODIUM LACTATE, POTASSIUM CHLORIDE, CALCIUM CHLORIDE 600; 310; 30; 20 MG/100ML; MG/100ML; MG/100ML; MG/100ML
INJECTION, SOLUTION INTRAVENOUS CONTINUOUS
Status: DISCONTINUED | OUTPATIENT
Start: 2025-06-02 | End: 2025-06-02 | Stop reason: HOSPADM

## 2025-06-02 RX ORDER — HEPARIN SODIUM 10000 [USP'U]/ML
INJECTION, SOLUTION INTRAVENOUS; SUBCUTANEOUS
Status: DISCONTINUED | OUTPATIENT
Start: 2025-06-02 | End: 2025-06-02 | Stop reason: SDUPTHER

## 2025-06-02 RX ORDER — ETOMIDATE 2 MG/ML
INJECTION INTRAVENOUS
Status: DISCONTINUED | OUTPATIENT
Start: 2025-06-02 | End: 2025-06-02 | Stop reason: SDUPTHER

## 2025-06-02 RX ORDER — MIDAZOLAM HYDROCHLORIDE 2 MG/2ML
2 INJECTION, SOLUTION INTRAMUSCULAR; INTRAVENOUS
Status: DISCONTINUED | OUTPATIENT
Start: 2025-06-02 | End: 2025-06-02 | Stop reason: HOSPADM

## 2025-06-02 RX ORDER — PROTAMINE SULFATE 10 MG/ML
INJECTION, SOLUTION INTRAVENOUS
Status: DISCONTINUED | OUTPATIENT
Start: 2025-06-02 | End: 2025-06-02 | Stop reason: SDUPTHER

## 2025-06-02 RX ORDER — SODIUM CHLORIDE, SODIUM LACTATE, POTASSIUM CHLORIDE, CALCIUM CHLORIDE 600; 310; 30; 20 MG/100ML; MG/100ML; MG/100ML; MG/100ML
INJECTION, SOLUTION INTRAVENOUS
Status: DISCONTINUED | OUTPATIENT
Start: 2025-06-02 | End: 2025-06-02 | Stop reason: SDUPTHER

## 2025-06-02 RX ORDER — INSULIN GLARGINE 100 [IU]/ML
6 INJECTION, SOLUTION SUBCUTANEOUS EVERY MORNING
Status: DISCONTINUED | OUTPATIENT
Start: 2025-06-02 | End: 2025-06-03

## 2025-06-02 RX ORDER — DEXAMETHASONE SODIUM PHOSPHATE 4 MG/ML
INJECTION, SOLUTION INTRA-ARTICULAR; INTRALESIONAL; INTRAMUSCULAR; INTRAVENOUS; SOFT TISSUE
Status: DISCONTINUED | OUTPATIENT
Start: 2025-06-02 | End: 2025-06-02 | Stop reason: SDUPTHER

## 2025-06-02 RX ORDER — FAMOTIDINE 10 MG/ML
INJECTION, SOLUTION INTRAVENOUS
Status: DISCONTINUED | OUTPATIENT
Start: 2025-06-02 | End: 2025-06-02 | Stop reason: SDUPTHER

## 2025-06-02 RX ORDER — ROCURONIUM BROMIDE 10 MG/ML
INJECTION, SOLUTION INTRAVENOUS
Status: DISCONTINUED | OUTPATIENT
Start: 2025-06-02 | End: 2025-06-02 | Stop reason: SDUPTHER

## 2025-06-02 RX ORDER — FENTANYL CITRATE 50 UG/ML
INJECTION, SOLUTION INTRAMUSCULAR; INTRAVENOUS
Status: DISCONTINUED | OUTPATIENT
Start: 2025-06-02 | End: 2025-06-02 | Stop reason: SDUPTHER

## 2025-06-02 RX ORDER — DIPHENHYDRAMINE HYDROCHLORIDE 50 MG/ML
12.5 INJECTION, SOLUTION INTRAMUSCULAR; INTRAVENOUS
Status: DISCONTINUED | OUTPATIENT
Start: 2025-06-02 | End: 2025-06-02 | Stop reason: HOSPADM

## 2025-06-02 RX ORDER — FENTANYL CITRATE 50 UG/ML
100 INJECTION, SOLUTION INTRAMUSCULAR; INTRAVENOUS
Status: DISCONTINUED | OUTPATIENT
Start: 2025-06-02 | End: 2025-06-02 | Stop reason: HOSPADM

## 2025-06-02 RX ORDER — NALOXONE HYDROCHLORIDE 0.4 MG/ML
INJECTION, SOLUTION INTRAMUSCULAR; INTRAVENOUS; SUBCUTANEOUS PRN
Status: DISCONTINUED | OUTPATIENT
Start: 2025-06-02 | End: 2025-06-02 | Stop reason: HOSPADM

## 2025-06-02 RX ORDER — CEFAZOLIN SODIUM 1 G/3ML
INJECTION, POWDER, FOR SOLUTION INTRAMUSCULAR; INTRAVENOUS
Status: DISCONTINUED | OUTPATIENT
Start: 2025-06-02 | End: 2025-06-02 | Stop reason: SDUPTHER

## 2025-06-02 RX ORDER — ONDANSETRON 2 MG/ML
INJECTION INTRAMUSCULAR; INTRAVENOUS
Status: DISCONTINUED | OUTPATIENT
Start: 2025-06-02 | End: 2025-06-02 | Stop reason: SDUPTHER

## 2025-06-02 RX ADMIN — HEPARIN SODIUM 1000 UNITS: 10000 INJECTION INTRAVENOUS; SUBCUTANEOUS at 16:08

## 2025-06-02 RX ADMIN — SODIUM CHLORIDE, PRESERVATIVE FREE 10 ML: 5 INJECTION INTRAVENOUS at 20:57

## 2025-06-02 RX ADMIN — ACETAMINOPHEN 1000 MG: 500 TABLET ORAL at 20:55

## 2025-06-02 RX ADMIN — GABAPENTIN 200 MG: 100 CAPSULE ORAL at 20:55

## 2025-06-02 RX ADMIN — SODIUM CHLORIDE, PRESERVATIVE FREE 10 ML: 5 INJECTION INTRAVENOUS at 20:56

## 2025-06-02 RX ADMIN — DICYCLOMINE HYDROCHLORIDE 10 MG: 10 CAPSULE ORAL at 20:55

## 2025-06-02 RX ADMIN — SODIUM CHLORIDE 550 MG: 9 INJECTION INTRAMUSCULAR; INTRAVENOUS; SUBCUTANEOUS at 19:30

## 2025-06-02 RX ADMIN — SODIUM CHLORIDE, POTASSIUM CHLORIDE, SODIUM LACTATE AND CALCIUM CHLORIDE: 600; 310; 30; 20 INJECTION, SOLUTION INTRAVENOUS at 13:22

## 2025-06-02 RX ADMIN — HEPARIN SODIUM 5000 UNITS: 10000 INJECTION INTRAVENOUS; SUBCUTANEOUS at 15:11

## 2025-06-02 RX ADMIN — ACETAMINOPHEN 1000 MG: 500 TABLET ORAL at 06:44

## 2025-06-02 RX ADMIN — MICONAZOLE NITRATE: 2 POWDER TOPICAL at 20:56

## 2025-06-02 RX ADMIN — CEFEPIME 1000 MG: 1 INJECTION, POWDER, FOR SOLUTION INTRAMUSCULAR; INTRAVENOUS at 04:19

## 2025-06-02 RX ADMIN — SODIUM BICARBONATE 650 MG: 650 TABLET ORAL at 20:55

## 2025-06-02 RX ADMIN — OXYCODONE 5 MG: 5 TABLET ORAL at 20:03

## 2025-06-02 RX ADMIN — PROTAMINE SULFATE 25 MG: 10 INJECTION, SOLUTION INTRAVENOUS at 16:28

## 2025-06-02 RX ADMIN — FAMOTIDINE 20 MG: 10 INJECTION INTRAVENOUS at 13:50

## 2025-06-02 RX ADMIN — DICYCLOMINE HYDROCHLORIDE 10 MG: 10 CAPSULE ORAL at 06:44

## 2025-06-02 RX ADMIN — FENTANYL CITRATE 25 MCG: 50 INJECTION, SOLUTION INTRAMUSCULAR; INTRAVENOUS at 16:44

## 2025-06-02 RX ADMIN — ONDANSETRON HYDROCHLORIDE 4 MG: 2 SOLUTION INTRAMUSCULAR; INTRAVENOUS at 16:39

## 2025-06-02 RX ADMIN — DEXAMETHASONE SODIUM PHOSPHATE 8 MG: 4 INJECTION, SOLUTION INTRAMUSCULAR; INTRAVENOUS at 13:50

## 2025-06-02 RX ADMIN — MIDAZOLAM HYDROCHLORIDE 2 MG: 1 INJECTION, SOLUTION INTRAMUSCULAR; INTRAVENOUS at 13:33

## 2025-06-02 RX ADMIN — CEFAZOLIN SODIUM 2 G: 1 POWDER, FOR SOLUTION INTRAMUSCULAR; INTRAVENOUS at 16:16

## 2025-06-02 RX ADMIN — ETOMIDATE 20 MG: 2 INJECTION, SOLUTION INTRAVENOUS at 13:42

## 2025-06-02 RX ADMIN — METRONIDAZOLE 500 MG: 500 INJECTION, SOLUTION INTRAVENOUS at 09:45

## 2025-06-02 RX ADMIN — PANTOPRAZOLE SODIUM 40 MG: 40 TABLET, DELAYED RELEASE ORAL at 06:44

## 2025-06-02 RX ADMIN — FENTANYL CITRATE 50 MCG: 50 INJECTION, SOLUTION INTRAMUSCULAR; INTRAVENOUS at 13:42

## 2025-06-02 RX ADMIN — METRONIDAZOLE 500 MG: 500 INJECTION, SOLUTION INTRAVENOUS at 00:53

## 2025-06-02 RX ADMIN — FENTANYL CITRATE 50 MCG: 50 INJECTION, SOLUTION INTRAMUSCULAR; INTRAVENOUS at 15:40

## 2025-06-02 RX ADMIN — FUROSEMIDE 20 MG: 20 TABLET ORAL at 09:42

## 2025-06-02 RX ADMIN — ROCURONIUM BROMIDE 50 MG: 10 INJECTION INTRAVENOUS at 13:42

## 2025-06-02 ASSESSMENT — PAIN SCALES - GENERAL
PAINLEVEL_OUTOF10: 3
PAINLEVEL_OUTOF10: 0
PAINLEVEL_OUTOF10: 10
PAINLEVEL_OUTOF10: 8

## 2025-06-02 ASSESSMENT — PAIN - FUNCTIONAL ASSESSMENT: PAIN_FUNCTIONAL_ASSESSMENT: 0-10

## 2025-06-02 NOTE — PERIOP NOTE
Inpatient Cardiology Consultation      Reason for Consult:  New onset of AF    Consulting Physician: Dr. Elina Jolley     Requesting Physician:  Dr. Desmond Hamman    Date of Consultation: 7/11/2023    HISTORY OF PRESENT ILLNESS:   Mr. Tana Loving is a 80year old male who is new to [facility]. Patient was previously evaluated by Dr. Johanna Garcia in 10/2011 for near syncope. Etiology was thought to be due to vasovagal syncope with normal Lexiscan MPS at that time with normal LVEF. Since that time patient had an unremarkable echocardiogram in 2014. PMHx: [place] (wears hearing aids), OZ, on BiPAP, RBBB, Morbid obesity, GERD, prediabetes, COPD, history of CHF, chronic lymphedema, chronic FUNG, chronic hypoxic respiratory failure (on 3 L nasal cannula) and history of near syncope. Patient reported that he recently moved from his home to assisted living in 3/2023 because patient had multiple falls at home and was a risk to self. He stated since that time he has been doing well at assisted living and is able to ambulate 60-70 feet using a walker with no chest pain but reports having chronic FUNG. Patient reported that for the last 3 weeks he has had worsening bilateral lower extremity edema with redness over his shins and his oxygen was \"all over the place. \"  He was told that his oxygen has been low and therefore he was sent into the hospital for further evaluation. According to records, patient had generalized weakness, decreased appetite and intermittent confusion over the past several days. He denies dysuria and palpitations but reports having orthopnea, PND, weight gain (approximately 20 pounds over the past month), scrotal edema, abdominal bloating and early satiety. He occasionally has \"heaviness\" 5/10 over his anterior chest that is improved after he uses his  rescue inhaler. He denies any recent viral illness, fever, cough or chills.   Due to worsening weakness and altered mental status with hypoxia he was TRANSFER - OUT REPORT:    Verbal report given to EMILIANO Mejia on Candida Maza  being transferred to [facility] for routine post-op       Report consisted of patient's Situation, Background, Assessment and   Recommendations(SBAR).     Information from the following report(s) Nurse Handoff Report, Surgery Report, Intake/Output, MAR, Cardiac Rhythm nsr, and Procedure Verification was reviewed with the receiving nurse.           Lines:       Opportunity for questions and clarification was provided.      Patient transported with:  Registered Nurse         obese, non-tender to light palpation. Bowel sounds present. No palpable masses no organomegaly; no abdominal bruit  MS: Good muscle strength and tone. No atrophy or abnormal movements. : Deferred  SKIN: Warm and dry no statis dermatitis or ulcers   NEURO / PSYCH: Oriented to person, place and time. Speech clear and appropriate. Follows all commands. Pleasant affect. Extremely hard of hearing. DATA:    ECG: See HPI. Tele strips: A-fib with CVR. Diagnostic:      Intake/Output Summary (Last 24 hours) at 7/11/2023 0735  Last data filed at 7/11/2023 0405  Gross per 24 hour   Intake --   Output 500 ml   Net -500 ml       Labs:   CBC:   Recent Labs     07/10/23  1517   WBC 5.5   HGB 10.8*   HCT 37.8        BMP:   Recent Labs     07/10/23  1517      K 5.2*   CO2 32*   BUN 21   CREATININE 0.9   LABGLOM >60   CALCIUM 9.1     Mag: No results for input(s): MG in the last 72 hours. Phos: No results for input(s): PHOS in the last 72 hours. TFT:   Lab Results   Component Value Date    TSH 2.780 09/08/2022      HgA1c:   Lab Results   Component Value Date/Time    LABA1C 6.3 09/08/2022 01:41 PM    LABA1C 6.2 05/06/2022 12:16 PM    LABA1C 6.2 05/04/2021 11:35 AM     No results found for: EAG  proBNP:   Lab Results   Component Value Date/Time    PROBNP 1,025 07/10/2023 03:17 PM    PROBNP 191 05/04/2021 11:35 AM    PROBNP 56 05/24/2018 12:21 PM     PT/INR: No results for input(s): PROTIME, INR in the last 72 hours. APTT:No results for input(s): APTT in the last 72 hours.   TROPONIN:  Lab Results   Component Value Date/Time    TROPHS 29 07/10/2023 05:29 PM    TROPHS 32 07/10/2023 03:17 PM     CK:  Lab Results   Component Value Date/Time    CKTOTAL 124 10/18/2011 10:30 PM     FASTING LIPID PANEL:  Lab Results   Component Value Date/Time    CHOL 172 09/08/2022 01:41 PM    HDL 56 09/08/2022 01:41 PM    LDLCALC 108 09/08/2022 01:41 PM    TRIG 41 09/08/2022 01:41 PM     LIVER PROFILE:  Recent Labs     07/10/23  1513

## 2025-06-02 NOTE — ANESTHESIA PRE PROCEDURE
G89.29   • Altered mental status R41.82   • Macrocytic anemia D53.9   • Elevated LDH R74.02   • Syncope R55   • Normocytic anemia D64.9   • Contusion of face S00.83XA   • History of gastric bypass Z98.84   • Dilation of common bile duct K83.8   • Chronic pancreatitis (HCC) K86.1   • Pain in both lower extremities M79.604, M79.605   • Acute cystitis without hematuria N30.00   • Sepsis (HCC) A41.9   • Altered mental status R41.82   • Bilateral leg edema R60.0   • Acute pulmonary edema (HCC) J81.0   • Acute cystitis with hematuria N30.01   • Chronic pancreatitis (HCC) K86.1   • Severe sepsis (HCC) A41.9, R65.20   • Hypernatremia E87.0   • Shortness of breath R06.02   • HARPER (acute kidney injury) N17.9   • Hypoglycemia E16.2   • Community acquired pneumonia J18.9   • Osteomyelitis of great toe of left foot ([facility]) M86.9   • Diabetic foot infection ([facility]) E11.628, L08.9   • Primary hypertension I10   • Acute on chronic anemia D64.9   • Allergy to multiple antibiotics Z88.1   • PAD (peripheral artery disease) I73.9       Past Medical History:        Diagnosis Date   • Arthritis    • Asthma    • Cataracts, bilateral    • Diabetes (HCC)     seen by endocrinology at [facility]   • GERD (gastroesophageal reflux disease)    • Hypertension    • Obesity, Class II, BMI 35-39.9    • Pancreatic insufficiency        Past Surgical History:        Procedure Laterality Date   • CATARACT REMOVAL Right 10/2016   • CATARACT REMOVAL Left 10/2015   • CHOLECYSTECTOMY  2005?   • COLONOSCOPY N/A 11/30/2016    COLONOSCOPY,EGD performed by Jarocoh Tim MD at [facility] ENDOSCOPY   • COLONOSCOPY     • INVASIVE VASCULAR Left 5/30/2025    Aortagram abdominal w runoff performed by Onur Butterfield MD at [facility] CARDIAC CATH LAB   • INVASIVE VASCULAR N/A 5/30/2025    Ultrasound guided vascular access performed by Onur Butterfield MD at [facility] CARDIAC CATH LAB   • PANCREAS SURGERY PROC UNLISTED  2001?    Distal pancreatectomy.   • PANCREATIC ELASTASE, FECAL,

## 2025-06-02 NOTE — BRIEF OP NOTE
Brief Postoperative Note      Patient: Candida Maza  YOB: 1960  MRN: [id number]    Date of Procedure: 6/2/2025    Pre-Op Diagnosis Codes:      * Peripheral vascular disease of lower extremity with ulceration (HCC) [I73.9, L97.909]    Post-Op Diagnosis: Same       Procedure(s):  LEFT FEMORAL POPLITEAL BYPASS  TOE AMPUTATION, LEFT HALLUX    Surgeon(s):  Onur Butterfield MD Ramos, Ralph, DPM    Assistant:  Surgical Assistant: Candida Oliver    Anesthesia: General    Estimated Blood Loss (mL): 300     Complications: None, Hematoma    Specimens:   ID Type Source Tests Collected by Time Destination   1 : LEFT Hallux Tissue Toe SURGICAL PATHOLOGY Nakul Wang DPM 6/2/2025 1410        Implants:  * No implants in log *      Drains:   Urinary Catheter 06/02/25 [place] (Active)       Findings:  Infection Present At Time Of Surgery (PATOS) (choose all levels that have infection present):  No infection present  Other Findings: 0    Electronically signed by Onur Butterfield MD on 6/2/2025 at 4:28 PM

## 2025-06-03 ENCOUNTER — APPOINTMENT (OUTPATIENT)
Facility: HOSPITAL | Age: 65
DRG: 253 | End: 2025-06-03
Payer: MEDICARE

## 2025-06-03 LAB
ALBUMIN SERPL-MCNC: 1.2 G/DL (ref 3.5–5)
ALBUMIN/GLOB SERPL: 0.3 (ref 1.1–2.2)
ALP SERPL-CCNC: 233 U/L (ref 45–117)
ALT SERPL-CCNC: 15 U/L (ref 12–78)
ANION GAP SERPL CALC-SCNC: 4 MMOL/L (ref 2–12)
AST SERPL-CCNC: 12 U/L (ref 15–37)
BILIRUB SERPL-MCNC: 0.2 MG/DL (ref 0.2–1)
BUN SERPL-MCNC: 55 MG/DL (ref 6–20)
BUN/CREAT SERPL: 23 (ref 12–20)
CALCIUM SERPL-MCNC: 8.5 MG/DL (ref 8.5–10.1)
CHLORIDE SERPL-SCNC: 115 MMOL/L (ref 97–108)
CK SERPL-CCNC: 97 U/L (ref 26–192)
CO2 SERPL-SCNC: 21 MMOL/L (ref 21–32)
CREAT SERPL-MCNC: 2.38 MG/DL (ref 0.55–1.02)
ERYTHROCYTE [DISTWIDTH] IN BLOOD BY AUTOMATED COUNT: 16.5 % (ref 11.5–14.5)
GLOBULIN SER CALC-MCNC: 4 G/DL (ref 2–4)
GLUCOSE BLD STRIP.AUTO-MCNC: 262 MG/DL (ref 65–117)
GLUCOSE BLD STRIP.AUTO-MCNC: 274 MG/DL (ref 65–117)
GLUCOSE BLD STRIP.AUTO-MCNC: 281 MG/DL (ref 65–117)
GLUCOSE BLD STRIP.AUTO-MCNC: 396 MG/DL (ref 65–117)
GLUCOSE SERPL-MCNC: 242 MG/DL (ref 65–100)
HCT VFR BLD AUTO: 28.4 % (ref 35–47)
HGB BLD-MCNC: 9.2 G/DL (ref 11.5–16)
MCH RBC QN AUTO: 30.4 PG (ref 26–34)
MCHC RBC AUTO-ENTMCNC: 32.4 G/DL (ref 30–36.5)
MCV RBC AUTO: 93.7 FL (ref 80–99)
NRBC # BLD: 0 K/UL (ref 0–0.01)
NRBC BLD-RTO: 0 PER 100 WBC
PLATELET # BLD AUTO: 350 K/UL (ref 150–400)
PMV BLD AUTO: 10.3 FL (ref 8.9–12.9)
POTASSIUM SERPL-SCNC: 5.5 MMOL/L (ref 3.5–5.1)
PROT SERPL-MCNC: 5.2 G/DL (ref 6.4–8.2)
RBC # BLD AUTO: 3.03 M/UL (ref 3.8–5.2)
SERVICE CMNT-IMP: ABNORMAL
SODIUM SERPL-SCNC: 140 MMOL/L (ref 136–145)
WBC # BLD AUTO: 13.2 K/UL (ref 3.6–11)

## 2025-06-03 PROCEDURE — 80053 COMPREHEN METABOLIC PANEL: CPT

## 2025-06-03 PROCEDURE — 6360000002 HC RX W HCPCS: Performed by: FAMILY MEDICINE

## 2025-06-03 PROCEDURE — 6370000000 HC RX 637 (ALT 250 FOR IP)

## 2025-06-03 PROCEDURE — 51798 US URINE CAPACITY MEASURE: CPT

## 2025-06-03 PROCEDURE — 2500000003 HC RX 250 WO HCPCS

## 2025-06-03 PROCEDURE — 36415 COLL VENOUS BLD VENIPUNCTURE: CPT

## 2025-06-03 PROCEDURE — 85027 COMPLETE CBC AUTOMATED: CPT

## 2025-06-03 PROCEDURE — 99232 SBSQ HOSP IP/OBS MODERATE 35: CPT | Performed by: FAMILY MEDICINE

## 2025-06-03 PROCEDURE — 97163 PT EVAL HIGH COMPLEX 45 MIN: CPT

## 2025-06-03 PROCEDURE — 94761 N-INVAS EAR/PLS OXIMETRY MLT: CPT

## 2025-06-03 PROCEDURE — 97530 THERAPEUTIC ACTIVITIES: CPT

## 2025-06-03 PROCEDURE — 82962 GLUCOSE BLOOD TEST: CPT

## 2025-06-03 PROCEDURE — 82550 ASSAY OF CK (CPK): CPT

## 2025-06-03 PROCEDURE — 97165 OT EVAL LOW COMPLEX 30 MIN: CPT

## 2025-06-03 PROCEDURE — 97535 SELF CARE MNGMENT TRAINING: CPT

## 2025-06-03 PROCEDURE — 71045 X-RAY EXAM CHEST 1 VIEW: CPT

## 2025-06-03 PROCEDURE — 1100000000 HC RM PRIVATE

## 2025-06-03 PROCEDURE — 6360000002 HC RX W HCPCS

## 2025-06-03 RX ORDER — INSULIN GLARGINE 100 [IU]/ML
10 INJECTION, SOLUTION SUBCUTANEOUS EVERY MORNING
Status: DISCONTINUED | OUTPATIENT
Start: 2025-06-03 | End: 2025-06-04

## 2025-06-03 RX ORDER — LIDOCAINE 4 G/G
1 PATCH TOPICAL ONCE
Status: COMPLETED | OUTPATIENT
Start: 2025-06-03 | End: 2025-06-04

## 2025-06-03 RX ORDER — BUMETANIDE 0.25 MG/ML
0.5 INJECTION, SOLUTION INTRAMUSCULAR; INTRAVENOUS ONCE
Status: COMPLETED | OUTPATIENT
Start: 2025-06-03 | End: 2025-06-03

## 2025-06-03 RX ORDER — ENOXAPARIN SODIUM 100 MG/ML
30 INJECTION SUBCUTANEOUS EVERY 24 HOURS
Status: DISCONTINUED | OUTPATIENT
Start: 2025-06-03 | End: 2025-06-06

## 2025-06-03 RX ADMIN — BUMETANIDE 0.5 MG: 0.25 INJECTION INTRAMUSCULAR; INTRAVENOUS at 09:12

## 2025-06-03 RX ADMIN — GABAPENTIN 200 MG: 100 CAPSULE ORAL at 09:17

## 2025-06-03 RX ADMIN — PANCRELIPASE LIPASE, PANCRELIPASE PROTEASE, PANCRELIPASE AMYLASE 15000 UNITS: 15000; 47000; 63000 CAPSULE, DELAYED RELEASE ORAL at 14:20

## 2025-06-03 RX ADMIN — OXYCODONE 5 MG: 5 TABLET ORAL at 22:17

## 2025-06-03 RX ADMIN — DICYCLOMINE HYDROCHLORIDE 10 MG: 10 CAPSULE ORAL at 17:24

## 2025-06-03 RX ADMIN — INSULIN LISPRO 4 UNITS: 100 INJECTION, SOLUTION INTRAVENOUS; SUBCUTANEOUS at 17:27

## 2025-06-03 RX ADMIN — GABAPENTIN 200 MG: 100 CAPSULE ORAL at 22:13

## 2025-06-03 RX ADMIN — SODIUM BICARBONATE 650 MG: 650 TABLET ORAL at 14:13

## 2025-06-03 RX ADMIN — GABAPENTIN 200 MG: 100 CAPSULE ORAL at 14:14

## 2025-06-03 RX ADMIN — PANTOPRAZOLE SODIUM 40 MG: 40 TABLET, DELAYED RELEASE ORAL at 17:26

## 2025-06-03 RX ADMIN — INSULIN GLARGINE 10 UNITS: 100 INJECTION, SOLUTION SUBCUTANEOUS at 09:17

## 2025-06-03 RX ADMIN — AMLODIPINE BESYLATE 10 MG: 5 TABLET ORAL at 09:16

## 2025-06-03 RX ADMIN — SODIUM CHLORIDE, PRESERVATIVE FREE 10 ML: 5 INJECTION INTRAVENOUS at 09:13

## 2025-06-03 RX ADMIN — PANCRELIPASE LIPASE, PANCRELIPASE PROTEASE, PANCRELIPASE AMYLASE 20000 UNITS: 20000; 63000; 84000 CAPSULE, DELAYED RELEASE ORAL at 17:28

## 2025-06-03 RX ADMIN — DICYCLOMINE HYDROCHLORIDE 10 MG: 10 CAPSULE ORAL at 22:17

## 2025-06-03 RX ADMIN — PANCRELIPASE LIPASE, PANCRELIPASE PROTEASE, PANCRELIPASE AMYLASE 15000 UNITS: 15000; 47000; 63000 CAPSULE, DELAYED RELEASE ORAL at 17:28

## 2025-06-03 RX ADMIN — DICYCLOMINE HYDROCHLORIDE 10 MG: 10 CAPSULE ORAL at 06:33

## 2025-06-03 RX ADMIN — SODIUM BICARBONATE 650 MG: 650 TABLET ORAL at 22:13

## 2025-06-03 RX ADMIN — SODIUM BICARBONATE 650 MG: 650 TABLET ORAL at 09:16

## 2025-06-03 RX ADMIN — DICYCLOMINE HYDROCHLORIDE 10 MG: 10 CAPSULE ORAL at 09:14

## 2025-06-03 RX ADMIN — OXYCODONE 5 MG: 5 TABLET ORAL at 03:36

## 2025-06-03 RX ADMIN — PANTOPRAZOLE SODIUM 40 MG: 40 TABLET, DELAYED RELEASE ORAL at 06:33

## 2025-06-03 RX ADMIN — ACETAMINOPHEN 1000 MG: 500 TABLET ORAL at 06:33

## 2025-06-03 RX ADMIN — ENOXAPARIN SODIUM 30 MG: 100 INJECTION SUBCUTANEOUS at 17:26

## 2025-06-03 RX ADMIN — INSULIN LISPRO 4 UNITS: 100 INJECTION, SOLUTION INTRAVENOUS; SUBCUTANEOUS at 09:33

## 2025-06-03 RX ADMIN — OXYCODONE 5 MG: 5 TABLET ORAL at 17:24

## 2025-06-03 RX ADMIN — SODIUM CHLORIDE, PRESERVATIVE FREE 10 ML: 5 INJECTION INTRAVENOUS at 09:18

## 2025-06-03 RX ADMIN — SODIUM CHLORIDE, PRESERVATIVE FREE 10 ML: 5 INJECTION INTRAVENOUS at 22:13

## 2025-06-03 RX ADMIN — PANCRELIPASE LIPASE, PANCRELIPASE PROTEASE, PANCRELIPASE AMYLASE 15000 UNITS: 15000; 47000; 63000 CAPSULE, DELAYED RELEASE ORAL at 09:17

## 2025-06-03 RX ADMIN — PANCRELIPASE LIPASE, PANCRELIPASE PROTEASE, PANCRELIPASE AMYLASE 20000 UNITS: 20000; 63000; 84000 CAPSULE, DELAYED RELEASE ORAL at 09:18

## 2025-06-03 RX ADMIN — ACETAMINOPHEN 1000 MG: 500 TABLET ORAL at 22:13

## 2025-06-03 RX ADMIN — INSULIN LISPRO 4 UNITS: 100 INJECTION, SOLUTION INTRAVENOUS; SUBCUTANEOUS at 14:13

## 2025-06-03 RX ADMIN — INSULIN LISPRO 8 UNITS: 100 INJECTION, SOLUTION INTRAVENOUS; SUBCUTANEOUS at 23:19

## 2025-06-03 RX ADMIN — MICONAZOLE NITRATE: 2 POWDER TOPICAL at 09:34

## 2025-06-03 RX ADMIN — OXYCODONE 5 MG: 5 TABLET ORAL at 09:32

## 2025-06-03 RX ADMIN — ACETAMINOPHEN 1000 MG: 500 TABLET ORAL at 14:13

## 2025-06-03 RX ADMIN — PANCRELIPASE LIPASE, PANCRELIPASE PROTEASE, PANCRELIPASE AMYLASE 20000 UNITS: 20000; 63000; 84000 CAPSULE, DELAYED RELEASE ORAL at 14:20

## 2025-06-03 RX ADMIN — FUROSEMIDE 20 MG: 20 TABLET ORAL at 09:17

## 2025-06-03 RX ADMIN — MICONAZOLE NITRATE: 2 POWDER TOPICAL at 22:13

## 2025-06-03 ASSESSMENT — PAIN SCALES - GENERAL
PAINLEVEL_OUTOF10: 10
PAINLEVEL_OUTOF10: 6
PAINLEVEL_OUTOF10: 6
PAINLEVEL_OUTOF10: 10
PAINLEVEL_OUTOF10: 8
PAINLEVEL_OUTOF10: 6
PAINLEVEL_OUTOF10: 10
PAINLEVEL_OUTOF10: 6

## 2025-06-03 ASSESSMENT — PAIN DESCRIPTION - ORIENTATION
ORIENTATION: LEFT

## 2025-06-03 ASSESSMENT — PAIN DESCRIPTION - LOCATION
LOCATION: LEG
LOCATION: FOOT
LOCATION: FOOT
LOCATION: LEG
LOCATION: FOOT

## 2025-06-03 ASSESSMENT — PAIN DESCRIPTION - DESCRIPTORS
DESCRIPTORS: ACHING

## 2025-06-03 ASSESSMENT — PAIN - FUNCTIONAL ASSESSMENT: PAIN_FUNCTIONAL_ASSESSMENT: PREVENTS OR INTERFERES SOME ACTIVE ACTIVITIES AND ADLS

## 2025-06-03 NOTE — OP NOTE
[facility]          [address] [place]  [address]                            OPERATIVE REPORT      PATIENT NAME: ZEE HERZOG            : 1960  MED REC NO: [id number]                       ROOM: [id number]  ACCOUNT NO: [id number]                       ADMIT DATE: 2025  PROVIDER: Onur Butterfield MD    DATE OF SERVICE:  2025    PREOPERATIVE DIAGNOSES:  Peripheral vascular disease.    POSTOPERATIVE DIAGNOSES:  Peripheral vascular disease.    PROCEDURES PERFORMED:  Left popliteal to peroneal artery bypass with non-reversed saphenous vein.    SURGEON:  Onur Butterfield MD    ASSISTANT:  None.    ANESTHESIA:  General endotracheal anesthesia.    ESTIMATED BLOOD LOSS:  300 mL.    SPECIMENS REMOVED:  None.    INTRAOPERATIVE FINDINGS:  See below.     COMPLICATIONS:  None.    IMPLANTS:  None.    INDICATIONS:  The patient is a middle-aged female with peripheral vascular disease and necrotic toe.  Decision was made to take her to the operating room for revascularization in conjunction with toe amputation.    DESCRIPTION OF PROCEDURE:  The patient was taken to the operating room.  Once a suitable level of anesthesia was introduced, she was prepped and draped in typical sterile fashion.  Oblique incision was made in the left groin and dissection carried out down to the saphenous vein, which was dissected free of its soft tissue attachments.  A longitudinal incision was made below the knee.  The below-knee popliteal artery was exposed.  This was found to have a good pulse.  The peroneal artery was then exposed in the mid lower leg.  The patient was systemically heparinized.  The vein was ligated proximally and distally and sewed end-to-side to the popliteal artery.  Flow was restored through the graft.  Doll valvulotome was used to lyse the valves throughout the length of the conduit.  It was then tunneled anatomically and sewed end-to-side to the peroneal 
direct ultrasound guidance and a permanent image stored.  Sheath was placed.  Catheter was introduced into the aorta and aortoiliac angiogram was performed.  Catheter was then used to select out the left common iliac, external iliac, and common femoral artery.  Through this, a left lower extremity runoff was performed.  Catheter was used to select out the popliteal artery and multiple attempts to cross the lesion in the lower leg were unsuccessful.  Decision was made to abort the procedure and proceed to bypass.  Devices were removed.  Angio-Seal was used for hemostasis.  She tolerated the procedure well.  Sponge and instrument counts were correct x2.  She was awakened and transferred to recovery area in good condition.        MD NEETU LANDAVERDE/AQS  D:  05/30/2025 15:44:37  T:  05/30/2025 20:19:28  JOB #:  [id number]     
transported to the operating room. The patient was transferred to the operating table and anesthesia was administered as indicated above. The lower extremity was scrubbed and draped in sterile fashion. A time out was performed to confirm the correct patient, correct procedure, correct limb, abx, allergies, fire risk and attendees within the operating room. Upon completion, procedure #1 commenced.     Using a 15 blade an incision was made at the level of the metatarsal phalangeal joint disarticulating the left hallux and sending to pathology for further analysis.  The wound was copiously irrigated with normal saline.  The superficial skin was then closed with 2-0 nylon.  The foot was then covered with Xeroform gauze, focal gauze, Kerlix and secured with an Ace bandage.    Patient tolerated the above procedures well and all post operative counts were correct.  Patient was transferred to PACU without incident. A thorough neurovascular check was then performed. Upon meeting transfer criteria, patient was transferred back to the medical floor.      Electronically signed by Nakul Wang DPM on 6/2/2025 at 10:40 PM

## 2025-06-04 LAB
ALBUMIN SERPL-MCNC: 1.1 G/DL (ref 3.5–5)
ALBUMIN/GLOB SERPL: 0.3 (ref 1.1–2.2)
ALP SERPL-CCNC: 238 U/L (ref 45–117)
ALT SERPL-CCNC: 11 U/L (ref 12–78)
ANION GAP SERPL CALC-SCNC: 4 MMOL/L (ref 2–12)
AST SERPL-CCNC: 13 U/L (ref 15–37)
BASOPHILS # BLD: 0.05 K/UL (ref 0–0.1)
BASOPHILS NFR BLD: 0.3 % (ref 0–1)
BILIRUB SERPL-MCNC: 0.2 MG/DL (ref 0.2–1)
BUN SERPL-MCNC: 63 MG/DL (ref 6–20)
BUN/CREAT SERPL: 27 (ref 12–20)
CALCIUM SERPL-MCNC: 8 MG/DL (ref 8.5–10.1)
CHLORIDE SERPL-SCNC: 117 MMOL/L (ref 97–108)
CO2 SERPL-SCNC: 20 MMOL/L (ref 21–32)
CREAT SERPL-MCNC: 2.33 MG/DL (ref 0.55–1.02)
DIFFERENTIAL METHOD BLD: ABNORMAL
EOSINOPHIL # BLD: 0.04 K/UL (ref 0–0.4)
EOSINOPHIL NFR BLD: 0.3 % (ref 0–7)
ERYTHROCYTE [DISTWIDTH] IN BLOOD BY AUTOMATED COUNT: 16.4 % (ref 11.5–14.5)
GLOBULIN SER CALC-MCNC: 3.4 G/DL (ref 2–4)
GLUCOSE BLD STRIP.AUTO-MCNC: 109 MG/DL (ref 65–117)
GLUCOSE BLD STRIP.AUTO-MCNC: 152 MG/DL (ref 65–117)
GLUCOSE BLD STRIP.AUTO-MCNC: 165 MG/DL (ref 65–117)
GLUCOSE BLD STRIP.AUTO-MCNC: 208 MG/DL (ref 65–117)
GLUCOSE BLD STRIP.AUTO-MCNC: 246 MG/DL (ref 65–117)
GLUCOSE BLD STRIP.AUTO-MCNC: 362 MG/DL (ref 65–117)
GLUCOSE SERPL-MCNC: 226 MG/DL (ref 65–100)
HCT VFR BLD AUTO: 24.2 % (ref 35–47)
HGB BLD-MCNC: 7.7 G/DL (ref 11.5–16)
IMM GRANULOCYTES # BLD AUTO: 0.08 K/UL (ref 0–0.04)
IMM GRANULOCYTES NFR BLD AUTO: 0.5 % (ref 0–0.5)
LYMPHOCYTES # BLD: 4.6 K/UL (ref 0.8–3.5)
LYMPHOCYTES NFR BLD: 31 % (ref 12–49)
MCH RBC QN AUTO: 30.3 PG (ref 26–34)
MCHC RBC AUTO-ENTMCNC: 31.8 G/DL (ref 30–36.5)
MCV RBC AUTO: 95.3 FL (ref 80–99)
MONOCYTES # BLD: 1.22 K/UL (ref 0–1)
MONOCYTES NFR BLD: 8.2 % (ref 5–13)
NEUTS SEG # BLD: 8.84 K/UL (ref 1.8–8)
NEUTS SEG NFR BLD: 59.7 % (ref 32–75)
NRBC # BLD: 0 K/UL (ref 0–0.01)
NRBC BLD-RTO: 0 PER 100 WBC
PLATELET # BLD AUTO: 312 K/UL (ref 150–400)
PMV BLD AUTO: 10.5 FL (ref 8.9–12.9)
POTASSIUM SERPL-SCNC: 5.4 MMOL/L (ref 3.5–5.1)
PROT SERPL-MCNC: 4.5 G/DL (ref 6.4–8.2)
RBC # BLD AUTO: 2.54 M/UL (ref 3.8–5.2)
SERVICE CMNT-IMP: ABNORMAL
SERVICE CMNT-IMP: NORMAL
SODIUM SERPL-SCNC: 141 MMOL/L (ref 136–145)
WBC # BLD AUTO: 14.8 K/UL (ref 3.6–11)

## 2025-06-04 PROCEDURE — 6370000000 HC RX 637 (ALT 250 FOR IP)

## 2025-06-04 PROCEDURE — 97535 SELF CARE MNGMENT TRAINING: CPT

## 2025-06-04 PROCEDURE — 97116 GAIT TRAINING THERAPY: CPT

## 2025-06-04 PROCEDURE — 80053 COMPREHEN METABOLIC PANEL: CPT

## 2025-06-04 PROCEDURE — 2580000003 HC RX 258

## 2025-06-04 PROCEDURE — 36415 COLL VENOUS BLD VENIPUNCTURE: CPT

## 2025-06-04 PROCEDURE — 82962 GLUCOSE BLOOD TEST: CPT

## 2025-06-04 PROCEDURE — 99232 SBSQ HOSP IP/OBS MODERATE 35: CPT | Performed by: FAMILY MEDICINE

## 2025-06-04 PROCEDURE — 94761 N-INVAS EAR/PLS OXIMETRY MLT: CPT

## 2025-06-04 PROCEDURE — 1100000000 HC RM PRIVATE

## 2025-06-04 PROCEDURE — 2500000003 HC RX 250 WO HCPCS

## 2025-06-04 PROCEDURE — 97530 THERAPEUTIC ACTIVITIES: CPT

## 2025-06-04 PROCEDURE — 6360000002 HC RX W HCPCS

## 2025-06-04 PROCEDURE — 51701 INSERT BLADDER CATHETER: CPT

## 2025-06-04 PROCEDURE — 85025 COMPLETE CBC W/AUTO DIFF WBC: CPT

## 2025-06-04 PROCEDURE — 6360000002 HC RX W HCPCS: Performed by: FAMILY MEDICINE

## 2025-06-04 RX ORDER — TIZANIDINE 2 MG/1
2 TABLET ORAL EVERY 8 HOURS PRN
Status: DISCONTINUED | OUTPATIENT
Start: 2025-06-04 | End: 2025-06-08 | Stop reason: HOSPADM

## 2025-06-04 RX ORDER — INSULIN LISPRO 100 [IU]/ML
4 INJECTION, SOLUTION INTRAVENOUS; SUBCUTANEOUS
Status: DISCONTINUED | OUTPATIENT
Start: 2025-06-04 | End: 2025-06-06

## 2025-06-04 RX ORDER — INSULIN GLARGINE 100 [IU]/ML
18 INJECTION, SOLUTION SUBCUTANEOUS EVERY MORNING
Status: DISCONTINUED | OUTPATIENT
Start: 2025-06-04 | End: 2025-06-06

## 2025-06-04 RX ADMIN — SODIUM CHLORIDE 550 MG: 9 INJECTION INTRAMUSCULAR; INTRAVENOUS; SUBCUTANEOUS at 15:25

## 2025-06-04 RX ADMIN — PANCRELIPASE LIPASE, PANCRELIPASE PROTEASE, PANCRELIPASE AMYLASE 15000 UNITS: 15000; 47000; 63000 CAPSULE, DELAYED RELEASE ORAL at 12:54

## 2025-06-04 RX ADMIN — Medication 3 MG: at 21:17

## 2025-06-04 RX ADMIN — OXYCODONE 5 MG: 5 TABLET ORAL at 15:24

## 2025-06-04 RX ADMIN — DICYCLOMINE HYDROCHLORIDE 10 MG: 10 CAPSULE ORAL at 12:55

## 2025-06-04 RX ADMIN — INSULIN LISPRO 4 UNITS: 100 INJECTION, SOLUTION INTRAVENOUS; SUBCUTANEOUS at 12:54

## 2025-06-04 RX ADMIN — ACETAMINOPHEN 1000 MG: 500 TABLET ORAL at 06:07

## 2025-06-04 RX ADMIN — INSULIN GLARGINE 18 UNITS: 100 INJECTION, SOLUTION SUBCUTANEOUS at 08:19

## 2025-06-04 RX ADMIN — INSULIN LISPRO 4 UNITS: 100 INJECTION, SOLUTION INTRAVENOUS; SUBCUTANEOUS at 17:31

## 2025-06-04 RX ADMIN — OXYCODONE 5 MG: 5 TABLET ORAL at 21:16

## 2025-06-04 RX ADMIN — SODIUM BICARBONATE 650 MG: 650 TABLET ORAL at 12:55

## 2025-06-04 RX ADMIN — DICYCLOMINE HYDROCHLORIDE 10 MG: 10 CAPSULE ORAL at 06:12

## 2025-06-04 RX ADMIN — SODIUM CHLORIDE, PRESERVATIVE FREE 10 ML: 5 INJECTION INTRAVENOUS at 08:33

## 2025-06-04 RX ADMIN — ENOXAPARIN SODIUM 30 MG: 100 INJECTION SUBCUTANEOUS at 17:32

## 2025-06-04 RX ADMIN — INSULIN LISPRO 4 UNITS: 100 INJECTION, SOLUTION INTRAVENOUS; SUBCUTANEOUS at 08:22

## 2025-06-04 RX ADMIN — AMLODIPINE BESYLATE 10 MG: 5 TABLET ORAL at 08:17

## 2025-06-04 RX ADMIN — DICYCLOMINE HYDROCHLORIDE 10 MG: 10 CAPSULE ORAL at 21:18

## 2025-06-04 RX ADMIN — GABAPENTIN 200 MG: 100 CAPSULE ORAL at 15:23

## 2025-06-04 RX ADMIN — PANTOPRAZOLE SODIUM 40 MG: 40 TABLET, DELAYED RELEASE ORAL at 15:24

## 2025-06-04 RX ADMIN — PANCRELIPASE LIPASE, PANCRELIPASE PROTEASE, PANCRELIPASE AMYLASE 20000 UNITS: 20000; 63000; 84000 CAPSULE, DELAYED RELEASE ORAL at 08:18

## 2025-06-04 RX ADMIN — PANCRELIPASE LIPASE, PANCRELIPASE PROTEASE, PANCRELIPASE AMYLASE 20000 UNITS: 20000; 63000; 84000 CAPSULE, DELAYED RELEASE ORAL at 12:55

## 2025-06-04 RX ADMIN — PANCRELIPASE LIPASE, PANCRELIPASE PROTEASE, PANCRELIPASE AMYLASE 20000 UNITS: 20000; 63000; 84000 CAPSULE, DELAYED RELEASE ORAL at 17:32

## 2025-06-04 RX ADMIN — PANCRELIPASE LIPASE, PANCRELIPASE PROTEASE, PANCRELIPASE AMYLASE 15000 UNITS: 15000; 47000; 63000 CAPSULE, DELAYED RELEASE ORAL at 17:32

## 2025-06-04 RX ADMIN — OXYCODONE 5 MG: 5 TABLET ORAL at 08:17

## 2025-06-04 RX ADMIN — ACETAMINOPHEN 1000 MG: 500 TABLET ORAL at 12:55

## 2025-06-04 RX ADMIN — PANCRELIPASE LIPASE, PANCRELIPASE PROTEASE, PANCRELIPASE AMYLASE 15000 UNITS: 15000; 47000; 63000 CAPSULE, DELAYED RELEASE ORAL at 08:18

## 2025-06-04 RX ADMIN — SODIUM CHLORIDE, PRESERVATIVE FREE 10 ML: 5 INJECTION INTRAVENOUS at 21:18

## 2025-06-04 RX ADMIN — INSULIN LISPRO 2 UNITS: 100 INJECTION, SOLUTION INTRAVENOUS; SUBCUTANEOUS at 08:19

## 2025-06-04 RX ADMIN — TIZANIDINE 2 MG: 2 TABLET ORAL at 21:17

## 2025-06-04 RX ADMIN — SODIUM BICARBONATE 650 MG: 650 TABLET ORAL at 21:18

## 2025-06-04 RX ADMIN — SODIUM BICARBONATE 650 MG: 650 TABLET ORAL at 08:17

## 2025-06-04 RX ADMIN — ACETAMINOPHEN 1000 MG: 500 TABLET ORAL at 21:16

## 2025-06-04 RX ADMIN — FUROSEMIDE 20 MG: 20 TABLET ORAL at 08:17

## 2025-06-04 RX ADMIN — MICONAZOLE NITRATE: 2 POWDER TOPICAL at 08:34

## 2025-06-04 RX ADMIN — GABAPENTIN 200 MG: 100 CAPSULE ORAL at 08:17

## 2025-06-04 RX ADMIN — PANTOPRAZOLE SODIUM 40 MG: 40 TABLET, DELAYED RELEASE ORAL at 06:12

## 2025-06-04 RX ADMIN — GABAPENTIN 200 MG: 100 CAPSULE ORAL at 21:17

## 2025-06-04 RX ADMIN — DICYCLOMINE HYDROCHLORIDE 10 MG: 10 CAPSULE ORAL at 17:32

## 2025-06-04 RX ADMIN — MICONAZOLE NITRATE: 2 POWDER TOPICAL at 21:18

## 2025-06-04 ASSESSMENT — PAIN SCALES - GENERAL
PAINLEVEL_OUTOF10: 7
PAINLEVEL_OUTOF10: 7
PAINLEVEL_OUTOF10: 8
PAINLEVEL_OUTOF10: 10
PAINLEVEL_OUTOF10: 3

## 2025-06-04 ASSESSMENT — PAIN - FUNCTIONAL ASSESSMENT: PAIN_FUNCTIONAL_ASSESSMENT: ACTIVITIES ARE NOT PREVENTED

## 2025-06-04 ASSESSMENT — PAIN DESCRIPTION - LOCATION
LOCATION: LEG
LOCATION: FOOT
LOCATION: FOOT

## 2025-06-04 ASSESSMENT — PAIN DESCRIPTION - ORIENTATION
ORIENTATION: LEFT
ORIENTATION: RIGHT;LOWER

## 2025-06-04 ASSESSMENT — PAIN DESCRIPTION - DESCRIPTORS
DESCRIPTORS: BURNING;ACHING
DESCRIPTORS: ACHING;DISCOMFORT;THROBBING
DESCRIPTORS: ACHING;THROBBING

## 2025-06-04 NOTE — DISCHARGE INSTRUCTIONS
swelling LLE doppler negative for DVT.   - Hold home Lasix iso of worsening kidney function. Restart when able.      Chronic pancreatitis  Pancreatic exocrine insufficiency  Prior admission imaging showed pancreatic ductal dilation, unable to exclude mass, recommended repeat imaging during past admission in March.   - Zenpep (sub for home Creon 1 cap tid)  - Continue home bentyl 10 mg 4 times daily before meals and nightly      Diabetes Mellitus T2 c/b neuropathy  Last A1c 7.6. Home Lantus 10 u in AM, gabapentin 200 mg tid.   - Continue home Lantus, resume home metformin 1000 mg bid      CKD IV  At Risk HARPER : Baseline creatinine approximately 1.6-1.8. POA Cr of 1.72. On home sodium bicarbonate 650 mg tid.   - Continue home med  - Follow up with nephrology     GERD  Chronic, controlled. Home omeprazole 20 mg bid.  - Continue home med     Asthma: Chronic, controlled. On RA.  - albuterol prn     Migraine: Controlled.   - Continue home sumatriptan 100 mg prn.     Hypertension: Chronic, Controlled   - Will hold home amlodipine 10 mg given hyperkalemia.   - Follow up with PCP and nephrology for further blood pressure management.      Hx of ESBL UTI: UA this admission non infectious.  - Contact precautions        FOLLOW-UP CARE RECOMMENDATIONS:    APPOINTMENTS:  No future appointments.     [facility].  [address], [address]  [place] [address]  Follow up  Home Health Nursing. If you have not heard from this agency within 24, hours of discharge. Please contact agency directly. Thank you!    [facility]  [phone]  Follow up  Home infusion company who will supply your iv antibiotics.    Nakul Wang DPM  [address]  [id number]  [facility] [id number] [phone]    Follow up      Nakul Wang DPM  [address]  [address]  [place] [facility] [address]  [phone]          Carlos Underwood MD  [address]  [address]  [place] [address] [phone]    Follow

## 2025-06-04 NOTE — ANESTHESIA POSTPROCEDURE EVALUATION
Department of Anesthesiology  Postprocedure Note    Patient: Candida Maza  MRN: [id number]  YOB: 1960  Date of evaluation: 6/4/2025    Procedure Summary       Date: 06/02/25 Room / Location: [facility] MAIN OR  / [facility] MAIN OR    Anesthesia Start: 1328 Anesthesia Stop: 1707    Procedures:       LEFT FEMORAL POPLITEAL BYPASS (Left: Leg Lower)      TOE AMPUTATION, LEFT HALLUX (Left: Foot) Diagnosis:       Peripheral vascular disease of lower extremity with ulceration (HCC)      (Peripheral vascular disease of lower extremity with ulceration (HCC) [I73.9, L97.909])    Surgeons: Onur Butterfield MD Responsible Provider: Gordon Rubin MD    Anesthesia Type: general ASA Status: 3            Anesthesia Type: No value filed.    Mady Phase I: Mady Score: 10    Mady Phase II: Mady Score: 10    Anesthesia Post Evaluation    Patient location during evaluation: PACU  Patient participation: complete - patient participated  Level of consciousness: awake  Airway patency: patent  Nausea & Vomiting: no vomiting and no nausea  Cardiovascular status: hemodynamically stable  Respiratory status: acceptable  Hydration status: stable  Pain management: adequate    No notable events documented.

## 2025-06-05 PROBLEM — A49.02 MRSA INFECTION: Status: ACTIVE | Noted: 2025-06-05

## 2025-06-05 LAB
ALBUMIN SERPL-MCNC: 1.1 G/DL (ref 3.5–5)
ALBUMIN/GLOB SERPL: 0.4 (ref 1.1–2.2)
ALP SERPL-CCNC: 220 U/L (ref 45–117)
ALT SERPL-CCNC: 12 U/L (ref 12–78)
ANION GAP SERPL CALC-SCNC: 3 MMOL/L (ref 2–12)
ANION GAP SERPL CALC-SCNC: ABNORMAL MMOL/L (ref 2–12)
APPEARANCE UR: ABNORMAL
AST SERPL-CCNC: 20 U/L (ref 15–37)
BACTERIA URNS QL MICRO: NEGATIVE /HPF
BASOPHILS # BLD: 0 K/UL (ref 0–0.1)
BASOPHILS NFR BLD: 0 % (ref 0–1)
BILIRUB SERPL-MCNC: 0.2 MG/DL (ref 0.2–1)
BILIRUB UR QL: NEGATIVE
BUN SERPL-MCNC: 75 MG/DL (ref 6–20)
BUN SERPL-MCNC: 75 MG/DL (ref 6–20)
BUN/CREAT SERPL: 33 (ref 12–20)
BUN/CREAT SERPL: 34 (ref 12–20)
CALCIUM SERPL-MCNC: 7.8 MG/DL (ref 8.5–10.1)
CALCIUM SERPL-MCNC: 8 MG/DL (ref 8.5–10.1)
CHLORIDE SERPL-SCNC: 117 MMOL/L (ref 97–108)
CHLORIDE SERPL-SCNC: 118 MMOL/L (ref 97–108)
CO2 SERPL-SCNC: 22 MMOL/L (ref 21–32)
CO2 SERPL-SCNC: 24 MMOL/L (ref 21–32)
COLOR UR: ABNORMAL
CREAT SERPL-MCNC: 2.23 MG/DL (ref 0.55–1.02)
CREAT SERPL-MCNC: 2.29 MG/DL (ref 0.55–1.02)
CREAT UR-MCNC: 59.1 MG/DL
DIFFERENTIAL METHOD BLD: ABNORMAL
EKG ATRIAL RATE: 74 BPM
EKG DIAGNOSIS: NORMAL
EKG P AXIS: -7 DEGREES
EKG P-R INTERVAL: 102 MS
EKG Q-T INTERVAL: 370 MS
EKG QRS DURATION: 66 MS
EKG QTC CALCULATION (BAZETT): 410 MS
EKG R AXIS: 6 DEGREES
EKG T AXIS: 67 DEGREES
EKG VENTRICULAR RATE: 74 BPM
EOSINOPHIL # BLD: 0 K/UL (ref 0–0.4)
EOSINOPHIL NFR BLD: 0 % (ref 0–7)
EPITH CASTS URNS QL MICRO: ABNORMAL /LPF
ERYTHROCYTE [DISTWIDTH] IN BLOOD BY AUTOMATED COUNT: 16.9 % (ref 11.5–14.5)
GLOBULIN SER CALC-MCNC: 2.9 G/DL (ref 2–4)
GLUCOSE BLD STRIP.AUTO-MCNC: 223 MG/DL (ref 65–117)
GLUCOSE BLD STRIP.AUTO-MCNC: 237 MG/DL (ref 65–117)
GLUCOSE BLD STRIP.AUTO-MCNC: 241 MG/DL (ref 65–117)
GLUCOSE BLD STRIP.AUTO-MCNC: 243 MG/DL (ref 65–117)
GLUCOSE BLD STRIP.AUTO-MCNC: 247 MG/DL (ref 65–117)
GLUCOSE BLD STRIP.AUTO-MCNC: 260 MG/DL (ref 65–117)
GLUCOSE BLD STRIP.AUTO-MCNC: 306 MG/DL (ref 65–117)
GLUCOSE SERPL-MCNC: 180 MG/DL (ref 65–100)
GLUCOSE SERPL-MCNC: 234 MG/DL (ref 65–100)
GLUCOSE UR STRIP.AUTO-MCNC: NEGATIVE MG/DL
HCT VFR BLD AUTO: 22.6 % (ref 35–47)
HGB BLD-MCNC: 7.4 G/DL (ref 11.5–16)
HGB UR QL STRIP: ABNORMAL
IMM GRANULOCYTES # BLD AUTO: 0 K/UL
IMM GRANULOCYTES NFR BLD AUTO: 0 %
KETONES UR QL STRIP.AUTO: NEGATIVE MG/DL
LEUKOCYTE ESTERASE UR QL STRIP.AUTO: ABNORMAL
LYMPHOCYTES # BLD: 3.61 K/UL (ref 0.8–3.5)
LYMPHOCYTES NFR BLD: 28 % (ref 12–49)
MCH RBC QN AUTO: 30.7 PG (ref 26–34)
MCHC RBC AUTO-ENTMCNC: 32.7 G/DL (ref 30–36.5)
MCV RBC AUTO: 93.8 FL (ref 80–99)
MICROALBUMIN UR-MCNC: 105 MG/DL
MICROALBUMIN/CREAT UR-RTO: 1777 MG/G (ref 0–30)
MONOCYTES # BLD: 1.55 K/UL (ref 0–1)
MONOCYTES NFR BLD: 12 % (ref 5–13)
MYELOCYTES NFR BLD MANUAL: 2 %
NEUTS SEG # BLD: 7.48 K/UL (ref 1.8–8)
NEUTS SEG NFR BLD: 58 % (ref 32–75)
NITRITE UR QL STRIP.AUTO: NEGATIVE
NRBC # BLD: 0 K/UL (ref 0–0.01)
NRBC BLD-RTO: 0 PER 100 WBC
PH UR STRIP: 5.5 (ref 5–8)
PLATELET # BLD AUTO: 300 K/UL (ref 150–400)
PMV BLD AUTO: 10.4 FL (ref 8.9–12.9)
POTASSIUM SERPL-SCNC: 5.8 MMOL/L (ref 3.5–5.1)
POTASSIUM SERPL-SCNC: 5.9 MMOL/L (ref 3.5–5.1)
POTASSIUM SERPL-SCNC: 6.2 MMOL/L (ref 3.5–5.1)
PROT SERPL-MCNC: 4 G/DL (ref 6.4–8.2)
PROT UR STRIP-MCNC: 300 MG/DL
RBC # BLD AUTO: 2.41 M/UL (ref 3.8–5.2)
RBC #/AREA URNS HPF: ABNORMAL /HPF (ref 0–5)
RBC MORPH BLD: ABNORMAL
SERVICE CMNT-IMP: ABNORMAL
SODIUM SERPL-SCNC: 141 MMOL/L (ref 136–145)
SODIUM SERPL-SCNC: 142 MMOL/L (ref 136–145)
SP GR UR REFRACTOMETRY: 1.02 (ref 1–1.03)
SPECIMEN HOLD: NORMAL
UROBILINOGEN UR QL STRIP.AUTO: 0.2 EU/DL (ref 0.2–1)
WBC # BLD AUTO: 12.9 K/UL (ref 3.6–11)
WBC URNS QL MICRO: ABNORMAL /HPF (ref 0–4)
YEAST URNS QL MICRO: PRESENT

## 2025-06-05 PROCEDURE — 6370000000 HC RX 637 (ALT 250 FOR IP)

## 2025-06-05 PROCEDURE — 6370000000 HC RX 637 (ALT 250 FOR IP): Performed by: INTERNAL MEDICINE

## 2025-06-05 PROCEDURE — 80053 COMPREHEN METABOLIC PANEL: CPT

## 2025-06-05 PROCEDURE — 99232 SBSQ HOSP IP/OBS MODERATE 35: CPT | Performed by: INTERNAL MEDICINE

## 2025-06-05 PROCEDURE — 1100000000 HC RM PRIVATE

## 2025-06-05 PROCEDURE — 82043 UR ALBUMIN QUANTITATIVE: CPT

## 2025-06-05 PROCEDURE — 93005 ELECTROCARDIOGRAM TRACING: CPT

## 2025-06-05 PROCEDURE — 6360000002 HC RX W HCPCS

## 2025-06-05 PROCEDURE — 85025 COMPLETE CBC W/AUTO DIFF WBC: CPT

## 2025-06-05 PROCEDURE — 6360000002 HC RX W HCPCS: Performed by: FAMILY MEDICINE

## 2025-06-05 PROCEDURE — 93010 ELECTROCARDIOGRAM REPORT: CPT | Performed by: SPECIALIST

## 2025-06-05 PROCEDURE — 84132 ASSAY OF SERUM POTASSIUM: CPT

## 2025-06-05 PROCEDURE — 82570 ASSAY OF URINE CREATININE: CPT

## 2025-06-05 PROCEDURE — 81001 URINALYSIS AUTO W/SCOPE: CPT

## 2025-06-05 PROCEDURE — 2500000003 HC RX 250 WO HCPCS

## 2025-06-05 PROCEDURE — 51798 US URINE CAPACITY MEASURE: CPT

## 2025-06-05 PROCEDURE — 94640 AIRWAY INHALATION TREATMENT: CPT

## 2025-06-05 PROCEDURE — 2580000003 HC RX 258

## 2025-06-05 PROCEDURE — 99232 SBSQ HOSP IP/OBS MODERATE 35: CPT | Performed by: FAMILY MEDICINE

## 2025-06-05 PROCEDURE — 82962 GLUCOSE BLOOD TEST: CPT

## 2025-06-05 PROCEDURE — 36415 COLL VENOUS BLD VENIPUNCTURE: CPT

## 2025-06-05 RX ORDER — ALBUTEROL SULFATE 5 MG/ML
10 SOLUTION RESPIRATORY (INHALATION) ONCE
Status: COMPLETED | OUTPATIENT
Start: 2025-06-05 | End: 2025-06-05

## 2025-06-05 RX ORDER — BUMETANIDE 1 MG/1
1 TABLET ORAL DAILY
Status: DISCONTINUED | OUTPATIENT
Start: 2025-06-05 | End: 2025-06-08 | Stop reason: HOSPADM

## 2025-06-05 RX ORDER — CALCIUM GLUCONATE 20 MG/ML
1000 INJECTION, SOLUTION INTRAVENOUS ONCE
Status: COMPLETED | OUTPATIENT
Start: 2025-06-05 | End: 2025-06-05

## 2025-06-05 RX ORDER — FUROSEMIDE 20 MG/1
20 TABLET ORAL 2 TIMES DAILY
Status: DISCONTINUED | OUTPATIENT
Start: 2025-06-05 | End: 2025-06-05

## 2025-06-05 RX ORDER — ALBUTEROL SULFATE 5 MG/ML
10 SOLUTION RESPIRATORY (INHALATION) ONCE
Status: DISCONTINUED | OUTPATIENT
Start: 2025-06-05 | End: 2025-06-08 | Stop reason: HOSPADM

## 2025-06-05 RX ORDER — DEXTROSE MONOHYDRATE 100 MG/ML
INJECTION, SOLUTION INTRAVENOUS CONTINUOUS PRN
Status: DISCONTINUED | OUTPATIENT
Start: 2025-06-05 | End: 2025-06-08 | Stop reason: HOSPADM

## 2025-06-05 RX ORDER — DOXYCYCLINE HYCLATE 100 MG
100 TABLET ORAL EVERY 12 HOURS SCHEDULED
Status: DISCONTINUED | OUTPATIENT
Start: 2025-06-05 | End: 2025-06-08 | Stop reason: HOSPADM

## 2025-06-05 RX ADMIN — SODIUM BICARBONATE 650 MG: 650 TABLET ORAL at 08:21

## 2025-06-05 RX ADMIN — SODIUM ZIRCONIUM CYCLOSILICATE 10 G: 5 POWDER, FOR SUSPENSION ORAL at 20:36

## 2025-06-05 RX ADMIN — SODIUM ZIRCONIUM CYCLOSILICATE 10 G: 5 POWDER, FOR SUSPENSION ORAL at 10:46

## 2025-06-05 RX ADMIN — DICYCLOMINE HYDROCHLORIDE 10 MG: 10 CAPSULE ORAL at 08:21

## 2025-06-05 RX ADMIN — DICYCLOMINE HYDROCHLORIDE 10 MG: 10 CAPSULE ORAL at 20:35

## 2025-06-05 RX ADMIN — ACETAMINOPHEN 1000 MG: 500 TABLET ORAL at 20:33

## 2025-06-05 RX ADMIN — INSULIN LISPRO 2 UNITS: 100 INJECTION, SOLUTION INTRAVENOUS; SUBCUTANEOUS at 08:23

## 2025-06-05 RX ADMIN — INSULIN GLARGINE 18 UNITS: 100 INJECTION, SOLUTION SUBCUTANEOUS at 08:23

## 2025-06-05 RX ADMIN — CALCIUM GLUCONATE 1000 MG: 20 INJECTION, SOLUTION INTRAVENOUS at 10:45

## 2025-06-05 RX ADMIN — DOXYCYCLINE HYCLATE 100 MG: 100 TABLET, COATED ORAL at 20:36

## 2025-06-05 RX ADMIN — PANTOPRAZOLE SODIUM 40 MG: 40 TABLET, DELAYED RELEASE ORAL at 17:05

## 2025-06-05 RX ADMIN — SODIUM BICARBONATE 650 MG: 650 TABLET ORAL at 14:03

## 2025-06-05 RX ADMIN — ACETAMINOPHEN 1000 MG: 500 TABLET ORAL at 14:03

## 2025-06-05 RX ADMIN — PANCRELIPASE LIPASE, PANCRELIPASE PROTEASE, PANCRELIPASE AMYLASE 20000 UNITS: 20000; 63000; 84000 CAPSULE, DELAYED RELEASE ORAL at 17:05

## 2025-06-05 RX ADMIN — PANTOPRAZOLE SODIUM 40 MG: 40 TABLET, DELAYED RELEASE ORAL at 07:01

## 2025-06-05 RX ADMIN — INSULIN LISPRO 4 UNITS: 100 INJECTION, SOLUTION INTRAVENOUS; SUBCUTANEOUS at 17:04

## 2025-06-05 RX ADMIN — GABAPENTIN 200 MG: 100 CAPSULE ORAL at 14:03

## 2025-06-05 RX ADMIN — INSULIN LISPRO 2 UNITS: 100 INJECTION, SOLUTION INTRAVENOUS; SUBCUTANEOUS at 17:04

## 2025-06-05 RX ADMIN — SODIUM BICARBONATE 650 MG: 650 TABLET ORAL at 20:35

## 2025-06-05 RX ADMIN — PANCRELIPASE LIPASE, PANCRELIPASE PROTEASE, PANCRELIPASE AMYLASE 15000 UNITS: 15000; 47000; 63000 CAPSULE, DELAYED RELEASE ORAL at 17:05

## 2025-06-05 RX ADMIN — DICYCLOMINE HYDROCHLORIDE 10 MG: 10 CAPSULE ORAL at 17:12

## 2025-06-05 RX ADMIN — INSULIN LISPRO 2 UNITS: 100 INJECTION, SOLUTION INTRAVENOUS; SUBCUTANEOUS at 20:41

## 2025-06-05 RX ADMIN — DOXYCYCLINE HYCLATE 100 MG: 100 TABLET, COATED ORAL at 14:10

## 2025-06-05 RX ADMIN — AMLODIPINE BESYLATE 10 MG: 5 TABLET ORAL at 08:21

## 2025-06-05 RX ADMIN — ALBUTEROL SULFATE 10 MG: 2.5 SOLUTION RESPIRATORY (INHALATION) at 05:26

## 2025-06-05 RX ADMIN — INSULIN LISPRO 4 UNITS: 100 INJECTION, SOLUTION INTRAVENOUS; SUBCUTANEOUS at 11:22

## 2025-06-05 RX ADMIN — DICYCLOMINE HYDROCHLORIDE 10 MG: 10 CAPSULE ORAL at 10:43

## 2025-06-05 RX ADMIN — PANCRELIPASE LIPASE, PANCRELIPASE PROTEASE, PANCRELIPASE AMYLASE 20000 UNITS: 20000; 63000; 84000 CAPSULE, DELAYED RELEASE ORAL at 11:22

## 2025-06-05 RX ADMIN — MICONAZOLE NITRATE: 2 POWDER TOPICAL at 20:36

## 2025-06-05 RX ADMIN — INSULIN LISPRO 2 UNITS: 100 INJECTION, SOLUTION INTRAVENOUS; SUBCUTANEOUS at 11:22

## 2025-06-05 RX ADMIN — INSULIN LISPRO 4 UNITS: 100 INJECTION, SOLUTION INTRAVENOUS; SUBCUTANEOUS at 08:23

## 2025-06-05 RX ADMIN — PANCRELIPASE LIPASE, PANCRELIPASE PROTEASE, PANCRELIPASE AMYLASE 15000 UNITS: 15000; 47000; 63000 CAPSULE, DELAYED RELEASE ORAL at 11:22

## 2025-06-05 RX ADMIN — ACETAMINOPHEN 1000 MG: 500 TABLET ORAL at 08:32

## 2025-06-05 RX ADMIN — DEXTROSE 250 ML: 10 SOLUTION INTRAVENOUS at 08:40

## 2025-06-05 RX ADMIN — GABAPENTIN 200 MG: 100 CAPSULE ORAL at 08:21

## 2025-06-05 RX ADMIN — ENOXAPARIN SODIUM 30 MG: 100 INJECTION SUBCUTANEOUS at 17:11

## 2025-06-05 RX ADMIN — Medication 3 MG: at 20:33

## 2025-06-05 RX ADMIN — GABAPENTIN 200 MG: 100 CAPSULE ORAL at 20:34

## 2025-06-05 RX ADMIN — OXYCODONE 5 MG: 5 TABLET ORAL at 18:11

## 2025-06-05 RX ADMIN — FUROSEMIDE 20 MG: 20 TABLET ORAL at 08:21

## 2025-06-05 RX ADMIN — PANCRELIPASE LIPASE, PANCRELIPASE PROTEASE, PANCRELIPASE AMYLASE 20000 UNITS: 20000; 63000; 84000 CAPSULE, DELAYED RELEASE ORAL at 08:24

## 2025-06-05 RX ADMIN — SODIUM ZIRCONIUM CYCLOSILICATE 10 G: 5 POWDER, FOR SUSPENSION ORAL at 14:02

## 2025-06-05 RX ADMIN — MICONAZOLE NITRATE: 2 POWDER TOPICAL at 08:25

## 2025-06-05 RX ADMIN — HUMAN INSULIN 10 UNITS: 100 INJECTION, SOLUTION SUBCUTANEOUS at 08:32

## 2025-06-05 RX ADMIN — PANCRELIPASE LIPASE, PANCRELIPASE PROTEASE, PANCRELIPASE AMYLASE 15000 UNITS: 15000; 47000; 63000 CAPSULE, DELAYED RELEASE ORAL at 08:24

## 2025-06-05 RX ADMIN — SODIUM CHLORIDE, PRESERVATIVE FREE 10 ML: 5 INJECTION INTRAVENOUS at 20:36

## 2025-06-05 RX ADMIN — BUMETANIDE 1 MG: 1 TABLET ORAL at 14:10

## 2025-06-05 ASSESSMENT — PAIN SCALES - GENERAL: PAINLEVEL_OUTOF10: 7

## 2025-06-05 ASSESSMENT — PAIN DESCRIPTION - LOCATION: LOCATION: LEG

## 2025-06-05 NOTE — CONSULTS
Infectious Disease Consult    Impression/Plan     65 y.o. female with past medical Hx of  T2DM, CKDIV, HTN, pancreatic insufficiency, GERD, ESBL UTIs who presented to the ED with bilateral leg pain and swelling, admitted for diabetic foot infection and osteomyelitis of left hallux.     Diabetic foot infection  Osteomyelitis of left great hallux  Antibiotic allergies    Given left PEE results, recommend vascular consult    Appreciate podiatry input.  Patient prefers no amputation and could trial IV antibiotics, however without adequate perfusion would be futile.  Discussed extensively with patient that without culture data to guide antibiotic therapy she would have to go out on broad-spectrum antibiotics.     In light of worsening creatinine, stopped vancomycin and started daptomycin continue cefepime and Flagyl    MRSA nares ordered    A1c 6.5, controlled      D/w Dr. Rabago, pt, Dr. Preston                   Anti-infectives:   Vancomycin  Cefepime  Flagyl    Subjective:   Date of Consultation:  May 27, 2025  Date of Admission: 5/24/2025   Referring Physician: Nakul Wang    Patient is a 65 y.o. female with past medical Hx of  T2DM, CKDIV, HTN, pancreatic insufficiency, GERD, ESBL UTIs who presented to the ED with bilateral leg pain and swelling, admitted for diabetic foot infection and osteomyelitis of left hallux.     Per chart review, patient was advised by her PCP to come to the ED due to foul-smelling left great toe ulcer.  Denies fever, chills.  ED workup with mild leukocytosis, afebrile.  XR right foot with acute osteomyelitis left great toe distal phalangeal tuft.  MRI confirming XR findings.  Evaluated by podiatry; patient prefers no amputation.  PEE on left with peripheral artery disease.  Empirically started on vancomycin, cefepime, Flagyl.  On examination, great toe ulcer noted with no apparent drainage at this time.  Not hot to touch.    Patient Active Problem List   Diagnosis    Severe obesity 
Session ID: [id number]  Session Duration: 5 minutes  Language: Danish   ID: #[id number]   Name: Jake
Hematocrit 28.1 (L) 35.0 - 47.0 %    MCV 93.4 80.0 - 99.0 FL    MCH 29.6 26.0 - 34.0 PG    MCHC 31.7 30.0 - 36.5 g/dL    RDW 15.5 (H) 11.5 - 14.5 %    Platelets 304 150 - 400 K/uL    MPV 10.0 8.9 - 12.9 FL    Nucleated RBCs 0.0 0  WBC    nRBC 0.00 0.00 - 0.01 K/uL    Neutrophils % 72.9 32.0 - 75.0 %    Lymphocytes % 17.0 12.0 - 49.0 %    Monocytes % 8.6 5.0 - 13.0 %    Eosinophils % 0.3 0.0 - 7.0 %    Basophils % 0.5 0.0 - 1.0 %    Immature Granulocytes % 0.7 (H) 0.0 - 0.5 %    Neutrophils Absolute 7.68 1.80 - 8.00 K/UL    Lymphocytes Absolute 1.79 0.80 - 3.50 K/UL    Monocytes Absolute 0.90 0.00 - 1.00 K/UL    Eosinophils Absolute 0.03 0.00 - 0.40 K/UL    Basophils Absolute 0.05 0.00 - 0.10 K/UL    Immature Granulocytes Absolute 0.07 (H) 0.00 - 0.04 K/UL    Differential Type AUTOMATED         Assessmen/Plan:   I discussed situation in detail with the patient she has evidence of arterial insufficiency and a nonhealing left toe ulcer I think she would benefit from angiographic evaluation I will check the Cath Lab schedule to see whether I can do this as an inpatient or whether we will need to schedule this as an outpatient patient is free to undergo any podiatric procedures that might be planned thank you very much for the consult we will follow along with you    Signed By: Onur Butterfield MD    May 28, 2025           
arvind..    Electronically signed by FRANK ENRIQUEZ       Assessment:   Osteomyelitis left hallux  Diabetic ulcer left hallux  Diabetes mellitus with neuropathy    Plan:     Patient seen and evaluated at bedside  - Current labs personally reviewed and findings dicussed with patient  - MRI was seen and discussed with patient.  Had a long conversation with family and patient about treatment for osteomyelitis.  Discussed with patient my recommendation of doing a partial left hallux amputation.  Discussed with patient the possibility of having 6 weeks of long-term IV antibiotics.  Patient wants to discussed with infectious disease Dr. At this time patient wants to think about her options.  - Continue IV antibiotics  - Continue local wound care  - Pending vascular studies.  Patient possibly having ischemia to the left hallux.    Naklu Wang, DUYEN, CWSP, AACFAS   
projecting medially in the left  upper arm.  Impression: 1. Mild bibasilar atelectasis is present. This has improved in the interval.  Continued follow-up is suggested.    Electronically signed by Noam Sarah MD, FASN  6/5/2025     [facility]    [address] [place] [address]  Phone - [phone]  Fax - [phone]

## 2025-06-06 LAB
ALBUMIN SERPL-MCNC: 1.3 G/DL (ref 3.5–5)
ALBUMIN/GLOB SERPL: 0.3 (ref 1.1–2.2)
ALP SERPL-CCNC: 200 U/L (ref 45–117)
ALT SERPL-CCNC: 14 U/L (ref 12–78)
ANION GAP SERPL CALC-SCNC: 0 MMOL/L (ref 2–12)
AST SERPL-CCNC: 20 U/L (ref 15–37)
BILIRUB SERPL-MCNC: 0.2 MG/DL (ref 0.2–1)
BUN SERPL-MCNC: 82 MG/DL (ref 6–20)
BUN/CREAT SERPL: 38 (ref 12–20)
CALCIUM SERPL-MCNC: 8.5 MG/DL (ref 8.5–10.1)
CHLORIDE SERPL-SCNC: 117 MMOL/L (ref 97–108)
CO2 SERPL-SCNC: 24 MMOL/L (ref 21–32)
CREAT SERPL-MCNC: 2.13 MG/DL (ref 0.55–1.02)
GLOBULIN SER CALC-MCNC: 3.9 G/DL (ref 2–4)
GLUCOSE BLD STRIP.AUTO-MCNC: 112 MG/DL (ref 65–117)
GLUCOSE BLD STRIP.AUTO-MCNC: 66 MG/DL (ref 65–117)
GLUCOSE BLD STRIP.AUTO-MCNC: 72 MG/DL (ref 65–117)
GLUCOSE BLD STRIP.AUTO-MCNC: 73 MG/DL (ref 65–117)
GLUCOSE BLD STRIP.AUTO-MCNC: 90 MG/DL (ref 65–117)
GLUCOSE SERPL-MCNC: 68 MG/DL (ref 65–100)
POTASSIUM SERPL-SCNC: 5.6 MMOL/L (ref 3.5–5.1)
POTASSIUM SERPL-SCNC: 5.7 MMOL/L (ref 3.5–5.1)
POTASSIUM SERPL-SCNC: 5.9 MMOL/L (ref 3.5–5.1)
POTASSIUM SERPL-SCNC: 5.9 MMOL/L (ref 3.5–5.1)
PROT SERPL-MCNC: 5.2 G/DL (ref 6.4–8.2)
SERVICE CMNT-IMP: NORMAL
SODIUM SERPL-SCNC: 141 MMOL/L (ref 136–145)

## 2025-06-06 PROCEDURE — 94761 N-INVAS EAR/PLS OXIMETRY MLT: CPT

## 2025-06-06 PROCEDURE — 97535 SELF CARE MNGMENT TRAINING: CPT

## 2025-06-06 PROCEDURE — 6370000000 HC RX 637 (ALT 250 FOR IP)

## 2025-06-06 PROCEDURE — 36415 COLL VENOUS BLD VENIPUNCTURE: CPT

## 2025-06-06 PROCEDURE — 6360000002 HC RX W HCPCS

## 2025-06-06 PROCEDURE — 84132 ASSAY OF SERUM POTASSIUM: CPT

## 2025-06-06 PROCEDURE — 82962 GLUCOSE BLOOD TEST: CPT

## 2025-06-06 PROCEDURE — 80053 COMPREHEN METABOLIC PANEL: CPT

## 2025-06-06 PROCEDURE — 99232 SBSQ HOSP IP/OBS MODERATE 35: CPT | Performed by: FAMILY MEDICINE

## 2025-06-06 PROCEDURE — 97530 THERAPEUTIC ACTIVITIES: CPT

## 2025-06-06 PROCEDURE — 6370000000 HC RX 637 (ALT 250 FOR IP): Performed by: INTERNAL MEDICINE

## 2025-06-06 PROCEDURE — 2500000003 HC RX 250 WO HCPCS

## 2025-06-06 PROCEDURE — 1100000000 HC RM PRIVATE

## 2025-06-06 RX ORDER — LIDOCAINE 4 G/G
1 PATCH TOPICAL DAILY
Status: DISCONTINUED | OUTPATIENT
Start: 2025-06-06 | End: 2025-06-08 | Stop reason: HOSPADM

## 2025-06-06 RX ORDER — INSULIN LISPRO 100 [IU]/ML
6 INJECTION, SOLUTION INTRAVENOUS; SUBCUTANEOUS
Status: DISCONTINUED | OUTPATIENT
Start: 2025-06-06 | End: 2025-06-08 | Stop reason: HOSPADM

## 2025-06-06 RX ORDER — SENNA AND DOCUSATE SODIUM 50; 8.6 MG/1; MG/1
2 TABLET, FILM COATED ORAL DAILY
Status: DISCONTINUED | OUTPATIENT
Start: 2025-06-06 | End: 2025-06-08 | Stop reason: HOSPADM

## 2025-06-06 RX ORDER — OXYCODONE HYDROCHLORIDE 5 MG/1
2.5 TABLET ORAL EVERY 8 HOURS PRN
Refills: 0 | Status: DISCONTINUED | OUTPATIENT
Start: 2025-06-06 | End: 2025-06-06

## 2025-06-06 RX ORDER — POLYETHYLENE GLYCOL 3350 17 G/17G
17 POWDER, FOR SOLUTION ORAL 2 TIMES DAILY
Status: DISCONTINUED | OUTPATIENT
Start: 2025-06-06 | End: 2025-06-08

## 2025-06-06 RX ORDER — HEPARIN SODIUM 5000 [USP'U]/ML
5000 INJECTION, SOLUTION INTRAVENOUS; SUBCUTANEOUS EVERY 8 HOURS SCHEDULED
Status: DISCONTINUED | OUTPATIENT
Start: 2025-06-06 | End: 2025-06-08 | Stop reason: HOSPADM

## 2025-06-06 RX ORDER — INSULIN GLARGINE 100 [IU]/ML
20 INJECTION, SOLUTION SUBCUTANEOUS EVERY MORNING
Status: DISCONTINUED | OUTPATIENT
Start: 2025-06-06 | End: 2025-06-07

## 2025-06-06 RX ORDER — OXYCODONE HYDROCHLORIDE 5 MG/1
2.5 TABLET ORAL EVERY 6 HOURS PRN
Refills: 0 | Status: DISCONTINUED | OUTPATIENT
Start: 2025-06-06 | End: 2025-06-08

## 2025-06-06 RX ADMIN — DICYCLOMINE HYDROCHLORIDE 10 MG: 10 CAPSULE ORAL at 17:00

## 2025-06-06 RX ADMIN — SENNOSIDES, DOCUSATE SODIUM 2 TABLET: 50; 8.6 TABLET, FILM COATED ORAL at 09:21

## 2025-06-06 RX ADMIN — OXYCODONE 2.5 MG: 5 TABLET ORAL at 11:31

## 2025-06-06 RX ADMIN — DICYCLOMINE HYDROCHLORIDE 10 MG: 10 CAPSULE ORAL at 06:53

## 2025-06-06 RX ADMIN — DICYCLOMINE HYDROCHLORIDE 10 MG: 10 CAPSULE ORAL at 11:24

## 2025-06-06 RX ADMIN — ACETAMINOPHEN 1000 MG: 500 TABLET ORAL at 15:13

## 2025-06-06 RX ADMIN — MICONAZOLE NITRATE: 2 POWDER TOPICAL at 21:18

## 2025-06-06 RX ADMIN — INSULIN LISPRO 6 UNITS: 100 INJECTION, SOLUTION INTRAVENOUS; SUBCUTANEOUS at 09:21

## 2025-06-06 RX ADMIN — PANTOPRAZOLE SODIUM 40 MG: 40 TABLET, DELAYED RELEASE ORAL at 15:13

## 2025-06-06 RX ADMIN — GABAPENTIN 200 MG: 100 CAPSULE ORAL at 09:22

## 2025-06-06 RX ADMIN — HEPARIN SODIUM 5000 UNITS: 5000 INJECTION INTRAVENOUS; SUBCUTANEOUS at 15:14

## 2025-06-06 RX ADMIN — SODIUM CHLORIDE, PRESERVATIVE FREE 10 ML: 5 INJECTION INTRAVENOUS at 21:21

## 2025-06-06 RX ADMIN — ACETAMINOPHEN 1000 MG: 500 TABLET ORAL at 21:18

## 2025-06-06 RX ADMIN — INSULIN LISPRO 6 UNITS: 100 INJECTION, SOLUTION INTRAVENOUS; SUBCUTANEOUS at 11:24

## 2025-06-06 RX ADMIN — SODIUM BICARBONATE 650 MG: 650 TABLET ORAL at 15:13

## 2025-06-06 RX ADMIN — PANCRELIPASE LIPASE, PANCRELIPASE PROTEASE, PANCRELIPASE AMYLASE 20000 UNITS: 20000; 63000; 84000 CAPSULE, DELAYED RELEASE ORAL at 11:23

## 2025-06-06 RX ADMIN — GABAPENTIN 200 MG: 100 CAPSULE ORAL at 21:18

## 2025-06-06 RX ADMIN — POLYETHYLENE GLYCOL 3350 17 G: 17 POWDER, FOR SOLUTION ORAL at 09:21

## 2025-06-06 RX ADMIN — HEPARIN SODIUM 5000 UNITS: 5000 INJECTION INTRAVENOUS; SUBCUTANEOUS at 09:42

## 2025-06-06 RX ADMIN — SODIUM BICARBONATE 650 MG: 650 TABLET ORAL at 09:22

## 2025-06-06 RX ADMIN — SODIUM CHLORIDE, PRESERVATIVE FREE 10 ML: 5 INJECTION INTRAVENOUS at 09:20

## 2025-06-06 RX ADMIN — GABAPENTIN 200 MG: 100 CAPSULE ORAL at 15:13

## 2025-06-06 RX ADMIN — OXYCODONE 2.5 MG: 5 TABLET ORAL at 17:01

## 2025-06-06 RX ADMIN — INSULIN GLARGINE 20 UNITS: 100 INJECTION, SOLUTION SUBCUTANEOUS at 09:20

## 2025-06-06 RX ADMIN — PANCRELIPASE LIPASE, PANCRELIPASE PROTEASE, PANCRELIPASE AMYLASE 15000 UNITS: 15000; 47000; 63000 CAPSULE, DELAYED RELEASE ORAL at 15:13

## 2025-06-06 RX ADMIN — DOXYCYCLINE HYCLATE 100 MG: 100 TABLET, COATED ORAL at 09:21

## 2025-06-06 RX ADMIN — DICYCLOMINE HYDROCHLORIDE 10 MG: 10 CAPSULE ORAL at 21:18

## 2025-06-06 RX ADMIN — POLYETHYLENE GLYCOL 3350 17 G: 17 POWDER, FOR SOLUTION ORAL at 21:17

## 2025-06-06 RX ADMIN — PANCRELIPASE LIPASE, PANCRELIPASE PROTEASE, PANCRELIPASE AMYLASE 15000 UNITS: 15000; 47000; 63000 CAPSULE, DELAYED RELEASE ORAL at 11:23

## 2025-06-06 RX ADMIN — PANCRELIPASE LIPASE, PANCRELIPASE PROTEASE, PANCRELIPASE AMYLASE 20000 UNITS: 20000; 63000; 84000 CAPSULE, DELAYED RELEASE ORAL at 09:21

## 2025-06-06 RX ADMIN — DOXYCYCLINE HYCLATE 100 MG: 100 TABLET, COATED ORAL at 21:18

## 2025-06-06 RX ADMIN — HEPARIN SODIUM 5000 UNITS: 5000 INJECTION INTRAVENOUS; SUBCUTANEOUS at 21:17

## 2025-06-06 RX ADMIN — PANTOPRAZOLE SODIUM 40 MG: 40 TABLET, DELAYED RELEASE ORAL at 06:53

## 2025-06-06 RX ADMIN — PANCRELIPASE LIPASE, PANCRELIPASE PROTEASE, PANCRELIPASE AMYLASE 15000 UNITS: 15000; 47000; 63000 CAPSULE, DELAYED RELEASE ORAL at 09:42

## 2025-06-06 RX ADMIN — SODIUM BICARBONATE 650 MG: 650 TABLET ORAL at 21:18

## 2025-06-06 RX ADMIN — Medication 3 MG: at 21:18

## 2025-06-06 RX ADMIN — MICONAZOLE NITRATE: 2 POWDER TOPICAL at 09:22

## 2025-06-06 RX ADMIN — PANCRELIPASE LIPASE, PANCRELIPASE PROTEASE, PANCRELIPASE AMYLASE 20000 UNITS: 20000; 63000; 84000 CAPSULE, DELAYED RELEASE ORAL at 15:13

## 2025-06-06 RX ADMIN — ACETAMINOPHEN 1000 MG: 500 TABLET ORAL at 06:53

## 2025-06-06 ASSESSMENT — PAIN DESCRIPTION - LOCATION
LOCATION: LEG

## 2025-06-06 ASSESSMENT — PAIN DESCRIPTION - DESCRIPTORS
DESCRIPTORS: ACHING

## 2025-06-06 ASSESSMENT — PAIN SCALES - GENERAL
PAINLEVEL_OUTOF10: 7
PAINLEVEL_OUTOF10: 0
PAINLEVEL_OUTOF10: 0
PAINLEVEL_OUTOF10: 3
PAINLEVEL_OUTOF10: 0
PAINLEVEL_OUTOF10: 6
PAINLEVEL_OUTOF10: 3
PAINLEVEL_OUTOF10: 4
PAINLEVEL_OUTOF10: 7
PAINLEVEL_OUTOF10: 0

## 2025-06-06 ASSESSMENT — PAIN DESCRIPTION - ORIENTATION
ORIENTATION: LEFT

## 2025-06-07 LAB
ALBUMIN SERPL-MCNC: 1.1 G/DL (ref 3.5–5)
ALBUMIN/GLOB SERPL: 0.3 (ref 1.1–2.2)
ALP SERPL-CCNC: 174 U/L (ref 45–117)
ALT SERPL-CCNC: 13 U/L (ref 12–78)
ANION GAP SERPL CALC-SCNC: 2 MMOL/L (ref 2–12)
ANION GAP SERPL CALC-SCNC: 2 MMOL/L (ref 2–12)
AST SERPL-CCNC: 18 U/L (ref 15–37)
BASOPHILS # BLD: 0.04 K/UL (ref 0–0.1)
BASOPHILS NFR BLD: 0.4 % (ref 0–1)
BILIRUB SERPL-MCNC: 0.3 MG/DL (ref 0.2–1)
BUN SERPL-MCNC: 76 MG/DL (ref 6–20)
BUN SERPL-MCNC: 82 MG/DL (ref 6–20)
BUN/CREAT SERPL: 39 (ref 12–20)
BUN/CREAT SERPL: 42 (ref 12–20)
CALCIUM SERPL-MCNC: 8.2 MG/DL (ref 8.5–10.1)
CALCIUM SERPL-MCNC: 8.3 MG/DL (ref 8.5–10.1)
CHLORIDE SERPL-SCNC: 116 MMOL/L (ref 97–108)
CHLORIDE SERPL-SCNC: 116 MMOL/L (ref 97–108)
CO2 SERPL-SCNC: 23 MMOL/L (ref 21–32)
CO2 SERPL-SCNC: 23 MMOL/L (ref 21–32)
CREAT SERPL-MCNC: 1.94 MG/DL (ref 0.55–1.02)
CREAT SERPL-MCNC: 1.97 MG/DL (ref 0.55–1.02)
DIFFERENTIAL METHOD BLD: ABNORMAL
EOSINOPHIL # BLD: 0.14 K/UL (ref 0–0.4)
EOSINOPHIL NFR BLD: 1.3 % (ref 0–7)
ERYTHROCYTE [DISTWIDTH] IN BLOOD BY AUTOMATED COUNT: 17.2 % (ref 11.5–14.5)
GLOBULIN SER CALC-MCNC: 3.8 G/DL (ref 2–4)
GLUCOSE BLD STRIP.AUTO-MCNC: 123 MG/DL (ref 65–117)
GLUCOSE BLD STRIP.AUTO-MCNC: 161 MG/DL (ref 65–117)
GLUCOSE BLD STRIP.AUTO-MCNC: 162 MG/DL (ref 65–117)
GLUCOSE BLD STRIP.AUTO-MCNC: 200 MG/DL (ref 65–117)
GLUCOSE BLD STRIP.AUTO-MCNC: 207 MG/DL (ref 65–117)
GLUCOSE BLD STRIP.AUTO-MCNC: 63 MG/DL (ref 65–117)
GLUCOSE BLD STRIP.AUTO-MCNC: 94 MG/DL (ref 65–117)
GLUCOSE SERPL-MCNC: 207 MG/DL (ref 65–100)
GLUCOSE SERPL-MCNC: 47 MG/DL (ref 65–100)
HCT VFR BLD AUTO: 23.5 % (ref 35–47)
HGB BLD-MCNC: 7.8 G/DL (ref 11.5–16)
IMM GRANULOCYTES # BLD AUTO: 0.07 K/UL (ref 0–0.04)
IMM GRANULOCYTES NFR BLD AUTO: 0.6 % (ref 0–0.5)
LYMPHOCYTES # BLD: 3.38 K/UL (ref 0.8–3.5)
LYMPHOCYTES NFR BLD: 30.6 % (ref 12–49)
MCH RBC QN AUTO: 31.2 PG (ref 26–34)
MCHC RBC AUTO-ENTMCNC: 33.2 G/DL (ref 30–36.5)
MCV RBC AUTO: 94 FL (ref 80–99)
MONOCYTES # BLD: 0.77 K/UL (ref 0–1)
MONOCYTES NFR BLD: 7 % (ref 5–13)
NEUTS SEG # BLD: 6.64 K/UL (ref 1.8–8)
NEUTS SEG NFR BLD: 60.1 % (ref 32–75)
NRBC # BLD: 0 K/UL (ref 0–0.01)
NRBC BLD-RTO: 0 PER 100 WBC
PLATELET # BLD AUTO: 354 K/UL (ref 150–400)
PMV BLD AUTO: 10.3 FL (ref 8.9–12.9)
POTASSIUM SERPL-SCNC: 5.4 MMOL/L (ref 3.5–5.1)
POTASSIUM SERPL-SCNC: 5.8 MMOL/L (ref 3.5–5.1)
POTASSIUM SERPL-SCNC: 5.8 MMOL/L (ref 3.5–5.1)
POTASSIUM SERPL-SCNC: 5.9 MMOL/L (ref 3.5–5.1)
POTASSIUM SERPL-SCNC: 6.1 MMOL/L (ref 3.5–5.1)
PROT SERPL-MCNC: 4.9 G/DL (ref 6.4–8.2)
RBC # BLD AUTO: 2.5 M/UL (ref 3.8–5.2)
SERVICE CMNT-IMP: ABNORMAL
SERVICE CMNT-IMP: NORMAL
SODIUM SERPL-SCNC: 141 MMOL/L (ref 136–145)
SODIUM SERPL-SCNC: 141 MMOL/L (ref 136–145)
WBC # BLD AUTO: 11 K/UL (ref 3.6–11)

## 2025-06-07 PROCEDURE — 6370000000 HC RX 637 (ALT 250 FOR IP)

## 2025-06-07 PROCEDURE — 36415 COLL VENOUS BLD VENIPUNCTURE: CPT

## 2025-06-07 PROCEDURE — 6370000000 HC RX 637 (ALT 250 FOR IP): Performed by: INTERNAL MEDICINE

## 2025-06-07 PROCEDURE — 99232 SBSQ HOSP IP/OBS MODERATE 35: CPT | Performed by: FAMILY MEDICINE

## 2025-06-07 PROCEDURE — 6360000002 HC RX W HCPCS

## 2025-06-07 PROCEDURE — 1100000000 HC RM PRIVATE

## 2025-06-07 PROCEDURE — 2500000003 HC RX 250 WO HCPCS

## 2025-06-07 PROCEDURE — 80053 COMPREHEN METABOLIC PANEL: CPT

## 2025-06-07 PROCEDURE — 85025 COMPLETE CBC W/AUTO DIFF WBC: CPT

## 2025-06-07 PROCEDURE — 82962 GLUCOSE BLOOD TEST: CPT

## 2025-06-07 PROCEDURE — 94761 N-INVAS EAR/PLS OXIMETRY MLT: CPT

## 2025-06-07 PROCEDURE — 84132 ASSAY OF SERUM POTASSIUM: CPT

## 2025-06-07 RX ORDER — OXYCODONE HYDROCHLORIDE 5 MG/1
2.5 TABLET ORAL ONCE
Refills: 0 | Status: COMPLETED | OUTPATIENT
Start: 2025-06-07 | End: 2025-06-07

## 2025-06-07 RX ORDER — INSULIN GLARGINE 100 [IU]/ML
10 INJECTION, SOLUTION SUBCUTANEOUS EVERY MORNING
Qty: 3 ML | Refills: 0 | Status: SHIPPED | OUTPATIENT
Start: 2025-06-07

## 2025-06-07 RX ORDER — DOXYCYCLINE HYCLATE 100 MG
100 TABLET ORAL EVERY 12 HOURS SCHEDULED
Qty: 10 TABLET | Refills: 0 | Status: SHIPPED | OUTPATIENT
Start: 2025-06-07 | End: 2025-06-08

## 2025-06-07 RX ORDER — INSULIN GLARGINE 100 [IU]/ML
10 INJECTION, SOLUTION SUBCUTANEOUS EVERY MORNING
Status: DISCONTINUED | OUTPATIENT
Start: 2025-06-08 | End: 2025-06-08 | Stop reason: HOSPADM

## 2025-06-07 RX ADMIN — OXYCODONE 2.5 MG: 5 TABLET ORAL at 18:23

## 2025-06-07 RX ADMIN — PANTOPRAZOLE SODIUM 40 MG: 40 TABLET, DELAYED RELEASE ORAL at 15:38

## 2025-06-07 RX ADMIN — INSULIN GLARGINE 10 UNITS: 100 INJECTION, SOLUTION SUBCUTANEOUS at 08:44

## 2025-06-07 RX ADMIN — GABAPENTIN 200 MG: 100 CAPSULE ORAL at 15:20

## 2025-06-07 RX ADMIN — MICONAZOLE NITRATE: 2 POWDER TOPICAL at 08:48

## 2025-06-07 RX ADMIN — DICYCLOMINE HYDROCHLORIDE 10 MG: 10 CAPSULE ORAL at 20:40

## 2025-06-07 RX ADMIN — GABAPENTIN 200 MG: 100 CAPSULE ORAL at 20:39

## 2025-06-07 RX ADMIN — HEPARIN SODIUM 5000 UNITS: 5000 INJECTION INTRAVENOUS; SUBCUTANEOUS at 20:39

## 2025-06-07 RX ADMIN — MICONAZOLE NITRATE: 2 POWDER TOPICAL at 20:40

## 2025-06-07 RX ADMIN — DOXYCYCLINE HYCLATE 100 MG: 100 TABLET, COATED ORAL at 20:40

## 2025-06-07 RX ADMIN — OXYCODONE 2.5 MG: 5 TABLET ORAL at 15:38

## 2025-06-07 RX ADMIN — PANCRELIPASE LIPASE, PANCRELIPASE PROTEASE, PANCRELIPASE AMYLASE 20000 UNITS: 20000; 63000; 84000 CAPSULE, DELAYED RELEASE ORAL at 08:44

## 2025-06-07 RX ADMIN — INSULIN LISPRO 2 UNITS: 100 INJECTION, SOLUTION INTRAVENOUS; SUBCUTANEOUS at 22:35

## 2025-06-07 RX ADMIN — ACETAMINOPHEN 1000 MG: 500 TABLET ORAL at 06:00

## 2025-06-07 RX ADMIN — PANTOPRAZOLE SODIUM 40 MG: 40 TABLET, DELAYED RELEASE ORAL at 06:00

## 2025-06-07 RX ADMIN — ACETAMINOPHEN 1000 MG: 500 TABLET ORAL at 20:40

## 2025-06-07 RX ADMIN — DICYCLOMINE HYDROCHLORIDE 10 MG: 10 CAPSULE ORAL at 16:58

## 2025-06-07 RX ADMIN — SODIUM CHLORIDE, PRESERVATIVE FREE 10 ML: 5 INJECTION INTRAVENOUS at 20:46

## 2025-06-07 RX ADMIN — HEPARIN SODIUM 5000 UNITS: 5000 INJECTION INTRAVENOUS; SUBCUTANEOUS at 15:21

## 2025-06-07 RX ADMIN — INSULIN LISPRO 2 UNITS: 100 INJECTION, SOLUTION INTRAVENOUS; SUBCUTANEOUS at 16:58

## 2025-06-07 RX ADMIN — SODIUM CHLORIDE, PRESERVATIVE FREE 10 ML: 5 INJECTION INTRAVENOUS at 08:45

## 2025-06-07 RX ADMIN — GABAPENTIN 200 MG: 100 CAPSULE ORAL at 08:47

## 2025-06-07 RX ADMIN — PANCRELIPASE LIPASE, PANCRELIPASE PROTEASE, PANCRELIPASE AMYLASE 15000 UNITS: 15000; 47000; 63000 CAPSULE, DELAYED RELEASE ORAL at 11:11

## 2025-06-07 RX ADMIN — POLYETHYLENE GLYCOL 3350 17 G: 17 POWDER, FOR SOLUTION ORAL at 08:47

## 2025-06-07 RX ADMIN — DICYCLOMINE HYDROCHLORIDE 10 MG: 10 CAPSULE ORAL at 05:59

## 2025-06-07 RX ADMIN — POLYETHYLENE GLYCOL 3350 17 G: 17 POWDER, FOR SOLUTION ORAL at 20:39

## 2025-06-07 RX ADMIN — SODIUM BICARBONATE 650 MG: 650 TABLET ORAL at 08:47

## 2025-06-07 RX ADMIN — DICYCLOMINE HYDROCHLORIDE 10 MG: 10 CAPSULE ORAL at 11:11

## 2025-06-07 RX ADMIN — SODIUM BICARBONATE 650 MG: 650 TABLET ORAL at 20:40

## 2025-06-07 RX ADMIN — PANCRELIPASE LIPASE, PANCRELIPASE PROTEASE, PANCRELIPASE AMYLASE 15000 UNITS: 15000; 47000; 63000 CAPSULE, DELAYED RELEASE ORAL at 16:58

## 2025-06-07 RX ADMIN — SODIUM ZIRCONIUM CYCLOSILICATE 10 G: 5 POWDER, FOR SUSPENSION ORAL at 20:39

## 2025-06-07 RX ADMIN — SODIUM ZIRCONIUM CYCLOSILICATE 10 G: 5 POWDER, FOR SUSPENSION ORAL at 08:43

## 2025-06-07 RX ADMIN — OXYCODONE 2.5 MG: 5 TABLET ORAL at 05:59

## 2025-06-07 RX ADMIN — SODIUM ZIRCONIUM CYCLOSILICATE 10 G: 5 POWDER, FOR SUSPENSION ORAL at 15:21

## 2025-06-07 RX ADMIN — DOXYCYCLINE HYCLATE 100 MG: 100 TABLET, COATED ORAL at 08:46

## 2025-06-07 RX ADMIN — SENNOSIDES, DOCUSATE SODIUM 2 TABLET: 50; 8.6 TABLET, FILM COATED ORAL at 08:47

## 2025-06-07 RX ADMIN — SODIUM BICARBONATE 650 MG: 650 TABLET ORAL at 15:20

## 2025-06-07 RX ADMIN — PANCRELIPASE LIPASE, PANCRELIPASE PROTEASE, PANCRELIPASE AMYLASE 20000 UNITS: 20000; 63000; 84000 CAPSULE, DELAYED RELEASE ORAL at 11:11

## 2025-06-07 RX ADMIN — PANCRELIPASE LIPASE, PANCRELIPASE PROTEASE, PANCRELIPASE AMYLASE 20000 UNITS: 20000; 63000; 84000 CAPSULE, DELAYED RELEASE ORAL at 16:58

## 2025-06-07 RX ADMIN — HEPARIN SODIUM 5000 UNITS: 5000 INJECTION INTRAVENOUS; SUBCUTANEOUS at 06:00

## 2025-06-07 RX ADMIN — PANCRELIPASE LIPASE, PANCRELIPASE PROTEASE, PANCRELIPASE AMYLASE 15000 UNITS: 15000; 47000; 63000 CAPSULE, DELAYED RELEASE ORAL at 08:44

## 2025-06-07 RX ADMIN — Medication 16 G: at 05:24

## 2025-06-07 RX ADMIN — ACETAMINOPHEN 1000 MG: 500 TABLET ORAL at 15:20

## 2025-06-07 ASSESSMENT — PAIN DESCRIPTION - ORIENTATION
ORIENTATION: LEFT
ORIENTATION: LEFT

## 2025-06-07 ASSESSMENT — PAIN SCALES - GENERAL
PAINLEVEL_OUTOF10: 3
PAINLEVEL_OUTOF10: 7
PAINLEVEL_OUTOF10: 8
PAINLEVEL_OUTOF10: 3
PAINLEVEL_OUTOF10: 8
PAINLEVEL_OUTOF10: 7
PAINLEVEL_OUTOF10: 8

## 2025-06-07 ASSESSMENT — PAIN DESCRIPTION - LOCATION
LOCATION: LEG
LOCATION: LEG

## 2025-06-07 ASSESSMENT — PAIN DESCRIPTION - DESCRIPTORS
DESCRIPTORS: ACHING
DESCRIPTORS: ACHING

## 2025-06-07 NOTE — DISCHARGE SUMMARY
[address] [place] [address]   Office [phone]  Fax [phone]       Discharge / Transfer / Off-Service Note     Name: Candida Maza MRN: [id number]  Sex: Female   YOB: 1960  Age: 65 y.o.  PCP: Keena Leyva MD     Date of admission: 5/24/2025  Date of discharge/transfer: 6/8/2025    Attending physician at admission: Dr. Jean Ayoub.  Attending physician at discharge/transfer: Dr. Jean Ayoub.  Resident physician at discharge/transfer: Minda Yates DO     Consultants during hospitalization  IP CONSULT TO PODIATRY  IP CONSULT TO INFECTIOUS DISEASES  IP CONSULT TO VASCULAR SURGERY  IP CONSULT TO SPIRITUAL CARE  IP CONSULT TO VASCULAR ACCESS TEAM  IP CONSULT TO CASE MANAGEMENT  IP CONSULT TO CASE MANAGEMENT  IP CONSULT TO NEPHROLOGY     Admission diagnoses   Diabetic foot infection (HCC) [E11.628, L08.9]  Osteomyelitis of great toe of left foot (HCC) [M86.9]    Recommended follow-up after discharge    1. PCP-Keena Leyva MD  2. Nephrology  3. Podiatry  4. Vascular surgery     To follow up on with PCP:   - Hospital follow up   - Resumption of home meds   - Repeat BMP after Lokelma x 1 week.   - Follow blood sugars, episodes of hyperglycemia during admission.     To follow up with nephrology:   - Hyperkalemia   - HARPER    To follow up with podiatry:   - Wound care s/p hallux amputation    To follow up with vascular surgery:  - S/p L popliteal to peroneal artery bypass    ---------------------------------------------------------------------------------------------------------------    History of Present Illness    As per admitting provider, Dr. Ortiz:   \"Candida aMza is a 65 y.o. female with PMHx of T2DM, CKDIV, HTN, pancreatic insufficiency, GERD, ESBL UTIs who presents to the ED complaining of bilateral leg pain.     Endorses worsening bilateral leg pain of more than 4 weeks duration associated with swelling. Bilateral leg pain described as intermittent,

## 2025-06-08 VITALS
TEMPERATURE: 97.9 F | BODY MASS INDEX: 32.76 KG/M2 | DIASTOLIC BLOOD PRESSURE: 70 MMHG | OXYGEN SATURATION: 100 % | HEIGHT: 62 IN | RESPIRATION RATE: 17 BRPM | HEART RATE: 71 BPM | WEIGHT: 178 LBS | SYSTOLIC BLOOD PRESSURE: 167 MMHG

## 2025-06-08 LAB
ALBUMIN SERPL-MCNC: 1.1 G/DL (ref 3.5–5)
ALBUMIN/GLOB SERPL: 0.3 (ref 1.1–2.2)
ALP SERPL-CCNC: 187 U/L (ref 45–117)
ALT SERPL-CCNC: 17 U/L (ref 12–78)
ANION GAP SERPL CALC-SCNC: 1 MMOL/L (ref 2–12)
AST SERPL-CCNC: 25 U/L (ref 15–37)
BASOPHILS # BLD: 0.04 K/UL (ref 0–0.1)
BASOPHILS NFR BLD: 0.4 % (ref 0–1)
BILIRUB SERPL-MCNC: 0.2 MG/DL (ref 0.2–1)
BUN SERPL-MCNC: 72 MG/DL (ref 6–20)
BUN/CREAT SERPL: 40 (ref 12–20)
CALCIUM SERPL-MCNC: 8.5 MG/DL (ref 8.5–10.1)
CHLORIDE SERPL-SCNC: 117 MMOL/L (ref 97–108)
CO2 SERPL-SCNC: 24 MMOL/L (ref 21–32)
CREAT SERPL-MCNC: 1.8 MG/DL (ref 0.55–1.02)
DIFFERENTIAL METHOD BLD: ABNORMAL
EOSINOPHIL # BLD: 0.14 K/UL (ref 0–0.4)
EOSINOPHIL NFR BLD: 1.3 % (ref 0–7)
ERYTHROCYTE [DISTWIDTH] IN BLOOD BY AUTOMATED COUNT: 17.3 % (ref 11.5–14.5)
GLOBULIN SER CALC-MCNC: 3.6 G/DL (ref 2–4)
GLUCOSE BLD STRIP.AUTO-MCNC: 111 MG/DL (ref 65–117)
GLUCOSE BLD STRIP.AUTO-MCNC: 327 MG/DL (ref 65–117)
GLUCOSE SERPL-MCNC: 91 MG/DL (ref 65–100)
HCT VFR BLD AUTO: 23.5 % (ref 35–47)
HGB BLD-MCNC: 7.4 G/DL (ref 11.5–16)
IMM GRANULOCYTES # BLD AUTO: 0.07 K/UL (ref 0–0.04)
IMM GRANULOCYTES NFR BLD AUTO: 0.6 % (ref 0–0.5)
LYMPHOCYTES # BLD: 2.45 K/UL (ref 0.8–3.5)
LYMPHOCYTES NFR BLD: 22.2 % (ref 12–49)
MCH RBC QN AUTO: 30 PG (ref 26–34)
MCHC RBC AUTO-ENTMCNC: 31.5 G/DL (ref 30–36.5)
MCV RBC AUTO: 95.1 FL (ref 80–99)
MONOCYTES # BLD: 0.81 K/UL (ref 0–1)
MONOCYTES NFR BLD: 7.4 % (ref 5–13)
NEUTS SEG # BLD: 7.51 K/UL (ref 1.8–8)
NEUTS SEG NFR BLD: 68.1 % (ref 32–75)
NRBC # BLD: 0 K/UL (ref 0–0.01)
NRBC BLD-RTO: 0 PER 100 WBC
PLATELET # BLD AUTO: 375 K/UL (ref 150–400)
PMV BLD AUTO: 10.1 FL (ref 8.9–12.9)
POTASSIUM SERPL-SCNC: 5.2 MMOL/L (ref 3.5–5.1)
POTASSIUM SERPL-SCNC: 5.6 MMOL/L (ref 3.5–5.1)
PROT SERPL-MCNC: 4.7 G/DL (ref 6.4–8.2)
RBC # BLD AUTO: 2.47 M/UL (ref 3.8–5.2)
SERVICE CMNT-IMP: ABNORMAL
SERVICE CMNT-IMP: NORMAL
SODIUM SERPL-SCNC: 142 MMOL/L (ref 136–145)
WBC # BLD AUTO: 11 K/UL (ref 3.6–11)

## 2025-06-08 PROCEDURE — 6370000000 HC RX 637 (ALT 250 FOR IP): Performed by: INTERNAL MEDICINE

## 2025-06-08 PROCEDURE — 6360000002 HC RX W HCPCS

## 2025-06-08 PROCEDURE — 82962 GLUCOSE BLOOD TEST: CPT

## 2025-06-08 PROCEDURE — 6370000000 HC RX 637 (ALT 250 FOR IP)

## 2025-06-08 PROCEDURE — 2500000003 HC RX 250 WO HCPCS

## 2025-06-08 PROCEDURE — 85025 COMPLETE CBC W/AUTO DIFF WBC: CPT

## 2025-06-08 PROCEDURE — 84132 ASSAY OF SERUM POTASSIUM: CPT

## 2025-06-08 PROCEDURE — 94761 N-INVAS EAR/PLS OXIMETRY MLT: CPT

## 2025-06-08 PROCEDURE — 36415 COLL VENOUS BLD VENIPUNCTURE: CPT

## 2025-06-08 PROCEDURE — 80053 COMPREHEN METABOLIC PANEL: CPT

## 2025-06-08 PROCEDURE — 99239 HOSP IP/OBS DSCHRG MGMT >30: CPT | Performed by: FAMILY MEDICINE

## 2025-06-08 RX ORDER — GABAPENTIN 100 MG/1
100 CAPSULE ORAL 3 TIMES DAILY
Qty: 90 CAPSULE | Refills: 0 | Status: SHIPPED | OUTPATIENT
Start: 2025-06-08 | End: 2025-07-08

## 2025-06-08 RX ORDER — POLYETHYLENE GLYCOL 3350 17 G/17G
17 POWDER, FOR SOLUTION ORAL 2 TIMES DAILY
Qty: 1530 G | Refills: 0 | Status: SHIPPED
Start: 2025-06-08 | End: 2025-07-08

## 2025-06-08 RX ORDER — DOXYCYCLINE HYCLATE 100 MG
100 TABLET ORAL EVERY 12 HOURS SCHEDULED
Qty: 15 TABLET | Refills: 0 | Status: SHIPPED
Start: 2025-06-08 | End: 2025-06-16

## 2025-06-08 RX ORDER — OXYCODONE HYDROCHLORIDE 5 MG/1
2.5 TABLET ORAL EVERY 6 HOURS PRN
Qty: 5 TABLET | Refills: 0 | Status: SHIPPED | OUTPATIENT
Start: 2025-06-08 | End: 2025-06-12

## 2025-06-08 RX ORDER — POLYETHYLENE GLYCOL 3350 17 G/17G
17 POWDER, FOR SOLUTION ORAL 3 TIMES DAILY
Status: DISCONTINUED | OUTPATIENT
Start: 2025-06-08 | End: 2025-06-08 | Stop reason: HOSPADM

## 2025-06-08 RX ORDER — OXYCODONE HYDROCHLORIDE 5 MG/1
2.5 TABLET ORAL EVERY 8 HOURS PRN
Refills: 0 | Status: DISCONTINUED | OUTPATIENT
Start: 2025-06-08 | End: 2025-06-08 | Stop reason: HOSPADM

## 2025-06-08 RX ADMIN — OXYCODONE 2.5 MG: 5 TABLET ORAL at 09:01

## 2025-06-08 RX ADMIN — PANCRELIPASE LIPASE, PANCRELIPASE PROTEASE, PANCRELIPASE AMYLASE 15000 UNITS: 15000; 47000; 63000 CAPSULE, DELAYED RELEASE ORAL at 07:48

## 2025-06-08 RX ADMIN — INSULIN LISPRO 6 UNITS: 100 INJECTION, SOLUTION INTRAVENOUS; SUBCUTANEOUS at 11:39

## 2025-06-08 RX ADMIN — ACETAMINOPHEN 1000 MG: 500 TABLET ORAL at 14:23

## 2025-06-08 RX ADMIN — DOXYCYCLINE HYCLATE 100 MG: 100 TABLET, COATED ORAL at 07:49

## 2025-06-08 RX ADMIN — DICYCLOMINE HYDROCHLORIDE 10 MG: 10 CAPSULE ORAL at 11:40

## 2025-06-08 RX ADMIN — DICYCLOMINE HYDROCHLORIDE 10 MG: 10 CAPSULE ORAL at 06:13

## 2025-06-08 RX ADMIN — PANCRELIPASE LIPASE, PANCRELIPASE PROTEASE, PANCRELIPASE AMYLASE 20000 UNITS: 20000; 63000; 84000 CAPSULE, DELAYED RELEASE ORAL at 07:48

## 2025-06-08 RX ADMIN — POLYETHYLENE GLYCOL 3350 17 G: 17 POWDER, FOR SOLUTION ORAL at 14:25

## 2025-06-08 RX ADMIN — GABAPENTIN 200 MG: 100 CAPSULE ORAL at 07:49

## 2025-06-08 RX ADMIN — PANCRELIPASE LIPASE, PANCRELIPASE PROTEASE, PANCRELIPASE AMYLASE 20000 UNITS: 20000; 63000; 84000 CAPSULE, DELAYED RELEASE ORAL at 11:40

## 2025-06-08 RX ADMIN — SODIUM BICARBONATE 650 MG: 650 TABLET ORAL at 07:49

## 2025-06-08 RX ADMIN — ACETAMINOPHEN 1000 MG: 500 TABLET ORAL at 06:13

## 2025-06-08 RX ADMIN — SODIUM BICARBONATE 650 MG: 650 TABLET ORAL at 14:24

## 2025-06-08 RX ADMIN — HEPARIN SODIUM 5000 UNITS: 5000 INJECTION INTRAVENOUS; SUBCUTANEOUS at 14:24

## 2025-06-08 RX ADMIN — HEPARIN SODIUM 5000 UNITS: 5000 INJECTION INTRAVENOUS; SUBCUTANEOUS at 06:14

## 2025-06-08 RX ADMIN — PANTOPRAZOLE SODIUM 40 MG: 40 TABLET, DELAYED RELEASE ORAL at 06:13

## 2025-06-08 RX ADMIN — SODIUM ZIRCONIUM CYCLOSILICATE 10 G: 5 POWDER, FOR SUSPENSION ORAL at 14:24

## 2025-06-08 RX ADMIN — MICONAZOLE NITRATE: 2 POWDER TOPICAL at 07:46

## 2025-06-08 RX ADMIN — GABAPENTIN 200 MG: 100 CAPSULE ORAL at 14:23

## 2025-06-08 RX ADMIN — SODIUM ZIRCONIUM CYCLOSILICATE 10 G: 5 POWDER, FOR SUSPENSION ORAL at 07:49

## 2025-06-08 RX ADMIN — POLYETHYLENE GLYCOL 3350 17 G: 17 POWDER, FOR SOLUTION ORAL at 07:48

## 2025-06-08 RX ADMIN — PANCRELIPASE LIPASE, PANCRELIPASE PROTEASE, PANCRELIPASE AMYLASE 15000 UNITS: 15000; 47000; 63000 CAPSULE, DELAYED RELEASE ORAL at 11:39

## 2025-06-08 RX ADMIN — INSULIN GLARGINE 10 UNITS: 100 INJECTION, SOLUTION SUBCUTANEOUS at 08:53

## 2025-06-08 RX ADMIN — SENNOSIDES, DOCUSATE SODIUM 2 TABLET: 50; 8.6 TABLET, FILM COATED ORAL at 07:49

## 2025-06-08 RX ADMIN — SODIUM CHLORIDE, PRESERVATIVE FREE 10 ML: 5 INJECTION INTRAVENOUS at 07:48

## 2025-06-08 ASSESSMENT — PAIN DESCRIPTION - LOCATION: LOCATION: LEG

## 2025-06-08 ASSESSMENT — PAIN DESCRIPTION - ORIENTATION: ORIENTATION: LEFT

## 2025-06-08 ASSESSMENT — PAIN DESCRIPTION - DESCRIPTORS: DESCRIPTORS: ACHING

## 2025-06-08 ASSESSMENT — PAIN SCALES - GENERAL
PAINLEVEL_OUTOF10: 3
PAINLEVEL_OUTOF10: 7

## 2025-06-08 NOTE — CARE COORDINATION
06/06/25 3:37 PM  SNF auth for [facility] initiated by CARLA and Lala Duron via [employer] portal.   Auth ID# [id number].  Effective from 6/7/25 through 6/10/25.  Auth information communicated with covering .    The following information was submitted via [employer] portal for initial authorization:  Face Sheet/Demographics   H&P  Last 24 hours of MD notes  PT/OT/ST Evaluations and/or notes within the last 48 hours  MAR  MD orders  (Include: Attending or ordering MD’s name and phone #; skilled nursing needs;   Wound care/etc)  Projected Date of Transfer to SNF  Requested SNF  CM Point of Contact and Phone #  NPI # for SNF    Authorization approved for start of care on 6/7/25.  GRACIELA Andrews    
5/27/2025  5:01 PM  CM attempted assessment. Pt not available. CM to reattempt.  
6/4/2025  4:21 PM  Care Management Progress Note    Reason for Admission:   Diabetic foot infection (HCC) [E11.628, L08.9]  Osteomyelitis of great toe of left foot (HCC) [M86.9]  Procedure(s) (LRB):  LEFT FEMORAL POPLITEAL BYPASS (Left)  TOE AMPUTATION, LEFT HALLUX (Left)  2 Days Post-Op    Patient Admission Status: Inpatient  Date Admitted to INP: 5/24/25 []NA - OBS/Outpatient  RUR: Readmission Risk Score: 26.4    Hospitalization in the last 30 days (Readmission):  No        Transition of care plan:  Awaiting medical clearance and DC order. Therapy following. Podiatry and Vascular following.   SNF - CM met with pt to discuss SNF recommendation. Freedom of Choice offered, and SNF listing provided. Pt indicated [facility] as preference, and [facility] as secondary. CM submitted referrals via [employer], and awaiting response. Auth required. Pt reported to CM that she is \"not going anywhere until they figure out where this jerking is coming from.\"  Date IM given: 6/3/25 []NA  Outpatient follow-up.  Discharge transport: BLS likely       06/04/25 1621   Services At/After Discharge   Services At/After Discharge Skilled Nursing Facility (SNF)   Mode of Transport at Discharge Other (see comment)   Confirm Follow Up Transport Family   Condition of Participation: Discharge Planning   The Plan for Transition of Care is related to the following treatment goals: Osteo of toe   The Patient and/or Patient Representative was provided with a Choice of Provider? Patient   The Patient and/Or Patient Representative agree with the Discharge Plan? Yes   Freedom of Choice list was provided with basic dialogue that supports the patient's individualized plan of care/goals, treatment preferences, and shares the quality data associated with the providers?  Yes           
6/5/2025  2:47 PM  Care Management Progress Note    Reason for Admission:   Diabetic foot infection (HCC) [E11.628, L08.9]  Osteomyelitis of great toe of left foot (HCC) [M86.9]  Procedure(s) (LRB):  LEFT FEMORAL POPLITEAL BYPASS (Left)  TOE AMPUTATION, LEFT HALLUX (Left)  3 Days Post-Op    Patient Admission Status: Inpatient  Date Admitted to INP: 5/24/25 []NA - OBS/Outpatient  RUR: Readmission Risk Score: 26    Hospitalization in the last 30 days (Readmission):  No        Transition of care plan:  Awaiting medical clearance and DC order. Therapy following. Podiatry, Nephro, and Vascular following.   SNF - [facility]. Cm notified pt, and pt requested CM submit referral to facilities in [facility] as she no longer wants to DC to the [place] area. New preferences (not in order) indicated as [facility], [facility], [facility], [facility], and [facility]. CM submitted new referrals via [employer], and awaiting responses. Auth required. Once responses receivied pt reported she will select preference.  Date IM given: 6/3/25 []NA  Outpatient follow-up.  Discharge transport: BLS likely       06/04/25 1621   Services At/After Discharge   Services At/After Discharge Skilled Nursing Facility (SNF)   Mode of Transport at Discharge Other (see comment)   Confirm Follow Up Transport Family   Condition of Participation: Discharge Planning   The Plan for Transition of Care is related to the following treatment goals: Osteo of toe   The Patient and/or Patient Representative was provided with a Choice of Provider? Patient   The Patient and/Or Patient Representative agree with the Discharge Plan? Yes   Freedom of Choice list was provided with basic dialogue that supports the patient's individualized plan of care/goals, treatment preferences, and shares the quality data associated with the providers?  Yes           
6/7/2025  9:52 AM  Care Management Progress Note    Reason for Admission:   Diabetic foot infection (HCC) [E11.628, L08.9]  Osteomyelitis of great toe of left foot (HCC) [M86.9]  Procedure(s) (LRB):  LEFT FEMORAL POPLITEAL BYPASS (Left)  TOE AMPUTATION, LEFT HALLUX (Left)  5 Days Post-Op    Patient Admission Status: Inpatient  Date Admitted to INP: 5/24/25 []NA - OBS/Outpatient  RUR: Readmission Risk Score: 27    Hospitalization in the last 30 days (Readmission):  No        Transition of care plan:  Awaiting medical clearance and DC order. Nephro following, and awaiting lab stability for DC. Therapy treating.  S[facility] - [facility] accepted pt, and auth secured for DC. CM notified [facility] of pt's cancelled DC for today.   Date IM given: []NA  Outpatient follow-up.  Discharge transport: [facility] BLS transport cancelled via portal for today's transport at 11:00.        06/04/25 1621   Services At/After Discharge   Services At/After Discharge Skilled Nursing Facility (SNF)   Mode of Transport at Discharge Other (see comment)   Confirm Follow Up Transport Family   Condition of Participation: Discharge Planning   The Plan for Transition of Care is related to the following treatment goals: Osteo of toe   The Patient and/or Patient Representative was provided with a Choice of Provider? Patient   The Patient and/Or Patient Representative agree with the Discharge Plan? Yes   Freedom of Choice list was provided with basic dialogue that supports the patient's individualized plan of care/goals, treatment preferences, and shares the quality data associated with the providers?  Yes       
6/8/2025  11:23 AM  Transition of Care Plan to SNF/Rehab    Communication to Patient/Family:   Met with patient and family and they are agreeable to the transition plan. The Plan for Transition of Care is related to the following treatment goals: Diabetic foot infection (HCC) [E11.628, L08.9]  Osteomyelitis of great toe of left foot (HCC) [M86.9]      The Patient and/or patient representative was provided with a choice of provider and agrees  with the discharge plan.      Yes [x] No []    A Freedom of choice list was provided with basic dialogue that supports the patient's individualized plan of care/goals and shares the quality data associated with the providers.       Yes [x] No []    SNF/Rehab Transition:  Patient has been accepted to [facility] and meets criteria for admission.     CM Manager received auth? [x]  Medicare 3 night stay satisfied? []    Patient will be [facility] by [facility]LS and expected to leave at 2:00 PM.   [x] Packet on chart (if needed)  [x] PCS completed (if applicable)     Communication to SNF/Rehab:  Bedside RN, Katie, has been notified to update the transition plan to the facility and call report (phone number , [facility], ).  Discharge information has been updated on the AVS. And communicated to facility via Done In :60 Seconds/All Scripts, or CC link.     Discharge instructions to be fax'd to facility via [x] Ubaldo [] BEVERLEYink      Nursing Please include all hard scripts for controlled substances, med rec and dc summary, and AVS in packet.       Nursing, please discuss the following applicable information with the facility's nursing during report:     Arvind with (X) only those applicable:  Medication:  []Medications are available at the facility  []IV Antibiotics    []Controlled Substance - hard copies available sent.  []Weekly Labs    Equipment:  []CPAP/BiPAP  []Wound Vacuum  []Baron or Urinary Device  []PICC/Central Line  []Nebulizer  []Ventilator  
Care Management Progress Note    Reason for Admission:   Diabetic foot infection (HCC) [E11.628, L08.9]  Osteomyelitis of great toe of left foot (HCC) [M86.9]  Procedure(s) (LRB):  LEFT FEMORAL POPLITEAL BYPASS (Left)  TOE AMPUTATION, LEFT HALLUX (Left)  4 Days Post-Op    Patient Admission Status: Inpatient  RUR: 27%  Hospitalization in the last 30 days (Readmission):  No      PT/OT recommending SNF placement, discussed with patient and she is agreeable with plan.  Multiple SNF referrals placed yesterday, accepted by [facility], [facility], [facility], and [facility].  Patients choice is [facility]. [employer] insurance authorization has been approved.  Spoke with rosemarie Mejía. Bed available at [facility] on Saturday 6/7/25, room 704.  Nurse to call report to [phone] [facility].  Please sent AVS and current MAR with patient.  [facility] scheduled for  at 1100.     Transition of care plan:  Discussed in IDT rounds, continues to require medical management.  Osteomyelitis left great toe, Left halux ampuatation, Left fem-pop 6/2/25  [DC plan]  If SNF or IPR:  SNF Date FOC offered: 6/5/25  Accepting facility:  [facility]  Date authorization started with reference number:   Date authorization received and expires:  6/6/25  Discharge plan communicated with patient and/or discharge caregiver: Yes    Date 1st IMM letter given: 6/6/25  Outpatient follow-up.  Transport at discharge:  REGI Dominguez, RN, MSN/Care manager        
Case Management Note              Updated clinicals sent to [facility]  in [facility].          ___________________________________________   Dania Jeff RN Case Manager  5/31/2025   12:42 PM     
Assist for ADLs Independent   Ambulation Assistance Independent   Prior Level of Assist for Transfers Independent   Active  No   Patient's  Info Family or Medicaid Transport   Discharge Planning   Type of Residence House   Living Arrangements Alone  (Son stays sometimes)   Current Services Prior To Admission None   Potential Assistance Needed Home Care;Outpatient IV   DME Ordered? No   Potential Assistance Purchasing Medications No   Type of Home Care Services IV Therapy;Nursing Services   Patient expects to be discharged to: House   Services At/After Discharge   Transition of Care Consult (CM Consult) Home Health;Other   Internal Home Health No   Reason Outside Agency Chosen Unable to staff case   Services At/After Discharge Home Health;IV Therapy   Mode of Transport at Discharge Other (see comment)  (Pt's Brother)   Condition of Participation: Discharge Planning   The Plan for Transition of Care is related to the following treatment goals: Home health and iv infusion   The Patient and/or Patient Representative was provided with a Choice of Provider? Patient   The Patient and/Or Patient Representative agree with the Discharge Plan? Yes   Freedom of Choice list was provided with basic dialogue that supports the patient's individualized plan of care/goals, treatment preferences, and shares the quality data associated with the providers?  Yes         
with her sometimes and visits frequently. Pt was independent with adls and using a cane to assist with ambulation prior to admission. Pt also owns a rollator and rw. Pt does not drive but reported friends or family will take her to MD appts. Pt reported if no one is available, she utilizes her Medicaid transport.   Pt has had home health in the past through [facility] and would like to use this agency again for home health.   Pt has been to [facility] in the past for [facility].     Discharge Concerns: []Yes [x]No []Unknown   Describe:    Financial concerns/barriers: []Yes, explain: [x]No []Unknown/Not discussed  __________________________________________________________________________    Insurer:   Active Insurance as of 5/24/2025       Primary Coverage       Payor Plan Insurance Group Employer/Plan Group    HU[employer] MEDICARE HU[employer]NA CHOICE-PPO MEDICARE [id number]       Payor Plan Address Payor Plan Phone Number Payor Plan Fax Number Effective Dates    [address] [address]   5/1/2025 - None Entered    [facility] [id number]         Subscriber Name Subscriber Birth Date Member ID       ZEE HERZOG 1960 [id number]               Secondary Coverage       Payor Plan Insurance Group Employer/Plan Group    [place]        Payor Plan Address Payor Plan Phone Number Payor Plan Fax Number Effective Dates    [address] [address]   5/24/2025 - None Entered    [facility] [id number]         Subscriber Name Subscriber Birth Date Member ID       ZEE HERZOG 1960 [id number]                     PCP: Keena Leyva MD   Address: [address] of [address] [address] [facility] [address]   Phone number: [phone]    Pharmacy:   [facility]/pharmacy #[id number] - [place], [place] - [address] - P [phone] - F [phone]  [address] [facility] [facility] [address]  Phone: [phone] Fax: [phone]    [id number] [place] - [address] - P [phone] - F [phone]  [address] [facility] [address] [facility]

## 2025-06-08 NOTE — PLAN OF CARE
Problem: Chronic Conditions and Co-morbidities  Goal: Patient's chronic conditions and co-morbidity symptoms are monitored and maintained or improved  5/25/2025 1008 by Cammy Higgins RN  Outcome: Progressing  5/24/2025 2355 by Pebbles Nelson RN  Outcome: Progressing     Problem: Discharge Planning  Goal: Discharge to home or other facility with appropriate resources  5/25/2025 1008 by Cammy Higgins RN  Outcome: Progressing  5/24/2025 2355 by Pebbles Nelson RN  Outcome: Progressing     Problem: Pain  Goal: Verbalizes/displays adequate comfort level or baseline comfort level  5/25/2025 1008 by Cammy Higgins RN  Outcome: Progressing  5/24/2025 2355 by Pebbles Nelson RN  Outcome: Progressing     Problem: Skin/Tissue Integrity  Goal: Skin integrity remains intact  Description: 1.  Monitor for areas of redness and/or skin breakdown2.  Assess vascular access sites hourly3.  Every 4-6 hours minimum:  Change oxygen saturation probe site4.  Every 4-6 hours:  If on nasal continuous positive airway pressure, respiratory therapy assess nares and determine need for appliance change or resting period  5/25/2025 1008 by Cammy Higgins RN  Outcome: Progressing  5/24/2025 2355 by Pebbles Nelson RN  Outcome: Progressing  Flowsheets (Taken 5/24/2025 1515 by Cammy Higgins RN)  Skin Integrity Remains Intact:   Monitor for areas of redness and/or skin breakdown   Turn and reposition as indicated     Problem: ABCDS Injury Assessment  Goal: Absence of physical injury  5/25/2025 1008 by Cammy Higgins RN  Outcome: Progressing  5/24/2025 2355 by Pebbles Nelson RN  Outcome: Progressing     Problem: Safety - Adult  Goal: Free from fall injury  5/25/2025 1008 by Cammy Higgins RN  Outcome: Progressing  5/24/2025 2355 by Pebbles Nelson RN  Outcome: Progressing     
  Problem: Chronic Conditions and Co-morbidities  Goal: Patient's chronic conditions and co-morbidity symptoms are monitored and maintained or improved  5/26/2025 1044 by Katie Bradshaw RN  Outcome: Progressing  5/25/2025 2101 by Antwon Bland RN  Outcome: Progressing     Problem: Discharge Planning  Goal: Discharge to home or other facility with appropriate resources  5/26/2025 1044 by Katie Bradshaw RN  Outcome: Progressing  5/25/2025 2101 by Antwon Bland RN  Outcome: Progressing     Problem: Pain  Goal: Verbalizes/displays adequate comfort level or baseline comfort level  5/26/2025 1044 by Katie Bradshaw RN  Outcome: Progressing  5/25/2025 2101 by Antwon Bland RN  Outcome: Progressing     Problem: Skin/Tissue Integrity  Goal: Skin integrity remains intact  Description: 1.  Monitor for areas of redness and/or skin breakdown2.  Assess vascular access sites hourly3.  Every 4-6 hours minimum:  Change oxygen saturation probe site4.  Every 4-6 hours:  If on nasal continuous positive airway pressure, respiratory therapy assess nares and determine need for appliance change or resting period  5/26/2025 1044 by Katie Bradshaw RN  Outcome: Progressing  5/25/2025 2101 by Antwon Bland RN  Outcome: Progressing     Problem: ABCDS Injury Assessment  Goal: Absence of physical injury  5/26/2025 1044 by Katie Bradshaw RN  Outcome: Progressing  5/25/2025 2101 by Antwon Bland RN  Outcome: Progressing     Problem: Safety - Adult  Goal: Free from fall injury  5/26/2025 1044 by Katie Bradshaw RN  Outcome: Progressing  5/25/2025 2101 by Antwon Bland RN  Outcome: Progressing     
  Problem: Chronic Conditions and Co-morbidities  Goal: Patient's chronic conditions and co-morbidity symptoms are monitored and maintained or improved  5/28/2025 1050 by Paul Anand RN  Outcome: Progressing  5/28/2025 0231 by Blair Adames RN  Outcome: Progressing     Problem: Discharge Planning  Goal: Discharge to home or other facility with appropriate resources  5/28/2025 1050 by Paul Anand RN  Outcome: Progressing  5/28/2025 0231 by Blair Adames RN  Outcome: Progressing     Problem: Pain  Goal: Verbalizes/displays adequate comfort level or baseline comfort level  5/28/2025 1050 by Paul Anand RN  Outcome: Progressing  5/28/2025 0231 by Blair Adames RN  Outcome: Progressing     Problem: Skin/Tissue Integrity  Goal: Skin integrity remains intact  Description: 1.  Monitor for areas of redness and/or skin breakdown2.  Assess vascular access sites hourly3.  Every 4-6 hours minimum:  Change oxygen saturation probe site4.  Every 4-6 hours:  If on nasal continuous positive airway pressure, respiratory therapy assess nares and determine need for appliance change or resting period  5/28/2025 1050 by Paul Anand RN  Outcome: Progressing  5/28/2025 0231 by Blair Adames RN  Outcome: Progressing     Problem: ABCDS Injury Assessment  Goal: Absence of physical injury  5/28/2025 1050 by Paul Anand RN  Outcome: Progressing  5/28/2025 0231 by Blair Adames RN  Outcome: Progressing     Problem: Safety - Adult  Goal: Free from fall injury  5/28/2025 1050 by Paul Anand RN  Outcome: Progressing  5/28/2025 0231 by Blair Adames RN  Outcome: Progressing     
  Problem: Chronic Conditions and Co-morbidities  Goal: Patient's chronic conditions and co-morbidity symptoms are monitored and maintained or improved  5/28/2025 2227 by Blair Adames RN  Outcome: Progressing  5/28/2025 1050 by Paul Anand RN  Outcome: Progressing     Problem: Discharge Planning  Goal: Discharge to home or other facility with appropriate resources  5/28/2025 2227 by Blair Adames RN  Outcome: Progressing  5/28/2025 1050 by Paul Anand RN  Outcome: Progressing     Problem: Pain  Goal: Verbalizes/displays adequate comfort level or baseline comfort level  5/28/2025 2227 by Blair Adames RN  Outcome: Progressing  5/28/2025 1050 by Paul Anand RN  Outcome: Progressing     Problem: Skin/Tissue Integrity  Goal: Skin integrity remains intact  Description: 1.  Monitor for areas of redness and/or skin breakdown2.  Assess vascular access sites hourly3.  Every 4-6 hours minimum:  Change oxygen saturation probe site4.  Every 4-6 hours:  If on nasal continuous positive airway pressure, respiratory therapy assess nares and determine need for appliance change or resting period  5/28/2025 2227 by Blair Adames RN  Outcome: Progressing  5/28/2025 1050 by Paul Anand RN  Outcome: Progressing     Problem: ABCDS Injury Assessment  Goal: Absence of physical injury  5/28/2025 2227 by Blair Adamse RN  Outcome: Progressing  5/28/2025 1050 by Paul Anand RN  Outcome: Progressing     Problem: Safety - Adult  Goal: Free from fall injury  5/28/2025 2227 by Blair Adames RN  Outcome: Progressing  5/28/2025 1050 by Paul Anand RN  Outcome: Progressing     
  Problem: Chronic Conditions and Co-morbidities  Goal: Patient's chronic conditions and co-morbidity symptoms are monitored and maintained or improved  5/30/2025 0031 by Rossy Amaya RN  Outcome: Progressing  5/29/2025 1349 by Dorothy Blue RN  Outcome: Progressing     Problem: Pain  Goal: Verbalizes/displays adequate comfort level or baseline comfort level  5/30/2025 0031 by Rossy Amaya RN  Outcome: Progressing  5/29/2025 1349 by Dorothy Blue RN  Outcome: Progressing     Problem: Skin/Tissue Integrity  Goal: Skin integrity remains intact  Description: 1.  Monitor for areas of redness and/or skin breakdown2.  Assess vascular access sites hourly3.  Every 4-6 hours minimum:  Change oxygen saturation probe site4.  Every 4-6 hours:  If on nasal continuous positive airway pressure, respiratory therapy assess nares and determine need for appliance change or resting period  5/30/2025 0031 by Rossy Amaya RN  Outcome: Progressing  5/29/2025 1349 by Dorothy Blue RN  Outcome: Progressing     
  Problem: Chronic Conditions and Co-morbidities  Goal: Patient's chronic conditions and co-morbidity symptoms are monitored and maintained or improved  5/30/2025 1003 by Dorothy Blue RN  Outcome: Progressing     Problem: Discharge Planning  Goal: Discharge to home or other facility with appropriate resources  5/30/2025 1003 by Dorothy Blue RN  Outcome: Progressing     Problem: Pain  Goal: Verbalizes/displays adequate comfort level or baseline comfort level  5/30/2025 1003 by Dorothy Blue RN  Outcome: Progressing     Problem: Skin/Tissue Integrity  Goal: Skin integrity remains intact  Description: 1.  Monitor for areas of redness and/or skin breakdown2.  Assess vascular access sites hourly3.  Every 4-6 hours minimum:  Change oxygen saturation probe site4.  Every 4-6 hours:  If on nasal continuous positive airway pressure, respiratory therapy assess nares and determine need for appliance change or resting period  5/30/2025 1003 by Dorothy Blue RN  Outcome: Progressing     Problem: ABCDS Injury Assessment  Goal: Absence of physical injury  5/30/2025 1003 by Dorothy Blue RN  Outcome: Progressing     Problem: Safety - Adult  Goal: Free from fall injury  5/30/2025 1003 by Dorothy Blue RN  Outcome: Progressing     Problem: Nutrition Deficit:  Goal: Optimize nutritional status  Outcome: Progressing     
  Problem: Chronic Conditions and Co-morbidities  Goal: Patient's chronic conditions and co-morbidity symptoms are monitored and maintained or improved  5/30/2025 2043 by Rossy Amaya RN  Outcome: Progressing  5/30/2025 1003 by Dorothy Blue RN  Outcome: Progressing     Problem: Pain  Goal: Verbalizes/displays adequate comfort level or baseline comfort level  5/30/2025 2043 by Rossy Amaya RN  Outcome: Progressing  5/30/2025 1003 by Dorothy Blue RN  Outcome: Progressing     Problem: Skin/Tissue Integrity  Goal: Skin integrity remains intact  Description: 1.  Monitor for areas of redness and/or skin breakdown2.  Assess vascular access sites hourly3.  Every 4-6 hours minimum:  Change oxygen saturation probe site4.  Every 4-6 hours:  If on nasal continuous positive airway pressure, respiratory therapy assess nares and determine need for appliance change or resting period  5/30/2025 2043 by Rossy Amaya RN  Outcome: Progressing  5/30/2025 1003 by Dorothy Blue RN  Outcome: Progressing     
  Problem: Chronic Conditions and Co-morbidities  Goal: Patient's chronic conditions and co-morbidity symptoms are monitored and maintained or improved  5/31/2025 2335 by Sj De Dios RN  Outcome: Progressing  5/31/2025 1552 by Cailin Fried RN  Outcome: Progressing     Problem: Discharge Planning  Goal: Discharge to home or other facility with appropriate resources  5/31/2025 2335 by Sj De Dios RN  Outcome: Progressing  5/31/2025 1552 by Cailin Fried RN  Outcome: Progressing     Problem: Pain  Goal: Verbalizes/displays adequate comfort level or baseline comfort level  5/31/2025 2335 by Sj De Dios RN  Outcome: Progressing  5/31/2025 1552 by Cailin Fried RN  Outcome: Progressing     Problem: Skin/Tissue Integrity  Goal: Skin integrity remains intact  Description: 1.  Monitor for areas of redness and/or skin breakdown2.  Assess vascular access sites hourly3.  Every 4-6 hours minimum:  Change oxygen saturation probe site4.  Every 4-6 hours:  If on nasal continuous positive airway pressure, respiratory therapy assess nares and determine need for appliance change or resting period  5/31/2025 2335 by Sj De Dios RN  Outcome: Progressing  5/31/2025 1552 by Cailin Fried RN  Outcome: Progressing     Problem: Safety - Adult  Goal: Free from fall injury  5/31/2025 2335 by Sj De Dios RN  Outcome: Progressing  5/31/2025 1552 by Cailin Fried RN  Outcome: Progressing     
  Problem: Chronic Conditions and Co-morbidities  Goal: Patient's chronic conditions and co-morbidity symptoms are monitored and maintained or improved  Outcome: Progressing     Problem: Discharge Planning  Goal: Discharge to home or other facility with appropriate resources  Outcome: Progressing     Problem: Pain  Goal: Verbalizes/displays adequate comfort level or baseline comfort level  Outcome: Progressing     Problem: Skin/Tissue Integrity  Goal: Skin integrity remains intact  Description: 1.  Monitor for areas of redness and/or skin breakdown2.  Assess vascular access sites hourly3.  Every 4-6 hours minimum:  Change oxygen saturation probe site4.  Every 4-6 hours:  If on nasal continuous positive airway pressure, respiratory therapy assess nares and determine need for appliance change or resting period  Outcome: Progressing     Problem: ABCDS Injury Assessment  Goal: Absence of physical injury  Outcome: Progressing     Problem: Safety - Adult  Goal: Free from fall injury  Outcome: Progressing     
  Problem: Chronic Conditions and Co-morbidities  Goal: Patient's chronic conditions and co-morbidity symptoms are monitored and maintained or improved  Outcome: Progressing     Problem: Discharge Planning  Goal: Discharge to home or other facility with appropriate resources  Outcome: Progressing     Problem: Pain  Goal: Verbalizes/displays adequate comfort level or baseline comfort level  Outcome: Progressing     Problem: Skin/Tissue Integrity  Goal: Skin integrity remains intact  Description: 1.  Monitor for areas of redness and/or skin breakdown2.  Assess vascular access sites hourly3.  Every 4-6 hours minimum:  Change oxygen saturation probe site4.  Every 4-6 hours:  If on nasal continuous positive airway pressure, respiratory therapy assess nares and determine need for appliance change or resting period  Outcome: Progressing     Problem: ABCDS Injury Assessment  Goal: Absence of physical injury  Outcome: Progressing     Problem: Safety - Adult  Goal: Free from fall injury  Outcome: Progressing     
  Problem: Chronic Conditions and Co-morbidities  Goal: Patient's chronic conditions and co-morbidity symptoms are monitored and maintained or improved  Outcome: Progressing     Problem: Discharge Planning  Goal: Discharge to home or other facility with appropriate resources  Outcome: Progressing     Problem: Pain  Goal: Verbalizes/displays adequate comfort level or baseline comfort level  Outcome: Progressing     Problem: Skin/Tissue Integrity  Goal: Skin integrity remains intact  Description: 1.  Monitor for areas of redness and/or skin breakdown2.  Assess vascular access sites hourly3.  Every 4-6 hours minimum:  Change oxygen saturation probe site4.  Every 4-6 hours:  If on nasal continuous positive airway pressure, respiratory therapy assess nares and determine need for appliance change or resting period  Outcome: Progressing     Problem: ABCDS Injury Assessment  Goal: Absence of physical injury  Outcome: Progressing     Problem: Safety - Adult  Goal: Free from fall injury  Outcome: Progressing     Problem: Nutrition Deficit:  Goal: Optimize nutritional status  Outcome: Progressing     
  Problem: Chronic Conditions and Co-morbidities  Goal: Patient's chronic conditions and co-morbidity symptoms are monitored and maintained or improved  Outcome: Progressing     Problem: Discharge Planning  Goal: Discharge to home or other facility with appropriate resources  Outcome: Progressing     Problem: Pain  Goal: Verbalizes/displays adequate comfort level or baseline comfort level  Outcome: Progressing     Problem: Skin/Tissue Integrity  Goal: Skin integrity remains intact  Description: 1.  Monitor for areas of redness and/or skin breakdown2.  Assess vascular access sites hourly3.  Every 4-6 hours minimum:  Change oxygen saturation probe site4.  Every 4-6 hours:  If on nasal continuous positive airway pressure, respiratory therapy assess nares and determine need for appliance change or resting period  Outcome: Progressing     Problem: ABCDS Injury Assessment  Goal: Absence of physical injury  Outcome: Progressing     Problem: Safety - Adult  Goal: Free from fall injury  Outcome: Progressing     Problem: Nutrition Deficit:  Goal: Optimize nutritional status  Outcome: Progressing     
  Problem: Chronic Conditions and Co-morbidities  Goal: Patient's chronic conditions and co-morbidity symptoms are monitored and maintained or improved  Outcome: Progressing     Problem: Discharge Planning  Goal: Discharge to home or other facility with appropriate resources  Outcome: Progressing     Problem: Pain  Goal: Verbalizes/displays adequate comfort level or baseline comfort level  Outcome: Progressing     Problem: Skin/Tissue Integrity  Goal: Skin integrity remains intact  Description: 1.  Monitor for areas of redness and/or skin breakdown2.  Assess vascular access sites hourly3.  Every 4-6 hours minimum:  Change oxygen saturation probe site4.  Every 4-6 hours:  If on nasal continuous positive airway pressure, respiratory therapy assess nares and determine need for appliance change or resting period  Outcome: Progressing     Problem: ABCDS Injury Assessment  Goal: Absence of physical injury  Outcome: Progressing     Problem: Safety - Adult  Goal: Free from fall injury  Outcome: Progressing     Problem: Nutrition Deficit:  Goal: Optimize nutritional status  Outcome: Progressing     Problem: Respiratory - Adult  Goal: Achieves optimal ventilation and oxygenation  Outcome: Progressing     
  Problem: Chronic Conditions and Co-morbidities  Goal: Patient's chronic conditions and co-morbidity symptoms are monitored and maintained or improved  Outcome: Progressing     Problem: Discharge Planning  Goal: Discharge to home or other facility with appropriate resources  Outcome: Progressing     Problem: Pain  Goal: Verbalizes/displays adequate comfort level or baseline comfort level  Outcome: Progressing     Problem: Skin/Tissue Integrity  Goal: Skin integrity remains intact  Description: 1.  Monitor for areas of redness and/or skin breakdown2.  Assess vascular access sites hourly3.  Every 4-6 hours minimum:  Change oxygen saturation probe site4.  Every 4-6 hours:  If on nasal continuous positive airway pressure, respiratory therapy assess nares and determine need for appliance change or resting period  Outcome: Progressing     Problem: ABCDS Injury Assessment  Goal: Absence of physical injury  Outcome: Progressing     Problem: Safety - Adult  Goal: Free from fall injury  Outcome: Progressing     Problem: Nutrition Deficit:  Goal: Optimize nutritional status  Outcome: Progressing     Problem: Respiratory - Adult  Goal: Achieves optimal ventilation and oxygenation  Outcome: Progressing     
  Problem: Chronic Conditions and Co-morbidities  Goal: Patient's chronic conditions and co-morbidity symptoms are monitored and maintained or improved  Outcome: Progressing     Problem: Discharge Planning  Goal: Discharge to home or other facility with appropriate resources  Outcome: Progressing     Problem: Pain  Goal: Verbalizes/displays adequate comfort level or baseline comfort level  Outcome: Progressing     Problem: Skin/Tissue Integrity  Goal: Skin integrity remains intact  Description: 1.  Monitor for areas of redness and/or skin breakdown2.  Assess vascular access sites hourly3.  Every 4-6 hours minimum:  Change oxygen saturation probe site4.  Every 4-6 hours:  If on nasal continuous positive airway pressure, respiratory therapy assess nares and determine need for appliance change or resting period  Outcome: Progressing     Problem: Safety - Adult  Goal: Free from fall injury  Outcome: Progressing     
  Problem: Chronic Conditions and Co-morbidities  Goal: Patient's chronic conditions and co-morbidity symptoms are monitored and maintained or improved  Outcome: Progressing     Problem: Discharge Planning  Goal: Discharge to home or other facility with appropriate resources  Outcome: Progressing     Problem: Pain  Goal: Verbalizes/displays adequate comfort level or baseline comfort level  Outcome: Progressing     Problem: Skin/Tissue Integrity  Goal: Skin integrity remains intact  Description: 1.  Monitor for areas of redness and/or skin breakdown2.  Assess vascular access sites hourly3.  Every 4-6 hours minimum:  Change oxygen saturation probe site4.  Every 4-6 hours:  If on nasal continuous positive airway pressure, respiratory therapy assess nares and determine need for appliance change or resting period  Outcome: Progressing  Flowsheets (Taken 5/24/2025 1515 by Cammy Higgins RN)  Skin Integrity Remains Intact:   Monitor for areas of redness and/or skin breakdown   Turn and reposition as indicated     Problem: ABCDS Injury Assessment  Goal: Absence of physical injury  Outcome: Progressing     Problem: Safety - Adult  Goal: Free from fall injury  Outcome: Progressing     
  Problem: Chronic Conditions and Co-morbidities  Goal: Patient's chronic conditions and co-morbidity symptoms are monitored and maintained or improved  Outcome: Progressing  Flowsheets (Taken 6/5/2025 0755)  Care Plan - Patient's Chronic Conditions and Co-Morbidity Symptoms are Monitored and Maintained or Improved: Monitor and assess patient's chronic conditions and comorbid symptoms for stability, deterioration, or improvement     Problem: Discharge Planning  Goal: Discharge to home or other facility with appropriate resources  Outcome: Progressing  Flowsheets (Taken 6/5/2025 0755)  Discharge to home or other facility with appropriate resources: Identify barriers to discharge with patient and caregiver     Problem: Pain  Goal: Verbalizes/displays adequate comfort level or baseline comfort level  Outcome: Progressing     Problem: Skin/Tissue Integrity  Goal: Skin integrity remains intact  Description: 1.  Monitor for areas of redness and/or skin breakdown2.  Assess vascular access sites hourly3.  Every 4-6 hours minimum:  Change oxygen saturation probe site4.  Every 4-6 hours:  If on nasal continuous positive airway pressure, respiratory therapy assess nares and determine need for appliance change or resting period  Outcome: Progressing  Flowsheets  Taken 6/5/2025 1517  Skin Integrity Remains Intact: Monitor for areas of redness and/or skin breakdown  Taken 6/5/2025 0755  Skin Integrity Remains Intact: Monitor for areas of redness and/or skin breakdown     Problem: ABCDS Injury Assessment  Goal: Absence of physical injury  Outcome: Progressing     Problem: Safety - Adult  Goal: Free from fall injury  Outcome: Progressing     Problem: Nutrition Deficit:  Goal: Optimize nutritional status  Outcome: Progressing     Problem: Respiratory - Adult  Goal: Achieves optimal ventilation and oxygenation  Outcome: Progressing     
  Problem: Physical Therapy - Adult  Goal: By Discharge: Performs mobility at highest level of function for planned discharge setting.  See evaluation for individualized goals.  Description: FUNCTIONAL STATUS PRIOR TO ADMISSION: Patient was modified independent using a single point cane for functional mobility.    HOME SUPPORT PRIOR TO ADMISSION: The patient lived alone with family to provide assistance.    Physical Therapy Goals  Initiated 6/3/2025  1.  Patient will move from supine to sit and sit to supine in bed with supervision/set-up within 7 day(s).    2.  Patient will perform sit to stand with minimal assistance within 7 day(s).  3.  Patient will transfer from bed to chair and chair to bed with minimal assistance using the least restrictive device within 7 day(s).  4.  Patient will ambulate with minimal assistance for 10x2 feet with the least restrictive device within 7 day(s).   Outcome: Progressing   PHYSICAL THERAPY EVALUATION    Patient: Candida Maza (65 y.o. female)  Date: 6/3/2025  Diagnosis: Diabetic foot infection (HCC) [E11.628, L08.9]  Osteomyelitis of great toe of left foot (HCC) [M86.9] Osteomyelitis of great toe of left foot (HCC)  Procedure(s) (LRB):  LEFT FEMORAL POPLITEAL BYPASS (Left)  TOE AMPUTATION, LEFT HALLUX (Left) 1 Day Post-Op  Precautions: Restrictions/Precautions; Contact/MRSA  Restrictions/Precautions: Fall Risk, Weight Bearing (heel wt bearing LLE in ant off loading shoe) Lower Extremity Weight Bearing Restrictions  Left Lower Extremity Weight Bearing: Heel Weight Bearing (ant off loading shoe)          ASSESSMENT:  Patient continues to benefit from skilled PT services and is slowly progressing towards goals. Pt is post op day# 1 from L gr toe amp due to OM and fem-pop bypass of LLE.  She is to be heel wt bearing in L ant off loading shoe OOB.  Pt received sitting up in recliner with ant off loading shoe on L foot.  She reports being up ~1.5 hrs in the chair.  Pt reporting 
  Problem: Physical Therapy - Adult  Goal: By Discharge: Performs mobility at highest level of function for planned discharge setting.  See evaluation for individualized goals.  Description: FUNCTIONAL STATUS PRIOR TO ADMISSION: Patient was modified independent using a single point cane for functional mobility.    HOME SUPPORT PRIOR TO ADMISSION: The patient lived alone with family to provide assistance.    Physical Therapy Goals  Initiated 6/3/2025  1.  Patient will move from supine to sit and sit to supine in bed with supervision/set-up within 7 day(s).    2.  Patient will perform sit to stand with minimal assistance within 7 day(s).  3.  Patient will transfer from bed to chair and chair to bed with minimal assistance using the least restrictive device within 7 day(s).  4.  Patient will ambulate with minimal assistance for 10x2 feet with the least restrictive device within 7 day(s).   Outcome: Progressing   PHYSICAL THERAPY TREATMENT    Patient: Candida Maza (65 y.o. female)  Date: 6/4/2025  Diagnosis: Diabetic foot infection (HCC) [E11.628, L08.9]  Osteomyelitis of great toe of left foot (HCC) [M86.9] Osteomyelitis of great toe of left foot (HCC)  Procedure(s) (LRB):  LEFT FEMORAL POPLITEAL BYPASS (Left)  TOE AMPUTATION, LEFT HALLUX (Left) 2 Days Post-Op  Precautions: Restrictions/Precautions  Restrictions/Precautions: Fall Risk, Weight Bearing (heel wt bearing LLE in ant off loading shoe) Lower Extremity Weight Bearing Restrictions  Left Lower Extremity Weight Bearing: Heel Weight Bearing (ant off loading shoe)          ASSESSMENT:  Patient continues to benefit from skilled PT services and is slowly progressing towards goals. Limited by impaired balance, coordination, decreased strength and activity tolerance, Heel WB restrictions on LLE. Increased time and need for assistance with mobility. Verbalized understanding for heel WB with off loading shoe, pt unable to maintain despite max verbal cues and RW 
  Problem: Physical Therapy - Adult  Goal: By Discharge: Performs mobility at highest level of function for planned discharge setting.  See evaluation for individualized goals.  Description: FUNCTIONAL STATUS PRIOR TO ADMISSION: Patient was modified independent using a single point cane for functional mobility.    HOME SUPPORT PRIOR TO ADMISSION: The patient lived alone with family to provide assistance.    Physical Therapy Goals  Initiated 6/3/2025  1.  Patient will move from supine to sit and sit to supine in bed with supervision/set-up within 7 day(s).    2.  Patient will perform sit to stand with minimal assistance within 7 day(s).  3.  Patient will transfer from bed to chair and chair to bed with minimal assistance using the least restrictive device within 7 day(s).  4.  Patient will ambulate with minimal assistance for 10x2 feet with the least restrictive device within 7 day(s).   Outcome: Progressing   PHYSICAL THERAPY TREATMENT    Patient: Candida Maza (65 y.o. female)  Date: 6/6/2025  Diagnosis: Diabetic foot infection (HCC) [E11.628, L08.9]  Osteomyelitis of great toe of left foot (HCC) [M86.9] Osteomyelitis of great toe of left foot (HCC)  Procedure(s) (LRB):  LEFT FEMORAL POPLITEAL BYPASS (Left)  TOE AMPUTATION, LEFT HALLUX (Left) 4 Days Post-Op  Precautions: Restrictions/Precautions  Restrictions/Precautions: Fall Risk, Weight Bearing (heel wt bearing LLE in ant off loading shoe) Lower Extremity Weight Bearing Restrictions  Left Lower Extremity Weight Bearing: Heel Weight Bearing (ant off loading shoe)          ASSESSMENT:  Patient continues to benefit from skilled PT services.Pt supine to sit with mod assist of 2.Pt progressed to CGA sitting on EOB.Pt sit to stand with mod assist of 2.Pt stood with RW min assist of 2.Pt took 4 steps to chair with RW mod assist of 2 off load shoe on left foot.Pt needs cuing for weight shift.Pt left sitting with legs elevated.Nurse advised use Sera steady to assist 
Discharge paperwork reviewed with patient. Midline removed. Report given to Sundeep Celeste. Patient being transported via [facility] in route now.     Problem: Chronic Conditions and Co-morbidities  Goal: Patient's chronic conditions and co-morbidity symptoms are monitored and maintained or improved  6/8/2025 1705 by Rody Alaniz, RN  Outcome: Adequate for Discharge  6/8/2025 0920 by Katie Bradshaw RN  Outcome: Progressing     Problem: Discharge Planning  Goal: Discharge to home or other facility with appropriate resources  6/8/2025 1705 by Rody Alaniz RN  Outcome: Adequate for Discharge  6/8/2025 0920 by Katie Bradshaw RN  Outcome: Progressing     Problem: Pain  Goal: Verbalizes/displays adequate comfort level or baseline comfort level  6/8/2025 1705 by Rody Alaniz RN  Outcome: Adequate for Discharge  6/8/2025 0920 by Katie Bradshaw RN  Outcome: Progressing     Problem: Skin/Tissue Integrity  Goal: Skin integrity remains intact  Description: 1.  Monitor for areas of redness and/or skin breakdown2.  Assess vascular access sites hourly3.  Every 4-6 hours minimum:  Change oxygen saturation probe site4.  Every 4-6 hours:  If on nasal continuous positive airway pressure, respiratory therapy assess nares and determine need for appliance change or resting period  6/8/2025 1705 by Rody Alaniz RN  Outcome: Adequate for Discharge  6/8/2025 0920 by Katie Bradshaw RN  Outcome: Progressing     Problem: ABCDS Injury Assessment  Goal: Absence of physical injury  6/8/2025 1705 by Rody Alaniz, RN  Outcome: Adequate for Discharge  6/8/2025 0920 by Katie Bradshaw RN  Outcome: Progressing     Problem: Safety - Adult  Goal: Free from fall injury  6/8/2025 1705 by Rody Alaniz RN  Outcome: Adequate for Discharge  6/8/2025 0920 by Katie Bradshaw RN  Outcome: Progressing     Problem: Nutrition Deficit:  Goal: Optimize nutritional status  6/8/2025 1705 by Rody Alaniz, EMILIANO  Outcome: Adequate for Discharge  6/8/2025 0920 by Katie Bradshaw 
pain  5/26/2025 1044 by Katie Bradshaw RN  Outcome: Progressing     Problem: Skin/Tissue Integrity  Goal: Skin integrity remains intact  Description: 1.  Monitor for areas of redness and/or skin breakdown2.  Assess vascular access sites hourly3.  Every 4-6 hours minimum:  Change oxygen saturation probe site4.  Every 4-6 hours:  If on nasal continuous positive airway pressure, respiratory therapy assess nares and determine need for appliance change or resting period  5/26/2025 2235 by Morena Nicole RN  Outcome: Progressing  Flowsheets (Taken 5/26/2025 2033)  Skin Integrity Remains Intact:   Monitor for areas of redness and/or skin breakdown   Assess vascular access sites hourly  5/26/2025 1044 by Katie Bradshaw RN  Outcome: Progressing     Problem: ABCDS Injury Assessment  Goal: Absence of physical injury  5/26/2025 2235 by Morena Nicole, RN  Outcome: Progressing  5/26/2025 1044 by Katie Bradshaw RN  Outcome: Progressing     Problem: Safety - Adult  Goal: Free from fall injury  5/26/2025 2235 by Morena Nicole RN  Outcome: Progressing  5/26/2025 1044 by Katie Bradshaw RN  Outcome: Progressing     
(unsupported)  Sitting - Dynamic: Fair (occasional)  Standing: Impaired  Standing - Static: Constant support;Poor;Fair  Standing - Dynamic: Constant support;Poor;Fair      ADL Intervention:     Patient required modA to transfer to EOB and to scoot to get feet on the floor. MaxA to don sock and shoe on RLE and surgical shoe on LLE. Manuela to don gown (patient states she spilled her drink all over herself due to the intermittent jerking in her extremities). Mod A to stand at EOB using RW. Required increased time and ModA to transfer to BSC. Constant verbal cues required to adhere to LLE heel WB precations. Stated she did not have to void. ModA required to return to EOB after decline to sit in chair due to wound in sacral region. RN aware and wound bandaged. MaxAx2 to return to supine.              UE Dressing: Minimal assistance  UE Dressing Skilled Clinical Factors: don gown, able to don over forearms. Required assistance to don over shoulders    LE Dressing: Maximum assistance  LE Dressing Skilled Clinical Factors: don sock/shoe on RLE       Pain Ratin/10. RN aware   Pain Intervention(s):   rest and elevation after session       Activity Tolerance:   Good. Requires frequent rest breaks   Please refer to the flowsheet for vital signs taken during this treatment.    After treatment:   Patient left in no apparent distress in bed, Call bell within reach, Bed/ chair alarm activated, Side rails x3, and Heels elevated for pressure relief    COMMUNICATION/EDUCATION:   The patient's plan of care was discussed with: physical therapist and registered nurse    Patient Education  Education Given To: Patient  Education Provided: Role of Therapy;Transfer Training  Education Method: Demonstration;Verbal  Barriers to Learning: Other (Comment)  Education Outcome: Verbalized understanding;Continued education needed    Thank you for this referral.  Madyson Martinez  Minutes: 34    
understanding;Demonstrated understanding;Continued education needed    Thank you for this referral.  Minda Johnson  Minutes: 41    Regarding student involvement in patient care:  A student participated in this treatment session. Per CMS Medicare statements and AOTA guidelines I certify that the following was true:  1. I was present and directly observed the entire session.  2. I made all skilled judgments and clinical decisions regarding care.  3. I am the practitioner responsible for assessment, treatment, and documentation.    
care:  A student participated in this treatment session. Per CMS Medicare statements and AOTA guidelines I certify that the following was true:  1. I was present and directly observed the entire session.  2. I made all skilled judgments and clinical decisions regarding care.  3. I am the practitioner responsible for assessment, treatment, and documentation.

## 2025-06-12 NOTE — PROGRESS NOTES
Podiatry Progress Note    Date:2025       Room:[facility]  Patient Name:Candida Maza     YOB: 1960     Age:65 y.o.    Subjective:   Subjective   Pt seen at . No new complaints. Per nursing, no acute overnight events         Review of Systems  Unremarkable outside of HPI     Objective:     Vitals Last 24 Hours:  TEMPERATURE:  Temp  Av.5 °F (36.9 °C)  Min: 97.9 °F (36.6 °C)  Max: 99.7 °F (37.6 °C)  RESPIRATIONS RANGE: Resp  Av.1  Min: 16  Max: 18  PULSE OXIMETRY RANGE: SpO2  Av.7 %  Min: 92 %  Max: 100 %  PULSE RANGE: Pulse  Av.1  Min: 71  Max: 77  BLOOD PRESSURE RANGE: Systolic (24hrs), Av , Min:104 , Max:139        Diastolic (24hrs), Av, Min:53, Max:91        I/O (24Hr):    Intake/Output Summary (Last 24 hours) at 2025 1140  Last data filed at 2025 0227  Gross per 24 hour   Intake --   Output 500 ml   Net -500 ml       Physical Exam:    GEN: Pt is AAOx4 and in NAD. Dressing to the left foot is C/D/I. No family noted at   DERM: Surgical site with sutures to the left hallux amp site. NCSOI noted  VASC: Pedal pulses (DP/PT) diminished to B/L LE. CFT<3sec to all digits of B/L LE.  No pedal hair growth noted to the level of the digits for B/L LE. Skin temp is warm to warm from proximal to distal for B/L LE. Neg homans/neno signs to B/L LE. No varicosities or telangectasias noted to B/L LE.  NEURO: Protective and epicritic sensations grossly absent to B/L LE  MSK: (-) POP, No gross deformities. Good muscle tone and bulk noted to B/L LE.  PSYCH: Cooperative with normal mood and affect       Labs/Imaging/Diagnostics:     Labs:  CBC:  Recent Labs     25  0548 25  1154 25  0232   WBC 10.6 13.2* 14.8*   RBC 3.00* 3.03* 2.54*   HGB 8.8* 9.2* 7.7*   HCT 27.9* 28.4* 24.2*   MCV 93.0 93.7 95.3   RDW 16.0* 16.5* 16.4*    350 312     CHEMISTRIES:  Recent Labs     25  0802 25  1200 25  0232    140 141   K 5.0 5.5* 
       Podiatry Progress Note    Date:2025       Room:[facility] [place][note 2]  Patient Name:Candida Maza     YOB: 1960     Age:65 y.o.    Subjective:     Patient is a 65 y.o. female who is being seen at bedside right hallux osteomyelitis.       Objective:     Vitals Last 24 Hours:  TEMPERATURE:  Temp  Av.9 °F (36.6 °C)  Min: 97.3 °F (36.3 °C)  Max: 98.6 °F (37 °C)  RESPIRATIONS RANGE: Resp  Av.1  Min: 16  Max: 20  PULSE OXIMETRY RANGE: SpO2  Av.3 %  Min: 96 %  Max: 100 %  PULSE RANGE: Pulse  Av.5  Min: 61  Max: 73  BLOOD PRESSURE RANGE: Systolic (24hrs), Av , Min:140 , Max:155        Diastolic (24hrs), Av, Min:67, Max:98        I/O (24Hr):  No intake or output data in the 24 hours ending 250    Physical Exam:    GEN: Pt is AAOx4 and in NAD. Dressing to the left foot is C/D/I. No family noted at BS  DERM: Wound distal tip of the left hallux, probes to bone. Fibrotic wound to the dorsal left foot. Dry skin noted to B/L LE.   VASC: Pedal pulses (DP/PT) diminished to B/L LE. CFT<3sec to all digits of B/L LE. No pedal hair growth noted to the level of the digits for B/L LE. Skin temp is warm to warm from proximal to distal for B/L LE. Neg homans/neno signs to B/L LE. No varicosities or telangectasias noted to B/L LE.  NEURO: Protective and epicritic sensations grossly absent to B/L LE  MSK: (-) POP, No gross deformities. Good muscle tone and bulk noted to B/L LE.  PSYCH: Cooperative with normal mood and affect       Labs/Imaging/Diagnostics:     Labs:  CBC:  Recent Labs     25  0456 25  1018 25  0424   WBC 10.5 11.2* 9.3   RBC 3.01* 3.37* 3.32*   HGB 8.9* 10.0* 9.8*   HCT 28.1* 30.9* 30.7*   MCV 93.4 91.7 92.5   RDW 15.5* 15.3* 15.5*    306 314     CHEMISTRIES:  Recent Labs     25  0456 25  1018 25  0424    141 140   K 4.8 5.1 4.8   * 116* 116*   CO2 20* 21 20*   BUN 37* 39* 38*   CREATININE 2.08* 1.93* 1.88*   GLUCOSE 
       Podiatry Progress Note    Date:2025       Room:OR/PL  Patient Name:Candida Maza     YOB: 1960     Age:65 y.o.    Subjective:     Patient is a 65 y.o. female who is being seen at bedside right hallux osteomyelitis, Patient is seen in the preoperative area.       Objective:     Vitals Last 24 Hours:  TEMPERATURE:  Temp  Av.4 °F (36.9 °C)  Min: 97.7 °F (36.5 °C)  Max: 98.9 °F (37.2 °C)  RESPIRATIONS RANGE: Resp  Av.4  Min: 16  Max: 19  PULSE OXIMETRY RANGE: SpO2  Av.1 %  Min: 94 %  Max: 99 %  PULSE RANGE: Pulse  Av.7  Min: 67  Max: 74  BLOOD PRESSURE RANGE: Systolic (24hrs), Av , Min:124 , Max:145        Diastolic (24hrs), Av, Min:53, Max:69        I/O (24Hr):    Intake/Output Summary (Last 24 hours) at 2025 1022  Last data filed at 2025 0744  Gross per 24 hour   Intake --   Output 800 ml   Net -800 ml       Physical Exam:    GEN: Pt is AAOx4 and in NAD. Dressing to the left foot is C/D/I. No family noted at BS  DERM: Wound distal tip of the left hallux, probes to bone. Fibrotic wound to the dorsal left foot. Dry skin noted to B/L LE.   VASC: Pedal pulses (DP/PT) diminished to B/L LE. CFT<3sec to all digits of B/L LE. No pedal hair growth noted to the level of the digits for B/L LE. Skin temp is warm to warm from proximal to distal for B/L LE. Neg homans/neno signs to B/L LE. No varicosities or telangectasias noted to B/L LE.  NEURO: Protective and epicritic sensations grossly absent to B/L LE  MSK: (-) POP, No gross deformities. Good muscle tone and bulk noted to B/L LE.  PSYCH: Cooperative with normal mood and affect       Labs/Imaging/Diagnostics:     Labs:  CBC:  Recent Labs     25  0607 06/01/25  0548   WBC 9.5 10.3 10.6   RBC 3.13* 2.96* 3.00*   HGB 9.3* 8.8* 8.8*   HCT 29.1* 27.4* 27.9*   MCV 93.0 92.6 93.0   RDW 15.9* 15.7* 16.0*    300 295     CHEMISTRIES:  Recent Labs     25 
       Podiatry Progress Note    Date:2025       Room:[facility]  Patient Name:Candida Maza     YOB: 1960     Age:65 y.o.    Subjective:   Subjective   Pt seen at . No new complaints. Per nursing, no acute overnight events        Review of Systems  Unremarkable outside of HPI    Objective:     Vitals Last 24 Hours:  TEMPERATURE:  Temp  Av.4 °F (36.9 °C)  Min: 97.9 °F (36.6 °C)  Max: 99.1 °F (37.3 °C)  RESPIRATIONS RANGE: Resp  Av.8  Min: 16  Max: 18  PULSE OXIMETRY RANGE: SpO2  Av.5 %  Min: 95 %  Max: 96 %  PULSE RANGE: Pulse  Av.3  Min: 65  Max: 73  BLOOD PRESSURE RANGE: Systolic (24hrs), Av , Min:126 , Max:160        Diastolic (24hrs), Av, Min:61, Max:79        I/O (24Hr):  No intake or output data in the 24 hours ending 25 1339    Physical Exam:    GEN: Pt is AAOx4 and in NAD. Dressing to the left foot is C/D/I. No family noted at   DERM: Wound distal tip of the left hallux, probes to bone.  Fibrotic wound to the dorsal left foot. Dry skin noted to B/L LE.   VASC: Pedal pulses (DP/PT) diminished to B/L LE. CFT<3sec to all digits of B/L LE.  No pedal hair growth noted to the level of the digits for B/L LE. Skin temp is warm to warm from proximal to distal for B/L LE. Neg homans/neno signs to B/L LE. No varicosities or telangectasias noted to B/L LE.  NEURO: Protective and epicritic sensations grossly absent to B/L LE  MSK: (-) POP, No gross deformities. Good muscle tone and bulk noted to B/L LE.  PSYCH: Cooperative with normal mood and affect       Labs/Imaging/Diagnostics:     Labs:  CBC:  Recent Labs     25  0248 25  1531 25  1810 25  0456 25  1018   WBC 8.7  --   --  10.5 11.2*   RBC 2.35*  --   --  3.01* 3.37*   HGB 6.9*   < > 10.1* 8.9* 10.0*   HCT 22.4*   < > 31.6* 28.1* 30.9*   MCV 95.3  --   --  93.4 91.7   RDW 15.4*  --   --  15.5* 15.3*     --   --  304 306    < > = values in this interval not displayed. 
     Nutrition Note    Fixing supplement order;    - Provide Nepro TID to increase kcal/protein intake (420 kcal, 38 g Carbs, 19 g Protein each)   - Provide Jose Roberto BID to aid in wound healing (95 kcal, 9.8 g carbs, complete amino acids for wound healing 2.5 g)    Mag Schmitz MS, RD, [facility]  Ext: [phone], or via SeptRxve      
    Pharmacy Note - Dose adjustment for daptomycin per P&T approved protocol.    Dosing Weight Used:  actual body weight: 71.2kg    BMI:   BMI Readings from Last 1 Encounters:   05/24/25 28.72 kg/m²     Height:   Ht Readings from Last 1 Encounters:   05/24/25 1.575 m (5' 2\")     ABW:  Wt Readings from Last 1 Encounters:   05/24/25 71.2 kg (157 lb)      IBW and AdjBW: Ideal body weight: 50.1 kg (110 lb 7.2 oz)  Adjusted ideal body weight: 58.5 kg (129 lb 1.1 oz)        Dosing:  Estimated Creatinine Clearance: 24 mL/min (A) (based on SCr of 2.15 mg/dL (H)).  Recent Labs     05/25/25  0654 05/26/25  0301 05/27/25  0248   BUN 30* 34* 36*   CREATININE 1.62* 1.72* 2.15*         Max recommended dose is 1500mg daily. If dose is exceeded confirm dosing with infectious disease physician or fellow pharmacist. Increased incidence of side effects although benefit may outweigh risks in some cases. See dosing protocol for more information.    Statin Therapy    No statin therapy currently ordered    CK Monitoring    Creatinine Kinase (CK) has been ordered    Ordered dose:    Daptomycin 550mg IV every 48 hours has been ordered.    Thank you,  Carolee Bergman [facility]  5/27/2025, 2:26 PM  
  [address] [id number]   Office [phone]  Fax [phone]          Subjective / Objective     Subjective  Overnight Events: No acute overnight events.     Patient seen and examined at bedside. Reports feeling well this AM. Having some pain in the legs but overall okay. Denies CP, SOB, N/V, dysuria.    Respiratory:   Room air  Vitals:    05/25/25 0506   BP:    Pulse:    Resp: 16   Temp:    SpO2:      Physical Examination:   General appearance - alert, well appearing, and in no distress  Chest - clear to auscultation, no wheezes, rales or rhonchi, symmetric air entry  Heart - normal rate, regular rhythm, normal S1, S2, no murmurs, rubs, clicks or gallops,   Abdomen - soft, nontender, nondistended, no masses or organomegaly  Neurological - alert, oriented, normal speech, no focal findings  Skin - warm, dry. Ankles wrapped with gauze. Wound on left great toe.   Extremities - peripheral pulses normal, no pedal edema, no clubbing or cyanosis  Psychiatric - normal speech and thought processes    I/O:  No intake/output data recorded.  Inpatient Medications  [unfilled]  Current Facility-Administered Medications   Medication Dose Route Frequency    sodium chloride flush 0.9 % injection 5-40 mL  5-40 mL IntraVENous 2 times per day    sodium chloride flush 0.9 % injection 5-40 mL  5-40 mL IntraVENous PRN    0.9 % sodium chloride infusion   IntraVENous PRN    potassium chloride (KLOR-CON) extended release tablet 40 mEq  40 mEq Oral PRN    Or    potassium bicarb-citric acid (EFFER-K) effervescent tablet 40 mEq  40 mEq Oral PRN    Or    potassium chloride 10 mEq/100 mL IVPB (Peripheral Line)  10 mEq IntraVENous PRN    magnesium sulfate 2000 mg in 50 mL IVPB premix  2,000 mg IntraVENous PRN    enoxaparin (LOVENOX) injection 40 mg  40 mg SubCUTAneous Q24H    polyethylene glycol (GLYCOLAX) packet 17 g  17 g Oral Daily PRN    ceFEPIme (MAXIPIME) 2,000 mg in sodium chloride 0.9 % 100 mL IVPB (addEASE)  
  [address] [address] [id number]   Office [phone]  Fax [phone]          Subjective / Objective     Subjective  Overnight Events: No acute events ON.   Patient seen and examined at bedside. Reports feeling well this AM, no change in pain of the L foot/ great toe. B/L LE swelling, worse since discontinuing Lasix yesterday. Also reporting L calf tenderness. Denies any vomiting, or abdominal pain.  Denies CP, SOB, N/V, dysuria. Denies any hematochezia, melena, or hematuria.     Respiratory: Room Air  /61   Pulse 65   Temp 97.9 °F (36.6 °C) (Axillary)   Resp 16   Ht 1.575 m (5' 2\")   Wt 71.2 kg (157 lb)   SpO2 95%   BMI 28.72 kg/m²    Physical Examination:   General appearance - resting comfortably, alert, well appearing, and in no distress  Eyes: No scleral icterus   Chest - clear to auscultation, no wheezes, rales or rhonchi, symmetric air entry  Heart - normal rate, regular rhythm, normal S1, S2, no murmurs, rubs, clicks or gallops,   Abdomen - soft, non-tender, nondistended, no masses or organomegaly  Neurological - alert, oriented, normal speech, no focal findings  Skin - warm, dry. Ankles wrapped with gauze. Wound on left great toe.   Extremities - 1+ DP pulses, no clubbing or cyanosis. 2+ LE edema bilaterally. L posterior calf pain. No surrounding erythema or warmth.   Psychiatric - normal speech and thought processes, no focal deficit.     I/O:  No intake/output data recorded.  Inpatient Medications  Current Facility-Administered Medications   Medication Dose Route Frequency    0.9 % sodium chloride infusion   IntraVENous PRN    DAPTOmycin (CUBICIN) 550 mg in sodium chloride (PF) 0.9 % 11 mL IV syringe  550 mg IntraVENous Q48H    insulin glargine (LANTUS) injection vial 8 Units  8 Units SubCUTAneous QAM    vitamin D (ERGOCALCIFEROL) capsule 50,000 Units  50,000 Units Oral Weekly    miconazole (MICOTIN) 2 % powder   Topical BID    oxyCODONE (ROXICODONE) immediate release 
  [address] [place] [address]   Office [phone]  Fax [phone]          Subjective / Objective     Subjective  Overnight Events:None  Patient seen and examined at bedside. Continued nausea, improved with Zofran. No additional episodes of emesis. Denies CP, SOB, N/V, dysuria. Denies any hematochezia, melena, or hematuria.     Respiratory: Room Air  /61   Pulse 61   Temp 98.2 °F (36.8 °C) (Oral)   Resp 18   Ht 1.575 m (5' 2\")   Wt 71.2 kg (157 lb)   SpO2 94%   BMI 28.72 kg/m²    Physical Examination:   General appearance - resting comfortably, alert, well appearing, and in no distress  Eyes: No scleral icterus   Chest - clear to auscultation, no wheezes, rales or rhonchi, symmetric air entry  Heart - normal rate, regular rhythm, normal S1, S2, no murmurs, rubs, clicks or gallops,   Abdomen - soft, non-tender, nondistended, no masses or organomegaly  Neurological - alert, oriented, normal speech, no focal findings  Skin - warm, dry. Ankles wrapped with gauze. Wound on left great toe.   Extremities - 1+ DP pulses, no clubbing or cyanosis. 2+ LE edema bilaterally. No calf tenderness, warmth, or erythema.   Psychiatric - normal speech and thought processes, no focal deficit.     I/O:  No intake/output data recorded.  Inpatient Medications  Current Facility-Administered Medications   Medication Dose Route Frequency    calcium carbonate (TUMS) chewable tablet 500 mg  500 mg Oral TID PRN    melatonin tablet 3 mg  3 mg Oral Nightly PRN    furosemide (LASIX) tablet 20 mg  20 mg Oral BID    ondansetron (ZOFRAN) injection 4 mg  4 mg IntraVENous Q6H PRN    Or    ondansetron (ZOFRAN-ODT) disintegrating tablet 4 mg  4 mg Oral Q8H PRN    pantoprazole (PROTONIX) tablet 40 mg  40 mg Oral BID AC    0.9 % sodium chloride infusion   IntraVENous PRN    DAPTOmycin (CUBICIN) 550 mg in sodium chloride (PF) 0.9 % 11 mL IV syringe  550 mg IntraVENous Q48H    insulin glargine (LANTUS) injection vial 8 
  [address] [address] [id number]   Office [phone]  Fax [phone]          Subjective / Objective     Subjective  Overnight Events: None    Pt reports feeling well. She reports continued post-op pain of the RLE. No other concerns. Denies any chest pain, shortness of breath, N/V/D. No other concerns at this time.     Respiratory: Room Air  BP (!) 123/58   Pulse 68   Temp 97 °F (36.1 °C) (Axillary)   Resp 16   Ht 1.575 m (5' 2.01\")   Wt 80.7 kg (178 lb)   SpO2 96%   BMI 32.55 kg/m²    Physical Examination:   General appearance - resting comfortably, alert, well appearing, and in no distress  Eyes: No scleral icterus, no conjunctival pallor  Chest - clear to auscultation, no wheezes, rales or rhonchi, symmetric air entry  Heart - normal rate, regular rhythm, normal S1, S2, no murmurs, rubs, clicks or gallops,   Abdomen - soft, mild-epigastric tenderness, nondistended, no masses or organomegaly  Neurological - alert, oriented, normal speech, no focal findings  Skin - warm, dry. Ankles and foot wrapped with guaze/sock. No drainage noted.   Extremities - 1+ upper and lower extremity edema. Pt has c/d/I incision of the LLE medial mid-calf. No drainage, erythema or swelling or warmth surrounding incision. L foot wrapped. Dressing remains clean dry and intact.   Psychiatric - normal speech and thought processes, no focal deficit.     I/O:  06/04 0701 - 06/05 0700  In: -   Out: 700 [Urine:700]  Inpatient Medications  Current Facility-Administered Medications   Medication Dose Route Frequency    sodium zirconium cyclosilicate (LOKELMA) oral suspension 10 g  10 g Oral Once    insulin glargine (LANTUS) injection vial 18 Units  18 Units SubCUTAneous QAM    insulin lispro (HUMALOG,ADMELOG) injection vial 4 Units  4 Units SubCUTAneous TID WC    tiZANidine (ZANAFLEX) tablet 2 mg  2 mg Oral Q8H PRN    enoxaparin Sodium (LOVENOX) injection 30 mg  30 mg SubCUTAneous Q24H    furosemide (LASIX) tablet 20 mg 
  [address] [place] [address]   Office [phone]  Fax [phone]          Subjective / Objective     Subjective  Overnight Events:None  Patient seen and examined at bedside. Complains of LUE pain and swelling. Had a midline cath placed yesterday in left arm. Denies any hematochezia, melena, or hematuria.     Respiratory: Room Air  /64   Pulse 60   Temp 97.7 °F (36.5 °C) (Oral)   Resp 17   Ht 1.575 m (5' 2.01\")   Wt 71.2 kg (157 lb)   SpO2 97%   BMI 28.71 kg/m²    Physical Examination:   General appearance - resting comfortably, alert, well appearing, and in no distress  Eyes: No scleral icterus, sclera anicteric  Chest - clear to auscultation, no wheezes, rales or rhonchi, symmetric air entry  Heart - normal rate, regular rhythm, normal S1, S2, no murmurs, rubs, clicks or gallops,   Abdomen - soft, mild-epigastric tenderness, nondistended, no masses or organomegaly  Neurological - alert, oriented, normal speech, no focal findings  Skin - warm, dry. Ankles and foot wrapped with guaze/sock. No drainage noted.   Extremities - 1+ DP pulses, no clubbing or cyanosis. 2+ LE edema bilaterally. No calf tenderness, warmth, or erythema. LUE edema present over hand and forearm, pulses felt.   Psychiatric - normal speech and thought processes, no focal deficit.     I/O:  No intake/output data recorded.  Inpatient Medications  Current Facility-Administered Medications   Medication Dose Route Frequency    insulin glargine (LANTUS) injection vial 12 Units  12 Units SubCUTAneous QAM    sodium chloride flush 0.9 % injection 5-40 mL  5-40 mL IntraVENous 2 times per day    sodium chloride flush 0.9 % injection 5-40 mL  5-40 mL IntraVENous PRN    0.9 % sodium chloride infusion   IntraVENous PRN    lidocaine PF 1 % injection 50 mg  50 mg IntraDERmal Once    calcium carbonate (TUMS) chewable tablet 500 mg  500 mg Oral TID PRN    melatonin tablet 3 mg  3 mg Oral Nightly PRN    cefepime (MAXIPIME) 1,000 
  [address] [place] [address]   Office [phone]  Fax [phone]          Subjective / Objective     Subjective  Overnight Events: NATHALIE  Pt reports feeling well. She reports continued post-op pain of the LLE. New concern of ear itching this morning. No chest pain/abdominal pain/shortness of breath.    Respiratory: Room Air  /60   Pulse 73   Temp 98.2 °F (36.8 °C) (Oral)   Resp 16   Ht 1.575 m (5' 2.01\")   Wt 80.7 kg (178 lb)   SpO2 99%   BMI 32.55 kg/m²      Physical Examination:   General appearance - resting comfortably, alert, well appearing, and in no distress  Eyes: No scleral icterus, no conjunctival pallor  Chest - clear to auscultation, no wheezes, rales or rhonchi, symmetric air entry  Heart - normal rate, regular rhythm, normal S1, S2, no murmurs, rubs, clicks or gallops,   Abdomen - soft, mild-epigastric tenderness, nondistended, no masses or organomegaly  Neurological - alert, oriented, normal speech, no focal findings  Skin - warm, dry.Extremities - 2+ upper and lower extremity edema. No drainage, erythema or swelling or warmth surrounding incision.  L ankle and foot wrapped with gauze/sock. No drainage noted.   Psychiatric - normal speech and thought processes, no focal deficit.     I/O:  06/06 0701 - 06/07 0700  In: -   Out: 1200 [Urine:1200]  Inpatient Medications  Current Facility-Administered Medications   Medication Dose Route Frequency    polyethylene glycol (GLYCOLAX) packet 17 g  17 g Oral BID    insulin glargine (LANTUS) injection vial 20 Units  20 Units SubCUTAneous QAM    insulin lispro (HUMALOG,ADMELOG) injection vial 6 Units  6 Units SubCUTAneous TID WC    heparin (porcine) injection 5,000 Units  5,000 Units SubCUTAneous 3 times per day    sennosides-docusate sodium (SENOKOT-S) 8.6-50 MG tablet 2 tablet  2 tablet Oral Daily    oxyCODONE (ROXICODONE) immediate release tablet 2.5 mg  2.5 mg Oral Q6H PRN    lidocaine 4 % external patch 1 patch  1 patch 
  [address] [address] [id number]   Office [phone]  Fax [phone]          Subjective / Objective     Subjective  Overnight Events: AM CBC w/ Hg of 6.9, pt asymptomatic. 1upRBC's ordered.   Patient seen and examined at bedside. Reports feeling well this AM, continues to have mild pain of the L foot/ great toe. Additionally reports some nausea this morning. Denies any vomiting, or abdominal pain.  Denies CP, SOB, N/V, dysuria. Denies any hematochezia, melena, or hematuria.     Respiratory: Room Air  /60   Pulse 65   Temp 98.6 °F (37 °C) (Oral)   Resp 16   Ht 1.575 m (5' 2\")   Wt 71.2 kg (157 lb)   SpO2 95%   BMI 28.72 kg/m²    Physical Examination:   General appearance - resting comfortably, alert, well appearing, and in no distress  Eyes: Conjunctival pallor  Chest - clear to auscultation, no wheezes, rales or rhonchi, symmetric air entry  Heart - normal rate, regular rhythm, normal S1, S2, no murmurs, rubs, clicks or gallops,   Abdomen - soft, mild epigastric tenderness, nondistended, no masses or organomegaly  Neurological - alert, oriented, normal speech, no focal findings  Skin - warm, dry. Ankles wrapped with gauze. Wound on left great toe.   Extremities - peripheral pulses normal, no pedal edema, no clubbing or cyanosis  Psychiatric - normal speech and thought processes, no focal deficit.     I/O:  05/26 0701 - 05/27 0700  In: 1251.5 [P.O.:200]  Out: -   Inpatient Medications  Current Facility-Administered Medications   Medication Dose Route Frequency    0.9 % sodium chloride infusion   IntraVENous PRN    sodium chloride 0.9 % bolus 500 mL  500 mL IntraVENous Once    lactated ringers bolus 500 mL  500 mL IntraVENous Once    insulin glargine (LANTUS) injection vial 8 Units  8 Units SubCUTAneous QAM    vitamin D (ERGOCALCIFEROL) capsule 50,000 Units  50,000 Units Oral Weekly    miconazole (MICOTIN) 2 % powder   Topical BID    oxyCODONE (ROXICODONE) immediate release tablet 
  Progress Note    Date:06/06/2025       Room:[facility]  Patient Name:Candida Maza     YOB: 1960     Age:65 y.o.    Subjective    Subjective   Pt seen at . No new complaints. Per nursing, no acute overnight events       Review of Systems  Unremarkable outside of HPI       Objective         Vitals Last 24 Hours:  TEMPERATURE:  No data recorded  RESPIRATIONS RANGE: No data recorded  PULSE OXIMETRY RANGE: No data recorded  PULSE RANGE: No data recorded  BLOOD PRESSURE RANGE: No data recorded.  ; No data recorded.    I/O (24Hr):  No intake or output data in the 24 hours ending 06/12/25 0936    Objective  GEN: Pt is AAOx4 and in NAD. Dressing to the left foot is C/D/I. No family noted at   DERM: Surgical site with sutures to the left hallux amp site. NCSOI noted  VASC: Pedal pulses (DP/PT) diminished to B/L LE. CFT<3sec to all digits of B/L LE. No pedal hair growth noted to the level of the digits for B/L LE. Skin temp is warm to warm from proximal to distal for B/L LE. Neg homans/neno signs to B/L LE. No varicosities or telangectasias noted to B/L LE.  NEURO: Protective and epicritic sensations grossly absent to B/L LE  MSK: (-) POP, No gross deformities. Good muscle tone and bulk noted to B/L LE.  PSYCH: Cooperative with normal mood and affect       Labs/Imaging/Diagnostics    Labs:  CBC:No results for input(s): \"WBC\", \"RBC\", \"HGB\", \"HCT\", \"MCV\", \"RDW\", \"PLT\" in the last 72 hours.  CHEMISTRIES:No results for input(s): \"NA\", \"K\", \"CL\", \"CO2\", \"BUN\", \"CREATININE\", \"GLUCOSE\", \"PHOS\", \"MG\" in the last 72 hours.    Invalid input(s): \"CA\"  PT/INR:No results for input(s): \"PROTIME\", \"INR\" in the last 72 hours.  APTT:No results for input(s): \"APTT\" in the last 72 hours.  LIVER PROFILE:No results for input(s): \"AST\", \"ALT\", \"BILIDIR\", \"BILITOT\", \"ALKPHOS\" in the last 72 hours.    Imaging Last 24 Hours:  No results found.      Assessment//Plan           Hospital Problems           Last Modified POA    * 
  Progress Note    Date:2025       Room:[facility]  Patient Name:Candida Maza     YOB: 1960     Age:65 y.o.    Subjective    Subjective   Pt seen at . No new complaints. Per nursing, no acute overnight events       Review of Systems  Unremarkable outside of HPI       Objective         Vitals Last 24 Hours:  TEMPERATURE:  Temp  Av.2 °F (36.8 °C)  Min: 97 °F (36.1 °C)  Max: 99 °F (37.2 °C)  RESPIRATIONS RANGE: Resp  Av.2  Min: 16  Max: 18  PULSE OXIMETRY RANGE: SpO2  Av.9 %  Min: 93 %  Max: 99 %  PULSE RANGE: Pulse  Av.9  Min: 67  Max: 77  BLOOD PRESSURE RANGE: Systolic (24hrs), Av , Min:118 , Max:140   ; Diastolic (24hrs), Av, Min:49, Max:68    I/O (24Hr):    Intake/Output Summary (Last 24 hours) at 2025 0827  Last data filed at 2025 1824  Gross per 24 hour   Intake --   Output 700 ml   Net -700 ml     Objective  GEN: Pt is AAOx4 and in NAD. Dressing to the left foot is C/D/I. No family noted at   DERM: Surgical site with sutures to the left hallux amp site. NCSOI noted  VASC: Pedal pulses (DP/PT) diminished to B/L LE. CFT<3sec to all digits of B/L LE.  No pedal hair growth noted to the level of the digits for B/L LE. Skin temp is warm to warm from proximal to distal for B/L LE. Neg homans/neno signs to B/L LE. No varicosities or telangectasias noted to B/L LE.  NEURO: Protective and epicritic sensations grossly absent to B/L LE  MSK: (-) POP, No gross deformities. Good muscle tone and bulk noted to B/L LE.  PSYCH: Cooperative with normal mood and affect       Labs/Imaging/Diagnostics    Labs:  CBC:  Recent Labs     25  1154 25  0232 25  0144   WBC 13.2* 14.8* 12.9*   RBC 3.03* 2.54* 2.41*   HGB 9.2* 7.7* 7.4*   HCT 28.4* 24.2* 22.6*   MCV 93.7 95.3 93.8   RDW 16.5* 16.4* 16.9*    312 300     CHEMISTRIES:  Recent Labs     25  1200 25  0232 25  0144    141 141   K 5.5* 5.4* 6.2*   * 117* 118*   CO2 21 
  Progress Note    Date:6/3/2025       Room:[facility]  Patient Name:Candida Maza     YOB: 1960     Age:65 y.o.    Subjective    Subjective   Pt seen at . No new complaints. Per nursing, no acute overnight events       Review of Systems  Unremarkable outside of HPI       Objective         Vitals Last 24 Hours:  TEMPERATURE:  Temp  Av.8 °F (36.6 °C)  Min: 97.3 °F (36.3 °C)  Max: 98.2 °F (36.8 °C)  RESPIRATIONS RANGE: Resp  Av.4  Min: 8  Max: 18  PULSE OXIMETRY RANGE: SpO2  Av %  Min: 95 %  Max: 100 %  PULSE RANGE: Pulse  Av.5  Min: 66  Max: 79  BLOOD PRESSURE RANGE: Systolic (24hrs), Av , Min:112 , Max:151   ; Diastolic (24hrs), Av, Min:57, Max:86    I/O (24Hr):    Intake/Output Summary (Last 24 hours) at 6/3/2025 1151  Last data filed at 6/3/2025 0930  Gross per 24 hour   Intake 600 ml   Output 1515 ml   Net -915 ml     Objective  GEN: Pt is AAOx4 and in NAD. Dressing to the left foot is C/D/I. No family noted at   DERM: Surgical site with sutures to the left hallux amp site. NCSOI noted  VASC: Pedal pulses (DP/PT) diminished to B/L LE. CFT<3sec to all digits of B/L LE.  No pedal hair growth noted to the level of the digits for B/L LE. Skin temp is warm to warm from proximal to distal for B/L LE. Neg homans/neno signs to B/L LE. No varicosities or telangectasias noted to B/L LE.  NEURO: Protective and epicritic sensations grossly absent to B/L LE  MSK: (-) POP, No gross deformities. Good muscle tone and bulk noted to B/L LE.  PSYCH: Cooperative with normal mood and affect       Labs/Imaging/Diagnostics    Labs:  CBC:  Recent Labs     25  0141 25  0548   WBC 10.3 10.6   RBC 2.96* 3.00*   HGB 8.8* 8.8*   HCT 27.4* 27.9*   MCV 92.6 93.0   RDW 15.7* 16.0*    295     CHEMISTRIES:  Recent Labs     25  01425  0802    143 141   K 4.5 3.9 5.0   * 120* 117*   CO2 22 18* 20*   BUN 46* 38* 50*   CREATININE 2.15* 1.64* 
Complains of \"shakiness\"  Bypass is open  Continue as is  Activity as tolerated from my standpoint.   
Comprehensive Nutrition Assessment    Type and Reason for Visit:  Initial, LOS    Nutrition Recommendations/Plan:   Resume regular 4 carb choice diet when able   When diet resumes,  provide Glucerna TID (660 kcal, 78 g carbs, 30 g protein) Chocolate   When diet resumes, provide Jose Roberto BID to aid in wound healing (95 kcal, 9.8 g carbs, complete amino acids for wound healing 2.5 g)     Malnutrition Assessment:  Malnutrition Status:  Insufficient data (05/30/25 0930)    Context:  Acute Illness     Findings of the 6 clinical characteristics of malnutrition:  Energy Intake:  No decrease in energy intake  Weight Loss:  Unable to assess     Body Fat Loss:  No body fat loss     Muscle Mass Loss:  No muscle mass loss    Fluid Accumulation:  No fluid accumulation     Strength:  Not Performed    Nutrition Assessment:     Patient is a 65 year old female admitted with Diabetic foot infection (HCC) [E11.628, L08.9]  Osteomyelitis of great toe of left foot (HCC) [M86.9] and  has a past medical history of Arthritis, Asthma, Cataracts, bilateral, Diabetes (HCC), GERD (gastroesophageal reflux disease), Hypertension, Obesity, Class II, BMI 35-39.9, and Pancreatic insufficiency.    Patient shares that her appetite and intake have been at baseline in the months prior to admission. She shares that her intake isn't always the best at baseline. Unable to assess wt loss with confidence. Documented wt suggest a 25#, 13.7% wt loss over the last three months, however bed scale today measures 181#. Additionally, the pt states she does not believe she's lost any wt recently. NFPE completed without any significant findings. Pt unable to provide insight regarding intake since admission, has been NPO frequently. She denies any N/V/D. Last BM 5.27. She shares it's normal for her to go a couple of days with no BM. She does not use any ONS at home but is accepting of recommendation for Glucerna TID and Jose Roberto BID when diet order is resumed. Labs 
Comprehensive Nutrition Assessment    Type and Reason for Visit:  Reassess    Nutrition Recommendations/Plan:   Modify diet to regular 4 carb choice  Provide Glucerna TID (660 kcal, 78 g carbs, 30 g protein) Chocolate   Provide Jose Roberto BID to aid in wound healing (95 kcal, 9.8 g carbs, complete amino acids for wound healing 2.5 g)     Malnutrition Assessment:  Malnutrition Status:  Insufficient data (05/30/25 0930)    Context:  Acute Illness     Findings of the 6 clinical characteristics of malnutrition:  Energy Intake:  No decrease in energy intake  Weight Loss:  Unable to assess     Body Fat Loss:  No body fat loss     Muscle Mass Loss:  No muscle mass loss    Fluid Accumulation:  No fluid accumulation     Strength:  Not Performed    Nutrition Assessment:       6.03.25 Follow Up: Pt reports \"usual\" appetite and estimates eating 50% of meals since surgery yesterday. Was NPO Sunday and Monday. Denies any N/V/D. Last BM 5.31; pt shares she sometimes goes a couple of without BM. New wt today measured 178# bed scale. After procedure yesterday, diet was resumed as regular and ONS orders were not re-placed. Corrected both this AM. Will continue with carb restricted diet, Jose Roberto, and Glucerna while monitoring intake.     Initial assessment 5.30.25: Patient is a 65 year old female admitted with Diabetic foot infection (HCC) [E11.628, L08.9]  Osteomyelitis of great toe of left foot (HCC) [M86.9] and  has a past medical history of Arthritis, Asthma, Cataracts, bilateral, Diabetes (HCC), GERD (gastroesophageal reflux disease), Hypertension, Obesity, Class II, BMI 35-39.9, and Pancreatic insufficiency.    Patient shares that her appetite and intake have been at baseline in the months prior to admission. She shares that her intake isn't always the best at baseline. Unable to assess wt loss with confidence. Documented wt suggest a 25#, 13.7% wt loss over the last three months, however bed scale today measures 181#. 
Creat better. K still high. Ordered  lokelma x 6 doses  Available as needed over  w/e  
Doing well from a vascular standpoint  Ok for discharge when medically stable  Follow up in 2 weeks.   
Infectious Disease Progress     65 y.o. female with past medical Hx of  T2DM, CKDIV, HTN, pancreatic insufficiency, GERD, ESBL UTIs who presented to the ED with bilateral leg pain and swelling, admitted for diabetic foot infection and osteomyelitis of left hallux.      Diabetic foot infection  Osteomyelitis of left great hallux  S/p left hallux ampuation (6/2)   CKD  Antibiotic allergies  MRSA nares negative  Superficial wound cx 5/27 MRSA  A1c 6.5, controlled    Per podiatry surgery was definitive with removal of all infected bone.  Cultures growing MRSA.  Narrow antibiotics to doxycycline.  Would continue through 6/11   ID will follow peripherally.  Please call with questions     BMP:  Recent Labs     06/03/25  1200 06/04/25  0232 06/05/25  0144   BUN 55* 63* 75*    141 141   K 5.5* 5.4* 6.2*   * 117* 118*   CO2 21 20* 24       LFTS:  Recent Labs     06/03/25  1200 06/04/25  0232 06/05/25  0144   ALT 15 11* 12       Microbiology:   [unfilled]    Cultures:   Results       Procedure Component Value Units Date/Time    Culture, MRSA, Screening [[id number]]     Order Status: Canceled Specimen: Other     Culture, Wound (with Gram Stain) [[id number]]  (Abnormal)  (Susceptibility) Collected: 05/27/25 1430    Order Status: Completed Specimen: Toe Updated: 05/30/25 0829     Special Requests LEFT GREAT TOE WOUND     Gram Stain RARE WBCS SEEN         NO ORGANISMS SEEN        Culture       MODERATE Methicillin Resistant Staphylococcus aureus                  SCANT MIXED SKIN DIDI ISOLATED            (NOTE) PREIMINARY REPORT OF MRSA BY ANTIGEN DETECTION CALLED TO AND READ BACK BY GINA CUMMINGS AT 1420 ON 5/29/25. [facility]    Susceptibility        Methicillin-Resistant Staphylococcus aureus      BACTERIAL SUSCEPTIBILITY PANEL LATOYA      ciprofloxacin >=8 ug/mL Resistant      clindamycin >=4 ug/mL Resistant      DAPTOmycin 0.25 ug/mL Sensitive      doxycycline 1 ug/mL Sensitive      erythromycin >=8 ug/mL Resistant      
[facility] Dosing Services: Antimicrobial Stewardship Daily Doc  Consult for antibiotic dosing of vancomycin by Dr. Lewis  Indication: osteo L great toe (confirmed with xray and MRI)  Day of Therapy: 3    Ht Readings from Last 1 Encounters:   05/24/25 1.575 m (5' 2\")        Wt Readings from Last 1 Encounters:   05/24/25 71.2 kg (157 lb)      Vancomycin therapy:  Loading dose: Vancomycin 1750 mg x1 dose given @ 1125  Maintenance dose: Vancomycin 750 mg IV every 24 hours    Dose calculated to approximate a           a. Target AUC/LATOYA of 400-600      Last level: 18.5 mcg/ml @ 0301, calculates to trough of 17.4 mcg/ml and GEA=488    Plan: AUC therapeutic today, continue current regimen, recommend weekly level monitoring. WBC WNL, afebrile, CKD IV-at baseline (Scr 1.6-1.8). Podiatry consulted.      Dose administration notes: Doses given appropriately as scheduled      Other Antimicrobial   (not dosed by pharmacist) Cefepime 2g IV every 12 hours-d3  Flagyl 500mg IV every 8 hours-d3   Cultures none   Serum Creatinine Lab Results   Component Value Date/Time    CREATININE 1.72 05/26/2025 03:01 AM          Creatinine Clearance Estimated Creatinine Clearance: 30 mL/min (A) (based on SCr of 1.72 mg/dL (H)).     Temp Temp: 98.8 °F (37.1 °C) (Oral)       WBC Lab Results   Component Value Date/Time    WBC 7.5 05/26/2025 03:01 AM          Procalcitonin Lab Results   Component Value Date/Time    PROCAL <0.05 03/08/2025 10:32 PM      For Antifungals, Metronidazole and Nafcillin: Lab Results   Component Value Date/Time    ALT 34 05/26/2025 03:01 AM    AST 20 05/26/2025 03:01 AM        Pharmacist: Patrick Tran, PharmD, BCPS  [phone]        
Left LE venous duplex completed. Final results to follow.   
Left leg angio performed without diffficulty or complication  She will need bypass  Tentatively planned for monday afternoon.    
[facility] Dosing Services: Antimicrobial Stewardship Daily Doc  Consult for antibiotic dosing of vancomycin by Dr. Lewis  Indication: osteo L great toe   Day of Therapy: 1    Ht Readings from Last 1 Encounters:   05/23/25 1.575 m (5' 2\")        Wt Readings from Last 1 Encounters:   05/23/25 71.4 kg (157 lb 6.4 oz)      Vancomycin therapy:  Loading dose: Vancomycin 1750 mg x1 dose given @ 1125  Maintenance dose: Vancomycin 750 mg IV every 24 hours, predicts AUCss 555     Dose calculated to approximate a           a. Target AUC/LATOYA of 400-600      Last level:     Plan: Recommend level within 48 hours (not yet ordered). CKD IV-at baseline (Scr 1.6-1.8)  Dose administration notes: Doses given appropriately as scheduled      Other Antimicrobial   (not dosed by pharmacist) Cefepime 2g IV every 12 hours   Cultures none   Serum Creatinine Lab Results   Component Value Date/Time    CREATININE 1.72 05/24/2025 10:45 AM          Creatinine Clearance Estimated Creatinine Clearance: 30 mL/min (A) (based on SCr of 1.72 mg/dL (H)).     Temp Temp: 97.9 °F (36.6 °C)       WBC Lab Results   Component Value Date/Time    WBC 12.6 05/24/2025 10:45 AM          Procalcitonin Lab Results   Component Value Date/Time    PROCAL <0.05 03/08/2025 10:32 PM      For Antifungals, Metronidazole and Nafcillin: Lab Results   Component Value Date/Time    ALT 48 05/24/2025 10:45 AM    AST 29 05/24/2025 10:45 AM        Pharmacist: Patrick Tran, PharmD, BCPS  [phone]    
Notify family medicine resident via perfect serve labs just obtain at this time. Notify patient is a really hard stick. Acknowledge information. No further orders received.  
OT order received, chart reviewed, team members consulted. Patient is at her functional baseline, no ADL deficits identified. Acute OT services not indicated.   Elyse Oquendo, OTR/L  
PEE / physiologic doppler attempted. Patient eating dinner. Will try again tomorrow.   
PEE / physiologic doppler completed. Final results to follow.   
Patient Fall protocol  Yellow arm band applied to patient.  Yellow non-skid socks placed on Patient.    Bed in low position, all side rails up, call bell in reach.  Patient and family instructed on \"Call Don't Fall\" Protocol   -Use your call bell, wait for assistance, staff (not Family) will assist you to get up and move about.  Patient and Family verbalize understanding of Fall Precautions and the \" Call Don't Fall\" Protocol    
Pharmacy Dosing Services: 05/30/25  Cefepime dose change per renal protocol  Physician Shirley Reyna  Indication: DFI    Serum Creatinine Lab Results   Component Value Date/Time    CREATININE 1.99 05/30/2025 02:17 AM      Creatinine Clearance Estimated Creatinine Clearance: 26 mL/min (A) (based on SCr of 1.99 mg/dL (H)).   BUN Lab Results   Component Value Date/Time    BUN 40 05/30/2025 02:17 AM         Current regimen: Cefepime 2 gm IV q12hr  New regimen: Cefepime 1 gm IV q12hr    Thank you  RYAN SANTACRUZ, [facility]  [phone]    
Postop note  Postop day 0 for left femoral pop bypass left hallux amputation.  Patient evaluated bedside.  Patient states she is sleepy but otherwise denies any complaints.  Pain is controlled.  Denies nausea.  VS reviewed   BP (!) 143/68   Pulse 75   Temp 97.9 °F (36.6 °C)   Resp 16   Ht 1.575 m (5' 2.01\")   Wt 71.2 kg (157 lb)   SpO2 99%   BMI 28.71 kg/m²   Brief physical exam-  General appearance - resting comfortably, alert, well appearing, and in no distress  Eyes: No scleral icterus, no conjunctival pallor  Chest - clear to auscultation, no wheezes, rales or rhonchi, symmetric air entry  Heart - well perfused   Abdomen - soft  Extremities - L foot bandage C/D/I. 1+ DP pulses  Continue routine postop care.  Pain regimen with Oxy 2.5/5 prn.  Please refer to day team for full progress note  Cammy Iglesias MD    
Rapid Response Note     S: I examined the patient at bedside with Dr. Yates.    O:   Vitals:    05/28/25 0915   BP: (!) 160/76   Pulse: 70   Resp: 16   Temp: 98.1 °F (36.7 °C)   SpO2: 95%     EKG normal sinus rhythm, no ST segment elevations, depressions  Cardio: normal rate, regular rhythm. No chest wall tenderness.   Lung: symmetric chest rise  Abdomen: non distented  Extremities: 2+ edema bilaterally  Neuro: Alert, awake , oriented     A/P  Chest Pain: EKG nonischemic. Scanned into media. Pain reproducible/not reproducible. HR 75, RR18, satting 95% on RA. DDX includes but is not limited to ACS, PE, PNA, GERD, MSK. Wells Score 1.5. Low suspicion for PE at this time given no new O2 requirement. Continue to monitor respiratory status, obtain CTA chest if pt becomes hypoxic.   - Trop every 2 hours, BMP, CBC, CXR pending  - Doppler of LLE from this AM changed to STAT   -Will give Mylanta (hold viscous lidocaine given pt on metronidazole, can precipitate disulfiram-like reaction).      Shawna Frederick, DO  Family Medicine Resident       
Schedule conflict precludes doing procedure today  Will try to get on schedule for tomorrow  Alternatively, can discharge patient and I will arrange as an outpatient.   
Tentative cath lab Thursday afternoon.   
Unable to obtain labs; Provider (Resident) notified; RN Request RRT assistance for new access placement and lab draw. Noted in MAR.  
Vascular PEE attempted. Transport was there to take patient to MRI.  
    Report consisted of patient's Situation, Background, Assessment and   Recommendations(SBAR).     Information from the following report(s) Nurse Handoff Report was reviewed with the receiving nurse.           Lines:   Peripheral IV 05/24/25 Left Antecubital (Active)   Site Assessment Clean, dry & intact 05/30/25 1152   Line Status Flushed 05/30/25 1152   Line Care Connections checked and tightened 05/30/25 0955   Phlebitis Assessment No symptoms 05/30/25 1152   Infiltration Assessment 0 05/30/25 1152   Alcohol Cap Used Yes 05/30/25 1152   Dressing Status Clean, dry & intact 05/30/25 1152   Dressing Type Transparent 05/30/25 1152        Opportunity for questions and clarification was provided.      Patient transported with:  Registered Nurse    1355: Site visualized with RN. CDI. No hematoma. No complaints. Patient HOB elevated 30 degrees.                   
116* 116* 115*   CO2 21 20* 21   BUN 39* 38* 40*   CREATININE 1.93* 1.88* 1.99*   GLUCOSE 120* 125* 200*   CALCIUM 8.0* 8.2* 8.1*     PT/INR:  No results for input(s): \"PROTIME\", \"INR\", \"INREXT\" in the last 72 hours.  APTT:  No results for input(s): \"APTT\" in the last 72 hours.  LIVER PROFILE:  Recent Labs     05/28/25  0456 05/29/25  0424 05/30/25  0217   AST 14* 25 17   ALT 20 20 18   BILITOT 0.2 0.2 0.2   ALKPHOS 306* 300* 263*     Lab Results   Component Value Date/Time    ALT 18 05/30/2025 02:17 AM    AST 17 05/30/2025 02:17 AM    ALKPHOS 263 05/30/2025 02:17 AM    ALKPHOS 156 04/20/2023 08:51 AM    BILITOT 0.2 05/30/2025 02:17 AM       Imaging Last 24 Hours:    Assessment:     Diabetic ulcer with osteomyelitis, left hallux  Diabetes mellitus type 2 with neuropathy  PAD       Plan:   - Patient was seen and evaluated at bedside.  - Current labs personally reviewed and findings dicussed with patient  -Patient underwent vascular procedure today.  - Discussed with patient performing a left hallux amputation on Monday morning.  - Patient to be n.p.o. Monday  - Continue local wound care and IV antibiotics      Nakul Wang DPM, CWSP, AACFAS   
PRN    dextrose bolus 10% 125 mL  125 mL IntraVENous PRN    Or    dextrose bolus 10% 250 mL  250 mL IntraVENous PRN    glucagon injection 1 mg  1 mg SubCUTAneous PRN    dextrose 10 % infusion   IntraVENous Continuous PRN    albuterol (PROVENTIL) nebulizer solution 10 mg  10 mg Nebulization Once    [Held by provider] bumetanide (BUMEX) tablet 1 mg  1 mg Oral Daily    doxycycline hyclate (VIBRA-TABS) tablet 100 mg  100 mg Oral 2 times per day    tiZANidine (ZANAFLEX) tablet 2 mg  2 mg Oral Q8H PRN    sodium chloride flush 0.9 % injection 5-40 mL  5-40 mL IntraVENous 2 times per day    sodium chloride flush 0.9 % injection 5-40 mL  5-40 mL IntraVENous PRN    0.9 % sodium chloride infusion   IntraVENous PRN    lidocaine PF 1 % injection 50 mg  50 mg IntraDERmal Once    calcium carbonate (TUMS) chewable tablet 500 mg  500 mg Oral TID PRN    melatonin tablet 3 mg  3 mg Oral Nightly PRN    ondansetron (ZOFRAN) injection 4 mg  4 mg IntraVENous Q6H PRN    Or    ondansetron (ZOFRAN-ODT) disintegrating tablet 4 mg  4 mg Oral Q8H PRN    pantoprazole (PROTONIX) tablet 40 mg  40 mg Oral BID AC    0.9 % sodium chloride infusion   IntraVENous PRN    vitamin D (ERGOCALCIFEROL) capsule 50,000 Units  50,000 Units Oral Weekly    miconazole (MICOTIN) 2 % powder   Topical BID    0.9 % sodium chloride infusion   IntraVENous PRN    dextrose 10 % infusion   IntraVENous Continuous PRN    insulin lispro (HUMALOG,ADMELOG) injection vial 0-8 Units  0-8 Units SubCUTAneous 4x Daily AC & HS    lipase-protease-amylase (ZENPEP) 51888-71597 units delayed release capsule 20,000 Units  20,000 Units Oral TID WC    sodium bicarbonate tablet 650 mg  650 mg Oral TID    dicyclomine (BENTYL) capsule 10 mg  10 mg Oral 4x Daily AC & HS    gabapentin (NEURONTIN) capsule 200 mg  200 mg Oral TID    SUMAtriptan (IMITREX) tablet 100 mg  100 mg Oral PRN    albuterol (PROVENTIL) (2.5 MG/3ML) 0.083% nebulizer solution 2.5 mg  2.5 mg Nebulization Q4H PRN    
one on file here or she can provide a copy of her AMD to be kept on file here. She took the AMD form and thanked  for the AMD form, that she will discuss it further with family and asked for prayer for what she is concerned about.  complied and prayed for her and she expressed her appreciation.       Patient Interventions include: Facilitated expression of thoughts and feelings, Explored spiritual coping/struggle/distress, Engaged in theological reflection, Affirmed coping skills/support systems, Facilitated life review and/ or legacy, and Assisted in Advance Care Planning conversation  Family/Friends Interventions include: No family/friends present    Patient Plan of Care: Spiritual Care available upon further referral  Family/Friends Plan of Care: No family/friends present    Electronically signed by JANAE Jain on 5/28/2025 at 4:22 PM  For  support, please call Spiritual Health Team @ [phone])      
prancreatitis    UPPER GASTROINTESTINAL ENDOSCOPY N/A 2/20/2025    ESOPHAGOGASTRODUODENOSCOPY performed by Artem Diaz MD at [facility] ENDOSCOPY    UPPER GASTROINTESTINAL ENDOSCOPY N/A 2/20/2025    ENDOSCOPIC ULTRASOUND performed by Artem Diaz MD at [facility] ENDOSCOPY    UPPER GASTROINTESTINAL ENDOSCOPY N/A 2/20/2025    ESOPHAGOGASTRODUODENOSCOPY BIOPSY performed by Artem Diaz MD at [facility] ENDOSCOPY       Home Situation and Prior Level of Function:   Social/Functional History  Lives With: Alone (son stays occasionally)  Type of Home: House  Home Layout: One level  Home Access: Stairs to enter with rails  Entrance Stairs - Number of Steps: 3  Entrance Stairs - Rails: Both  Home Equipment: Cane, Rollator    Patient Vitals for the past 6 hrs:   Pulse BP Patient Position   05/25/25 1102 70 128/65 Supine   05/25/25 0905 69 133/65 Supine                Strength:    Strength: Within functional limits    Tone & Sensation:   Tone: Normal  Sensation: Intact    Coordination:  Coordination: Within functional limits    Range Of Motion:  AROM: Within functional limits  PROM: Within functional limits    Functional Mobility:  Bed Mobility:     Bed Mobility Training  Bed Mobility Training: Yes  Rolling: Independent  Supine to Sit: Independent  Sit to Supine: Independent  Transfers:     Transfer Training  Transfer Training: Yes  Sit to Stand: Independent  Stand to Sit: Independent  Bed to Chair: Modified independent  Balance:               Balance  Sitting: Intact  Standing: Intact  Ambulation/Gait Training:                       Gait  Gait Training: Yes  Overall Level of Assistance: Modified independent  Distance (ft): 500 Feet  Assistive Device: Gait belt;Cane, straight                                                                                                                                                                                                                                                                [facility] AM-PAC® 
      Assessment//Plan           Hospital Problems           Last Modified POA    * (Principal) Osteomyelitis of great toe of left foot (HCC) 5/24/2025 Yes    Diabetic foot infection (HCC) 5/25/2025 Yes    Primary hypertension 5/25/2025 Yes    Acute on chronic anemia 5/26/2025 Yes     Assessment & Plan    Diabetic ulcer with osteomyelitis, left hallux  Diabetes mellitus type 2 with neuropathy  PAD      Patient seen and evaluated at bedside  - Current labs personally reviewed and findings dicussed with patient  - MRI images previously was seen and discussed with patient.    - Non invasive vascular studies personally reviewed and findings discussed with pt. Inflow disease noted to the affected LLE, consult placed to vascular surgery and appreciate recs  - Pending ID consult and appreciate recs. Cont empiric abx at this time  - Local wound care performed. Orders placed in epic. Offload while in bed  - We will follow closely and update recs as needed    Thank you!          Aaron Canada DPM, CWSP, DABPM, AACFAS    [facility]  [address] [facility] [place] [address] [phone]    [facility]  [address] [facility] [address] [place] [address] [phone]     [facility]  [address] [address] [phone]     * Available via [email or web address] 24/7    Electronically signed by Aaron Canada DPM on 5/27/25 at 11:13 AM EDT    
Labs     06/04/25  0232 06/05/25  0144 06/05/25  1150 06/05/25  1702    141 142  --    K 5.4* 6.2* 5.8* 5.9*   * 118* 117*  --    CO2 20* 24 22  --    BUN 63* 75* 75*  --    CREATININE 2.33* 2.23* 2.29*  --    GLUCOSE 226* 180* 234*  --    CALCIUM 8.0* 7.8* 8.0*  --          : Carlos Underwood MD  6/6/2025        [facility]:  [email or web address]  [email or web address]    [place] office:  [address] [facility], [address]  [place] [address]  Phone: [phone]  Fax :     [phone]    [place] office:  [address] [place] [address]  Phone - [phone]  Fax - [phone]                                                                                        
RN.      Transition of Care: Plan to follow weekly and as needed while admitted to hospital.     RADHIKA NorwoodN, RN  [phone]  Available via [employer]    
  IMPRESSION:  Acute osteomyelitis of the first distal phalanx.        Electronically signed by CYNDIE ROY         Assessment and Plan     Candida Maza is a 65 y.o. female with a PMHx of T2DM, CKDIV, HTN, pancreatic insufficiency, GERD, ESBL UTIs admitted for osteomyelitis of the left great toe.     Osteomyelitis of L great toe  Chronic wound on base of L great toe. Xray imaging showing osteomyelitis of the left great toe. ESR elevated to 53. Leukocytosis of 12.6. notable h/o T2DM. S/p cefepime, vanc in the ER. 0/4 SIRS.  MRI L Foot confirmed osteomyelitis of the L first distal phalynx.   - continue cefepime, vancomycin, Flagyl  - Consult Podiatry, likely procedure tomorrow, NPO @ midnight  - ABIs ordered for decreased distal pulses  - SCDs  - Tylenol 1000 mg scheduled TID  - Continue Oxy 2.5 or 5 mg q6h prn for pain.      Hyperkalemia. POA K 5.3. Repeat 5.3 this am, not meeting criteria for emergent management. Pt with h/o CKD4.   - Diet: Low potassium  - Will continue to monitor.      Chronic bilateral lower extremity edema   1+ bipedal edema on presentation. Home Lasix 20 mg tid.  - Continue Lasix      Chronic pancreatitis  Pancreatic exocrine insufficiency  Chronic Mild abdominal pain at baseline. Prior admission imaging showed pancreatic ductal dilation, unable to exclude mass, recommended repeat imaging during past admission in March.   - Zenpep (sub for home Creon 1 cap tid)  - Continue home bentyl 10 mg 4 times daily before meals and nightly      Diabetes Mellitus T2 c/b neuropathy  POA. Patient endorsing chronic, intermittent pins and needles pain. Home Lantus 10 u in AM, gabapentin 200 mg tid. Hypoglycemic on admission iso not eating. However this am glucose is 277.                 Hemoglobin A1C   Date Value Ref Range Status   02/18/2025 7.6 (H) 4.0 - 5.6 % Final       Comment:       (NOTE)  HbA1C Interpretive Ranges  <5.7              Normal  5.7 - 6.4         Consider Prediabetes  >6.5          
Edema is seen throughout the musculature likely to diabetic neuropathy.     Neurovascular bundles: Within normal limits.     Articular cartilage:intact. No osteochondral lesion.     Soft tissue mass: No mass. There is diffuse subcutaneous edema surrounding the  foot. No evidence for focal drainable fluid collection. There is a soft tissue  wound of the distal first toe.     IMPRESSION:  Acute osteomyelitis of the first distal phalanx.        Electronically signed by CYNDIE ROY         Assessment and Plan     Candida Maza is a 65 y.o. female with a PMHx of T2DM, CKDIV, HTN, pancreatic insufficiency, GERD, ESBL UTIs admitted for osteomyelitis of the left great toe.     Osteomyelitis of L great toe  POD6 L pop to peroneal bypass and L Hallux Amputation (6/2): Chronic wound on base of L great toe.MRI L Foot confirmed osteomyelitis of the L first distal phalynx. PEE L foot showing mild signs of significant PAD. S/p Vancomycin (5/24-5/27)  Cefepime and Metronidazole (5/26-06/02), Dapto (5/27-6/5) Wcx: moderate MRSA. Surgical path with clean margins. PT/OT recommended SNF placement.   - ID Following    - Continue PO Doxy x 10 day (6/5-6/15)  - Podiatry following:   - Continue post-op care  - OP follow up in 2 weeks.   - Vascular surgery  - Will follow outpatient.   - Will work with CM to arrange SNF placement, accepted.   - Tylenol 1000 mg scheduled TID  - Continue Oxy 2.5 q6h prn for pain.  - Increase scheduled Glycolax to TID for constipation    Hyperkalemia. POA K 5.3. iso CKD4. Peak K+ 6.2. Pt asymptomatic. No EKG changes. S/p Albuterol 10mg nebulizer, calcium gluconate, humalin 10u. Improved,  K+ 5.4 this AM, will reach back out to nephrology.  - Nephrology following:  - Diet: Low potassium  - DC amlodipine, do not restart until advised by nephrologist or PCP.   - Pause diuresis   - Lokelma x 6   - Will continue to monitor     LUE swelling: LUE duplex negative for DVT or thrombophlebitis. Continued 
phalangeal tuft. There is erosive change of the first distal phalangeal tuft. There is edema and decreased T1 signal in the first distal phalanx. Findings are consistent with acute osteomyelitis. Joint fluid:  None. Tendons: Intact. Muscles: Edema is seen throughout the musculature likely to diabetic neuropathy. Neurovascular bundles: Within normal limits. Articular cartilage:intact. No osteochondral lesion. Soft tissue mass: No mass. There is diffuse subcutaneous edema surrounding the foot. No evidence for focal drainable fluid collection. There is a soft tissue wound of the distal first toe.     Acute osteomyelitis of the first distal phalanx. Electronically signed by CYNDIE ROY    XR FOOT LEFT (MIN 3 VIEWS)  Result Date: 5/24/2025  EXAM: XR FOOT LEFT (MIN 3 VIEWS) INDICATION: infection L great toe, osteo?. Reason for exam:->infection L great toe, osteo?. COMPARISON: None. FINDINGS: Three views of the left foot demonstrate attenuation and erosion of the tuft of the left great toe distal phalanx, with cortical indistinctness and destruction at the site of a soft tissue ulcer and generalized soft tissue swelling around the great toe. Mild osteopenia diffusely.. The soft tissues are within normal limits.     Acute osteomyelitis left great toe distal phalangeal tuft.. Electronically signed by FRANK ENRIQUEZ      Thank you for the opportunity to participate in the care of this patient.   Please contact with questions or concerns.      JACKIE Robles NP             
improved today from 2.16 to 2.08. However LE swelling worsened yesterday with pain of L posterior calf, LLE doppler negative for DVT. Pain improving today.   - Lasix 20 BID    Chronic pancreatitis  Pancreatic exocrine insufficiency  Chronic Mild abdominal pain at baseline. Prior admission imaging showed pancreatic ductal dilation, unable to exclude mass, recommended repeat imaging during past admission in March.   - Zenpep (sub for home Creon 1 cap tid)  - Continue home bentyl 10 mg 4 times daily before meals and nightly      Diabetes Mellitus T2 c/b neuropathy  Last A1c 7.6. Patient endorsing chronic, intermittent pins and needles pain. Home Lantus 10 u in AM, gabapentin 200 mg tid.   - Will start Lantus 8u am   - Continue home gabapentin   - SSI, hypoglycemia protocols ordered     CKD IV/ At-Risk HARPRE : Baseline creatinine approximately 1.6-1.8. POA Cr of 1.72,today increased to 2.16. On home sodium bicarbonate 650 mg tid.   - Continue home med  - Will hold Lasix  - Will order IVF bolus     GERD  Chronic, controlled. Home omeprazole 20 mg bid.  - Sub Protonix 40 mg bid for home omeprazole      Asthma: Chronic, controlled. On RA.  - albuterol prn     Migraine: Controlled.   - Continue home sumatriptan 100 mg prn.     Hypertension: AM BP was 123/53 mmHg.   - Will hold home amlodipine 10 mg  - monitor and adjust as indicated     Hx of ESBL UTI  UA this admission non infectious.  - Contact precautions      Obesity:  - The pt's BMI 28.79 kg/m2  - Encouraging lifestyle modifications and further follow up outpatient.     FEN/GI - Diabetic diet. NPO  Activity - Up with assistance   DVT prophylaxis - Lovenox, Held ISO of possible procedure. SCD's  GI prophylaxis - Protonix  Fall prophylaxis - Not indicated at this time.  Disposition - Admit to Medical. Plan to d/c to TBD. Consulting PT and OT  Code Status - Full. Discussed with patient / caregivers.  Next of Kin Name and Contact - Croona Jama (Other)  [phone] . 
improved today from 2.16 to 2.08. However LE swelling worsened yesterday with pain of L posterior calf, LLE doppler negative for DVT. Pain improving today.   - Resume Lasix 20mg IV qd     Chronic pancreatitis  Pancreatic exocrine insufficiency  Chronic Mild abdominal pain at baseline. Prior admission imaging showed pancreatic ductal dilation, unable to exclude mass, recommended repeat imaging during past admission in March.   - Zenpep (sub for home Creon 1 cap tid)  - Continue home bentyl 10 mg 4 times daily before meals and nightly      Diabetes Mellitus T2 c/b neuropathy  Last A1c 7.6. Patient endorsing chronic, intermittent pins and needles pain. Home Lantus 10 u in AM, gabapentin 200 mg tid.   - Will start Lantus 8u am   - Continue home gabapentin   - SSI, hypoglycemia protocols ordered     CKD IV/ At-Risk HARPER : Baseline creatinine approximately 1.6-1.8. POA Cr of 1.72, increased to 2.15 at peak today continues to improve at 1.88. On home sodium bicarbonate 650 mg tid.   - Continue home med     GERD  Chronic, controlled. Home omeprazole 20 mg bid.  - Sub Protonix 40 mg bid for home omeprazole      Asthma: Chronic, controlled. On RA.  - albuterol prn     Migraine: Controlled.   - Continue home sumatriptan 100 mg prn.     Hypertension: AM BP was 123/53 mmHg.   - Will hold home amlodipine 10 mg  - monitor and adjust as indicated     Hx of ESBL UTI  UA this admission non infectious.  - Contact precautions      Obesity:  - The pt's BMI 28.79 kg/m2  - Encouraging lifestyle modifications and further follow up outpatient.     FEN/GI - Diabetic diet. NPO  Activity - Up with assistance   DVT prophylaxis - Lovenox, Held ISO of possible procedure. SCD's  GI prophylaxis - Protonix  Fall prophylaxis - Not indicated at this time.  Disposition - Admit to Medical. Plan to d/c to TBD. Consulting PT and OT  Code Status - Full. Discussed with patient / caregivers.  Next of Kin Name and Contact - Corona Jama 
to monitor.      Chronic bilateral lower extremity edema   1+ bipedal edema on presentation. Home Lasix 20 mg tid. Lasix initially held due to concern for at risk HARPER. Cr improved today from 2.16 to 2.08. However LE swelling worsened yesterday with pain of L posterior calf, LLE doppler negative for DVT.  - Lasix 20 BID    Chronic pancreatitis  Pancreatic exocrine insufficiency  Chronic Mild abdominal pain at baseline. Prior admission imaging showed pancreatic ductal dilation, unable to exclude mass, recommended repeat imaging during past admission in March.   - Zenpep (sub for home Creon 1 cap tid)  - Continue home bentyl 10 mg 4 times daily before meals and nightly      Diabetes Mellitus T2 c/b neuropathy  Last A1c 7.6. Patient endorsing chronic, intermittent pins and needles pain. Home Lantus 10 u in AM, gabapentin 200 mg tid.   - While NPO pt on Lantus 6u, SSI has been held.      CKD IV/ At-Risk HARPER : Baseline creatinine approximately 1.6-1.8. POA Cr of 1.72,today increased to 1.69 back to baseline. On home sodium bicarbonate 650 mg tid.   - Continue home med     GERD  Chronic, controlled. Home omeprazole 20 mg bid.  - Sub Protonix 40 mg bid for home omeprazole      Asthma: Chronic, controlled. On RA.  - albuterol prn     Migraine: Controlled.   - Continue home sumatriptan 100 mg prn.     Hypertension: AM BP was 123/53 mmHg.   - Will hold home amlodipine 10 mg  - monitor and adjust as indicated     Hx of ESBL UTI  UA this admission non infectious.  - Contact precautions        FEN/GI - Diabetic diet. NPO from MN  Activity - Up with assistance   DVT prophylaxis - Lovenox, Held from MN  GI prophylaxis - Protonix  Fall prophylaxis - Not indicated at this time.  Disposition - Consulting PT and OT  Code Status - Full. Discussed with patient / caregivers.  Next of Kin Name and Contact - Corona Jama (Other)  [phone] .     Patient discussed with Murray Meredith MD,   Family Medicine 
  CKD IV/ At-Risk HARPER : Baseline creatinine approximately 1.6-1.8. POA Cr of 1.72, today at 2.23. On home sodium bicarbonate 650 mg tid.   - Continue home med     GERD  Chronic, controlled. Home omeprazole 20 mg bid.  - Sub Protonix 40 mg bid for home omeprazole      Asthma: Chronic, controlled. On RA.  - albuterol prn     Migraine: Controlled.   - Continue home sumatriptan 100 mg prn.     Hypertension: AM BP was 123/53 mmHg.   - Will hold home amlodipine 10 mg  - monitor and adjust as indicated     Hx of ESBL UTI  UA this admission non infectious.  - Contact precautions        FEN/GI - Diabetic diet. NPO from MN  Activity - Up with assistance   DVT prophylaxis - Lovenox, Held from MN  GI prophylaxis - Protonix  Fall prophylaxis - Not indicated at this time.  Disposition - Consulting PT and OT  Code Status - Full. Discussed with patient / caregivers.  Next of Kin Name and Contact - Corona Jama (Other)  [phone] .     Patient discussed with Murary Meredith MD, DO  Family Medicine Resident       For Billing    Chief Complaint   Patient presents with    Leg Pain       Principal Problem:    Osteomyelitis of great toe of left foot (HCC)  Active Problems:    Diabetic foot infection (HCC)    Primary hypertension    Acute on chronic anemia    Allergy to multiple antibiotics    PAD (peripheral artery disease)  Resolved Problems:    * No resolved hospital problems. *        
(Other)  [phone] .     Patient discussed with Murray Meredith MD, DO  Family Medicine Resident       For Billing    Chief Complaint   Patient presents with    Leg Pain       Principal Problem:    Osteomyelitis of great toe of left foot (HCC)  Active Problems:    Diabetic foot infection (HCC)    Primary hypertension    Acute on chronic anemia    Allergy to multiple antibiotics    PAD (peripheral artery disease)  Resolved Problems:    * No resolved hospital problems. *        
3:17 PM   Result Value Ref Range    POC Glucose 125 (H) 65 - 117 mg/dL    Performed by: René Constantino    POCT Glucose    Collection Time: 05/29/25  7:41 PM   Result Value Ref Range    POC Glucose 102 65 - 117 mg/dL    Performed by: Rafael Najera    Comprehensive Metabolic Panel    Collection Time: 05/30/25  2:17 AM   Result Value Ref Range    Sodium 139 136 - 145 mmol/L    Potassium 5.1 3.5 - 5.1 mmol/L    Chloride 115 (H) 97 - 108 mmol/L    CO2 21 21 - 32 mmol/L    Anion Gap 3 2 - 12 mmol/L    Glucose 200 (H) 65 - 100 mg/dL    BUN 40 (H) 6 - 20 MG/DL    Creatinine 1.99 (H) 0.55 - 1.02 MG/DL    BUN/Creatinine Ratio 20 12 - 20      Est, Glom Filt Rate 27 (L) >60 ml/min/1.73m2    Calcium 8.1 (L) 8.5 - 10.1 MG/DL    Total Bilirubin 0.2 0.2 - 1.0 MG/DL    ALT 18 12 - 78 U/L    AST 17 15 - 37 U/L    Alk Phosphatase 263 (H) 45 - 117 U/L    Total Protein 4.2 (L) 6.4 - 8.2 g/dL    Albumin 1.1 (L) 3.5 - 5.0 g/dL    Globulin 3.1 2.0 - 4.0 g/dL    Albumin/Globulin Ratio 0.4 (L) 1.1 - 2.2     CBC with Auto Differential    Collection Time: 05/30/25  2:17 AM   Result Value Ref Range    WBC 9.0 3.6 - 11.0 K/uL    RBC 3.12 (L) 3.80 - 5.20 M/uL    Hemoglobin 9.3 (L) 11.5 - 16.0 g/dL    Hematocrit 29.1 (L) 35.0 - 47.0 %    MCV 93.3 80.0 - 99.0 FL    MCH 29.8 26.0 - 34.0 PG    MCHC 32.0 30.0 - 36.5 g/dL    RDW 15.6 (H) 11.5 - 14.5 %    Platelets 318 150 - 400 K/uL    MPV 9.8 8.9 - 12.9 FL    Nucleated RBCs 0.0 0  WBC    nRBC 0.00 0.00 - 0.01 K/uL    Neutrophils % 75.9 (H) 32.0 - 75.0 %    Lymphocytes % 18.2 12.0 - 49.0 %    Monocytes % 4.7 (L) 5.0 - 13.0 %    Eosinophils % 0.2 0.0 - 7.0 %    Basophils % 0.6 0.0 - 1.0 %    Immature Granulocytes % 0.4 0.0 - 0.5 %    Neutrophils Absolute 6.81 1.80 - 8.00 K/UL    Lymphocytes Absolute 1.63 0.80 - 3.50 K/UL    Monocytes Absolute 0.42 0.00 - 1.00 K/UL    Eosinophils Absolute 0.02 0.00 - 0.40 K/UL    Basophils Absolute 0.05 0.00 - 0.10 K/UL    Immature Granulocytes Absolute 0.04 0.00 
Platelets 314 150 - 400 K/uL    MPV 9.7 8.9 - 12.9 FL    Nucleated RBCs 0.0 0  WBC    nRBC 0.00 0.00 - 0.01 K/uL    Neutrophils % 75.5 (H) 32.0 - 75.0 %    Lymphocytes % 16.7 12.0 - 49.0 %    Monocytes % 6.7 5.0 - 13.0 %    Eosinophils % 0.2 0.0 - 7.0 %    Basophils % 0.5 0.0 - 1.0 %    Immature Granulocytes % 0.4 0.0 - 0.5 %    Neutrophils Absolute 7.04 1.80 - 8.00 K/UL    Lymphocytes Absolute 1.56 0.80 - 3.50 K/UL    Monocytes Absolute 0.63 0.00 - 1.00 K/UL    Eosinophils Absolute 0.02 0.00 - 0.40 K/UL    Basophils Absolute 0.05 0.00 - 0.10 K/UL    Immature Granulocytes Absolute 0.04 0.00 - 0.04 K/UL    Differential Type AUTOMATED     POCT Glucose    Collection Time: 05/29/25  8:09 AM   Result Value Ref Range    POC Glucose 106 65 - 117 mg/dL    Performed by: René Constantino         Microbiology:      Studies:  MRI Result (most recent):  MRI FOOT LEFT WO CONTRAST 05/25/2025    Narrative  EXAM:  MRI FOOT LEFT WO CONTRAST    INDICATION: Foot infection. Evaluate for osteomyelitis.    COMPARISON: Radiographs 5/24/2025    TECHNIQUE: Axial, coronal, and sagittal MRI of the left forefoot in the T1, T2,  and inversion-recovery pulse sequences with and without fat saturation .    CONTRAST: None.    FINDINGS: Bone marrow: There is exposure of the first distal phalangeal tuft.  There is erosive change of the first distal phalangeal tuft. There is edema and  decreased T1 signal in the first distal phalanx. Findings are consistent with  acute osteomyelitis.    Joint fluid:  None.    Tendons: Intact.    Muscles: Edema is seen throughout the musculature likely to diabetic neuropathy.    Neurovascular bundles: Within normal limits.    Articular cartilage:intact. No osteochondral lesion.    Soft tissue mass: No mass. There is diffuse subcutaneous edema surrounding the  foot. No evidence for focal drainable fluid collection. There is a soft tissue  wound of the distal first toe.    Impression  Acute osteomyelitis of the first 
Shirley Reyna, APRN - NP     May 28, 2025

## 2025-06-18 NOTE — ED NOTES
TRANSFER - OUT REPORT:    Verbal report given to [facility] staff on Candida Maza  being transferred to [facility] for routine progression of patient care       Report consisted of patient's Situation, Background, Assessment and   Recommendations(SBAR).     Information from the following report(s) Nurse Handoff Report, Index, ED Encounter Summary, ED SBAR, Adult Overview, Intake/Output, MAR, Med Rec Status, Quality Measures, and Neuro Assessment was reviewed with the receiving nurse.    [place] Fall Assessment:                         Reassessment at this time unchanged. Pt stable for discharge as ordered by Dr. Gaitan and ALTHEA Mena  Lines:   Peripheral IV 02/17/25 Left;Proximal;Anterior Forearm (Active)        Opportunity for questions and clarification was provided.      Patient transported with:  Monitor         used

## 2025-07-01 ENCOUNTER — TELEPHONE (OUTPATIENT)
Age: 65
End: 2025-07-01

## 2025-07-03 ENCOUNTER — TELEPHONE (OUTPATIENT)
Age: 65
End: 2025-07-03

## 2025-07-03 DIAGNOSIS — M54.50 CHRONIC MIDLINE LOW BACK PAIN WITHOUT SCIATICA: ICD-10-CM

## 2025-07-03 DIAGNOSIS — G89.29 CHRONIC MIDLINE LOW BACK PAIN WITHOUT SCIATICA: ICD-10-CM

## 2025-07-03 NOTE — TELEPHONE ENCOUNTER
Pt had [facility] over for Physical Therapy today July 3, They called to inform us that it was going to be an evaluation only as pt does not need skilled Physical Therapy at this time.     P:[phone]

## 2025-07-03 NOTE — TELEPHONE ENCOUNTER
Tried calling the number on file multiple times. It felt like it was a spam call. I did not get a VM.

## 2025-07-09 ENCOUNTER — TELEPHONE (OUTPATIENT)
Age: 65
End: 2025-07-09

## 2025-07-09 NOTE — TELEPHONE ENCOUNTER
[facility] is Following Pt for wound care of Left Great Toe amputation. They are requesting a new order to Cleanse with Vasat, Hydrogel, cover with zero form, cover with Abd pad, secure with Kerlix, Cover with coban and to change dressing 2x a week or more if needed.    3 wounds:  Left Great Toe  Top of Left Foot  Left Calf    Can leave a message agreeing to orders at [phone] and Ashlee will write up the order.

## 2025-07-15 DIAGNOSIS — M86.9 OSTEOMYELITIS OF GREAT TOE OF LEFT FOOT (HCC): ICD-10-CM

## 2025-07-15 RX ORDER — OXYCODONE HYDROCHLORIDE 5 MG/1
2.5 TABLET ORAL EVERY 6 HOURS PRN
Qty: 5 TABLET | Refills: 0 | OUTPATIENT
Start: 2025-07-15 | End: 2025-07-19

## 2025-07-15 RX ORDER — TRAMADOL HYDROCHLORIDE 50 MG/1
TABLET ORAL
OUTPATIENT
Start: 2025-07-15

## 2025-07-15 NOTE — TELEPHONE ENCOUNTER
Ashlee with [facility] is requesting the following Rx be sent in to assist Pt while in wound care. She states that Pt says she has run out of both of these.     Ashlee also says that Pt's wounds are pretty significant and cause her a lot of pain during wound care.    oxyCODONE (ROXICODONE) immediate release tablet 2.5 mg   [[id number]]    traMADol (ULTRAM) 50 MG tablet [[id number]]  ENDED      [facility] #[id number] - [place], [place] - [address] [place] [address] -  [phone] - F [phone]  [address] [place] [address]  Phone: [phone]  Fax: [phone]
